# Patient Record
Sex: MALE | Race: WHITE | NOT HISPANIC OR LATINO | Employment: OTHER | URBAN - METROPOLITAN AREA
[De-identification: names, ages, dates, MRNs, and addresses within clinical notes are randomized per-mention and may not be internally consistent; named-entity substitution may affect disease eponyms.]

---

## 2017-01-05 ENCOUNTER — TRANSCRIBE ORDERS (OUTPATIENT)
Dept: LAB | Facility: CLINIC | Age: 77
End: 2017-01-05

## 2017-01-05 ENCOUNTER — APPOINTMENT (OUTPATIENT)
Dept: LAB | Facility: CLINIC | Age: 77
End: 2017-01-05
Payer: MEDICARE

## 2017-01-05 DIAGNOSIS — E11.9 TYPE 2 DIABETES MELLITUS WITHOUT COMPLICATION, UNSPECIFIED LONG TERM INSULIN USE STATUS: Primary | ICD-10-CM

## 2017-01-05 DIAGNOSIS — E11.9 TYPE 2 DIABETES MELLITUS WITHOUT COMPLICATION, UNSPECIFIED LONG TERM INSULIN USE STATUS: ICD-10-CM

## 2017-01-05 DIAGNOSIS — I42.9 SECONDARY CARDIOMYOPATHY, UNSPECIFIED: ICD-10-CM

## 2017-01-05 LAB
ALBUMIN SERPL BCP-MCNC: 3.3 G/DL (ref 3.5–5)
ALP SERPL-CCNC: 169 U/L (ref 46–116)
ALT SERPL W P-5'-P-CCNC: 34 U/L (ref 12–78)
ANION GAP SERPL CALCULATED.3IONS-SCNC: 9 MMOL/L (ref 4–13)
AST SERPL W P-5'-P-CCNC: 22 U/L (ref 5–45)
BASOPHILS # BLD AUTO: 0.04 THOUSANDS/ΜL (ref 0–0.1)
BASOPHILS NFR BLD AUTO: 1 % (ref 0–1)
BILIRUB SERPL-MCNC: 0.75 MG/DL (ref 0.2–1)
BUN SERPL-MCNC: 54 MG/DL (ref 5–25)
CALCIUM SERPL-MCNC: 9.5 MG/DL (ref 8.3–10.1)
CHLORIDE SERPL-SCNC: 99 MMOL/L (ref 100–108)
CHOLEST SERPL-MCNC: 124 MG/DL (ref 50–200)
CO2 SERPL-SCNC: 29 MMOL/L (ref 21–32)
CREAT SERPL-MCNC: 1.82 MG/DL (ref 0.6–1.3)
EOSINOPHIL # BLD AUTO: 0.18 THOUSAND/ΜL (ref 0–0.61)
EOSINOPHIL NFR BLD AUTO: 3 % (ref 0–6)
ERYTHROCYTE [DISTWIDTH] IN BLOOD BY AUTOMATED COUNT: 14.6 % (ref 11.6–15.1)
EST. AVERAGE GLUCOSE BLD GHB EST-MCNC: 151 MG/DL
GFR SERPL CREATININE-BSD FRML MDRD: 36.2 ML/MIN/1.73SQ M
GLUCOSE SERPL-MCNC: 75 MG/DL (ref 65–140)
HBA1C MFR BLD: 6.9 % (ref 4.2–6.3)
HCT VFR BLD AUTO: 38.1 % (ref 36.5–49.3)
HDLC SERPL-MCNC: 48 MG/DL (ref 40–60)
HGB BLD-MCNC: 12.8 G/DL (ref 12–17)
LDLC SERPL CALC-MCNC: 58 MG/DL (ref 0–100)
LYMPHOCYTES # BLD AUTO: 1.59 THOUSANDS/ΜL (ref 0.6–4.47)
LYMPHOCYTES NFR BLD AUTO: 23 % (ref 14–44)
MCH RBC QN AUTO: 34.9 PG (ref 26.8–34.3)
MCHC RBC AUTO-ENTMCNC: 33.6 G/DL (ref 31.4–37.4)
MCV RBC AUTO: 104 FL (ref 82–98)
MONOCYTES # BLD AUTO: 0.83 THOUSAND/ΜL (ref 0.17–1.22)
MONOCYTES NFR BLD AUTO: 12 % (ref 4–12)
NEUTROPHILS # BLD AUTO: 4.26 THOUSANDS/ΜL (ref 1.85–7.62)
NEUTS SEG NFR BLD AUTO: 61 % (ref 43–75)
NRBC BLD AUTO-RTO: 0 /100 WBCS
PLATELET # BLD AUTO: 309 THOUSANDS/UL (ref 149–390)
PMV BLD AUTO: 11.7 FL (ref 8.9–12.7)
POTASSIUM SERPL-SCNC: 4.4 MMOL/L (ref 3.5–5.3)
PROT SERPL-MCNC: 7.5 G/DL (ref 6.4–8.2)
RBC # BLD AUTO: 3.67 MILLION/UL (ref 3.88–5.62)
SODIUM SERPL-SCNC: 137 MMOL/L (ref 136–145)
TRIGL SERPL-MCNC: 88 MG/DL
WBC # BLD AUTO: 6.98 THOUSAND/UL (ref 4.31–10.16)

## 2017-01-05 PROCEDURE — 80061 LIPID PANEL: CPT

## 2017-01-05 PROCEDURE — 83036 HEMOGLOBIN GLYCOSYLATED A1C: CPT

## 2017-01-05 PROCEDURE — 36415 COLL VENOUS BLD VENIPUNCTURE: CPT

## 2017-01-05 PROCEDURE — 85025 COMPLETE CBC W/AUTO DIFF WBC: CPT

## 2017-01-05 PROCEDURE — 80053 COMPREHEN METABOLIC PANEL: CPT

## 2017-03-07 ENCOUNTER — APPOINTMENT (OUTPATIENT)
Dept: LAB | Facility: CLINIC | Age: 77
End: 2017-03-07
Payer: MEDICARE

## 2017-03-07 ENCOUNTER — TRANSCRIBE ORDERS (OUTPATIENT)
Dept: LAB | Facility: CLINIC | Age: 77
End: 2017-03-07

## 2017-03-07 DIAGNOSIS — I50.9 HEART FAILURE, UNSPECIFIED (HCC): ICD-10-CM

## 2017-03-07 DIAGNOSIS — Z79.899 ENCOUNTER FOR LONG-TERM (CURRENT) USE OF OTHER MEDICATIONS: ICD-10-CM

## 2017-03-07 DIAGNOSIS — I51.7 CARDIOMEGALY: Primary | ICD-10-CM

## 2017-03-07 LAB
ALBUMIN SERPL BCP-MCNC: 3.4 G/DL (ref 3.5–5)
ALP SERPL-CCNC: 151 U/L (ref 46–116)
ALT SERPL W P-5'-P-CCNC: 22 U/L (ref 12–78)
ANION GAP SERPL CALCULATED.3IONS-SCNC: 9 MMOL/L (ref 4–13)
AST SERPL W P-5'-P-CCNC: 13 U/L (ref 5–45)
BASOPHILS # BLD AUTO: 0.06 THOUSANDS/ΜL (ref 0–0.1)
BASOPHILS NFR BLD AUTO: 1 % (ref 0–1)
BILIRUB SERPL-MCNC: 0.52 MG/DL (ref 0.2–1)
BUN SERPL-MCNC: 42 MG/DL (ref 5–25)
CALCIUM SERPL-MCNC: 9.4 MG/DL (ref 8.3–10.1)
CHLORIDE SERPL-SCNC: 103 MMOL/L (ref 100–108)
CHOLEST SERPL-MCNC: 136 MG/DL (ref 50–200)
CO2 SERPL-SCNC: 25 MMOL/L (ref 21–32)
CREAT SERPL-MCNC: 1.69 MG/DL (ref 0.6–1.3)
EOSINOPHIL # BLD AUTO: 0.33 THOUSAND/ΜL (ref 0–0.61)
EOSINOPHIL NFR BLD AUTO: 5 % (ref 0–6)
ERYTHROCYTE [DISTWIDTH] IN BLOOD BY AUTOMATED COUNT: 13.5 % (ref 11.6–15.1)
GFR SERPL CREATININE-BSD FRML MDRD: 39.4 ML/MIN/1.73SQ M
GLUCOSE SERPL-MCNC: 274 MG/DL (ref 65–140)
HCT VFR BLD AUTO: 37.3 % (ref 36.5–49.3)
HDLC SERPL-MCNC: 46 MG/DL (ref 40–60)
HGB BLD-MCNC: 12.8 G/DL (ref 12–17)
LDLC SERPL CALC-MCNC: 63 MG/DL (ref 0–100)
LYMPHOCYTES # BLD AUTO: 0.85 THOUSANDS/ΜL (ref 0.6–4.47)
LYMPHOCYTES NFR BLD AUTO: 12 % (ref 14–44)
MCH RBC QN AUTO: 34.8 PG (ref 26.8–34.3)
MCHC RBC AUTO-ENTMCNC: 34.3 G/DL (ref 31.4–37.4)
MCV RBC AUTO: 101 FL (ref 82–98)
MONOCYTES # BLD AUTO: 0.91 THOUSAND/ΜL (ref 0.17–1.22)
MONOCYTES NFR BLD AUTO: 13 % (ref 4–12)
NEUTROPHILS # BLD AUTO: 4.84 THOUSANDS/ΜL (ref 1.85–7.62)
NEUTS SEG NFR BLD AUTO: 69 % (ref 43–75)
NRBC BLD AUTO-RTO: 0 /100 WBCS
PLATELET # BLD AUTO: 288 THOUSANDS/UL (ref 149–390)
PMV BLD AUTO: 10.9 FL (ref 8.9–12.7)
POTASSIUM SERPL-SCNC: 5.4 MMOL/L (ref 3.5–5.3)
PROT SERPL-MCNC: 7.5 G/DL (ref 6.4–8.2)
RBC # BLD AUTO: 3.68 MILLION/UL (ref 3.88–5.62)
SODIUM SERPL-SCNC: 137 MMOL/L (ref 136–145)
TRIGL SERPL-MCNC: 134 MG/DL
WBC # BLD AUTO: 7.06 THOUSAND/UL (ref 4.31–10.16)

## 2017-03-07 PROCEDURE — 80061 LIPID PANEL: CPT

## 2017-03-07 PROCEDURE — 80053 COMPREHEN METABOLIC PANEL: CPT

## 2017-03-07 PROCEDURE — 36415 COLL VENOUS BLD VENIPUNCTURE: CPT

## 2017-03-07 PROCEDURE — 85025 COMPLETE CBC W/AUTO DIFF WBC: CPT

## 2017-06-07 ENCOUNTER — APPOINTMENT (OUTPATIENT)
Dept: LAB | Facility: CLINIC | Age: 77
End: 2017-06-07
Payer: MEDICARE

## 2017-06-07 ENCOUNTER — TRANSCRIBE ORDERS (OUTPATIENT)
Dept: LAB | Facility: CLINIC | Age: 77
End: 2017-06-07

## 2017-06-07 DIAGNOSIS — I25.5 ISCHEMIC CARDIOMYOPATHY: Primary | ICD-10-CM

## 2017-06-07 DIAGNOSIS — E87.5 HYPERPOTASSEMIA: ICD-10-CM

## 2017-06-07 DIAGNOSIS — Z79.899 ENCOUNTER FOR LONG-TERM (CURRENT) USE OF OTHER MEDICATIONS: ICD-10-CM

## 2017-06-07 LAB
ANION GAP SERPL CALCULATED.3IONS-SCNC: 8 MMOL/L (ref 4–13)
BUN SERPL-MCNC: 39 MG/DL (ref 5–25)
CALCIUM SERPL-MCNC: 9.3 MG/DL (ref 8.3–10.1)
CHLORIDE SERPL-SCNC: 101 MMOL/L (ref 100–108)
CO2 SERPL-SCNC: 27 MMOL/L (ref 21–32)
CREAT SERPL-MCNC: 1.66 MG/DL (ref 0.6–1.3)
GFR SERPL CREATININE-BSD FRML MDRD: 40.3 ML/MIN/1.73SQ M
GLUCOSE SERPL-MCNC: 248 MG/DL (ref 65–140)
NT-PROBNP SERPL-MCNC: 1423 PG/ML
POTASSIUM SERPL-SCNC: 4.1 MMOL/L (ref 3.5–5.3)
SODIUM SERPL-SCNC: 136 MMOL/L (ref 136–145)

## 2017-06-07 PROCEDURE — 83880 ASSAY OF NATRIURETIC PEPTIDE: CPT

## 2017-06-07 PROCEDURE — 36415 COLL VENOUS BLD VENIPUNCTURE: CPT

## 2017-06-07 PROCEDURE — 80048 BASIC METABOLIC PNL TOTAL CA: CPT

## 2017-07-10 ENCOUNTER — APPOINTMENT (OUTPATIENT)
Dept: LAB | Facility: CLINIC | Age: 77
End: 2017-07-10
Payer: MEDICARE

## 2017-07-10 DIAGNOSIS — E13.9 MATURITY ONSET DIABETES MELLITUS IN YOUNG (HCC): Primary | ICD-10-CM

## 2017-07-10 LAB
EST. AVERAGE GLUCOSE BLD GHB EST-MCNC: 275 MG/DL
HBA1C MFR BLD: 11.2 % (ref 4.2–6.3)

## 2017-07-10 PROCEDURE — 83036 HEMOGLOBIN GLYCOSYLATED A1C: CPT

## 2017-07-10 PROCEDURE — 36415 COLL VENOUS BLD VENIPUNCTURE: CPT

## 2018-01-15 ENCOUNTER — APPOINTMENT (OUTPATIENT)
Dept: LAB | Facility: CLINIC | Age: 78
End: 2018-01-15
Payer: MEDICARE

## 2018-01-15 DIAGNOSIS — E11.65 UNCONTROLLED TYPE 2 DIABETES MELLITUS WITH COMPLICATION, UNSPECIFIED LONG TERM INSULIN USE STATUS: Primary | ICD-10-CM

## 2018-01-15 DIAGNOSIS — I25.5 ISCHEMIC CARDIOMYOPATHY: ICD-10-CM

## 2018-01-15 DIAGNOSIS — E11.8 UNCONTROLLED TYPE 2 DIABETES MELLITUS WITH COMPLICATION, UNSPECIFIED LONG TERM INSULIN USE STATUS: Primary | ICD-10-CM

## 2018-01-15 DIAGNOSIS — I10 ESSENTIAL HYPERTENSION, MALIGNANT: ICD-10-CM

## 2018-01-15 LAB
ALBUMIN SERPL BCP-MCNC: 3.6 G/DL (ref 3.5–5)
ALP SERPL-CCNC: 99 U/L (ref 46–116)
ALT SERPL W P-5'-P-CCNC: 28 U/L (ref 12–78)
ANION GAP SERPL CALCULATED.3IONS-SCNC: 6 MMOL/L (ref 4–13)
AST SERPL W P-5'-P-CCNC: 15 U/L (ref 5–45)
BACTERIA UR QL AUTO: ABNORMAL /HPF
BASOPHILS # BLD AUTO: 0.04 THOUSANDS/ΜL (ref 0–0.1)
BASOPHILS NFR BLD AUTO: 1 % (ref 0–1)
BILIRUB SERPL-MCNC: 0.78 MG/DL (ref 0.2–1)
BILIRUB UR QL STRIP: NEGATIVE
BUN SERPL-MCNC: 40 MG/DL (ref 5–25)
CALCIUM SERPL-MCNC: 9 MG/DL (ref 8.3–10.1)
CHLORIDE SERPL-SCNC: 102 MMOL/L (ref 100–108)
CLARITY UR: ABNORMAL
CO2 SERPL-SCNC: 28 MMOL/L (ref 21–32)
COLOR UR: YELLOW
CREAT SERPL-MCNC: 2.43 MG/DL (ref 0.6–1.3)
CREAT UR-MCNC: 85.1 MG/DL
EOSINOPHIL # BLD AUTO: 0.22 THOUSAND/ΜL (ref 0–0.61)
EOSINOPHIL NFR BLD AUTO: 3 % (ref 0–6)
ERYTHROCYTE [DISTWIDTH] IN BLOOD BY AUTOMATED COUNT: 12 % (ref 11.6–15.1)
EST. AVERAGE GLUCOSE BLD GHB EST-MCNC: 272 MG/DL
GFR SERPL CREATININE-BSD FRML MDRD: 24 ML/MIN/1.73SQ M
GLUCOSE P FAST SERPL-MCNC: 270 MG/DL (ref 65–99)
GLUCOSE UR STRIP-MCNC: ABNORMAL MG/DL
HBA1C MFR BLD: 11.1 % (ref 4.2–6.3)
HCT VFR BLD AUTO: 38 % (ref 36.5–49.3)
HGB BLD-MCNC: 13.2 G/DL (ref 12–17)
HGB UR QL STRIP.AUTO: ABNORMAL
KETONES UR STRIP-MCNC: NEGATIVE MG/DL
LEUKOCYTE ESTERASE UR QL STRIP: ABNORMAL
LYMPHOCYTES # BLD AUTO: 1.13 THOUSANDS/ΜL (ref 0.6–4.47)
LYMPHOCYTES NFR BLD AUTO: 15 % (ref 14–44)
MCH RBC QN AUTO: 35.7 PG (ref 26.8–34.3)
MCHC RBC AUTO-ENTMCNC: 34.7 G/DL (ref 31.4–37.4)
MCV RBC AUTO: 103 FL (ref 82–98)
MICROALBUMIN UR-MCNC: 274 MG/L (ref 0–20)
MICROALBUMIN/CREAT 24H UR: 322 MG/G CREATININE (ref 0–30)
MONOCYTES # BLD AUTO: 0.89 THOUSAND/ΜL (ref 0.17–1.22)
MONOCYTES NFR BLD AUTO: 12 % (ref 4–12)
NEUTROPHILS # BLD AUTO: 5.19 THOUSANDS/ΜL (ref 1.85–7.62)
NEUTS SEG NFR BLD AUTO: 69 % (ref 43–75)
NITRITE UR QL STRIP: NEGATIVE
NON-SQ EPI CELLS URNS QL MICRO: ABNORMAL /HPF
NRBC BLD AUTO-RTO: 0 /100 WBCS
PH UR STRIP.AUTO: 6.5 [PH] (ref 4.5–8)
PLATELET # BLD AUTO: 268 THOUSANDS/UL (ref 149–390)
PMV BLD AUTO: 10.3 FL (ref 8.9–12.7)
POTASSIUM SERPL-SCNC: 4.2 MMOL/L (ref 3.5–5.3)
PROT SERPL-MCNC: 7.4 G/DL (ref 6.4–8.2)
PROT UR STRIP-MCNC: ABNORMAL MG/DL
RBC # BLD AUTO: 3.7 MILLION/UL (ref 3.88–5.62)
RBC #/AREA URNS AUTO: ABNORMAL /HPF
SODIUM SERPL-SCNC: 136 MMOL/L (ref 136–145)
SP GR UR STRIP.AUTO: 1.02 (ref 1–1.03)
UROBILINOGEN UR QL STRIP.AUTO: 0.2 E.U./DL
WBC # BLD AUTO: 7.49 THOUSAND/UL (ref 4.31–10.16)
WBC #/AREA URNS AUTO: ABNORMAL /HPF

## 2018-01-15 PROCEDURE — 80053 COMPREHEN METABOLIC PANEL: CPT

## 2018-01-15 PROCEDURE — 82043 UR ALBUMIN QUANTITATIVE: CPT

## 2018-01-15 PROCEDURE — 82570 ASSAY OF URINE CREATININE: CPT

## 2018-01-15 PROCEDURE — 81001 URINALYSIS AUTO W/SCOPE: CPT

## 2018-01-15 PROCEDURE — 83036 HEMOGLOBIN GLYCOSYLATED A1C: CPT

## 2018-01-15 PROCEDURE — 85025 COMPLETE CBC W/AUTO DIFF WBC: CPT

## 2018-01-15 PROCEDURE — 36415 COLL VENOUS BLD VENIPUNCTURE: CPT

## 2018-02-08 ENCOUNTER — APPOINTMENT (OUTPATIENT)
Dept: LAB | Facility: CLINIC | Age: 78
End: 2018-02-08
Payer: MEDICARE

## 2018-02-08 DIAGNOSIS — N18.4 CHRONIC KIDNEY DISEASE, STAGE IV (SEVERE) (HCC): ICD-10-CM

## 2018-02-08 DIAGNOSIS — E11.65 UNCONTROLLED TYPE 2 DIABETES MELLITUS WITH COMPLICATION, UNSPECIFIED LONG TERM INSULIN USE STATUS: Primary | ICD-10-CM

## 2018-02-08 DIAGNOSIS — E11.8 UNCONTROLLED TYPE 2 DIABETES MELLITUS WITH COMPLICATION, UNSPECIFIED LONG TERM INSULIN USE STATUS: Primary | ICD-10-CM

## 2018-02-08 LAB
ANION GAP SERPL CALCULATED.3IONS-SCNC: 9 MMOL/L (ref 4–13)
BUN SERPL-MCNC: 23 MG/DL (ref 5–25)
CALCIUM SERPL-MCNC: 9.1 MG/DL (ref 8.3–10.1)
CHLORIDE SERPL-SCNC: 103 MMOL/L (ref 100–108)
CO2 SERPL-SCNC: 25 MMOL/L (ref 21–32)
CREAT SERPL-MCNC: 1.62 MG/DL (ref 0.6–1.3)
GFR SERPL CREATININE-BSD FRML MDRD: 40 ML/MIN/1.73SQ M
GLUCOSE SERPL-MCNC: 240 MG/DL (ref 65–140)
POTASSIUM SERPL-SCNC: 5 MMOL/L (ref 3.5–5.3)
SODIUM SERPL-SCNC: 137 MMOL/L (ref 136–145)

## 2018-02-08 PROCEDURE — 36415 COLL VENOUS BLD VENIPUNCTURE: CPT

## 2018-02-08 PROCEDURE — 80048 BASIC METABOLIC PNL TOTAL CA: CPT

## 2018-05-09 ENCOUNTER — APPOINTMENT (EMERGENCY)
Dept: RADIOLOGY | Facility: HOSPITAL | Age: 78
DRG: 682 | End: 2018-05-09
Payer: MEDICARE

## 2018-05-09 ENCOUNTER — HOSPITAL ENCOUNTER (INPATIENT)
Facility: HOSPITAL | Age: 78
LOS: 7 days | Discharge: HOME | DRG: 682 | End: 2018-05-16
Attending: EMERGENCY MEDICINE | Admitting: FAMILY MEDICINE
Payer: MEDICARE

## 2018-05-09 DIAGNOSIS — E11.00 HYPEROSMOLAR NON-KETOTIC STATE IN PATIENT WITH TYPE 2 DIABETES MELLITUS (HCC): Primary | ICD-10-CM

## 2018-05-09 DIAGNOSIS — I25.5 CARDIOMYOPATHY, ISCHEMIC: Chronic | ICD-10-CM

## 2018-05-09 DIAGNOSIS — N18.9 ACUTE ON CHRONIC RENAL FAILURE (HCC): ICD-10-CM

## 2018-05-09 DIAGNOSIS — R73.9 HYPERGLYCEMIA: ICD-10-CM

## 2018-05-09 DIAGNOSIS — E87.5 HYPERKALEMIA: ICD-10-CM

## 2018-05-09 DIAGNOSIS — R53.1 GENERALIZED WEAKNESS: ICD-10-CM

## 2018-05-09 DIAGNOSIS — N17.9 ACUTE ON CHRONIC RENAL FAILURE (HCC): ICD-10-CM

## 2018-05-09 DIAGNOSIS — E87.1 HYPONATREMIA: ICD-10-CM

## 2018-05-09 DIAGNOSIS — I49.5 TACHY-BRADY SYNDROME (HCC): ICD-10-CM

## 2018-05-09 PROBLEM — E11.9 DM2 (DIABETES MELLITUS, TYPE 2) (HCC): Status: ACTIVE | Noted: 2018-05-09

## 2018-05-09 PROBLEM — I10 HTN (HYPERTENSION): Status: ACTIVE | Noted: 2018-05-09

## 2018-05-09 PROBLEM — Z95.1 S/P CABG (CORONARY ARTERY BYPASS GRAFT): Status: ACTIVE | Noted: 2018-05-09

## 2018-05-09 PROBLEM — I25.10 CAD (CORONARY ARTERY DISEASE): Status: ACTIVE | Noted: 2018-05-09

## 2018-05-09 LAB
ALBUMIN SERPL BCP-MCNC: 2.8 G/DL (ref 3.5–5)
ALP SERPL-CCNC: 95 U/L (ref 46–116)
ALT SERPL W P-5'-P-CCNC: 23 U/L (ref 12–78)
ANION GAP SERPL CALCULATED.3IONS-SCNC: 10 MMOL/L (ref 4–13)
ANION GAP SERPL CALCULATED.3IONS-SCNC: 11 MMOL/L (ref 4–13)
APTT PPP: 30 SECONDS (ref 24–33)
ARTERIAL PATENCY WRIST A: YES
AST SERPL W P-5'-P-CCNC: 9 U/L (ref 5–45)
BACTERIA UR QL AUTO: ABNORMAL /HPF
BASE EXCESS BLDA CALC-SCNC: -10 MMOL/L
BASOPHILS # BLD AUTO: 0 THOUSANDS/ΜL (ref 0–0.1)
BASOPHILS NFR BLD AUTO: 0 % (ref 0–1)
BILIRUB SERPL-MCNC: 0.7 MG/DL (ref 0.2–1)
BILIRUB UR QL STRIP: NEGATIVE
BODY TEMPERATURE: 96.7 DEGREES FEHRENHEIT
BUN SERPL-MCNC: 90 MG/DL (ref 5–25)
BUN SERPL-MCNC: 92 MG/DL (ref 5–25)
CALCIUM SERPL-MCNC: 7.8 MG/DL (ref 8.3–10.1)
CALCIUM SERPL-MCNC: 8.2 MG/DL (ref 8.3–10.1)
CHLORIDE SERPL-SCNC: 83 MMOL/L (ref 100–108)
CHLORIDE SERPL-SCNC: 89 MMOL/L (ref 100–108)
CLARITY UR: ABNORMAL
CO2 SERPL-SCNC: 19 MMOL/L (ref 21–32)
CO2 SERPL-SCNC: 19 MMOL/L (ref 21–32)
COLOR UR: ABNORMAL
CREAT SERPL-MCNC: 2.36 MG/DL (ref 0.6–1.3)
CREAT SERPL-MCNC: 2.6 MG/DL (ref 0.6–1.3)
DIGOXIN SERPL-MCNC: 1.8 NG/ML (ref 0.8–2)
EOSINOPHIL # BLD AUTO: 0 THOUSAND/ΜL (ref 0–0.61)
EOSINOPHIL NFR BLD AUTO: 0 % (ref 0–6)
ERYTHROCYTE [DISTWIDTH] IN BLOOD BY AUTOMATED COUNT: 12.3 % (ref 11.6–15.1)
GFR SERPL CREATININE-BSD FRML MDRD: 22 ML/MIN/1.73SQ M
GFR SERPL CREATININE-BSD FRML MDRD: 25 ML/MIN/1.73SQ M
GLUCOSE SERPL-MCNC: 483 MG/DL (ref 65–140)
GLUCOSE SERPL-MCNC: 589 MG/DL (ref 65–140)
GLUCOSE SERPL-MCNC: 679 MG/DL (ref 65–140)
GLUCOSE UR STRIP-MCNC: ABNORMAL MG/DL
HCO3 BLDA-SCNC: 13.2 MMOL/L (ref 22–28)
HCT VFR BLD AUTO: 41.4 % (ref 42–52)
HGB BLD-MCNC: 13.7 G/DL (ref 14–18)
HGB UR QL STRIP.AUTO: ABNORMAL
INR PPP: 1.04 (ref 0.86–1.16)
KETONES UR STRIP-MCNC: NEGATIVE MG/DL
LEUKOCYTE ESTERASE UR QL STRIP: ABNORMAL
LG PLATELETS BLD QL SMEAR: PRESENT
LYMPHOCYTES # BLD AUTO: 0.6 THOUSANDS/ΜL (ref 0.6–4.47)
LYMPHOCYTES NFR BLD AUTO: 4 % (ref 14–44)
MAGNESIUM SERPL-MCNC: 2.7 MG/DL (ref 1.6–2.6)
MCH RBC QN AUTO: 34.2 PG (ref 27–31)
MCHC RBC AUTO-ENTMCNC: 33.1 G/DL (ref 31.4–37.4)
MCV RBC AUTO: 103 FL (ref 82–98)
MONOCYTES # BLD AUTO: 1.1 THOUSAND/ΜL (ref 0.17–1.22)
MONOCYTES NFR BLD AUTO: 8 % (ref 4–12)
NEUTROPHILS # BLD AUTO: 12.8 THOUSANDS/ΜL (ref 1.85–7.62)
NEUTS SEG NFR BLD AUTO: 88 % (ref 43–75)
NITRITE UR QL STRIP: POSITIVE
NON VENT ROOM AIR: 21 %
NON-SQ EPI CELLS URNS QL MICRO: ABNORMAL /HPF
NRBC BLD AUTO-RTO: 0 /100 WBCS
NT-PROBNP SERPL-MCNC: 1991 PG/ML
O2 CT BLDA-SCNC: 17.3 ML/DL (ref 16–23)
OXYHGB MFR BLDA: 95.3 % (ref 94–97)
PCO2 BLDA: 23.1 MM HG (ref 36–44)
PCO2 TEMP ADJ BLDA: 22 MM HG (ref 36–44)
PH BLD: 7.39 [PH] (ref 7.35–7.45)
PH BLDA: 7.38 [PH] (ref 7.35–7.45)
PH UR STRIP.AUTO: 6 [PH] (ref 5–9)
PLATELET # BLD AUTO: 225 THOUSANDS/UL (ref 130–400)
PLATELET BLD QL SMEAR: ADEQUATE
PMV BLD AUTO: 10.5 FL (ref 8.9–12.7)
PO2 BLD: 88.8 MM HG (ref 75–129)
PO2 BLDA: 95 MM HG (ref 75–129)
POTASSIUM SERPL-SCNC: 5.5 MMOL/L (ref 3.5–5.3)
POTASSIUM SERPL-SCNC: 6.5 MMOL/L (ref 3.5–5.3)
PROT SERPL-MCNC: 7.4 G/DL (ref 6.4–8.2)
PROT UR STRIP-MCNC: NEGATIVE MG/DL
PROTHROMBIN TIME: 10.9 SECONDS (ref 9.4–11.7)
RBC # BLD AUTO: 4.02 MILLION/UL (ref 4.7–6.1)
RBC #/AREA URNS AUTO: ABNORMAL /HPF
SODIUM SERPL-SCNC: 113 MMOL/L (ref 136–145)
SODIUM SERPL-SCNC: 118 MMOL/L (ref 136–145)
SP GR UR STRIP.AUTO: 1.01 (ref 1–1.03)
SPECIMEN SOURCE: ABNORMAL
TOXIC GRANULES BLD QL SMEAR: PRESENT
TROPONIN I SERPL-MCNC: <0.02 NG/ML
TSH SERPL DL<=0.05 MIU/L-ACNC: 1.74 UIU/ML (ref 0.36–3.74)
UROBILINOGEN UR QL STRIP.AUTO: 0.2 E.U./DL
WBC # BLD AUTO: 14.5 THOUSAND/UL (ref 4.8–10.8)
WBC #/AREA URNS AUTO: ABNORMAL /HPF

## 2018-05-09 PROCEDURE — 85730 THROMBOPLASTIN TIME PARTIAL: CPT | Performed by: EMERGENCY MEDICINE

## 2018-05-09 PROCEDURE — 83735 ASSAY OF MAGNESIUM: CPT | Performed by: PHYSICIAN ASSISTANT

## 2018-05-09 PROCEDURE — 87081 CULTURE SCREEN ONLY: CPT | Performed by: ANESTHESIOLOGY

## 2018-05-09 PROCEDURE — 87077 CULTURE AEROBIC IDENTIFY: CPT | Performed by: EMERGENCY MEDICINE

## 2018-05-09 PROCEDURE — 82948 REAGENT STRIP/BLOOD GLUCOSE: CPT

## 2018-05-09 PROCEDURE — 87086 URINE CULTURE/COLONY COUNT: CPT | Performed by: EMERGENCY MEDICINE

## 2018-05-09 PROCEDURE — 36600 WITHDRAWAL OF ARTERIAL BLOOD: CPT

## 2018-05-09 PROCEDURE — 80048 BASIC METABOLIC PNL TOTAL CA: CPT | Performed by: EMERGENCY MEDICINE

## 2018-05-09 PROCEDURE — 82805 BLOOD GASES W/O2 SATURATION: CPT | Performed by: EMERGENCY MEDICINE

## 2018-05-09 PROCEDURE — 85049 AUTOMATED PLATELET COUNT: CPT | Performed by: PHYSICIAN ASSISTANT

## 2018-05-09 PROCEDURE — 36415 COLL VENOUS BLD VENIPUNCTURE: CPT | Performed by: EMERGENCY MEDICINE

## 2018-05-09 PROCEDURE — 80053 COMPREHEN METABOLIC PANEL: CPT | Performed by: EMERGENCY MEDICINE

## 2018-05-09 PROCEDURE — 93005 ELECTROCARDIOGRAM TRACING: CPT

## 2018-05-09 PROCEDURE — 83930 ASSAY OF BLOOD OSMOLALITY: CPT | Performed by: PHYSICIAN ASSISTANT

## 2018-05-09 PROCEDURE — 83935 ASSAY OF URINE OSMOLALITY: CPT | Performed by: PHYSICIAN ASSISTANT

## 2018-05-09 PROCEDURE — 80162 ASSAY OF DIGOXIN TOTAL: CPT | Performed by: EMERGENCY MEDICINE

## 2018-05-09 PROCEDURE — 81001 URINALYSIS AUTO W/SCOPE: CPT | Performed by: EMERGENCY MEDICINE

## 2018-05-09 PROCEDURE — 99291 CRITICAL CARE FIRST HOUR: CPT | Performed by: PHYSICIAN ASSISTANT

## 2018-05-09 PROCEDURE — 83880 ASSAY OF NATRIURETIC PEPTIDE: CPT | Performed by: EMERGENCY MEDICINE

## 2018-05-09 PROCEDURE — 99285 EMERGENCY DEPT VISIT HI MDM: CPT

## 2018-05-09 PROCEDURE — 85025 COMPLETE CBC W/AUTO DIFF WBC: CPT | Performed by: EMERGENCY MEDICINE

## 2018-05-09 PROCEDURE — 80048 BASIC METABOLIC PNL TOTAL CA: CPT | Performed by: PHYSICIAN ASSISTANT

## 2018-05-09 PROCEDURE — 83735 ASSAY OF MAGNESIUM: CPT | Performed by: EMERGENCY MEDICINE

## 2018-05-09 PROCEDURE — 84300 ASSAY OF URINE SODIUM: CPT | Performed by: PHYSICIAN ASSISTANT

## 2018-05-09 PROCEDURE — 84443 ASSAY THYROID STIM HORMONE: CPT | Performed by: PHYSICIAN ASSISTANT

## 2018-05-09 PROCEDURE — 70450 CT HEAD/BRAIN W/O DYE: CPT

## 2018-05-09 PROCEDURE — 71045 X-RAY EXAM CHEST 1 VIEW: CPT

## 2018-05-09 PROCEDURE — 87186 SC STD MICRODIL/AGAR DIL: CPT | Performed by: EMERGENCY MEDICINE

## 2018-05-09 PROCEDURE — 85610 PROTHROMBIN TIME: CPT | Performed by: EMERGENCY MEDICINE

## 2018-05-09 PROCEDURE — 84484 ASSAY OF TROPONIN QUANT: CPT | Performed by: EMERGENCY MEDICINE

## 2018-05-09 RX ORDER — LISINOPRIL 2.5 MG/1
2.5 TABLET ORAL DAILY
COMMUNITY

## 2018-05-09 RX ORDER — HEPARIN SODIUM 5000 [USP'U]/ML
5000 INJECTION, SOLUTION INTRAVENOUS; SUBCUTANEOUS EVERY 8 HOURS SCHEDULED
Status: DISCONTINUED | OUTPATIENT
Start: 2018-05-09 | End: 2018-05-16 | Stop reason: HOSPADM

## 2018-05-09 RX ORDER — GLIMEPIRIDE 4 MG/1
4 TABLET ORAL 2 TIMES DAILY WITH MEALS
COMMUNITY
End: 2020-03-18 | Stop reason: HOSPADM

## 2018-05-09 RX ORDER — TORSEMIDE 10 MG/1
10 TABLET ORAL
Status: ON HOLD | COMMUNITY
End: 2018-05-16

## 2018-05-09 RX ORDER — METOPROLOL SUCCINATE 50 MG/1
50 TABLET, EXTENDED RELEASE ORAL DAILY
COMMUNITY
End: 2018-05-16 | Stop reason: HOSPADM

## 2018-05-09 RX ORDER — SPIRONOLACTONE 25 MG/1
25 TABLET ORAL DAILY
COMMUNITY
End: 2018-05-16 | Stop reason: HOSPADM

## 2018-05-09 RX ORDER — DIGOXIN 125 MCG
125 TABLET ORAL DAILY
COMMUNITY
End: 2018-05-16 | Stop reason: HOSPADM

## 2018-05-09 RX ORDER — DOCUSATE SODIUM 100 MG/1
100 CAPSULE, LIQUID FILLED ORAL 2 TIMES DAILY
COMMUNITY

## 2018-05-09 RX ORDER — ATORVASTATIN CALCIUM 10 MG/1
10 TABLET, FILM COATED ORAL DAILY
COMMUNITY

## 2018-05-09 RX ORDER — ASPIRIN 81 MG/1
81 TABLET, CHEWABLE ORAL DAILY
COMMUNITY
End: 2018-11-18 | Stop reason: HOSPADM

## 2018-05-09 RX ORDER — FERROUS SULFATE 325(65) MG
325 TABLET ORAL
COMMUNITY

## 2018-05-09 RX ORDER — TORSEMIDE 20 MG/1
20 TABLET ORAL EVERY MORNING
COMMUNITY
End: 2018-05-16 | Stop reason: HOSPADM

## 2018-05-09 RX ADMIN — CEFTRIAXONE 1000 MG: 1 INJECTION, SOLUTION INTRAVENOUS at 22:59

## 2018-05-09 RX ADMIN — SODIUM CHLORIDE 9 UNITS/HR: 9 INJECTION, SOLUTION INTRAVENOUS at 21:23

## 2018-05-09 RX ADMIN — HEPARIN SODIUM 5000 UNITS: 5000 INJECTION, SOLUTION INTRAVENOUS; SUBCUTANEOUS at 22:24

## 2018-05-09 RX ADMIN — SODIUM CHLORIDE 1000 ML: 0.9 INJECTION, SOLUTION INTRAVENOUS at 21:14

## 2018-05-10 PROBLEM — I10 BENIGN ESSENTIAL HYPERTENSION: Status: ACTIVE | Noted: 2017-07-12

## 2018-05-10 PROBLEM — E86.1 HYPOVOLEMIA DUE TO DEHYDRATION: Status: ACTIVE | Noted: 2018-05-10

## 2018-05-10 PROBLEM — I10 BENIGN ESSENTIAL HYPERTENSION: Chronic | Status: ACTIVE | Noted: 2017-07-12

## 2018-05-10 PROBLEM — Z95.1 HX OF CABG: Chronic | Status: ACTIVE | Noted: 2018-05-09

## 2018-05-10 PROBLEM — N39.0 URINARY TRACT INFECTION: Status: ACTIVE | Noted: 2018-05-10

## 2018-05-10 PROBLEM — N18.30 STAGE 3 CHRONIC KIDNEY DISEASE (HCC): Status: ACTIVE | Noted: 2018-05-09

## 2018-05-10 PROBLEM — E86.0 HYPOVOLEMIA DUE TO DEHYDRATION: Status: ACTIVE | Noted: 2018-05-10

## 2018-05-10 PROBLEM — M10.00 IDIOPATHIC GOUT: Status: ACTIVE | Noted: 2017-07-12

## 2018-05-10 PROBLEM — I10 HTN (HYPERTENSION): Chronic | Status: ACTIVE | Noted: 2018-05-09

## 2018-05-10 PROBLEM — I25.10 CAD (CORONARY ARTERY DISEASE): Chronic | Status: ACTIVE | Noted: 2018-05-09

## 2018-05-10 PROBLEM — E11.9 DM2 (DIABETES MELLITUS, TYPE 2) (HCC): Chronic | Status: ACTIVE | Noted: 2018-05-09

## 2018-05-10 PROBLEM — N18.30 STAGE 3 CHRONIC KIDNEY DISEASE (HCC): Chronic | Status: ACTIVE | Noted: 2018-05-09

## 2018-05-10 PROBLEM — I25.5 CARDIOMYOPATHY, ISCHEMIC: Status: ACTIVE | Noted: 2017-07-12

## 2018-05-10 PROBLEM — I25.5 CARDIOMYOPATHY, ISCHEMIC: Chronic | Status: ACTIVE | Noted: 2017-07-12

## 2018-05-10 PROBLEM — M10.00 IDIOPATHIC GOUT: Chronic | Status: ACTIVE | Noted: 2017-07-12

## 2018-05-10 LAB
ALBUMIN SERPL BCP-MCNC: 2.5 G/DL (ref 3.5–5)
ALP SERPL-CCNC: 88 U/L (ref 46–116)
ALT SERPL W P-5'-P-CCNC: 18 U/L (ref 12–78)
ANION GAP SERPL CALCULATED.3IONS-SCNC: 11 MMOL/L (ref 4–13)
ANION GAP SERPL CALCULATED.3IONS-SCNC: 9 MMOL/L (ref 4–13)
AST SERPL W P-5'-P-CCNC: 9 U/L (ref 5–45)
ATRIAL RATE: 141 BPM
ATRIAL RATE: 46 BPM
ATRIAL RATE: 50 BPM
BILIRUB SERPL-MCNC: 0.6 MG/DL (ref 0.2–1)
BUN SERPL-MCNC: 65 MG/DL (ref 5–25)
BUN SERPL-MCNC: 72 MG/DL (ref 5–25)
BUN SERPL-MCNC: 80 MG/DL (ref 5–25)
BUN SERPL-MCNC: 83 MG/DL (ref 5–25)
BUN SERPL-MCNC: 83 MG/DL (ref 5–25)
BUN SERPL-MCNC: 86 MG/DL (ref 5–25)
CA-I BLD-SCNC: 1.12 MMOL/L (ref 1.12–1.32)
CALCIUM SERPL-MCNC: 7.9 MG/DL (ref 8.3–10.1)
CALCIUM SERPL-MCNC: 7.9 MG/DL (ref 8.3–10.1)
CALCIUM SERPL-MCNC: 8 MG/DL (ref 8.3–10.1)
CALCIUM SERPL-MCNC: 8.3 MG/DL (ref 8.3–10.1)
CALCIUM SERPL-MCNC: 8.3 MG/DL (ref 8.3–10.1)
CALCIUM SERPL-MCNC: 8.4 MG/DL (ref 8.3–10.1)
CHLORIDE SERPL-SCNC: 94 MMOL/L (ref 100–108)
CHLORIDE SERPL-SCNC: 95 MMOL/L (ref 100–108)
CHLORIDE SERPL-SCNC: 95 MMOL/L (ref 100–108)
CHLORIDE SERPL-SCNC: 96 MMOL/L (ref 100–108)
CHLORIDE SERPL-SCNC: 97 MMOL/L (ref 100–108)
CHLORIDE SERPL-SCNC: 98 MMOL/L (ref 100–108)
CK SERPL-CCNC: 28 U/L (ref 39–308)
CO2 SERPL-SCNC: 18 MMOL/L (ref 21–32)
CO2 SERPL-SCNC: 18 MMOL/L (ref 21–32)
CO2 SERPL-SCNC: 20 MMOL/L (ref 21–32)
CO2 SERPL-SCNC: 20 MMOL/L (ref 21–32)
CO2 SERPL-SCNC: 21 MMOL/L (ref 21–32)
CO2 SERPL-SCNC: 21 MMOL/L (ref 21–32)
CREAT SERPL-MCNC: 1.73 MG/DL (ref 0.6–1.3)
CREAT SERPL-MCNC: 1.95 MG/DL (ref 0.6–1.3)
CREAT SERPL-MCNC: 2.07 MG/DL (ref 0.6–1.3)
CREAT SERPL-MCNC: 2.13 MG/DL (ref 0.6–1.3)
CREAT SERPL-MCNC: 2.16 MG/DL (ref 0.6–1.3)
CREAT SERPL-MCNC: 2.29 MG/DL (ref 0.6–1.3)
ERYTHROCYTE [DISTWIDTH] IN BLOOD BY AUTOMATED COUNT: 12.1 % (ref 11.6–15.1)
GFR SERPL CREATININE-BSD FRML MDRD: 26 ML/MIN/1.73SQ M
GFR SERPL CREATININE-BSD FRML MDRD: 28 ML/MIN/1.73SQ M
GFR SERPL CREATININE-BSD FRML MDRD: 29 ML/MIN/1.73SQ M
GFR SERPL CREATININE-BSD FRML MDRD: 30 ML/MIN/1.73SQ M
GFR SERPL CREATININE-BSD FRML MDRD: 32 ML/MIN/1.73SQ M
GFR SERPL CREATININE-BSD FRML MDRD: 37 ML/MIN/1.73SQ M
GLUCOSE SERPL-MCNC: 115 MG/DL (ref 65–140)
GLUCOSE SERPL-MCNC: 119 MG/DL (ref 65–140)
GLUCOSE SERPL-MCNC: 171 MG/DL (ref 65–140)
GLUCOSE SERPL-MCNC: 184 MG/DL (ref 65–140)
GLUCOSE SERPL-MCNC: 186 MG/DL (ref 65–140)
GLUCOSE SERPL-MCNC: 190 MG/DL (ref 65–140)
GLUCOSE SERPL-MCNC: 210 MG/DL (ref 65–140)
GLUCOSE SERPL-MCNC: 212 MG/DL (ref 65–140)
GLUCOSE SERPL-MCNC: 237 MG/DL (ref 65–140)
GLUCOSE SERPL-MCNC: 255 MG/DL (ref 65–140)
GLUCOSE SERPL-MCNC: 276 MG/DL (ref 65–140)
HCT VFR BLD AUTO: 40 % (ref 42–52)
HGB BLD-MCNC: 13.3 G/DL (ref 14–18)
LG PLATELETS BLD QL SMEAR: PRESENT
LYMPHOCYTES # BLD AUTO: 0.39 THOUSAND/UL (ref 0.6–4.47)
LYMPHOCYTES # BLD AUTO: 3 %
MAGNESIUM SERPL-MCNC: 2.5 MG/DL (ref 1.6–2.6)
MAGNESIUM SERPL-MCNC: 2.6 MG/DL (ref 1.6–2.6)
MCH RBC QN AUTO: 34.1 PG (ref 27–31)
MCHC RBC AUTO-ENTMCNC: 33.2 G/DL (ref 31.4–37.4)
MCV RBC AUTO: 103 FL (ref 82–98)
MONOCYTES # BLD AUTO: 0.52 THOUSAND/UL (ref 0–1.22)
MONOCYTES NFR BLD AUTO: 4 % (ref 4–12)
NEUTS BAND NFR BLD MANUAL: 10 % (ref 0–8)
NEUTS SEG # BLD: 12.18 THOUSAND/UL (ref 1.81–6.82)
NEUTS SEG NFR BLD AUTO: 83 %
NRBC BLD AUTO-RTO: 0 /100 WBCS
OSMOLALITY UR/SERPL-RTO: 314 MMOL/KG (ref 282–298)
OSMOLALITY UR: 456 MMOL/KG
P AXIS: 57 DEGREES
P AXIS: 84 DEGREES
PHOSPHATE SERPL-MCNC: 3 MG/DL (ref 2.3–4.1)
PLATELET # BLD AUTO: 222 THOUSANDS/UL (ref 130–400)
PLATELET # BLD AUTO: 240 THOUSANDS/UL (ref 130–400)
PLATELET BLD QL SMEAR: ADEQUATE
PMV BLD AUTO: 8.9 FL (ref 8.9–12.7)
PMV BLD AUTO: 9.2 FL (ref 8.9–12.7)
POTASSIUM SERPL-SCNC: 5 MMOL/L (ref 3.5–5.3)
POTASSIUM SERPL-SCNC: 5.3 MMOL/L (ref 3.5–5.3)
POTASSIUM SERPL-SCNC: 5.4 MMOL/L (ref 3.5–5.3)
POTASSIUM SERPL-SCNC: 5.4 MMOL/L (ref 3.5–5.3)
PR INTERVAL: 198 MS
PR INTERVAL: 220 MS
PROT SERPL-MCNC: 6.9 G/DL (ref 6.4–8.2)
QRS AXIS: 23 DEGREES
QRS AXIS: 32 DEGREES
QRS AXIS: 52 DEGREES
QRSD INTERVAL: 102 MS
QRSD INTERVAL: 110 MS
QRSD INTERVAL: 118 MS
QT INTERVAL: 294 MS
QT INTERVAL: 372 MS
QT INTERVAL: 432 MS
QTC INTERVAL: 325 MS
QTC INTERVAL: 393 MS
QTC INTERVAL: 464 MS
RBC # BLD AUTO: 3.9 MILLION/UL (ref 4.7–6.1)
SODIUM 24H UR-SCNC: 27 MOL/L
SODIUM SERPL-SCNC: 124 MMOL/L (ref 136–145)
SODIUM SERPL-SCNC: 124 MMOL/L (ref 136–145)
SODIUM SERPL-SCNC: 125 MMOL/L (ref 136–145)
SODIUM SERPL-SCNC: 126 MMOL/L (ref 136–145)
SODIUM SERPL-SCNC: 127 MMOL/L (ref 136–145)
SODIUM SERPL-SCNC: 127 MMOL/L (ref 136–145)
T WAVE AXIS: 187 DEGREES
T WAVE AXIS: 215 DEGREES
T WAVE AXIS: 219 DEGREES
TOTAL CELLS COUNTED SPEC: 100
TOXIC GRANULES BLD QL SMEAR: PRESENT
VENTRICULAR RATE: 150 BPM
VENTRICULAR RATE: 46 BPM
VENTRICULAR RATE: 50 BPM
WBC # BLD AUTO: 13.1 THOUSAND/UL (ref 4.8–10.8)

## 2018-05-10 PROCEDURE — 80048 BASIC METABOLIC PNL TOTAL CA: CPT | Performed by: PHYSICIAN ASSISTANT

## 2018-05-10 PROCEDURE — 82550 ASSAY OF CK (CPK): CPT | Performed by: INTERNAL MEDICINE

## 2018-05-10 PROCEDURE — 99222 1ST HOSP IP/OBS MODERATE 55: CPT | Performed by: INTERNAL MEDICINE

## 2018-05-10 PROCEDURE — 82948 REAGENT STRIP/BLOOD GLUCOSE: CPT

## 2018-05-10 PROCEDURE — 80053 COMPREHEN METABOLIC PANEL: CPT | Performed by: PHYSICIAN ASSISTANT

## 2018-05-10 PROCEDURE — 82330 ASSAY OF CALCIUM: CPT | Performed by: PHYSICIAN ASSISTANT

## 2018-05-10 PROCEDURE — 93005 ELECTROCARDIOGRAM TRACING: CPT

## 2018-05-10 PROCEDURE — 93010 ELECTROCARDIOGRAM REPORT: CPT | Performed by: INTERNAL MEDICINE

## 2018-05-10 PROCEDURE — 84100 ASSAY OF PHOSPHORUS: CPT | Performed by: PHYSICIAN ASSISTANT

## 2018-05-10 PROCEDURE — 85007 BL SMEAR W/DIFF WBC COUNT: CPT | Performed by: PHYSICIAN ASSISTANT

## 2018-05-10 PROCEDURE — 85027 COMPLETE CBC AUTOMATED: CPT | Performed by: PHYSICIAN ASSISTANT

## 2018-05-10 PROCEDURE — 83735 ASSAY OF MAGNESIUM: CPT | Performed by: PHYSICIAN ASSISTANT

## 2018-05-10 PROCEDURE — 99232 SBSQ HOSP IP/OBS MODERATE 35: CPT | Performed by: ANESTHESIOLOGY

## 2018-05-10 RX ORDER — ATORVASTATIN CALCIUM 10 MG/1
10 TABLET, FILM COATED ORAL
Status: DISCONTINUED | OUTPATIENT
Start: 2018-05-10 | End: 2018-05-16 | Stop reason: HOSPADM

## 2018-05-10 RX ORDER — ASPIRIN 81 MG/1
81 TABLET, CHEWABLE ORAL DAILY
Status: DISCONTINUED | OUTPATIENT
Start: 2018-05-10 | End: 2018-05-16 | Stop reason: HOSPADM

## 2018-05-10 RX ORDER — SODIUM CHLORIDE, SODIUM LACTATE, POTASSIUM CHLORIDE, CALCIUM CHLORIDE 600; 310; 30; 20 MG/100ML; MG/100ML; MG/100ML; MG/100ML
125 INJECTION, SOLUTION INTRAVENOUS CONTINUOUS
Status: DISCONTINUED | OUTPATIENT
Start: 2018-05-10 | End: 2018-05-11

## 2018-05-10 RX ORDER — INSULIN GLARGINE 100 [IU]/ML
20 INJECTION, SOLUTION SUBCUTANEOUS
Status: DISCONTINUED | OUTPATIENT
Start: 2018-05-10 | End: 2018-05-16 | Stop reason: HOSPADM

## 2018-05-10 RX ADMIN — SODIUM CHLORIDE, SODIUM LACTATE, POTASSIUM CHLORIDE, AND CALCIUM CHLORIDE 125 ML/HR: .6; .31; .03; .02 INJECTION, SOLUTION INTRAVENOUS at 19:31

## 2018-05-10 RX ADMIN — INSULIN LISPRO 2 UNITS: 100 INJECTION, SOLUTION INTRAVENOUS; SUBCUTANEOUS at 21:45

## 2018-05-10 RX ADMIN — INSULIN LISPRO 1 UNITS: 100 INJECTION, SOLUTION INTRAVENOUS; SUBCUTANEOUS at 07:33

## 2018-05-10 RX ADMIN — ATORVASTATIN CALCIUM 10 MG: 10 TABLET, FILM COATED ORAL at 16:11

## 2018-05-10 RX ADMIN — SODIUM CHLORIDE, SODIUM LACTATE, POTASSIUM CHLORIDE, AND CALCIUM CHLORIDE 500 ML: .6; .31; .03; .02 INJECTION, SOLUTION INTRAVENOUS at 13:57

## 2018-05-10 RX ADMIN — HEPARIN SODIUM 5000 UNITS: 5000 INJECTION, SOLUTION INTRAVENOUS; SUBCUTANEOUS at 13:45

## 2018-05-10 RX ADMIN — HEPARIN SODIUM 5000 UNITS: 5000 INJECTION, SOLUTION INTRAVENOUS; SUBCUTANEOUS at 05:54

## 2018-05-10 RX ADMIN — SODIUM CHLORIDE, SODIUM LACTATE, POTASSIUM CHLORIDE, AND CALCIUM CHLORIDE 500 ML: .6; .31; .03; .02 INJECTION, SOLUTION INTRAVENOUS at 06:33

## 2018-05-10 RX ADMIN — INSULIN GLARGINE 20 UNITS: 100 INJECTION, SOLUTION SUBCUTANEOUS at 21:45

## 2018-05-10 RX ADMIN — INSULIN LISPRO 2 UNITS: 100 INJECTION, SOLUTION INTRAVENOUS; SUBCUTANEOUS at 01:18

## 2018-05-10 RX ADMIN — HEPARIN SODIUM 5000 UNITS: 5000 INJECTION, SOLUTION INTRAVENOUS; SUBCUTANEOUS at 21:12

## 2018-05-10 RX ADMIN — INSULIN LISPRO 2 UNITS: 100 INJECTION, SOLUTION INTRAVENOUS; SUBCUTANEOUS at 11:57

## 2018-05-10 RX ADMIN — SODIUM CHLORIDE, SODIUM LACTATE, POTASSIUM CHLORIDE, AND CALCIUM CHLORIDE 100 ML/HR: .6; .31; .03; .02 INJECTION, SOLUTION INTRAVENOUS at 09:45

## 2018-05-10 RX ADMIN — ASPIRIN 81 MG 81 MG: 81 TABLET ORAL at 08:49

## 2018-05-10 RX ADMIN — CEFTRIAXONE 1000 MG: 1 INJECTION, SOLUTION INTRAVENOUS at 21:12

## 2018-05-10 NOTE — ASSESSMENT & PLAN NOTE
Likely secondary to CHRIS in the setting of hypovolemia and potassium supplements as well as diuretics and Ace inhibitors; trending down, following BMPs

## 2018-05-10 NOTE — ED PROVIDER NOTES
History  Chief Complaint   Patient presents with    Weakness - Generalized     thinks his CHF is acting up  Weak all over for several days  Denies SOB  76 yowm c/o worsening weakness x one week  Had fallen out of bed a week ago  Did not get checked  No head pain  No nausea/vomiting   + poor appetite  + feeling lightheaded  Says he has been taking his meds as he is supposed to  No chest pain  + sob with exertion  History provided by:  Patient   used: No        Prior to Admission Medications   Prescriptions Last Dose Informant Patient Reported?  Taking?   aspirin 81 mg chewable tablet   Yes Yes   Sig: Chew 81 mg daily   atorvastatin (LIPITOR) 10 mg tablet   Yes Yes   Sig: Take 10 mg by mouth daily   digoxin (DIGOX) 0 125 mg tablet   Yes Yes   Sig: Take 125 mcg by mouth daily   docusate sodium (COLACE) 100 mg capsule   Yes Yes   Sig: Take 100 mg by mouth 2 (two) times a day   ferrous sulfate 325 (65 Fe) mg tablet   Yes Yes   Sig: Take 325 mg by mouth 3 (three) times a day with meals   glimepiride (AMARYL) 4 mg tablet   Yes Yes   Sig: Take 4 mg by mouth 2 (two) times a day with meals   insulin detemir (LEVEMIR) 100 units/mL subcutaneous injection   Yes Yes   Sig: Inject 20 Units under the skin daily at bedtime   lisinopril (ZESTRIL) 2 5 mg tablet   Yes Yes   Sig: Take 2 5 mg by mouth daily   metoprolol succinate (TOPROL-XL) 50 mg 24 hr tablet   Yes Yes   Sig: Take 50 mg by mouth daily   spironolactone (ALDACTONE) 25 mg tablet   Yes Yes   Sig: Take 25 mg by mouth daily   torsemide (DEMADEX) 10 mg tablet   Yes Yes   Sig: Take 10 mg by mouth daily at bedtime   torsemide (DEMADEX) 20 mg tablet   Yes Yes   Sig: Take 20 mg by mouth every morning      Facility-Administered Medications: None       Past Medical History:   Diagnosis Date    CHF (congestive heart failure) (HCC)     Diabetes mellitus (HCC)     History of open heart surgery     Hyperlipidemia     Hypertension        Past Surgical History:   Procedure Laterality Date    CARDIAC SURGERY         History reviewed  No pertinent family history  I have reviewed and agree with the history as documented  Social History   Substance Use Topics    Smoking status: Never Smoker    Smokeless tobacco: Never Used    Alcohol use No        Review of Systems   Constitutional: Negative for chills and fever  HENT: Negative  Negative for congestion and sore throat  Eyes: Negative  Respiratory: Positive for shortness of breath  Negative for cough  Cardiovascular: Negative  Negative for chest pain and leg swelling  Gastrointestinal: Negative  Negative for abdominal pain, diarrhea, nausea and vomiting  Genitourinary: Negative  Negative for dysuria, flank pain and hematuria  Musculoskeletal: Negative  Negative for back pain and myalgias  Skin: Negative  Negative for rash and wound  Neurological: Positive for weakness and light-headedness  Negative for dizziness and headaches  Psychiatric/Behavioral: Negative  Negative for confusion and hallucinations  The patient is not nervous/anxious  All other systems reviewed and are negative  Physical Exam  ED Triage Vitals   Temperature Pulse Respirations Blood Pressure SpO2   05/09/18 1834 05/09/18 1834 05/09/18 1834 05/09/18 1840 05/09/18 1845   (!) 96 7 °F (35 9 °C) (!) 41 18 126/56 99 %      Temp Source Heart Rate Source Patient Position - Orthostatic VS BP Location FiO2 (%)   05/09/18 1834 05/09/18 1834 05/09/18 1834 05/09/18 1834 --   Tympanic Monitor Lying Left arm       Pain Score       05/09/18 1834       8           Orthostatic Vital Signs  Vitals:    05/09/18 1930 05/09/18 1945 05/09/18 2030 05/09/18 2045   BP: 110/57 118/56 110/53 (!) 189/70   Pulse: (!) 48 (!) 50 (!) 54 64   Patient Position - Orthostatic VS:           Physical Exam   Constitutional: He is oriented to person, place, and time  He appears well-developed and well-nourished  No distress     HENT: Head: Normocephalic and atraumatic  Mm dry   Eyes: Conjunctivae are normal  Pupils are equal, round, and reactive to light  No scleral icterus  Neck: Normal range of motion  Neck supple  Cardiovascular: Regular rhythm and normal heart sounds  Bradycardia present  No murmur heard  Pulmonary/Chest: Effort normal and breath sounds normal  No respiratory distress  He exhibits no tenderness  Abdominal: Soft  Bowel sounds are normal  He exhibits no distension  There is no tenderness  Musculoskeletal: Normal range of motion  He exhibits no edema, tenderness or deformity  Neurological: He is alert and oriented to person, place, and time  No cranial nerve deficit  He exhibits normal muscle tone  No drift, no droop, speech intact, finger to nose intact   Skin: Skin is warm and dry  No rash noted  He is not diaphoretic  No erythema  There is pallor  Psychiatric: He has a normal mood and affect  His behavior is normal    Nursing note and vitals reviewed  ED Medications  Medications   sodium chloride 0 9 % bolus 1,000 mL (1,000 mL Intravenous New Bag 5/9/18 2114)   insulin regular (HumuLIN R,NovoLIN R) 1 Units/mL in sodium chloride 0 9 % 100 mL infusion (9 Units/hr Intravenous New Bag 5/9/18 2123)       Diagnostic Studies  Results Reviewed     Procedure Component Value Units Date/Time    UA w Reflex to Microscopic [[id number]] Collected:  05/09/18 2116    Lab Status: In process Specimen:  Urine from Urine, Clean Catch Updated:  05/09/18 2124    Urine culture [[id number]] Collected:  05/09/18 2116    Lab Status: In process Specimen:  Urine from Urine, Clean Catch Updated:  05/09/18 2609    Basic metabolic panel [[id number]] Collected:  05/09/18 2116    Lab Status:   In process Specimen:  Blood from Arm, Right Updated:  05/09/18 2123    Blood gas, arterial [[id number]]     Lab Status:  No result Specimen:  Blood, Arterial     Comprehensive metabolic panel [[id number]]  (Abnormal) Collected:  05/09/18 1927    Lab Status:  Final result Specimen:  Blood from Arm, Right Updated:  05/09/18 2028     Sodium 113 (LL) mmol/L      Potassium 6 5 (HH) mmol/L      Chloride 83 (L) mmol/L      CO2 19 (L) mmol/L      Anion Gap 11 mmol/L      BUN 92 (H) mg/dL      Creatinine 2 60 (H) mg/dL      Glucose 679 (HH) mg/dL      Calcium 8 2 (L) mg/dL      AST 9 U/L      ALT 23 U/L      Alkaline Phosphatase 95 U/L      Total Protein 7 4 g/dL      Albumin 2 8 (L) g/dL      Total Bilirubin 0 70 mg/dL      eGFR 22 ml/min/1 73sq m     Narrative:         National Kidney Disease Education Program recommendations are as follows:  GFR calculation is accurate only with a steady state creatinine  Chronic Kidney disease less than 60 ml/min/1 73 sq  meters  Kidney failure less than 15 ml/min/1 73 sq  meters  B-type natriuretic peptide [[id number]]  (Abnormal) Collected:  05/09/18 1927    Lab Status:  Final result Specimen:  Blood from Arm, Right Updated:  05/09/18 2024     NT-proBNP 1,991 (H) pg/mL     Digoxin level [[id number]]  (Normal) Collected:  05/09/18 1927    Lab Status:  Final result Specimen:  Blood from Arm, Right Updated:  05/09/18 2024     Digoxin Lvl 1 8 ng/mL     Magnesium [[id number]]  (Abnormal) Collected:  05/09/18 1927    Lab Status:  Final result Specimen:  Blood from Arm, Right Updated:  05/09/18 2024     Magnesium 2 7 (H) mg/dL     Troponin I [[id number]]  (Normal) Collected:  05/09/18 1927    Lab Status:  Final result Specimen:  Blood from Arm, Right Updated:  05/09/18 2013     Troponin I <0 02 ng/mL     Narrative:         [employer] Chemistry analyzer 99% cutoff is > 0 04 ng/mL in network labs    o cTnI 99% cutoff is useful only when applied to patients in the clinical setting of myocardial ischemia  o cTnI 99% cutoff should be interpreted in the context of clinical history, ECG findings and possibly cardiac imaging to establish correct diagnosis    o cTnI 99% cutoff may be suggestive but clearly not indicative of a coronary event without the clinical setting of myocardial ischemia  CBC and differential [[id number]]  (Abnormal) Collected:  05/09/18 1927    Lab Status:  Final result Specimen:  Blood from Arm, Right Updated:  05/09/18 1951     WBC 14 50 (H) Thousand/uL      RBC 4 02 (L) Million/uL      Hemoglobin 13 7 (L) g/dL      Hematocrit 41 4 (L) %       (H) fL      MCH 34 2 (H) pg      MCHC 33 1 g/dL      RDW 12 3 %      MPV 10 5 fL      Platelets 340 Thousands/uL      nRBC 0 /100 WBCs      Neutrophils Relative 88 (H) %      Lymphocytes Relative 4 (L) %      Monocytes Relative 8 %      Eosinophils Relative 0 %      Basophils Relative 0 %      Neutrophils Absolute 12 80 (H) Thousands/µL      Lymphocytes Absolute 0 60 Thousands/µL      Monocytes Absolute 1 10 Thousand/µL      Eosinophils Absolute 0 00 Thousand/µL      Basophils Absolute 0 00 Thousands/µL     Protime-INR [[id number]]  (Normal) Collected:  05/09/18 1927    Lab Status:  Final result Specimen:  Blood from Arm, Right Updated:  05/09/18 1947     Protime 10 9 seconds      INR 1 04    APTT [[id number]]  (Normal) Collected:  05/09/18 1927    Lab Status:  Final result Specimen:  Blood from Arm, Right Updated:  05/09/18 1947     PTT 30 seconds                  CT head without contrast   Final Result by Giles Tatum MD (05/09 2119)      No acute intracranial abnormality  Workstation performed: [id number]         XR chest 1 view portable   Final Result by Nelida Hidalgo MD (05/09 2054)      No acute cardiopulmonary disease              Workstation performed: [id number]                    Procedures  ECG 12 Lead Documentation  Date/Time: 5/9/2018 9:16 PM  Performed by: Ludy Diana  Authorized by: Ludy Diana     Indications / Diagnosis:  Weakness  ECG reviewed by me, the ED Provider: yes    Patient location:  ED  Previous ECG:     Previous ECG:  Unavailable  Interpretation:     Interpretation: abnormal    Rate:     ECG rate:  50    ECG rate assessment: bradycardic    Rhythm: Rhythm: sinus bradycardia    Ectopy:     Ectopy: none    QRS:     QRS axis:  Normal  Conduction:     Conduction: normal    ST segments:     ST segments:  Normal  T waves:     T waves: inverted      Inverted:  I, II, aVL, aVF, V4, V5 and V6           Phone Contacts  ED Phone Contact    ED Course                               MDM  Number of Diagnoses or Management Options  Diagnosis management comments: Discussed with Dr Erin Rudd and Dr Cornelio Thakur  Will admit to ICU  The patient presented with a condition in which there was a high probability of imminent or life-threatening deterioration, and critical care services (excluding separately billable procedures) totalled 30-74 minutes  Disposition  Final diagnoses:   Hyperosmolar non-ketotic state in patient with type 2 diabetes mellitus ([facility] )   Acute on chronic renal failure (HCC)   Hyperkalemia   Hyponatremia   Generalized weakness   Hyperglycemia     Time reflects when diagnosis was documented in both MDM as applicable and the Disposition within this note     Time User Action Codes Description Comment    7/8/7045  4:96 PM Leitha Dynes A Add [R06 78] Hyperosmolar non-ketotic state in patient with type 2 diabetes mellitus ([facility] 75 )     4/4/1590  3:85 PM Tor Hikes Add [F80 7,  N18 9] Acute on chronic renal failure ([facility] 75 )     9/2/4773  7:38 PM Tor Hikes Add [N25 4] Hyperkalemia     0/5/7552  4:32 PM Leitha Dynes A Add [B42 8] Hyponatremia     5/5/0644  8:21 PM Leitha Dynes A Add [S98 0] Generalized weakness     2/9/2167  6:68 PM Leitha Dynes A Add [L23 4] Hyperglycemia       ED Disposition     ED Disposition Condition Comment    Admit  Case was discussed with **Dr Erin Rudd* and the patient's admission status was agreed to be Admission Status: inpatient status  Follow-up Information    None       Patient's Medications   Discharge Prescriptions    No medications on file     No discharge procedures on file      ED Provider  Electronically Signed by           Estefania Carrillo MD  64/94/30 1509       Estefania Carrillo MD  97/26/49 8377

## 2018-05-10 NOTE — CONSULTS
Consultation - Cardiology   Sai Brooks 76 y o  male MRN: [id number]  Unit/Bed#: ICU 05 Encounter: [id number]  05/10/18  8:23 AM          Physician Requesting Consult: Sho Bass MD  Reason for Consult / Principal Problem: Atrial fibrillation      Assessment:  1  Atrial fibrillation - patient has been alternating with rapid atrial fibrillation and sinus bradycardia with frequent PVCs  Likely related to electrolyte abnormalities  Will continue monitoring while treatment is ongoing  If remains after electrolytes normalize, may require permanent pacemaker  No indication for temporary pacemaker at this time  Check digoxin level  2  CAD with previous CABG - hold beta blocker  Continue ASA  3  Chronic CHF - please obtain most recent echocardiogram from his cardiologist     4  DM with hyperglycemia - treatment per icu team    5  Lethargy - began after fall - obtain CK  6  Dyslipidemia - hold atorvastatin for now  7  Hyponatremia  8  Hyperkalemia    Plan:  As above      History of Present Illness   HPI: Sai Brooks is a 76y o  year old male who presents with generalized weakness that has been present for the past 10 days  He complains of poor appetite and lethargy  Symptoms began after falling out of his bed 10 days ago  He was on the ground for over an hour and had multiple areas of injury  He denies any chest pain but has dyspnea with exertion  In ER, noted to be bradycardic and afterwards developed atrial fibrillation with RVR  Chest Xray was normal   Head CT did not show any intracranial abnormality  Sodium level was 113 (corrected 122) and blood sugar was 679  Patient has a history of CABG in 2017 and follows with Dr Sajan Bueno from [facility]  Review of Systems:    Review of Systems   Constitutional: Positive for fatigue  Negative for chills and fever  HENT: Negative for congestion, nosebleeds and postnasal drip      Respiratory: Positive for shortness of breath  Negative for cough and chest tightness  Cardiovascular: Negative for chest pain, palpitations and leg swelling  Gastrointestinal: Negative for abdominal distention, abdominal pain, diarrhea, nausea and vomiting  Endocrine: Negative for polydipsia, polyphagia and polyuria  Musculoskeletal: Positive for arthralgias and myalgias  Negative for gait problem  Skin: Negative for color change, pallor and rash  Allergic/Immunologic: Negative for environmental allergies, food allergies and immunocompromised state  Neurological: Negative for dizziness, seizures, syncope and light-headedness  Hematological: Negative for adenopathy  Does not bruise/bleed easily  Psychiatric/Behavioral: Negative for dysphoric mood  The patient is not nervous/anxious  Historical Information   Past Medical History:   Diagnosis Date    [facility]F (congestive heart failure) ([facility] )     Diabetes mellitus ([facility] )     History of open heart surgery     Hyperlipidemia     Hypertension      Past Surgical History:   Procedure Laterality Date    CARDIAC SURGERY       History   Alcohol Use No     History   Drug Use No     History   Smoking Status    Never Smoker   Smokeless Tobacco    Never Used       Family History: History reviewed  No pertinent family history  Meds/Allergies   PTA meds:   Prior to Admission Medications   Prescriptions Last Dose Informant Patient Reported?  Taking?   aspirin 81 mg chewable tablet   Yes Yes   Sig: Chew 81 mg daily   atorvastatin (LIPITOR) 10 mg tablet   Yes Yes   Sig: Take 10 mg by mouth daily   digoxin (DIGOX) 0 125 mg tablet   Yes Yes   Sig: Take 125 mcg by mouth daily   docusate sodium (COLACE) 100 mg capsule   Yes Yes   Sig: Take 100 mg by mouth 2 (two) times a day   ferrous sulfate 325 (65 Fe) mg tablet   Yes Yes   Sig: Take 325 mg by mouth 3 (three) times a day with meals   glimepiride (AMARYL) 4 mg tablet   Yes Yes   Sig: Take 4 mg by mouth 2 (two) times a day with meals   insulin detemir (LEVEMIR) 100 units/mL subcutaneous injection   Yes Yes   Sig: Inject 20 Units under the skin daily at bedtime   lisinopril (ZESTRIL) 2 5 mg tablet   Yes Yes   Sig: Take 2 5 mg by mouth daily   metoprolol succinate (TOPROL-XL) 50 mg 24 hr tablet   Yes Yes   Sig: Take 50 mg by mouth daily   spironolactone (ALDACTONE) 25 mg tablet   Yes Yes   Sig: Take 25 mg by mouth daily   torsemide (DEMADEX) 10 mg tablet   Yes Yes   Sig: Take 10 mg by mouth daily at bedtime   torsemide (DEMADEX) 20 mg tablet   Yes Yes   Sig: Take 20 mg by mouth every morning      Facility-Administered Medications: None     No Known Allergies    Objective   Vitals: Blood pressure (!) 83/44, pulse 75, temperature 98 6 °F (37 °C), temperature source Temporal, resp  rate (!) 24, height 5' 8 5" (1 74 m), weight 66 5 kg (146 lb 9 7 oz), SpO2 96 %  , Body mass index is 21 97 kg/m²  Physical Exam   Constitutional: He appears healthy  No distress  HENT:   Nose: Nose normal    Mouth/Throat: Oropharynx is clear  Eyes: Conjunctivae are normal  Pupils are equal, round, and reactive to light  Neck: Neck supple  No JVD present  Cardiovascular: Normal rate  An irregular rhythm present  Exam reveals no distant heart sounds and no friction rub  Murmur heard  Pulmonary/Chest: Effort normal and breath sounds normal  He has no wheezes  He has no rales  Abdominal: Soft  He exhibits no distension  There is no tenderness  Musculoskeletal: He exhibits no edema  Neurological: He is alert and oriented to person, place, and time  Skin: Skin is warm and dry  No rash noted         Lab Results:     Troponins:   Results from last 7 days  Lab Units 05/09/18 1927   TROPONIN I ng/mL <0 02       CBC with diff:   Results from last 7 days  Lab Units 05/10/18  0422 05/09/18  2356 05/09/18 1927   WBC Thousand/uL 13 10*  --  14 50*   HEMOGLOBIN g/dL 13 3*  --  13 7*   HEMATOCRIT % 40 0*  --  41 4*   MCV fL 103*  --  103*   PLATELETS Thousands/uL 240 222 225 MCH pg 34 1*  --  34 2*   MCHC g/dL 33 2  --  33 1   RDW % 12 1  --  12 3   MPV fL 8 9 9 2 10 5   NRBC AUTO /100 WBCs 0  --  0       CMP:   Results from last 7 days  Lab Units 05/10/18  0417 05/09/18  2356 05/09/18  2116 05/09/18  1927   SODIUM mmol/L 127* 126* 118* 113*   POTASSIUM mmol/L 5 3 5 0 5 5* 6 5*   CHLORIDE mmol/L 97* 94* 89* 83*   CO2 mmol/L 21 21 19* 19*   ANION GAP mmol/L 9 11 10 11   BUN mg/dL 83* 86* 90* 92*   CREATININE mg/dL 2 07* 2 16* 2 36* 2 60*   GLUCOSE RANDOM mg/dL 119 186* 589* 679*   CALCIUM mg/dL 8 4 8 3 7 8* 8 2*   AST U/L 9  --   --  9   ALT U/L 18  --   --  23   ALK PHOS U/L 88  --   --  95   TOTAL PROTEIN g/dL 6 9  --   --  7 4   BILIRUBIN TOTAL mg/dL 0 60  --   --  0 70   EGFR ml/min/1 73sq m 30 28 25 22       Magnesium:   Results from last 7 days  Lab Units 05/10/18  0417 05/09/18  2356 05/09/18  1927   MAGNESIUM mg/dL 2 5 2 6 2 7*       Coags:   Results from last 7 days  Lab Units 05/09/18 1927   PTT seconds 30   INR  1 04     Cardiac testing:     Serial EKG: Personally reviewed:   Atrial fibrillation with RVR  Marked Sinus bradycardia with frequent PVCs  Sinus bradycardia    Imaging: I have personally reviewed pertinent films in PACS

## 2018-05-10 NOTE — ASSESSMENT & PLAN NOTE
Suspect back to baseline 1 4, suspect secondary to hypovolemia and medication use; no additional intervention

## 2018-05-10 NOTE — PROGRESS NOTES
Daily Progress Note - Critical Care/ Stepdown   Remington Jc 76 y o  male MRN: [id number]  Unit/Bed#: ICU 05 Encounter: [id number]    ______________________________________________________________________  Assessment:   Principal Problem:    Hyponatremia  Active Problems:    Hypovolemia due to dehydration    Hyperkalemia    CHRIS (acute kidney injury) ([place] [address] )    Urinary tract infection    CKD (chronic kidney disease) stage 3, GFR 30-59 ml/min    Cardiomyopathy, ischemic    DM2 (diabetes mellitus, type 2) ([place] [address] )    CAD (coronary artery disease)    Hx of CABG    Benign essential hypertension  Resolved Problems:    * No resolved hospital problems  *      Hypovolemia due to dehydration   Assessment & Plan    Cont IVF, trend labs        * Hyponatremia   Assessment & Plan    Corrected sodium for hyperglycemia was 122, upto 127 this am, will cont to trend Q4H, correct no higher than 130 today; Patient admits to not eating or drinking very well for the last week and a half  Patient is also on several medications that may be playing a role in this such as torsemide, lisinopril, potassium supplements, Aldactone; Hold all these meds for now  Given history, suspect this is hypovolemic hyponatremia; urine studies are currently pending; May need additional fluids base on lab data        Hyperkalemia   Assessment & Plan    Likely secondary to CHRIS in the setting of hypovolemia and potassium supplements as well as diuretics and Ace inhibitors; trending down, following BMPs        CHRIS (acute kidney injury) ([place] [address] )   Assessment & Plan    Suspect secondary to hypovolemia and medication use, trended down this am, still making urine   Follow urine output        Urinary tract infection   Assessment & Plan    Cont on Rocephin, awaiting urine culture        HTN (hypertension)   Assessment & Plan    · Blood pressure is currently stable  · Will hold antihypertensive meds and diuretics given acute kidney injury and electrolyte abnormalities CKD (chronic kidney disease) stage 3, GFR 30-59 ml/min   Assessment & Plan    Baseline creatinine appears to be around 1 6, 2 07 this am, will follow        Cardiomyopathy, ischemic   Assessment & Plan    Supportive care, no active intervention        Benign essential hypertension   Assessment & Plan    Hold antihypertensives as BP's in the low 100's/ 90's        CAD (coronary artery disease)   Assessment & Plan    History of coronary artery disease with a CABG in 2017, continue aspirin        DM2 (diabetes mellitus, type 2) ([facility])   Assessment & Plan    Restart Levemir, cont to hold Amaryl; sliding scale            Plan:    Neuro:   · Delirium: CAM ICU positive no   · Regulate sleep/wake cycle    CV:   · Rhythm: Has had multiple different rhythms, A-fib, Junctional and NSR; currently NSR  · Follow rhythm on telemetry    Pulm:   · Tolerating room air    GI:   · Nutrition/diet plan: Diabetic  · Stress ulcer prophylaxis: No prophylaxis needed  · Bowel regimen: none  · Last BM: prior to admission    FEN:   · Potassium trending down; Sodium slowly trending up; follow Q4H  · Fluid/Diuretic plan: Needs volume LR infusion  · Electrolytes repleted: yes  · Goal: K >4 0, Mag >2 0, and Phos >3 0    :   · Indwelling Gordon present: yes   · Urinary catheter still needed for Strict I and O in a critically ill patient      ID:   · Possible UTI, cont antibx until culture back  · Abx ordered: Ceftriaxone  · Day # 1 of 7 day course  · Trend temps and WBC count    Heme:   · Hgb stable  · Trend hgb and plts    Endo:   · BS stable, restarted Levemir, cont sliding scale  · Glycemic control plan: Blood glucose controlled on current regimen    Msk/Skin:  · Mobility goal: OOB as tolerated  · PT consult: no  · OT consult: no  · Frequent turning and off-loading    Family:  · Family updated within 24 hours: yes   · Family meeting planned today: no     Lines:  · PIV    VTE Prophylaxis:  · Pharmacologic Prophylaxis: Heparin  · Mechanical Prophylaxis: sequential compression device    Disposition: Transfer to Stepdown    Code Status: Level 1 - Full Code    Counseling / Coordination of Care  Total time spent today 40 minutes  Greater than 50% of total time was spent with the patient and / or family counseling and / or coordination of care  A description of the counseling / coordination of care: evaluating patient, writing note, placing orders, reviewing chart  ______________________________________________________________________    HPI/24hr events: no issues overnight; he states he feels much better this morning; denies any SOB or CP;     ______________________________________________________________________    Physical Exam:   Physical Exam   Constitutional: He is oriented to person, place, and time  He appears well-developed and well-nourished  No distress  HENT:   Head: Normocephalic  Dry mucous membranes   Eyes: Pupils are equal, round, and reactive to light  Neck: Normal range of motion  Cardiovascular: Normal rate and regular rhythm  Pulmonary/Chest: Effort normal and breath sounds normal  No respiratory distress  Abdominal: Soft  Bowel sounds are normal  He exhibits no distension  There is no tenderness  Musculoskeletal: Normal range of motion  He exhibits no edema  Neurological: He is alert and oriented to person, place, and time  Skin: Skin is warm and dry  Capillary refill takes less than 2 seconds  He is not diaphoretic  Psychiatric: He has a normal mood and affect  His behavior is normal    Nursing note and vitals reviewed        ______________________________________________________________________  Vitals:    05/10/18 1346 05/10/18 1500 05/10/18 1502 05/10/18 1608   BP:    100/52   Pulse: 85 86 87 90   Resp:    Temp:    97 7 °F (36 5 °C)   TempSrc:    Temporal   SpO2: 97% 97% 97% 98%   Weight:       Height:                  Temperature:   Temp (24hrs), Av 6 °F (36 4 °C), Min:96 7 °F (35 9 °C), Max:98 6 °F (37 °C)    Current Temperature: 97 7 °F (36 5 °C)    Weights:   IBW: 69 55 kg    Body mass index is 21 97 kg/m²  Weight (last 2 days)     Date/Time   Weight    05/10/18 0600  66 5 (146 61)    05/09/18 2209  66 5 (146 61)    05/09/18 1834  68 (150)              Hemodynamic Monitoring:  N/A       Non-Invasive/Invasive Ventilation Settings:  Respiratory    Lab Data (Last 4 hours)    None         O2/Vent Data (Last 4 hours)    None              Lab Results   Component Value Date    PHART 7 376 05/09/2018    SDN2OBS 23 1 (LL) 05/09/2018    PO2ART 95 0 05/09/2018    ZDR2UJS 13 2 (L) 05/09/2018    BEART -10 0 05/09/2018    SOURCE Radial, Left 05/09/2018     SpO2: SpO2: 98 %, SpO2 Device: O2 Device: None (Room air)    Intake and Outputs:  I/O       05/08 0701 - 05/09 0700 05/09 0701 - 05/10 0700 05/10 0701 - 05/11 0700    P  O    240    I V  (mL/kg)  28 4 (0 4) 30 (0 5)    IV Piggyback  1050 1000    Total Intake(mL/kg)  1078 4 (16 2) 1270 (19 1)    Urine (mL/kg/hr)  1450 370 (0 6)    Total Output   1450 370    Net   -371 6 +900                 Nutrition:        Diet Orders            Start     Ordered    05/10/18 0756  Diet Omid/CHO Controlled; Consistent Carbohydrate Diet Level 2 (5 carb servings/75 grams CHO/meal)  Diet effective now     Question Answer Comment   Diet Type Omid/CHO Controlled    Omid/CHO Controlled Consistent Carbohydrate Diet Level 2 (5 carb servings/75 grams CHO/meal)    RD to adjust diet per protocol?  No        05/10/18 0756    05/10/18 0754  Room Service  Once     Question:  Type of Service  Answer:  Room Service-Appropriate    05/10/18 0753        Labs:     Results from last 7 days  Lab Units 05/10/18  0422 05/09/18  2356 05/09/18  1927   WBC Thousand/uL 13 10*  --  14 50*   HEMOGLOBIN g/dL 13 3*  --  13 7*   HEMATOCRIT % 40 0*  --  41 4*   PLATELETS Thousands/uL 240 222 225   NEUTROS PCT %  --   --  88*   MONOS PCT %  --   --  8   MONO PCT MAN % 4  --   --        Results from last 7 days  Lab Units 05/10/18  1607 05/10/18  1205 05/10/18  0750 05/10/18  0417  05/09/18  1927   SODIUM mmol/L 124* 124* 127* 127*  < > 113*   POTASSIUM mmol/L 5 4* 5 4* 5 0 5 3  < > 6 5*   CHLORIDE mmol/L 95* 95* 98* 97*  < > 83*   CO2 mmol/L 20* 18* 18* 21  < > 19*   BUN mg/dL 72* 80* 83* 83*  < > 92*   CREATININE mg/dL 1 95* 2 13* 2 29* 2 07*  < > 2 60*   CALCIUM mg/dL 7 9* 7 9* 8 3 8 4  < > 8 2*   TOTAL PROTEIN g/dL  --   --   --  6 9  --  7 4   BILIRUBIN TOTAL mg/dL  --   --   --  0 60  --  0 70   ALK PHOS U/L  --   --   --  88  --  95   ALT U/L  --   --   --  18  --  23   AST U/L  --   --   --  9  --  9   GLUCOSE RANDOM mg/dL 276* 184* 115 119  < > 679*   < > = values in this interval not displayed  Results from last 7 days  Lab Units 05/10/18  0417 05/09/18  2356 05/09/18  1927   MAGNESIUM mg/dL 2 5 2 6 2 7*     Lab Results   Component Value Date    PHOS 3 0 05/10/2018        Results from last 7 days  Lab Units 05/09/18 1927   INR  1 04   PTT seconds 30       0  Lab Value Date/Time   TROPONINI <0 02 05/09/2018 1927         ABG:  Lab Results   Component Value Date    PHART 7 376 05/09/2018    [id number] 23 1 (LL) 05/09/2018    PO2ART 95 0 05/09/2018    [id number] 13 2 (L) 05/09/2018    BEART -10 0 05/09/2018    SOURCE Radial, Left 05/09/2018       Imaging: CXR: none today I have personally reviewed pertinent reports        EKG: NSR presently, but has flipped into A-fib w/ RVR and Junctional    Micro:  No results found for: Brandy Laity, WOUNDCULT, SPUTUMCULTUR    Allergies: No Known Allergies  Medications:   Scheduled Meds:  Current Facility-Administered Medications:  aspirin 81 mg Oral Daily MIGUEL Glover    atorvastatin 10 mg Oral Daily With MIGUEL Graves    cefTRIAXone 1,000 mg Intravenous Q24H Rhea John PA-C Last Rate: Stopped (05/09/18 1269)   heparin (porcine) 5,000 Units Subcutaneous Q8H [address] Rhea John PA-C    insulin glargine 20 Units Subcutaneous HS Rhea John PA-C    insulin lispro 1-6 Units Subcutaneous TID AC MIGUEL Prasad    insulin lispro 1-6 Units Subcutaneous HS MIGUEL Prasad    lactated ringers 125 mL/hr Intravenous Continuous MIGUEL Prasad Last Rate: 125 mL/hr (05/10/18 1350)   pneumococcal 13-valent conjugate vaccine 0 5 mL Intramuscular Prior to discharge Noemi Wallace MD      Continuous Infusions:  lactated ringers 125 mL/hr Last Rate: 125 mL/hr (05/10/18 1350)     PRN Meds:    pneumococcal 13-valent conjugate vaccine 0 5 mL Prior to discharge       Invasive lines and devices:   Invasive Devices     Peripheral Intravenous Line            Peripheral IV 05/09/18 Right Antecubital less than 1 day    Peripheral IV 05/09/18 Right Forearm less than 1 day          Drain            Urethral Catheter 16 Fr  less than 1 day                   SIGNATURE: MIGUEL Prasad  DATE: May 10, 2018

## 2018-05-10 NOTE — PLAN OF CARE
Plan of care cont      CARDIOVASCULAR - ADULT     Maintains optimal cardiac output and hemodynamic stability Progressing     Absence of cardiac dysrhythmias or at baseline rhythm Progressing        GENITOURINARY - ADULT     Maintains or returns to baseline urinary function Progressing     Absence of urinary retention Progressing     Urinary catheter remains patent Progressing        METABOLIC, FLUID AND ELECTROLYTES - ADULT     Electrolytes maintained within normal limits Progressing     Fluid balance maintained Progressing     Glucose maintained within target range Progressing        Potential for Falls     Patient will remain free of falls Progressing        Prexisting or High Potential for Compromised Skin Integrity     Skin integrity is maintained or improved Progressing

## 2018-05-10 NOTE — MEDICAL STUDENT
Daily Progress Note - Critical Care/ Stepdown   Salud Pearl 76 y o  male MRN: [id number]  Unit/Bed#: ICU 05 Encounter: [id number]    ______________________________________________________________________  Assessment:   Principal Problem:    Hyponatremia  Active Problems:    Hyperkalemia    Hyperglycemia    DM2 (diabetes mellitus, type 2) ([facility])    CAD (coronary artery disease)    Hx of CABG    CKD (chronic kidney disease)    CHRIS (acute kidney injury) ([facility] )    HTN (hypertension)  Resolved Problems:    * No resolved hospital problems  *      HTN (hypertension)   Assessment & Plan    · Blood pressure is currently stable  · Will hold antihypertensive meds and diuretics given acute kidney injury and electrolyte abnormalities        CHRIS (acute kidney injury) ([facility] )   Assessment & Plan    · Suspect secondary to hypovolemia and medication use  · Monitor urine output        CKD (chronic kidney disease)   Assessment & Plan    · Baseline creatinine appears to be around 1 6        CAD (coronary artery disease)   Assessment & Plan    · History of coronary artery disease with a CABG in 2017  · Continue aspirin  · Normally follows with yeast in Cardiology  No echo available to review however medication list suggests a possible poor ejection fraction        DM2 (diabetes mellitus, type 2) ([facility])   Assessment & Plan    · Normally on Levemir as well as Amaryl, hold while currently on insulin infusion        Hyperglycemia   Assessment & Plan    · Insulin infusion         Hyperkalemia   Assessment & Plan    · Likely secondary to CHRIS in the setting of hypovolemia and potassium supplements as well as diuretics and Ace inhibitors  · Will repeat labs after fluid resuscitation and insulin infusion        * Hyponatremia   Assessment & Plan    · Corrected sodium for hyperglycemia is 122  · Patient admits to not eating or drinking very well for the last week and a half    Family member states he ate a can of soup today and stated it was his 1st time eating for several days    · Patient is also on several medications that may be playing a role in this such as torsemide, lisinopril, potassium supplements, Aldactone  · Given history, suspect this is hypo volemic hyponatremia  · Urine studies are currently pending  · Receiving 1 L normal saline in the emergency department, will repeat BMP after fluids and re-evaluate            Plan:    Neuro:   · Delirium: CAM ICU positive no   · Regulate sleep/wake cycle    CV:   · Conduction issues: consult cardiology- Junctional, afib, christen  · CAD: restart ASA 81mg daily  · HTN:  Cont to hold home meds  · Rhythm: NSR, junctional, afib  · Follow rhythm on telemetry    Pulm:   ·     GI:   · Nutrition/diet plan: start consistent carb diet   · Stress ulcer prophylaxis: No prophylaxis needed  · Bowel regimen: none at this time  · Last BM: unknown    FEN:   · Hyponatremia: 127 this am, cont to trend, cont fluids  · Electrolytes repleted: no  · Goal: K >4 0, Mag >2 0, and Phos >3 0    :   · CHRIS on CKD stage 3: bumped this morning to 2 29, add LR at 100ml/hr  · UTI: urine culture pending,  UA dirty- cont ceftriaxone   · Indwelling Luis present: yes, DC luis    ID:   · Abx ordered: Ceftriaxone  · Day # 2 of course  · Trend temps and WBC count    Heme:   · Leukocytosis: slowly trending down  · Bandemia: 10% today  · Trend hgb and plts    Endo:   · DM2: insulin drip DC'd overnight, 119 this am, lantus tonight  · Glycemic control plan: Blood glucose controlled on current regimen    Msk/Skin:  · Mobility goal: activity as tolerated  · PT consult: no  · OT consult: no  · Frequent turning and off-loading    Family:  · Family updated within 24 hours: yes   · Family meeting planned today: yes     Lines:  · 2 peripheral IVs    VTE Prophylaxis:  · Pharmacologic Prophylaxis: Heparin  · Mechanical Prophylaxis: sequential compression device    Disposition: Transfer to Stepdown    Code Status: Level 1 - Full Code    Counseling / Coordination of Care  Total Critical Care time spent 41 minutes excluding procedures, teaching and family updates  ______________________________________________________________________    HPI/24hr events: Pts labs normalizing  Overnight he went into Afib RVR, junctional and bradycardic rhythms, cardiology consult today      ______________________________________________________________________    Physical Exam:   Physical Exam   Constitutional: He is oriented to person, place, and time  He appears well-developed  No distress  HENT:   Head: Normocephalic and atraumatic  Nose: Nose normal    Mouth/Throat: Mucous membranes are dry  Eyes: Conjunctivae and EOM are normal  Pupils are equal, round, and reactive to light  Neck: Normal range of motion  Neck supple  No JVD present  Cardiovascular: Normal rate, regular rhythm, normal heart sounds and intact distal pulses  Exam reveals no gallop and no friction rub  No murmur heard  Pulmonary/Chest: Effort normal and breath sounds normal    Abdominal: Soft  He exhibits no distension and no mass  There is no tenderness  There is no guarding  Musculoskeletal: Normal range of motion  He exhibits no edema  Neurological: He is alert and oriented to person, place, and time  No cranial nerve deficit  Skin: Skin is warm and dry  Capillary refill takes less than 2 seconds  Psychiatric: He has a normal mood and affect   His behavior is normal  Judgment and thought content normal        ______________________________________________________________________  Vitals:    05/10/18 0100 05/10/18 0200 05/10/18 0300 05/10/18 0659   BP: 92/55 94/53 114/57    Pulse: 83 95 90    Resp: 22 21 (!) 24    Temp:    98 6 °F (37 °C)   TempSrc:    Temporal   SpO2: 97% 97% 97%    Weight:       Height:                  Temperature:   Temp (24hrs), Av 6 °F (36 4 °C), Min:96 7 °F (35 9 °C), Max:98 6 °F (37 °C)    Current Temperature: 98 6 °F (37 °C)    Weights:   IBW: 69 55 kg Body mass index is 21 97 kg/m²  Weight (last 2 days)     Date/Time   Weight    05/09/18 2209  66 5 (146 61)    05/09/18 1834  68 (150)              Hemodynamic Monitoring:  N/A       Non-Invasive/Invasive Ventilation Settings:  Respiratory    Lab Data (Last 4 hours)    None         O2/Vent Data (Last 4 hours)    None              Lab Results   Component Value Date    PHART 7 376 05/09/2018    [id number] 23 1 (LL) 05/09/2018    PO2ART 95 0 05/09/2018    [id number] 13 2 (L) 05/09/2018    BEART -10 0 05/09/2018    SOURCE Radial, Left 05/09/2018     SpO2: SpO2: 96 %    Intake and Outputs:  I/O       05/08 0701 - 05/09 0700 05/09 0701 - 05/10 0700 05/10 0701 - 05/11 0700    IV Piggyback  1000     Total Intake(mL/kg)  1000 (15)     Urine (mL/kg/hr)  1125     Total Output   1125      Net   -125                    Nutrition:        Diet Orders            Start     Ordered    05/09/18 2209  Diet NPO; Sips of clear liquids  Diet effective now     Question Answer Comment   Diet Type NPO    NPO Except: Sips of clear liquids    RD to adjust diet per protocol?  No        05/09/18 2208          Labs:     Results from last 7 days  Lab Units 05/10/18  0422 05/09/18 2356 05/09/18  1927   WBC Thousand/uL 13 10*  --  14 50*   HEMOGLOBIN g/dL 13 3*  --  13 7*   HEMATOCRIT % 40 0*  --  41 4*   PLATELETS Thousands/uL 240 222 225   NEUTROS PCT %  --   --  88*   MONOS PCT %  --   --  8   MONO PCT MAN % 4  --   --        Results from last 7 days  Lab Units 05/10/18  0417 05/09/18  2356 05/09/18  2116 05/09/18  1927   SODIUM mmol/L 127* 126* 118* 113*   POTASSIUM mmol/L 5 3 5 0 5 5* 6 5*   CHLORIDE mmol/L 97* 94* 89* 83*   CO2 mmol/L 21 21 19* 19*   BUN mg/dL 83* 86* 90* 92*   CREATININE mg/dL 2 07* 2 16* 2 36* 2 60*   CALCIUM mg/dL 8 4 8 3 7 8* 8 2*   TOTAL PROTEIN g/dL 6 9  --   --  7 4   BILIRUBIN TOTAL mg/dL 0 60  --   --  0 70   ALK PHOS U/L 88  --   --  95   ALT U/L 18  --   --  23   AST U/L 9  --   --  9   GLUCOSE RANDOM mg/dL 119 186* 589* 679*       Results from last 7 days  Lab Units 05/10/18  0417 05/09/18  2356 05/09/18  1927   MAGNESIUM mg/dL 2 5 2 6 2 7*     Lab Results   Component Value Date    PHOS 3 0 05/10/2018        Results from last 7 days  Lab Units 05/09/18 1927   INR  1 04   PTT seconds 30       0  Lab Value Date/Time   TROPONINI <0 02 05/09/2018 1927         ABG:  Lab Results   Component Value Date    PHART 7 376 05/09/2018    [id number] 23 1 (LL) 05/09/2018    PO2ART 95 0 05/09/2018    [id number] 13 2 (L) 05/09/2018    BEART -10 0 05/09/2018    SOURCE Radial, Left 05/09/2018       Imaging: CXR: no disease I have personally reviewed pertinent reports  CT: no abnormalities I have personally reviewed pertinent reports  EKG: NSR with PACs    Micro:  No results found for: Arman Deems, WOUNDCULT, SPUTUMCULTUR    Allergies: No Known Allergies  Medications:   Scheduled Meds:  Current Facility-Administered Medications:  cefTRIAXone 1,000 mg Intravenous Q24H Rhea John PA-C Last Rate: 1,000 mg (05/09/18 2259)   heparin (porcine) 5,000 Units Subcutaneous Q8H [address] FRITZ An-MARGOTH    insulin glargine 20 Units Subcutaneous HS FRITZ An-MARGOTH    insulin lispro 1-6 Units Subcutaneous TID AC Rhea John PA-C    lactated ringers 500 mL Intravenous Once MIGUEL Gomez Last Rate: 500 mL (05/10/18 2250)   pneumococcal 13-valent conjugate vaccine 0 5 mL Intramuscular Prior to discharge Farrah Pitt MD      Continuous Infusions:   PRN Meds:    pneumococcal 13-valent conjugate vaccine 0 5 mL Prior to discharge       Invasive lines and devices:   Invasive Devices     Peripheral Intravenous Line            Peripheral IV 05/09/18 Right Antecubital less than 1 day    Peripheral IV 05/09/18 Right Forearm less than 1 day          Drain            Urethral Catheter 16 Fr  less than 1 day                   SIGNATURE: Concepción Staton  DATE: May 10, 2018

## 2018-05-10 NOTE — H&P
History and Physical - Critical Care  Steph Mcneil 76 y o  male MRN: [id number]  Unit/Bed#: ICU 05 Encounter: [id number]     Reason for Admission / Chief Complaint: generalized weakness     History of Present Illness:  Steph Mcneil is a 76 y o  male with past medical history of coronary artery disease status post CABG in 2017, hypertension , hyperlipidemia, diabetes type 2, chronic kidney disease who presents with generalized weakness  Some of history is offered by his daughter  States that about a week and half ago the patient fell out of bed and since then he has been generally very weak  Patient is normally very active, 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  For the last week he has been somewhat lethargic, taking naps which is uncharacteristic for him  Patient and daughter also admit that he has had very poor oral intake recently  He made a comment to her today after eating a bowl of soup that that was the 1st food he had eaten in several days  He does complain of some dysuria that has been ongoing for several days  He denies knowing he has ever had a urinary tract infection in the past   He denies symptoms such as urgency or hesitancy  In Emergency Department, labs were significant for hyponatremia, hyperkalemia, leukocytosis, and acute kidney injury  Given degree of metabolic abnormalities, patient was admitted to ICU for further monitoring  History obtained from chart review and the patient  Past Medical History:  Past Medical History:   Diagnosis Date    [facility]     [facility]     History of open heart surgery     Hyperlipidemia     Hypertension         Past Surgical History:  Past Surgical History:   Procedure Laterality Date    CARDIAC SURGERY          Past Family History:  History reviewed  No pertinent family history       Social History:  History   Smoking Status    Never Smoker   Smokeless Tobacco    Never Used     History   Alcohol Use No     History   Drug Use No     Marital Status: Single       Medications:  Current Facility-Administered Medications   Medication Dose Route Frequency    cefTRIAXone (ROCEPHIN) IVPB (premix) 1,000 mg  1,000 mg Intravenous Q24H    heparin (porcine) subcutaneous injection 5,000 Units  5,000 Units Subcutaneous Q8H [facility]    insulin regular (HumuLIN R,NovoLIN R) 1 Units/mL in sodium chloride 0 9 % 100 mL infusion  9 Units/hr Intravenous Continuous     Home medications:  Prior to Admission medications    Medication Sig Start Date End Date Taking?  Authorizing Provider   aspirin 81 mg chewable tablet Chew 81 mg daily   Yes Historical Provider, MD   atorvastatin (LIPITOR) 10 mg tablet Take 10 mg by mouth daily   Yes Historical Provider, MD   digoxin (DIGOX) 0 125 mg tablet Take 125 mcg by mouth daily   Yes Historical Provider, MD   docusate sodium (COLACE) 100 mg capsule Take 100 mg by mouth 2 (two) times a day   Yes Historical Provider, MD   ferrous sulfate 325 (65 Fe) mg tablet Take 325 mg by mouth 3 (three) times a day with meals   Yes Historical Provider, MD   glimepiride (AMARYL) 4 mg tablet Take 4 mg by mouth 2 (two) times a day with meals   Yes Historical Provider, MD   insulin detemir (LEVEMIR) 100 units/mL subcutaneous injection Inject 20 Units under the skin daily at bedtime   Yes Historical Provider, MD   lisinopril (ZESTRIL) 2 5 mg tablet Take 2 5 mg by mouth daily   Yes Historical Provider, MD   metoprolol succinate (TOPROL-XL) 50 mg 24 hr tablet Take 50 mg by mouth daily   Yes Historical Provider, MD   spironolactone (ALDACTONE) 25 mg tablet Take 25 mg by mouth daily   Yes Historical Provider, MD   torsemide (DEMADEX) 10 mg tablet Take 10 mg by mouth daily at bedtime   Yes Historical Provider, MD   torsemide (DEMADEX) 20 mg tablet Take 20 mg by mouth every morning   Yes Historical Provider, MD     Allergies:  No Known Allergies     ROS:   Review of Systems Constitutional: Positive for appetite change  Gastrointestinal: Negative for abdominal pain  Neurological: Positive for weakness and light-headedness  Negative for dizziness, seizures, speech difficulty and numbness  All other systems reviewed and are negative  Vitals:  Vitals:    18 1930 18 19418   BP: 110/57 118/56 110/53 (!) 189/70   Pulse: (!) 48 (!) 50 (!) 54 64   Resp: (!) 23 18 20 (!) 34   Temp:       TempSrc:       SpO2: 98% 98%     Weight:       Height:         Temperature:   Temp (24hrs), Av 7 °F (35 9 °C), Min:96 7 °F (35 9 °C), Max:96 7 °F (35 9 °C)    Current: Temperature: (!) 96 7 °F (35 9 °C)     Weights:   IBW: 69 55 kg  Body mass index is 22 48 kg/m²  Hemodynamic Monitoring:  N/A     Non-Invasive/Invasive Ventilation Settings:  Respiratory    Lab Data (Last 4 hours)       2132            pH, Arterial       7 376           pCO2, Arterial       (!)23 1           pO2, Arterial       95 0           HCO3, Arterial       (!)13 2           Base Excess, Arterial       -10 0                O2/Vent Data     None              Lab Results   Component Value Date    PHART 7 376 2018    JEL1YMN 23 1 (LL) 2018    PO2ART 95 0 2018    GBN2TXZ 13 2 (L) 2018    BEART -10 0 2018    SOURCE Radial, Left 2018     SpO2: SpO2: 98 %, SpO2 Activity: SpO2 Activity: At Rest, SpO2 Device:  , Capnography:       Physical Exam:  Physical Exam   Constitutional: He is oriented to person, place, and time  He appears well-developed and well-nourished  No distress  HENT:   Head: Normocephalic and atraumatic  Eyes: Pupils are equal, round, and reactive to light  Neck: No JVD present  Cardiovascular: Normal rate and regular rhythm  No murmur heard  Pulmonary/Chest: Effort normal and breath sounds normal  No respiratory distress  Abdominal: Soft  Bowel sounds are normal  He exhibits no distension     Musculoskeletal: He exhibits no edema  Neurological: He is alert and oriented to person, place, and time  No cranial nerve deficit  Skin: Skin is warm and dry  Psychiatric: He has a normal mood and affect  Nursing note and vitals reviewed  Labs:    Results from last 7 days  Lab Units 05/09/18 1927   WBC Thousand/uL 14 50*   HEMOGLOBIN g/dL 13 7*   HEMATOCRIT % 41 4*   PLATELETS Thousands/uL 225   NEUTROS PCT % 88*   MONOS PCT % 8      Results from last 7 days  Lab Units 05/09/18 2116 05/09/18 1927   SODIUM mmol/L 118* 113*   POTASSIUM mmol/L 5 5* 6 5*   CHLORIDE mmol/L 89* 83*   CO2 mmol/L 19* 19*   BUN mg/dL 90* 92*   CREATININE mg/dL 2 36* 2 60*   CALCIUM mg/dL 7 8* 8 2*   TOTAL PROTEIN g/dL  --  7 4   BILIRUBIN TOTAL mg/dL  --  0 70   ALK PHOS U/L  --  95   ALT U/L  --  23   AST U/L  --  9   GLUCOSE RANDOM mg/dL 589* 679*       Results from last 7 days  Lab Units 05/09/18 1927   MAGNESIUM mg/dL 2 7*            Results from last 7 days  Lab Units 05/09/18 1927   INR  1 04   PTT seconds 30           0  Lab Value Date/Time   TROPONINI <0 02 05/09/2018 1927        Imaging: cxr, CTH I have personally reviewed pertinent reports  and I have personally reviewed pertinent films in PACS  Micro:  No results found for: CHAY WrightCULTLITA    Assessment/Plan:      * Hyponatremia   Assessment & Plan    · Corrected sodium for hyperglycemia is 122  · Patient admits to not eating or drinking very well for the last week and a half  Family member states he ate a can of soup today and stated it was his 1st time eating for several days    · Patient is also on several medications that may be playing a role in this such as torsemide, lisinopril, potassium supplements, Aldactone  · Given history, suspect this is hypo volemic hyponatremia  · Urine studies are currently pending  · Receiving 1 L normal saline in the emergency department, will repeat BMP after fluids and re-evaluate        Hyperkalemia   Assessment & Plan    · Likely secondary to CHRIS in the setting of hypovolemia and potassium supplements as well as diuretics and Ace inhibitors  · Will repeat labs after fluid resuscitation and insulin infusion        [facility]IS ([facility]) ([facility] )   Assessment & Plan    · Suspect secondary to hypovolemia and medication use  · Monitor urine output        Hyperglycemia   Assessment & Plan    · Insulin infusion         DM2 (diabetes mellitus, type 2) ([facility])   Assessment & Plan    · Normally on Levemir as well as Amaryl, hold while currently on insulin infusion        CAD (coronary artery disease)   Assessment & Plan    · History of coronary artery disease with a CABG in 2017  · Continue aspirin  · Normally follows with yeast in Cardiology  No echo available to review however medication list suggests a possible poor ejection fraction        HTN (hypertension)   Assessment & Plan    · Blood pressure is currently stable  · Will hold antihypertensive meds and diuretics given acute kidney injury and electrolyte abnormalities        CKD (chronic kidney disease)   Assessment & Plan    · Baseline creatinine appears to be around 1 6                           Disposition: Admit to ICU     VTE Pharmacologic Prophylaxis: Heparin  VTE Mechanical Prophylaxis: sequential compression device     Invasive lines and devices: Invasive Devices     Peripheral Intravenous Line            Peripheral IV 05/09/18 Right Antecubital less than 1 day                 Code Status: Level 1 - Full Code  POA:    POLST:       Given critical illness, patient length of stay will require greater than two midnights  Counseling / Coordination of Care  Total Critical Care time spent 43 minutes excluding procedures, teaching and family updates  Portions of the record may have been created with voice recognition software  Occasional wrong word or "sound a like" substitutions may have occurred due to the inherent limitations of voice recognition software    Read the chart carefully and recognize, using context, where substitutions have occurred          Myrna Nova PA-C

## 2018-05-10 NOTE — PLAN OF CARE
Problem: DISCHARGE PLANNING - CARE MANAGEMENT  Goal: Discharge to post-acute care or home with appropriate resources  INTERVENTIONS:  - Conduct assessment to determine patient/family and health care team treatment goals, and need for post-acute services based on payer coverage, community resources, and patient preferences, and barriers to discharge  - Address psychosocial, clinical, and financial barriers to discharge as identified in assessment in conjunction with the patient/family and health care team  - Arrange appropriate level of post-acute services according to patient's   needs and preference and payer coverage in collaboration with the physician and health care team  - Communicate with and update the patient/family, physician, and health care team regarding progress on the discharge plan  - Arrange appropriate transportation to post-acute venues    RETURN HOME WHEN STABLE  Outcome: Progressing   Pt's plan is to return home when discharged  No discharge needs expressed or anticipated by pt at this time

## 2018-05-10 NOTE — CASE MANAGEMENT
Initial Clinical Review    Admission: Date/Time/Statement: 5/9/18 @ 2120     Orders Placed This Encounter   Procedures    Inpatient Admission (expected length of stay for this patient is greater than two midnights)     Standing Status:   Standing     Number of Occurrences:   1     Order Specific Question:   Admitting Physician     Answer:   Edi Diggs [[id number]]     Order Specific Question:   Level of Care     Answer:   Critical Care [15]     Order Specific Question:   Estimated length of stay     Answer:   More than 2 Midnights     Order Specific Question:   Certification     Answer:   I certify that inpatient services are medically necessary for this patient for a duration of greater than two midnights  See H&P and MD Progress Notes for additional information about the patient's course of treatment  ED: Date/Time/Mode of Arrival:   ED Arrival Information     Expected Arrival Acuity Means of Arrival Escorted By Service Admission Type    - 5/9/2018 18:16 Urgent Wheelchair Family Member Critical Care/ICU Urgent    Arrival Complaint    extremity weakness x 4 days      Chief Complaint:   Chief Complaint   Patient presents with    Weakness - Generalized     thinks his CHF is acting up  Weak all over for several days  Denies SOB  History of Illness:   76 yowm c/o worsening weakness x one week  Had fallen out of bed a week ago  Did not get checked  No head pain  No nausea/vomiting   + poor appetite  + feeling lightheaded  Says he has been taking his meds as he is supposed to  No chest pain  + sob with exertion    ED Vital Signs:   ED Triage Vitals   Temperature Pulse Respirations Blood Pressure SpO2   05/09/18 1834 05/09/18 1834 05/09/18 1834 05/09/18 1840 05/09/18 1845   (!) 96 7 °F (35 9 °C) (!) 41 18 126/56 99 %      Temp Source Heart Rate Source Patient Position - Orthostatic VS BP Location FiO2 (%)   05/09/18 1834 05/09/18 1834 05/09/18 1834 05/09/18 1834 --   Tympanic Monitor Lying Left arm Pain Score       05/09/18 1834       8        Wt Readings from Last 1 Encounters:   05/10/18 66 5 kg (146 lb 9 7 oz)   Vital Signs (abnormal):   HR 50  BP 94/53, 83/44  Abnormal Labs/Diagnostic Test Results: GFR 22 WBC 14 50 HGB 13 7 BNP 1991 MG 2 7   Sodium 136 - 145 mmol/L 113   LL    Comments: Verified by repeat analysis   Potassium 3 5 - 5 3 mmol/L 6 5   HH    Comments: No Hemolysis present  Specimen was rerun for result verification    Chloride 100 - 108 mmol/L 83   L    CO2 21 - 32 mmol/L 19   L    Anion Gap 4 - 13 mmol/L 11     BUN 5 - 25 mg/dL 92   H    Creatinine 0 60 - 1 30 mg/dL 2 60   H    Comments: Standardized to IDMS reference method   Glucose 65 - 140 mg/dL 679   HH      Color, UA  Light Yellow     Clarity, UA  Cloudy     Specific Gravity, UA 1 000 - 1 030 1 010     pH, UA 5 0 - 9 0 6 0     Leukocytes, UA Negative Moderate   A    Nitrite, UA Negative Positive   A    Protein, UA Negative mg/dl Negative     Glucose, UA Negative mg/dl >=1000 (1%)   A    Ketones, UA Negative mg/dl Negative     Urobilinogen, UA 0 2, 1 0 E U /dl E U /dl 0 2     Bilirubin, UA Negative Negative     Blood, UA Negative Trace-Intact   A      RBC, UA None Seen, 0-5 /hpf 1-2   A    WBC, UA None Seen, 0-5, 5-55, 5-65 /hpf 20-30   A    Epithelial Cells None Seen, Occasional /hpf Occasional     Bacteria, UA None Seen, Occasional /hpf Innumerable   A     pH, Arterial 7 350 - 7 450 7 376     PH ART TC 7 350 - 7 450 7 392     pCO2, Arterial 36 0 - 44 0 mm Hg 23 1   LL    PCO2 (TC) Arterial 36 0 - 44 0 mm Hg 22 0   LL    Comments: Results verified by repeat analysis   pO2, Arterial 75 0 - 129 0 mm Hg 95 0     PO2 (TC) Arterial 75 0 - 129 0 mm Hg 88 8     HCO3, Arterial 22 0 - 28 0 mmol/L 13 2   L    Base Excess, Arterial mmol/L -10 0     O2 Content, Arterial 16 0 - 23 0 mL/dL 17 3     O2 HGB,Arterial  94 0 - 97 0 % 95 3     SOURCE  Radial, Left     VÍCTOR TEST  Yes     Temperature Degrees Fehrenheit 96 7     ROOM AIR FIO2 % 21 CXR=No acute cardiopulmonary disease  CT HEAD=No acute intracranial abnormality  EKG=  Ventricular Rate BPM 50    Atrial Rate BPM 50    PA Interval ms 198    QRSD Interval ms 118    QT Interval ms 432    QTC Interval ms 393    P Axis degrees 84    QRS Axis degrees 23    T Wave Axis degrees 187      Sinus bradycardia  Low voltage QRS  Left bundle branch block  Abnormal ECG      ED Treatment:   Medication Administration from 05/09/2018 1815 to 05/09/2018 2156       Date/Time Order Dose Route Action Action by Comments     05/09/2018 2114 sodium chloride 0 9 % bolus 1,000 mL 1,000 mL Intravenous Darcy Olson 40, [address] [facility]      05/09/2018 2123 insulin regular (HumuLIN R,NovoLIN R) 1 Units/mL in sodium chloride 0 9 % 100 mL infusion 9 Units/hr Intravenous New Sangeetha Ferraro RN       Past Medical/Surgical History: Active Ambulatory Problems     Diagnosis Date Noted    HTN (hypertension) 05/09/2018    Idiopathic gout 07/12/2017     Resolved Ambulatory Problems     Diagnosis Date Noted    No Resolved Ambulatory Problems     Past Medical History:   Diagnosis Date    [facility]     [facility]     History of open heart surgery     Hyperlipidemia     Hypertension    Admitting Diagnosis: Hyperkalemia [E87 5]  Hyponatremia [E87 1]  Head injury [S09 90XA]  Hyperglycemia [R73 9]  Acute on chronic renal failure (HCC) [N17 9, N18 9]  Generalized weakness [R53 1]  Hyperosmolar non-ketotic state in patient with type 2 diabetes mellitus ([facility] ) [E11 01]  Age/Sex: 76 y o  male  Assessment/Plan:   Hyponatremia   Assessment & Plan     · Corrected sodium for hyperglycemia is 122  · Patient admits to not eating or drinking very well for the last week and a half  Family member states he ate a can of soup today and stated it was his 1st time eating for several days    · Patient is also on several medications that may be playing a role in this such as torsemide, lisinopril, potassium supplements, Aldactone  · Given history, suspect this is hypo volemic hyponatremia  · Urine studies are currently pending  · Receiving 1 L normal saline in the emergency department, will repeat BMP after fluids and re-evaluate       Hyperkalemia   Assessment & Plan     · Likely secondary to CHRIS in the setting of hypovolemia and potassium supplements as well as diuretics and Ace inhibitors  · Will repeat labs after fluid resuscitation and insulin infusion       CHRIS (acute kidney injury) ([facility] )   Assessment & Plan     · Suspect secondary to hypovolemia and medication use  · Monitor urine output       Hyperglycemia   Assessment & Plan     · Insulin infusion        DM2 (diabetes mellitus, type 2) ([facility])   Assessment & Plan     · Normally on Levemir as well as Amaryl, hold while currently on insulin infusion       CAD (coronary artery disease)   Assessment & Plan     · History of coronary artery disease with a CABG in 2017  · Continue aspirin  · Normally follows with yeast in Cardiology    No echo available to review however medication list suggests a possible poor ejection fraction       HTN (hypertension)   Assessment & Plan     · Blood pressure is currently stable  · Will hold antihypertensive meds and diuretics given acute kidney injury and electrolyte abnormalities       CKD (chronic kidney disease)   Assessment & Plan     · Baseline creatinine appears to be around 1 6   Admission Orders:  ICU  MIGUEL CATH  NEURO CHECKS Q2H  VENODYNES  NPO  ACCUCHECKS WITH COVERAGE SCALE  CONSULT CARDIO  Scheduled Meds:   Current Facility-Administered Medications:  aspirin 81 mg Oral Daily MIGUEL Rod    atorvastatin 10 mg Oral Daily With MIGUEL Brito    cefTRIAXone 1,000 mg Intravenous Q24H Rhea John PA-C Last Rate: Stopped (05/09/18 1587)   heparin (porcine) 5,000 Units Subcutaneous Q8H [address] Rhea John PA-C    insulin glargine 20 Units Subcutaneous HS Rhea John PA-C    insulin lispro 1-6 Units Subcutaneous TID AC Elizabeth Tammie, CRNP    insulin lispro 1-6 Units Subcutaneous HS Elizabeth Furminerva, CRNP    lactated ringers 100 mL/hr Intravenous Continuous Elizabeth MIGUEL Cho Last Rate: 100 mL/hr (05/10/18 0945)   pneumococcal 13-valent conjugate vaccine 0 5 mL Intramuscular Prior to discharge Millie Ruiz MD      Continuous Infusions:   lactated ringers 100 mL/hr Last Rate: 100 mL/hr (05/10/18 0945)     PRN Meds: pneumococcal 13-valent conjugate vaccine

## 2018-05-10 NOTE — ASSESSMENT & PLAN NOTE
· Blood pressure is currently stable  · Will hold antihypertensive meds and diuretics given acute kidney injury and electrolyte abnormalities

## 2018-05-10 NOTE — ASSESSMENT & PLAN NOTE
Corrected sodium for hyperglycemia was 122, fluctuated throughout the day yesterday but has hit a plateau has been between 124-126, will cont to trend; Patient was also on several medications that may be playing a role in this such as torsemide, lisinopril, potassium supplements, Aldactone; Cont to hold all these meds for now  Given history, suspect this is hypovolemic hyponatremia; urine studies complete;  Cont IVF, will transition to NSS today

## 2018-05-11 ENCOUNTER — APPOINTMENT (INPATIENT)
Dept: NON INVASIVE DIAGNOSTICS | Facility: HOSPITAL | Age: 78
DRG: 682 | End: 2018-05-11
Payer: MEDICARE

## 2018-05-11 PROBLEM — E87.5 HYPERKALEMIA: Status: RESOLVED | Noted: 2018-05-09 | Resolved: 2018-05-11

## 2018-05-11 PROBLEM — E86.1 HYPOVOLEMIA DUE TO DEHYDRATION: Status: RESOLVED | Noted: 2018-05-10 | Resolved: 2018-05-11

## 2018-05-11 PROBLEM — E86.0 HYPOVOLEMIA DUE TO DEHYDRATION: Status: RESOLVED | Noted: 2018-05-10 | Resolved: 2018-05-11

## 2018-05-11 LAB
ANION GAP SERPL CALCULATED.3IONS-SCNC: 6 MMOL/L (ref 4–13)
ANION GAP SERPL CALCULATED.3IONS-SCNC: 7 MMOL/L (ref 4–13)
ANION GAP SERPL CALCULATED.3IONS-SCNC: 7 MMOL/L (ref 4–13)
BASOPHILS # BLD AUTO: 0.16 THOUSAND/UL (ref 0–0.1)
BASOPHILS NFR MAR MANUAL: 1 % (ref 0–1)
BUN SERPL-MCNC: 35 MG/DL (ref 5–25)
BUN SERPL-MCNC: 39 MG/DL (ref 5–25)
BUN SERPL-MCNC: 51 MG/DL (ref 5–25)
CALCIUM SERPL-MCNC: 7.7 MG/DL (ref 8.3–10.1)
CALCIUM SERPL-MCNC: 7.8 MG/DL (ref 8.3–10.1)
CALCIUM SERPL-MCNC: 7.9 MG/DL (ref 8.3–10.1)
CHLORIDE SERPL-SCNC: 100 MMOL/L (ref 100–108)
CHLORIDE SERPL-SCNC: 98 MMOL/L (ref 100–108)
CHLORIDE SERPL-SCNC: 99 MMOL/L (ref 100–108)
CO2 SERPL-SCNC: 21 MMOL/L (ref 21–32)
CO2 SERPL-SCNC: 23 MMOL/L (ref 21–32)
CO2 SERPL-SCNC: 24 MMOL/L (ref 21–32)
CREAT SERPL-MCNC: 1.3 MG/DL (ref 0.6–1.3)
CREAT SERPL-MCNC: 1.41 MG/DL (ref 0.6–1.3)
CREAT SERPL-MCNC: 1.42 MG/DL (ref 0.6–1.3)
DOHLE BOD BLD QL SMEAR: PRESENT
ERYTHROCYTE [DISTWIDTH] IN BLOOD BY AUTOMATED COUNT: 12.2 % (ref 11.6–15.1)
GFR SERPL CREATININE-BSD FRML MDRD: 47 ML/MIN/1.73SQ M
GFR SERPL CREATININE-BSD FRML MDRD: 47 ML/MIN/1.73SQ M
GFR SERPL CREATININE-BSD FRML MDRD: 52 ML/MIN/1.73SQ M
GLUCOSE SERPL-MCNC: 160 MG/DL (ref 65–140)
GLUCOSE SERPL-MCNC: 169 MG/DL (ref 65–140)
GLUCOSE SERPL-MCNC: 202 MG/DL (ref 65–140)
GLUCOSE SERPL-MCNC: 223 MG/DL (ref 65–140)
GLUCOSE SERPL-MCNC: 234 MG/DL (ref 65–140)
GLUCOSE SERPL-MCNC: 265 MG/DL (ref 65–140)
GLUCOSE SERPL-MCNC: 282 MG/DL (ref 65–140)
GLUCOSE SERPL-MCNC: 288 MG/DL (ref 65–140)
GLUCOSE SERPL-MCNC: 337 MG/DL (ref 65–140)
HCT VFR BLD AUTO: 34.4 % (ref 42–52)
HGB BLD-MCNC: 11.4 G/DL (ref 14–18)
LG PLATELETS BLD QL SMEAR: PRESENT
LYMPHOCYTES # BLD AUTO: 0.63 THOUSAND/UL (ref 0.6–4.47)
LYMPHOCYTES # BLD AUTO: 4 %
MAGNESIUM SERPL-MCNC: 2.2 MG/DL (ref 1.6–2.6)
MCH RBC QN AUTO: 34 PG (ref 27–31)
MCHC RBC AUTO-ENTMCNC: 33.1 G/DL (ref 31.4–37.4)
MCV RBC AUTO: 103 FL (ref 82–98)
MONOCYTES # BLD AUTO: 0.32 THOUSAND/UL (ref 0–1.22)
MONOCYTES NFR BLD AUTO: 2 % (ref 4–12)
MRSA NOSE QL CULT: NORMAL
NEUTS BAND NFR BLD MANUAL: 25 % (ref 0–8)
NEUTS SEG # BLD: 14.69 THOUSAND/UL (ref 1.81–6.82)
NEUTS SEG NFR BLD AUTO: 68 %
NRBC BLD AUTO-RTO: 0 /100 WBCS
PHOSPHATE SERPL-MCNC: 2.3 MG/DL (ref 2.3–4.1)
PLATELET # BLD AUTO: 243 THOUSANDS/UL (ref 130–400)
PLATELET BLD QL SMEAR: ADEQUATE
PMV BLD AUTO: 8.9 FL (ref 8.9–12.7)
POTASSIUM SERPL-SCNC: 4.5 MMOL/L (ref 3.5–5.3)
POTASSIUM SERPL-SCNC: 4.8 MMOL/L (ref 3.5–5.3)
POTASSIUM SERPL-SCNC: 4.9 MMOL/L (ref 3.5–5.3)
RBC # BLD AUTO: 3.35 MILLION/UL (ref 4.7–6.1)
SODIUM SERPL-SCNC: 126 MMOL/L (ref 136–145)
SODIUM SERPL-SCNC: 128 MMOL/L (ref 136–145)
SODIUM SERPL-SCNC: 131 MMOL/L (ref 136–145)
TOTAL CELLS COUNTED SPEC: 100
TOXIC GRANULES BLD QL SMEAR: PRESENT
WBC # BLD AUTO: 15.8 THOUSAND/UL (ref 4.8–10.8)

## 2018-05-11 PROCEDURE — 93306 TTE W/DOPPLER COMPLETE: CPT | Performed by: INTERNAL MEDICINE

## 2018-05-11 PROCEDURE — 82948 REAGENT STRIP/BLOOD GLUCOSE: CPT

## 2018-05-11 PROCEDURE — 80048 BASIC METABOLIC PNL TOTAL CA: CPT | Performed by: PHYSICIAN ASSISTANT

## 2018-05-11 PROCEDURE — 83735 ASSAY OF MAGNESIUM: CPT | Performed by: NURSE PRACTITIONER

## 2018-05-11 PROCEDURE — 84100 ASSAY OF PHOSPHORUS: CPT | Performed by: NURSE PRACTITIONER

## 2018-05-11 PROCEDURE — 85007 BL SMEAR W/DIFF WBC COUNT: CPT | Performed by: NURSE PRACTITIONER

## 2018-05-11 PROCEDURE — 85027 COMPLETE CBC AUTOMATED: CPT | Performed by: NURSE PRACTITIONER

## 2018-05-11 PROCEDURE — 80048 BASIC METABOLIC PNL TOTAL CA: CPT | Performed by: NURSE PRACTITIONER

## 2018-05-11 PROCEDURE — 93306 TTE W/DOPPLER COMPLETE: CPT

## 2018-05-11 PROCEDURE — 99232 SBSQ HOSP IP/OBS MODERATE 35: CPT | Performed by: ANESTHESIOLOGY

## 2018-05-11 RX ORDER — SODIUM CHLORIDE 9 MG/ML
75 INJECTION, SOLUTION INTRAVENOUS CONTINUOUS
Status: DISCONTINUED | OUTPATIENT
Start: 2018-05-11 | End: 2018-05-13

## 2018-05-11 RX ADMIN — HEPARIN SODIUM 5000 UNITS: 5000 INJECTION, SOLUTION INTRAVENOUS; SUBCUTANEOUS at 21:18

## 2018-05-11 RX ADMIN — SODIUM CHLORIDE 125 ML/HR: 0.9 INJECTION, SOLUTION INTRAVENOUS at 17:30

## 2018-05-11 RX ADMIN — INSULIN GLARGINE 20 UNITS: 100 INJECTION, SOLUTION SUBCUTANEOUS at 21:24

## 2018-05-11 RX ADMIN — HEPARIN SODIUM 5000 UNITS: 5000 INJECTION, SOLUTION INTRAVENOUS; SUBCUTANEOUS at 05:40

## 2018-05-11 RX ADMIN — CEFTRIAXONE 1000 MG: 1 INJECTION, SOLUTION INTRAVENOUS at 21:18

## 2018-05-11 RX ADMIN — SODIUM CHLORIDE 125 ML/HR: 0.9 INJECTION, SOLUTION INTRAVENOUS at 09:30

## 2018-05-11 RX ADMIN — HEPARIN SODIUM 5000 UNITS: 5000 INJECTION, SOLUTION INTRAVENOUS; SUBCUTANEOUS at 14:10

## 2018-05-11 RX ADMIN — ATORVASTATIN CALCIUM 10 MG: 10 TABLET, FILM COATED ORAL at 17:22

## 2018-05-11 RX ADMIN — ASPIRIN 81 MG 81 MG: 81 TABLET ORAL at 08:41

## 2018-05-11 RX ADMIN — INSULIN LISPRO 3 UNITS: 100 INJECTION, SOLUTION INTRAVENOUS; SUBCUTANEOUS at 21:25

## 2018-05-11 RX ADMIN — SODIUM PHOSPHATE, MONOBASIC, MONOHYDRATE AND SODIUM PHOSPHATE, DIBASIC, ANHYDROUS 21 MMOL: 276; 142 INJECTION, SOLUTION INTRAVENOUS at 11:29

## 2018-05-11 RX ADMIN — SODIUM CHLORIDE, SODIUM LACTATE, POTASSIUM CHLORIDE, AND CALCIUM CHLORIDE 125 ML/HR: .6; .31; .03; .02 INJECTION, SOLUTION INTRAVENOUS at 04:02

## 2018-05-11 NOTE — MALNUTRITION/BMI
This medical record reflects one or more clinical indicators suggestive of malnutrition and/or morbid obesity  Malnutrition Findings:   Malnutrition type: Acute illness  Degree of Malnutrition: Other severe protein calorie malnutrition (related to inadequate intake)  Malnutrition Characteristics: Muscle loss, Fat loss, Inadequate energy, Weight loss (as evidenced by temporal indentation, clavicle visible, orbital hollow, inadequate intake >5 days per pt reported po history, 14% wt loss x 3 mos (based on wt of 171 lb 2/1/18 encounter)  Treated with oral supplementation )      See Nutrition note dated 5/11/18 for additional details  Completed nutrition assessment is viewable in the nutrition documentation

## 2018-05-11 NOTE — MEDICAL STUDENT
Daily Progress Note - Critical Care/ Stepdown   Sai Brooks 76 y o  male MRN: [id number]  Unit/Bed#: ICU 05 Encounter: [id number]    ______________________________________________________________________  Assessment:   Principal Problem:    Hyponatremia  Active Problems:    Hyperkalemia    DM2 (diabetes mellitus, type 2) ([facility])    CAD (coronary artery disease)    Hx of CABG    CKD (chronic kidney disease) stage 3, GFR 30-59 ml/min    CHRIS (acute kidney injury) ([facility] 75 )    Cardiomyopathy, ischemic    Benign essential hypertension    Hypovolemia due to dehydration    Urinary tract infection  Resolved Problems:    * No resolved hospital problems  *      Urinary tract infection   Assessment & Plan    Cont on Rocephin, awaiting urine culture        Hypovolemia due to dehydration   Assessment & Plan    Cont IVF, trend labs        Benign essential hypertension   Assessment & Plan    Hold antihypertensives as BP's in the low 100's/ 90's        Cardiomyopathy, ischemic   Assessment & Plan    Supportive care, no active intervention        HTN (hypertension)   Assessment & Plan    · Blood pressure is currently stable  · Will hold antihypertensive meds and diuretics given acute kidney injury and electrolyte abnormalities        CHRIS (acute kidney injury) ([facility] 75 )   Assessment & Plan    Suspect secondary to hypovolemia and medication use, trended down this am, still making urine   Follow urine output        CKD (chronic kidney disease) stage 3, GFR 30-59 ml/min   Assessment & Plan    Baseline creatinine appears to be around 1 6, 2 07 this am, will follow        CAD (coronary artery disease)   Assessment & Plan    History of coronary artery disease with a CABG in 2017, continue aspirin        DM2 (diabetes mellitus, type 2) ([facility])   Assessment & Plan    Restart Levemir, cont to hold Amaryl; sliding scale        Hyperkalemia   Assessment & Plan    Likely secondary to CHRIS in the setting of hypovolemia and potassium supplements as well as diuretics and Ace inhibitors; trending down, following BMPs        * Hyponatremia   Assessment & Plan    Corrected sodium for hyperglycemia was 122, upto 127 this am, will cont to trend Q4H, correct no higher than 130 today; Patient admits to not eating or drinking very well for the last week and a half  Patient is also on several medications that may be playing a role in this such as torsemide, lisinopril, potassium supplements, Aldactone; Hold all these meds for now  Given history, suspect this is hypovolemic hyponatremia; urine studies are currently pending; May need additional fluids base on lab data            Plan:    Neuro:   · Delirium: CAM ICU positive no   · Pain controlled with: no pain  · Pain score: none  · Regulate sleep/wake cycle    CV:   · CAD, hx CABG: continue ASA 81 daily, obtain most recent echo from cardiologist, if not within the year, order echo  · Ischemic cardiomyopathy: supportive care   · HTN: continue holding antihypertensives, bp continues to be in low range  · Rhythm: NSR  · Follow rhythm on telemetry    Pulm:   · No active concerns     GI:   · Nutrition/diet plan: consistent carb  · Stress ulcer prophylaxis: No prophylaxis needed  · Bowel regimen: none at this time  · Last BM: 5/10    FEN:   · Hyponatremia: 126 this am, corrected 127  Cont fluids at 125ml/hr, change to NSS  Check labs q8h  · Hypovolemia: likely d/t dehydration, cont fluids  · Fluid/Diuretic plan: cont fluids  · Goal 24 hour fluid balance: +  · Electrolytes repleted: yes  · Goal: K >4 0, Mag >2 0, and Phos >3 0- replete phos, sodium phos    :   · CHRIS on CKD stage 3: creat back to baseline 1 4 today, cont to monitor  · Indwelling Gordon present: yes   · Urinary catheter no longer needed  Will place order to D/C      ID:   · UTI: cont ceftriaxone, wbc slightly increased today, bands 25%  · Abx ordered: Ceftriaxone  · Day # 3 of course  · Trend temps and WBC count    Heme:   · Leukocytosis: slightly increased, awaiting urine culture  · Trend hgb and plts    Endo:   · DM2: continue sliding scale and lantus at bedtime  Trend today  · Glycemic control plan: cont    Msk/Skin:  · Mobility goal: activity as tolerated  · PT consult: no  · OT consult: no  · Frequent turning and off-loading    Family:  · Family updated within 24 hours: yes   · Family meeting planned today: yes     Lines:  · 2 peripheral IVs and a luis    VTE Prophylaxis:  · Pharmacologic Prophylaxis: Heparin  · Mechanical Prophylaxis: sequential compression device    Disposition: Continue Stepdown, transfer to floor later today if remains stable  Code Status: Level 1 - Full Code    Counseling / Coordination of Care  Total Critical Care time spent 41 minutes excluding procedures, teaching and family updates  ______________________________________________________________________    HPI/24hr events: Pt remained stable throughout the night, Na 126 this am and Cr 1 42     ______________________________________________________________________    Physical Exam:   Physical Exam   Constitutional: He is oriented to person, place, and time  He appears well-developed  No distress  HENT:   Head: Normocephalic and atraumatic  Nose: Nose normal    Eyes: Conjunctivae and EOM are normal  Pupils are equal, round, and reactive to light  Neck: Normal range of motion  Neck supple  No JVD present  Cardiovascular: Normal rate, regular rhythm, normal heart sounds and intact distal pulses  Exam reveals no gallop and no friction rub  No murmur heard  Pulmonary/Chest: Effort normal and breath sounds normal    Abdominal: Soft  Bowel sounds are normal    Musculoskeletal: Normal range of motion  He exhibits no edema  Neurological: He is alert and oriented to person, place, and time  No cranial nerve deficit  Skin: Skin is warm and dry  Capillary refill takes less than 2 seconds  He is not diaphoretic  There is pallor  Psychiatric: He has a normal mood and affect   His behavior is normal  Judgment and thought content normal        ______________________________________________________________________  Vitals:    18 0200 18 0400 18 0412 18 0600   BP: 97/54 106/53  104/56   Pulse: 101 101  98   Resp: 21 22  22   Temp:  99 1 °F (37 3 °C)     TempSrc:       SpO2: 96% 96%  98%   Weight:   66 7 kg (147 lb 0 8 oz)    Height:                  Temperature:   Temp (24hrs), Av 2 °F (36 8 °C), Min:97 5 °F (36 4 °C), Max:99 1 °F (37 3 °C)    Current Temperature: 99 1 °F (37 3 °C)    Weights:   IBW: 69 55 kg    Body mass index is 22 03 kg/m²  Weight (last 2 days)     Date/Time   Weight    18 0412  66 7 (147 05)    05/10/18 0600  66 5 (146 61)    18 2209  66 5 (146 61)    18 1834  68 (150)              Hemodynamic Monitoring: Total Critical Care time spent 41 minutes excluding procedures, teaching and family updates  Non-Invasive/Invasive Ventilation Settings:  Respiratory    Lab Data (Last 4 hours)    None         O2/Vent Data (Last 4 hours)    None              No results found for: PHART, BKS3GMV, PO2ART, ABN2JGT, K8ONAVXR, BEART, SOURCE  SpO2: SpO2: 98 %    Intake and Outputs:  I/O       701 - 05/10 0700 05/10 07 -  0700    P  O   1080    I V  (mL/kg) 28 4 (0 4) 961 3 (14 4)    IV Piggyback 1050 1046  7    Total Intake(mL/kg) 1078 4 (16 2) 3087 9 (46 3)    Urine (mL/kg/hr) 1450 2200 (1 4)    Total Output 1450 2200    Net -371 6 +887 9              UOP: 90cc/hour     Nutrition:        Diet Orders            Start     Ordered    05/10/18 0756  Diet Omid/CHO Controlled; Consistent Carbohydrate Diet Level 2 (5 carb servings/75 grams CHO/meal)  Diet effective now     Question Answer Comment   Diet Type Omid/CHO Controlled    Omid/CHO Controlled Consistent Carbohydrate Diet Level 2 (5 carb servings/75 grams CHO/meal)    RD to adjust diet per protocol?  No        05/10/18 0756    05/10/18 0754  Room Service  Once     Question:  Type of Service Answer:  [facility]    05/10/18 0753          Labs:     Results from last 7 days  Lab Units 05/11/18  0421 05/10/18  0422 05/09/18 2356 05/09/18 1927   WBC Thousand/uL 15 80* 13 10*  --  14 50*   HEMOGLOBIN g/dL 11 4* 13 3*  --  13 7*   HEMATOCRIT % 34 4* 40 0*  --  41 4*   PLATELETS Thousands/uL 243 240 222 225   NEUTROS PCT %  --   --   --  88*   MONOS PCT %  --   --   --  8   MONO PCT MAN % 2* 4  --   --        Results from last 7 days  Lab Units 05/11/18  0405 05/10/18  2044 05/10/18  1607  05/10/18  0417  05/09/18  1927   SODIUM mmol/L 126* 125* 124*  < > 127*  < > 113*   POTASSIUM mmol/L 4 8 5 0 5 4*  < > 5 3  < > 6 5*   CHLORIDE mmol/L 98* 96* 95*  < > 97*  < > 83*   CO2 mmol/L 21 20* 20*  < > 21  < > 19*   BUN mg/dL 51* 65* 72*  < > 83*  < > 92*   CREATININE mg/dL 1 42* 1 73* 1 95*  < > 2 07*  < > 2 60*   CALCIUM mg/dL 7 9* 8 0* 7 9*  < > 8 4  < > 8 2*   TOTAL PROTEIN g/dL  --   --   --   --  6 9  --  7 4   BILIRUBIN TOTAL mg/dL  --   --   --   --  0 60  --  0 70   ALK PHOS U/L  --   --   --   --  88  --  95   ALT U/L  --   --   --   --  18  --  23   AST U/L  --   --   --   --  9  --  9   GLUCOSE RANDOM mg/dL 169* 237* 276*  < > 119  < > 679*   < > = values in this interval not displayed  Results from last 7 days  Lab Units 05/11/18  0405 05/10/18  0417 05/09/18  2356   MAGNESIUM mg/dL 2 2 2 5 2 6     Lab Results   Component Value Date    PHOS 2 3 05/11/2018    PHOS 3 0 05/10/2018        Results from last 7 days  Lab Units 05/09/18 1927   INR  1 04   PTT seconds 30       0  Lab Value Date/Time   TROPONINI <0 02 05/09/2018 1927         ABG:  Lab Results   Component Value Date    PHART 7 376 05/09/2018    LMJ2IRF 23 1 (LL) 05/09/2018    PO2ART 95 0 05/09/2018    WWA4AXO 13 2 (L) 05/09/2018    BEART -10 0 05/09/2018    SOURCE Radial, Left 05/09/2018       Imaging: CXR: 5/9 no acute disease I have personally reviewed pertinent reports      CT: 5/9 no acute intracranial abnormalities I have personally reviewed pertinent reports  EKG: NSR    Micro:  No results found for: Suzi Heror, WOUNDCULT, SPUTUMCULTUR    Allergies: No Known Allergies  Medications:   Scheduled Meds:  Current Facility-Administered Medications:  aspirin 81 mg Oral Daily MIGUEL Acuña    atorvastatin 10 mg Oral Daily With MIGUEL Ross    cefTRIAXone 1,000 mg Intravenous Q24H FRITZ An-MARGOTH Last Rate: Stopped (05/10/18 2140)   heparin (porcine) 5,000 Units Subcutaneous Q8H [address] Rhea John PA-C    insulin glargine 20 Units Subcutaneous HS Rhea John PA-C    insulin lispro 1-6 Units Subcutaneous TID AC MIGUEL Acuña    insulin lispro 1-6 Units Subcutaneous HS Alessio AdiausGAYATRINP    lactated ringers 125 mL/hr Intravenous Continuous Alessio MIGUEL Altamirano Last Rate: 125 mL/hr (05/11/18 0402)   pneumococcal 13-valent conjugate vaccine 0 5 mL Intramuscular Prior to discharge Patti Alejandre MD      Continuous Infusions:  lactated ringers 125 mL/hr Last Rate: 125 mL/hr (05/11/18 0402)     PRN Meds:    pneumococcal 13-valent conjugate vaccine 0 5 mL Prior to discharge       Invasive lines and devices:   Invasive Devices     Peripheral Intravenous Line            Peripheral IV 05/09/18 Right Antecubital 1 day    Peripheral IV 05/09/18 Right Forearm 1 day          Drain            Urethral Catheter 16 Fr  1 day                   SIGNATURE: Concepción Staton  DATE: May 11, 2018

## 2018-05-11 NOTE — PROGRESS NOTES
Daily Progress Note - Critical Care/ Stepdown   Gloria Dimas 76 y o  male MRN: [id number]  Unit/Bed#: ICU 05 Encounter: [id number]    ______________________________________________________________________  Assessment:   Principal Problem:    Hyponatremia  Active Problems:    CHRIS (acute kidney injury) ([facility] 75 )    Urinary tract infection    CKD (chronic kidney disease) stage 3, GFR 30-59 ml/min    Cardiomyopathy, ischemic    DM2 (diabetes mellitus, type 2) ([place] [address] )    CAD (coronary artery disease)    Hx of CABG    Benign essential hypertension  Resolved Problems:    Hypovolemia due to dehydration    Hyperkalemia      * Hyponatremia   Assessment & Plan    Corrected sodium for hyperglycemia was 122, fluctuated throughout the day yesterday but has hit a plateau has been between 124-126, will cont to trend; Patient was also on several medications that may be playing a role in this such as torsemide, lisinopril, potassium supplements, Aldactone; Cont to hold all these meds for now  Given history, suspect this is hypovolemic hyponatremia; urine studies complete;  Cont IVF, will transition to NSS today        Hypovolemia due to dehydrationresolved as of 5/11/2018   Assessment & Plan    Cont IVF, trend labs        Hyperkalemiaresolved as of 5/11/2018   Assessment & Plan    Likely secondary to CHRIS in the setting of hypovolemia and potassium supplements as well as diuretics and Ace inhibitors; trending down, following BMPs        CHRIS (acute kidney injury) ([place] [address] )   Assessment & Plan    Suspect back to baseline 1 4, suspect secondary to hypovolemia and medication use; no additional intervention        Urinary tract infection   Assessment & Plan    Cont on Rocephin, still awaiting urine culture, should be back today        HTN (hypertension)   Assessment & Plan    · Blood pressure is currently stable  · Will hold antihypertensive meds and diuretics given acute kidney injury and electrolyte abnormalities        CKD (chronic kidney disease) stage 3, GFR 30-59 ml/min   Assessment & Plan    Baseline creatinine appears to be around 1 6  Cr 1 4 this am        Cardiomyopathy, ischemic   Assessment & Plan    Supportive care, no active intervention        Benign essential hypertension   Assessment & Plan    Cont to hold antihypertensives as BP's in the low 100's        CAD (coronary artery disease)   Assessment & Plan    History of coronary artery disease with a CABG in 2017, continue aspirin        DM2 (diabetes mellitus, type 2) ([facility])   Assessment & Plan    Cont Levemir, cont to hold Amaryl; sliding scale; BS better this am since restarting Levemir            Plan:    Neuro:   · Delirium: CAM ICU positive no   · Regulate sleep/wake cycle    CV:   · Rhythm: NSR  · Follow rhythm on telemetry    Pulm:   · Tolerating room air     GI:   · Nutrition/diet plan: Diabetic  · Stress ulcer prophylaxis: No prophylaxis needed  · Bowel regimen: none  · Last BM: prior to admission    FEN:   · Improving  · Fluid/Diuretic plan: Needs volume normal saline infusion  · Electrolytes repleted: yes  · Goal: K >4 0, Mag >2 0, and Phos >3 0    :   · Indwelling Gordon present: yes   · Urinary catheter no longer needed  Will place order to D/C      ID:   · Possible UTI, culture pending, cont antibx  · Abx ordered: Ceftriaxone  · Day # 2 of 7 day course  · Trend temps and WBC count    Heme:   · Hgb stable  · Trend hgb and plts as needed    Endo:   · Cont sliding scale  · Glycemic control plan: Blood glucose controlled on current regimen    Msk/Skin:  · Mobility goal: OOB to chair/ ambulate as tolerated  · PT consult: no  · OT consult: no  · Frequent turning and off-loading    Family:  · Family updated within 24 hours: yes at bedside yesterday evening- son and daughter in law  · Family meeting planned today: no     Lines:  · PIV    VTE Prophylaxis:  · Pharmacologic Prophylaxis: Heparin  · Mechanical Prophylaxis: sequential compression device    Disposition: Transfer to telemetry    Code Status: Level 1 - Full Code    Counseling / Coordination of Care  Total time spent today 40 minutes  Greater than 50% of total time was spent with the patient and / or family counseling and / or coordination of care  A description of the counseling / coordination of care: reviewing chart, evaluating patient, writing noted, placing orders  ______________________________________________________________________    HPI/24hr events: No issues overnight; states he is feeling better this morning but still not feeling back to normal  Tolerating diet, denies any c/o pain    ______________________________________________________________________    Physical Exam:   Physical Exam   Constitutional: He is oriented to person, place, and time  He appears well-developed and well-nourished  No distress  HENT:   Head: Normocephalic and atraumatic  Mucous membranes not as dry, improved from yesterday   Eyes: Pupils are equal, round, and reactive to light  Neck: Normal range of motion  Cardiovascular: Normal rate and regular rhythm  Pulmonary/Chest: Effort normal and breath sounds normal  No respiratory distress  Abdominal: Soft  Bowel sounds are normal  He exhibits no distension  There is no tenderness  Genitourinary:   Genitourinary Comments: Gordon for yellow urine   Musculoskeletal: Normal range of motion  He exhibits no edema  Neurological: He is alert and oriented to person, place, and time  Skin: Skin is warm and dry  Capillary refill takes less than 2 seconds  He is not diaphoretic  There is pallor  Nursing note and vitals reviewed        ______________________________________________________________________  Vitals:    05/11/18 0400 05/11/18 0412 05/11/18 0600 05/11/18 0709   BP: 106/53  104/56    Pulse: 101  98    Resp: 22  22    Temp: 99 1 °F (37 3 °C)   98 7 °F (37 1 °C)   TempSrc:    Temporal   SpO2: 96%  98%    Weight:  66 7 kg (147 lb 0 8 oz)     Height:                  Temperature: Temp (24hrs), Av 2 °F (36 8 °C), Min:97 5 °F (36 4 °C), Max:99 1 °F (37 3 °C)    Current Temperature: 98 7 °F (37 1 °C)    Weights:   IBW: 69 55 kg    Body mass index is 22 03 kg/m²  Weight (last 2 days)     Date/Time   Weight    18 0412  66 7 (147 05)    05/10/18 0600  66 5 (146 61)    18 2209  66 5 (146 61)    18 1834  68 (150)              Hemodynamic Monitoring:  N/A       Non-Invasive/Invasive Ventilation Settings:  Respiratory    Lab Data (Last 4 hours)    None         O2/Vent Data (Last 4 hours)    None              No results found for: PHART, SQN5XDM, PO2ART, LQO0XBN, L0PUGXHY, BEART, SOURCE  SpO2: SpO2: 98 %, SpO2 Device: O2 Device: None (Room air)    Intake and Outputs:  I/O       701 - 05/10 0700 05/10 07 -  0700  07 -  0700    P  O   1280     I V  (mL/kg) 28 4 (0 4) 2461 3 (36 9)     IV Piggyback 1050 1046  7     Total Intake(mL/kg) 1078 4 (16 2) 4787 9 (71 8)     Urine (mL/kg/hr) 1450 2200 (1 4)     Total Output 1450 2200      Net -371 6 +2587 9                   Nutrition:        Diet Orders            Start     Ordered    05/10/18 0756  Diet Omid/CHO Controlled; Consistent Carbohydrate Diet Level 2 (5 carb servings/75 grams CHO/meal)  Diet effective now     Question Answer Comment   Diet Type Omid/CHO Controlled    Omid/CHO Controlled Consistent Carbohydrate Diet Level 2 (5 carb servings/75 grams CHO/meal)    RD to adjust diet per protocol?  No        05/10/18 0756    05/10/18 0754  Room Service  Once     Question:  Type of Service  Answer:  Room Service-Appropriate    05/10/18 0753          Labs:     Results from last 7 days  Lab Units 18  0421 05/10/18  0422 18  2356 18  1927   WBC Thousand/uL 15 80* 13 10*  --  14 50*   HEMOGLOBIN g/dL 11 4* 13 3*  --  13 7*   HEMATOCRIT % 34 4* 40 0*  --  41 4*   PLATELETS Thousands/uL 243 240 222 225   NEUTROS PCT %  --   --   --  88*   MONOS PCT %  --   --   --  8   MONO PCT MAN % 2* 4  --   -- Results from last 7 days  Lab Units 05/11/18  0405 05/10/18  2044 05/10/18  1607  05/10/18  0417  05/09/18 1927   SODIUM mmol/L 126* 125* 124*  < > 127*  < > 113*   POTASSIUM mmol/L 4 8 5 0 5 4*  < > 5 3  < > 6 5*   CHLORIDE mmol/L 98* 96* 95*  < > 97*  < > 83*   CO2 mmol/L 21 20* 20*  < > 21  < > 19*   BUN mg/dL 51* 65* 72*  < > 83*  < > 92*   CREATININE mg/dL 1 42* 1 73* 1 95*  < > 2 07*  < > 2 60*   CALCIUM mg/dL 7 9* 8 0* 7 9*  < > 8 4  < > 8 2*   TOTAL PROTEIN g/dL  --   --   --   --  6 9  --  7 4   BILIRUBIN TOTAL mg/dL  --   --   --   --  0 60  --  0 70   ALK PHOS U/L  --   --   --   --  88  --  95   ALT U/L  --   --   --   --  18  --  23   AST U/L  --   --   --   --  9  --  9   GLUCOSE RANDOM mg/dL 169* 237* 276*  < > 119  < > 679*   < > = values in this interval not displayed  Results from last 7 days  Lab Units 05/11/18  0405 05/10/18  0417 05/09/18  2356   MAGNESIUM mg/dL 2 2 2 5 2 6     Lab Results   Component Value Date    PHOS 2 3 05/11/2018    PHOS 3 0 05/10/2018        Results from last 7 days  Lab Units 05/09/18 1927   INR  1 04   PTT seconds 30       0  Lab Value Date/Time   TROPONINI <0 02 05/09/2018 1927         ABG:  Lab Results   Component Value Date    PHART 7 376 05/09/2018    OBR2JVS 23 1 (LL) 05/09/2018    PO2ART 95 0 05/09/2018    BSB9RVV 13 2 (L) 05/09/2018    BEART -10 0 05/09/2018    SOURCE Radial, Left 05/09/2018       Imaging: CXR: None today I have personally reviewed pertinent reports        EKG: NSR    Micro:  No results found for: Rob Loera, WOUNDCULT, SPUTUMCULTUR    Allergies: No Known Allergies  Medications:   Scheduled Meds:  Current Facility-Administered Medications:  aspirin 81 mg Oral Daily MIGUEL Sue    atorvastatin 10 mg Oral Daily With MIGUEL Park    cefTRIAXone 1,000 mg Intravenous Q24H Rhea John PA-C Last Rate: Stopped (05/10/18 2140)   heparin (porcine) 5,000 Units Subcutaneous Q8H [facility] Rhea John PA-C    insulin glargine 20 Units Subcutaneous HS Rhea John PA-C    insulin lispro 1-6 Units Subcutaneous TID AC MIGUEL Subramanian    insulin lispro 1-6 Units Subcutaneous HS MIGUEL Subramanian    lactated ringers 125 mL/hr Intravenous Continuous MIGUEL Subramanian Last Rate: 125 mL/hr (05/11/18 0402)   pneumococcal 13-valent conjugate vaccine 0 5 mL Intramuscular Prior to discharge Beaulah Schirmer, MD      Continuous Infusions:  lactated ringers 125 mL/hr Last Rate: 125 mL/hr (05/11/18 0402)     PRN Meds:    pneumococcal 13-valent conjugate vaccine 0 5 mL Prior to discharge       Invasive lines and devices:   Invasive Devices     Peripheral Intravenous Line            Peripheral IV 05/09/18 Right Antecubital 1 day    Peripheral IV 05/09/18 Right Forearm 1 day          Drain            Urethral Catheter 16 Fr  1 day                   SIGNATURE: MIGUEL Subramanian  DATE: May 11, 2018

## 2018-05-11 NOTE — PROGRESS NOTES
Transfer Note - ICU/Stepdown Transfer to [facility]/MS tele   Sina Choudhury 76 y o  male MRN: [id number]  Jayce 43   Unit/Bed#: ICU 05 Encounter: [id number]    Code Status: Level 1 - Full Code    Reason for ICU/Stepdown admission: Hyperkalemia/ Hyponatremia    Active problems: Principal Problem:    Hyponatremia  Active Problems:    CHRIS (acute kidney injury) ([place] [address] )    Urinary tract infection    CKD (chronic kidney disease) stage 3, GFR 30-59 ml/min    Cardiomyopathy, ischemic    DM2 (diabetes mellitus, type 2) ([place] [address] )    CAD (coronary artery disease)    Hx of CABG    Benign essential hypertension  Resolved Problems:    Hypovolemia due to dehydration    Hyperkalemia      * Hyponatremia   Assessment & Plan    Corrected sodium for hyperglycemia was 122, fluctuated throughout the day yesterday but has hit a plateau has been between 124-126, will cont to trend; Patient was also on several medications that may be playing a role in this such as torsemide, lisinopril, potassium supplements, Aldactone; Cont to hold all these meds for now  Given history, suspect this is hypovolemic hyponatremia; urine studies complete;  Cont IVF, will transition to NSS today        Hypovolemia due to dehydrationresolved as of 5/11/2018   Assessment & Plan    Cont IVF, trend labs        Hyperkalemiaresolved as of 5/11/2018   Assessment & Plan    Likely secondary to CHRIS in the setting of hypovolemia and potassium supplements as well as diuretics and Ace inhibitors; trending down, following BMPs        CHRIS (acute kidney injury) ([place] [address] )   Assessment & Plan    Suspect back to baseline 1 4, suspect secondary to hypovolemia and medication use; no additional intervention        Urinary tract infection   Assessment & Plan    Cont on Rocephin, still awaiting urine culture, should be back today        HTN (hypertension)   Assessment & Plan    · Blood pressure is currently stable  · Will hold antihypertensive meds and diuretics given acute kidney injury and electrolyte abnormalities        CKD (chronic kidney disease) stage 3, GFR 30-59 ml/min   Assessment & Plan    Baseline creatinine appears to be around 1 6  Cr 1 4 this am        Cardiomyopathy, ischemic   Assessment & Plan    Supportive care, no active intervention        Benign essential hypertension   Assessment & Plan    Cont to hold antihypertensives as BP's in the low 100's        CAD (coronary artery disease)   Assessment & Plan    History of coronary artery disease with a CABG in 2017, continue aspirin        DM2 (diabetes mellitus, type 2) ([facility])   Assessment & Plan    Cont Levemir, cont to hold Amaryl; sliding scale; BS better this am since restarting Levemir            Consultants:   · Cardiology    History of Present Illness/Summary of clinical course: from H&P 76 y o  male with past medical history of coronary artery disease status post CABG in 2017, hypertension , hyperlipidemia, diabetes type 2, chronic kidney disease who presents with generalized weakness  Some of history is offered by his daughter  States that about a week and half ago the patient fell out of bed and since then he has been generally very weak  Patient is normally very active, 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  For the last week he has been somewhat lethargic, taking naps which is uncharacteristic for him  Patient and daughter also admit that he has had very poor oral intake recently  He made a comment to her today after eating a bowl of soup that that was the 1st food he had eaten in several days  He does complain of some dysuria that has been ongoing for several days  He denies knowing he has ever had a urinary tract infection in the past  In Emergency Department, labs were significant for hyponatremia, hyperkalemia, leukocytosis, and acute kidney injury  Given degree of metabolic abnormalities, patient was admitted to ICU for further monitoring      While in ICU he has had no c/o pain or SOB, states he is feeling better but not back to normal  Sodium is slowly correcting, transitioned to NSS today  Following BMP Q8H     Please refer to today's progress note for further clinical details  Recent or scheduled procedures: none    Outstanding/pending diagnostics: Urine culture, BMP Q8H       Mobilization Plan: OOB as tolerated    Nutrition Plan: Regular    Discharge Plan: Home     Specific Diagnosis Plan:    Sepsis: Source: Urine, Antibiotics: Rocephin, Length:  7 days; narrow once culture back, Gram negative rods    Need for Infectious Disease consult: no    Hyperglycemia:  Converting from IV to subcutaneous insulin: Back on Levemir and sliding scale    [  ] Family aware of transfer out of critical care: no     Spoke with Dr Jeaneth Arredondo regarding transfer @ [phone]  Patient accepted to their service      MIGUEL Lira

## 2018-05-11 NOTE — PLAN OF CARE
Problem: Nutrition/Hydration-ADULT  Goal: Nutrient/Hydration intake appropriate for improving, restoring or maintaining nutritional needs  Monitor and assess patient's nutrition/hydration status for malnutrition (ex- brittle hair, bruises, dry skin, pale skin and conjunctiva, muscle wasting, smooth red tongue, and disorientation)  Collaborate with interdisciplinary team and initiate plan and interventions as ordered  Monitor patient's weight and dietary intake as ordered or per policy  Utilize nutrition screening tool and intervene per policy  Determine patient's food preferences and provide high-protein, high-caloric foods as appropriate  INTERVENTIONS:  - Monitor oral intake, urinary output, labs, and treatment plans  - Assess nutrition and hydration status and recommend course of action  - Evaluate amount of meals eaten  - Assist patient with eating if necessary   - Allow adequate time for meals  - Recommend/ encourage appropriate diets, oral nutritional supplements, and vitamin/mineral supplements  - Order, calculate, and assess calorie counts as needed  - Recommend, monitor, and adjust tube feedings and TPN/PPN based on assessed needs  - Assess need for intravenous fluids  - Provide specific nutrition/hydration education as appropriate  - Include patient/family/caregiver in decisions related to nutrition  Outcome: Progressing    Pt with acute severe malnutrition  RD currently does not have protocol to order supplements  Please provide Glucerna QD at dinner meals  Pt also interested in trialing Ensure Clear (apple) at breakfast meals  Monitor glucose levels  Will monitor tolerance/acceptance and make adjustments as indicated

## 2018-05-12 PROBLEM — I48.91 ATRIAL FIBRILLATION (HCC): Status: ACTIVE | Noted: 2018-05-12

## 2018-05-12 LAB
ANION GAP SERPL CALCULATED.3IONS-SCNC: 6 MMOL/L (ref 4–13)
ANION GAP SERPL CALCULATED.3IONS-SCNC: 8 MMOL/L (ref 4–13)
ANION GAP SERPL CALCULATED.3IONS-SCNC: 8 MMOL/L (ref 4–13)
BASOPHILS # BLD AUTO: 0 THOUSANDS/ΜL (ref 0–0.1)
BASOPHILS NFR BLD AUTO: 0 % (ref 0–1)
BUN SERPL-MCNC: 26 MG/DL (ref 5–25)
BUN SERPL-MCNC: 27 MG/DL (ref 5–25)
BUN SERPL-MCNC: 29 MG/DL (ref 5–25)
CALCIUM SERPL-MCNC: 7.5 MG/DL (ref 8.3–10.1)
CALCIUM SERPL-MCNC: 7.6 MG/DL (ref 8.3–10.1)
CALCIUM SERPL-MCNC: 7.7 MG/DL (ref 8.3–10.1)
CHLORIDE SERPL-SCNC: 100 MMOL/L (ref 100–108)
CHLORIDE SERPL-SCNC: 101 MMOL/L (ref 100–108)
CHLORIDE SERPL-SCNC: 104 MMOL/L (ref 100–108)
CO2 SERPL-SCNC: 21 MMOL/L (ref 21–32)
CO2 SERPL-SCNC: 22 MMOL/L (ref 21–32)
CO2 SERPL-SCNC: 23 MMOL/L (ref 21–32)
CREAT SERPL-MCNC: 1.07 MG/DL (ref 0.6–1.3)
CREAT SERPL-MCNC: 1.21 MG/DL (ref 0.6–1.3)
CREAT SERPL-MCNC: 1.22 MG/DL (ref 0.6–1.3)
EOSINOPHIL # BLD AUTO: 0.2 THOUSAND/ΜL (ref 0–0.61)
EOSINOPHIL NFR BLD AUTO: 2 % (ref 0–6)
ERYTHROCYTE [DISTWIDTH] IN BLOOD BY AUTOMATED COUNT: 12 % (ref 11.6–15.1)
GFR SERPL CREATININE-BSD FRML MDRD: 56 ML/MIN/1.73SQ M
GFR SERPL CREATININE-BSD FRML MDRD: 57 ML/MIN/1.73SQ M
GFR SERPL CREATININE-BSD FRML MDRD: 66 ML/MIN/1.73SQ M
GLUCOSE SERPL-MCNC: 227 MG/DL (ref 65–140)
GLUCOSE SERPL-MCNC: 239 MG/DL (ref 65–140)
GLUCOSE SERPL-MCNC: 241 MG/DL (ref 65–140)
GLUCOSE SERPL-MCNC: 280 MG/DL (ref 65–140)
GLUCOSE SERPL-MCNC: 304 MG/DL (ref 65–140)
GLUCOSE SERPL-MCNC: 85 MG/DL (ref 65–140)
GLUCOSE SERPL-MCNC: 97 MG/DL (ref 65–140)
HCT VFR BLD AUTO: 30.1 % (ref 42–52)
HGB BLD-MCNC: 9.9 G/DL (ref 14–18)
LG PLATELETS BLD QL SMEAR: PRESENT
LYMPHOCYTES # BLD AUTO: 0.9 THOUSANDS/ΜL (ref 0.6–4.47)
LYMPHOCYTES NFR BLD AUTO: 8 % (ref 14–44)
MAGNESIUM SERPL-MCNC: 2.1 MG/DL (ref 1.6–2.6)
MCH RBC QN AUTO: 34.1 PG (ref 27–31)
MCHC RBC AUTO-ENTMCNC: 32.8 G/DL (ref 31.4–37.4)
MCV RBC AUTO: 104 FL (ref 82–98)
MONOCYTES # BLD AUTO: 0.9 THOUSAND/ΜL (ref 0.17–1.22)
MONOCYTES NFR BLD AUTO: 9 % (ref 4–12)
NEUTROPHILS # BLD AUTO: 8.5 THOUSANDS/ΜL (ref 1.85–7.62)
NEUTS SEG NFR BLD AUTO: 81 % (ref 43–75)
NRBC BLD AUTO-RTO: 0 /100 WBCS
PHOSPHATE SERPL-MCNC: 2.6 MG/DL (ref 2.3–4.1)
PLATELET # BLD AUTO: 232 THOUSANDS/UL (ref 130–400)
PLATELET BLD QL SMEAR: ADEQUATE
PMV BLD AUTO: 8.5 FL (ref 8.9–12.7)
POTASSIUM SERPL-SCNC: 4.2 MMOL/L (ref 3.5–5.3)
POTASSIUM SERPL-SCNC: 4.7 MMOL/L (ref 3.5–5.3)
POTASSIUM SERPL-SCNC: 4.8 MMOL/L (ref 3.5–5.3)
RBC # BLD AUTO: 2.89 MILLION/UL (ref 4.7–6.1)
SODIUM SERPL-SCNC: 130 MMOL/L (ref 136–145)
SODIUM SERPL-SCNC: 130 MMOL/L (ref 136–145)
SODIUM SERPL-SCNC: 133 MMOL/L (ref 136–145)
TOXIC GRANULES BLD QL SMEAR: PRESENT
WBC # BLD AUTO: 10.5 THOUSAND/UL (ref 4.8–10.8)

## 2018-05-12 PROCEDURE — 83735 ASSAY OF MAGNESIUM: CPT | Performed by: NURSE PRACTITIONER

## 2018-05-12 PROCEDURE — 99232 SBSQ HOSP IP/OBS MODERATE 35: CPT | Performed by: INTERNAL MEDICINE

## 2018-05-12 PROCEDURE — 84100 ASSAY OF PHOSPHORUS: CPT | Performed by: NURSE PRACTITIONER

## 2018-05-12 PROCEDURE — 82948 REAGENT STRIP/BLOOD GLUCOSE: CPT

## 2018-05-12 PROCEDURE — 99232 SBSQ HOSP IP/OBS MODERATE 35: CPT | Performed by: FAMILY MEDICINE

## 2018-05-12 PROCEDURE — 80048 BASIC METABOLIC PNL TOTAL CA: CPT | Performed by: NURSE PRACTITIONER

## 2018-05-12 PROCEDURE — 85025 COMPLETE CBC W/AUTO DIFF WBC: CPT | Performed by: NURSE PRACTITIONER

## 2018-05-12 RX ADMIN — HEPARIN SODIUM 5000 UNITS: 5000 INJECTION, SOLUTION INTRAVENOUS; SUBCUTANEOUS at 13:53

## 2018-05-12 RX ADMIN — SODIUM CHLORIDE 125 ML/HR: 0.9 INJECTION, SOLUTION INTRAVENOUS at 01:17

## 2018-05-12 RX ADMIN — INSULIN GLARGINE 20 UNITS: 100 INJECTION, SOLUTION SUBCUTANEOUS at 21:41

## 2018-05-12 RX ADMIN — CEFTRIAXONE 1000 MG: 1 INJECTION, SOLUTION INTRAVENOUS at 21:39

## 2018-05-12 RX ADMIN — ASPIRIN 81 MG 81 MG: 81 TABLET ORAL at 09:01

## 2018-05-12 RX ADMIN — SODIUM CHLORIDE 75 ML/HR: 0.9 INJECTION, SOLUTION INTRAVENOUS at 16:03

## 2018-05-12 RX ADMIN — HEPARIN SODIUM 5000 UNITS: 5000 INJECTION, SOLUTION INTRAVENOUS; SUBCUTANEOUS at 05:41

## 2018-05-12 RX ADMIN — HEPARIN SODIUM 5000 UNITS: 5000 INJECTION, SOLUTION INTRAVENOUS; SUBCUTANEOUS at 21:42

## 2018-05-12 RX ADMIN — SODIUM CHLORIDE 125 ML/HR: 0.9 INJECTION, SOLUTION INTRAVENOUS at 08:52

## 2018-05-12 RX ADMIN — ATORVASTATIN CALCIUM 10 MG: 10 TABLET, FILM COATED ORAL at 15:57

## 2018-05-12 RX ADMIN — INSULIN LISPRO 4 UNITS: 100 INJECTION, SOLUTION INTRAVENOUS; SUBCUTANEOUS at 21:41

## 2018-05-12 NOTE — ASSESSMENT & PLAN NOTE
As per cardiology note, patient had been alternating with rapid atrial fibrillation and sinus bradycardia with frequent PVCs which is likely related to electrolyte abnormalities      Patient in sinus rhythm but still having lot frequent PVCs  Started on Toprol-XL 25 mg daily

## 2018-05-12 NOTE — PROGRESS NOTES
[facility] Internal Medicine Progress Note  Patient: Morena Uche 76 y o  male   MRN: [id number]  PCP: Conor Tillman DO  Unit/Bed#: [address] [place] [address] Encounter: [id number]  Date Of Visit: 05/12/18    Problem List:    Principal Problem:    Hyponatremia  Active Problems:    CHRIS (acute kidney injury) ([facility] [address] )    Urinary tract infection    Atrial fibrillation (HCC)    DM2 (diabetes mellitus, type 2) ([facility])    CKD (chronic kidney disease) stage 3, GFR 30-59 ml/min    Cardiomyopathy, ischemic    Benign essential hypertension    CAD (coronary artery disease)      Assessment & Plan:    * Hyponatremia   Assessment & Plan    Corrected sodium for hyperglycemia on recent lab work is about 133  Patient was also on several medications that may be playing a role in this such as torsemide, lisinopril, Aldactone  Cont to hold all these meds for now  Likely hypovolemic hyponatremia sodium level is improving with IV fluid   Decrease rate of IV fluids and continue to monitor        Urinary tract infection   Assessment & Plan    Cont on Rocephin  urine culture growing gram-negative rods        CHRIS (acute kidney injury) ([facility] )   Assessment & Plan    Likely pre renal in nature from hypovolemia and also due to medication use  Creatinine has trended down with IV fluids        Atrial fibrillation ([facility])   Assessment & Plan    As per cardiology note, patient has been alternating with rapid atrial fibrillation and sinus bradycardia with frequent PVCs which is likely related to electrolyte abnormalities  Continue monitoring on telemetry  He may need permanent pacemaker if remains after electrolytes normalize  No indication for temporary pacemaker at this time          Benign essential hypertension   Assessment & Plan    Home medications on hold due to electrolyte abnormalities and acute kidney injury        Cardiomyopathy, ischemic   Assessment & Plan    No evidence of volume overload  Echo showed ejection fraction of 40 % with moderate diffuse hypokinesis and grade 1 diastolic dysfunction        CKD (chronic kidney disease) stage 3, GFR 30-59 ml/min   Assessment & Plan    Baseline creatinine appears to be around 1 6  Creatinine now appears at baseline        DM2 (diabetes mellitus, type 2) ([facility])   Assessment & Plan    Cont Levemir, sliding scale  Continue holding Amaryl        CAD (coronary artery disease)   Assessment & Plan    History of coronary artery disease with a CABG in 2017, continue aspirin        Hyperkalemiaresolved as of 2018   Assessment & Plan    resolved        Hypovolemia due to dehydrationresolved as of 2018   Assessment & Plan                    VTE Pharmacologic Prophylaxis:   Pharmacologic: Heparin  Mechanical VTE Prophylaxis in Place: Yes    Patient Centered Rounds: I have performed bedside rounds with nursing staff today  Discussions with Specialists or Other Care Team Provider: Yes    Education and Discussions with Family / Patient:Yes    Time Spent for Care: 30 minutes  More than 50% of total time spent on counseling and coordination of care as described above  Current Length of Stay: 3 day(s)    Current Patient Status: Inpatient     Discharge Plan: home vs rehab    Code Status: Level 1 - Full Code    Certification Statement: The patient will continue to require additional inpatient hospital stay due to hyponatremia, CHRIS      Subjective:   States he feels better denies any shortness of breath, chest pain    Objective:     Vitals:   Temp (24hrs), Av 9 °F (36 6 °C), Min:97 5 °F (36 4 °C), Max:98 1 °F (36 7 °C)    HR:  [79-88] 83  Resp:  [16-18] 16  BP: (104-124)/(51-65) 105/51  SpO2:  [97 %-100 %] 100 %  Body mass index is 22 03 kg/m²  Input and Output Summary (last 24 hours):        Intake/Output Summary (Last 24 hours) at 18 1802  Last data filed at 18 1401   Gross per 24 hour   Intake          1983 33 ml   Output             1710 ml   Net           273 33 ml       Physical Exam: Physical Exam   Constitutional: He is oriented to person, place, and time  No distress  HENT:   Head: Normocephalic and atraumatic  Eyes: EOM are normal  Right eye exhibits no discharge  Left eye exhibits no discharge  No scleral icterus  Neck: Neck supple  Cardiovascular: Normal rate and regular rhythm  Murmur heard  Pulmonary/Chest: Effort normal and breath sounds normal  No respiratory distress  He has no wheezes  He has no rales  Abdominal: Soft  Bowel sounds are normal  He exhibits no distension  There is no tenderness  Musculoskeletal: He exhibits no edema  Neurological: He is alert and oriented to person, place, and time  No cranial nerve deficit  Skin: He is not diaphoretic  Psychiatric: He has a normal mood and affect  Additional Data:     Labs:      Results from last 7 days  Lab Units 05/12/18  0434   WBC Thousand/uL 10 50   HEMOGLOBIN g/dL 9 9*   HEMATOCRIT % 30 1*   PLATELETS Thousands/uL 232   NEUTROS PCT % 81*   LYMPHS PCT % 8*   MONOS PCT % 9   EOS PCT % 2       Results from last 7 days  Lab Units 05/12/18  1408  05/10/18  0417   SODIUM mmol/L 130*  < > 127*   POTASSIUM mmol/L 4 7  < > 5 3   CHLORIDE mmol/L 100  < > 97*   CO2 mmol/L 22  < > 21   BUN mg/dL 27*  < > 83*   CREATININE mg/dL 1 21  < > 2 07*   CALCIUM mg/dL 7 7*  < > 8 4   TOTAL PROTEIN g/dL  --   --  6 9   BILIRUBIN TOTAL mg/dL  --   --  0 60   ALK PHOS U/L  --   --  88   ALT U/L  --   --  18   AST U/L  --   --  9   GLUCOSE RANDOM mg/dL 241*  < > 119   < > = values in this interval not displayed  Results from last 7 days  Lab Units 05/09/18  1927   INR  1 04       * I Have Reviewed All Lab Data Listed Above  * Additional Pertinent Lab Tests Reviewed: All Labs For Current Hospital Admission Reviewed    Imaging:  Xr Chest 1 View Portable    Result Date: 5/9/2018  Narrative: CHEST INDICATION:   weakness   COMPARISON:  None EXAM PERFORMED/VIEWS:  XR CHEST PORTABLE FINDINGS:  There are median sternotomy wires indicating prior cardiac surgery  Cardiomediastinal silhouette appears unremarkable  The lungs are clear  No pneumothorax or pleural effusion  Osseous structures appear within normal limits for patient age  Impression: No acute cardiopulmonary disease  Workstation performed: [id number]     Ct Head Without Contrast    Result Date: 5/9/2018  Narrative: CT BRAIN - WITHOUT CONTRAST INDICATION:   Head trauma, neuro decline, f/u imaging fall, weakness, hit head  COMPARISON:  None  TECHNIQUE:  CT examination of the brain was performed  In addition to axial images, coronal 2D reformatted images were created and submitted for interpretation  Radiation dose length product (DLP) for this visit:  1169 35 mGy-cm   This examination, like all CT scans performed in the [facility], was performed utilizing techniques to minimize radiation dose exposure, including the use of iterative reconstruction and automated exposure control  IMAGE QUALITY:  Diagnostic  FINDINGS: PARENCHYMA:  No intracranial mass, mass effect or midline shift  No CT signs of acute infarction  No acute intracranial hemorrhage  Mild bilateral periventricular white matter hypodensity likely microvascular in origin  VENTRICLES AND EXTRA-AXIAL SPACES:  Normal for the patient's age  VISUALIZED ORBITS AND PARANASAL SINUSES:  Unremarkable  CALVARIUM AND EXTRACRANIAL SOFT TISSUES:  Normal      Impression: No acute intracranial abnormality   Workstation performed: [id number]     Imaging Reports Reviewed by myself    Cultures:   Blood Culture: No results found for: BLOODCX  Urine Culture:   Lab Results   Component Value Date    URINECX >100,000 cfu/ml Gram Negative Ole (A) 05/09/2018     Sputum Culture: No components found for: SPUTUMCX  Wound Culture: No results found for: WOUNDCULT    Last 24 Hours Medication List:     Current Facility-Administered Medications:  aspirin 81 mg Oral Daily MIGUEL Hood    atorvastatin 10 mg Oral Daily With MIGUEL Alanis    cefTRIAXone 1,000 mg Intravenous Q24H Yanique Calderón PA-C Last Rate: Stopped (05/11/18 2150)   heparin (porcine) 5,000 Units Subcutaneous Q8H [facility] Rhea John PA-C    insulin glargine 20 Units Subcutaneous HS Rhea John PA-C    insulin lispro 1-6 Units Subcutaneous TID AC MIGUEL Arora    insulin lispro 1-6 Units Subcutaneous HS MIGUEL Arora    pneumococcal 13-valent conjugate vaccine 0 5 mL Intramuscular Prior to discharge Ita De La Cruz MD    sodium chloride 75 mL/hr Intravenous Continuous Rubia Ndiaye DO Last Rate: 75 mL/hr (05/12/18 1603)        Today, Patient Was Seen By: Susan Cox DO    ** Please Note: Dragon 360 Dictation voice to text software may have been used in the creation of this document   **

## 2018-05-12 NOTE — ASSESSMENT & PLAN NOTE
Sodium level has improved stable overall  Patient was also on several medications that may be playing a role in this such as torsemide, lisinopril, Aldactone      Likely hypovolemic hyponatremia  IVF was discontinued yesterday  Repeat labs in the a m    As per Cardiology, start patient on low-dose of torsemide 10 mg daily at discharge, Aldactone at a lower dose, lisinopril 2 5 mg before discharge

## 2018-05-12 NOTE — ASSESSMENT & PLAN NOTE
No evidence of volume overload  Echo showed ejection fraction of 40 % with moderate diffuse hypokinesis and grade 1 diastolic dysfunction  Patient underwent Lexiscan stress test today  Patient started on metoprolol 25 milligram p o  daily and digoxin 0 125 milligram p o  Daily  Patient to be restarted back on Demadex 10 milligram p o  daily and Aldactone 12 5 milligrams tomorrow  Also start low-dose lisinopril upon discharge

## 2018-05-12 NOTE — PROGRESS NOTES
Cardiology Progress Note  ASSESSMENT:    1  Suppressed atrial fibrillation and PVCs with suppression of sinus bradycardia  2   Stable CAD with prior CABG  3  Ischemic cardiomyopathy with 40% ejection fraction, grade 1 diastolic dysfunction, and LAE  4   Chronic systolic and diastolic heart failure, currently compensated  5  Improving uncontrolled diabetes mellitus with hyperosmolar nonketotic state  6  Previously treated dyslipidemia  7  Mild hyponatremia, but improved since admission  8  Resolved hyperkalemia      PLAN:    1  Concur with present management  2   Can resume atorvastatin at discharge  3   Recommend follow-up with his own cardiologist within several weeks after discharge  HPI:    Mr Dain Mina Denies new cardiopulmonary or  medical symptoms  He is quite alert and oriented at present  He normally has cardiology follow-up with Dr Berry Barros MD of [facility] and thinks he has an upcoming appointment in September, 2018  The patient currently denies weakness, poor appetite, and does not appear lethargic  He was ambulated with assistance today  Objective       Physical Exam:   /51 (BP Location: Left arm)   Pulse 83   Temp 97 8 °F (36 6 °C) (Oral)   Resp 16   Ht 5' 8 5" (1 74 m)   Wt 66 7 kg (147 lb 0 8 oz)   SpO2 100%   BMI 22 03 kg/m²   Body mass index is 22 03 kg/m²      General Appearance:  Alert, cooperative, no distress, appears stated age   Head:  Normocephalic, without obvious abnormality, atraumatic   Eyes:  PERRL, conjunctiva/corneas clear, EOM's intact,   both eyes   Ears:  Normal TM's and external ear canals, both ears   Nose: Nares normal, septum midline, mucosa normal, no drainage or sinus tenderness   Throat: Lips, mucosa, and tongue normal; teeth and gums normal   Neck: Supple, symmetrical, trachea midline, no adenopathy, thyroid: not enlarged, symmetric, no tenderness/mass/nodules, no carotid bruit or JVD   Back:   Symmetric, no curvature, ROM normal, no CVA tenderness   Lungs:   Clear to auscultation bilaterally, respirations unlabored   Chest Wall:  No tenderness or deformity   Heart:  Regular rate and rhythm, S1, S2 normal, no murmur, rub or gallop   Abdomen:   Soft, non-tender, bowel sounds active all four quadrants,  no masses, no organomegaly   Extremities: Extremities normal, atraumatic, no cyanosis or edema   Pulses: 2+ and symmetric   Skin: Skin showed normal color, texture, turgor and no rashes or lesions   Lymph nodes: Cervical, supraclavicular, and axillary nodes normal   Neurologic: Normal         Cardiographics  Telemetry 05/12/2018:              Normal sinus rhythm with no further bradycardia or tachycardia    Echocardiogram 05/11/2018:               Left ventricular enlargement and mild LVH with grade 1 diastolic dysfunction and 03% LVEF  LAE  Mild aortic, mitral, and tricuspid regurgitation    Imaging  Chest X-Ray 05/09/2018:              No active disease    Lab Review   Recent Results (from the past 24 hour(s))   Fingerstick Glucose (POCT)    Collection Time: 05/11/18  9:21 PM   Result Value Ref Range    POC Glucose 234 (H) 65 - 140 mg/dl   Basic metabolic panel    Collection Time: 05/11/18 10:16 PM   Result Value Ref Range    Sodium 131 (L) 136 - 145 mmol/L    Potassium 4 5 3 5 - 5 3 mmol/L    Chloride 100 100 - 108 mmol/L    CO2 24 21 - 32 mmol/L    Anion Gap 7 4 - 13 mmol/L    BUN 35 (H) 5 - 25 mg/dL    Creatinine 1 30 0 60 - 1 30 mg/dL    Glucose 202 (H) 65 - 140 mg/dL    Calcium 7 8 (L) 8 3 - 10 1 mg/dL    eGFR 52 ml/min/1 73sq m   Basic metabolic panel    Collection Time: 05/12/18  4:34 AM   Result Value Ref Range    Sodium 133 (L) 136 - 145 mmol/L    Potassium 4 2 3 5 - 5 3 mmol/L    Chloride 104 100 - 108 mmol/L    CO2 21 21 - 32 mmol/L    Anion Gap 8 4 - 13 mmol/L    BUN 29 (H) 5 - 25 mg/dL    Creatinine 1 07 0 60 - 1 30 mg/dL    Glucose 97 65 - 140 mg/dL    Calcium 7 5 (L) 8 3 - 10 1 mg/dL    eGFR 66 ml/min/1 73sq m   CBC and differential    Collection Time: 05/12/18  4:34 AM   Result Value Ref Range    WBC 10 50 4 80 - 10 80 Thousand/uL    RBC 2 89 (L) 4 70 - 6 10 Million/uL    Hemoglobin 9 9 (L) 14 0 - 18 0 g/dL    Hematocrit 30 1 (L) 42 0 - 52 0 %     (H) 82 - 98 fL    MCH 34 1 (H) 27 0 - 31 0 pg    MCHC 32 8 31 4 - 37 4 g/dL    RDW 12 0 11 6 - 15 1 %    MPV 8 5 (L) 8 9 - 12 7 fL    Platelets 446 212 - 182 Thousands/uL    nRBC 0 /100 WBCs    Neutrophils Relative 81 (H) 43 - 75 %    Lymphocytes Relative 8 (L) 14 - 44 %    Monocytes Relative 9 4 - 12 %    Eosinophils Relative 2 0 - 6 %    Basophils Relative 0 0 - 1 %    Neutrophils Absolute 8 50 (H) 1 85 - 7 62 Thousands/µL    Lymphocytes Absolute 0 90 0 60 - 4 47 Thousands/µL    Monocytes Absolute 0 90 0 17 - 1 22 Thousand/µL    Eosinophils Absolute 0 20 0 00 - 0 61 Thousand/µL    Basophils Absolute 0 00 0 00 - 0 10 Thousands/µL   Magnesium    Collection Time: 05/12/18  4:34 AM   Result Value Ref Range    Magnesium 2 1 1 6 - 2 6 mg/dL   Phosphorus    Collection Time: 05/12/18  4:34 AM   Result Value Ref Range    Phosphorus 2 6 2 3 - 4 1 mg/dL   Smear Review(Phlebs Do Not Order)    Collection Time: 05/12/18  4:34 AM   Result Value Ref Range    Toxic Granulation Present     Platelet Estimate Adequate Adequate    Large Platelet Present    Fingerstick Glucose (POCT)    Collection Time: 05/12/18  8:16 AM   Result Value Ref Range    POC Glucose 85 65 - 140 mg/dl   Fingerstick Glucose (POCT)    Collection Time: 05/12/18 11:37 AM   Result Value Ref Range    POC Glucose 227 (H) 65 - 140 mg/dl   Basic metabolic panel    Collection Time: 05/12/18  2:08 PM   Result Value Ref Range    Sodium 130 (L) 136 - 145 mmol/L    Potassium 4 7 3 5 - 5 3 mmol/L    Chloride 100 100 - 108 mmol/L    CO2 22 21 - 32 mmol/L    Anion Gap 8 4 - 13 mmol/L    BUN 27 (H) 5 - 25 mg/dL    Creatinine 1 21 0 60 - 1 30 mg/dL    Glucose 241 (H) 65 - 140 mg/dL    Calcium 7 7 (L) 8 3 - 10 1 mg/dL    eGFR 57 ml/min/1 73sq rebekah   Fingerstick Glucose (POCT)    Collection Time: 05/12/18  3:59 PM   Result Value Ref Range    POC Glucose 239 (H) 65 - 140 mg/dl     Current Facility-Administered Medications   Medication Dose Route Frequency Provider Last Rate Last Dose    aspirin chewable tablet 81 mg  81 mg Oral Daily MIGUEL Andrew   81 mg at 05/12/18 0901    atorvastatin (LIPITOR) tablet 10 mg  10 mg Oral Daily With MIGUEL Nation   10 mg at 05/12/18 1557    cefTRIAXone (ROCEPHIN) IVPB (premix) 1,000 mg  1,000 mg Intravenous Q24H Rhea John PA-C   Stopped at 05/11/18 2150    heparin (porcine) subcutaneous injection 5,000 Units  5,000 Units Subcutaneous Q8H [address] Rhea John PA-C   5,000 Units at 05/12/18 1353    insulin glargine (LANTUS) subcutaneous injection 20 Units 0 2 mL  20 Units Subcutaneous HS Rhea John PA-C   20 Units at 05/11/18 2124    insulin lispro (HumaLOG) 100 units/mL subcutaneous injection 1-6 Units  1-6 Units Subcutaneous TID AC MIGUEL Andrew   3 Units at 05/12/18 1601    insulin lispro (HumaLOG) 100 units/mL subcutaneous injection 1-6 Units  1-6 Units Subcutaneous HS MIGUEL Andrew   3 Units at 05/11/18 2125    pneumococcal 13-valent conjugate vaccine (PREVNAR-13) IM injection 0 5 mL  0 5 mL Intramuscular Prior to discharge Sandra Ovalle MD        sodium chloride 0 9 % infusion  75 mL/hr Intravenous Continuous Rubia Ndiaye DO 75 mL/hr at 05/12/18 1603 75 mL/hr at 05/12/18 1603

## 2018-05-12 NOTE — ASSESSMENT & PLAN NOTE
Likely pre renal in nature from hypovolemia and also due to medication use  Creatinine has trended down with IV fluids and appears at baseline

## 2018-05-13 LAB
ANION GAP SERPL CALCULATED.3IONS-SCNC: 7 MMOL/L (ref 4–13)
ANION GAP SERPL CALCULATED.3IONS-SCNC: 8 MMOL/L (ref 4–13)
BACTERIA UR CULT: ABNORMAL
BUN SERPL-MCNC: 19 MG/DL (ref 5–25)
BUN SERPL-MCNC: 20 MG/DL (ref 5–25)
CALCIUM SERPL-MCNC: 7.4 MG/DL (ref 8.3–10.1)
CALCIUM SERPL-MCNC: 7.6 MG/DL (ref 8.3–10.1)
CHLORIDE SERPL-SCNC: 101 MMOL/L (ref 100–108)
CHLORIDE SERPL-SCNC: 105 MMOL/L (ref 100–108)
CO2 SERPL-SCNC: 21 MMOL/L (ref 21–32)
CO2 SERPL-SCNC: 22 MMOL/L (ref 21–32)
CREAT SERPL-MCNC: 0.98 MG/DL (ref 0.6–1.3)
CREAT SERPL-MCNC: 1.04 MG/DL (ref 0.6–1.3)
ERYTHROCYTE [DISTWIDTH] IN BLOOD BY AUTOMATED COUNT: 12.4 % (ref 11.6–15.1)
GFR SERPL CREATININE-BSD FRML MDRD: 68 ML/MIN/1.73SQ M
GFR SERPL CREATININE-BSD FRML MDRD: 73 ML/MIN/1.73SQ M
GLUCOSE SERPL-MCNC: 192 MG/DL (ref 65–140)
GLUCOSE SERPL-MCNC: 227 MG/DL (ref 65–140)
GLUCOSE SERPL-MCNC: 301 MG/DL (ref 65–140)
GLUCOSE SERPL-MCNC: 379 MG/DL (ref 65–140)
GLUCOSE SERPL-MCNC: 84 MG/DL (ref 65–140)
GLUCOSE SERPL-MCNC: 86 MG/DL (ref 65–140)
HCT VFR BLD AUTO: 28.4 % (ref 42–52)
HGB BLD-MCNC: 9.4 G/DL (ref 14–18)
MCH RBC QN AUTO: 34.4 PG (ref 27–31)
MCHC RBC AUTO-ENTMCNC: 33.3 G/DL (ref 31.4–37.4)
MCV RBC AUTO: 103 FL (ref 82–98)
PLATELET # BLD AUTO: 251 THOUSANDS/UL (ref 130–400)
PMV BLD AUTO: 8.6 FL (ref 8.9–12.7)
POTASSIUM SERPL-SCNC: 4.1 MMOL/L (ref 3.5–5.3)
POTASSIUM SERPL-SCNC: 4.3 MMOL/L (ref 3.5–5.3)
RBC # BLD AUTO: 2.74 MILLION/UL (ref 4.7–6.1)
SODIUM SERPL-SCNC: 131 MMOL/L (ref 136–145)
SODIUM SERPL-SCNC: 133 MMOL/L (ref 136–145)
WBC # BLD AUTO: 10.5 THOUSAND/UL (ref 4.8–10.8)

## 2018-05-13 PROCEDURE — 97166 OT EVAL MOD COMPLEX 45 MIN: CPT

## 2018-05-13 PROCEDURE — 82948 REAGENT STRIP/BLOOD GLUCOSE: CPT

## 2018-05-13 PROCEDURE — G8987 SELF CARE CURRENT STATUS: HCPCS

## 2018-05-13 PROCEDURE — 80048 BASIC METABOLIC PNL TOTAL CA: CPT | Performed by: NURSE PRACTITIONER

## 2018-05-13 PROCEDURE — G8978 MOBILITY CURRENT STATUS: HCPCS

## 2018-05-13 PROCEDURE — 99232 SBSQ HOSP IP/OBS MODERATE 35: CPT | Performed by: FAMILY MEDICINE

## 2018-05-13 PROCEDURE — 99232 SBSQ HOSP IP/OBS MODERATE 35: CPT | Performed by: INTERNAL MEDICINE

## 2018-05-13 PROCEDURE — 85027 COMPLETE CBC AUTOMATED: CPT | Performed by: FAMILY MEDICINE

## 2018-05-13 PROCEDURE — 97162 PT EVAL MOD COMPLEX 30 MIN: CPT

## 2018-05-13 PROCEDURE — 80048 BASIC METABOLIC PNL TOTAL CA: CPT | Performed by: FAMILY MEDICINE

## 2018-05-13 PROCEDURE — G8988 SELF CARE GOAL STATUS: HCPCS

## 2018-05-13 PROCEDURE — G8979 MOBILITY GOAL STATUS: HCPCS

## 2018-05-13 RX ADMIN — HEPARIN SODIUM 5000 UNITS: 5000 INJECTION, SOLUTION INTRAVENOUS; SUBCUTANEOUS at 14:39

## 2018-05-13 RX ADMIN — INSULIN LISPRO 6 UNITS: 100 INJECTION, SOLUTION INTRAVENOUS; SUBCUTANEOUS at 21:08

## 2018-05-13 RX ADMIN — CEFTRIAXONE 1000 MG: 1 INJECTION, SOLUTION INTRAVENOUS at 21:09

## 2018-05-13 RX ADMIN — ATORVASTATIN CALCIUM 10 MG: 10 TABLET, FILM COATED ORAL at 16:39

## 2018-05-13 RX ADMIN — HEPARIN SODIUM 5000 UNITS: 5000 INJECTION, SOLUTION INTRAVENOUS; SUBCUTANEOUS at 21:08

## 2018-05-13 RX ADMIN — INSULIN GLARGINE 20 UNITS: 100 INJECTION, SOLUTION SUBCUTANEOUS at 21:08

## 2018-05-13 RX ADMIN — HEPARIN SODIUM 5000 UNITS: 5000 INJECTION, SOLUTION INTRAVENOUS; SUBCUTANEOUS at 06:40

## 2018-05-13 RX ADMIN — ASPIRIN 81 MG 81 MG: 81 TABLET ORAL at 08:02

## 2018-05-13 NOTE — OCCUPATIONAL THERAPY NOTE
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  Patient s/p recent fall due to weakness   Prior Function   Level of [place] Independent with ADLs and functional mobility   Lives With Alone   ADL Assistance Independent   IADLs Independent   Falls in the last 6 months 1 to 4   ADL   Eating Assistance 7  [address]  4  Minimal Assistance   Bed Mobility   Supine to Sit 5  Supervision   Transfers   Sit to Stand 4  Minimal assistance   Stand to Sit 4  Minimal assistance   Stand pivot 4  Minimal assistance   Functional Mobility   Functional Mobility 4  Minimal assistance   Additional Comments 50 feet; unsteady    Balance   Static Sitting Fair +   Dynamic Sitting Fair   Static Standing Fair -   Dynamic Standing Fair -   Activity Tolerance   Activity Tolerance Patient limited by fatigue   RUE Assessment   RUE Assessment WFL  (4/5)   LUE Assessment   LUE Assessment WFL  (4/5)   Cognition   Overall Cognitive Status WFL   Arousal/Participation Cooperative   Attention Within functional limits   Orientation Level Oriented X4   Following Commands Follows all commands and directions without difficulty   Assessment   Limitation Decreased ADL status; Decreased UE strength;Decreased Safe judgement during ADL;Decreased sensation;Decreased self-care trans;Decreased high-level ADLs  (decreased balance and mobility )   Prognosis Good   Assessment Patient evaluated by Occupational Therapy  Patient admitted with Hyponatremia    The patients occupational profile, medical and therapy history includes a expanded review of medical and/or therapy records and additional review of physical, cognitive, or psychosocial history related to current functional performance  Comorbidities affecting functional mobility and ADLS include:cardiac surgery, CHF, diabetes and hypertension  Prior to admission, patient was independent with functional mobility without assistive device, independent with ADLS and independent with IADLS  The evaluation identifies the following performance deficits: weakness, impaired balance, decreased endurance, increased fall risk, new onset of impairment of functional mobility, decreased ADLS, decreased IADLS, decreased activity tolerance, decreased safety awareness, impaired judgement and decreased strength, that result in activity limitations and/or participation restrictions  This evaluation requires clinical decision making of moderate complexity, because the patient may present with comorbidities that affect occupational performance and required minimal or moderate modification of tasks or assistance with the consideration of several treatment options  The Barthel Index was used as a functional outcome tool presenting with a score of 60, indicating moderate limitations of functional mobility and ADLS  Patient will benefit from skilled Occupational Therapy services to address above deficits and facilitate a safe return to prior level of function  Goals   Patient Goals go home   STG Time Frame (1-7 days)   Short Term Goal  Patient will increase standing tolerance to 5 minutes during functional activity;  Patient will increase bed mobility to independent; Patient will increase functional mobility to and from bathroom with no assistive device with supervision to increase performance with ADLS; Patient will tolerate 8 minutes of UE ROM/strengthening to increase general activity tolerance and performance in ADLS/IADLS; Patient will improve functional activity tolerance to 10 minutes of sustained functional tasks to increase participation in basic self-care and decrease assistance level  LTG Time Frame (8-14 days)   Long Term Goal Patient will increase standing tolerance to 10 minutes during functional activity; Patient will increase functional mobility to and from bathroom with no assistive device independently to increase performance with ADLS; Patient will tolerate 12 minutes of UE ROM/strengthening to increase general activity tolerance and performance in ADLS/IADLS; Patient will improve functional activity tolerance to 20 minutes of sustained functional tasks to increase participation in basic self-care and decrease assistance level  Functional Transfer Goals   Pt Will Perform All Functional Transfers (STG supervision LTG independent )   ADL Goals   Pt Will Perform Bathing (STG supervision LTG independent )   Pt Will Perform LE Dressing (STG supervision LTG independent )   Pt Will Perform Toileting (STG supervision LTG independent )   Plan   Treatment Interventions ADL retraining;Functional transfer training; Endurance training;UE strengthening/ROM; Patient/family training;Equipment evaluation/education; Activityengagement   OT Frequency 3-5x/wk   Recommendation   OT Discharge Recommendation Home with family support   Barthel Index   Feeding 10   Bathing 0   Grooming Score 5   Dressing Score 5   Bladder Score 10   Bowels Score 10   Toilet Use Score 5   Transfers (Bed/Chair) Score 10   Mobility (Level Surface) Score 0   Stairs Score 5   Barthel Index Score 61   Licensure   [place] License Number  Benny Quiroga Willy 85 OTR/L [id number]

## 2018-05-13 NOTE — PHYSICAL THERAPY NOTE
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 ))   Gait Assistance 4  Minimal assist   Assistive Device None   Distance 50 feet with change in direction   Balance   Static Sitting Fair +   Dynamic Sitting Fair   Static Standing Fair -   Dynamic Standing Fair -   Ambulatory Fair -   Activity Tolerance   Activity Tolerance Patient limited by fatigue   Assessment   Prognosis Good   Problem List Decreased strength;Decreased endurance; Impaired balance;Decreased mobility   Assessment Patient seen for Physical Therapy evaluation  Patient admitted with Hyponatremia  Comorbidities affecting patient's physical performance include: CABG, DM2, HTN, CKD, CHF  Personal factors affecting patient at time of initial evaluation include: lives in single story house, inability to navigate level surfaces without external assistance, limited home support, positive fall history, inability to perform current job functions, inability to perform physical activity and inability to perform ADLS   Prior to admission, patient was independent with functional mobility without assistive device, independent with ADLS, independent with IADLS, living with alone in a single  level home with one steps to enter, ambulating household distance, ambulating community distances and works full time  Please find objective findings from Physical Therapy assessment regarding body systems outlined above with impairments and limitations including weakness, impaired balance, decreased endurance, gait deviations, decreased activity tolerance, decreased safety awareness and fall risk  The Barthel Index was used as a functional outcome tool presenting with a score of 60 today indicating moderate limitations of functional mobility and ADLS  Patient's clinical presentation is currently unstable/unpredictable as seen in patient's presentation of increased fall risk, new onset of impairment of functional mobility, decreased endurance and new onset of weakness  Pt would benefit from continued Physical Therapy treatment to address deficits as defined above and maximize level of functional mobility  As demonstrated by objective findings, the assigned level of complexity for this evaluation is high  Goals   Patient Goals to go home   STG Expiration Date (1- 7 days)   Short Term Goal #1 Indep  bedmobs and transfers from all surfaces and flat bed   Short Term Goal #2 Indend  amb  without AD for 100 without loss of balance   LTG Expiration Date (8 - 14 days)   Long Term Goal #1 Independ  amb  without AD for 200 feet without LOB and normal gait pattern   Plan   Treatment/Interventions Functional transfer training;LE strengthening/ROM; Therapeutic exercise; Endurance training;Patient/family training;Equipment eval/education; Bed mobility;Gait training;OT   PT Frequency 5x/wk   Recommendation   Recommendation Home independently   Barthel Index   Feeding 10   Bathing 0   Grooming Score 5   Dressing Score 5   Bladder Score 10   Bowels Score 10   Toilet Use Score 5   Transfers (Bed/Chair) Score 10   Mobility (Level Surface) Score 0   Stairs Score 5   Barthel Index Score 60   Licensure   [place] License Number  [facility]   Ramesh [facility] PT  [id number]

## 2018-05-13 NOTE — PROGRESS NOTES
[facility] Progress Note  Patient: Michael Osei 76 y o  male   MRN: [id number]  PCP: Juvenal Corrales DO  Unit/Bed#: [address] [place] [address] Encounter: [id number]  Date Of Visit: 05/13/18    Problem List:    Principal Problem:    Hyponatremia  Active Problems:    CHRIS (acute kidney injury) ([facility] [address] )    Urinary tract infection    Atrial fibrillation (HCC)    DM2 (diabetes mellitus, type 2) ([facility])    CKD (chronic kidney disease) stage 3, GFR 30-59 ml/min    Cardiomyopathy, ischemic    Benign essential hypertension    CAD (coronary artery disease)      Assessment & Plan:    * Hyponatremia   Assessment & Plan    Sodium level improved  Patient was also on several medications that may be playing a role in this such as torsemide, lisinopril, Aldactone  Cont to hold all these meds for now  Likely hypovolemic hyponatremia  Repeat level later today and again tomm        Urinary tract infection   Assessment & Plan    Cont on Rocephin   urine culture growing klebsiella oxytoca        CHRIS (acute kidney injury) ([facility] [address] )   Assessment & Plan    Likely pre renal in nature from hypovolemia and also due to medication use  Creatinine has trended down with IV fluids and appears at baseline        Atrial fibrillation [facility])   Assessment & Plan    As per cardiology note, patient has been alternating with rapid atrial fibrillation and sinus bradycardia with frequent PVCs which is likely related to electrolyte abnormalities  Continue monitoring on telemetry  He may need permanent pacemaker if remains after electrolytes normalize  No indication for temporary pacemaker at this time  Currently not noted to be in atrial fibrillation        Benign essential hypertension   Assessment & Plan    Home medications on hold due to electrolyte abnormalities and acute kidney injury  BP is acceptable        Cardiomyopathy, ischemic   Assessment & Plan    No evidence of volume overload    Echo showed ejection fraction of 40 % with moderate diffuse hypokinesis and grade 1 diastolic dysfunction  Holding home medications due to Marco and electrolyte abnormalities        CKD (chronic kidney disease) stage 3, GFR 30-59 ml/min   Assessment & Plan    Creatinine now appears at baseline        DM2 (diabetes mellitus, type 2) ([facility])   Assessment & Plan    Cont Levemir, sliding scale  Continue holding Amaryl  Check A1c in the a m         CAD (coronary artery disease)   Assessment & Plan    History of coronary artery disease with a CABG in 2017, continue aspirin  Hyperkalemiaresolved as of 2018   Assessment & Plan    Resolved  VTE Pharmacologic Prophylaxis:   Pharmacologic: Heparin  Mechanical VTE Prophylaxis in Place: Yes    Patient Centered Rounds: I have performed bedside rounds with nursing staff today  Discussions with Specialists or Other Care Team Provider: Yes    Education and Discussions with Family / Patient:Yes    Time Spent for Care: 35 min  More than 50% of total time spent on counseling and coordination of care as described above  Current Length of Stay: 4 day(s)    Current Patient Status: Inpatient     Discharge Plan: home     Code Status: Level 1 - Full Code    Certification Statement: The patient will continue to require additional inpatient hospital stay due to hyponatremia, MARCO      Subjective:     Feels better  Appetite is improving  Denies any shortness of breath    Objective:     Vitals:   Temp (24hrs), Av 1 °F (36 7 °C), Min:97 5 °F (36 4 °C), Max:98 7 °F (37 1 °C)    HR:  [65-87] 72  Resp:  [18] 18  BP: (107-132)/(55-63) 132/63  SpO2:  [95 %-99 %] 99 %  Body mass index is 22 03 kg/m²  Input and Output Summary (last 24 hours):        Intake/Output Summary (Last 24 hours) at 18 1949  Last data filed at 18 1846   Gross per 24 hour   Intake              960 ml   Output             2200 ml   Net            -1240 ml       Physical Exam:     Physical Exam   Constitutional: He is oriented to person, place, and time  No distress  HENT:   Head: Normocephalic and atraumatic  Eyes: Conjunctivae are normal  Right eye exhibits no discharge  Left eye exhibits no discharge  Neck: Neck supple  Cardiovascular: Normal rate and regular rhythm  Murmur heard  Pulmonary/Chest: Effort normal and breath sounds normal  No respiratory distress  He has no wheezes  He has no rales  Abdominal: Soft  Bowel sounds are normal  He exhibits no distension  There is no tenderness  Musculoskeletal: He exhibits no edema  Neurological: He is alert and oriented to person, place, and time  No cranial nerve deficit  Skin: He is not diaphoretic  Psychiatric: He has a normal mood and affect  Additional Data:     Labs:      Results from last 7 days  Lab Units 05/13/18  0646 05/12/18  0434   WBC Thousand/uL 10 50 10 50   HEMOGLOBIN g/dL 9 4* 9 9*   HEMATOCRIT % 28 4* 30 1*   PLATELETS Thousands/uL 251 232   NEUTROS PCT %  --  81*   LYMPHS PCT %  --  8*   MONOS PCT %  --  9   EOS PCT %  --  2       Results from last 7 days  Lab Units 05/13/18  0646  05/10/18  0417   SODIUM mmol/L 133*  < > 127*   POTASSIUM mmol/L 4 1  < > 5 3   CHLORIDE mmol/L 105  < > 97*   CO2 mmol/L 21  < > 21   BUN mg/dL 20  < > 83*   CREATININE mg/dL 0 98  < > 2 07*   CALCIUM mg/dL 7 6*  < > 8 4   TOTAL PROTEIN g/dL  --   --  6 9   BILIRUBIN TOTAL mg/dL  --   --  0 60   ALK PHOS U/L  --   --  88   ALT U/L  --   --  18   AST U/L  --   --  9   GLUCOSE RANDOM mg/dL 84  < > 119   < > = values in this interval not displayed  Results from last 7 days  Lab Units 05/09/18  1927   INR  1 04       * I Have Reviewed All Lab Data Listed Above  * Additional Pertinent Lab Tests Reviewed: All Labs For Current Hospital Admission Reviewed    Imaging:  Xr Chest 1 View Portable    Result Date: 5/9/2018  Narrative: CHEST INDICATION:   weakness   COMPARISON:  None EXAM PERFORMED/VIEWS:  XR CHEST PORTABLE FINDINGS:  There are median sternotomy wires indicating prior cardiac surgery  Cardiomediastinal silhouette appears unremarkable  The lungs are clear  No pneumothorax or pleural effusion  Osseous structures appear within normal limits for patient age  Impression: No acute cardiopulmonary disease  Workstation performed: [id number]     Ct Head Without Contrast    Result Date: 5/9/2018  Narrative: CT BRAIN - WITHOUT CONTRAST INDICATION:   Head trauma, neuro decline, f/u imaging fall, weakness, hit head  COMPARISON:  None  TECHNIQUE:  CT examination of the brain was performed  In addition to axial images, coronal 2D reformatted images were created and submitted for interpretation  Radiation dose length product (DLP) for this visit:  1169 35 mGy-cm   This examination, like all CT scans performed in the [facility], was performed utilizing techniques to minimize radiation dose exposure, including the use of iterative reconstruction and automated exposure control  IMAGE QUALITY:  Diagnostic  FINDINGS: PARENCHYMA:  No intracranial mass, mass effect or midline shift  No CT signs of acute infarction  No acute intracranial hemorrhage  Mild bilateral periventricular white matter hypodensity likely microvascular in origin  VENTRICLES AND EXTRA-AXIAL SPACES:  Normal for the patient's age  VISUALIZED ORBITS AND PARANASAL SINUSES:  Unremarkable  CALVARIUM AND EXTRACRANIAL SOFT TISSUES:  Normal      Impression: No acute intracranial abnormality   Workstation performed: [id number]     Imaging Reports Reviewed by myself    Cultures:   Blood Culture: No results found for: BLOODCX  Urine Culture:   Lab Results   Component Value Date    URINECX >100,000 cfu/ml Klebsiella oxytoca (A) 05/09/2018     Sputum Culture: No components found for: SPUTUMCX  Wound Culture: No results found for: WOUNDCULT    Last 24 Hours Medication List:     Current Facility-Administered Medications:  aspirin 81 mg Oral Daily MIGUEL Haas    atorvastatin 10 mg Oral Daily With Leroy Hernandez MIGUEL Granger    cefTRIAXone 1,000 mg Intravenous Q24H Marie John PA-C Last Rate: 1,000 mg (05/12/18 2139)   heparin (porcine) 5,000 Units Subcutaneous Q8H [facility] Rhea John PA-C    insulin glargine 20 Units Subcutaneous HS Rhea John PA-C    insulin lispro 1-6 Units Subcutaneous TID AC MIGUEL Arora    insulin lispro 1-6 Units Subcutaneous HS MIGUEL Arora    pneumococcal 13-valent conjugate vaccine 0 5 mL Intramuscular Prior to discharge Ita De La Cruz MD    sodium chloride 75 mL/hr Intravenous Continuous Rubia Ndiaye DO Last Rate: 75 mL/hr (05/12/18 1603)        Today, Patient Was Seen By: Susan Cox DO    ** Please Note: Dragon 360 Dictation voice to text software may have been used in the creation of this document   **

## 2018-05-13 NOTE — PROGRESS NOTES
Cardiology Progress Note  ASSESSMENT:    1  Suppressed atrial fibrillation and PVCs with suppression of sinus bradycardia  2   Stable CAD with prior CABG  3  Ischemic cardiomyopathy with 40% ejection fraction, grade 1 diastolic dysfunction, and LAE  4   Chronic systolic and diastolic heart failure, currently compensated  5  Improving uncontrolled diabetes mellitus with hyperosmolar nonketotic state  6  Previously treated dyslipidemia  7  Mild hyponatremia, but improved since admission, with further improvement to 133 today  8   Resolved hyperkalemia  9  Moderate anemia, slightly worsened with H/H 9 4/28 4  10  Improved renal function with creatinine of 0 98, down from 1 22      PLAN:    1  Continue present management  2   Patient to follow up with his own cardiologist within several weeks after discharge  HPI:    Mr  Valentina Alonso Denies new cardiopulmonary or  medical symptoms  He feels well and did well with physical therapy and ambulation today  No confusion in good appetite noted  Objective       Physical Exam:   /63 (BP Location: Right arm)   Pulse 72   Temp 98 °F (36 7 °C) (Oral)   Resp 18   Ht 5' 8 5" (1 74 m)   Wt 66 7 kg (147 lb 0 8 oz)   SpO2 99%   BMI 22 03 kg/m²   Body mass index is 22 03 kg/m²      General Appearance:  Alert, cooperative, no distress, appears stated age   Head:  Normocephalic, without obvious abnormality, atraumatic   Eyes:  PERRL, conjunctiva/corneas clear, EOM's intact,   both eyes   Ears:  Normal TM's and external ear canals, both ears   Nose: Nares normal, septum midline, mucosa normal, no drainage or sinus tenderness   Throat: Lips, mucosa, and tongue normal; teeth and gums normal   Neck: Supple, symmetrical, trachea midline, no adenopathy, thyroid: not enlarged, symmetric, no tenderness/mass/nodules, no carotid bruit or JVD   Back:   Symmetric, no curvature, ROM normal, no CVA tenderness   Lungs:   Clear to auscultation bilaterally, respirations unlabored Chest Wall:  No tenderness or deformity   Heart:  Regular rate and rhythm, S1, S2 normal, no murmur, rub or gallop   Abdomen:   Soft, non-tender, bowel sounds active all four quadrants,  no masses, no organomegaly   Extremities: Extremities normal, atraumatic, no cyanosis or edema   Pulses: 2+ and symmetric   Skin: Skin showed normal color, texture, turgor and no rashes or lesions   Lymph nodes: Cervical, supraclavicular, and axillary nodes normal   Neurologic: Normal         Cardiographics  ECG             Not applicable    Echocardiogram             Not applicable    Imaging  Chest X-Ray             Not applicable    Lab Review   Recent Results (from the past 24 hour(s))   Fingerstick Glucose (POCT)    Collection Time: 05/12/18  9:40 PM   Result Value Ref Range    POC Glucose 304 (H) 65 - 140 mg/dl   Basic metabolic panel    Collection Time: 05/12/18 10:07 PM   Result Value Ref Range    Sodium 130 (L) 136 - 145 mmol/L    Potassium 4 8 3 5 - 5 3 mmol/L    Chloride 101 100 - 108 mmol/L    CO2 23 21 - 32 mmol/L    Anion Gap 6 4 - 13 mmol/L    BUN 26 (H) 5 - 25 mg/dL    Creatinine 1 22 0 60 - 1 30 mg/dL    Glucose 280 (H) 65 - 140 mg/dL    Calcium 7 6 (L) 8 3 - 10 1 mg/dL    eGFR 56 ml/min/1 73sq m   Basic metabolic panel    Collection Time: 05/13/18  6:46 AM   Result Value Ref Range    Sodium 133 (L) 136 - 145 mmol/L    Potassium 4 1 3 5 - 5 3 mmol/L    Chloride 105 100 - 108 mmol/L    CO2 21 21 - 32 mmol/L    Anion Gap 7 4 - 13 mmol/L    BUN 20 5 - 25 mg/dL    Creatinine 0 98 0 60 - 1 30 mg/dL    Glucose 84 65 - 140 mg/dL    Calcium 7 6 (L) 8 3 - 10 1 mg/dL    eGFR 73 ml/min/1 73sq m   CBC    Collection Time: 05/13/18  6:46 AM   Result Value Ref Range    WBC 10 50 4 80 - 10 80 Thousand/uL    RBC 2 74 (L) 4 70 - 6 10 Million/uL    Hemoglobin 9 4 (L) 14 0 - 18 0 g/dL    Hematocrit 28 4 (L) 42 0 - 52 0 %     (H) 82 - 98 fL    MCH 34 4 (H) 27 0 - 31 0 pg    MCHC 33 3 31 4 - 37 4 g/dL    RDW 12 4 11 6 - 15 1 % Platelets 161 582 - 012 Thousands/uL    MPV 8 6 (L) 8 9 - 12 7 fL   Fingerstick Glucose (POCT)    Collection Time: 05/13/18  7:21 AM   Result Value Ref Range    POC Glucose 86 65 - 140 mg/dl   Fingerstick Glucose (POCT)    Collection Time: 05/13/18 10:52 AM   Result Value Ref Range    POC Glucose 227 (H) 65 - 140 mg/dl   Fingerstick Glucose (POCT)    Collection Time: 05/13/18  4:04 PM   Result Value Ref Range    POC Glucose 192 (H) 65 - 140 mg/dl     Current Facility-Administered Medications   Medication Dose Route Frequency Provider Last Rate Last Dose    aspirin chewable tablet 81 mg  81 mg Oral Daily MIGUEL Andrew   81 mg at 05/13/18 0802    atorvastatin (LIPITOR) tablet 10 mg  10 mg Oral Daily With MIGUEL Nation   10 mg at 05/13/18 1639    cefTRIAXone (ROCEPHIN) IVPB (premix) 1,000 mg  1,000 mg Intravenous Q24H Rhea John PA-C 100 mL/hr at 05/12/18 2139 1,000 mg at 05/12/18 2139    heparin (porcine) subcutaneous injection 5,000 Units  5,000 Units Subcutaneous [facility] Rhea John PA-C   5,000 Units at 05/13/18 1439    insulin glargine (LANTUS) subcutaneous injection 20 Units 0 2 mL  20 Units Subcutaneous HS Rhea John PA-C   20 Units at 05/12/18 2141    insulin lispro (HumaLOG) 100 units/mL subcutaneous injection 1-6 Units  1-6 Units Subcutaneous TID AC MIGUEL Andrew   2 Units at 05/13/18 1639    insulin lispro (HumaLOG) 100 units/mL subcutaneous injection 1-6 Units  1-6 Units Subcutaneous HS MIGUEL Andrew   4 Units at 05/12/18 2141    pneumococcal 13-valent conjugate vaccine (PREVNAR-13) IM injection 0 5 mL  0 5 mL Intramuscular Prior to discharge Sandra Ovalle MD        sodium chloride 0 9 % infusion  75 mL/hr Intravenous Continuous Rubia Ndiaye DO 75 mL/hr at 05/12/18 1603 75 mL/hr at 05/12/18 1603

## 2018-05-14 PROBLEM — D64.9 ANEMIA: Status: ACTIVE | Noted: 2018-05-14

## 2018-05-14 PROBLEM — E43 SEVERE PROTEIN-CALORIE MALNUTRITION (HCC): Status: ACTIVE | Noted: 2018-05-14

## 2018-05-14 LAB
ANION GAP SERPL CALCULATED.3IONS-SCNC: 7 MMOL/L (ref 4–13)
BUN SERPL-MCNC: 15 MG/DL (ref 5–25)
CALCIUM SERPL-MCNC: 7.6 MG/DL (ref 8.3–10.1)
CHLORIDE SERPL-SCNC: 104 MMOL/L (ref 100–108)
CO2 SERPL-SCNC: 23 MMOL/L (ref 21–32)
CREAT SERPL-MCNC: 0.93 MG/DL (ref 0.6–1.3)
FERRITIN SERPL-MCNC: 460 NG/ML (ref 8–388)
GFR SERPL CREATININE-BSD FRML MDRD: 78 ML/MIN/1.73SQ M
GLUCOSE SERPL-MCNC: 100 MG/DL (ref 65–140)
GLUCOSE SERPL-MCNC: 208 MG/DL (ref 65–140)
GLUCOSE SERPL-MCNC: 256 MG/DL (ref 65–140)
GLUCOSE SERPL-MCNC: 318 MG/DL (ref 65–140)
GLUCOSE SERPL-MCNC: 96 MG/DL (ref 65–140)
IRON SATN MFR SERPL: 61 %
IRON SERPL-MCNC: 84 UG/DL (ref 65–175)
POTASSIUM SERPL-SCNC: 4.2 MMOL/L (ref 3.5–5.3)
SODIUM SERPL-SCNC: 134 MMOL/L (ref 136–145)
TIBC SERPL-MCNC: 138 UG/DL (ref 250–450)

## 2018-05-14 PROCEDURE — 83540 ASSAY OF IRON: CPT | Performed by: FAMILY MEDICINE

## 2018-05-14 PROCEDURE — 99233 SBSQ HOSP IP/OBS HIGH 50: CPT | Performed by: INTERNAL MEDICINE

## 2018-05-14 PROCEDURE — 82728 ASSAY OF FERRITIN: CPT | Performed by: FAMILY MEDICINE

## 2018-05-14 PROCEDURE — 83550 IRON BINDING TEST: CPT | Performed by: FAMILY MEDICINE

## 2018-05-14 PROCEDURE — 97110 THERAPEUTIC EXERCISES: CPT

## 2018-05-14 PROCEDURE — 80048 BASIC METABOLIC PNL TOTAL CA: CPT | Performed by: FAMILY MEDICINE

## 2018-05-14 PROCEDURE — 99232 SBSQ HOSP IP/OBS MODERATE 35: CPT | Performed by: FAMILY MEDICINE

## 2018-05-14 PROCEDURE — 82948 REAGENT STRIP/BLOOD GLUCOSE: CPT

## 2018-05-14 RX ORDER — METOPROLOL SUCCINATE 25 MG/1
25 TABLET, EXTENDED RELEASE ORAL DAILY
Status: DISCONTINUED | OUTPATIENT
Start: 2018-05-14 | End: 2018-05-16 | Stop reason: HOSPADM

## 2018-05-14 RX ADMIN — ATORVASTATIN CALCIUM 10 MG: 10 TABLET, FILM COATED ORAL at 16:27

## 2018-05-14 RX ADMIN — ASPIRIN 81 MG 81 MG: 81 TABLET ORAL at 08:15

## 2018-05-14 RX ADMIN — HEPARIN SODIUM 5000 UNITS: 5000 INJECTION, SOLUTION INTRAVENOUS; SUBCUTANEOUS at 21:56

## 2018-05-14 RX ADMIN — INSULIN LISPRO 5 UNITS: 100 INJECTION, SOLUTION INTRAVENOUS; SUBCUTANEOUS at 21:55

## 2018-05-14 RX ADMIN — HEPARIN SODIUM 5000 UNITS: 5000 INJECTION, SOLUTION INTRAVENOUS; SUBCUTANEOUS at 05:15

## 2018-05-14 RX ADMIN — HEPARIN SODIUM 5000 UNITS: 5000 INJECTION, SOLUTION INTRAVENOUS; SUBCUTANEOUS at 13:46

## 2018-05-14 RX ADMIN — INSULIN GLARGINE 20 UNITS: 100 INJECTION, SOLUTION SUBCUTANEOUS at 21:55

## 2018-05-14 RX ADMIN — CEFTRIAXONE 1000 MG: 1 INJECTION, SOLUTION INTRAVENOUS at 21:54

## 2018-05-14 NOTE — CASE MANAGEMENT
Continued Stay Review    Date: 5/13/18    Vital Signs: BP 97/54 (BP Location: Right arm)   Pulse 68   Temp 97 5 °F (36 4 °C) (Oral)   Resp 18   Ht 5' 8 5" (1 74 m)   Wt 66 7 kg (147 lb 0 8 oz)   SpO2 95%   BMI 22 03 kg/m²     Medications:   Scheduled Meds:   Current Facility-Administered Medications:  aspirin 81 mg Oral Daily MIGUEL Tyler    atorvastatin 10 mg Oral Daily With MIGUEL Wu    cefTRIAXone 1,000 mg Intravenous Q24H Rhea John PA-C Last Rate: 1,000 mg (05/13/18 2109)   heparin (porcine) 5,000 Units Subcutaneous Q8H [facility] Rhea John PA-C    insulin glargine 20 Units Subcutaneous HS Rhea John PA-C    insulin lispro 1-6 Units Subcutaneous TID AC MIGUEL Tyler    insulin lispro 1-6 Units Subcutaneous HS MIGUEL Tyler    pneumococcal 13-valent conjugate vaccine 0 5 mL Intramuscular Prior to discharge Deborah Crawford MD      Continuous Infusions:    PRN Meds: pneumococcal 13-valent conjugate vaccine    Abnormal Labs/Diagnostic Results:     Age/Sex: 76 y o  male     CARDIO  ASSESSMENT:     1   Suppressed atrial fibrillation and PVCs with suppression of sinus bradycardia  2   Stable CAD with prior CABG  3   Ischemic cardiomyopathy with 40% ejection fraction, grade 1 diastolic dysfunction, and LAE  4   Chronic systolic and diastolic heart failure, currently compensated  5   Improving uncontrolled diabetes mellitus with hyperosmolar nonketotic state  6   Previously treated dyslipidemia  7   Mild hyponatremia, but improved since admission, with further improvement to 133 today  8   Resolved hyperkalemia  9  Moderate anemia, slightly worsened with H/H 9 4/28 4  10  Improved renal function with creatinine of 0 98, down from 1 22        PLAN:     1  Continue present management  2   Patient to follow up with his own cardiologist within several weeks after discharge      ATTENDING  Hyponatremia   Assessment & Plan     Sodium level improved  Patient was also on several medications that may be playing a role in this such as torsemide, lisinopril, Aldactone  Cont to hold all these meds for now  Likely hypovolemic hyponatremia  Repeat level later today and again tomm          Urinary tract infection   Assessment & Plan     Cont on Rocephin   urine culture growing klebsiella oxytoca          MARCO (acute kidney injury) ([facility] [address] )   Assessment & Plan     Likely pre renal in nature from hypovolemia and also due to medication use  Creatinine has trended down with IV fluids and appears at baseline          Atrial fibrillation [facility])   Assessment & Plan     As per cardiology note, patient has been alternating with rapid atrial fibrillation and sinus bradycardia with frequent PVCs which is likely related to electrolyte abnormalities     Continue monitoring on telemetry  He may need permanent pacemaker if remains after electrolytes normalize  No indication for temporary pacemaker at this time  Currently not noted to be in atrial fibrillation          Benign essential hypertension   Assessment & Plan     Home medications on hold due to electrolyte abnormalities and acute kidney injury  BP is acceptable          Cardiomyopathy, ischemic   Assessment & Plan     No evidence of volume overload  Echo showed ejection fraction of 40 % with moderate diffuse hypokinesis and grade 1 diastolic dysfunction  Holding home medications due to Marco and electrolyte abnormalities          CKD (chronic kidney disease) stage 3, GFR 30-59 ml/min   Assessment & Plan     Creatinine now appears at baseline          DM2 (diabetes mellitus, type 2) ([facility])   Assessment & Plan     Cont Levemir, sliding scale  Continue holding Amaryl    Check A1c in the a m           CAD (coronary artery disease)   Assessment & Plan     History of coronary artery disease with a CABG in 2017, continue aspirin           Hyperkalemiaresolved as of 5/11/2018   Assessment & Plan     Resolved

## 2018-05-14 NOTE — PHYSICAL THERAPY NOTE
PT TREATMENT     05/14/18 1506   Pain Assessment   Pain Assessment No/denies pain   Restrictions/Precautions   Other Precautions Fall Risk   General   Chart Reviewed Yes   Family/Caregiver Present No   Cognition   Arousal/Participation Cooperative   Subjective   Subjective patient reports possible DC home tomorrow   Transfers   Sit to Stand 5  Supervision   Stand to Sit 5  Supervision   Ambulation/Elevation   Gait Assistance 5  Supervision   Additional items Verbal cues   Assistive Device Straight cane   Distance 80 feet with change in direction, education completed in safety issues with gait and potential to fall therefore patient given cane for improved balance and gait endurance  patient states having cane at home and "walking stick" to use at times from home to barn as needed   Exercises   Hip Flexion Sitting;10 reps;Bilateral   Knee AROM Long Arc Quad Sitting;10 reps;Bilateral   Ankle Pumps Sitting;10 reps;Bilateral   Balance training  forward and backward walking as well as sidestepping all with cane and supervision to min assist of 1   Assessment   Assessment Patient cooperative and demonstrating improved balance with cane therefore encouraged to use cane for safety at home  pateint will benefit from continued PT with progression as tolerated   Plan   Treatment/Interventions ADL retraining;Functional transfer training;LE strengthening/ROM; Elevations; Therapeutic exercise; Endurance training;Patient/family training;Equipment eval/education;Gait training   PT Frequency 5x/wk   Recommendation   Recommendation (home)   Licensure   [place] License Number  Edgar Wolff PT [id number]

## 2018-05-14 NOTE — ASSESSMENT & PLAN NOTE
Malnutrition Findings:   Malnutrition type: Acute illness  Degree of Malnutrition: Other severe protein calorie malnutrition (related to inadequate intake)    BMI Findings: Body mass index is 22 03 kg/m²

## 2018-05-14 NOTE — PLAN OF CARE
CARDIOVASCULAR - ADULT     Maintains optimal cardiac output and hemodynamic stability Progressing     Absence of cardiac dysrhythmias or at baseline rhythm Progressing        DISCHARGE PLANNING - CARE MANAGEMENT     Discharge to post-acute care or home with appropriate resources Progressing        GENITOURINARY - ADULT     Maintains or returns to baseline urinary function Progressing     Absence of urinary retention Progressing     Urinary catheter remains patent Progressing        METABOLIC, FLUID AND ELECTROLYTES - ADULT     Electrolytes maintained within normal limits Progressing     Fluid balance maintained Progressing     Glucose maintained within target range Progressing        Nutrition/Hydration-ADULT     Nutrient/Hydration intake appropriate for improving, restoring or maintaining nutritional needs Progressing        Potential for Falls     Patient will remain free of falls Progressing        Prexisting or High Potential for Compromised Skin Integrity     Skin integrity is maintained or improved Progressing

## 2018-05-14 NOTE — PROGRESS NOTES
Progress Note - Cardiology   Christine Valdez 76 y o  male MRN: [id number]  : 1940  Unit/Bed#: [address] Encounter: [id number]    Assessment and Plan:   1  Recent severe hyponatremia, dizziness lightheadedness,  etiology may be to secondary to dehydration  2   Frequent PVCs, including couplet and triplet with episode of atrial fibrillation during admission time currently in sinus rhythm  Heart rate now in 90s will restart back on low-dose metoprolol, as patient is continues to have recurrent episodes of couplets and triplets  3   History of ischemic cardiomyopathy with EF around 40%  Will check Lexiscan stress test to rule out any underlying ischemia  4  Coronary artery disease status post CABG  5  Recent acute kidney injury  Creatinine now close to baseline  6  Dyslipidemia on statin  7  Type 2 diabetes mellitus  8  History of chronic systolic and diastolic heart failure patient seems to be compensated  9   Anemia  Plan  Will start the patient back on low-dose of metoprolol XL as he is having lot of frequent PVCs  Lexiscan stress test to rule out any ischemia  Monitor input output  At discharge will start the patient on a low-dose of torsemide 10 mg daily  Will also add Aldactone at a lower dose    Will add lisinopril 2 5 mg before discharge  Continue statin  So far no more episode of recurrent atrial fibrillation noted  Subjective / Objective:   Patient seen and evaluated  Chart reviewed  He is feeling much better  Denies any dizziness or lightheadedness  Vitals: Blood pressure 108/57, pulse 94, temperature 98 1 °F (36 7 °C), temperature source Oral, resp  rate 18, height 5' 8 5" (1 74 m), weight 66 7 kg (147 lb 0 8 oz), SpO2 99 %  Vitals:    18 0600 18 0427   Weight: 66 7 kg (147 lb 0 8 oz) 66 7 kg (147 lb 0 8 oz)     Body mass index is 22 03 kg/m²    BP Readings from Last 3 Encounters:   18 108/57     Orthostatic Blood Pressures      Most Recent Value   Blood Pressure  108/57 filed at 05/14/2018 1137   Patient Position - Orthostatic VS  Sitting filed at 05/14/2018 1137          Invasive Devices     Peripheral Intravenous Line            Peripheral IV 05/13/18 Left Wrist 1 day                  Intake/Output Summary (Last 24 hours) at 05/14/18 1302  Last data filed at 05/14/18 0830   Gross per 24 hour   Intake              580 ml   Output             1850 ml   Net            -1270 ml         Physical Exam:   Physical Exam    Neurologic:  Alert & oriented x 3,  no focal deficits noted   Constitutional:  Well developed, well nourished,  With no acute distress  Eyes:  PERRL, conjunctiva normal   HENT:  Atraumatic, external ears normal, nose normal,   NECK: Normal range of motion, no tenderness, neck is supple , No JVP  Respiratory:  Bilateral air entry mostly clear to auscultation  Cardiovascular: S1-S2 irregular with a 2/6 ejection systolic murmur  GI:  Soft, nondistended, normal bowel sounds, nontender, no hepatosplenomegaly appreciated  Musculoskeletal:  No tenderness, no deformities      Extremities:  No edema   Psychiatric:  Speech and behavior appropriate             Medications/ Allergies:     Current Facility-Administered Medications:  aspirin 81 mg Oral Daily MIGUEL Haas    atorvastatin 10 mg Oral Daily With MIGUEL Bustos    cefTRIAXone 1,000 mg Intravenous Q24H Rhea John PA-C Last Rate: 1,000 mg (05/13/18 2109)   heparin (porcine) 5,000 Units Subcutaneous Q8H [address] Rhea John PA-C    insulin glargine 20 Units Subcutaneous HS Rhea John PA-C    insulin lispro 1-6 Units Subcutaneous TID AC MIGUEL Haas    insulin lispro 1-6 Units Subcutaneous HS MIGUEL Haas    pneumococcal 13-valent conjugate vaccine 0 5 mL Intramuscular Prior to discharge Estela Kruger MD        pneumococcal 13-valent conjugate vaccine 0 5 mL Prior to discharge     No Known Allergies    VTE Pharmacologic Prophylaxis:    Heparin    Labs: Troponins:  Results from last 7 days  Lab Units 05/10/18  1205 05/09/18  1927   CK TOTAL U/L 28*  --    TROPONIN I ng/mL  --  <0 02       CBC with diff:  Results from last 7 days  Lab Units 05/13/18  0646 05/12/18  0434 05/11/18  0421 05/10/18  0422 05/09/18  2356 05/09/18  1927   WBC Thousand/uL 10 50 10 50 15 80* 13 10*  --  14 50*   HEMOGLOBIN g/dL 9 4* 9 9* 11 4* 13 3*  --  13 7*   HEMATOCRIT % 28 4* 30 1* 34 4* 40 0*  --  41 4*   MCV fL 103* 104* 103* 103*  --  103*   PLATELETS Thousands/uL 251 232 243 240 222 225   MCH pg 34 4* 34 1* 34 0* 34 1*  --  34 2*   MCHC g/dL 33 3 32 8 33 1 33 2  --  33 1   RDW % 12 4 12 0 12 2 12 1  --  12 3   MPV fL 8 6* 8 5* 8 9 8 9 9 2 10 5   NRBC AUTO /100 WBCs  --  0 0 0  --  0       CMP:  Results from last 7 days  Lab Units 05/14/18  0514 05/13/18  1948 05/13/18  0646 05/12/18  2207 05/12/18  1408 05/12/18  0434 05/11/18  2216  05/10/18  0417  05/09/18  1927   SODIUM mmol/L 134* 131* 133* 130* 130* 133* 131*  < > 127*  < > 113*   POTASSIUM mmol/L 4 2 4 3 4 1 4 8 4 7 4 2 4 5  < > 5 3  < > 6 5*   CHLORIDE mmol/L 104 101 105 101 100 104 100  < > 97*  < > 83*   CO2 mmol/L 23 22 21 23 22 21 24  < > 21  < > 19*   ANION GAP mmol/L 7 8 7 6 8 8 7  < > 9  < > 11   BUN mg/dL 15 19 20 26* 27* 29* 35*  < > 83*  < > 92*   CREATININE mg/dL 0 93 1 04 0 98 1 22 1 21 1 07 1 30  < > 2 07*  < > 2 60*   GLUCOSE RANDOM mg/dL 100 301* 84 280* 241* 97 202*  < > 119  < > 679*   CALCIUM mg/dL 7 6* 7 4* 7 6* 7 6* 7 7* 7 5* 7 8*  < > 8 4  < > 8 2*   AST U/L  --   --   --   --   --   --   --   --  9  --  9   ALT U/L  --   --   --   --   --   --   --   --  18  --  23   ALK PHOS U/L  --   --   --   --   --   --   --   --  88  --  95   TOTAL PROTEIN g/dL  --   --   --   --   --   --   --   --  6 9  --  7 4   BILIRUBIN TOTAL mg/dL  --   --   --   --   --   --   --   --  0 60  --  0 70   EGFR ml/min/1 73sq m 78 68 73 56 57 66 52  < > 30  < > 22   < > = values in this interval not displayed  Magnesium:  Results from last 7 days  Lab Units 05/12/18  0434 05/11/18  0405 05/10/18  0417 05/09/18  2356 05/09/18  1927   MAGNESIUM mg/dL 2 1 2 2 2 5 2 6 2 7*     Coags:  Results from last 7 days  Lab Units 05/09/18 1927   PTT seconds 30   INR  1 04     TSH:  Results from last 7 days  Lab Units 05/09/18 1927   TSH 3RD GENERATON uIU/mL 1 738         Imaging & Testing   I have personally reviewed pertinent reports  Xr Chest 1 View Portable    Result Date: 5/9/2018  Narrative: CHEST INDICATION:   weakness  COMPARISON:  None EXAM PERFORMED/VIEWS:  XR CHEST PORTABLE FINDINGS:  There are median sternotomy wires indicating prior cardiac surgery  Cardiomediastinal silhouette appears unremarkable  The lungs are clear  No pneumothorax or pleural effusion  Osseous structures appear within normal limits for patient age  Impression: No acute cardiopulmonary disease  Workstation performed: [id number]     Ct Head Without Contrast    Result Date: 5/9/2018  Narrative: CT BRAIN - WITHOUT CONTRAST INDICATION:   Head trauma, neuro decline, f/u imaging fall, weakness, hit head  COMPARISON:  None  TECHNIQUE:  CT examination of the brain was performed  In addition to axial images, coronal 2D reformatted images were created and submitted for interpretation  Radiation dose length product (DLP) for this visit:  1169 35 mGy-cm   This examination, like all CT scans performed in the [facility], was performed utilizing techniques to minimize radiation dose exposure, including the use of iterative reconstruction and automated exposure control  IMAGE QUALITY:  Diagnostic  FINDINGS: PARENCHYMA:  No intracranial mass, mass effect or midline shift  No CT signs of acute infarction  No acute intracranial hemorrhage  Mild bilateral periventricular white matter hypodensity likely microvascular in origin  VENTRICLES AND EXTRA-AXIAL SPACES:  Normal for the patient's age   VISUALIZED ORBITS AND PARANASAL SINUSES:  Unremarkable  CALVARIUM AND EXTRACRANIAL SOFT TISSUES:  Normal      Impression: No acute intracranial abnormality  Workstation performed: [id number]        EKG / Monitor: Personally reviewed  Monitor shows multiple episodes of PVCs, couplet and triplet is also noted  Patient otherwise in sinus rhythm  Cardiac testing:   Results for orders placed during the hospital encounter of 18   Echo complete with contrast if indicated    Narrative [address]  [address] [facility]   [phone]    Transthoracic Echocardiogram  2D, M-mode, Doppler, and Color Doppler    Study date:  11-May-2018    Patient: Leo Kathleen  MR number: [id number]  Account number: [de-identified]  : 1940  Age: 76 years  Gender: Male  Status: Routine  Location: Echo lab  Height: 68 in  Weight: 146 7 lb  BP: 97/ 54 mmHg    Indications: A Fib  Diagnoses: I48 0 - Atrial fibrillation    Sonographer:  ADRIEL Norris  Primary Physician:  Lynn Gloria DO  Group:  [facility]  Interpreting Physician:  Paul Jiang DO    IMPRESSIONS:  These features are consistent with dilated cardiomyopathy  SUMMARY    LEFT VENTRICLE:  The ventricle was mildly dilated  Systolic function was moderately reduced by visual assessment  Ejection fraction was estimated to be 40 %  There was moderate diffuse hypokinesis  Wall thickness was mildly increased  Doppler parameters were consistent with abnormal left ventricular relaxation (grade 1 diastolic dysfunction)  VENTRICULAR SEPTUM:  There was paradoxical motion  These changes are consistent with a post-thoracotomy state  LEFT ATRIUM:  The atrium was moderately to markedly dilated  MITRAL VALVE:  There was mild regurgitation  AORTIC VALVE:  There was no evidence for stenosis  There was mild regurgitation  TRICUSPID VALVE:  There was mild regurgitation    The tricuspid jet envelope definition was inadequate for estimation of RV systolic pressure  PULMONIC VALVE:  There was mild regurgitation  AORTA:  The root exhibited normal size and mild fibrocalcific change  HISTORY: PRIOR HISTORY: Diabetes, CABG,CAD,Cardiomyopathy, HTN, Acute kidney injury    PROCEDURE: The procedure was performed in the echo lab  This was a routine study  The transthoracic approach was used  The study included complete 2D imaging, M-mode, complete spectral Doppler, and color Doppler  The heart rate was 82 bpm,  at the start of the study  Image quality was adequate  LEFT VENTRICLE: The ventricle was mildly dilated  Systolic function was moderately reduced by visual assessment  Ejection fraction was estimated to be 40 %  There was moderate diffuse hypokinesis  Wall thickness was mildly increased  DOPPLER: Doppler parameters were consistent with abnormal left ventricular relaxation (grade 1 diastolic dysfunction)  VENTRICULAR SEPTUM: There was paradoxical motion  These changes are consistent with a post-thoracotomy state  RIGHT VENTRICLE: The size was normal  Systolic function was normal  DOPPLER: Systolic pressure was within the normal range  LEFT ATRIUM: The atrium was moderately to markedly dilated  RIGHT ATRIUM: The atrium was mildly dilated  MITRAL VALVE: There was annular calcification  Valve structure was normal  There was mild thickening  There was normal leaflet separation  No echocardiographic evidence for prolapse  DOPPLER: The transmitral velocity was within the normal  range  There was no evidence for stenosis  There was mild regurgitation  AORTIC VALVE: The valve was trileaflet  Leaflets exhibited normal thickness, calcification, and normal cuspal separation  DOPPLER: Transaortic velocity was within the normal range  There was no evidence for stenosis  There was mild  regurgitation  TRICUSPID VALVE: The valve structure was normal  There was normal leaflet separation   DOPPLER: The transtricuspid velocity was within the normal range  There was no evidence for stenosis  There was mild regurgitation  The tricuspid jet  envelope definition was inadequate for estimation of RV systolic pressure  PULMONIC VALVE: Leaflets exhibited normal thickness, no calcification, and normal cuspal separation  DOPPLER: The transpulmonic velocity was within the normal range  There was mild regurgitation  PERICARDIUM: There was no thickening  There was no pericardial effusion  AORTA: The root exhibited normal size and mild fibrocalcific change  PULMONARY ARTERY: The size was normal  The morphology appeared normal     SYSTEM MEASUREMENT TABLES    2D mode  AoR Diam 2D: 3 7 cm  LA Diam (2D): 4 cm  LA/Ao (2D): 1 08  FS (2D Teich): 19 3 %  IVSd (2D): 1 12 cm  LVDEV: 114 cm³  LVEDV MOD BP: 177 cm³  LVESV: 69 2 cm³  LVIDd(2D): 4 93 cm  LVISd (2D): 3 98 cm  LVOT Area 2D: 3 14 cm squared  LVPWd (2D): 1 17 cm  SV (Teich): 44 8 cm³    Apical four chamber  LVEF A4C: 40 %    Apical two chamber  LA Area: 21 9 cm squared  LA Volume: 65 cm³  LVEF A2C: 41 %    Unspecified Scan Mode  CARL Cont Eq (Peak Travon): 2 39 cm squared  LVOT Diam : 2 cm  LVOT Vmax: 1050 mm/s  LVOT Vmax; Mean: 1050 mm/s  Peak Grad ; Mean: 4 mm[Hg]  MV Peak A Travon: 1010 mm/s  MV Peak E Travon  Mean: 647 mm/s  MVA (PHT): 5 24 cm squared  PHT: 42 ms  RA Area: 19 8 cm squared  RA Volume: 51 cm³  TAPSE: 1 2 cm    [employer] Accredited Echocardiography Laboratory    Prepared and electronically signed by    Elizabeth Cordon DO  Signed 11-May-2018 17:30:45         Dr Severo Jay MD [facility]      "This note has been constructed using a voice recognition system  Therefore there may be syntax, spelling, and/or grammatical errors   Please call if you have any questions  "

## 2018-05-14 NOTE — PROGRESS NOTES
[facility] Internal Medicine Progress Note  Patient: Reynaldo Patient 76 y o  male   MRN: [id number]  PCP: Espinoza Benitez DO  Unit/Bed#: [address] [address] Encounter: [id number]  Date Of Visit: 05/14/18    Problem List:    Principal Problem:    Hyponatremia  Active Problems:    CHRIS (acute kidney injury) ([facility] )    Urinary tract infection    Atrial fibrillation (HCC)    Severe protein-calorie malnutrition (HCC)    Anemia    DM2 (diabetes mellitus, type 2) ([facility])    CKD (chronic kidney disease) stage 3, GFR 30-59 ml/min    Cardiomyopathy, ischemic    Benign essential hypertension    CAD (coronary artery disease)      Assessment & Plan:    * Hyponatremia   Assessment & Plan    Sodium level has improved stable overall  Patient was also on several medications that may be playing a role in this such as torsemide, lisinopril, Aldactone  Cont to hold all these meds for now  Likely hypovolemic hyponatremia  IVF was discontinued yesterday  Repeat labs in the a m  As per Cardiology, start patient on low-dose of torsemide 10 mg daily at discharge, Aldactone at a lower dose, lisinopril 2 5 mg before discharge        Urinary tract infection   Assessment & Plan    Cont on Rocephin, 5 out of 7 days completed so far  urine culture growing klebsiella oxytoca        CHRIS (acute kidney injury) ([facility] )   Assessment & Plan    Likely pre renal in nature from hypovolemia and also due to medication use  Creatinine has trended down with IV fluids and appears at baseline  Anemia   Assessment & Plan    Microcytic    Hemoglobin has trended down although this could be dilutional as patient was on IV fluids  Iron panel reviewed and Iron level is adequate  FOBT was ordered  Check vitamin B12, folate level        Severe protein-calorie malnutrition (HCC)   Assessment & Plan    Malnutrition Findings:   Malnutrition type: Acute illness  Degree of Malnutrition: Other severe protein calorie malnutrition (related to inadequate intake)    BMI Findings: Body mass index is 22 03 kg/m²  Atrial fibrillation ([address] )   Assessment & Plan    As per cardiology note, patient had been alternating with rapid atrial fibrillation and sinus bradycardia with frequent PVCs which is likely related to electrolyte abnormalities  Patient in sinus rhythm but still having lot frequent PVCs  Continue monitoring on telemetry  Started on Toprol-XL 25 mg daily        Benign essential hypertension   Assessment & Plan    Started on Toprol-XL        Cardiomyopathy, ischemic   Assessment & Plan    No evidence of volume overload  Echo showed ejection fraction of 40 % with moderate diffuse hypokinesis and grade 1 diastolic dysfunction  Holding home medications due to Marco and electrolyte abnormalities  Lexiscan stress test in the a m  to rule out any ischemia        CKD (chronic kidney disease) stage 3, GFR 30-59 ml/min   Assessment & Plan    Creatinine now appears at baseline  DM2 (diabetes mellitus, type 2) ([facility])   Assessment & Plan    Cont Lantus, sliding scale  Continue holding Amaryl  Check A1c in the a m        CAD (coronary artery disease)   Assessment & Plan    History of coronary artery disease with a CABG in 2017, continue aspirin, Toprol-XL, statin        Hyperkalemiaresolved as of 5/11/2018   Assessment & Plan    Resolved              VTE Pharmacologic Prophylaxis:   Pharmacologic: Heparin  Mechanical VTE Prophylaxis in Place: Yes    Patient Centered Rounds: I have performed bedside rounds with nursing staff today  Discussions with Specialists or Other Care Team Provider: Yes    Education and Discussions with Family / Patient:Yes    Time Spent for Care: 35 min  More than 50% of total time spent on counseling and coordination of care as described above      Current Length of Stay: 5 day(s)    Current Patient Status: Inpatient     Discharge Plan: home     Code Status: Level 1 - Full Code    Certification Statement: The patient will continue to require additional inpatient hospital stay due to hyponatremia, Bonny      Subjective:     Denies any chest pain, shortness of breath    Objective:     Vitals:   Temp (24hrs), Av °F (36 7 °C), Min:97 5 °F (36 4 °C), Max:98 2 °F (36 8 °C)    HR:  [68-94] 75  Resp:  [18] 18  BP: ()/(54-63) 99/55  SpO2:  [95 %-99 %] 99 %  Body mass index is 22 03 kg/m²  Input and Output Summary (last 24 hours): Intake/Output Summary (Last 24 hours) at 18 1414  Last data filed at 18 0830   Gross per 24 hour   Intake              580 ml   Output             1850 ml   Net            -1270 ml       Physical Exam:     Physical Exam   Constitutional: He is oriented to person, place, and time  No distress  HENT:   Head: Normocephalic and atraumatic  Eyes: Conjunctivae are normal  Right eye exhibits no discharge  Left eye exhibits no discharge  Neck: Neck supple  Cardiovascular: Normal rate and regular rhythm  Murmur heard  Pulmonary/Chest: Effort normal and breath sounds normal  No respiratory distress  He has no wheezes  He has no rales  Abdominal: Soft  Bowel sounds are normal  He exhibits no distension  There is no tenderness  Musculoskeletal: He exhibits no edema  Neurological: He is alert and oriented to person, place, and time  No cranial nerve deficit  Skin: He is not diaphoretic  Psychiatric: He has a normal mood and affect         Additional Data:     Labs:      Results from last 7 days  Lab Units 18  0646 18  0434   WBC Thousand/uL 10 50 10 50   HEMOGLOBIN g/dL 9 4* 9 9*   HEMATOCRIT % 28 4* 30 1*   PLATELETS Thousands/uL 251 232   NEUTROS PCT %  --  81*   LYMPHS PCT %  --  8*   MONOS PCT %  --  9   EOS PCT %  --  2       Results from last 7 days  Lab Units 18  0514  05/10/18  0417   SODIUM mmol/L 134*  < > 127*   POTASSIUM mmol/L 4 2  < > 5 3   CHLORIDE mmol/L 104  < > 97*   CO2 mmol/L 23  < > 21   BUN mg/dL 15  < > 83*   CREATININE mg/dL 0 93  < > 2 07*   CALCIUM mg/dL 7 6*  < > 8 4   TOTAL PROTEIN g/dL  --   --  6 9   BILIRUBIN TOTAL mg/dL  --   --  0 60   ALK PHOS U/L  --   --  88   ALT U/L  --   --  18   AST U/L  --   --  9   GLUCOSE RANDOM mg/dL 100  < > 119   < > = values in this interval not displayed  Results from last 7 days  Lab Units 05/09/18  1927   INR  1 04       * I Have Reviewed All Lab Data Listed Above  * Additional Pertinent Lab Tests Reviewed: All Labs For Current Hospital Admission Reviewed    Imaging:  Xr Chest 1 View Portable    Result Date: 5/9/2018  Narrative: CHEST INDICATION:   weakness  COMPARISON:  None EXAM PERFORMED/VIEWS:  XR CHEST PORTABLE FINDINGS:  There are median sternotomy wires indicating prior cardiac surgery  Cardiomediastinal silhouette appears unremarkable  The lungs are clear  No pneumothorax or pleural effusion  Osseous structures appear within normal limits for patient age  Impression: No acute cardiopulmonary disease  Workstation performed: [id number]     Ct Head Without Contrast    Result Date: 5/9/2018  Narrative: CT BRAIN - WITHOUT CONTRAST INDICATION:   Head trauma, neuro decline, f/u imaging fall, weakness, hit head  COMPARISON:  None  TECHNIQUE:  CT examination of the brain was performed  In addition to axial images, coronal 2D reformatted images were created and submitted for interpretation  Radiation dose length product (DLP) for this visit:  1169 35 mGy-cm   This examination, like all CT scans performed in the [facility], was performed utilizing techniques to minimize radiation dose exposure, including the use of iterative reconstruction and automated exposure control  IMAGE QUALITY:  Diagnostic  FINDINGS: PARENCHYMA:  No intracranial mass, mass effect or midline shift  No CT signs of acute infarction  No acute intracranial hemorrhage  Mild bilateral periventricular white matter hypodensity likely microvascular in origin  VENTRICLES AND EXTRA-AXIAL SPACES:  Normal for the patient's age   VISUALIZED ORBITS AND PARANASAL SINUSES:  Unremarkable  CALVARIUM AND EXTRACRANIAL SOFT TISSUES:  Normal      Impression: No acute intracranial abnormality  Workstation performed: [id number]     Imaging Reports Reviewed by myself    Cultures:   Blood Culture: No results found for: BLOODCX  Urine Culture:   Lab Results   Component Value Date    URINECX >100,000 cfu/ml Klebsiella oxytoca (A) 05/09/2018     Sputum Culture: No components found for: SPUTUMCX  Wound Culture: No results found for: WOUNDCULT    Last 24 Hours Medication List:     Current Facility-Administered Medications:  aspirin 81 mg Oral Daily GAYATRI AcuñaNP    atorvastatin 10 mg Oral Daily With Dinner Alessio GAYATRI AltamiranoNP    cefTRIAXone 1,000 mg Intravenous Q24H Rhea John PA-C Last Rate: 1,000 mg (05/13/18 2109)   heparin (porcine) 5,000 Units Subcutaneous Q8H [address] Rhea John PA-C    insulin glargine 20 Units Subcutaneous HS Rhea John PA-C    insulin lispro 1-6 Units Subcutaneous TID AC GAYATRI AcuñaNP    insulin lispro 1-6 Units Subcutaneous HS GAYATRI AcuñaNP    metoprolol succinate 25 mg Oral Daily Lanre Carter MD    pneumococcal 13-valent conjugate vaccine 0 5 mL Intramuscular Prior to discharge Patti Alejandre MD         Today, Patient Was Seen By: Bonnie Moore DO    ** Please Note: Dragon 360 Dictation voice to text software may have been used in the creation of this document   **

## 2018-05-14 NOTE — ASSESSMENT & PLAN NOTE
Microcytic  Hemoglobin has trended down although this could be dilutional as patient was on IV fluids    Hemoglobin has been stable  Iron panel reviewed and Iron level is adequate  FOBT was ordered  N60 and folic acid level acceptable

## 2018-05-15 ENCOUNTER — APPOINTMENT (INPATIENT)
Dept: NON INVASIVE DIAGNOSTICS | Facility: HOSPITAL | Age: 78
DRG: 682 | End: 2018-05-15
Payer: MEDICARE

## 2018-05-15 ENCOUNTER — APPOINTMENT (INPATIENT)
Dept: RADIOLOGY | Facility: HOSPITAL | Age: 78
DRG: 682 | End: 2018-05-15
Payer: MEDICARE

## 2018-05-15 LAB
ANION GAP SERPL CALCULATED.3IONS-SCNC: 7 MMOL/L (ref 4–13)
BUN SERPL-MCNC: 14 MG/DL (ref 5–25)
CALCIUM SERPL-MCNC: 8 MG/DL (ref 8.3–10.1)
CHEST PAIN STATEMENT: NORMAL
CHLORIDE SERPL-SCNC: 105 MMOL/L (ref 100–108)
CO2 SERPL-SCNC: 24 MMOL/L (ref 21–32)
CREAT SERPL-MCNC: 0.93 MG/DL (ref 0.6–1.3)
ERYTHROCYTE [DISTWIDTH] IN BLOOD BY AUTOMATED COUNT: 12.1 % (ref 11.6–15.1)
EST. AVERAGE GLUCOSE BLD GHB EST-MCNC: 292 MG/DL
FOLATE SERPL-MCNC: 16.8 NG/ML (ref 3.1–17.5)
GFR SERPL CREATININE-BSD FRML MDRD: 78 ML/MIN/1.73SQ M
GLUCOSE SERPL-MCNC: 165 MG/DL (ref 65–140)
GLUCOSE SERPL-MCNC: 210 MG/DL (ref 65–140)
GLUCOSE SERPL-MCNC: 72 MG/DL (ref 65–140)
GLUCOSE SERPL-MCNC: 76 MG/DL (ref 65–140)
GLUCOSE SERPL-MCNC: 89 MG/DL (ref 65–140)
HBA1C MFR BLD: 11.8 % (ref 4.2–6.3)
HCT VFR BLD AUTO: 29.7 % (ref 42–52)
HGB BLD-MCNC: 9.8 G/DL (ref 14–18)
MAX DIASTOLIC BP: 80 MMHG
MAX HEART RATE: 102 BPM
MAX PREDICTED HEART RATE: 141 BPM
MAX. SYSTOLIC BP: 118 MMHG
MCH RBC QN AUTO: 33.9 PG (ref 27–31)
MCHC RBC AUTO-ENTMCNC: 32.9 G/DL (ref 31.4–37.4)
MCV RBC AUTO: 103 FL (ref 82–98)
PLATELET # BLD AUTO: 330 THOUSANDS/UL (ref 130–400)
PMV BLD AUTO: 7.6 FL (ref 8.9–12.7)
POTASSIUM SERPL-SCNC: 4.1 MMOL/L (ref 3.5–5.3)
PROTOCOL NAME: NORMAL
RBC # BLD AUTO: 2.88 MILLION/UL (ref 4.7–6.1)
REASON FOR TERMINATION: NORMAL
SODIUM SERPL-SCNC: 136 MMOL/L (ref 136–145)
TARGET HR FORMULA: NORMAL
TEST INDICATION: NORMAL
TIME IN EXERCISE PHASE: NORMAL
VIT B12 SERPL-MCNC: 727 PG/ML (ref 100–900)
WBC # BLD AUTO: 12.7 THOUSAND/UL (ref 4.8–10.8)

## 2018-05-15 PROCEDURE — A9502 TC99M TETROFOSMIN: HCPCS

## 2018-05-15 PROCEDURE — 93017 CV STRESS TEST TRACING ONLY: CPT

## 2018-05-15 PROCEDURE — 83036 HEMOGLOBIN GLYCOSYLATED A1C: CPT | Performed by: FAMILY MEDICINE

## 2018-05-15 PROCEDURE — 99233 SBSQ HOSP IP/OBS HIGH 50: CPT | Performed by: INTERNAL MEDICINE

## 2018-05-15 PROCEDURE — 82746 ASSAY OF FOLIC ACID SERUM: CPT | Performed by: FAMILY MEDICINE

## 2018-05-15 PROCEDURE — 99232 SBSQ HOSP IP/OBS MODERATE 35: CPT | Performed by: INTERNAL MEDICINE

## 2018-05-15 PROCEDURE — 80048 BASIC METABOLIC PNL TOTAL CA: CPT | Performed by: FAMILY MEDICINE

## 2018-05-15 PROCEDURE — 97110 THERAPEUTIC EXERCISES: CPT

## 2018-05-15 PROCEDURE — 78452 HT MUSCLE IMAGE SPECT MULT: CPT

## 2018-05-15 PROCEDURE — 85027 COMPLETE CBC AUTOMATED: CPT | Performed by: FAMILY MEDICINE

## 2018-05-15 PROCEDURE — 82948 REAGENT STRIP/BLOOD GLUCOSE: CPT

## 2018-05-15 PROCEDURE — 82607 VITAMIN B-12: CPT | Performed by: FAMILY MEDICINE

## 2018-05-15 RX ORDER — TORSEMIDE 10 MG/1
10 TABLET ORAL DAILY
Status: DISCONTINUED | OUTPATIENT
Start: 2018-05-16 | End: 2018-05-16 | Stop reason: HOSPADM

## 2018-05-15 RX ORDER — SPIRONOLACTONE 25 MG/1
12.5 TABLET ORAL DAILY
Status: DISCONTINUED | OUTPATIENT
Start: 2018-05-15 | End: 2018-05-16 | Stop reason: HOSPADM

## 2018-05-15 RX ADMIN — CEFTRIAXONE 1000 MG: 1 INJECTION, SOLUTION INTRAVENOUS at 23:18

## 2018-05-15 RX ADMIN — INSULIN GLARGINE 20 UNITS: 100 INJECTION, SOLUTION SUBCUTANEOUS at 23:18

## 2018-05-15 RX ADMIN — INSULIN LISPRO 2 UNITS: 100 INJECTION, SOLUTION INTRAVENOUS; SUBCUTANEOUS at 23:19

## 2018-05-15 RX ADMIN — ASPIRIN 81 MG 81 MG: 81 TABLET ORAL at 08:12

## 2018-05-15 RX ADMIN — ATORVASTATIN CALCIUM 10 MG: 10 TABLET, FILM COATED ORAL at 18:41

## 2018-05-15 RX ADMIN — REGADENOSON 0.4 MG: 0.08 INJECTION, SOLUTION INTRAVENOUS at 09:56

## 2018-05-15 RX ADMIN — HEPARIN SODIUM 5000 UNITS: 5000 INJECTION, SOLUTION INTRAVENOUS; SUBCUTANEOUS at 13:56

## 2018-05-15 RX ADMIN — SPIRONOLACTONE 12.5 MG: 25 TABLET, FILM COATED ORAL at 18:40

## 2018-05-15 RX ADMIN — HEPARIN SODIUM 5000 UNITS: 5000 INJECTION, SOLUTION INTRAVENOUS; SUBCUTANEOUS at 06:03

## 2018-05-15 RX ADMIN — HEPARIN SODIUM 5000 UNITS: 5000 INJECTION, SOLUTION INTRAVENOUS; SUBCUTANEOUS at 23:18

## 2018-05-15 RX ADMIN — METOPROLOL SUCCINATE 25 MG: 25 TABLET, FILM COATED, EXTENDED RELEASE ORAL at 08:12

## 2018-05-15 NOTE — PROGRESS NOTES
Progress Note - Cardiology   Margarita Ksaper 76 y o  male MRN: [id number]  : 1940  Unit/Bed#: [address] Encounter: [id number]    Assessment and Plan:   1  Recent severe hyponatremia, dizziness lightheadedness,  etiology may be to secondary to dehydration  Patient is now euvolemic  2  Frequent PVCs, including couplet and triplet with episode of atrial fibrillation during admission time currently in sinus rhythm  Patient tolerating metoprolol very well  Continue metoprolol XL 25 mg daily  3  History of ischemic cardiomyopathy with EF around 40%  Will review Lexiscan when done  4  Coronary artery disease status post CABG  5  Recent acute kidney injury  Creatinine now close to baseline  Will resume baseline diuretics  6  Dyslipidemia on statin  7  Type 2 diabetes mellitus  8  History of chronic systolic and diastolic heart failure patient seems to be compensated  9   Anemia  Plan  Will start the patient back on low-dose of metoprolol XL as he is having lot of frequent PVCs  Review Lexiscan  Monitor input output  Start back torsemide 10 mg daily from tomorrow  Aldactone 12 5 mg daily on discharge  Will add lisinopril 2 5 mg before discharge  Continue statin  So far no more episode of recurrent atrial fibrillation noted  Subjective / Objective:   Patient seen and evaluated  Chart reviewed  He is feeling much better  Denies any dizziness or lightheadedness  Patient has less PVCs now  Started on metoprolol yesterday  Vitals: Blood pressure 119/61, pulse 84, temperature (!) 97 °F (36 1 °C), temperature source Tympanic, resp  rate 18, height 5' 8 5" (1 74 m), weight 66 8 kg (147 lb 4 3 oz), SpO2 98 %  Vitals:    18 0427 05/15/18 0600   Weight: 66 7 kg (147 lb 0 8 oz) 66 8 kg (147 lb 4 3 oz)     Body mass index is 22 07 kg/m²    BP Readings from Last 3 Encounters:   05/15/18 119/61     Orthostatic Blood Pressures      Most Recent Value   Blood Pressure  119/61 filed at 05/15/2018 6393   Patient Position - Orthostatic VS  Lying filed at 05/15/2018 0750          Invasive Devices     Peripheral Intravenous Line            Peripheral IV 05/13/18 Left Wrist 2 days                  Intake/Output Summary (Last 24 hours) at 05/15/18 1138  Last data filed at 05/15/18 0801   Gross per 24 hour   Intake               60 ml   Output              750 ml   Net             -690 ml         Physical Exam:   Physical Exam   Constitutional: He is oriented to person, place, and time  He appears well-developed and well-nourished  No distress  HENT:   Head: Normocephalic and atraumatic  Eyes: Pupils are equal, round, and reactive to light  Neck: Neck supple  No JVD present  No tracheal deviation present  No thyromegaly present  Cardiovascular: Normal rate, regular rhythm, S1 normal, S2 normal and intact distal pulses  Exam reveals no gallop, no S3, no S4, no distant heart sounds and no friction rub  Murmur heard  Systolic (ejection) murmur is present with a grade of 2/6   Pulmonary/Chest: Effort normal and breath sounds normal  No respiratory distress  He has no wheezes  He has no rales  He exhibits no tenderness  Abdominal: Soft  Bowel sounds are normal  He exhibits no distension  There is no tenderness  Musculoskeletal: He exhibits no edema or deformity  Neurological: He is alert and oriented to person, place, and time  Skin: Skin is warm and dry  No rash noted  He is not diaphoretic  No pallor  Psychiatric: He has a normal mood and affect   His behavior is normal  Judgment normal                  Medications/ Allergies:       Current Facility-Administered Medications:  aspirin 81 mg Oral Daily MIGUEL Garnica    atorvastatin 10 mg Oral Daily With MIGUEL Sullivan    cefTRIAXone 1,000 mg Intravenous Q24H Rhea John PA-C Last Rate: 1,000 mg (05/14/18 2154)   heparin (porcine) 5,000 Units Subcutaneous Q8H [facility] Rhea John PA-C    insulin glargine 20 Units Subcutaneous HS Rhea John PA-C    insulin lispro 1-6 Units Subcutaneous TID AC MIGUEL Green    insulin lispro 1-6 Units Subcutaneous HS MIGUEL Green    metoprolol succinate 25 mg Oral Daily Dontae Carvajal MD    pneumococcal 13-valent conjugate vaccine 0 5 mL Intramuscular Prior to discharge Nelson Enamorado MD        pneumococcal 13-valent conjugate vaccine 0 5 mL Prior to discharge     No Known Allergies    VTE Pharmacologic Prophylaxis:    Heparin    Labs:   Troponins:    Results from last 7 days  Lab Units 05/10/18  1205 05/09/18  1927   CK TOTAL U/L 28*  --    TROPONIN I ng/mL  --  <0 02       CBC with diff:    Results from last 7 days  Lab Units 05/15/18  0612 05/13/18  0646 05/12/18  0434 05/11/18  0421 05/10/18  0422 05/09/18  2356 05/09/18  1927   WBC Thousand/uL 12 70* 10 50 10 50 15 80* 13 10*  --  14 50*   HEMOGLOBIN g/dL 9 8* 9 4* 9 9* 11 4* 13 3*  --  13 7*   HEMATOCRIT % 29 7* 28 4* 30 1* 34 4* 40 0*  --  41 4*   MCV fL 103* 103* 104* 103* 103*  --  103*   PLATELETS Thousands/uL 330 251 232 243 240 222 225   MCH pg 33 9* 34 4* 34 1* 34 0* 34 1*  --  34 2*   MCHC g/dL 32 9 33 3 32 8 33 1 33 2  --  33 1   RDW % 12 1 12 4 12 0 12 2 12 1  --  12 3   MPV fL 7 6* 8 6* 8 5* 8 9 8 9 9 2 10 5   NRBC AUTO /100 WBCs  --   --  0 0 0  --  0       CMP:  Results from last 7 days  Lab Units 05/15/18  0612 05/14/18  0514 05/13/18  1948 05/13/18  0646 05/12/18  2207 05/12/18  1408 05/12/18  0434  05/10/18  0417  05/09/18  1927   SODIUM mmol/L 136 134* 131* 133* 130* 130* 133*  < > 127*  < > 113*   POTASSIUM mmol/L 4 1 4 2 4 3 4 1 4 8 4 7 4 2  < > 5 3  < > 6 5*   CHLORIDE mmol/L 105 104 101 105 101 100 104  < > 97*  < > 83*   CO2 mmol/L 24 23 22 21 23 22 21  < > 21  < > 19*   ANION GAP mmol/L 7 7 8 7 6 8 8  < > 9  < > 11   BUN mg/dL 14 15 19 20 26* 27* 29*  < > 83*  < > 92*   CREATININE mg/dL 0 93 0 93 1 04 0 98 1 22 1 21 1 07  < > 2 07*  < > 2 60*   GLUCOSE RANDOM mg/dL 72 100 301* 84 280* 241* 97  < > 119  < > 679*   CALCIUM mg/dL 8 0* 7 6* 7 4* 7 6* 7 6* 7 7* 7 5*  < > 8 4  < > 8 2*   AST U/L  --   --   --   --   --   --   --   --  9  --  9   ALT U/L  --   --   --   --   --   --   --   --  18  --  23   ALK PHOS U/L  --   --   --   --   --   --   --   --  88  --  95   TOTAL PROTEIN g/dL  --   --   --   --   --   --   --   --  6 9  --  7 4   BILIRUBIN TOTAL mg/dL  --   --   --   --   --   --   --   --  0 60  --  0 70   EGFR ml/min/1 73sq m 78 78 68 73 56 57 66  < > 30  < > 22   < > = values in this interval not displayed  Magnesium:    Results from last 7 days  Lab Units 05/12/18  0434 05/11/18  0405 05/10/18  0417 05/09/18  2356 05/09/18  1927   MAGNESIUM mg/dL 2 1 2 2 2 5 2 6 2 7*     Coags:    Results from last 7 days  Lab Units 05/09/18  1927   PTT seconds 30   INR  1 04     TSH:    Results from last 7 days  Lab Units 05/09/18  1927   TSH 3RD GENERATON uIU/mL 1 738         Imaging & Testing   I have personally reviewed pertinent reports  Xr Chest 1 View Portable    Result Date: 5/9/2018  Narrative: CHEST INDICATION:   weakness  COMPARISON:  None EXAM PERFORMED/VIEWS:  XR CHEST PORTABLE FINDINGS:  There are median sternotomy wires indicating prior cardiac surgery  Cardiomediastinal silhouette appears unremarkable  The lungs are clear  No pneumothorax or pleural effusion  Osseous structures appear within normal limits for patient age  Impression: No acute cardiopulmonary disease  Workstation performed: [id number]     Ct Head Without Contrast    Result Date: 5/9/2018  Narrative: CT BRAIN - WITHOUT CONTRAST INDICATION:   Head trauma, neuro decline, f/u imaging fall, weakness, hit head  COMPARISON:  None  TECHNIQUE:  CT examination of the brain was performed  In addition to axial images, coronal 2D reformatted images were created and submitted for interpretation  Radiation dose length product (DLP) for this visit:  1169 35 mGy-cm     This examination, like all CT scans performed in the [facility], was performed utilizing techniques to minimize radiation dose exposure, including the use of iterative reconstruction and automated exposure control  IMAGE QUALITY:  Diagnostic  FINDINGS: PARENCHYMA:  No intracranial mass, mass effect or midline shift  No CT signs of acute infarction  No acute intracranial hemorrhage  Mild bilateral periventricular white matter hypodensity likely microvascular in origin  VENTRICLES AND EXTRA-AXIAL SPACES:  Normal for the patient's age  VISUALIZED ORBITS AND PARANASAL SINUSES:  Unremarkable  CALVARIUM AND EXTRACRANIAL SOFT TISSUES:  Normal      Impression: No acute intracranial abnormality  Workstation performed: [id number]        EKG / Monitor: Personally reviewed  Monitor shows multiple episodes of PVCs  As compared to few days ago number of PVCs has significantly decreased  Cardiac testing:   Results for orders placed during the hospital encounter of 18   Echo complete with contrast if indicated    [address]  [address] [facility] [phone]    Transthoracic Echocardiogram  2D, M-mode, Doppler, and Color Doppler    Study date:  11-May-2018    Patient: Mauri Bolton  MR number: [id number]  Account number: [de-identified]  : 1940  Age: 76 years  Gender: Male  Status: Routine  Location: Echo lab  Height: 68 in  Weight: 146 7 lb  BP: 97/ 54 mmHg    Indications: A Fib  Diagnoses: I48 0 - Atrial fibrillation    Sonographer:  ADRIEL Joiner  Primary Physician:  Kenyetta Molina DO  Group:  [facility]  Interpreting Physician:  Karen Blackburn DO    IMPRESSIONS:  These features are consistent with dilated cardiomyopathy  SUMMARY    LEFT VENTRICLE:  The ventricle was mildly dilated  Systolic function was moderately reduced by visual assessment  Ejection fraction was estimated to be 40 %  There was moderate diffuse hypokinesis    Wall thickness was mildly increased  Doppler parameters were consistent with abnormal left ventricular relaxation (grade 1 diastolic dysfunction)  VENTRICULAR SEPTUM:  There was paradoxical motion  These changes are consistent with a post-thoracotomy state  LEFT ATRIUM:  The atrium was moderately to markedly dilated  MITRAL VALVE:  There was mild regurgitation  AORTIC VALVE:  There was no evidence for stenosis  There was mild regurgitation  TRICUSPID VALVE:  There was mild regurgitation  The tricuspid jet envelope definition was inadequate for estimation of RV systolic pressure  PULMONIC VALVE:  There was mild regurgitation  AORTA:  The root exhibited normal size and mild fibrocalcific change  HISTORY: PRIOR HISTORY: Diabetes, CABG,CAD,Cardiomyopathy, HTN, Acute kidney injury    PROCEDURE: The procedure was performed in the echo lab  This was a routine study  The transthoracic approach was used  The study included complete 2D imaging, M-mode, complete spectral Doppler, and color Doppler  The heart rate was 82 bpm,  at the start of the study  Image quality was adequate  LEFT VENTRICLE: The ventricle was mildly dilated  Systolic function was moderately reduced by visual assessment  Ejection fraction was estimated to be 40 %  There was moderate diffuse hypokinesis  Wall thickness was mildly increased  DOPPLER: Doppler parameters were consistent with abnormal left ventricular relaxation (grade 1 diastolic dysfunction)  VENTRICULAR SEPTUM: There was paradoxical motion  These changes are consistent with a post-thoracotomy state  RIGHT VENTRICLE: The size was normal  Systolic function was normal  DOPPLER: Systolic pressure was within the normal range  LEFT ATRIUM: The atrium was moderately to markedly dilated  RIGHT ATRIUM: The atrium was mildly dilated  MITRAL VALVE: There was annular calcification  Valve structure was normal  There was mild thickening  There was normal leaflet separation   No echocardiographic evidence for prolapse  DOPPLER: The transmitral velocity was within the normal  range  There was no evidence for stenosis  There was mild regurgitation  AORTIC VALVE: The valve was trileaflet  Leaflets exhibited normal thickness, calcification, and normal cuspal separation  DOPPLER: Transaortic velocity was within the normal range  There was no evidence for stenosis  There was mild  regurgitation  TRICUSPID VALVE: The valve structure was normal  There was normal leaflet separation  DOPPLER: The transtricuspid velocity was within the normal range  There was no evidence for stenosis  There was mild regurgitation  The tricuspid jet  envelope definition was inadequate for estimation of RV systolic pressure  PULMONIC VALVE: Leaflets exhibited normal thickness, no calcification, and normal cuspal separation  DOPPLER: The transpulmonic velocity was within the normal range  There was mild regurgitation  PERICARDIUM: There was no thickening  There was no pericardial effusion  AORTA: The root exhibited normal size and mild fibrocalcific change  PULMONARY ARTERY: The size was normal  The morphology appeared normal     SYSTEM MEASUREMENT TABLES    2D mode  AoR Diam 2D: 3 7 cm  LA Diam (2D): 4 cm  LA/Ao (2D): 1 08  FS (2D Teich): 19 3 %  IVSd (2D): 1 12 cm  LVDEV: 114 cm³  LVEDV MOD BP: 177 cm³  LVESV: 69 2 cm³  LVIDd(2D): 4 93 cm  LVISd (2D): 3 98 cm  LVOT Area 2D: 3 14 cm squared  LVPWd (2D): 1 17 cm  SV (Teich): 44 8 cm³    Apical four chamber  LVEF A4C: 40 %    Apical two chamber  LA Area: 21 9 cm squared  LA Volume: 65 cm³  LVEF A2C: 41 %    Unspecified Scan Mode  CARL Cont Eq (Peak Travon): 2 39 cm squared  LVOT Diam : 2 cm  LVOT Vmax: 1050 mm/s  LVOT Vmax; Mean: 1050 mm/s  Peak Grad ; Mean: 4 mm[Hg]  MV Peak A Travon: 1010 mm/s  MV Peak E Travon   Mean: 647 mm/s  MVA (PHT): 5 24 cm squared  PHT: 42 ms  RA Area: 19 8 cm squared  RA Volume: 51 cm³  TAPSE: 1 2 cm    [employer] Accredited Echocardiography Laboratory    Prepared and electronically signed by    Chantal Jackson DO  Signed 11-May-2018 17:30:45         Dr Jeffrey Cr MD [facility]      "This note has been constructed using a voice recognition system  Therefore there may be syntax, spelling, and/or grammatical errors   Please call if you have any questions  "

## 2018-05-15 NOTE — PHYSICAL THERAPY NOTE
PT TREATMENT     05/15/18 7898   Pain Assessment   Pain Assessment No/denies pain   Restrictions/Precautions   Other Precautions Fall Risk   General   Chart Reviewed Yes   Family/Caregiver Present No   Cognition   Arousal/Participation Cooperative   Subjective   Subjective patient reports having busy day due to stress testing   Bed Mobility   Supine to Sit 7  Independent   Sit to Supine 7  Independent   Transfers   Sit to Stand 5  Supervision   Additional items Assist x 1   Stand to Sit 5  Supervision   Additional items Assist x 1   Ambulation/Elevation   Gait Assistance 5  Supervision   Additional items Assist x 1   Assistive Device Straight cane   Distance 80 feet with change in direction with slow gait speed and one loss of balance laterally but patient able to recover with use of cane  Patient further educated in importance of use of cane for safety and balance even with return home  Exercises   Hip Flexion Sitting;10 reps;Bilateral   Hip Abduction Sitting;10 reps;Bilateral   Knee AROM Long Arc Quad Sitting;10 reps;Bilateral   Ankle Pumps Sitting;10 reps;Bilateral   Assessment   Assessment patient cooperative and motivated and will benefit from increasing mobility and continued PT   Plan   Treatment/Interventions ADL retraining;Functional transfer training;LE strengthening/ROM; Therapeutic exercise; Endurance training;Patient/family training;Equipment eval/education;Gait training   PT Frequency 5x/wk   Recommendation   Recommendation (home)   Licensure   [place] License Number  [employer] [id number]

## 2018-05-15 NOTE — PROGRESS NOTES
Progress Note - Sina Choudhury 1940, 76 y o  male MRN: [id number]    Unit/Bed#: [address] Encounter: [id number]    Primary Care Provider: Julio Nicolas DO   Date and time admitted to hospital: 5/9/2018  6:26 PM        * Hyponatremia   Assessment & Plan    Sodium level has improved stable overall  Patient was also on several medications that may be playing a role in this such as torsemide, lisinopril, Aldactone      Likely hypovolemic hyponatremia  IVF was discontinued yesterday  Repeat labs in the a m  As per Cardiology, start patient on low-dose of torsemide 10 mg daily at discharge, Aldactone at a lower dose, lisinopril 2 5 mg before discharge        CHRIS (acute kidney injury) ([facility] )   Assessment & Plan    Likely pre renal in nature from hypovolemia and also due to medication use  Creatinine has trended down with IV fluids and appears at baseline  Urinary tract infection   Assessment & Plan    Cont on Rocephin, 6 out of 7 days completed so far  urine culture growing klebsiella oxytoca        Cardiomyopathy, ischemic   Assessment & Plan    No evidence of volume overload  Echo showed ejection fraction of 40 % with moderate diffuse hypokinesis and grade 1 diastolic dysfunction  Patient underwent Lexiscan stress test today  Patient started on metoprolol 25 milligram p o  daily and digoxin 0 125 milligram p o  Daily  Patient to be restarted back on Demadex 10 milligram p o  daily and Aldactone 12 5 milligrams tomorrow  Also start low-dose lisinopril upon discharge  Anemia   Assessment & Plan    Microcytic  Hemoglobin has trended down although this could be dilutional as patient was on IV fluids    Hemoglobin has been stable  Iron panel reviewed and Iron level is adequate  FOBT was ordered  Y31 and folic acid level acceptable        Atrial fibrillation ([facility] )   Assessment & Plan    As per cardiology note, patient had been alternating with rapid atrial fibrillation and sinus bradycardia with frequent PVCs which is likely related to electrolyte abnormalities  Patient in sinus rhythm but still having lot frequent PVCs  Started on Toprol-XL 25 mg daily        Benign essential hypertension   Assessment & Plan    Patient is on Toprol-XL        DM2 (diabetes mellitus, type 2) ([facility])   Assessment & Plan    Cont Lantus, sliding scale  Hemoglobin A1c is 11 8  Blood sugars are labile        CAD (coronary artery disease)   Assessment & Plan    History of coronary artery disease with a CABG in 2017, continue aspirin, Toprol-XL        CKD (chronic kidney disease) stage 3, GFR 30-59 ml/min   Assessment & Plan    Creatinine now appears at baseline  Severe protein-calorie malnutrition ([facility] )   Assessment & Plan    Malnutrition Findings:   Malnutrition type: Acute illness  Degree of Malnutrition: Other severe protein calorie malnutrition (related to inadequate intake)    BMI Findings: Body mass index is 22 03 kg/m²  Hyperkalemiaresolved as of 2018   Assessment & Plan    Resolved            VTE Pharmacologic Prophylaxis:   Pharmacologic: Heparin  Mechanical VTE Prophylaxis in Place: Yes    Patient Centered Rounds: I have performed bedside rounds with nursing staff today  Discussions with Specialists or Other Care Team Provider: Dr Nimesh Arzola    Education and Discussions with Family / Patient: yes    Time Spent for Care: 30 minutes  More than 50% of total time spent on counseling and coordination of care as described above  Current Length of Stay: 6 day(s)    Current Patient Status: Inpatient   Certification Statement: The patient will continue to require additional inpatient hospital stay due to UTI, ischemic cardiomyopathy    Discharge Plan: Home    Code Status: Level 1 - Full Code      Subjective:   Patient is feeling well  Denies any chest pain, shortness of breath, abdominal pain, nausea or vomiting      Objective:     Vitals:   Temp (24hrs), Av °F (36 7 °C), Min:97 °F (36 1 °C), Max:98 5 °F (36 9 °C)    HR:  [] 106  Resp:  [18] 18  BP: (117-119)/(56-61) 117/56  SpO2:  [98 %] 98 %  Body mass index is 22 07 kg/m²  Input and Output Summary (last 24 hours): Intake/Output Summary (Last 24 hours) at 05/15/18 1931  Last data filed at 05/15/18 0801   Gross per 24 hour   Intake               60 ml   Output              750 ml   Net             -690 ml       Physical Exam:     Physical Exam   Constitutional: No distress  HENT:   Head: Normocephalic and atraumatic  Nose: Nose normal    Mouth/Throat: Oropharynx is clear and moist    Eyes: Conjunctivae and EOM are normal  Pupils are equal, round, and reactive to light  Neck: Normal range of motion  Neck supple  No JVD present  Cardiovascular: Normal rate and regular rhythm  Exam reveals no gallop and no friction rub  Murmur heard  Pulmonary/Chest: Effort normal and breath sounds normal  No respiratory distress  He has no wheezes  He has no rales  He exhibits no tenderness  Abdominal: Soft  Bowel sounds are normal  He exhibits no distension  There is no tenderness  There is no rebound and no guarding  Musculoskeletal: He exhibits no edema  Neurological: He is alert  No cranial nerve deficit  Skin: Skin is warm and dry  No rash noted  Psychiatric: He has a normal mood and affect  Additional Data:     Labs:      Results from last 7 days  Lab Units 05/15/18  0612  05/12/18  0434   WBC Thousand/uL 12 70*  < > 10 50   HEMOGLOBIN g/dL 9 8*  < > 9 9*   HEMATOCRIT % 29 7*  < > 30 1*   PLATELETS Thousands/uL 330  < > 232   NEUTROS PCT %  --   --  81*   LYMPHS PCT %  --   --  8*   MONOS PCT %  --   --  9   EOS PCT %  --   --  2   < > = values in this interval not displayed      Results from last 7 days  Lab Units 05/15/18  0612  05/10/18  0417   SODIUM mmol/L 136  < > 127*   POTASSIUM mmol/L 4 1  < > 5 3   CHLORIDE mmol/L 105  < > 97*   CO2 mmol/L 24  < > 21   BUN mg/dL 14  < > 83*   CREATININE mg/dL 0 93  < > 2 07* CALCIUM mg/dL 8 0*  < > 8 4   TOTAL PROTEIN g/dL  --   --  6 9   BILIRUBIN TOTAL mg/dL  --   --  0 60   ALK PHOS U/L  --   --  88   ALT U/L  --   --  18   AST U/L  --   --  9   GLUCOSE RANDOM mg/dL 72  < > 119   < > = values in this interval not displayed  Results from last 7 days  Lab Units 05/09/18  1927   INR  1 04       * I Have Reviewed All Lab Data Listed Above  * Additional Pertinent Lab Tests Reviewed: Meh[address] Admission Reviewed        Recent Cultures (last 7 days):       Results from last 7 days  Lab Units 05/09/18  2116   URINE CULTURE  >100,000 cfu/ml Klebsiella oxytoca*       Last 24 Hours Medication List:     Current Facility-Administered Medications:  aspirin 81 mg Oral Daily Cuco Flair, CRNP    atorvastatin 10 mg Oral Daily With MIGUEL Vergara    cefTRIAXone 1,000 mg Intravenous Q24H Rhea John PA-C Last Rate: 1,000 mg (05/14/18 2154)   heparin (porcine) 5,000 Units Subcutaneous Q8H [address] Rhea John PA-C    insulin glargine 20 Units Subcutaneous HS Rhea John PA-C    insulin lispro 1-6 Units Subcutaneous TID AC Cuco Flair, CRNP    insulin lispro 1-6 Units Subcutaneous HS Cuco Flair, CRNP    metoprolol succinate 25 mg Oral Daily Jon Castano MD    pneumococcal 13-valent conjugate vaccine 0 5 mL Intramuscular Prior to discharge Fredia Hatchet, MD    spironolactone 12 5 mg Oral Daily MD Maria Esther Gonzalez ON 5/16/2018] torsemide 10 mg Oral Daily Jon Castano MD         Today, Patient Was Seen By: Janeen Lehman MD    ** Please Note: Dictation voice to text software may have been used in the creation of this document   **

## 2018-05-16 VITALS
BODY MASS INDEX: 21.84 KG/M2 | RESPIRATION RATE: 18 BRPM | SYSTOLIC BLOOD PRESSURE: 107 MMHG | DIASTOLIC BLOOD PRESSURE: 58 MMHG | HEIGHT: 69 IN | HEART RATE: 71 BPM | TEMPERATURE: 97.7 F | WEIGHT: 147.49 LBS | OXYGEN SATURATION: 98 %

## 2018-05-16 LAB
ANION GAP SERPL CALCULATED.3IONS-SCNC: 7 MMOL/L (ref 4–13)
BASOPHILS # BLD MANUAL: 0 THOUSAND/UL (ref 0–0.1)
BASOPHILS NFR MAR MANUAL: 0 % (ref 0–1)
BUN SERPL-MCNC: 13 MG/DL (ref 5–25)
CALCIUM SERPL-MCNC: 7.9 MG/DL (ref 8.3–10.1)
CHLORIDE SERPL-SCNC: 105 MMOL/L (ref 100–108)
CO2 SERPL-SCNC: 25 MMOL/L (ref 21–32)
CREAT SERPL-MCNC: 0.97 MG/DL (ref 0.6–1.3)
EOSINOPHIL # BLD MANUAL: 0.08 THOUSAND/UL (ref 0–0.4)
EOSINOPHIL NFR BLD MANUAL: 1 % (ref 0–6)
ERYTHROCYTE [DISTWIDTH] IN BLOOD BY AUTOMATED COUNT: 11.8 % (ref 11.6–15.1)
GFR SERPL CREATININE-BSD FRML MDRD: 74 ML/MIN/1.73SQ M
GLUCOSE SERPL-MCNC: 243 MG/DL (ref 65–140)
GLUCOSE SERPL-MCNC: 72 MG/DL (ref 65–140)
GLUCOSE SERPL-MCNC: 72 MG/DL (ref 65–140)
HCT VFR BLD AUTO: 30 % (ref 36.5–49.3)
HGB BLD-MCNC: 9.9 G/DL (ref 12–17)
LYMPHOCYTES # BLD AUTO: 0.65 THOUSAND/UL (ref 0.6–4.47)
LYMPHOCYTES # BLD AUTO: 8 % (ref 14–44)
MCH RBC QN AUTO: 34.9 PG (ref 26.8–34.3)
MCHC RBC AUTO-ENTMCNC: 33 G/DL (ref 31.4–37.4)
MCV RBC AUTO: 106 FL (ref 82–98)
METAMYELOCYTES NFR BLD MANUAL: 2 % (ref 0–1)
MONOCYTES # BLD AUTO: 0.33 THOUSAND/UL (ref 0–1.22)
MONOCYTES NFR BLD: 4 % (ref 4–12)
NEUTROPHILS # BLD MANUAL: 6.93 THOUSAND/UL (ref 1.85–7.62)
NEUTS BAND NFR BLD MANUAL: 4 % (ref 0–8)
NEUTS SEG NFR BLD AUTO: 81 % (ref 43–75)
NRBC BLD AUTO-RTO: 0 /100 WBCS
PLATELET # BLD AUTO: 288 THOUSANDS/UL (ref 149–390)
PLATELET BLD QL SMEAR: ADEQUATE
PMV BLD AUTO: 10.6 FL (ref 8.9–12.7)
POTASSIUM SERPL-SCNC: 4.1 MMOL/L (ref 3.5–5.3)
RBC # BLD AUTO: 2.84 MILLION/UL (ref 3.88–5.62)
SODIUM SERPL-SCNC: 137 MMOL/L (ref 136–145)
TOTAL CELLS COUNTED SPEC: 100
TOXIC GRANULES BLD QL SMEAR: PRESENT
WBC # BLD AUTO: 8.15 THOUSAND/UL (ref 4.31–10.16)

## 2018-05-16 PROCEDURE — 90670 PCV13 VACCINE IM: CPT | Performed by: ANESTHESIOLOGY

## 2018-05-16 PROCEDURE — 85007 BL SMEAR W/DIFF WBC COUNT: CPT | Performed by: INTERNAL MEDICINE

## 2018-05-16 PROCEDURE — 80048 BASIC METABOLIC PNL TOTAL CA: CPT | Performed by: INTERNAL MEDICINE

## 2018-05-16 PROCEDURE — 82948 REAGENT STRIP/BLOOD GLUCOSE: CPT

## 2018-05-16 PROCEDURE — G0009 ADMIN PNEUMOCOCCAL VACCINE: HCPCS | Performed by: ANESTHESIOLOGY

## 2018-05-16 PROCEDURE — 99239 HOSP IP/OBS DSCHRG MGMT >30: CPT | Performed by: INTERNAL MEDICINE

## 2018-05-16 PROCEDURE — 85027 COMPLETE CBC AUTOMATED: CPT | Performed by: INTERNAL MEDICINE

## 2018-05-16 RX ORDER — SPIRONOLACTONE 25 MG/1
12.5 TABLET ORAL DAILY
Qty: 30 TABLET | Refills: 0 | Status: SHIPPED | OUTPATIENT
Start: 2018-05-17

## 2018-05-16 RX ORDER — TORSEMIDE 10 MG/1
10 TABLET ORAL DAILY
Refills: 0
Start: 2018-05-16

## 2018-05-16 RX ORDER — METOPROLOL SUCCINATE 25 MG/1
25 TABLET, EXTENDED RELEASE ORAL DAILY
Qty: 30 TABLET | Refills: 0 | Status: ON HOLD | OUTPATIENT
Start: 2018-05-17 | End: 2018-11-18

## 2018-05-16 RX ADMIN — ASPIRIN 81 MG 81 MG: 81 TABLET ORAL at 08:45

## 2018-05-16 RX ADMIN — TORSEMIDE 10 MG: 10 TABLET ORAL at 08:45

## 2018-05-16 RX ADMIN — HEPARIN SODIUM 5000 UNITS: 5000 INJECTION, SOLUTION INTRAVENOUS; SUBCUTANEOUS at 06:00

## 2018-05-16 RX ADMIN — SPIRONOLACTONE 12.5 MG: 25 TABLET, FILM COATED ORAL at 08:46

## 2018-05-16 RX ADMIN — METOPROLOL SUCCINATE 25 MG: 25 TABLET, FILM COATED, EXTENDED RELEASE ORAL at 08:45

## 2018-05-16 RX ADMIN — PNEUMOCOCCAL 13-VALENT CONJUGATE VACCINE 0.5 ML: 2.2; 2.2; 2.2; 2.2; 2.2; 4.4; 2.2; 2.2; 2.2; 2.2; 2.2; 2.2; 2.2 INJECTION, SUSPENSION INTRAMUSCULAR at 13:38

## 2018-05-16 NOTE — ASSESSMENT & PLAN NOTE
Microcytic  Hemoglobin has trended down although this could be dilutional as patient was on IV fluids    Hemoglobin has been stable around 9  Iron panel reviewed and Iron level is adequate  P39 and folic acid level acceptable

## 2018-05-16 NOTE — ASSESSMENT & PLAN NOTE
Malnutrition Findings:   Malnutrition type: Acute illness  Degree of Malnutrition: Other severe protein calorie malnutrition (related to inadequate intake)    BMI Findings: Body mass index is 22 1 kg/m²     Continue diet and nutrition supplementation as needed

## 2018-05-16 NOTE — ASSESSMENT & PLAN NOTE
No evidence of volume overload  Echo showed ejection fraction of 40 % with moderate diffuse hypokinesis and grade 1 diastolic dysfunction  Patient underwent Lexiscan stress test t which showed moderate-sized infarct of the inferior and apical wall and small ischemia, LVEF of 35%  Patient might benefit from biventricular pacemaker as per Cardiology and patient is going to follow up with Dr Naila Jay who is his primary cardiologist   Patient started on metoprolol 25 milligram p o  daily and digoxin 0 125 milligram p o   Daily  Patient restarted back on Demadex 10 milligram p o  daily and Aldactone 12 5 milligrams and lisinopril 2 5 milligram p o  daily  Follow BMP in 1 week

## 2018-05-16 NOTE — ASSESSMENT & PLAN NOTE
Sodium level has improved stable overall  Patient was also on several medications that may be playing a role in this such as torsemide, lisinopril, Aldactone      Likely hypovolemic hyponatremia  IVF was discontinued yesterday  Patient restarted back on torsemide, Aldactone at small doses    Follow-up BMP in 1 week

## 2018-05-16 NOTE — ASSESSMENT & PLAN NOTE
Likely pre renal in nature from hypovolemia and also due to medication use  Creatinine has trended down with IV fluids and appears at baseline    Follow BMP in with restarting diuretics

## 2018-05-16 NOTE — DISCHARGE SUMMARY
Discharge- Isabell Israel 1940, 76 y o  male MRN: [id number]    Unit/Bed#: [address] Encounter: [id number]    Primary Care Provider: Bettejane Ormond, DO   Date and time admitted to hospital: 5/9/2018  6:26 PM        * Hyponatremia   Assessment & Plan    Sodium level has improved stable overall  Patient was also on several medications that may be playing a role in this such as torsemide, lisinopril, Aldactone      Likely hypovolemic hyponatremia  IVF was discontinued yesterday  Patient restarted back on torsemide, Aldactone at small doses  Follow-up BMP in 1 week         CHRIS (acute kidney injury) [facility])   Assessment & Plan    Likely pre renal in nature from hypovolemia and also due to medication use  Creatinine has trended down with IV fluids and appears at baseline  Follow BMP in with restarting diuretics        Urinary tract infection   Assessment & Plan    Patient finished 7 day course of Rocephin  urine culture growing klebsiella oxytoca        Cardiomyopathy, ischemic   Assessment & Plan    No evidence of volume overload  Echo showed ejection fraction of 40 % with moderate diffuse hypokinesis and grade 1 diastolic dysfunction  Patient underwent Lexiscan stress test t which showed moderate-sized infarct of the inferior and apical wall and small ischemia, LVEF of 35%  Patient might benefit from biventricular pacemaker as per Cardiology and patient is going to follow up with Dr Alisson Escobar who is his primary cardiologist   Patient started on metoprolol 25 milligram p o  daily and digoxin 0 125 milligram p o  Daily  Patient restarted back on Demadex 10 milligram p o  daily and Aldactone 12 5 milligrams and lisinopril 2 5 milligram p o  daily  Follow BMP in 1 week        Anemia   Assessment & Plan    Microcytic  Hemoglobin has trended down although this could be dilutional as patient was on IV fluids    Hemoglobin has been stable around 9  Iron panel reviewed and Iron level is adequate  U78 and folic acid level acceptable        Atrial fibrillation (HCC)   Assessment & Plan    As per cardiology note, patient had been alternating with rapid atrial fibrillation and sinus bradycardia with frequent PVCs which is likely related to electrolyte abnormalities  Patient in sinus rhythm but still having lot frequent PVCs  Started on Toprol-XL 25 mg daily        Benign essential hypertension   Assessment & Plan    Patient is on Toprol-XL  Restarted back on torsemide, spironolactone and lisinopril at small doses        DM2 (diabetes mellitus, type 2) ([facility])   Assessment & Plan    Cont Lantus, sliding scale  Hemoglobin A1c is 11 8  Blood sugars are labile        CAD (coronary artery disease)   Assessment & Plan    History of coronary artery disease with a CABG in 2017, continue aspirin, Toprol-XL and Lipitor        CKD (chronic kidney disease) stage 3, GFR 30-59 ml/min   Assessment & Plan    Creatinine now appears at baseline  Severe protein-calorie malnutrition ([address] )   Assessment & Plan    Malnutrition Findings:   Malnutrition type: Acute illness  Degree of Malnutrition: Other severe protein calorie malnutrition (related to inadequate intake)    BMI Findings: Body mass index is 22 1 kg/m²     Continue diet and nutrition supplementation as needed              Discharging Physician / Practitioner: Lake Sawyer MD  PCP: Eugene Nj DO  Admission Date:   Admission Orders     Ordered        05/09/18 2119  Inpatient Admission (expected length of stay for this patient is greater than two midnights)  Once             Discharge Date: 05/16/18    Resolved Problems  Date Reviewed: 5/16/2018          Resolved    Hyperkalemia 5/11/2018     Resolved by  Pat Pereira DO          Consultations During Hospital Stay:  · Dr Tena Hoang    Procedures Performed:     · Nuclear stress test showed abnormal study with moderate-sized infarct of the entire inferior and apical wall with small amount of ischemia, reduced EF of 35% with regional wall motion abnormalities  · Echo showed EF of 51%, grade 1 diastolic dysfunction       Outpatient Tests Requested:  · BMP in 1 week  Follow up with Dr Dana Dee in 1 week    Complications:  None    Reason for Admission:     Hospital Course:     Clarence Oconnell is a 76 y o  male patient with past medical history of CAD status post CABG, hypertension, hyperlipidemia, diabetes, chronic kidney disease who originally presented to the hospital on 5/9/2018 due to generalized weakness with very poor oral intake  In the ED patient was noted to have acute kidney injury and significant hyponatremia with sodium level of 122  Patient was initially admitted to the ICU and was started on IV hydration  Patient also noted to have hyperkalemia  Patient's creatinine continued to improve with IV hydration  Patient's diuretics and lisinopril were held during the hospital stay  During the ICU patient was noted to have intermittent atrial fibrillation and some ectopy  Cardiology was consulted  Patient also noted to have UTI and patient was started on IV Rocephin  Due to frequent PVCs and ectopy patient was started back on low-dose metoprolol  Patient's creatinine improved to baseline and sodium level normalized  Patient finished 7 day course of IV Rocephin during the hospital stay  Later patient underwent nuclear stress test which showed moderate-sized defect with small amount of ischemia  Patient remained stable and patient was slowly restarted back on Demadex and Aldactone  Cardiology recommended biventricular pacemaker placement as outpatient and patient will follow up with his primary cardiologist   Patient remained in a stable condition be discharged home  Please see above list of diagnoses and related plan for additional information       Condition at Discharge: stable     Discharge Day Visit / Exam:     Subjective:  Patient denies any chest pain, shortness of breath, abdominal pain, nausea or vomiting  Vitals: Blood Pressure: 107/58 (05/16/18 0800)  Pulse: 71 (05/16/18 0800)  Temperature: 97 7 °F (36 5 °C) (05/16/18 0800)  Temp Source: Oral (05/16/18 0800)  Respirations: 18 (05/16/18 0800)  Height: 5' 8 5" (174 cm) (05/11/18 1537)  Weight - Scale: 66 9 kg (147 lb 7 8 oz) (05/16/18 0600)  SpO2: 98 % (05/16/18 0800)  Exam:   Physical Exam   Constitutional: No distress  HENT:   Head: Normocephalic and atraumatic  Nose: Nose normal    Mouth/Throat: Oropharynx is clear and moist    Eyes: Conjunctivae and EOM are normal  Pupils are equal, round, and reactive to light  Neck: Normal range of motion  Neck supple  No JVD present  Cardiovascular: Normal rate and regular rhythm  Exam reveals no gallop and no friction rub  Murmur heard  Pulmonary/Chest: Effort normal and breath sounds normal  No respiratory distress  He has no wheezes  He has no rales  He exhibits no tenderness  Abdominal: Soft  Bowel sounds are normal  He exhibits no distension  There is no tenderness  There is no rebound and no guarding  Musculoskeletal: He exhibits no edema  Neurological: He is alert  No cranial nerve deficit  Skin: Skin is warm and dry  No rash noted  Psychiatric: He has a normal mood and affect  Discussion with Family:  Daughter    Discharge instructions/Information to patient and family:   See after visit summary for information provided to patient and family  Provisions for Follow-Up Care:  See after visit summary for information related to follow-up care and any pertinent home health orders  Disposition:     Home    For Discharges to [facility]:   · Not Applicable to this Patient - Not Applicable to this Patient    Planned Readmission: No     Discharge Statement:  I spent 40 minutes discharging the patient  This time was spent on the day of discharge  I had direct contact with the patient on the day of discharge   Greater than 50% of the total time was spent examining patient, answering all patient questions, arranging and discussing plan of care with patient as well as directly providing post-discharge instructions  Additional time then spent on discharge activities  Discharge Medications:  See after visit summary for reconciled discharge medications provided to patient and family        ** Please Note: This note has been constructed using a voice recognition system **

## 2018-05-29 ENCOUNTER — APPOINTMENT (OUTPATIENT)
Dept: LAB | Facility: CLINIC | Age: 78
End: 2018-05-29
Payer: MEDICARE

## 2018-05-29 DIAGNOSIS — E11.8 UNCONTROLLED TYPE 2 DIABETES MELLITUS WITH COMPLICATION, UNSPECIFIED WHETHER LONG TERM INSULIN USE: ICD-10-CM

## 2018-05-29 DIAGNOSIS — E11.65 UNCONTROLLED TYPE 2 DIABETES MELLITUS WITH COMPLICATION, UNSPECIFIED WHETHER LONG TERM INSULIN USE: ICD-10-CM

## 2018-05-29 DIAGNOSIS — I25.5 ISCHEMIC CARDIOMYOPATHY: Primary | ICD-10-CM

## 2018-05-29 LAB
ANION GAP SERPL CALCULATED.3IONS-SCNC: 10 MMOL/L (ref 4–13)
BASOPHILS # BLD AUTO: 0.03 THOUSANDS/ΜL (ref 0–0.1)
BASOPHILS NFR BLD AUTO: 1 % (ref 0–1)
BUN SERPL-MCNC: 29 MG/DL (ref 5–25)
CALCIUM SERPL-MCNC: 8.9 MG/DL (ref 8.3–10.1)
CHLORIDE SERPL-SCNC: 104 MMOL/L (ref 100–108)
CO2 SERPL-SCNC: 25 MMOL/L (ref 21–32)
CREAT SERPL-MCNC: 1.39 MG/DL (ref 0.6–1.3)
EOSINOPHIL # BLD AUTO: 0.24 THOUSAND/ΜL (ref 0–0.61)
EOSINOPHIL NFR BLD AUTO: 5 % (ref 0–6)
ERYTHROCYTE [DISTWIDTH] IN BLOOD BY AUTOMATED COUNT: 13.2 % (ref 11.6–15.1)
GFR SERPL CREATININE-BSD FRML MDRD: 48 ML/MIN/1.73SQ M
GLUCOSE P FAST SERPL-MCNC: 76 MG/DL (ref 65–99)
HCT VFR BLD AUTO: 36.6 % (ref 36.5–49.3)
HGB BLD-MCNC: 11.8 G/DL (ref 12–17)
LYMPHOCYTES # BLD AUTO: 1.58 THOUSANDS/ΜL (ref 0.6–4.47)
LYMPHOCYTES NFR BLD AUTO: 30 % (ref 14–44)
MCH RBC QN AUTO: 34.2 PG (ref 26.8–34.3)
MCHC RBC AUTO-ENTMCNC: 32.2 G/DL (ref 31.4–37.4)
MCV RBC AUTO: 106 FL (ref 82–98)
MONOCYTES # BLD AUTO: 0.72 THOUSAND/ΜL (ref 0.17–1.22)
MONOCYTES NFR BLD AUTO: 14 % (ref 4–12)
NEUTROPHILS # BLD AUTO: 2.75 THOUSANDS/ΜL (ref 1.85–7.62)
NEUTS SEG NFR BLD AUTO: 51 % (ref 43–75)
NRBC BLD AUTO-RTO: 0 /100 WBCS
PLATELET # BLD AUTO: 369 THOUSANDS/UL (ref 149–390)
PMV BLD AUTO: 10 FL (ref 8.9–12.7)
POTASSIUM SERPL-SCNC: 3.8 MMOL/L (ref 3.5–5.3)
RBC # BLD AUTO: 3.45 MILLION/UL (ref 3.88–5.62)
SODIUM SERPL-SCNC: 139 MMOL/L (ref 136–145)
WBC # BLD AUTO: 5.34 THOUSAND/UL (ref 4.31–10.16)

## 2018-05-29 PROCEDURE — 36415 COLL VENOUS BLD VENIPUNCTURE: CPT

## 2018-05-29 PROCEDURE — 85025 COMPLETE CBC W/AUTO DIFF WBC: CPT

## 2018-05-29 PROCEDURE — 80048 BASIC METABOLIC PNL TOTAL CA: CPT

## 2018-10-25 ENCOUNTER — APPOINTMENT (EMERGENCY)
Dept: RADIOLOGY | Facility: HOSPITAL | Age: 78
DRG: 853 | End: 2018-10-25
Payer: MEDICARE

## 2018-10-25 ENCOUNTER — HOSPITAL ENCOUNTER (INPATIENT)
Facility: HOSPITAL | Age: 78
LOS: 24 days | Discharge: NURSING, REHAB OR LONG-TERM CARE | DRG: 853 | End: 2018-11-18
Attending: EMERGENCY MEDICINE | Admitting: ANESTHESIOLOGY
Payer: MEDICARE

## 2018-10-25 DIAGNOSIS — B95.61 BACTEREMIA DUE TO STAPHYLOCOCCUS AUREUS: ICD-10-CM

## 2018-10-25 DIAGNOSIS — I25.5 CARDIOMYOPATHY, ISCHEMIC: Chronic | ICD-10-CM

## 2018-10-25 DIAGNOSIS — I82.619: ICD-10-CM

## 2018-10-25 DIAGNOSIS — R78.81 BACTEREMIA DUE TO STAPHYLOCOCCUS AUREUS: ICD-10-CM

## 2018-10-25 DIAGNOSIS — Z95.828 STATUS POST PERIPHERALLY INSERTED CENTRAL CATHETER (PICC) CENTRAL LINE PLACEMENT: ICD-10-CM

## 2018-10-25 DIAGNOSIS — L03.112 CELLULITIS OF LEFT AXILLA: ICD-10-CM

## 2018-10-25 DIAGNOSIS — R33.9 URINARY RETENTION: ICD-10-CM

## 2018-10-25 DIAGNOSIS — R65.21 SEPTIC SHOCK (HCC): ICD-10-CM

## 2018-10-25 DIAGNOSIS — K92.1 MELENA: ICD-10-CM

## 2018-10-25 DIAGNOSIS — K21.9 GERD (GASTROESOPHAGEAL REFLUX DISEASE): ICD-10-CM

## 2018-10-25 DIAGNOSIS — L03.90 CELLULITIS: ICD-10-CM

## 2018-10-25 DIAGNOSIS — B02.9 SHINGLES: ICD-10-CM

## 2018-10-25 DIAGNOSIS — Z79.4 TYPE 2 DIABETES MELLITUS WITH COMPLICATION, WITH LONG-TERM CURRENT USE OF INSULIN (HCC): Chronic | ICD-10-CM

## 2018-10-25 DIAGNOSIS — L02.213 ABSCESS OF CHEST WALL: ICD-10-CM

## 2018-10-25 DIAGNOSIS — E13.10 DIABETIC KETOACIDOSIS WITHOUT COMA ASSOCIATED WITH OTHER SPECIFIED DIABETES MELLITUS (HCC): ICD-10-CM

## 2018-10-25 DIAGNOSIS — K59.00 CONSTIPATION: ICD-10-CM

## 2018-10-25 DIAGNOSIS — E11.10 DKA (DIABETIC KETOACIDOSES): Primary | ICD-10-CM

## 2018-10-25 DIAGNOSIS — I50.43 ACUTE ON CHRONIC COMBINED SYSTOLIC AND DIASTOLIC HEART FAILURE (HCC): ICD-10-CM

## 2018-10-25 DIAGNOSIS — E11.8 TYPE 2 DIABETES MELLITUS WITH COMPLICATION, WITH LONG-TERM CURRENT USE OF INSULIN (HCC): Chronic | ICD-10-CM

## 2018-10-25 DIAGNOSIS — A41.9 SEPSIS (HCC): ICD-10-CM

## 2018-10-25 DIAGNOSIS — G47.00 INSOMNIA: ICD-10-CM

## 2018-10-25 DIAGNOSIS — I80.8 THROMBOPHLEBITIS ARM: ICD-10-CM

## 2018-10-25 DIAGNOSIS — I50.42 CHRONIC COMBINED SYSTOLIC AND DIASTOLIC HEART FAILURE (HCC): Chronic | ICD-10-CM

## 2018-10-25 DIAGNOSIS — A41.9 SEPTIC SHOCK (HCC): ICD-10-CM

## 2018-10-25 DIAGNOSIS — I49.3 FREQUENT PVCS: ICD-10-CM

## 2018-10-25 DIAGNOSIS — K26.4 GASTROINTESTINAL HEMORRHAGE ASSOCIATED WITH DUODENAL ULCER: ICD-10-CM

## 2018-10-25 PROBLEM — E87.2 LACTIC ACID ACIDOSIS: Status: ACTIVE | Noted: 2018-10-25

## 2018-10-25 LAB
ACETONE SERPL-MCNC: ABNORMAL MG/DL
ALBUMIN SERPL BCP-MCNC: 1.9 G/DL (ref 3.5–5)
ALBUMIN SERPL BCP-MCNC: 2.3 G/DL (ref 3.5–5)
ALP SERPL-CCNC: 124 U/L (ref 46–116)
ALT SERPL W P-5'-P-CCNC: 17 U/L (ref 12–78)
ANION GAP SERPL CALCULATED.3IONS-SCNC: 16 MMOL/L (ref 4–13)
ANION GAP SERPL CALCULATED.3IONS-SCNC: 22 MMOL/L (ref 4–13)
AST SERPL W P-5'-P-CCNC: 11 U/L (ref 5–45)
BACTERIA UR QL AUTO: ABNORMAL /HPF
BASE EX.OXY STD BLDV CALC-SCNC: 70.1 % (ref 60–80)
BASE EX.OXY STD BLDV CALC-SCNC: 72.8 % (ref 60–80)
BASE EXCESS BLDV CALC-SCNC: -10.9 MMOL/L
BASE EXCESS BLDV CALC-SCNC: -12.5 MMOL/L
BASOPHILS # BLD MANUAL: 0 THOUSAND/UL (ref 0–0.1)
BASOPHILS NFR MAR MANUAL: 0 % (ref 0–1)
BILIRUB DIRECT SERPL-MCNC: 0.4 MG/DL (ref 0–0.2)
BILIRUB SERPL-MCNC: 0.7 MG/DL (ref 0.2–1)
BILIRUB UR QL STRIP: NEGATIVE
BODY TEMPERATURE: 94 DEGREES FEHRENHEIT
BUN SERPL-MCNC: 111 MG/DL (ref 5–25)
BUN SERPL-MCNC: 118 MG/DL (ref 5–25)
CALCIUM SERPL-MCNC: 8.3 MG/DL (ref 8.3–10.1)
CALCIUM SERPL-MCNC: 8.8 MG/DL (ref 8.3–10.1)
CHLORIDE SERPL-SCNC: 75 MMOL/L (ref 100–108)
CHLORIDE SERPL-SCNC: 87 MMOL/L (ref 100–108)
CK SERPL-CCNC: 131 U/L (ref 39–308)
CLARITY UR: ABNORMAL
CO2 SERPL-SCNC: 14 MMOL/L (ref 21–32)
CO2 SERPL-SCNC: 17 MMOL/L (ref 21–32)
COLOR UR: YELLOW
CREAT SERPL-MCNC: 2.87 MG/DL (ref 0.6–1.3)
CREAT SERPL-MCNC: 3.1 MG/DL (ref 0.6–1.3)
CREAT UR-MCNC: 29.6 MG/DL
EOSINOPHIL # BLD MANUAL: 0 THOUSAND/UL (ref 0–0.4)
EOSINOPHIL NFR BLD MANUAL: 0 % (ref 0–6)
ERYTHROCYTE [DISTWIDTH] IN BLOOD BY AUTOMATED COUNT: 13.4 % (ref 11.6–15.1)
GFR SERPL CREATININE-BSD FRML MDRD: 18 ML/MIN/1.73SQ M
GFR SERPL CREATININE-BSD FRML MDRD: 20 ML/MIN/1.73SQ M
GLUCOSE SERPL-MCNC: 1184 MG/DL (ref 65–140)
GLUCOSE SERPL-MCNC: 612 MG/DL (ref 65–140)
GLUCOSE SERPL-MCNC: 897 MG/DL (ref 65–140)
GLUCOSE SERPL-MCNC: >500 MG/DL (ref 65–140)
GLUCOSE UR STRIP-MCNC: ABNORMAL MG/DL
HCO3 BLDV-SCNC: 13.7 MMOL/L (ref 24–30)
HCO3 BLDV-SCNC: 15.1 MMOL/L (ref 24–30)
HCT VFR BLD AUTO: 37.3 % (ref 36.5–49.3)
HGB BLD-MCNC: 11.8 G/DL (ref 12–17)
HGB UR QL STRIP.AUTO: ABNORMAL
KETONES UR STRIP-MCNC: ABNORMAL MG/DL
LACTATE SERPL-SCNC: 2 MMOL/L (ref 0.5–2)
LACTATE SERPL-SCNC: 2.4 MMOL/L (ref 0.5–2)
LACTATE SERPL-SCNC: 3.5 MMOL/L (ref 0.5–2)
LACTATE SERPL-SCNC: 5.1 MMOL/L (ref 0.5–2)
LEUKOCYTE ESTERASE UR QL STRIP: ABNORMAL
LG PLATELETS BLD QL SMEAR: PRESENT
LIPASE SERPL-CCNC: 489 U/L (ref 73–393)
LYMPHOCYTES # BLD AUTO: 0.88 THOUSAND/UL (ref 0.6–4.47)
LYMPHOCYTES # BLD AUTO: 4 % (ref 14–44)
MACROCYTES BLD QL AUTO: PRESENT
MAGNESIUM SERPL-MCNC: 2.7 MG/DL (ref 1.6–2.6)
MAGNESIUM SERPL-MCNC: 3 MG/DL (ref 1.6–2.6)
MCH RBC QN AUTO: 35.3 PG (ref 26.8–34.3)
MCHC RBC AUTO-ENTMCNC: 31.6 G/DL (ref 31.4–37.4)
MCV RBC AUTO: 112 FL (ref 82–98)
METAMYELOCYTES NFR BLD MANUAL: 1 % (ref 0–1)
MONOCYTES # BLD AUTO: 1.77 THOUSAND/UL (ref 0–1.22)
MONOCYTES NFR BLD: 8 % (ref 4–12)
NASAL CANNULA: 3
NEUTROPHILS # BLD MANUAL: 19.22 THOUSAND/UL (ref 1.85–7.62)
NEUTS BAND NFR BLD MANUAL: 21 % (ref 0–8)
NEUTS SEG NFR BLD AUTO: 66 % (ref 43–75)
NITRITE UR QL STRIP: NEGATIVE
NON-SQ EPI CELLS URNS QL MICRO: ABNORMAL /HPF
NRBC BLD AUTO-RTO: 0 /100 WBCS
O2 CT BLDV-SCNC: 11.8 ML/DL
O2 CT BLDV-SCNC: 13.1 ML/DL
PCO2 BLDV: 32.6 MM HG (ref 42–50)
PCO2 BLDV: 34.2 MM HG (ref 42–50)
PH BLDV: 7.24 [PH] (ref 7.3–7.4)
PH BLDV: 7.26 [PH] (ref 7.3–7.4)
PH UR STRIP.AUTO: 6 [PH] (ref 5–9)
PHOSPHATE SERPL-MCNC: 4.3 MG/DL (ref 2.3–4.1)
PLATELET # BLD AUTO: 404 THOUSANDS/UL (ref 149–390)
PLATELET BLD QL SMEAR: ABNORMAL
PMV BLD AUTO: 11.1 FL (ref 8.9–12.7)
PO2 BLDV: 41.6 MM HG (ref 35–45)
PO2 BLDV: 43 MM HG (ref 35–45)
POTASSIUM SERPL-SCNC: 3.8 MMOL/L (ref 3.5–5.3)
POTASSIUM SERPL-SCNC: 5.4 MMOL/L (ref 3.5–5.3)
PROT SERPL-MCNC: 7 G/DL (ref 6.4–8.2)
PROT UR STRIP-MCNC: NEGATIVE MG/DL
RBC # BLD AUTO: 3.34 MILLION/UL (ref 3.88–5.62)
RBC #/AREA URNS AUTO: ABNORMAL /HPF
RBC MORPH BLD: NORMAL
SODIUM 24H UR-SCNC: 8 MOL/L
SODIUM SERPL-SCNC: 111 MMOL/L (ref 136–145)
SODIUM SERPL-SCNC: 120 MMOL/L (ref 136–145)
SP GR UR STRIP.AUTO: 1.01 (ref 1–1.03)
TOTAL CELLS COUNTED SPEC: 100
TOXIC GRANULES BLD QL SMEAR: PRESENT
TROPONIN I SERPL-MCNC: <0.02 NG/ML
UROBILINOGEN UR QL STRIP.AUTO: 0.2 E.U./DL
WBC # BLD AUTO: 22.09 THOUSAND/UL (ref 4.31–10.16)
WBC #/AREA URNS AUTO: ABNORMAL /HPF

## 2018-10-25 PROCEDURE — 83605 ASSAY OF LACTIC ACID: CPT | Performed by: EMERGENCY MEDICINE

## 2018-10-25 PROCEDURE — 85027 COMPLETE CBC AUTOMATED: CPT | Performed by: EMERGENCY MEDICINE

## 2018-10-25 PROCEDURE — 83605 ASSAY OF LACTIC ACID: CPT | Performed by: PHYSICIAN ASSISTANT

## 2018-10-25 PROCEDURE — 93005 ELECTROCARDIOGRAM TRACING: CPT

## 2018-10-25 PROCEDURE — 81001 URINALYSIS AUTO W/SCOPE: CPT | Performed by: PHYSICIAN ASSISTANT

## 2018-10-25 PROCEDURE — 96361 HYDRATE IV INFUSION ADD-ON: CPT

## 2018-10-25 PROCEDURE — 87205 SMEAR GRAM STAIN: CPT | Performed by: EMERGENCY MEDICINE

## 2018-10-25 PROCEDURE — 1123F ACP DISCUSS/DSCN MKR DOCD: CPT | Performed by: INTERNAL MEDICINE

## 2018-10-25 PROCEDURE — 82550 ASSAY OF CK (CPK): CPT | Performed by: EMERGENCY MEDICINE

## 2018-10-25 PROCEDURE — 80076 HEPATIC FUNCTION PANEL: CPT | Performed by: EMERGENCY MEDICINE

## 2018-10-25 PROCEDURE — 87070 CULTURE OTHR SPECIMN AEROBIC: CPT | Performed by: EMERGENCY MEDICINE

## 2018-10-25 PROCEDURE — 87186 SC STD MICRODIL/AGAR DIL: CPT | Performed by: EMERGENCY MEDICINE

## 2018-10-25 PROCEDURE — 83690 ASSAY OF LIPASE: CPT | Performed by: EMERGENCY MEDICINE

## 2018-10-25 PROCEDURE — 82570 ASSAY OF URINE CREATININE: CPT | Performed by: PHYSICIAN ASSISTANT

## 2018-10-25 PROCEDURE — 85007 BL SMEAR W/DIFF WBC COUNT: CPT | Performed by: EMERGENCY MEDICINE

## 2018-10-25 PROCEDURE — 73030 X-RAY EXAM OF SHOULDER: CPT

## 2018-10-25 PROCEDURE — 99285 EMERGENCY DEPT VISIT HI MDM: CPT

## 2018-10-25 PROCEDURE — 84484 ASSAY OF TROPONIN QUANT: CPT | Performed by: EMERGENCY MEDICINE

## 2018-10-25 PROCEDURE — 36415 COLL VENOUS BLD VENIPUNCTURE: CPT | Performed by: EMERGENCY MEDICINE

## 2018-10-25 PROCEDURE — 83930 ASSAY OF BLOOD OSMOLALITY: CPT | Performed by: PHYSICIAN ASSISTANT

## 2018-10-25 PROCEDURE — 83735 ASSAY OF MAGNESIUM: CPT | Performed by: EMERGENCY MEDICINE

## 2018-10-25 PROCEDURE — 87040 BLOOD CULTURE FOR BACTERIA: CPT | Performed by: EMERGENCY MEDICINE

## 2018-10-25 PROCEDURE — 72100 X-RAY EXAM L-S SPINE 2/3 VWS: CPT

## 2018-10-25 PROCEDURE — 82948 REAGENT STRIP/BLOOD GLUCOSE: CPT

## 2018-10-25 PROCEDURE — 83935 ASSAY OF URINE OSMOLALITY: CPT | Performed by: PHYSICIAN ASSISTANT

## 2018-10-25 PROCEDURE — 83735 ASSAY OF MAGNESIUM: CPT | Performed by: PHYSICIAN ASSISTANT

## 2018-10-25 PROCEDURE — 82805 BLOOD GASES W/O2 SATURATION: CPT | Performed by: PHYSICIAN ASSISTANT

## 2018-10-25 PROCEDURE — 72072 X-RAY EXAM THORAC SPINE 3VWS: CPT

## 2018-10-25 PROCEDURE — 84300 ASSAY OF URINE SODIUM: CPT | Performed by: PHYSICIAN ASSISTANT

## 2018-10-25 PROCEDURE — 87147 CULTURE TYPE IMMUNOLOGIC: CPT | Performed by: EMERGENCY MEDICINE

## 2018-10-25 PROCEDURE — 72125 CT NECK SPINE W/O DYE: CPT

## 2018-10-25 PROCEDURE — 96365 THER/PROPH/DIAG IV INF INIT: CPT

## 2018-10-25 PROCEDURE — 87081 CULTURE SCREEN ONLY: CPT | Performed by: ANESTHESIOLOGY

## 2018-10-25 PROCEDURE — 99291 CRITICAL CARE FIRST HOUR: CPT | Performed by: ANESTHESIOLOGY

## 2018-10-25 PROCEDURE — 87086 URINE CULTURE/COLONY COUNT: CPT | Performed by: PHYSICIAN ASSISTANT

## 2018-10-25 PROCEDURE — 82009 KETONE BODYS QUAL: CPT | Performed by: EMERGENCY MEDICINE

## 2018-10-25 PROCEDURE — 71045 X-RAY EXAM CHEST 1 VIEW: CPT

## 2018-10-25 PROCEDURE — 80048 BASIC METABOLIC PNL TOTAL CA: CPT | Performed by: EMERGENCY MEDICINE

## 2018-10-25 PROCEDURE — 96375 TX/PRO/DX INJ NEW DRUG ADDON: CPT

## 2018-10-25 PROCEDURE — 82805 BLOOD GASES W/O2 SATURATION: CPT | Performed by: EMERGENCY MEDICINE

## 2018-10-25 PROCEDURE — 80069 RENAL FUNCTION PANEL: CPT | Performed by: PHYSICIAN ASSISTANT

## 2018-10-25 PROCEDURE — 82947 ASSAY GLUCOSE BLOOD QUANT: CPT | Performed by: PHYSICIAN ASSISTANT

## 2018-10-25 PROCEDURE — 70450 CT HEAD/BRAIN W/O DYE: CPT

## 2018-10-25 RX ORDER — HEPARIN SODIUM 5000 [USP'U]/ML
5000 INJECTION, SOLUTION INTRAVENOUS; SUBCUTANEOUS EVERY 8 HOURS SCHEDULED
Status: DISCONTINUED | OUTPATIENT
Start: 2018-10-25 | End: 2018-10-29

## 2018-10-25 RX ORDER — ALBUMIN, HUMAN INJ 5% 5 %
12.5 SOLUTION INTRAVENOUS ONCE
Status: COMPLETED | OUTPATIENT
Start: 2018-10-25 | End: 2018-10-26

## 2018-10-25 RX ORDER — FENTANYL CITRATE 50 UG/ML
100 INJECTION, SOLUTION INTRAMUSCULAR; INTRAVENOUS ONCE
Status: COMPLETED | OUTPATIENT
Start: 2018-10-25 | End: 2018-10-25

## 2018-10-25 RX ORDER — VANCOMYCIN HYDROCHLORIDE 1 G/200ML
15 INJECTION, SOLUTION INTRAVENOUS ONCE
Status: COMPLETED | OUTPATIENT
Start: 2018-10-25 | End: 2018-10-25

## 2018-10-25 RX ORDER — HYDROMORPHONE HCL/PF 1 MG/ML
0.5 SYRINGE (ML) INJECTION ONCE
Status: DISCONTINUED | OUTPATIENT
Start: 2018-10-25 | End: 2018-10-25

## 2018-10-25 RX ORDER — POTASSIUM CHLORIDE 14.9 MG/ML
20 INJECTION INTRAVENOUS ONCE
Status: COMPLETED | OUTPATIENT
Start: 2018-10-25 | End: 2018-10-25

## 2018-10-25 RX ORDER — VANCOMYCIN HYDROCHLORIDE 1 G/200ML
15 INJECTION, SOLUTION INTRAVENOUS EVERY 24 HOURS
Status: DISCONTINUED | OUTPATIENT
Start: 2018-10-26 | End: 2018-10-28

## 2018-10-25 RX ORDER — SODIUM CHLORIDE, SODIUM LACTATE, POTASSIUM CHLORIDE, CALCIUM CHLORIDE 600; 310; 30; 20 MG/100ML; MG/100ML; MG/100ML; MG/100ML
250 INJECTION, SOLUTION INTRAVENOUS CONTINUOUS
Status: DISCONTINUED | OUTPATIENT
Start: 2018-10-25 | End: 2018-10-26

## 2018-10-25 RX ORDER — ASPIRIN 81 MG/1
81 TABLET, CHEWABLE ORAL DAILY
Status: DISCONTINUED | OUTPATIENT
Start: 2018-10-26 | End: 2018-10-30

## 2018-10-25 RX ORDER — ATORVASTATIN CALCIUM 10 MG/1
10 TABLET, FILM COATED ORAL DAILY
Status: DISCONTINUED | OUTPATIENT
Start: 2018-10-26 | End: 2018-11-18 | Stop reason: HOSPADM

## 2018-10-25 RX ORDER — FENTANYL CITRATE 50 UG/ML
100 INJECTION, SOLUTION INTRAMUSCULAR; INTRAVENOUS ONCE
Status: DISCONTINUED | OUTPATIENT
Start: 2018-10-25 | End: 2018-10-25

## 2018-10-25 RX ORDER — LIDOCAINE HYDROCHLORIDE AND EPINEPHRINE BITARTRATE 20; .01 MG/ML; MG/ML
10 INJECTION, SOLUTION SUBCUTANEOUS ONCE
Status: COMPLETED | OUTPATIENT
Start: 2018-10-25 | End: 2018-10-25

## 2018-10-25 RX ADMIN — Medication 0.04 UNITS/MIN: at 21:27

## 2018-10-25 RX ADMIN — FENTANYL CITRATE 100 MCG: 50 INJECTION, SOLUTION INTRAMUSCULAR; INTRAVENOUS at 15:49

## 2018-10-25 RX ADMIN — SODIUM CHLORIDE, SODIUM LACTATE, POTASSIUM CHLORIDE, AND CALCIUM CHLORIDE 250 ML/HR: .6; .31; .03; .02 INJECTION, SOLUTION INTRAVENOUS at 22:50

## 2018-10-25 RX ADMIN — HEPARIN SODIUM 5000 UNITS: 5000 INJECTION, SOLUTION INTRAVENOUS; SUBCUTANEOUS at 22:26

## 2018-10-25 RX ADMIN — SODIUM CHLORIDE 1000 ML: 0.9 INJECTION, SOLUTION INTRAVENOUS at 16:12

## 2018-10-25 RX ADMIN — SODIUM CHLORIDE, SODIUM LACTATE, POTASSIUM CHLORIDE, AND CALCIUM CHLORIDE 250 ML/HR: .6; .31; .03; .02 INJECTION, SOLUTION INTRAVENOUS at 21:21

## 2018-10-25 RX ADMIN — SODIUM CHLORIDE, SODIUM LACTATE, POTASSIUM CHLORIDE, AND CALCIUM CHLORIDE 500 ML: .6; .31; .03; .02 INJECTION, SOLUTION INTRAVENOUS at 20:23

## 2018-10-25 RX ADMIN — SODIUM CHLORIDE 1000 ML: 0.9 INJECTION, SOLUTION INTRAVENOUS at 16:43

## 2018-10-25 RX ADMIN — CEFEPIME HYDROCHLORIDE 2000 MG: 2 INJECTION, SOLUTION INTRAVENOUS at 15:54

## 2018-10-25 RX ADMIN — NOREPINEPHRINE BITARTRATE 15 MCG/MIN: 1 INJECTION INTRAVENOUS at 22:47

## 2018-10-25 RX ADMIN — SODIUM CHLORIDE 28 UNITS/HR: 9 INJECTION, SOLUTION INTRAVENOUS at 22:47

## 2018-10-25 RX ADMIN — SODIUM CHLORIDE, SODIUM LACTATE, POTASSIUM CHLORIDE, AND CALCIUM CHLORIDE 1000 ML: .6; .31; .03; .02 INJECTION, SOLUTION INTRAVENOUS at 21:23

## 2018-10-25 RX ADMIN — POTASSIUM CHLORIDE 20 MEQ: 200 INJECTION, SOLUTION INTRAVENOUS at 21:23

## 2018-10-25 RX ADMIN — SODIUM CHLORIDE 1000 ML: 0.9 INJECTION, SOLUTION INTRAVENOUS at 15:13

## 2018-10-25 RX ADMIN — NOREPINEPHRINE BITARTRATE 5 MCG/MIN: 1 INJECTION INTRAVENOUS at 18:39

## 2018-10-25 RX ADMIN — LIDOCAINE HYDROCHLORIDE,EPINEPHRINE BITARTRATE 10 ML: 20; .01 INJECTION, SOLUTION INFILTRATION; PERINEURAL at 18:45

## 2018-10-25 RX ADMIN — ACYCLOVIR SODIUM 700 MG: 50 INJECTION, SOLUTION INTRAVENOUS at 18:08

## 2018-10-25 RX ADMIN — SODIUM CHLORIDE 7 UNITS/HR: 9 INJECTION, SOLUTION INTRAVENOUS at 17:05

## 2018-10-25 RX ADMIN — FENTANYL CITRATE 100 MCG: 50 INJECTION, SOLUTION INTRAMUSCULAR; INTRAVENOUS at 17:22

## 2018-10-25 RX ADMIN — VANCOMYCIN HYDROCHLORIDE 1000 MG: 1 INJECTION, SOLUTION INTRAVENOUS at 17:01

## 2018-10-25 NOTE — SEPSIS NOTE
Sepsis Note   Jerald Jordan [de-identified] y o  male MRN: [id number]  Unit/Bed#: ED 08 Encounter: [id number]            Initial Sepsis Screening     Row Name 10/25/18 1836 10/25/18 1639             Is the patient's history suggestive of a new or worsening infection?  (!)  Yes (Proceed)  -SA       Suspected source of infection   wound infection  -SA       Are two or more of the following signs & symptoms of infection both present and new to the patient?  No  -SA       Indicate SIRS criteria   WBC > 10% bands;Leukocytosis (WBC > 68065 IJL); Tachycardia > 90 bpm  -SA       If the answer is yes to both questions, suspicion of sepsis is present  [de-identified]         If severe sepsis is present AND tissue hypoperfusion perists in the hour after fluid resuscitation or lactate > 4, the patient meets criteria for SEPTIC SHOCK           Are any of the following organ dysfunction criteria present within 6 hours of suspected infection and SIRS criteria that are NOT considered to be chronic conditions?  (!)  Yes  -SA       Organ dysfunction   Lactate > 2 0 mmol/L  -SA       Date of presentation of severe sepsis 10/25/18  -CR         Time of presentation of severe sepsis 1530  -CR         Tissue hypoperfusion persists in the hour after crystalloid fluid administration, evidenced, by either: SBP < 90 mm/Hg ( ___ mm/Hg in comment field)  -CR         Was hypotension present within one hour of the conclusion of crystalloid fluid administration?  Yes  -CR Yes  -SA       Date of presentation of septic shock 10/25/18  -CR         Time of presentation of septic shock 1730  -CR           User Key  (r) = Recorded By, (t) = Taken By, (c) = Cosigned By    [address] Name Provider Type    SA Patsy Combs DO Physician    Damien Knott PA-C Physician Assistant               [facility] (last 720 hours)      Sepsis Reassess     Row Name 10/25/18 1800                   Repeat Volume Status and Tissue Perfusion Assessment Performed Repeat Volume Status and Tissue Perfusion Assessment Performed Yes  -CR           Volume Status and Tissue Perfusion Post Fluid Resuscitation- Must Document 5 of the Following 7:    Vital Signs Reviewed (HR, RR, BP, T) Yes  -CR        Arterial Oxygen Saturation Reviewed (POx, SaO2 or SpO2) Yes (comment %)   99%  -CR        Cardio Normal S1/S2; Regular rate and rhythm; No murmor  -CR        Pulmonary Normal effort;Clear to auscultation  -CR        Capillary Refill Brisk  -CR        Peripheral Pulses Radial  -CR        Peripheral Pulse +3  -CR        Skin Warm  -CR        Urine output assessed Decreased  -CR           *OR*   Intensive Monitoring- Must Document One of the Following Four *:    Vital Signs Reviewed          * Central Venous Pressure (CVP or RAP)          * Central Venous Oxygen (SVO2, ScvO2 or Oxygen saturation via central catheter)          * Bedside Cardiovascular US in IVC diameter and % collapse          * Passive Leg Raise OR Crystalloid Challenge            User Key  (r) = Recorded By, (t) = Taken By, (c) = Cosigned By    Initials Name Provider Type    CR Dickson Hart PA-C Physician Assistant

## 2018-10-25 NOTE — ED PROVIDER NOTES
History  Chief Complaint   Patient presents with    Back Pain     Pt sts he has shingles and has back pain for 4 weeks  Nabor Summers last night because he lost his balance  Area left upper back scabbed, red swollen with greenis drainage  Scabbed area over scapula and under left arm  [de-identified] y/o male presents with severe left shoulder chest and upper back pain  Patient says he developed a rash for a few weeks, being managed with pain medicine, denies fevers,chills, shortness of breath, nausea,vomiting,abdominal pain or other symptoms  Says he fell last night and could not get up no LOC, neck pain, headache, numbness,weakness,parasthesias or other symptoms  History provided by:  Patient   used: No        Prior to Admission Medications   Prescriptions Last Dose Informant Patient Reported?  Taking?   aspirin 81 mg chewable tablet   Yes No   Sig: Chew 81 mg daily   atorvastatin (LIPITOR) 10 mg tablet   Yes No   Sig: Take 10 mg by mouth daily   docusate sodium (COLACE) 100 mg capsule   Yes No   Sig: Take 100 mg by mouth 2 (two) times a day   ferrous sulfate 325 (65 Fe) mg tablet   Yes No   Sig: Take 325 mg by mouth 3 (three) times a day with meals   glimepiride (AMARYL) 4 mg tablet   Yes No   Sig: Take 4 mg by mouth 2 (two) times a day with meals   insulin detemir (LEVEMIR) 100 units/mL subcutaneous injection   Yes No   Sig: Inject 20 Units under the skin daily at bedtime   lisinopril (ZESTRIL) 2 5 mg tablet   Yes No   Sig: Take 2 5 mg by mouth daily   metoprolol succinate (TOPROL-XL) 25 mg 24 hr tablet   No No   Sig: Take 1 tablet (25 mg total) by mouth daily   spironolactone (ALDACTONE) 25 mg tablet   No No   Sig: Take 0 5 tablets (12 5 mg total) by mouth daily   torsemide (DEMADEX) 10 mg tablet   No No   Sig: Take 1 tablet (10 mg total) by mouth daily      Facility-Administered Medications: None       Past Medical History:   Diagnosis Date    [facility]     [facility]     History of open heart surgery     Hyperlipidemia     Hypertension        Past Surgical History:   Procedure Laterality Date    CARDIAC SURGERY         History reviewed  No pertinent family history  I have reviewed and agree with the history as documented  Social History   Substance Use Topics    Smoking status: Former Smoker     Types: Pipe    Smokeless tobacco: Never Used    Alcohol use No        Review of Systems   All other systems reviewed and are negative  Physical Exam  Physical Exam   Constitutional: He is oriented to person, place, and time  He appears well-developed and well-nourished  HENT:   Head: Normocephalic and atraumatic  Eyes: Pupils are equal, round, and reactive to light  EOM are normal    Neck: Normal range of motion  Neck supple  Cardiovascular: Normal rate and regular rhythm  Pulmonary/Chest: Effort normal and breath sounds normal    Abdominal: Soft  Bowel sounds are normal    Musculoskeletal: Normal range of motion  Neurological: He is alert and oriented to person, place, and time  Skin: Skin is warm and dry  Vesicular rash noted left upper chest wall radiating across his upper back with crusted over lesions and overlying cellulitis, very tender to touch  Psychiatric: He has a normal mood and affect  Nursing note and vitals reviewed        Vital Signs  ED Triage Vitals   Temperature Pulse Respirations Blood Pressure SpO2   10/25/18 1507 10/25/18 1507 10/25/18 1507 10/25/18 1509 10/25/18 1507   (!) 96 4 °F (35 8 °C) (!) 106 20 (!) 89/51 98 %      Temp src Heart Rate Source Patient Position - Orthostatic VS BP Location FiO2 (%)   -- -- -- -- --             Pain Score       10/25/18 1507       6           Vitals:    10/25/18 2200 10/25/18 2230 10/25/18 2300 10/25/18 2330   BP: 100/60 116/63 (!) 89/56 91/52   Pulse: (!) 116 (!) 117 (!) 114 (!) 117       Visual Acuity  Visual Acuity      Most Recent Value   L Pupil Size (mm)  3   R Pupil Size (mm)  3   L Pupil Shape  Round   R Pupil Shape  Round          ED Medications  Medications   insulin regular (HumuLIN R,NovoLIN R) 1 Units/mL in sodium chloride 0 9 % 100 mL infusion (28 Units/hr Intravenous New Bag 10/25/18 2247)   aspirin chewable tablet 81 mg (not administered)   atorvastatin (LIPITOR) tablet 10 mg (not administered)   heparin (porcine) subcutaneous injection 5,000 Units (5,000 Units Subcutaneous Given 10/25/18 2226)   cefepime (MAXIPIME) IVPB (premix) 1,000 mg (not administered)   vancomycin (VANCOCIN) IVPB (premix) 1,000 mg (not administered)   acyclovir (ZOVIRAX) 700 mg in sodium chloride 0 9 % 100 mL IVPB (700 mg Intravenous Not Given 10/25/18 2330)   lactated ringers infusion (250 mL/hr Intravenous New Bag 10/25/18 2250)   norepinephrine (LEVOPHED) 4 mg (STANDARD CONCENTRATION) IV in sodium chloride 0 9% 250 mL (12 mcg/min Intravenous Rate/Dose Change 10/25/18 2337)   vasopressin (PITRESSIN) 20 Units in sodium chloride 0 9 % 100 mL infusion (0 04 Units/min Intravenous New Bag 10/25/18 2127)   influenza vaccine, high-dose (FLUZONE HIGH-DOSE) IM injection WIN 0 5 mL (not administered)   albumin human (FLEXBUMIN) 5 % injection 12 5 g (not administered)   albumin human (FLEXBUMIN) 5 % injection 12 5 g (not administered)   sodium chloride 0 9 % bolus 1,000 mL (0 mL Intravenous Stopped 10/25/18 1611)   cefepime (MAXIPIME) 2 g/50 mL dextrose IVPB (0 mg Intravenous Stopped 10/25/18 1651)   acyclovir (ZOVIRAX) 700 mg in sodium chloride 0 9 % 100 mL IVPB (700 mg Intravenous New Bag 10/25/18 1808)   fentanyl citrate (PF) 100 MCG/2ML 100 mcg (100 mcg Intravenous Given 10/25/18 1549)   sodium chloride 0 9 % bolus 1,000 mL (0 mL Intravenous Stopped 10/25/18 1712)   sodium chloride 0 9 % bolus 1,000 mL (0 mL Intravenous Stopped 10/25/18 1800)   vancomycin (VANCOCIN) IVPB (premix) 1,000 mg (0 mg/kg × 72 6 kg Intravenous Stopped 10/25/18 1810)   fentanyl citrate (PF) 100 MCG/2ML 100 mcg (100 mcg Intravenous Given 10/25/18 1722)   lidocaine-epinephrine (XYLOCAINE/EPINEPHRINE) 2 %-1:100,000 injection 10 mL (10 mL Infiltration Given 10/25/18 1845)   lactated ringers bolus 500 mL (0 mL Intravenous Stopped 10/25/18 2121)   lactated ringers bolus 1,000 mL (0 mL Intravenous Stopped 10/25/18 2250)   potassium chloride 20 mEq IVPB (premix) (20 mEq Intravenous New Bag 10/25/18 2123)       Diagnostic Studies  Results Reviewed     Procedure Component Value Units Date/Time    Fingerstick Glucose (POCT) [[id number]]  (Abnormal) Collected:  10/25/18 1937    Lab Status:  Final result Updated:  10/25/18 2003     POC Glucose >500 (HH) mg/dl     Fingerstick Glucose (POCT) [[id number]]  (Abnormal) Collected:  10/25/18 1850    Lab Status:  Final result Updated:  10/25/18 1851     POC Glucose >500 (HH) mg/dl     Fingerstick Glucose (POCT) [[id number]]  (Abnormal) Collected:  10/25/18 1711    Lab Status:  Final result Updated:  10/25/18 1851     POC Glucose >500 (HH) mg/dl     Lactic acid, plasma [[id number]]  (Normal) Collected:  10/25/18 1730    Lab Status:  Final result Specimen:  Blood from Arm, Left Updated:  10/25/18 1809     LACTIC ACID 2 0 mmol/L     Narrative:         Result may be elevated if tourniquet was used during collection  Urine Microscopic [[id number]]  (Abnormal) Collected:  10/25/18 1730    Lab Status:  Final result Specimen:  Urine from Urine, Other Updated:  10/25/18 1744     RBC, UA None Seen /hpf      WBC, UA 10-20 (A) /hpf      Epithelial Cells Occasional /hpf      Bacteria, UA Innumerable (A) /hpf     Urine culture [[id number]] Collected:  10/25/18 1730    Lab Status:   In process Specimen:  Urine from Urine, Other Updated:  10/25/18 1744    UA w Reflex to Microscopic w Reflex to Culture [[id number]]  (Abnormal) Collected:  10/25/18 1730    Lab Status:  Final result Specimen:  Urine from Urine, Other Updated:  10/25/18 1737     Color, UA Yellow     Clarity, UA Slightly Cloudy     Specific Brodnax, UA 1 010     pH, UA 6 0 Leukocytes, UA Small (A)     Nitrite, UA Negative     Protein, UA Negative mg/dl      Glucose, UA >=1000 (1%) (A) mg/dl      Ketones, UA Trace (A) mg/dl      Urobilinogen, UA 0 2 E U /dl      Bilirubin, UA Negative     Blood, UA Trace-lysed (A)    Wound culture and Gram stain [[id number]] Collected:  10/25/18 1730    Lab Status: In process Specimen:  Wound from Arm, Left Updated:  10/25/18 5395    Basic metabolic panel [[id number]]  (Abnormal) Collected:  10/25/18 1534    Lab Status:  Final result Specimen:  Blood from Arm, Left Updated:  10/25/18 1650     Sodium 111 (LL) mmol/L      Potassium 5 4 (H) mmol/L      Chloride 75 (L) mmol/L      CO2 14 (L) mmol/L      ANION GAP 22 (H) mmol/L       (H) mg/dL      Creatinine 3 10 (H) mg/dL      Glucose 1,184 (HH) mg/dL      Calcium 8 8 mg/dL      eGFR 18 ml/min/1 73sq m     Narrative:         National Kidney Disease Education Program recommendations are as follows:  GFR calculation is accurate only with a steady state creatinine  Chronic Kidney disease less than 60 ml/min/1 73 sq  meters  Kidney failure less than 15 ml/min/1 73 sq  meters      Magnesium [[id number]]  (Abnormal) Collected:  10/25/18 1534    Lab Status:  Final result Specimen:  Blood from Arm, Left Updated:  10/25/18 1648     Magnesium 3 0 (H) mg/dL     Hepatic function panel [[id number]]  (Abnormal) Collected:  10/25/18 1534    Lab Status:  Final result Specimen:  Blood from Arm, Left Updated:  10/25/18 1648     Total Bilirubin 0 70 mg/dL      Bilirubin, Direct 0 40 (H) mg/dL      Alkaline Phosphatase 124 (H) U/L      AST 11 U/L      ALT 17 U/L      Total Protein 7 0 g/dL      Albumin 2 3 (L) g/dL     Lipase [[id number]]  (Abnormal) Collected:  10/25/18 1534    Lab Status:  Final result Specimen:  Blood from Arm, Left Updated:  10/25/18 1648     Lipase 489 (H) u/L     CK (with reflex to MB) [[id number]]  (Normal) Collected:  10/25/18 1534    Lab Status:  Final result Specimen:  Blood from Arm, Left Updated: 10/25/18 1632     Total  U/L     CBC and differential [[id number]]  (Abnormal) Collected:  10/25/18 1534    Lab Status:  Final result Specimen:  Blood from Arm, Left Updated:  10/25/18 1623     WBC 22 09 (H) Thousand/uL      RBC 3 34 (L) Million/uL      Hemoglobin 11 8 (L) g/dL      Hematocrit 37 3 %       (H) fL      MCH 35 3 (H) pg      MCHC 31 6 g/dL      RDW 13 4 %      MPV 11 1 fL      Platelets 209 (H) Thousands/uL      nRBC 0 /100 WBCs     Narrative: This is an appended report  These results have been appended to a previously verified report  Troponin I [[id number]]  (Normal) Collected:  10/25/18 1534    Lab Status:  Final result Specimen:  Blood from Arm, Left Updated:  10/25/18 1622     Troponin I <0 02 ng/mL     Lactic acid, plasma [[id number]]  (Abnormal) Collected:  10/25/18 1534    Lab Status:  Final result Specimen:  Blood from Arm, Left Updated:  10/25/18 1622     LACTIC ACID 2 4 (HH) mmol/L     Narrative:         Result may be elevated if tourniquet was used during collection  Acetone [[id number]]  (Abnormal) Collected:  10/25/18 1548    Lab Status:  Final result Specimen:  Blood from Arm, Left Updated:  10/25/18 1609     Acetone, Bld 2+ (A)    Blood culture #1 [[id number]] Collected:  10/25/18 1534    Lab Status: In process Specimen:  Blood from Arm, Left Updated:  10/25/18 1602    Blood culture #2 [[id number]] Collected:  10/25/18 1530    Lab Status:   In process Specimen:  Blood from Arm, Right Updated:  10/25/18 1602    Blood gas, venous [[id number]]  (Abnormal) Collected:  10/25/18 1548    Lab Status:  Final result Specimen:  Blood from Arm, Left Updated:  10/25/18 1557     pH, Ruel 7 241 (L)     pCO2, Ruel 32 6 (L) mm Hg      pO2, Ruel 43 0 mm Hg      HCO3, Ruel 13 7 (L) mmol/L      Base Excess, Ruel -12 5 mmol/L      O2 Content, Ruel 13 1 ml/dL      O2 HGB, VENOUS 72 8 %     Fingerstick Glucose (POCT) [[id number]]  (Abnormal) Collected:  10/25/18 1507    Lab Status:  Final result Updated: 10/25/18 1504     POC Glucose >500 (HH) mg/dl                  XR spine lumbar 2 or 3 views injury   Final Result by Sil Valadez MD (10/25 2122)      Levoscoliosis and moderate multilevel degenerative change  Workstation performed: [id number]         XR chest 1 view   Final Result by Tee Rice MD (10/25 1653)      Focal consolidation in the right lower lobe  This may represent pneumonia  If there is any trauma to the right chest, pulmonary contusion is a consideration  Recommend follow up to resolution  The study was marked in EPIC for significant notification  Workstation performed: [id number]         XR spine thoracic 3 views   Final Result by Tee Rice MD (10/25 1651)      No acute osseous abnormality  Workstation performed: [id number]         XR shoulder 2+ views LEFT   Final Result by Tee Rice MD (10/25 1649)      No acute osseous abnormality  Workstation performed: [id number]         CT spine cervical without contrast   Final Result by Sil Valadez MD (10/25 1645)      No cervical spine fracture or traumatic malalignment  Workstation performed: [id number]         CT head without contrast   Final Result by Sil Valadez MD (10/25 1643)      No acute intracranial abnormality                    Workstation performed: [id number]                    Procedures  ECG 12 Lead Documentation  Date/Time: 10/25/2018 4:30 PM  Performed by: Deepti Olivas by: Jay Officer     ECG reviewed by me, the ED Provider: yes    Patient location:  ED  Previous ECG:     Previous ECG:  Unavailable    Comparison to cardiac monitor: Yes    Interpretation:     Interpretation: abnormal    Rate:     ECG rate assessment: normal    Rhythm:     Rhythm: sinus rhythm    Ectopy:     Ectopy: none    QRS:     QRS axis:  Normal  Conduction:     Conduction: normal    ST segments:     ST segments:  Non-specific  T waves:     T waves: non-specific      Central Line  Performed by: Nieves Swift by: Carolina Hernandez     Patient location:  ED  Consent:     Consent obtained:  Verbal    Consent given by:  Patient    Alternatives discussed:  No treatment  Universal protocol:     Patient identity confirmed:  Verbally with patient and arm band  Indications:     Central line indications: medications requiring central line and hemodynamic monitoring    Anesthesia (see MAR for exact dosages): Anesthesia method:  Local infiltration    Local anesthetic:  Lidocaine 1% WITH epi  Procedure details:     Location:  Right femoral    Vessel type: vein      Laterality:  Right    Approach: open technique used      Patient position:  Trendelenburg    Catheter type:  Triple lumen 16cm    Catheter size:  4 5 Fr    Landmarks identified: yes      Ultrasound guidance: yes      Sterile ultrasound techniques: Sterile gel and sterile probe covers were used      Number of attempts:  2  Post-procedure details:     Post-procedure:  Dressing applied    Assessment:  Blood return through all ports    Patient tolerance of procedure: Tolerated well, no immediate complications    Observer: Yes             Phone Contacts  ED Phone Contact    ED Course                         Initial Sepsis Screening     Row Name 10/25/18 1836 10/25/18 1095             Is the patient's history suggestive of a new or worsening infection?  (!)  Yes (Proceed)  -SA       Suspected source of infection   wound infection  -SA       Are two or more of the following signs & symptoms of infection both present and new to the patient?  No  -SA       Indicate SIRS criteria   WBC > 10% bands;Leukocytosis (WBC > 45299 IJL); Tachycardia > 90 bpm  -SA       If the answer is yes to both questions, suspicion of sepsis is present  [de-identified]         If severe sepsis is present AND tissue hypoperfusion perists in the hour after fluid resuscitation or lactate > 4, the patient meets criteria for SEPTIC SHOCK           Are any of the following organ dysfunction criteria present within 6 hours of suspected infection and SIRS criteria that are NOT considered to be chronic conditions?  (!)  Yes  -SA       Organ dysfunction   Lactate > 2 0 mmol/L  -SA       Date of presentation of severe sepsis 10/25/18  -CR         Time of presentation of severe sepsis 1530  -CR         Tissue hypoperfusion persists in the hour after crystalloid fluid administration, evidenced, by either: SBP < 90 mm/Hg ( ___ mm/Hg in comment field)  -CR         Was hypotension present within one hour of the conclusion of crystalloid fluid administration? Yes  -CR Yes  -SA       Date of presentation of septic shock 10/25/18  -CR         Time of presentation of septic shock 1730  -CR           User Key  (r) = Recorded By, (t) = Taken By, (c) = Cosigned By    Initials Name Provider Type    SA Claritza Hampton DO Physician    GAYATRI Ortiz PA-C Physician Assistant           Default Flowsheet Data (last 720 hours)      Sepsis Reassess     Row Name 10/25/18 1800                   Repeat Volume Status and Tissue Perfusion Assessment Performed    Repeat Volume Status and Tissue Perfusion Assessment Performed Yes  -CR           Volume Status and Tissue Perfusion Post Fluid Resuscitation- Must Document 5 of the Following 7:    Vital Signs Reviewed (HR, RR, BP, T) Yes  -CR        Arterial Oxygen Saturation Reviewed (POx, SaO2 or SpO2) Yes (comment %)   99%  -CR        Cardio Normal S1/S2; Regular rate and rhythm; No murmor  -CR        Pulmonary Normal effort;Clear to auscultation  -CR        Capillary Refill Brisk  -CR        Peripheral Pulses Radial  -CR        Peripheral Pulse +3  -CR        Skin Warm  -CR        Urine output assessed Decreased  -CR           *OR*   Intensive Monitoring- Must Document One of the Following Four *:    Vital Signs Reviewed          * Central Venous Pressure (CVP or RAP)          * Central Venous Oxygen (SVO2, ScvO2 or Oxygen saturation via central catheter)          * Bedside Cardiovascular US in IVC diameter and % collapse          * Passive Leg Raise OR Crystalloid Challenge            User Key  (r) = Recorded By, (t) = Taken By, (c) = Cosigned By    Initials Name Provider Type    GAYATRI Lomax PA-C Physician Assistant                MDM  Number of Diagnoses or Management Options  Cellulitis:   DKA (diabetic ketoacidoses) ([facility] ):   Sepsis ([facility] ):   Shingles:   Diagnosis management comments: Patient evaluated with labs, imaging, UA  Reviewed results discussed with patient  Patient given IV fluids, pain medications,  IV acyclovir and IV antibiotics  Central line placed and started patient on levophed  Given insulin drip  Patient admitted to ICU service for further evaluation and management  Patient verbalized understanding of results and agreed with the plan  Family updated with ED course  Amount and/or Complexity of Data Reviewed  Clinical lab tests: reviewed and ordered  Tests in the radiology section of CPT®: reviewed and ordered  Tests in the medicine section of CPT®: ordered and reviewed    Patient Progress  Patient progress: stable    The patient presented with a condition in which there was a high probability of imminent or life-threatening deterioration, and critical care services (excluding separately billable procedures) totalled > 105 minutes          Disposition  Final diagnoses:   DKA (diabetic ketoacidoses) ([facility] )   Sepsis ([facility] )   Cellulitis   Shingles     Time reflects when diagnosis was documented in both MDM as applicable and the Disposition within this note     Time User Action Codes Description Comment    10/25/2018  4:44 PM Anepu, Lisette Add [E13 10] DKA (diabetic ketoacidoses) ([facility] )     10/25/2018  4:44 PM Anepu, Lisette Add [A41 9] Sepsis ([facility] )     10/25/2018  4:44 PM Anepu, Lisette Add [L03 90] Cellulitis     10/25/2018  4:44 PM Anepu, Judith Base Add [B02 9] Shingles       ED Disposition     ED Disposition Condition Comment    Admit Follow-up Information    None         Current Discharge Medication List      CONTINUE these medications which have NOT CHANGED    Details   aspirin 81 mg chewable tablet Chew 81 mg daily      atorvastatin (LIPITOR) 10 mg tablet Take 10 mg by mouth daily      docusate sodium (COLACE) 100 mg capsule Take 100 mg by mouth 2 (two) times a day      ferrous sulfate 325 (65 Fe) mg tablet Take 325 mg by mouth 3 (three) times a day with meals      glimepiride (AMARYL) 4 mg tablet Take 4 mg by mouth 2 (two) times a day with meals      insulin detemir (LEVEMIR) 100 units/mL subcutaneous injection Inject 20 Units under the skin daily at bedtime      lisinopril (ZESTRIL) 2 5 mg tablet Take 2 5 mg by mouth daily      metoprolol succinate (TOPROL-XL) 25 mg 24 hr tablet Take 1 tablet (25 mg total) by mouth daily  Qty: 30 tablet, Refills: 0    Associated Diagnoses: Cardiomyopathy, ischemic      spironolactone (ALDACTONE) 25 mg tablet Take 0 5 tablets (12 5 mg total) by mouth daily  Qty: 30 tablet, Refills: 0    Associated Diagnoses: Cardiomyopathy, ischemic      torsemide (DEMADEX) 10 mg tablet Take 1 tablet (10 mg total) by mouth daily  Refills: 0    Associated Diagnoses: Cardiomyopathy, ischemic           No discharge procedures on file      ED Provider  Electronically Signed by           Jorge Purvis DO  10/25/18 9069

## 2018-10-25 NOTE — H&P
History and Physical - Critical Care  Angélica Aguero [de-identified] y o  male MRN: [id number]  Unit/Bed#: ED 08 Encounter: [id number]     Reason for Admission / Chief Complaint:  Fall     History of Present Illness:  Angélica Aguero is a [de-identified] y o  male with a past medical history of coronary artery disease status post CABG with recent nuclear stress test with showed moderate-sized defect with small amount of skin me a which is being followed as an outpatient by Cardiology, hypertension, hyperlipidemia, diabetes, chronic kidney disease who presents after being found down after a fall  Per conversation with Dr Brian Landa, patient states he has been having a rash for the last 2 weeks has been feeling generally unwell  Eb Calix yesterday and was unable to get up and was found today  In the emergency department he was found to have a leukocytosis, acute kidney injury, lactic acidosis, and hyperglycemia consistent with diabetic ketoacidosis  Patient is actively being fluid resuscitated and treated for septic shock secondary to a cellulitis  He will be admitted to the intensive care unit for further monitoring  History obtained from chart review and the patient  Past Medical History:  Past Medical History:   Diagnosis Date    CHF (congestive heart failure) ([address] )     [facility]     History of open heart surgery     Hyperlipidemia     Hypertension         Past Surgical History:  Past Surgical History:   Procedure Laterality Date    CARDIAC SURGERY          Past Family History:  History reviewed  No pertinent family history       Social History:  History   Smoking Status    Former Smoker    Types: Pipe   Smokeless Tobacco    Never Used     History   Alcohol Use No     History   Drug Use No     Marital Status: Single       Medications:  Current Facility-Administered Medications   Medication Dose Route Frequency    acyclovir (ZOVIRAX) 700 mg in sodium chloride 0 9 % 100 mL IVPB  10 mg/kg (Ideal) Intravenous Once  insulin regular (HumuLIN R,NovoLIN R) 1 Units/mL in sodium chloride 0 9 % 100 mL infusion  7 Units/hr Intravenous Continuous    lidocaine-epinephrine (XYLOCAINE/EPINEPHRINE) 2 %-1:100,000 injection 10 mL  10 mL Infiltration Once    norepinephrine (LEVOPHED) 4 mg (STANDARD CONCENTRATION) IV in sodium chloride 0 9% 250 mL  1-30 mcg/min Intravenous Titrated    sodium chloride 0 9 % bolus 1,000 mL  1,000 mL Intravenous Once     Home medications:  Prior to Admission medications    Medication Sig Start Date End Date Taking? Authorizing Provider   aspirin 81 mg chewable tablet Chew 81 mg daily    Historical Provider, MD   atorvastatin (LIPITOR) 10 mg tablet Take 10 mg by mouth daily    Historical Provider, MD   docusate sodium (COLACE) 100 mg capsule Take 100 mg by mouth 2 (two) times a day    Historical Provider, MD   ferrous sulfate 325 (65 Fe) mg tablet Take 325 mg by mouth 3 (three) times a day with meals    Historical Provider, MD   glimepiride (AMARYL) 4 mg tablet Take 4 mg by mouth 2 (two) times a day with meals    Historical Provider, MD   insulin detemir (LEVEMIR) 100 units/mL subcutaneous injection Inject 20 Units under the skin daily at bedtime    Historical Provider, MD   lisinopril (ZESTRIL) 2 5 mg tablet Take 2 5 mg by mouth daily    Historical Provider, MD   metoprolol succinate (TOPROL-XL) 25 mg 24 hr tablet Take 1 tablet (25 mg total) by mouth daily 5/17/18   Luiza Jaramillo MD   spironolactone (ALDACTONE) 25 mg tablet Take 0 5 tablets (12 5 mg total) by mouth daily 5/17/18   Luiza Jaramillo MD   torsemide (DEMADEX) 10 mg tablet Take 1 tablet (10 mg total) by mouth daily 5/16/18   Luiza Jaramillo MD     Allergies:  No Known Allergies     ROS:   Review of Systems   Reason unable to perform ROS: I did not physically examine patient as he is a high risk for shingles and for my personal safety          Vitals:  Vitals:    10/25/18 1612 10/25/18 1700 10/25/18 1730 10/25/18 1745   BP: (!) 87/53 99/56 (!) 87/49 (!) 79/45   Pulse: (!) 106 104 (!) 106 (!) 108   Resp: 20 20 21 19   Temp:       SpO2: 98% 98%  95%   Weight:       Height:         Temperature:   Temp (24hrs), Av 4 °F (35 8 °C), Min:96 4 °F (35 8 °C), Max:96 4 °F (35 8 °C)    Current: Temperature: (!) 96 4 °F (35 8 °C)     Weights:   IBW: 69 55 kg  Body mass index is 23 97 kg/m²  Hemodynamic Monitoring:  N/A     Non-Invasive/Invasive Ventilation Settings:  Respiratory    Lab Data (Last 4 hours)    None         O2/Vent Data (Last 4 hours)    None              No results found for: PHART, FGF9NRV, PO2ART, UBE7DJN, E6BBLTTP, BEART, SOURCE  SpO2: SpO2: 95 %, SpO2 Activity:       Physical Exam:  Physical Exam   Nursing note and vitals reviewed  I did not physically examine patient myself as he is a high risk for shingles and for my personal safety     Labs:    Results from last 7 days  Lab Units 10/25/18  1534   WBC Thousand/uL 22 09*   HEMOGLOBIN g/dL 11 8*   HEMATOCRIT % 37 3   PLATELETS Thousands/uL 404*   MONO PCT MAN % 8      Results from last 7 days  Lab Units 10/25/18  1534   SODIUM mmol/L 111*   POTASSIUM mmol/L 5 4*   CHLORIDE mmol/L 75*   CO2 mmol/L 14*   BUN mg/dL 118*   CREATININE mg/dL 3 10*   CALCIUM mg/dL 8 8   ALK PHOS U/L 124*   ALT U/L 17   AST U/L 11       Results from last 7 days  Lab Units 10/25/18  1534   MAGNESIUM mg/dL 3 0*                Results from last 7 days  Lab Units 10/25/18  1730   LACTIC ACID mmol/L 2 0       0  Lab Value Date/Time   TROPONINI <0 02 10/25/2018 1534   TROPONINI <0 02 2018 1927        Imaging: CXR, CT spine I have personally reviewed pertinent reports  EKG: This was personally reviewed by myself     Micro:  Lab Results   Component Value Date    URINECX >100,000 cfu/ml Klebsiella oxytoca (A) 2018       Assessment/Plan:     * Septic shock ([address] )   Assessment & Plan    · Source likely presumed cellulitis  · Patient has received greater than 30 mL/kg, if remains hypotensive, will require central line and vasopressors  · Given concomitant DKA, will likely require greater than 30 mL/kg fluid resuscitation  · Patient received cefepime, vancomycin, and acyclovir  Will continue  · Blood cultures in process  · Wound culture in process  · Trend lactic acid     Shingles   Assessment & Plan    · Continue acyclovir     Lactic acid acidosis   Assessment & Plan    · Secondary to septic shock and DKA, trend     CHRIS (acute kidney injury) ([facility] )   Assessment & Plan    · Likely secondary to septic shock and DKA  · Continue fluid resuscitation and monitor urine output  · May require Gordon catheter     DKA (diabetic ketoacidoses) [facility])   Assessment & Plan    Lab Results   Component Value Date    HGBA1C 11 8 (H) 05/15/2018       Recent Labs      10/25/18   1503   POCGLU  >500*       Blood Sugar Average: Last 72 hrs:  (P) 0     · Glucose found to be greater than 1000  · Acetone present and blood, acidosis present on VBG  · Continue fluid resuscitation and insulin infusion  · Monitor BMP for gap closure and electrolytes     Urinary tract infection   Assessment & Plan    · Urinalysis concerning for possible urinary tract infection  Urine culture in process  Continue antibiotics     Hyponatremia   Assessment & Plan    · Corrected sodium 125  · Continue follow closely with fluid resuscitation  · Likely hypovolemic hyponatremia in the setting of dehydration, sepsis, and DKA     [facility]   Assessment & Plan    · History of AFib in the setting of electrolyte abnormalities in the past   Monitor on telemetry     Cardiomyopathy, ischemic   Assessment & Plan    · EF 35 to 40%  Recent stress test in May of 2018 showed reversible ischemic changes  Cardiology following as an outpatient with medical management       DM2 (diabetes mellitus, type 2) [facility])   Assessment & Plan    Lab Results   Component Value Date    HGBA1C 11 8 (H) 05/15/2018       Recent Labs      10/25/18   1503   POCGLU  >500* · Managing DKA as above    Blood Sugar Average: Last 72 hrs:  (P) 0                        Disposition: Admit to ICU     VTE Pharmacologic Prophylaxis: Heparin  VTE Mechanical Prophylaxis: sequential compression device     Invasive lines and devices: Invasive Devices     Peripheral Intravenous Line            Peripheral IV 10/25/18 Left Antecubital less than 1 day    Peripheral IV 10/25/18 Right Hand less than 1 day                 Code Status: Prior  POA:    POLST:       Given critical illness, patient length of stay will require greater than two midnights  Counseling / Coordination of Care  Total Critical Care time spent 52 minutes excluding procedures, teaching and family updates  Portions of the record may have been created with voice recognition software  Occasional wrong word or "sound a like" substitutions may have occurred due to the inherent limitations of voice recognition software  Read the chart carefully and recognize, using context, where substitutions have occurred          Marcellus Richter PA-C

## 2018-10-26 ENCOUNTER — APPOINTMENT (INPATIENT)
Dept: RADIOLOGY | Facility: HOSPITAL | Age: 78
DRG: 853 | End: 2018-10-26
Payer: MEDICARE

## 2018-10-26 ENCOUNTER — APPOINTMENT (INPATIENT)
Dept: NON INVASIVE DIAGNOSTICS | Facility: HOSPITAL | Age: 78
DRG: 853 | End: 2018-10-26
Payer: MEDICARE

## 2018-10-26 ENCOUNTER — ANESTHESIA EVENT (INPATIENT)
Dept: PERIOP | Facility: HOSPITAL | Age: 78
DRG: 853 | End: 2018-10-26
Payer: MEDICARE

## 2018-10-26 ENCOUNTER — ANESTHESIA (INPATIENT)
Dept: PERIOP | Facility: HOSPITAL | Age: 78
DRG: 853 | End: 2018-10-26
Payer: MEDICARE

## 2018-10-26 PROBLEM — N30.01 ACUTE CYSTITIS WITH HEMATURIA: Status: ACTIVE | Noted: 2018-10-26

## 2018-10-26 PROBLEM — E87.2 LACTIC ACID ACIDOSIS: Status: RESOLVED | Noted: 2018-10-25 | Resolved: 2018-10-26

## 2018-10-26 PROBLEM — R53.83 LETHARGY: Status: ACTIVE | Noted: 2018-10-26

## 2018-10-26 PROBLEM — L03.90 NECROTIZING CELLULITIS: Status: ACTIVE | Noted: 2018-10-25

## 2018-10-26 PROBLEM — L02.213 ABSCESS OF CHEST WALL: Status: ACTIVE | Noted: 2018-10-25

## 2018-10-26 PROBLEM — A41.9 SEPSIS (HCC): Status: RESOLVED | Noted: 2018-10-25 | Resolved: 2018-10-26

## 2018-10-26 PROBLEM — J96.00 ACUTE RESPIRATORY FAILURE (HCC): Status: ACTIVE | Noted: 2018-10-26

## 2018-10-26 PROBLEM — J18.9 PNEUMONIA DUE TO INFECTIOUS ORGANISM: Status: ACTIVE | Noted: 2018-10-26

## 2018-10-26 PROBLEM — E11.10 DKA (DIABETIC KETOACIDOSES): Status: RESOLVED | Noted: 2018-10-25 | Resolved: 2018-10-26

## 2018-10-26 PROBLEM — A41.9 SEPSIS (HCC): Status: ACTIVE | Noted: 2018-10-25

## 2018-10-26 LAB
ABO GROUP BLD: NORMAL
ALBUMIN SERPL BCP-MCNC: 1.7 G/DL (ref 3.5–5)
ALBUMIN SERPL BCP-MCNC: 1.8 G/DL (ref 3.5–5)
ALBUMIN SERPL BCP-MCNC: 1.9 G/DL (ref 3.5–5)
ALBUMIN SERPL BCP-MCNC: 2.1 G/DL (ref 3.5–5)
ALBUMIN SERPL BCP-MCNC: 2.1 G/DL (ref 3.5–5)
ALBUMIN SERPL BCP-MCNC: 2.2 G/DL (ref 3.5–5)
ALP SERPL-CCNC: 67 U/L (ref 46–116)
ALP SERPL-CCNC: 68 U/L (ref 46–116)
ALT SERPL W P-5'-P-CCNC: 12 U/L (ref 12–78)
ALT SERPL W P-5'-P-CCNC: 12 U/L (ref 12–78)
AMMONIA PLAS-SCNC: 19 UMOL/L (ref 11–35)
ANION GAP SERPL CALCULATED.3IONS-SCNC: 10 MMOL/L (ref 4–13)
ANION GAP SERPL CALCULATED.3IONS-SCNC: 11 MMOL/L (ref 4–13)
ANION GAP SERPL CALCULATED.3IONS-SCNC: 11 MMOL/L (ref 4–13)
ANION GAP SERPL CALCULATED.3IONS-SCNC: 15 MMOL/L (ref 4–13)
ANION GAP SERPL CALCULATED.3IONS-SCNC: 8 MMOL/L (ref 4–13)
ANION GAP SERPL CALCULATED.3IONS-SCNC: 9 MMOL/L (ref 4–13)
APTT PPP: 27 SECONDS (ref 24–36)
ARTERIAL PATENCY WRIST A: YES
AST SERPL W P-5'-P-CCNC: 14 U/L (ref 5–45)
AST SERPL W P-5'-P-CCNC: 15 U/L (ref 5–45)
ATRIAL RATE: 109 BPM
BACTERIA UR CULT: NORMAL
BASE EX.OXY STD BLDV CALC-SCNC: 76 % (ref 60–80)
BASE EX.OXY STD BLDV CALC-SCNC: 97.3 % (ref 60–80)
BASE EXCESS BLDA CALC-SCNC: -2 MMOL/L
BASE EXCESS BLDA CALC-SCNC: -2 MMOL/L
BASE EXCESS BLDA CALC-SCNC: -2.2 MMOL/L
BASE EXCESS BLDA CALC-SCNC: -4.4 MMOL/L
BASE EXCESS BLDA CALC-SCNC: 0 MMOL/L (ref -2–3)
BASE EXCESS BLDA CALC-SCNC: 0 MMOL/L (ref -2–3)
BASE EXCESS BLDV CALC-SCNC: -3.3 MMOL/L
BASE EXCESS BLDV CALC-SCNC: -5.7 MMOL/L
BASOPHILS # BLD AUTO: 0.05 THOUSANDS/ΜL (ref 0–0.1)
BASOPHILS # BLD MANUAL: 0 THOUSAND/UL (ref 0–0.1)
BASOPHILS NFR BLD AUTO: 0 % (ref 0–1)
BASOPHILS NFR MAR MANUAL: 0 % (ref 0–1)
BILIRUB SERPL-MCNC: 0.4 MG/DL (ref 0.2–1)
BILIRUB SERPL-MCNC: 0.5 MG/DL (ref 0.2–1)
BLD GP AB SCN SERPL QL: NEGATIVE
BODY TEMPERATURE: 96.8 DEGREES FEHRENHEIT
BODY TEMPERATURE: 97.1 DEGREES FEHRENHEIT
BODY TEMPERATURE: 97.1 DEGREES FEHRENHEIT
BODY TEMPERATURE: 97.7 DEGREES FEHRENHEIT
BODY TEMPERATURE: 97.8 DEGREES FEHRENHEIT
BUN SERPL-MCNC: 55 MG/DL (ref 5–25)
BUN SERPL-MCNC: 60 MG/DL (ref 5–25)
BUN SERPL-MCNC: 66 MG/DL (ref 5–25)
BUN SERPL-MCNC: 83 MG/DL (ref 5–25)
BUN SERPL-MCNC: 84 MG/DL (ref 5–25)
BUN SERPL-MCNC: 99 MG/DL (ref 5–25)
CA-I BLD-SCNC: 1.2 MMOL/L (ref 1.12–1.32)
CA-I BLD-SCNC: 1.23 MMOL/L (ref 1.12–1.32)
CALCIUM SERPL-MCNC: 7.8 MG/DL (ref 8.3–10.1)
CALCIUM SERPL-MCNC: 7.8 MG/DL (ref 8.3–10.1)
CALCIUM SERPL-MCNC: 8.2 MG/DL (ref 8.3–10.1)
CALCIUM SERPL-MCNC: 8.3 MG/DL (ref 8.3–10.1)
CALCIUM SERPL-MCNC: 8.3 MG/DL (ref 8.3–10.1)
CALCIUM SERPL-MCNC: 8.4 MG/DL (ref 8.3–10.1)
CHLORIDE SERPL-SCNC: 101 MMOL/L (ref 100–108)
CHLORIDE SERPL-SCNC: 94 MMOL/L (ref 100–108)
CHLORIDE SERPL-SCNC: 98 MMOL/L (ref 100–108)
CHLORIDE SERPL-SCNC: 98 MMOL/L (ref 100–108)
CK SERPL-CCNC: 112 U/L (ref 39–308)
CO2 SERPL-SCNC: 17 MMOL/L (ref 21–32)
CO2 SERPL-SCNC: 21 MMOL/L (ref 21–32)
CO2 SERPL-SCNC: 22 MMOL/L (ref 21–32)
CO2 SERPL-SCNC: 23 MMOL/L (ref 21–32)
CREAT SERPL-MCNC: 1.3 MG/DL (ref 0.6–1.3)
CREAT SERPL-MCNC: 1.42 MG/DL (ref 0.6–1.3)
CREAT SERPL-MCNC: 1.49 MG/DL (ref 0.6–1.3)
CREAT SERPL-MCNC: 1.87 MG/DL (ref 0.6–1.3)
CREAT SERPL-MCNC: 1.88 MG/DL (ref 0.6–1.3)
CREAT SERPL-MCNC: 2.4 MG/DL (ref 0.6–1.3)
EOSINOPHIL # BLD AUTO: 0.01 THOUSAND/ΜL (ref 0–0.61)
EOSINOPHIL # BLD MANUAL: 0 THOUSAND/UL (ref 0–0.4)
EOSINOPHIL NFR BLD AUTO: 0 % (ref 0–6)
EOSINOPHIL NFR BLD MANUAL: 0 % (ref 0–6)
ERYTHROCYTE [DISTWIDTH] IN BLOOD BY AUTOMATED COUNT: 12.4 % (ref 11.6–15.1)
ERYTHROCYTE [DISTWIDTH] IN BLOOD BY AUTOMATED COUNT: 12.6 % (ref 11.6–15.1)
EST. AVERAGE GLUCOSE BLD GHB EST-MCNC: 298 MG/DL
GFR SERPL CREATININE-BSD FRML MDRD: 25 ML/MIN/1.73SQ M
GFR SERPL CREATININE-BSD FRML MDRD: 33 ML/MIN/1.73SQ M
GFR SERPL CREATININE-BSD FRML MDRD: 33 ML/MIN/1.73SQ M
GFR SERPL CREATININE-BSD FRML MDRD: 44 ML/MIN/1.73SQ M
GFR SERPL CREATININE-BSD FRML MDRD: 46 ML/MIN/1.73SQ M
GFR SERPL CREATININE-BSD FRML MDRD: 52 ML/MIN/1.73SQ M
GLUCOSE SERPL-MCNC: 104 MG/DL (ref 65–140)
GLUCOSE SERPL-MCNC: 107 MG/DL (ref 65–140)
GLUCOSE SERPL-MCNC: 115 MG/DL (ref 65–140)
GLUCOSE SERPL-MCNC: 120 MG/DL (ref 65–140)
GLUCOSE SERPL-MCNC: 131 MG/DL (ref 65–140)
GLUCOSE SERPL-MCNC: 136 MG/DL (ref 65–140)
GLUCOSE SERPL-MCNC: 136 MG/DL (ref 65–140)
GLUCOSE SERPL-MCNC: 158 MG/DL (ref 65–140)
GLUCOSE SERPL-MCNC: 177 MG/DL (ref 65–140)
GLUCOSE SERPL-MCNC: 182 MG/DL (ref 65–140)
GLUCOSE SERPL-MCNC: 192 MG/DL (ref 65–140)
GLUCOSE SERPL-MCNC: 230 MG/DL (ref 65–140)
GLUCOSE SERPL-MCNC: 242 MG/DL (ref 65–140)
GLUCOSE SERPL-MCNC: 260 MG/DL (ref 65–140)
GLUCOSE SERPL-MCNC: 260 MG/DL (ref 65–140)
GLUCOSE SERPL-MCNC: 289 MG/DL (ref 65–140)
GLUCOSE SERPL-MCNC: 400 MG/DL (ref 65–140)
GLUCOSE SERPL-MCNC: 422 MG/DL (ref 65–140)
GLUCOSE SERPL-MCNC: 92 MG/DL (ref 65–140)
HBA1C MFR BLD: 12 % (ref 4.2–6.3)
HCO3 BLDA-SCNC: 19.7 MMOL/L (ref 22–28)
HCO3 BLDA-SCNC: 20.1 MMOL/L (ref 22–28)
HCO3 BLDA-SCNC: 21.7 MMOL/L (ref 22–28)
HCO3 BLDA-SCNC: 22.3 MMOL/L (ref 22–28)
HCO3 BLDA-SCNC: 24.8 MMOL/L (ref 22–28)
HCO3 BLDA-SCNC: 25 MMOL/L (ref 22–28)
HCO3 BLDV-SCNC: 19.2 MMOL/L (ref 24–30)
HCO3 BLDV-SCNC: 20.2 MMOL/L (ref 24–30)
HCT VFR BLD AUTO: 25.8 % (ref 36.5–49.3)
HCT VFR BLD AUTO: 26.4 % (ref 36.5–49.3)
HCT VFR BLD CALC: 23 % (ref 36.5–49.3)
HCT VFR BLD CALC: 26 % (ref 36.5–49.3)
HGB BLD-MCNC: 9.3 G/DL (ref 12–17)
HGB BLD-MCNC: 9.4 G/DL (ref 12–17)
HGB BLDA-MCNC: 7.8 G/DL (ref 12–17)
HGB BLDA-MCNC: 8.8 G/DL (ref 12–17)
HOROWITZ INDEX BLDA+IHG-RTO: 100 MM[HG]
HOROWITZ INDEX BLDA+IHG-RTO: 40 MM[HG]
IMM GRANULOCYTES # BLD AUTO: 0.3 THOUSAND/UL (ref 0–0.2)
IMM GRANULOCYTES NFR BLD AUTO: 2 % (ref 0–2)
INR PPP: 1.09 (ref 0.86–1.16)
LACTATE SERPL-SCNC: 1.8 MMOL/L (ref 0.5–2)
LACTATE SERPL-SCNC: 2 MMOL/L (ref 0.5–2)
LACTATE SERPL-SCNC: 3.3 MMOL/L (ref 0.5–2)
LACTATE SERPL-SCNC: 5.2 MMOL/L (ref 0.5–2)
LYMPHOCYTES # BLD AUTO: 0.35 THOUSAND/UL (ref 0.6–4.47)
LYMPHOCYTES # BLD AUTO: 0.84 THOUSANDS/ΜL (ref 0.6–4.47)
LYMPHOCYTES # BLD AUTO: 2 % (ref 14–44)
LYMPHOCYTES NFR BLD AUTO: 5 % (ref 14–44)
MACROCYTES BLD QL AUTO: PRESENT
MAGNESIUM SERPL-MCNC: 2.1 MG/DL (ref 1.6–2.6)
MAGNESIUM SERPL-MCNC: 2.3 MG/DL (ref 1.6–2.6)
MAGNESIUM SERPL-MCNC: 2.3 MG/DL (ref 1.6–2.6)
MCH RBC QN AUTO: 35.6 PG (ref 26.8–34.3)
MCH RBC QN AUTO: 35.7 PG (ref 26.8–34.3)
MCHC RBC AUTO-ENTMCNC: 35.6 G/DL (ref 31.4–37.4)
MCHC RBC AUTO-ENTMCNC: 36 G/DL (ref 31.4–37.4)
MCV RBC AUTO: 100 FL (ref 82–98)
MCV RBC AUTO: 99 FL (ref 82–98)
MONOCYTES # BLD AUTO: 0.77 THOUSAND/ΜL (ref 0.17–1.22)
MONOCYTES # BLD AUTO: 0.89 THOUSAND/UL (ref 0–1.22)
MONOCYTES NFR BLD AUTO: 4 % (ref 4–12)
MONOCYTES NFR BLD: 5 % (ref 4–12)
NASAL CANNULA: 3
NASAL CANNULA: 3
NEUTROPHILS # BLD AUTO: 15.74 THOUSANDS/ΜL (ref 1.85–7.62)
NEUTROPHILS # BLD MANUAL: 16.47 THOUSAND/UL (ref 1.85–7.62)
NEUTS BAND NFR BLD MANUAL: 28 % (ref 0–8)
NEUTS SEG NFR BLD AUTO: 65 % (ref 43–75)
NEUTS SEG NFR BLD AUTO: 89 % (ref 43–75)
NRBC BLD AUTO-RTO: 0 /100 WBCS
NRBC BLD AUTO-RTO: 0 /100 WBCS
O2 CT BLDA-SCNC: 11.7 ML/DL (ref 16–23)
O2 CT BLDA-SCNC: 13.2 ML/DL (ref 16–23)
O2 CT BLDA-SCNC: 14.7 ML/DL (ref 16–23)
O2 CT BLDA-SCNC: 14.9 ML/DL (ref 16–23)
O2 CT BLDV-SCNC: 11.3 ML/DL
O2 CT BLDV-SCNC: 13.9 ML/DL
OSMOLALITY UR/SERPL-RTO: 341 MMOL/KG (ref 282–298)
OSMOLALITY UR: 476 MMOL/KG
OXYHGB MFR BLDA: 95.1 % (ref 94–97)
OXYHGB MFR BLDA: 95.4 % (ref 94–97)
OXYHGB MFR BLDA: 98.3 % (ref 94–97)
OXYHGB MFR BLDA: 98.4 % (ref 94–97)
P AXIS: 68 DEGREES
PCO2 BLD: 26 MMOL/L (ref 21–32)
PCO2 BLD: 26 MMOL/L (ref 21–32)
PCO2 BLD: 42.4 MM HG (ref 36–44)
PCO2 BLD: 43 MM HG (ref 36–44)
PCO2 BLDA: 25.6 MM HG (ref 36–44)
PCO2 BLDA: 32.6 MM HG (ref 36–44)
PCO2 BLDA: 32.6 MM HG (ref 36–44)
PCO2 BLDA: 37.5 MM HG (ref 36–44)
PCO2 BLDV: 30.9 MM HG (ref 42–50)
PCO2 BLDV: 35.4 MM HG (ref 42–50)
PCO2 TEMP ADJ BLDA: 25 MM HG (ref 36–44)
PCO2 TEMP ADJ BLDA: 31.5 MM HG (ref 36–44)
PCO2 TEMP ADJ BLDA: 32 MM HG (ref 36–44)
PCO2 TEMP ADJ BLDA: 35.9 MM HG (ref 36–44)
PEEP RESPIRATORY: 5 CM[H2O]
PEEP RESPIRATORY: 5 CM[H2O]
PH BLD: 7.37 [PH] (ref 7.35–7.45)
PH BLD: 7.38 [PH] (ref 7.35–7.45)
PH BLD: 7.41 [PH] (ref 7.35–7.45)
PH BLD: 7.41 [PH] (ref 7.35–7.45)
PH BLD: 7.45 [PH] (ref 7.35–7.45)
PH BLD: 7.52 [PH] (ref 7.35–7.45)
PH BLDA: 7.39 [PH] (ref 7.35–7.45)
PH BLDA: 7.4 [PH] (ref 7.35–7.45)
PH BLDA: 7.44 [PH] (ref 7.35–7.45)
PH BLDA: 7.51 [PH] (ref 7.35–7.45)
PH BLDV: 7.35 [PH] (ref 7.3–7.4)
PH BLDV: 7.43 [PH] (ref 7.3–7.4)
PHOSPHATE SERPL-MCNC: 1.8 MG/DL (ref 2.3–4.1)
PHOSPHATE SERPL-MCNC: 1.9 MG/DL (ref 2.3–4.1)
PHOSPHATE SERPL-MCNC: 2.2 MG/DL (ref 2.3–4.1)
PHOSPHATE SERPL-MCNC: 2.3 MG/DL (ref 2.3–4.1)
PHOSPHATE SERPL-MCNC: 2.4 MG/DL (ref 2.3–4.1)
PHOSPHATE SERPL-MCNC: 2.9 MG/DL (ref 2.3–4.1)
PLATELET # BLD AUTO: 294 THOUSANDS/UL (ref 149–390)
PLATELET # BLD AUTO: 324 THOUSANDS/UL (ref 149–390)
PLATELET BLD QL SMEAR: ADEQUATE
PMV BLD AUTO: 10.4 FL (ref 8.9–12.7)
PMV BLD AUTO: 10.9 FL (ref 8.9–12.7)
PO2 BLD: 321.7 MM HG (ref 75–129)
PO2 BLD: 334 MM HG (ref 75–129)
PO2 BLD: 460.7 MM HG (ref 75–129)
PO2 BLD: 75.5 MM HG (ref 75–129)
PO2 BLD: 86.1 MM HG (ref 75–129)
PO2 BLD: >400 MM HG (ref 75–129)
PO2 BLDA: 324.2 MM HG (ref 75–129)
PO2 BLDA: 467 MM HG (ref 75–129)
PO2 BLDA: 79.6 MM HG (ref 75–129)
PO2 BLDA: 88.3 MM HG (ref 75–129)
PO2 BLDV: 108.9 MM HG (ref 35–45)
PO2 BLDV: 41.6 MM HG (ref 35–45)
POTASSIUM BLD-SCNC: 3.5 MMOL/L (ref 3.5–5.3)
POTASSIUM BLD-SCNC: 3.5 MMOL/L (ref 3.5–5.3)
POTASSIUM SERPL-SCNC: 3.4 MMOL/L (ref 3.5–5.3)
POTASSIUM SERPL-SCNC: 3.9 MMOL/L (ref 3.5–5.3)
POTASSIUM SERPL-SCNC: 4.1 MMOL/L (ref 3.5–5.3)
POTASSIUM SERPL-SCNC: 4.2 MMOL/L (ref 3.5–5.3)
PR INTERVAL: 196 MS
PROCALCITONIN SERPL-MCNC: 2.34 NG/ML
PROT SERPL-MCNC: 5.5 G/DL (ref 6.4–8.2)
PROT SERPL-MCNC: 5.6 G/DL (ref 6.4–8.2)
PROTHROMBIN TIME: 11.4 SECONDS (ref 9.4–11.7)
QRS AXIS: 57 DEGREES
QRSD INTERVAL: 118 MS
QT INTERVAL: 328 MS
QTC INTERVAL: 441 MS
RBC # BLD AUTO: 2.61 MILLION/UL (ref 3.88–5.62)
RBC # BLD AUTO: 2.63 MILLION/UL (ref 3.88–5.62)
RBC MORPH BLD: PRESENT
RH BLD: POSITIVE
SAO2 % BLD FROM PO2: 100 % (ref 95–98)
SODIUM BLD-SCNC: 134 MMOL/L (ref 136–145)
SODIUM BLD-SCNC: 134 MMOL/L (ref 136–145)
SODIUM SERPL-SCNC: 126 MMOL/L (ref 136–145)
SODIUM SERPL-SCNC: 130 MMOL/L (ref 136–145)
SODIUM SERPL-SCNC: 130 MMOL/L (ref 136–145)
SODIUM SERPL-SCNC: 132 MMOL/L (ref 136–145)
SODIUM SERPL-SCNC: 132 MMOL/L (ref 136–145)
SODIUM SERPL-SCNC: 134 MMOL/L (ref 136–145)
SPECIMEN EXPIRATION DATE: NORMAL
SPECIMEN SOURCE: ABNORMAL
T WAVE AXIS: 217 DEGREES
TOTAL CELLS COUNTED SPEC: 100
TOXIC GRANULES BLD QL SMEAR: PRESENT
VENT AC: 16
VENT AC: 16
VENT- AC: AC
VENT- AC: AC
VENTRICULAR RATE: 109 BPM
VT SETTING VENT: 450 ML
VT SETTING VENT: 450 ML
WBC # BLD AUTO: 17.71 THOUSAND/UL (ref 4.31–10.16)
WBC # BLD AUTO: 17.71 THOUSAND/UL (ref 4.31–10.16)
WBC TOXIC VACUOLES BLD QL SMEAR: PRESENT

## 2018-10-26 PROCEDURE — 99223 1ST HOSP IP/OBS HIGH 75: CPT | Performed by: SURGERY

## 2018-10-26 PROCEDURE — 94150 VITAL CAPACITY TEST: CPT

## 2018-10-26 PROCEDURE — 83735 ASSAY OF MAGNESIUM: CPT | Performed by: STUDENT IN AN ORGANIZED HEALTH CARE EDUCATION/TRAINING PROGRAM

## 2018-10-26 PROCEDURE — 87070 CULTURE OTHR SPECIMN AEROBIC: CPT | Performed by: SURGERY

## 2018-10-26 PROCEDURE — 82948 REAGENT STRIP/BLOOD GLUCOSE: CPT

## 2018-10-26 PROCEDURE — 85025 COMPLETE CBC W/AUTO DIFF WBC: CPT | Performed by: NURSE PRACTITIONER

## 2018-10-26 PROCEDURE — 82805 BLOOD GASES W/O2 SATURATION: CPT | Performed by: PHYSICIAN ASSISTANT

## 2018-10-26 PROCEDURE — 87205 SMEAR GRAM STAIN: CPT | Performed by: SURGERY

## 2018-10-26 PROCEDURE — 87186 SC STD MICRODIL/AGAR DIL: CPT | Performed by: SURGERY

## 2018-10-26 PROCEDURE — 83605 ASSAY OF LACTIC ACID: CPT | Performed by: PHYSICIAN ASSISTANT

## 2018-10-26 PROCEDURE — 85610 PROTHROMBIN TIME: CPT | Performed by: PHYSICIAN ASSISTANT

## 2018-10-26 PROCEDURE — 85027 COMPLETE CBC AUTOMATED: CPT | Performed by: PHYSICIAN ASSISTANT

## 2018-10-26 PROCEDURE — 36620 INSERTION CATHETER ARTERY: CPT | Performed by: NURSE PRACTITIONER

## 2018-10-26 PROCEDURE — 83735 ASSAY OF MAGNESIUM: CPT | Performed by: PHYSICIAN ASSISTANT

## 2018-10-26 PROCEDURE — 83605 ASSAY OF LACTIC ACID: CPT | Performed by: NURSE PRACTITIONER

## 2018-10-26 PROCEDURE — 85007 BL SMEAR W/DIFF WBC COUNT: CPT | Performed by: PHYSICIAN ASSISTANT

## 2018-10-26 PROCEDURE — 82805 BLOOD GASES W/O2 SATURATION: CPT | Performed by: STUDENT IN AN ORGANIZED HEALTH CARE EDUCATION/TRAINING PROGRAM

## 2018-10-26 PROCEDURE — 84100 ASSAY OF PHOSPHORUS: CPT | Performed by: PHYSICIAN ASSISTANT

## 2018-10-26 PROCEDURE — 87176 TISSUE HOMOGENIZATION CULTR: CPT | Performed by: SURGERY

## 2018-10-26 PROCEDURE — 80069 RENAL FUNCTION PANEL: CPT | Performed by: PHYSICIAN ASSISTANT

## 2018-10-26 PROCEDURE — 86900 BLOOD TYPING SEROLOGIC ABO: CPT | Performed by: PHYSICIAN ASSISTANT

## 2018-10-26 PROCEDURE — 80053 COMPREHEN METABOLIC PANEL: CPT | Performed by: NURSE PRACTITIONER

## 2018-10-26 PROCEDURE — 94003 VENT MGMT INPAT SUBQ DAY: CPT

## 2018-10-26 PROCEDURE — 93306 TTE W/DOPPLER COMPLETE: CPT | Performed by: INTERNAL MEDICINE

## 2018-10-26 PROCEDURE — 83036 HEMOGLOBIN GLYCOSYLATED A1C: CPT | Performed by: PHYSICIAN ASSISTANT

## 2018-10-26 PROCEDURE — 86920 COMPATIBILITY TEST SPIN: CPT

## 2018-10-26 PROCEDURE — 82330 ASSAY OF CALCIUM: CPT

## 2018-10-26 PROCEDURE — 82140 ASSAY OF AMMONIA: CPT | Performed by: PHYSICIAN ASSISTANT

## 2018-10-26 PROCEDURE — 84145 PROCALCITONIN (PCT): CPT | Performed by: PHYSICIAN ASSISTANT

## 2018-10-26 PROCEDURE — 84295 ASSAY OF SERUM SODIUM: CPT

## 2018-10-26 PROCEDURE — 80053 COMPREHEN METABOLIC PANEL: CPT | Performed by: PHYSICIAN ASSISTANT

## 2018-10-26 PROCEDURE — 93010 ELECTROCARDIOGRAM REPORT: CPT | Performed by: INTERNAL MEDICINE

## 2018-10-26 PROCEDURE — 87147 CULTURE TYPE IMMUNOLOGIC: CPT | Performed by: SURGERY

## 2018-10-26 PROCEDURE — 74176 CT ABD & PELVIS W/O CONTRAST: CPT

## 2018-10-26 PROCEDURE — 82550 ASSAY OF CK (CPK): CPT | Performed by: PHYSICIAN ASSISTANT

## 2018-10-26 PROCEDURE — 87075 CULTR BACTERIA EXCEPT BLOOD: CPT | Performed by: SURGERY

## 2018-10-26 PROCEDURE — 0JB60ZZ EXCISION OF CHEST SUBCUTANEOUS TISSUE AND FASCIA, OPEN APPROACH: ICD-10-PCS | Performed by: SURGERY

## 2018-10-26 PROCEDURE — 85014 HEMATOCRIT: CPT

## 2018-10-26 PROCEDURE — 0JBF0ZZ EXCISION OF LEFT UPPER ARM SUBCUTANEOUS TISSUE AND FASCIA, OPEN APPROACH: ICD-10-PCS | Performed by: SURGERY

## 2018-10-26 PROCEDURE — 86901 BLOOD TYPING SEROLOGIC RH(D): CPT | Performed by: PHYSICIAN ASSISTANT

## 2018-10-26 PROCEDURE — 11046 DBRDMT MUSC&/FSCA EA ADDL: CPT | Performed by: SURGERY

## 2018-10-26 PROCEDURE — 11043 DBRDMT MUSC&/FSCA 1ST 20/<: CPT | Performed by: SURGERY

## 2018-10-26 PROCEDURE — 36600 WITHDRAWAL OF ARTERIAL BLOOD: CPT

## 2018-10-26 PROCEDURE — 82947 ASSAY GLUCOSE BLOOD QUANT: CPT

## 2018-10-26 PROCEDURE — 86850 RBC ANTIBODY SCREEN: CPT | Performed by: PHYSICIAN ASSISTANT

## 2018-10-26 PROCEDURE — 82803 BLOOD GASES ANY COMBINATION: CPT

## 2018-10-26 PROCEDURE — 99291 CRITICAL CARE FIRST HOUR: CPT | Performed by: PHYSICIAN ASSISTANT

## 2018-10-26 PROCEDURE — 3E0G76Z INTRODUCTION OF NUTRITIONAL SUBSTANCE INTO UPPER GI, VIA NATURAL OR ARTIFICIAL OPENING: ICD-10-PCS | Performed by: STUDENT IN AN ORGANIZED HEALTH CARE EDUCATION/TRAINING PROGRAM

## 2018-10-26 PROCEDURE — 94002 VENT MGMT INPAT INIT DAY: CPT

## 2018-10-26 PROCEDURE — 70450 CT HEAD/BRAIN W/O DYE: CPT

## 2018-10-26 PROCEDURE — 71045 X-RAY EXAM CHEST 1 VIEW: CPT

## 2018-10-26 PROCEDURE — C8929 TTE W OR WO FOL WCON,DOPPLER: HCPCS

## 2018-10-26 PROCEDURE — 84132 ASSAY OF SERUM POTASSIUM: CPT

## 2018-10-26 PROCEDURE — 97606 NEG PRS WND THER DME>50 SQCM: CPT | Performed by: SURGERY

## 2018-10-26 PROCEDURE — 94760 N-INVAS EAR/PLS OXIMETRY 1: CPT

## 2018-10-26 PROCEDURE — 85730 THROMBOPLASTIN TIME PARTIAL: CPT | Performed by: PHYSICIAN ASSISTANT

## 2018-10-26 PROCEDURE — 03HY32Z INSERTION OF MONITORING DEVICE INTO UPPER ARTERY, PERCUTANEOUS APPROACH: ICD-10-PCS | Performed by: STUDENT IN AN ORGANIZED HEALTH CARE EDUCATION/TRAINING PROGRAM

## 2018-10-26 PROCEDURE — 82805 BLOOD GASES W/O2 SATURATION: CPT | Performed by: NURSE PRACTITIONER

## 2018-10-26 PROCEDURE — 71250 CT THORAX DX C-: CPT

## 2018-10-26 PROCEDURE — 84100 ASSAY OF PHOSPHORUS: CPT | Performed by: NURSE PRACTITIONER

## 2018-10-26 RX ORDER — POTASSIUM CHLORIDE 14.9 MG/ML
20 INJECTION INTRAVENOUS ONCE
Status: COMPLETED | OUTPATIENT
Start: 2018-10-26 | End: 2018-10-26

## 2018-10-26 RX ORDER — FENTANYL CITRATE 50 UG/ML
INJECTION, SOLUTION INTRAMUSCULAR; INTRAVENOUS AS NEEDED
Status: DISCONTINUED | OUTPATIENT
Start: 2018-10-26 | End: 2018-10-26 | Stop reason: SURG

## 2018-10-26 RX ORDER — CLINDAMYCIN PHOSPHATE 600 MG/50ML
600 INJECTION INTRAVENOUS EVERY 8 HOURS
Status: DISCONTINUED | OUTPATIENT
Start: 2018-10-26 | End: 2018-10-29

## 2018-10-26 RX ORDER — CHLORHEXIDINE GLUCONATE 0.12 MG/ML
15 RINSE ORAL EVERY 12 HOURS SCHEDULED
Status: DISCONTINUED | OUTPATIENT
Start: 2018-10-26 | End: 2018-10-27

## 2018-10-26 RX ORDER — SUCCINYLCHOLINE/SOD CL,ISO/PF 100 MG/5ML
100 SYRINGE (ML) INTRAVENOUS ONCE
Status: COMPLETED | OUTPATIENT
Start: 2018-10-26 | End: 2018-10-26

## 2018-10-26 RX ORDER — ONDANSETRON 2 MG/ML
INJECTION INTRAMUSCULAR; INTRAVENOUS AS NEEDED
Status: DISCONTINUED | OUTPATIENT
Start: 2018-10-26 | End: 2018-10-26 | Stop reason: SURG

## 2018-10-26 RX ORDER — POTASSIUM CHLORIDE 29.8 MG/ML
40 INJECTION INTRAVENOUS ONCE
Status: DISCONTINUED | OUTPATIENT
Start: 2018-10-26 | End: 2018-10-26 | Stop reason: SDUPTHER

## 2018-10-26 RX ORDER — SODIUM CHLORIDE 9 MG/ML
INJECTION, SOLUTION INTRAVENOUS CONTINUOUS PRN
Status: DISCONTINUED | OUTPATIENT
Start: 2018-10-26 | End: 2018-10-26 | Stop reason: SURG

## 2018-10-26 RX ORDER — SODIUM CHLORIDE, SODIUM GLUCONATE, SODIUM ACETATE, POTASSIUM CHLORIDE, MAGNESIUM CHLORIDE, SODIUM PHOSPHATE, DIBASIC, AND POTASSIUM PHOSPHATE .53; .5; .37; .037; .03; .012; .00082 G/100ML; G/100ML; G/100ML; G/100ML; G/100ML; G/100ML; G/100ML
1000 INJECTION, SOLUTION INTRAVENOUS ONCE
Status: COMPLETED | OUTPATIENT
Start: 2018-10-26 | End: 2018-10-26

## 2018-10-26 RX ORDER — ROCURONIUM BROMIDE 10 MG/ML
INJECTION, SOLUTION INTRAVENOUS AS NEEDED
Status: DISCONTINUED | OUTPATIENT
Start: 2018-10-26 | End: 2018-10-26 | Stop reason: SURG

## 2018-10-26 RX ORDER — ETOMIDATE 2 MG/ML
6 INJECTION INTRAVENOUS ONCE
Status: COMPLETED | OUTPATIENT
Start: 2018-10-26 | End: 2018-10-26

## 2018-10-26 RX ORDER — ALBUMIN, HUMAN INJ 5% 5 %
SOLUTION INTRAVENOUS CONTINUOUS PRN
Status: DISCONTINUED | OUTPATIENT
Start: 2018-10-26 | End: 2018-10-26 | Stop reason: SURG

## 2018-10-26 RX ORDER — MAGNESIUM HYDROXIDE 1200 MG/15ML
LIQUID ORAL AS NEEDED
Status: DISCONTINUED | OUTPATIENT
Start: 2018-10-26 | End: 2018-10-26 | Stop reason: HOSPADM

## 2018-10-26 RX ORDER — DEXTROSE, SODIUM CHLORIDE, SODIUM LACTATE, POTASSIUM CHLORIDE, AND CALCIUM CHLORIDE 5; .6; .31; .03; .02 G/100ML; G/100ML; G/100ML; G/100ML; G/100ML
125 INJECTION, SOLUTION INTRAVENOUS CONTINUOUS
Status: DISCONTINUED | OUTPATIENT
Start: 2018-10-26 | End: 2018-10-26

## 2018-10-26 RX ORDER — PROPOFOL 10 MG/ML
5-50 INJECTION, EMULSION INTRAVENOUS
Status: DISCONTINUED | OUTPATIENT
Start: 2018-10-26 | End: 2018-10-27

## 2018-10-26 RX ADMIN — ACYCLOVIR SODIUM 700 MG: 50 INJECTION, SOLUTION INTRAVENOUS at 18:51

## 2018-10-26 RX ADMIN — CEFEPIME HYDROCHLORIDE 1000 MG: 1 INJECTION, SOLUTION INTRAVENOUS at 16:44

## 2018-10-26 RX ADMIN — ETOMIDATE 6 MG: 2 INJECTION INTRAVENOUS at 06:52

## 2018-10-26 RX ADMIN — DEXTROSE, SODIUM CHLORIDE, SODIUM LACTATE, POTASSIUM CHLORIDE, AND CALCIUM CHLORIDE 125 ML/HR: 5; .6; .31; .03; .02 INJECTION, SOLUTION INTRAVENOUS at 11:12

## 2018-10-26 RX ADMIN — NOREPINEPHRINE BITARTRATE 12 MCG/MIN: 1 INJECTION, SOLUTION, CONCENTRATE INTRAVENOUS at 07:12

## 2018-10-26 RX ADMIN — FENTANYL CITRATE 25 MCG/HR: 50 INJECTION, SOLUTION INTRAMUSCULAR; INTRAVENOUS at 11:04

## 2018-10-26 RX ADMIN — DIBASIC SODIUM PHOSPHATE, MONOBASIC POTASSIUM PHOSPHATE AND MONOBASIC SODIUM PHOSPHATE 2 TABLET: 852; 155; 130 TABLET ORAL at 18:00

## 2018-10-26 RX ADMIN — CHLORHEXIDINE GLUCONATE 0.12% ORAL RINSE 15 ML: 1.2 LIQUID ORAL at 20:50

## 2018-10-26 RX ADMIN — HYDROCORTISONE SODIUM SUCCINATE 50 MG: 100 INJECTION, POWDER, FOR SOLUTION INTRAMUSCULAR; INTRAVENOUS at 01:44

## 2018-10-26 RX ADMIN — NOREPINEPHRINE BITARTRATE 12 MCG/MIN: 1 INJECTION INTRAVENOUS at 05:39

## 2018-10-26 RX ADMIN — POTASSIUM PHOSPHATE, MONOBASIC AND POTASSIUM PHOSPHATE, DIBASIC 21 MMOL: 224; 236 INJECTION, SOLUTION, CONCENTRATE INTRAVENOUS at 23:23

## 2018-10-26 RX ADMIN — Medication 0.04 UNITS/MIN: at 07:16

## 2018-10-26 RX ADMIN — ALBUMIN HUMAN: 0.05 INJECTION, SOLUTION INTRAVENOUS at 08:07

## 2018-10-26 RX ADMIN — Medication: at 08:28

## 2018-10-26 RX ADMIN — POTASSIUM CHLORIDE 20 MEQ: 200 INJECTION, SOLUTION INTRAVENOUS at 14:10

## 2018-10-26 RX ADMIN — SODIUM CHLORIDE, SODIUM GLUCONATE, SODIUM ACETATE, POTASSIUM CHLORIDE, MAGNESIUM CHLORIDE, SODIUM PHOSPHATE, DIBASIC, AND POTASSIUM PHOSPHATE 1000 ML: .53; .5; .37; .037; .03; .012; .00082 INJECTION, SOLUTION INTRAVENOUS at 01:43

## 2018-10-26 RX ADMIN — ALBUMIN HUMAN 12.5 G: 0.05 INJECTION, SOLUTION INTRAVENOUS at 00:18

## 2018-10-26 RX ADMIN — VANCOMYCIN HYDROCHLORIDE 1000 MG: 1 INJECTION, SOLUTION INTRAVENOUS at 17:44

## 2018-10-26 RX ADMIN — HEPARIN SODIUM 5000 UNITS: 5000 INJECTION, SOLUTION INTRAVENOUS; SUBCUTANEOUS at 14:18

## 2018-10-26 RX ADMIN — NOREPINEPHRINE BITARTRATE 5 MCG/MIN: 1 INJECTION INTRAVENOUS at 18:05

## 2018-10-26 RX ADMIN — ACYCLOVIR SODIUM 700 MG: 50 INJECTION, SOLUTION INTRAVENOUS at 02:11

## 2018-10-26 RX ADMIN — Medication 0.04 UNITS/MIN: at 18:07

## 2018-10-26 RX ADMIN — ASPIRIN 81 MG 81 MG: 81 TABLET ORAL at 10:21

## 2018-10-26 RX ADMIN — CLINDAMYCIN IN 5 PERCENT DEXTROSE 600 MG: 12 INJECTION, SOLUTION INTRAVENOUS at 13:04

## 2018-10-26 RX ADMIN — CLINDAMYCIN IN 5 PERCENT DEXTROSE 600 MG: 12 INJECTION, SOLUTION INTRAVENOUS at 05:36

## 2018-10-26 RX ADMIN — CHLORHEXIDINE GLUCONATE 0.12% ORAL RINSE 15 ML: 1.2 LIQUID ORAL at 10:21

## 2018-10-26 RX ADMIN — PERFLUTREN 1 ML/MIN: 6.52 INJECTION, SUSPENSION INTRAVENOUS at 14:58

## 2018-10-26 RX ADMIN — Medication 100 MG: at 06:53

## 2018-10-26 RX ADMIN — FENTANYL CITRATE 25 MCG: 50 INJECTION, SOLUTION INTRAMUSCULAR; INTRAVENOUS at 08:10

## 2018-10-26 RX ADMIN — ACYCLOVIR SODIUM 700 MG: 50 INJECTION, SOLUTION INTRAVENOUS at 10:49

## 2018-10-26 RX ADMIN — Medication 20 MG: at 10:22

## 2018-10-26 RX ADMIN — HYDROCORTISONE SODIUM SUCCINATE 50 MG: 100 INJECTION, POWDER, FOR SOLUTION INTRAMUSCULAR; INTRAVENOUS at 13:04

## 2018-10-26 RX ADMIN — EPINEPHRINE 2 MCG/MIN: 1 INJECTION, SOLUTION, CONCENTRATE INTRAVENOUS at 18:29

## 2018-10-26 RX ADMIN — FENTANYL CITRATE 50 MCG: 50 INJECTION, SOLUTION INTRAMUSCULAR; INTRAVENOUS at 07:17

## 2018-10-26 RX ADMIN — POTASSIUM CHLORIDE 20 MEQ: 200 INJECTION, SOLUTION INTRAVENOUS at 11:48

## 2018-10-26 RX ADMIN — ATORVASTATIN CALCIUM 10 MG: 10 TABLET, FILM COATED ORAL at 18:16

## 2018-10-26 RX ADMIN — SODIUM CHLORIDE 4 UNITS/HR: 9 INJECTION, SOLUTION INTRAVENOUS at 14:16

## 2018-10-26 RX ADMIN — CLINDAMYCIN IN 5 PERCENT DEXTROSE 600 MG: 12 INJECTION, SOLUTION INTRAVENOUS at 05:35

## 2018-10-26 RX ADMIN — SODIUM CHLORIDE: 0.9 INJECTION, SOLUTION INTRAVENOUS at 07:17

## 2018-10-26 RX ADMIN — DEXTROSE, SODIUM CHLORIDE, SODIUM LACTATE, POTASSIUM CHLORIDE, AND CALCIUM CHLORIDE 125 ML/HR: 5; .6; .31; .03; .02 INJECTION, SOLUTION INTRAVENOUS at 03:02

## 2018-10-26 RX ADMIN — PROPOFOL 5 MCG/KG/MIN: 10 INJECTION, EMULSION INTRAVENOUS at 09:57

## 2018-10-26 RX ADMIN — ONDANSETRON 4 MG: 2 INJECTION INTRAMUSCULAR; INTRAVENOUS at 07:43

## 2018-10-26 RX ADMIN — HYDROCORTISONE SODIUM SUCCINATE 50 MG: 100 INJECTION, POWDER, FOR SOLUTION INTRAMUSCULAR; INTRAVENOUS at 18:52

## 2018-10-26 RX ADMIN — SODIUM CHLORIDE: 0.9 INJECTION, SOLUTION INTRAVENOUS at 08:37

## 2018-10-26 RX ADMIN — FENTANYL CITRATE 25 MCG: 50 INJECTION, SOLUTION INTRAMUSCULAR; INTRAVENOUS at 07:55

## 2018-10-26 RX ADMIN — ROCURONIUM BROMIDE 50 MG: 10 INJECTION INTRAVENOUS at 07:17

## 2018-10-26 RX ADMIN — NOREPINEPHRINE BITARTRATE 5 MCG/MIN: 1 INJECTION INTRAVENOUS at 12:08

## 2018-10-26 RX ADMIN — Medication 0.04 UNITS/MIN: at 01:42

## 2018-10-26 RX ADMIN — SODIUM CHLORIDE, SODIUM GLUCONATE, SODIUM ACETATE, POTASSIUM CHLORIDE, MAGNESIUM CHLORIDE, SODIUM PHOSPHATE, DIBASIC, AND POTASSIUM PHOSPHATE 1000 ML: .53; .5; .37; .037; .03; .012; .00082 INJECTION, SOLUTION INTRAVENOUS at 17:10

## 2018-10-26 RX ADMIN — Medication 100 MG: at 05:36

## 2018-10-26 RX ADMIN — CLINDAMYCIN IN 5 PERCENT DEXTROSE 600 MG: 12 INJECTION, SOLUTION INTRAVENOUS at 20:49

## 2018-10-26 RX ADMIN — Medication 3.5 UNITS/HR: at 07:16

## 2018-10-26 RX ADMIN — HEPARIN SODIUM 5000 UNITS: 5000 INJECTION, SOLUTION INTRAVENOUS; SUBCUTANEOUS at 21:35

## 2018-10-26 NOTE — PROGRESS NOTES
Pt became acutely hypoxic in preparation for the OR  Pt intubated at bedside by anesthesia  Family made aware and in agreement

## 2018-10-26 NOTE — PROGRESS NOTES
Spoke with Pierre Melvin PA-C  Reviewed CT scan  Several potential sites of infection, but it may be worth taking the pectoral region off the table with possible I&D and washout  Will present to the bedside to examine and then speak with family to discuss  I understand that the patient is DNR

## 2018-10-26 NOTE — ANESTHESIA PREPROCEDURE EVALUATION
Review of Systems/Medical History  Patient summary reviewed  Chart reviewed  No history of anesthetic complications     Cardiovascular  Hyperlipidemia, Hypertension , CAD , History of CABG, Dysrhythmias , atrial fibrillation, CHF ,   Comment: Stress results: There was no chest pain during stress  -  ECG conclusions: The stress ECG was equivocal for ischemia  -  Perfusion imaging: There was a moderate-sized, moderately severe, partially reversible myocardial perfusion defect of the entire inferior and apical wall  -  Gated SPECT: The calculated left ventricular ejection fraction was 35 %  Left ventricular ejection fraction was moderately decreased by visual estimate  There was moderate global left ventricular hypokinesis with paradoxical septal  motion  There was moderately reduced myocardial thickening and motion of the inferior wall of the left ventricle      IMPRESSIONS: Abnormal study after pharmacologic vasodilation  There was a moderate-sized infarct and a small amount of ischemia  Left ventricular systolic function was reduced, with regional wall motion abnormalities    ,  Pulmonary  Smoker ex-smoker  , Pneumonia,        GI/Hepatic       Kidney disease ARF, Chronic kidney disease stage 3,   Comment: urosepsis     Endo/Other  Diabetes poorly controlled type 2 ,   Comment: IN DKA    GYN       Hematology  Anemia ,     Musculoskeletal       Neurology   Psychology           Physical Exam    Airway  Comment: Patient intubated n ICU due to an episode of desaturation tdy morning  Mallampati score: II  TM Distance: >3 FB  Neck ROM: full     Dental       Cardiovascular  Cardiovascular exam normal    Pulmonary  Pulmonary exam normal Decreased breath sounds,     Other Findings        Anesthesia Plan  ASA Score- 4 Emergent    Anesthesia Type-   Additional Monitors: arterial line  Airway Plan:     Comment: SEptic shock with levophed and vasopressin running   Was intubated in the ICU tdy morning due to episode of desaturation  Admitted with blood Sugars in 1000s  on insulin drip  Tdy morning sugar in 130s  Intraoperative risks explained to the family in detail  Also discussed with them to Lift Off DNR orders incase patient codes during the prrocedure  Patient will remain intubated postprocedure and transferred back to MICU  George Goodman Plan Factors-    Induction- intravenous  Postoperative Plan-     Informed Consent- Anesthetic plan and risks discussed with daughter and son  I personally reviewed this patient with the CRNA  Discussed and agreed on the Anesthesia Plan with the CRNA  George Goodman

## 2018-10-26 NOTE — PROGRESS NOTES
Attempted to call son Lynnette [phone] concerning his father, left message to call hospital back

## 2018-10-26 NOTE — PLAN OF CARE
CARDIOVASCULAR - ADULT     Maintains optimal cardiac output and hemodynamic stability Progressing     Absence of cardiac dysrhythmias or at baseline rhythm Progressing        DISCHARGE PLANNING     Discharge to home or other facility with appropriate resources Progressing        GENITOURINARY - ADULT     Maintains or returns to baseline urinary function Progressing     Urinary catheter remains patent Progressing        INFECTION - ADULT     Absence or prevention of progression during hospitalization Progressing        Knowledge Deficit     Patient/family/caregiver demonstrates understanding of disease process, treatment plan, medications, and discharge instructions Progressing        METABOLIC, FLUID AND ELECTROLYTES - ADULT     Electrolytes maintained within normal limits Progressing     Fluid balance maintained Progressing     Glucose maintained within target range Progressing        PAIN - ADULT     Verbalizes/displays adequate comfort level or baseline comfort level Progressing        Potential for Falls     Patient will remain free of falls Progressing        Prexisting or High Potential for Compromised Skin Integrity     Skin integrity is maintained or improved Progressing        RESPIRATORY - ADULT     Achieves optimal ventilation and oxygenation Progressing        SAFETY ADULT     Maintain or return to baseline ADL function Progressing     Maintain or return mobility status to optimal level Progressing        SKIN/TISSUE INTEGRITY - ADULT     Incision(s), wounds(s) or drain site(s) healing without S/S of infection Progressing

## 2018-10-26 NOTE — CONSULTS
Recommend Jevity 1 2 at 40 mL/hour for a total volume of 960 mL  This would provide 1152 kcals, 53 grams protein and 775 mL free water  Recommend a flush of 120 mL q 4 hours for a total fluid volume of 1495 mL which doesn't quite meet needs but pt is on IVF currently  Recommend 2 packets of prosource daily for a total intake with current propofol and 5% Dextrose infusion of 1837 kcals and 83 grams protein

## 2018-10-26 NOTE — UTILIZATION REVIEW
Initial Clinical Review    Admission: Date/Time/Statement: 10/25/18 AT 1645 ADMIT INPATIENT TO ICU    Orders Placed This Encounter   Procedures    Inpatient Admission (expected length of stay for this patient is greater than two midnights)     Standing Status:   Standing     Number of Occurrences:   1     Order Specific Question:   Admitting Physician     Answer:   Luann Marques [[id number]]     Order Specific Question:   Level of Care     Answer:   Critical Care [15]     Order Specific Question:   Estimated length of stay     Answer:   More than 2 Midnights     Order Specific Question:   Certification     Answer:   I certify that inpatient services are medically necessary for this patient for a duration of greater than two midnights  See H&P and MD Progress Notes for additional information about the patient's course of treatment  ED: Date/Time/Mode of Arrival:   ED Arrival Information     Expected Arrival Acuity Means of Arrival Escorted By Service Admission Type    - 10/25/2018 14:55 Emergent Ambulance [address] [facility] [address] PAIN          Chief Complaint:   Chief Complaint   Patient presents with    Back Pain     Pt sts he has shingles and has back pain for 4 weeks  Vicky Fallon last night because he lost his balance  Area left upper back scabbed, red swollen with greenis drainage  Scabbed area over scapula and under left arm  Reason for Admission / Chief Complaint:  Fall; Hypotension     History of Present Illness:  Gloria Butts is a [de-identified] y o  male with a past medical history of coronary artery disease status post CABG with recent nuclear stress test with showed moderate-sized defect with small amount of skin me a which is being followed as an outpatient by Cardiology, hypertension, hyperlipidemia, diabetes, chronic kidney disease who presents after being found down after a fall    Per conversation with Dr Alex Omer, patient states he has been having a rash for the last 2 weeks has been feeling generally unwell  Krista Lockett yesterday and was unable to get up and was found today  In the emergency department he was found to have a leukocytosis, acute kidney injury, lactic acidosis, and hyperglycemia consistent with diabetic ketoacidosis  Patient is actively being fluid resuscitated and treated for septic shock secondary to a cellulitis  He will be admitted to the intensive care unit for further monitoring        Review of Systems:  Mental status change precludes getting this information        ED Vital Signs:   ED Triage Vitals   Temperature Pulse Respirations Blood Pressure SpO2   10/25/18 1507 10/25/18 1507 10/25/18 1507 10/25/18 1509 10/25/18 1507   (!) 96 4 °F (35 8 °C) (!) 106 20 (!) 89/51 98 %      Temp Source Heart Rate Source Patient Position - Orthostatic VS BP Location FiO2 (%)   10/26/18 0000 10/26/18 0000 -- 10/26/18 0600 10/26/18 1000   Temporal Monitor  Right arm 100      Pain Score       10/25/18 1507       6        Wt Readings from Last 1 Encounters:   10/25/18 71 1 kg (156 lb 12 oz)      10/25 0701  10/26 0700 10/26 0701  10/26 1443      Temperature (°F) 96 497 8 96 8      Pulse 103121 104113      Respirations 32 23      Blood Pressure 00/86609/50        Arterial Line /64 101/34134/68      SpO2 (%) w/ VENT 94100 95100        10/24 0701  10/25 0700 10/25 0701  10/26 0700 10/26 0701  10/27 0700    I V  (mL/kg)  1071 4 (15 1) 500 (7)   IV Piggyback  5600 250   Feedings   20   Total Intake(mL/kg)  6671 4 (93 8) 770 (10 8)   Urine (mL/kg/hr)  2200 815 (1 5)   Blood   100   Total Output  2200 915   Net  +4471 4 -145     Respiratory Data:  Blood Gases   (Last 48 hours)     10/26 0340 10/26 0708 10/26 0948                 pH, Arterial  7 400     7 512     7 393              pCO2, Arterial  32 6     25 6     37 5              pO2, Arterial  79 6     324 2     467 0              HCO3, Arterial  19 7     20 1     22 3                 Respiratory Data       10/26 0000 10/26 0600 10/26 0925  Most Recent                Vent Mode   AC/VC  AC/VC         Resp Rate (BPM) (BPM)   16  16         Vt (mL) (mL)   450  450         FIO2 (%) (%)   100  40         PEEP (cmH2O) (cmH2O)   5  5         O2 Device Nasal c  Nasal c  Other (              O2 Flow Rate (L/min) (L/min) 3 3   3                LABS/Diagnostic Test Results:   CBC  Results from last 7 days  Lab Units 10/25/18  1534   WBC Thousand/uL 22 09*   HEMOGLOBIN g/dL 11 8*   HEMATOCRIT % 37 3   PLATELETS Thousands/uL 404*   MONO PCT MAN % 8      Results from last 7 days  Lab Units 10/25/18  1534   SODIUM mmol/L 111*   POTASSIUM mmol/L 5 4*   CHLORIDE mmol/L 75*   CO2 mmol/L 14*   BUN mg/dL 118*   CREATININE mg/dL 3 10*   CALCIUM mg/dL 8 8   ALK PHOS U/L 124*   ALT U/L 17   AST U/L 11       Results from last 7 days  Lab Units 10/25/18  1534   MAGNESIUM mg/dL 3 0*      Results from last 7 days  Lab Units 10/25/18  1730   LACTIC ACID mmol/L 2 0      Lab Value Date/Time   TROPONINI <0 02 10/25/2018 1534     Microbiology:  Procedure Component Value - Date/Time   Culture, tissue and Gram stain [[id number]] Collected: 10/26/18 0749   Lab Status: Preliminary result Specimen: Tissue from Soft Tissue, Other Updated: 10/26/18 1350    Gram Stain Result No Polys     2+ Gram positive cocci in clusters   Anaerobic culture and Gram stain [[id number]] Collected: 10/26/18 0742   Lab Status: In process Specimen: Wound from Wound Updated: 10/26/18 0808   Wound culture and Gram stain [[id number]] Collected: 10/26/18 0742   Lab Status: Preliminary result Specimen: Wound from Wound Updated: 10/26/18 1348    Gram Stain Result 2+ Polys     3+ Gram positive cocci in clusters   MRSA culture [[id number]] Collected: 10/25/18 2007   Lab Status:  In process Specimen: Nares from Nose Updated: 10/25/18 2010   Wound culture and Gram stain [[id number]] (Abnormal) Collected: 10/25/18 1730   Lab Status: Preliminary result Specimen: Wound from Arm, Left Updated: 10/26/18 1238    Wound Culture 4+ Growth of Staphylococcus aureus (A)    Gram Stain Result 1+ Polys     2+ Gram positive cocci in pairs     2+ Gram positive cocci in clusters   Urine culture [[id number]] Collected: 10/25/18 1730   Lab Status: In process Specimen: Urine from Urine, Other Updated: 10/25/18 1744   Blood culture #1 [[id number]] Collected: 10/25/18 1534   Lab Status: In process Specimen: Blood from Arm, Left Updated: 10/25/18 1602   Blood culture #2 [[id number]] Collected: 10/25/18 1530   Lab Status: In process Specimen: Blood from Arm, Right Updated: 10/25/18 1602       CT CHEST ABDOMEN PELVIS -  Patchy airspace opacities are seen in the right lower lobe superior segment   There is a large dense consolidation in the right middle lobe, lateral segment, nonspecific but suspicious for infection/pneumonia in the appropriate clinical setting  Cardiomegaly, bilateral pleural effusions, and body wall edema suggests a superimposed component of fluid overload/CHF  Multiple areas of skin thickening with associated subjacent complex fluid are seen in the superficial soft tissues of the left pectoralis region (for example axial image 30), as well as in the posterior and superior left thorax (axial image 12), as   described   Consider cellulitis with developing abscesses   In addition, a few bubbles of air are seen in the anterior left chest subcutaneous tissues (axial image 8) which could be related to recent intervention but also raises suspicion for possible   gas-forming infection  Partially distended bladder with catheter seen within the bladder lumen   Mild circumferential bladder wall thickening noted, probably exaggerated by underdistention   A cystitis could be considered in the appropriate clinical setting  Renal cysts, degenerative changes of the shoulder, spine, and hips, and other findings as above          ED Treatment:   Medication Administration from 10/25/2018 9265 to 10/25/2018 1945       Date/Time Order Dose Route Action Action by Comments     10/25/2018 1611 sodium chloride 0 9 % bolus 1,000 mL 0 mL Intravenous Stopped Mari Mention, RN      10/25/2018 1513 sodium chloride 0 9 % bolus 1,000 mL 1,000 mL Intravenous [address] [place] Mention, RN      10/25/2018 1651 cefepime (MAXIPIME) 2 g/50 mL dextrose IVPB 0 mg Intravenous Stopped Mari Mention, RN      10/25/2018 1554 cefepime (MAXIPIME) 2 g/50 mL dextrose IVPB 2,000 mg Intravenous [address] Mari Mention, RN      10/25/2018 1808 acyclovir (ZOVIRAX) 700 mg in sodium chloride 0 9 % 100 mL IVPB 700 mg Intravenous [address] Mari Mention, RN      10/25/2018 1549 fentanyl citrate (PF) 100 MCG/2ML 100 mcg 100 mcg Intravenous Given Mari Mention, RN      10/25/2018 1712 sodium chloride 0 9 % bolus 1,000 mL 0 mL Intravenous Stopped [place] Mention, RN      10/25/2018 1612 sodium chloride 0 9 % bolus 1,000 mL 1,000 mL Intravenous [address] Mari Mention, RN      10/25/2018 1800 sodium chloride 0 9 % bolus 1,000 mL 0 mL Intravenous Stopped [place] Mention, RN      10/25/2018 1643 sodium chloride 0 9 % bolus 1,000 mL 1,000 mL Intravenous [address] [place] Mention, RN      10/25/2018 1810 vancomycin (VANCOCIN) IVPB (premix) 1,000 mg 0 mg/kg Intravenous Stopped [place] Mention, RN      10/25/2018 1701 vancomycin (VANCOCIN) IVPB (premix) 1,000 mg 1,000 mg Intravenous [address] [place] Mention, RN      10/25/2018 1853 insulin regular (HumuLIN R,NovoLIN R) 1 Units/mL in sodium chloride 0 9 % 100 mL infusion 14 Units/hr Intravenous Rate/Dose Change Mari Mention, RN      10/25/2018 1705 insulin regular (HumuLIN R,NovoLIN R) 1 Units/mL in sodium chloride 0 9 % 100 mL infusion 7 Units/hr Intravenous [address] [place] Mention, RN      10/25/2018 1722 fentanyl citrate (PF) 100 MCG/2ML 100 mcg 100 mcg Intravenous Given Mari Turner RN      10/25/2018 1922 norepinephrine (LEVOPHED) 4 mg (STANDARD CONCENTRATION) IV in sodium chloride 0 9% 250 mL 10 mcg/min Intravenous Rate/Dose 2008 Nine Carlton, RN Per Dr Marina Dyson     10/25/2018 1917 norepinephrine (LEVOPHED) 4 mg (STANDARD CONCENTRATION) IV in sodium chloride 0 9% 250 mL 6 mcg/min Intravenous Rate/Dose Change Maurilio Milligan RN      10/25/2018 1839 norepinephrine (LEVOPHED) 4 mg (STANDARD CONCENTRATION) IV in sodium chloride 0 9% 250 mL 5 mcg/min Intravenous [address] Maurilio Milligan RN      10/25/2018 1845 lidocaine-epinephrine (XYLOCAINE/EPINEPHRINE) 2 %-1:100,000 injection 10 mL 10 mL Infiltration Given Maurilio Milligan RN           Past Medical/Surgical History:    Active Ambulatory Problems     Diagnosis Date Noted    Hyponatremia 05/09/2018    DM2 (diabetes mellitus, type 2) ([place] [address] ) 05/09/2018    CAD (coronary artery disease) 05/09/2018    Hx of CABG 05/09/2018    CKD (chronic kidney disease) stage 3, GFR 30-59 ml/min ([facility]) 05/09/2018    CHRIS (acute kidney injury) ([place] [address] ) 05/09/2018    HTN (hypertension) 05/09/2018    Cardiomyopathy, ischemic 07/12/2017    Benign essential hypertension 07/12/2017    Idiopathic gout 07/12/2017    Urinary tract infection 05/10/2018    Atrial fibrillation ([place] [address] ) 05/12/2018    Severe protein-calorie malnutrition ([place] [address] ) 05/14/2018    Anemia 05/14/2018     Resolved Ambulatory Problems     Diagnosis Date Noted    Hyperkalemia 05/09/2018     Past Medical History:   Diagnosis Date    CHF (congestive heart failure) ([facility])     Diabetes mellitus ([place] [address] )     History of open heart surgery     Hyperlipidemia     Hypertension        Admitting Diagnosis: Shingles [B02 9]  Cellulitis [L03 90]  DKA (diabetic ketoacidoses) ([place] [address] ) [E13 10]  Flank pain [R10 9]  Sepsis ([place] [address] ) [A41 9]    Age/Sex: [de-identified] y o  male      Assessment/Plan:   * Septic shock ([place] [address] )   Assessment & Plan     · Source likely presumed cellulitis  · Patient has received greater than 30 mL/kg, if remains hypotensive, will require central line and vasopressors  · Given concomitant DKA, will likely require greater than 30 mL/kg fluid resuscitation  · Patient received cefepime, vancomycin, and acyclovir  Will continue  · Blood cultures in process  · Wound culture in process  · Trend lactic acid      Shingles   Assessment & Plan     · Continue acyclovir      Lactic acid acidosis   Assessment & Plan     · Secondary to septic shock and DKA, trend      CHRIS (acute kidney injury) ([facility] )   Assessment & Plan     · Likely secondary to septic shock and DKA  · Continue fluid resuscitation and monitor urine output  · May require Gordon catheter      DKA (diabetic ketoacidoses) [facility])   Assessment & Plan             Lab Results   Component Value Date     HGBA1C 11 8 (H) 05/15/2018             Recent Labs      10/25/18   1503   POCGLU  >500*         Blood Sugar Average: Last 72 hrs:  (P) 0      · Glucose found to be greater than 1000  · Acetone present and blood, acidosis present on VBG  · Continue fluid resuscitation and insulin infusion  · Monitor BMP for gap closure and electrolytes      Urinary tract infection   Assessment & Plan     · Urinalysis concerning for possible urinary tract infection  Urine culture in process  Continue antibiotics      Hyponatremia   Assessment & Plan     · Corrected sodium 125  · Continue follow closely with fluid resuscitation  · Likely hypovolemic hyponatremia in the setting of dehydration, sepsis, and DKA      Atrial fibrillation ([facility] )   Assessment & Plan     · History of AFib in the setting of electrolyte abnormalities in the past   Monitor on telemetry      Cardiomyopathy, ischemic   Assessment & Plan     · EF 35 to 40%  Recent stress test in May of 2018 showed reversible ischemic changes    Cardiology following as an outpatient with medical management       DM2 (diabetes mellitus, type 2) ([facility] )   Assessment & Plan             Lab Results   Component Value Date     HGBA1C 11 8 (H) 05/15/2018             Recent Labs      10/25/18   1503   POCGLU  >500*      · Managing DKA as above     Blood Sugar Average: Last 72 hrs:  (P) 0           VTE Pharmacologic Prophylaxis: Heparin  VTE Mechanical Prophylaxis: sequential compression device     Invasive lines and devices: Invasive Devices            Peripheral Intravenous Line                     Peripheral IV 10/25/18 Left Antecubital less than 1 day      Peripheral IV 10/25/18 Right Hand less than 1 day                  Given critical illness, patient length of stay will require greater than two midnights          Admission Orders:  10/25/18 AT 1645 ADMIT INPATIENT TO ICU  CARDIO PULM MONITORING  ARTERIAL LINE MONITORING  VS PER ICU Q1HRS    ETT - VENT AT AC  16  X  VT  450   X  100%  X  PEEP +5  Continuous Pulse Oximetry    Turn q2hrs  SCD      NPO    Continuous IV Infusions:   fentaNYL 25 mcg/hr Last Rate: 25 mcg/hr (10/26/18 1104)   insulin regular (HumuLIN R,NovoLIN R) infusion 0 3-21 Units/hr Last Rate: 4 Units/hr (10/26/18 1416)   norepinephrine 1-30 mcg/min Last Rate: 6 mcg/min (10/26/18 1213)   propofol 5-50 mcg/kg/min Last Rate: 5 mcg/kg/min (10/26/18 0957)   vasopressin (PITRESSIN) in 0 9 % sodium chloride 100 mL 0 04 Units/min Last Rate: 0 04 Units/min (10/26/18 0716)       Scheduled Meds:   Current Facility-Administered Medications:  acyclovir 10 mg/kg (Ideal) Intravenous Q8H Rhea John PA-C Last Rate: Stopped (10/26/18 1149)   aspirin 81 mg Oral Daily Rhea John PA-C    atorvastatin 10 mg Oral Daily Rhea John PA-C    cefepime 1,000 mg Intravenous Q24H Rhea John PA-C    chlorhexidine 15 mL Swish [employer] Spit Q12H [address] MIGUEL Kyle    clindamycin 600 mg Intravenous Rhina Cordero PA-C Last Rate: 600 mg (10/26/18 1304)   fentaNYL 25 mcg/hr Intravenous Continuous MIGUEL Kyle Last Rate: 25 mcg/hr (10/26/18 1104)   heparin (porcine) 5,000 Units Subcutaneous Q8H [address] Rhea John PA-C    hydrocortisone sodium succinate 50 mg Intravenous Q6H [address] Ritchie Geller PA-C    influenza vaccine 0 5 mL Intramuscular Prior to discharge Yvan Echavarria MD    insulin regular (HumuLIN R,NovoLIN R) infusion 0 3-21 Units/hr Intravenous Titrated Patricio Duckworth MD Last Rate: 4 Units/hr (10/26/18 1416)   norepinephrine 1-30 mcg/min Intravenous Titrated Rhea John PA-C Last Rate: 6 mcg/min (10/26/18 1213)   omeprazole (PRILOSEC) suspension 2 mg/mL 20 mg Oral Daily MIGUEL Kyle    potassium chloride 20 mEq Intravenous Once Joanne Mourning CRNP Last Rate: 20 mEq (10/26/18 1410)   propofol 5-50 mcg/kg/min Intravenous Titrated Joanne Mourning CRNP Last Rate: 5 mcg/kg/min (10/26/18 0957)   vancomycin 15 mg/kg Intravenous Q24H Rhea John PA-C    vasopressin (PITRESSIN) in 0 9 % sodium chloride 100 mL 0 04 Units/min Intravenous Continuous Cristanando Caceres PA-C Last Rate: 0 04 Units/min (10/26/18 0716)       PRN Meds:     influenza vaccine    CONSULT GEN SURGERY       Surgery-General  Consults Date of Service: 10/26/2018  5:43 AM     Assessment and Plan:  23-year-old male in septic shock with at least 3 foci of infection in the face of an improving DKA and ongoing resuscitation with dual vasopressors (NE and )  Have spoken with family and despite his being in DNR status, they believe that should Yaneth Johnson have been of sound mind he would have chosen to go through with the surgery to at least take one of the factors off the table for at least bring it under control  His family members acknowledge that the procedure will most likely require intubation if not at least LMA and the patient may be on prolonged ventilator support and with no guarantee of coming off  In addition chances of mortality currently still remain high  I did tell the family that I would think that he would be able to survive the surgery, however in the postoperative period, with 2 other competing factors that could potentially kill the patient, there still exists a high chance of mortality and prolonged ICU course    They responded that Yaneth Johnson is "a fighter" and would want all attempts made to improve his outcome           Surgical plan: Status is emergentCurrently am considering going at the most fluctuant areas to drain the abscesses but also and considering raising a flap up to the subclavian region as there is gas in the subclavian area on the CT scan  Conversely, I might make an incision at the most fluctuant areas and then make a counter incision near the sub subclavicular regions to explore that area as well         Surgery-General  OP Note Date of Service: 10/26/2018  7:33 AM     OPERATIVE REPORT  PATIENT NAME: Josette Sharma    :  1940  MRN: [id number]  Pt Location: [facility] OR ROOM 03     SURGERY DATE: 10/26/2018     Preop Diagnosis:  Sepsis ([address] ) [A41 9]  Shingles [B02 9]  Abscess of chest wall [L02 213]  Septic shock ([address] ) [A41 9, R65 21]     Post-Op Diagnosis Codes:     * Sepsis ([address] ) [A41 9]     * Shingles [B02 9]     * Abscess of chest wall [L02 213]     * Septic shock ([address] ) [A41 9, R65 21]     Procedure(s) (LRB):  Chest wall wound debridement (extensive) with pulse lavage and wound vac placement (Left)     Anesthesia Type: Choice     Operative Indications:  Sepsis ([address] ) [A41 9]  Shingles [B02 9]  Abscess of chest wall [L02 213]  Septic shock ([address] ) [A41 9, R65 21]     Operative Findings:  Extensive and multiple abscesses underlying zoster scabs going from left chest wall into left axilla    Infected sebaceous cyst of axilla      Complications: None

## 2018-10-26 NOTE — PROGRESS NOTES
Daily Progress Note - Critical Care/ Stepdown   Ez Lund [de-identified] y o  male MRN: [id number]  Unit/Bed#: ICU 08 Encounter: [id number]    ______________________________________________________________________  Assessment:   Principal Problem:    Septic shock ([place] [address] )  Active Problems:    Acute respiratory failure secondary to septic shock    Necrotizing cellulitis of chest wall    Urinary tract infection    Pneumonia due to infectious organism    Hyponatremia    DM2 (diabetes mellitus, type 2) ([facility])    CAD (coronary artery disease)    CKD (chronic kidney disease) stage 3, GFR 30-59 ml/min ([facility])    CHRIS (acute kidney injury) ([place] [address] )    Cardiomyopathy, ischemic    Atrial fibrillation ([facility])    Shingles    Lethargy    Acute cystitis with hematuria  Resolved Problems:    Lactic acid acidosis    DKA (diabetic ketoacidoses) ([facility])    Sepsis ([place] [address] )    Cellulitis of left axilla      * Septic shock ([facility])   Assessment & Plan    · Source likely presumed necrotizing cellulitis of chest wall with pneumonia and UTI  · Fluid resuscitation with 30ml/kg  · Given concomitant DKA, will likely require greater than 30 mL/kg fluid resuscitation  · Started on levophed in ER, now on levophed and vasopressin, started on Stress Dose Steroids  · Lactic acid normalized  · Patient received cefepime, vancomycin, clindamycin and acyclovir    Will continue  · Performed aggressive debridement in OR 10/26 of chest wall cellulitis, wound vac in place intubated prior to procedure  · Blood cultures in process  · Wound culture in process  · Urine cultures pending       Acute respiratory failure secondary to septic shock   Assessment & Plan    · Likely compensatory secondary to septic shock  · Intubated  · Repeat ABG at 1600     Necrotizing cellulitis of chest wall   Assessment & Plan    · Aggressive  debridement in OR on 10/26 by Dr Bijan Abarca, wound VAC in place  · Continue with antibiotics:  Vancomycin, cefepime, clindamycin, and acyclovir for HSV infection  · Plan to return to OR on Monday       Urinary tract infection   Assessment & Plan    · Urinalysis concerning for possible urinary tract infection  Urine culture in process  Continue antibiotics     Pneumonia due to infectious organism   Assessment & Plan    · Continue to treat with vancomycin, cefepime, clindamycin     Acute cystitis with hematuria   Assessment & Plan    Continue antibiotic regimen with vancomycin, cefepime, clindamycin, urine culture pending     Lethargy   Assessment & Plan    - VBG/ABG without indication of hypercarbia  - CT head negative for acute findings  - Ammonia normal  - Likely 2/2 septic shock, DKA, and resultant metabolic derangements with Pneumonia, UTI,  and multiple infected chest wall abcesses        Shingles   Assessment & Plan    · Continue acyclovir     Atrial fibrillation (HCC)   Assessment & Plan    · History of AFib in the setting of electrolyte abnormalities in the past   Monitor on telemetry  · Current Sinus Tachy  · Holding BB 2/2 hypotension     Cardiomyopathy, ischemic   Assessment & Plan    · EF 35 to 40%  Recent stress test in May of 2018 showed reversible ischemic changes  Cardiology following as an outpatient with medical management  · ECHO today     CHRIS (acute kidney injury) ([facility] )   Assessment & Plan    · Likely secondary to septic shock and DKA    · Improving with fluids  · FENa consistent with Pre-renal cause  · Continue fluid resuscitation and monitor urine output  · Gordon catheter in place     CKD (chronic kidney disease) stage 3, GFR 30-59 ml/min ([facility])   Assessment & Plan    Baseline 0 9-1 3  UOP Brisk overnight  Follow up AM Renal indices     CAD (coronary artery disease)   Assessment & Plan    Troponin negative  Will obtain ECHO today     DM2 (diabetes mellitus, type 2) ([facility] )   Assessment & Plan    Lab Results   Component Value Date    HGBA1C 11 8 (H) 05/15/2018       Recent Labs      10/25/18   1503   POCGLU  >500*     DKA resolved, will continue on Alg 1 insulin drip with D5LR at 125cc/hr, blood sugar checks every 2 hours  K and Mag repleted  BMP at 4pm           Hyponatremia   Assessment & Plan    · Improvings slowly  · Continue Q4 and avoid abrupt correction  · Continue follow closely with fluid resuscitation  · Likely hypovolemic hyponatremia in the setting of dehydration, sepsis, and DKA     DKA (diabetic ketoacidoses) (HCC)resolved as of 10/26/2018   Assessment & Plan    Lab Results   Component Value Date    HGBA1C 11 8 (H) 05/15/2018       Recent Labs      10/25/18   1503   POCGLU  >500*       Blood Sugar Average: Last 72 hrs:  (P) 0     · Glucose found to be greater than 1000  · Acetone present and blood, acidosis present on VBG  · Continue fluid resuscitation and insulin infusion  · Monitor BMP for gap closure and electrolytes     Lactic acid acidosisresolved as of 10/26/2018   Assessment & Plan    · Secondary to septic shock and DKA, trend  · Still tending upward, etiology unclear  · CT CAP showing:  Left pectoral cellulitis with gas-forming pockets and right lower lobe pneumonia  · Continue to trend  · Dose with Thiamine  · Surgery consulted, went to OR for I%D  · Continue with vancomycin, clindamycin, cefepime, acyclovir         Plan:    Neuro:   · Sedation plan: Fentanyl and Propofol  · RASS goal: -2 Light Sedation  · Sedation break plan: daily  · Delirium: CAM ICU positive no   · Pain controlled with: fentanyl  · Pain score: none  · Regulate sleep/wake cycle    CV:   · Cardiac infusions: Levophed, 6 mcg/min, Vasopressin, 0 04 Units/min  · Rhythm: NSR  · Follow rhythm on telemetry    Pulm:   · Intubated, plan to continue intubation today, possible extubation tomorrow  · SBT plan: daily  · Chest PT ordered: no   · Chlorhexidine ordered: yes   · HOB >30 degrees: yes     GI:   · Nutrition/diet plan:  Enteral trickle feeds initiated today  · Stress ulcer prophylaxis: No prophylaxis needed  · Bowel regimen: None currently      FEN: · Fluid/Diuretic plan: No intervention, monitor intake and output  · Electrolytes repleted: yes  · Goal: K >4 0, Mag >2 0, and Phos >3 0    :   · Indwelling Gordon present: yes   · Urinary catheter still needed for Strict I and O in a critically ill patient  ID:   · Septic shock secondary to necrotizing cellulitis of chest wall, pneumonia, UTI  · Abx ordered: Cefepime, Vanco and clindamycin   · Day # 1   · Vancomycin trough due: Sunday  · Trend temps and WBC count    Heme:   · Trend hgb and plts    Endo:   · DKA, now resolved, continue on algorithm 1 insulin drip  · Glycemic control plan: Blood glucose controlled on current regimen    Msk/Skin:  · Mobility goal:   · PT consult: no  · OT consult: no  · Frequent turning and off-loading    Family:  · Family updated within 24 hours: yes   · Family meeting planned today: no     Lines:  · Central venous access: triple lumen catheter - 20 cm  · Keep central line today for meds requiring central line, hemodynamic monitoring  · Arterial line: Assessed  Continued for the following reasons Hemodynamic monitoring  VTE Prophylaxis:  · Pharmacologic Prophylaxis: Heparin  · Mechanical Prophylaxis: sequential compression device    Disposition: Continue ICU care    Code Status: Level 3 - DNAR and DNI    Counseling / Coordination of Care  Total Critical Care time spent 25 minutes excluding procedures, teaching and family updates  ______________________________________________________________________    HPI/24hr events:  Patient developed worsening mental status along with increasing lactic acid  CT of chest revealed concerning gas pockets with cellulitis of anterior chest, along with pneumonia and UTI    Patient was placed on vancomycin, cefepime, clindamycin, intubated,  and brought to operating room for aggressive debridement of anterior chest wall     ______________________________________________________________________    Physical Exam:   Physical Exam Constitutional: He appears well-developed and well-nourished  No distress  Intubated with mild sedation   HENT:   Head: Normocephalic and atraumatic  Cardiovascular: Normal rate, regular rhythm, normal heart sounds and intact distal pulses  Exam reveals no friction rub  No murmur heard  Pulmonary/Chest: Effort normal and breath sounds normal  No respiratory distress  He has no wheezes  He has no rales  He exhibits no tenderness  Musculoskeletal: He exhibits no edema  Neurological:   Sedated, moving all extremities spontaneously, opening eyes, reacting to stimulus   Skin: Skin is warm and dry  Capillary refill takes less than 2 seconds  He is not diaphoretic  Left axilla and anterior chest wall wound VAC in place, red erythematous scabbed over lesions on posterior scapula left side  Left-sided femoral central line in place, right-sided A-line in place       ______________________________________________________________________  Vitals:    10/26/18 1100 10/26/18 1200 10/26/18 1208 10/26/18 1300   BP:       BP Location:       Pulse: 104 (!) 109  104   Resp: 19 18  20   Temp:  (!) 96 8 °F (36 °C)     TempSrc:  Temporal     SpO2: 100% 99% 98% 95%   Weight:       Height:         Arterial Line BP: 120/44  Arterial Line MAP (mmHg): 67 mmHg     Temperature:   Temp (24hrs), Av °F (36 1 °C), Min:96 4 °F (35 8 °C), Max:97 8 °F (36 6 °C)    Current Temperature: (!) 96 8 °F (36 °C)    Weights:   IBW: 69 55 kg    Body mass index is 23 49 kg/m²    Weight (last 2 days)     Date/Time   Weight    10/25/18 1952  71 1 (156 75)    10/25/18 1507  72 6 (160)              Hemodynamic Monitoring:  Arterial line in place, frequent vitals       Non-Invasive/Invasive Ventilation Settings:  Respiratory    Lab Data (Last 4 hours)    None         O2/Vent Data (Last 4 hours)      10/26 1208           Vent Mode AC/VC       Resp Rate (BPM) (BPM) 16       Vt (mL) (mL) 450       FIO2 (%) (%) 40       PEEP (cmH2O) (cmH2O) 5 MV 7 98                 Lab Results   Component Value Date    PHART 7 393 10/26/2018    SZT2PSG 37 5 10/26/2018    PO2ART 467 0 (H) 10/26/2018    PAX9PCV 22 3 10/26/2018    BEART -2 2 10/26/2018    SOURCE Line, Arterial 10/26/2018     SpO2: SpO2: 95 %    Intake and Outputs:  I/O       10/24 0701 - 10/25 0700 10/25 0701 - 10/26 0700 10/26 0701 - 10/27 0700    I V  (mL/kg)  1071 4 (15 1) 500 (7)    IV Piggyback  5600 250    Total Intake(mL/kg)  6671 4 (93 8) 750 (10 5)    Urine (mL/kg/hr)  2200 140 (1 1)    Blood   100    Total Output   2200 240    Net   +4471 4 +510                   Nutrition:        Diet Orders            Start     Ordered    10/26/18 1152  Diet Enteral/Parenteral; Tube Feeding No Oral Diet; Jevity 1 2 Omid; Continuous; 20  Diet effective now     Question Answer Comment   Diet Type Enteral/Parenteral    Enteral/Parenteral Tube Feeding No Oral Diet    Tube Feeding Formula: Jevity 1 2 Omid    Bolus/Cyclic/Continuous Continuous    Tube Feeding Goal Rate (mL/hr): 20    RD to adjust diet per protocol?  No        10/26/18 1151    10/25/18 1947  Room Service  Once     Question:  Type of Service  Answer:  Room Service - Appropriate with Assistance    10/25/18 1946            Labs:     Results from last 7 days  Lab Units 10/26/18  1003 10/26/18  0817 10/26/18  0732 10/26/18  0523 10/25/18  1534   WBC Thousand/uL 17 71*  --   --  17 71* 22 09*   HEMOGLOBIN g/dL 9 3*  --   --  9 4* 11 8*   I STAT HEMOGLOBIN g/dl  --  7 8* 8 8*  --   --    HEMATOCRIT % 25 8* 23* 26* 26 4* 37 3   PLATELETS Thousands/uL 294  --   --  324 404*   NEUTROS PCT % 89*  --   --   --   --    MONOS PCT % 4  --   --   --   --    MONO PCT MAN %  --   --   --  5 8       Results from last 7 days  Lab Units 10/26/18  1004 10/26/18  0817 10/26/18  0732 10/26/18  0525 10/26/18  0522  10/25/18  1534   SODIUM mmol/L 134*  --   --  130* 130*  < > 111*   POTASSIUM mmol/L 3 4*  --   --  3 9 3 9  < > 5 4*   CHLORIDE mmol/L 101  --   --  98* 98*  < > 75*   CO2 mmol/L 22  --   --  22 21  < > 14*   BUN mg/dL 66*  --   --  83* 84*  < > 118*   CREATININE mg/dL 1 49*  --   --  1 87* 1 88*  < > 3 10*   CALCIUM mg/dL 8 2*  --   --  8 3 8 4  < > 8 8   ALK PHOS U/L 67  --   --  68  --   --  124*   ALT U/L 12  --   --  12  --   --  17   AST U/L 14  --   --  15  --   --  11   GLUCOSE, ISTAT mg/dl  --  115 120  --   --   --   --    < > = values in this interval not displayed  Results from last 7 days  Lab Units 10/26/18  1004 10/26/18  0525 10/25/18  2015   MAGNESIUM mg/dL 2 3 2 3 2 7*     Lab Results   Component Value Date    PHOS 2 4 10/26/2018    PHOS 2 2 (L) 10/26/2018    PHOS 2 3 10/26/2018        Results from last 7 days  Lab Units 10/26/18  0526   INR  1 09   PTT seconds 27       0  Lab Value Date/Time   TROPONINI <0 02 10/25/2018 1534   TROPONINI <0 02 05/09/2018 1927       Results from last 7 days  Lab Units 10/26/18  1004 10/26/18  0528 10/26/18  0206   LACTIC ACID mmol/L 1 8 3 3* 5 2*     ABG:  Lab Results   Component Value Date    PHART 7 393 10/26/2018    XFS2FZD 37 5 10/26/2018    PO2ART 467 0 (H) 10/26/2018    KPY9YYQ 22 3 10/26/2018    BEART -2 2 10/26/2018    SOURCE Line, Arterial 10/26/2018       Imaging:   Chest x-ray:  ET tube 3 3 centimeters above yohana, consolidation at right lung base  CT chest abdomen pelvis:  Consolidation of right lung base, cellulitis is with developing abscesses and few bubbles of air in anterior left chest subcu tissue    CT head:  No acute intracranial abnormality      Micro:  Lab Results   Component Value Date    URINECX >100,000 cfu/ml Klebsiella oxytoca (A) 05/09/2018    WOUNDCULT 4+ Growth of Staphylococcus aureus (A) 10/25/2018       Allergies: No Known Allergies  Medications:   Scheduled Meds:    Current Facility-Administered Medications:  acyclovir 10 mg/kg (Ideal) Intravenous Q8H Rhea John PA-C Last Rate: Stopped (10/26/18 1149)   aspirin 81 mg Oral Daily Rhea John PA-C    atorvastatin 10 mg Oral Daily Adriel Mae GORDON John PA-C    cefepime 1,000 mg Intravenous Q24H Rhea John PA-C    chlorhexidine 15 mL Swish & Spit Q12H [address] MIGUEL Kyle    clindamycin 600 mg Intravenous [place], [place] Last Rate: 600 mg (10/26/18 1304)   fentaNYL 25 mcg/hr Intravenous Continuous Terrea Jubilee, CRNP Last Rate: 25 mcg/hr (10/26/18 1104)   heparin (porcine) 5,000 Units Subcutaneous Q8H [address] Rhea John PA-C    hydrocortisone sodium succinate 50 mg Intravenous Q6H [address] Dimple EVY Vasquez    influenza vaccine 0 5 mL Intramuscular Prior to discharge Kristel Walls MD    insulin regular (HumuLIN R,NovoLIN R) infusion 0 3-21 Units/hr Intravenous Titrated Ethel Miranda MD Last Rate: 4 Units/hr (10/26/18 1416)   norepinephrine 1-30 mcg/min Intravenous Titrated Rhea John PA-C Last Rate: 6 mcg/min (10/26/18 1213)   omeprazole (PRILOSEC) suspension 2 mg/mL 20 mg Oral Daily MIGUEL Kyle    potassium chloride 20 mEq Intravenous Once Terrea Jubilee, CRNP Last Rate: 20 mEq (10/26/18 1410)   propofol 5-50 mcg/kg/min Intravenous Titrated Terrannamaria Henningbilee CRNP Last Rate: 5 mcg/kg/min (10/26/18 0957)   vancomycin 15 mg/kg Intravenous Q24H Rhea John PA-C    vasopressin (PITRESSIN) in 0 9 % sodium chloride 100 mL 0 04 Units/min Intravenous Continuous Dimple EVY Vasquez Last Rate: 0 04 Units/min (10/26/18 0716)     Continuous Infusions:    fentaNYL 25 mcg/hr Last Rate: 25 mcg/hr (10/26/18 1104)   insulin regular (HumuLIN R,NovoLIN R) infusion 0 3-21 Units/hr Last Rate: 4 Units/hr (10/26/18 1416)   norepinephrine 1-30 mcg/min Last Rate: 6 mcg/min (10/26/18 1213)   propofol 5-50 mcg/kg/min Last Rate: 5 mcg/kg/min (10/26/18 0957)   vasopressin (PITRESSIN) in 0 9 % sodium chloride 100 mL 0 04 Units/min Last Rate: 0 04 Units/min (10/26/18 0716)     PRN Meds:    influenza vaccine 0 5 mL Prior to discharge       Invasive lines and devices:   Invasive Devices     Central Venous Catheter Line CVC Central Lines 10/25/18 Triple Right Femoral less than 1 day          Peripheral Intravenous Line            Peripheral IV 10/25/18 Left Antecubital less than 1 day    Peripheral IV 10/25/18 Left Forearm less than 1 day    Peripheral IV 10/25/18 Right Hand less than 1 day          Arterial Line            Arterial Line 10/26/18 Radial less than 1 day          Drain            NG/OG/Enteral Tube Orogastric less than 1 day    Negative Pressure Wound Therapy (V A C ) Chest Anterior; Left less than 1 day    Urethral Catheter Latex 16 Fr  less than 1 day          Airway            ETT  8 mm less than 1 day                   SIGNATURE: Chica Dejesus MD  DATE: October 26, 2018

## 2018-10-26 NOTE — OP NOTE
OPERATIVE REPORT  PATIENT NAME: Andrez Kuhn    :  1940  MRN: [id number]  Pt Location: [facility] OR ROOM 03    SURGERY DATE: 10/26/2018    Surgeon(s) and Role:     * Darlene Buck MD - Primary     * Etta Dang PA-C - Assisting    Preop Diagnosis:  Sepsis [facility]) [A41 9]  Shingles [B02 9]  Abscess of chest wall [L02 213]  Septic shock ([address] ) [A41 9, R65 21]    Post-Op Diagnosis Codes:     * Sepsis ([address] ) [A41 9]     * Shingles [B02 9]     * Abscess of chest wall [L02 213]     * Septic shock ([address] ) [A41 9, R65 21]    Procedure(s) (LRB):  Chest wall wound debridement (extensive) with pulse lavage and wound vac placement (Left)    Specimen(s):  ID Type Source Tests Collected by Time Destination   A : multiple chest wall abscess, LEFT Wound Wound ANAEROBIC CULTURE AND GRAM STAIN, WOUND CULTURE Darlene Buck MD 10/26/2018 2822    B : left chest wall abscess (multiple) Tissue Soft Tissue, Other CULTURE, TISSUE AND GRAM STAIN Darlene Buck MD 10/26/2018 0427        Estimated Blood Loss:   100 mL    Drains:  Negative Pressure Wound Therapy (V A C ) Chest Anterior; Left (Active)   Unit Type WOUND VAC 10/26/2018  8:35 AM   Black foam- # applied 3 10/26/2018  8:35 AM   Nonadherent (Adaptic)- # applied 2 10/26/2018  8:35 AM   Target Pressure (mmHg) 125 10/26/2018  8:35 AM   Canister Changed Yes 10/26/2018  8:35 AM   Number of days: 0       Urethral Catheter Latex 16 Fr   (Active)   Amt returned on insertion(mL) 100 mL 10/25/2018  9:00 PM   Site Assessment Clean;Skin intact 10/26/2018  4:00 AM   Collection Container Standard drainage bag 10/26/2018  4:00 AM   Securement Method Securing device (Describe) 10/26/2018  4:00 AM   Output (mL) 200 mL 10/26/2018  6:54 AM   Number of days: 1       Anesthesia Type:   Choice    Operative Indications:  Sepsis ([address] ) [A41 9]  Shingles [B02 9]  Abscess of chest wall [L02 213]  Se[facility] shock ([facility] ) [A41 9, R65 21]    Operative Findings:  Extensive and multiple abscesses underlying zoster scabs going from left chest wall into left axilla  Infected sebaceous cyst of axilla  Complications:   None    Procedure and Technique:  After the patient was brought into the OR, the patient was identified as Tigre Caballero, date of birth 1940  We reviewed that we had planned on an extensive chest wall debridement and all key steps and modifications were reviewed  We noted that the patient was neither on beta-blockers nor therapeutic anticoagulation, but was on DVT prophylactic doses of heparin  Otherwise SCDs were on and in place  Patient was on around the clock antibiotics including vancomycin, cefepime, and clindamycin  ASA was noted to be 4E  Patient was not noted to be allergic to any medications  Fire safety level was 3  Patient was placed in a supine position, anesthetized, prepped, and draped with arms out  An incision was made over the most fluctuant areas and multiple undermining abscesses were noted throughout the chest extending all the way into the axilla and therefore in a radical fashion down to the level of the fascia of the pectoralis muscle, a big excisional debridement was conducted with skin and subcutaneous tissue removed using skin knife as well as electrocautery  Total debridement area of approximately 35 cm x 20 cm with a depth of 10 cm  The area was then checked for hemostasis with electrocautery and subsequently irrigated out with pulse lavage for 3 L  Seeing that the area was pretty clean, I partially closed the middle of the region making a bilobed shaped and subsequently used a wound VAC to temporarily close the area with plans to return this upcoming Monday  Patient was still noted to be in shock at the end of the case with 12 micrograms/minute of norepinephrine and 0 04 units per hour of vasopressin  Patient was taken back to the ICU in very guarded state        I was present for the entire procedure and a physician assistant was required during the procedure for retraction tissue handling,dissection and suturing      Patient Disposition:  PACU     SIGNATURE: Andria Rico MD  DATE: 2018  TIME: 10:01 AM

## 2018-10-26 NOTE — PROGRESS NOTES
Patient with rapid desaturation to 66%  Previous desaturation noted and therefore we decided that it would be safest to intubate here at the bedside prior to going up to the OR  Dr Shana Tripp also at bedside with me  Intubation successful with colorimetric change noted  Proceeding to OR now

## 2018-10-26 NOTE — PROGRESS NOTES
Plan for return to OR on Monday for second look   Will more thoroughly address wound on posterior aspect and on the posterior aspect of axilla  (2nd look with VAC change )

## 2018-10-26 NOTE — ANESTHESIA POSTPROCEDURE EVALUATION
Post-Op Assessment Note      CV Status:  Stable    Hydration Status:  Stable    PONV Controlled:  Controlled  Airway: intubated    Post Op Vitals Reviewed: Yes          Staff: Anesthesiologist, CRNA       Comments: bring pt directly from OR to ICU with portable monitors and ambubag with 15L oxygen, pt vss stable during procedure           BP   164/68   Temp   37 3   Pulse 91   Resp   14   SpO2   100 with ETT 15L oxygen

## 2018-10-26 NOTE — SOCIAL WORK
SW following to monitor needs and assist as needed  Pt is currently intubated in ICU  SW familiar with pt from previous admission  [place]  Pt's son lived next door  SW will continue to follow to monitor progress and assist with planning once appropriate level of care is determined

## 2018-10-26 NOTE — CONSULTS
[facility] Consultation Note:    Requesting Physician: Nano Montano MD    Consulting Physician: Casper Couch MD    Chief Complaint/Reason for Consultation:  Chest wall abscess with gas on CT scan in subclavicular region    HPI:  13-year-old male with 3 competing potential sites of infection causing septic shock, complicated with the fact that he is in DKA despite his overall improving picture with closing anion gap  First is urosepsis  Second is potential pneumonia  And with the third being a secondarily infected zoster shingles with associated abscesses  Patient currently on 12 micrograms/minute of norepinephrine and 0 04 units of vasopressin per hour  Altered mental status is noted  In speaking to his family, they believe the patient would want surgery if at least 1 of these foci can be controlled  Patient currently noted to be DNR, but family understands that this includes the potential for prolonged intubation post surgery  PMH:  Past Medical History:   Diagnosis Date    CHF (congestive heart failure) ([facility] [place] [address] )     Diabetes mellitus ([facility] [place] [address] )     History of open heart surgery     Hyperlipidemia     Hypertension      PSH:  Past Surgical History:   Procedure Laterality Date    CARDIAC SURGERY       Home Meds:  No current facility-administered medications on file prior to encounter        Current Outpatient Prescriptions on File Prior to Encounter   Medication Sig Dispense Refill    aspirin 81 mg chewable tablet Chew 81 mg daily      atorvastatin (LIPITOR) 10 mg tablet Take 10 mg by mouth daily      docusate sodium (COLACE) 100 mg capsule Take 100 mg by mouth 2 (two) times a day      ferrous sulfate 325 (65 Fe) mg tablet Take 325 mg by mouth 3 (three) times a day with meals      glimepiride (AMARYL) 4 mg tablet Take 4 mg by mouth 2 (two) times a day with meals      insulin detemir (LEVEMIR) 100 units/mL subcutaneous injection Inject 20 Units under the skin daily at bedtime      lisinopril (ZESTRIL) 2 5 mg tablet Take 2 5 mg by mouth daily      metoprolol succinate (TOPROL-XL) 25 mg 24 hr tablet Take 1 tablet (25 mg total) by mouth daily 30 tablet 0    spironolactone (ALDACTONE) 25 mg tablet Take 0 5 tablets (12 5 mg total) by mouth daily 30 tablet 0    torsemide (DEMADEX) 10 mg tablet Take 1 tablet (10 mg total) by mouth daily  0     Allergies:  No Known Allergies    Social Hx:  Social History     Social History    Marital status: Single     Spouse name: N/A    Number of children: N/A    Years of education: N/A     Occupational History    Not on file  Social History Main Topics    Smoking status: Former Smoker     Types: Pipe    Smokeless tobacco: Never Used    Alcohol use No    Drug use: No    Sexual activity: Not on file     Other Topics Concern    Not on file     Social History Narrative    No narrative on file     Family Hx:    History reviewed  No pertinent family history  A  Bleeding diatheses: Mental status change precludes getting this information  B  Thrombophilias: Mental status change precludes getting this information  Review of Systems:  Mental status change precludes getting this information  Physical Examination:  1  Constitutional:   Vitals:    10/26/18 0430   BP: 105/61   Pulse: 105   Resp: 17   Temp:    SpO2: 95%   2  GEN: NAD  3  Eyes: PERRL, but only opens eyes to pain  4  ENT: Mucous membranes are dry  5  Neck: Unable to examine  6  CV: Tachycardia is noted  7  Pulm: Coarse bilaterally  8  Chest:  No obvious crepitance is felt, but certainly there is a scabbed over area with fluctuance and upon applying pressure to the area, there is definite purulence emanating from the crusted over areas with openings in the skin  The area corresponding to the gas on the CT scan in the subclavicular regions do not demonstrate any kind of crepitance or any overlying skin changes  Midline scar noted    9  Abdomen/GI:  Soft, nondistended  10  MSK: FROM, right femoral central line in place  11  Skin: Warm and dry  12  Neurologic: CN II-XI intact  13  Psychiatric: Unable to examine  Pertinent labs reviewed  Strangely in the face of an improving base deficit, the lactic acid has risen to 5 2  New lactic acid is currently pending  Last base deficit was 3 3  LFTs are within normal limits  INR is 1 09  Pertinent images and available reads personally reviewed    Pertinent notes reviewed    Assessment and Plan:  47-year-old male in septic shock with at least 3 foci of infection in the face of an improving DKA and ongoing resuscitation with dual vasopressors (NE and )  Have spoken with family and despite his being in DNR status, they believe that should Dennis Trevino have been of sound mind he would have chosen to go through with the surgery to at least take one of the factors off the table for at least bring it under control  His family members acknowledge that the procedure will most likely require intubation if not at least LMA and the patient may be on prolonged ventilator support and with no guarantee of coming off  In addition chances of mortality currently still remain high  I did tell the family that I would think that he would be able to survive the surgery, however in the postoperative period, with 2 other competing factors that could potentially kill the patient, there still exists a high chance of mortality and prolonged ICU course  They responded that Dennis Trevino is "a fighter" and would want all attempts made to improve his outcome  I have spoken with Dr Adam Carlin (Anesthesia) as well as Dr Sarita Rubin ([facility]) - we are assembling team for emergent trip to OR for debridement of chest wall  Spoke with Tasha Banks RN of OR to convey plan         Surgical plan: Status is emergentCurrently am considering going at the most fluctuant areas to drain the abscesses but also and considering raising a flap up to the subclavian region as there is gas in the subclavian area on the CT scan  Conversely, I might make an incision at the most fluctuant areas and then make a counter incision near the sub subclavicular regions to explore that area as well  Counseling / Coordination of Care  Total floor/unit time spent today 60 minutes  Greater than 50% of total time was spent with the patient and/or family counseling and/or coordination of care  A description of the counseling / coordination of care:  I performed an interim history, pertinent images and labs, performed a physical examination to arrive at the plan delineated above with associated thought processes  Plan conveyed to nurse Neva Zambrano, in person  Plan discussed with Terrence Lopez PA-C of primary team     Family members updated at the bedside - Elvira Villalba (son) and Elle Torres (daughter-in-law)  *The areas in bold, if present within the assessment and plan, are summary statements/bullet points for nursing and consultant/primary teams  * Points delineated in red, if present, are not points of contention and should not be taken by any practitioner, nurse, therapist, , or anyone else with access to the patient chart  Hand off errors are prevalent and therefore, any changes or updates shown in red are to communicate to the next practitioner, points not discussed in handout but can affect patient care and management (yet not important enough to wake, or disturb, or alert the next practitioner in an emergent fashion)  Portions of the record may have been created with voice recognition software  Occasional wrong word or "sound a like" substitutions may have occurred due to the inherent limitations of voice recognition software  Read the chart carefully and recognize, using context, where substitutions have occurred  RENETTA Corral    [address] [place] [address] [facility]  General, Trauma, and Acute Care Surgery  Surgical Critical Care  Office [phone]  Fax [phone]

## 2018-10-26 NOTE — PLAN OF CARE
Problem: DISCHARGE PLANNING - CARE MANAGEMENT  Goal: Discharge to post-acute care or home with appropriate resources  INTERVENTIONS:  - Conduct assessment to determine patient/family and health care team treatment goals, and need for post-acute services based on payer coverage, community resources, and patient preferences, and barriers to discharge  - Address psychosocial, clinical, and financial barriers to discharge as identified in assessment in conjunction with the patient/family and health care team  - Arrange appropriate level of post-acute services according to patients   needs and preference and payer coverage in collaboration with the physician and health care team  - Communicate with and update the patient/family, physician, and health care team regarding progress on the discharge plan  - Arrange appropriate transportation to post-acute venues  Outcome: Progressing  Pt is currently intubated in ICU  SW will continue to follow to monitor progress and assist with planning once appropriate level of care is determined

## 2018-10-26 NOTE — PROCEDURES
Arterial Line Insertion  Date/Time: 10/26/2018 7:13 AM  Performed by: Edu Darling  Authorized by: Tracey Villa     Patient location:  Bedside  Consent:     Consent obtained:  Written    Consent given by: Family     Risks discussed:  Bleeding, infection and pain  Universal protocol:     Procedure explained and questions answered to patient or proxy's satisfaction: yes      Relevant documents present and verified: yes      Test results available and properly labeled: yes      Radiology Images displayed and confirmed  If images not available, report reviewed: yes      Required blood products, implants, devices, and special equipment available: yes      Site/side marked: yes      Immediately prior to procedure a time out was called: yes      Patient identity confirmed:  Arm band  Indications:     Indications: hemodynamic monitoring, multiple ABGs and continuous blood pressure monitoring    Pre-procedure details:     Skin preparation:  Chlorhexidine    Preparation: Patient was prepped and draped in sterile fashion    Sedation:     Sedation type: none  Anesthesia (see MAR for exact dosages): Anesthesia method:  None  Procedure details:     Location / Tip of Catheter:  Radial    Laterality:  Right    Main's test performed: no      Needle gauge:  20 G    Placement technique:  Percutaneous    Number of attempts:  1    Successful placement: yes      Transducer: waveform confirmed    Post-procedure details:     Post-procedure:  Secured with tape, biopatch applied, sutured and sterile dressing applied    Patient tolerance of procedure:   Tolerated well, no immediate complications

## 2018-10-26 NOTE — RESPIRATORY THERAPY NOTE
RT Ventilator Management Note  Jovan Sep [de-identified] y o  male MRN: [id number]  Unit/Bed#: ICU 08 Encounter: [id number]  Patient intubated by anesthesia this am and transfer to OR safely  Patient received from OR and place on mechanical ventilation as documented on vent flow sheet vent order and x ray pending  Will continue to monitor      Daily Screen       10/26/2018 0925             Patient safety screen outcome[de-identified] Passed    Not Ready for Weaning due to[de-identified] FiO2 >60%            Physical Exam:

## 2018-10-26 NOTE — PROGRESS NOTES
Patient with failure to clear lactic acid despite adequate fluid resuscitation  Pressor requirements began to increase so stress dose hydrocortisone 50 mg IV Q6 hours ordered  At around 300 am, when lactic acid returned still higher, went to bedside to assess patient  Unfortunately, patient was only responsive to deeply painful stimuli  A STAT CT Head, CT CAP, and ABG ordered  Labs reviewed  I accompanied patient to CT Scans  On return from CT scan, patient with a slight interval improvement in mental status to the point of being able to say he was at the hospital and his name  CT head was negative  CT CAP showed several concerning findings including a large, dense consolidation in the right middle lung field, cystitis, but most concerningly, patient with multiple areas of skin thickening with associated subjacent complex fluid indicative of cellulitis and possible developing abscess and a few bubbles of air in the anterior left chest in the area of the subcutaneous tissues which raises suspicion for gas forming infection  Attending, Dr Brian Landa, and surgical on call, Dr Kristyn Rhoades notified  Dr Kristyn Rhoades arriving at bedside shortly  Unfortunately, in mean time, patient's mental status again has deteriorated and he is now only responsive to deep painful stimuli once again  I attempted to call patient's daughter-in-law, Bernie Molina and left a voicemail  I tried patient's son, Nilay Curtis, on his cell phone and once left a voice mail  I tried patient's son's home telephone number and did get through  I explained abrupt decompensation and CT findings  I requested him and his sister arrive at bedside for a discussion  I did also explain that patient's status continues to deteriorate, most notably his mental status  I confirmed level 3 code status and I explained that with his current mental status he is not protecting his airway and is at risk for aspiration and death   He expressed understanding and will arrive at bedside shortly      CC time: 45 minutes    Suha Davis

## 2018-10-27 PROBLEM — I50.40 COMBINED SYSTOLIC AND DIASTOLIC HEART FAILURE (HCC): Chronic | Status: ACTIVE | Noted: 2018-10-27

## 2018-10-27 PROBLEM — R53.83 LETHARGY: Status: RESOLVED | Noted: 2018-10-26 | Resolved: 2018-10-27

## 2018-10-27 PROBLEM — E87.1 HYPONATREMIA: Status: RESOLVED | Noted: 2018-05-09 | Resolved: 2018-10-27

## 2018-10-27 PROBLEM — N17.9 AKI (ACUTE KIDNEY INJURY) (HCC): Status: RESOLVED | Noted: 2018-05-09 | Resolved: 2018-10-27

## 2018-10-27 LAB
ALBUMIN SERPL BCP-MCNC: 1.6 G/DL (ref 3.5–5)
ALP SERPL-CCNC: 65 U/L (ref 46–116)
ALT SERPL W P-5'-P-CCNC: 12 U/L (ref 12–78)
ANION GAP SERPL CALCULATED.3IONS-SCNC: 7 MMOL/L (ref 4–13)
ARTERIAL PATENCY WRIST A: YES
AST SERPL W P-5'-P-CCNC: 17 U/L (ref 5–45)
BACTERIA WND AEROBE CULT: ABNORMAL
BASE EXCESS BLDA CALC-SCNC: 0.6 MMOL/L
BASOPHILS # BLD MANUAL: 0 THOUSAND/UL (ref 0–0.1)
BASOPHILS NFR MAR MANUAL: 0 % (ref 0–1)
BILIRUB SERPL-MCNC: 0.6 MG/DL (ref 0.2–1)
BODY TEMPERATURE: 97.9 DEGREES FEHRENHEIT
BUN SERPL-MCNC: 42 MG/DL (ref 5–25)
CALCIUM SERPL-MCNC: 7.7 MG/DL (ref 8.3–10.1)
CHLORIDE SERPL-SCNC: 103 MMOL/L (ref 100–108)
CK SERPL-CCNC: 49 U/L (ref 39–308)
CO2 SERPL-SCNC: 25 MMOL/L (ref 21–32)
CREAT SERPL-MCNC: 1.11 MG/DL (ref 0.6–1.3)
EOSINOPHIL # BLD MANUAL: 0 THOUSAND/UL (ref 0–0.4)
EOSINOPHIL NFR BLD MANUAL: 0 % (ref 0–6)
ERYTHROCYTE [DISTWIDTH] IN BLOOD BY AUTOMATED COUNT: 13.4 % (ref 11.6–15.1)
GFR SERPL CREATININE-BSD FRML MDRD: 62 ML/MIN/1.73SQ M
GLUCOSE SERPL-MCNC: 117 MG/DL (ref 65–140)
GLUCOSE SERPL-MCNC: 130 MG/DL (ref 65–140)
GLUCOSE SERPL-MCNC: 136 MG/DL (ref 65–140)
GLUCOSE SERPL-MCNC: 141 MG/DL (ref 65–140)
GLUCOSE SERPL-MCNC: 142 MG/DL (ref 65–140)
GLUCOSE SERPL-MCNC: 173 MG/DL (ref 65–140)
GLUCOSE SERPL-MCNC: 174 MG/DL (ref 65–140)
GLUCOSE SERPL-MCNC: 179 MG/DL (ref 65–140)
GLUCOSE SERPL-MCNC: 179 MG/DL (ref 65–140)
GLUCOSE SERPL-MCNC: 195 MG/DL (ref 65–140)
GLUCOSE SERPL-MCNC: 254 MG/DL (ref 65–140)
GLUCOSE SERPL-MCNC: 320 MG/DL (ref 65–140)
GRAM STN SPEC: ABNORMAL
HCO3 BLDA-SCNC: 24.3 MMOL/L (ref 22–28)
HCT VFR BLD AUTO: 23.4 % (ref 36.5–49.3)
HGB BLD-MCNC: 8 G/DL (ref 12–17)
HOROWITZ INDEX BLDA+IHG-RTO: 40 MM[HG]
LYMPHOCYTES # BLD AUTO: 0.17 THOUSAND/UL (ref 0.6–4.47)
LYMPHOCYTES # BLD AUTO: 1 % (ref 14–44)
MACROCYTES BLD QL AUTO: PRESENT
MAGNESIUM SERPL-MCNC: 2.1 MG/DL (ref 1.6–2.6)
MCH RBC QN AUTO: 35.2 PG (ref 26.8–34.3)
MCHC RBC AUTO-ENTMCNC: 34.2 G/DL (ref 31.4–37.4)
MCV RBC AUTO: 103 FL (ref 82–98)
MONOCYTES # BLD AUTO: 0.87 THOUSAND/UL (ref 0–1.22)
MONOCYTES NFR BLD: 5 % (ref 4–12)
MRSA NOSE QL CULT: NORMAL
NEUTROPHILS # BLD MANUAL: 16.31 THOUSAND/UL (ref 1.85–7.62)
NEUTS BAND NFR BLD MANUAL: 23 % (ref 0–8)
NEUTS SEG NFR BLD AUTO: 71 % (ref 43–75)
NRBC BLD AUTO-RTO: 0 /100 WBCS
O2 CT BLDA-SCNC: 11.9 ML/DL (ref 16–23)
OXYHGB MFR BLDA: 97.3 % (ref 94–97)
PCO2 BLDA: 34.9 MM HG (ref 36–44)
PCO2 TEMP ADJ BLDA: 34.3 MM HG (ref 36–44)
PEEP RESPIRATORY: 5 CM[H2O]
PH BLD: 7.47 [PH] (ref 7.35–7.45)
PH BLDA: 7.46 [PH] (ref 7.35–7.45)
PHOSPHATE SERPL-MCNC: 2.8 MG/DL (ref 2.3–4.1)
PLATELET # BLD AUTO: 256 THOUSANDS/UL (ref 149–390)
PLATELET BLD QL SMEAR: ADEQUATE
PMV BLD AUTO: 10.6 FL (ref 8.9–12.7)
PO2 BLD: 131.8 MM HG (ref 75–129)
PO2 BLDA: 134.2 MM HG (ref 75–129)
POTASSIUM SERPL-SCNC: 4.3 MMOL/L (ref 3.5–5.3)
PROT SERPL-MCNC: 5 G/DL (ref 6.4–8.2)
RBC # BLD AUTO: 2.27 MILLION/UL (ref 3.88–5.62)
RBC MORPH BLD: PRESENT
SODIUM SERPL-SCNC: 135 MMOL/L (ref 136–145)
SPECIMEN SOURCE: ABNORMAL
TOTAL CELLS COUNTED SPEC: 100
TOXIC GRANULES BLD QL SMEAR: PRESENT
VENT AC: 16
VENT- AC: AC
VT SETTING VENT: 450 ML
WBC # BLD AUTO: 17.35 THOUSAND/UL (ref 4.31–10.16)

## 2018-10-27 PROCEDURE — 84100 ASSAY OF PHOSPHORUS: CPT | Performed by: NURSE PRACTITIONER

## 2018-10-27 PROCEDURE — 82805 BLOOD GASES W/O2 SATURATION: CPT | Performed by: NURSE PRACTITIONER

## 2018-10-27 PROCEDURE — 99291 CRITICAL CARE FIRST HOUR: CPT | Performed by: ANESTHESIOLOGY

## 2018-10-27 PROCEDURE — 94760 N-INVAS EAR/PLS OXIMETRY 1: CPT

## 2018-10-27 PROCEDURE — 85007 BL SMEAR W/DIFF WBC COUNT: CPT | Performed by: NURSE PRACTITIONER

## 2018-10-27 PROCEDURE — 82948 REAGENT STRIP/BLOOD GLUCOSE: CPT

## 2018-10-27 PROCEDURE — 85027 COMPLETE CBC AUTOMATED: CPT | Performed by: NURSE PRACTITIONER

## 2018-10-27 PROCEDURE — 94003 VENT MGMT INPAT SUBQ DAY: CPT

## 2018-10-27 PROCEDURE — 93005 ELECTROCARDIOGRAM TRACING: CPT

## 2018-10-27 PROCEDURE — 80053 COMPREHEN METABOLIC PANEL: CPT | Performed by: NURSE PRACTITIONER

## 2018-10-27 PROCEDURE — 82550 ASSAY OF CK (CPK): CPT | Performed by: NURSE PRACTITIONER

## 2018-10-27 PROCEDURE — 83735 ASSAY OF MAGNESIUM: CPT | Performed by: NURSE PRACTITIONER

## 2018-10-27 PROCEDURE — 99232 SBSQ HOSP IP/OBS MODERATE 35: CPT | Performed by: SURGERY

## 2018-10-27 RX ORDER — FENTANYL CITRATE 50 UG/ML
50 INJECTION, SOLUTION INTRAMUSCULAR; INTRAVENOUS EVERY 2 HOUR PRN
Status: DISCONTINUED | OUTPATIENT
Start: 2018-10-27 | End: 2018-10-28

## 2018-10-27 RX ORDER — INSULIN GLARGINE 100 [IU]/ML
20 INJECTION, SOLUTION SUBCUTANEOUS
Status: DISCONTINUED | OUTPATIENT
Start: 2018-10-27 | End: 2018-10-29

## 2018-10-27 RX ORDER — FENTANYL CITRATE 50 UG/ML
50 INJECTION, SOLUTION INTRAMUSCULAR; INTRAVENOUS EVERY 2 HOUR PRN
Status: DISCONTINUED | OUTPATIENT
Start: 2018-10-27 | End: 2018-10-27

## 2018-10-27 RX ORDER — OXYCODONE HYDROCHLORIDE 5 MG/1
5 TABLET ORAL EVERY 4 HOURS PRN
Status: DISCONTINUED | OUTPATIENT
Start: 2018-10-27 | End: 2018-10-31

## 2018-10-27 RX ADMIN — THIAMINE HYDROCHLORIDE 200 MG: 100 INJECTION, SOLUTION INTRAMUSCULAR; INTRAVENOUS at 21:16

## 2018-10-27 RX ADMIN — ACYCLOVIR SODIUM 700 MG: 50 INJECTION, SOLUTION INTRAVENOUS at 01:18

## 2018-10-27 RX ADMIN — VANCOMYCIN HYDROCHLORIDE 1000 MG: 1 INJECTION, SOLUTION INTRAVENOUS at 17:41

## 2018-10-27 RX ADMIN — HYDROCORTISONE SODIUM SUCCINATE 50 MG: 100 INJECTION, POWDER, FOR SOLUTION INTRAMUSCULAR; INTRAVENOUS at 00:05

## 2018-10-27 RX ADMIN — FENTANYL CITRATE 25 MCG/HR: 50 INJECTION, SOLUTION INTRAMUSCULAR; INTRAVENOUS at 11:59

## 2018-10-27 RX ADMIN — Medication 0.04 UNITS/MIN: at 21:15

## 2018-10-27 RX ADMIN — ACYCLOVIR SODIUM 700 MG: 50 INJECTION, SOLUTION INTRAVENOUS at 18:07

## 2018-10-27 RX ADMIN — CEFEPIME HYDROCHLORIDE 1000 MG: 1 INJECTION, SOLUTION INTRAVENOUS at 18:00

## 2018-10-27 RX ADMIN — PROPOFOL 5 MCG/KG/MIN: 10 INJECTION, EMULSION INTRAVENOUS at 05:33

## 2018-10-27 RX ADMIN — COLLAGENASE SANTYL 1 APPLICATION: 250 OINTMENT TOPICAL at 08:54

## 2018-10-27 RX ADMIN — EPINEPHRINE 5 MCG/MIN: 1 INJECTION, SOLUTION INTRAMUSCULAR; SUBCUTANEOUS at 08:22

## 2018-10-27 RX ADMIN — NOREPINEPHRINE BITARTRATE 13 MCG/MIN: 1 INJECTION INTRAVENOUS at 04:26

## 2018-10-27 RX ADMIN — HYDROCORTISONE SODIUM SUCCINATE 50 MG: 100 INJECTION, POWDER, FOR SOLUTION INTRAMUSCULAR; INTRAVENOUS at 12:00

## 2018-10-27 RX ADMIN — ASCORBIC ACID: 500 INJECTION, SOLUTION INTRAMUSCULAR; INTRAVENOUS; SUBCUTANEOUS at 20:00

## 2018-10-27 RX ADMIN — Medication 20 MG: at 08:53

## 2018-10-27 RX ADMIN — INSULIN GLARGINE 20 UNITS: 100 INJECTION, SOLUTION SUBCUTANEOUS at 21:42

## 2018-10-27 RX ADMIN — CLINDAMYCIN IN 5 PERCENT DEXTROSE 600 MG: 12 INJECTION, SOLUTION INTRAVENOUS at 19:57

## 2018-10-27 RX ADMIN — HYDROCORTISONE SODIUM SUCCINATE 50 MG: 100 INJECTION, POWDER, FOR SOLUTION INTRAMUSCULAR; INTRAVENOUS at 07:47

## 2018-10-27 RX ADMIN — CLINDAMYCIN IN 5 PERCENT DEXTROSE 600 MG: 12 INJECTION, SOLUTION INTRAVENOUS at 13:03

## 2018-10-27 RX ADMIN — Medication 0.04 UNITS/MIN: at 13:00

## 2018-10-27 RX ADMIN — CLINDAMYCIN IN 5 PERCENT DEXTROSE 600 MG: 12 INJECTION, SOLUTION INTRAVENOUS at 04:26

## 2018-10-27 RX ADMIN — ASPIRIN 81 MG 81 MG: 81 TABLET ORAL at 08:53

## 2018-10-27 RX ADMIN — HYDROCORTISONE SODIUM SUCCINATE 50 MG: 100 INJECTION, POWDER, FOR SOLUTION INTRAMUSCULAR; INTRAVENOUS at 18:07

## 2018-10-27 RX ADMIN — ACYCLOVIR SODIUM 700 MG: 50 INJECTION, SOLUTION INTRAVENOUS at 10:21

## 2018-10-27 RX ADMIN — NOREPINEPHRINE BITARTRATE 4 MCG/MIN: 1 INJECTION INTRAVENOUS at 21:16

## 2018-10-27 RX ADMIN — HEPARIN SODIUM 5000 UNITS: 5000 INJECTION, SOLUTION INTRAVENOUS; SUBCUTANEOUS at 13:54

## 2018-10-27 RX ADMIN — HEPARIN SODIUM 5000 UNITS: 5000 INJECTION, SOLUTION INTRAVENOUS; SUBCUTANEOUS at 21:42

## 2018-10-27 RX ADMIN — ASCORBIC ACID: 500 INJECTION, SOLUTION INTRAMUSCULAR; INTRAVENOUS; SUBCUTANEOUS at 08:53

## 2018-10-27 RX ADMIN — HEPARIN SODIUM 5000 UNITS: 5000 INJECTION, SOLUTION INTRAVENOUS; SUBCUTANEOUS at 05:33

## 2018-10-27 RX ADMIN — Medication 0.04 UNITS/MIN: at 00:19

## 2018-10-27 RX ADMIN — THIAMINE HYDROCHLORIDE 200 MG: 100 INJECTION, SOLUTION INTRAMUSCULAR; INTRAVENOUS at 09:30

## 2018-10-27 RX ADMIN — CHLORHEXIDINE GLUCONATE 0.12% ORAL RINSE 15 ML: 1.2 LIQUID ORAL at 08:53

## 2018-10-27 RX ADMIN — ASCORBIC ACID: 500 INJECTION, SOLUTION INTRAMUSCULAR; INTRAVENOUS; SUBCUTANEOUS at 15:20

## 2018-10-27 NOTE — PLAN OF CARE
CARDIOVASCULAR - ADULT     Maintains optimal cardiac output and hemodynamic stability Progressing     Absence of cardiac dysrhythmias or at baseline rhythm Progressing        DISCHARGE PLANNING     Discharge to home or other facility with appropriate resources Progressing        DISCHARGE PLANNING - CARE MANAGEMENT     Discharge to post-acute care or home with appropriate resources Progressing        GENITOURINARY - ADULT     Maintains or returns to baseline urinary function Progressing     Urinary catheter remains patent Progressing        INFECTION - ADULT     Absence or prevention of progression during hospitalization Progressing        Knowledge Deficit     Patient/family/caregiver demonstrates understanding of disease process, treatment plan, medications, and discharge instructions Progressing        METABOLIC, FLUID AND ELECTROLYTES - ADULT     Electrolytes maintained within normal limits Progressing     Fluid balance maintained Progressing     Glucose maintained within target range Progressing        Nutrition/Hydration-ADULT     Nutrient/Hydration intake appropriate for improving, restoring or maintaining nutritional needs Progressing        PAIN - ADULT     Verbalizes/displays adequate comfort level or baseline comfort level Progressing        Potential for Falls     Patient will remain free of falls Progressing        Prexisting or High Potential for Compromised Skin Integrity     Skin integrity is maintained or improved Progressing        RESPIRATORY - ADULT     Achieves optimal ventilation and oxygenation Progressing        SAFETY ADULT     Maintain or return to baseline ADL function Progressing     Maintain or return mobility status to optimal level Progressing        SAFETY,RESTRAINT: NV/NON-SELF DESTRUCTIVE BEHAVIOR     Remains free of harm/injury (restraint for non violent/non self-detsructive behavior) Progressing     Returns to optimal restraint-free functioning Progressing SKIN/TISSUE INTEGRITY - ADULT     Incision(s), wounds(s) or drain site(s) healing without S/S of infection Progressing

## 2018-10-27 NOTE — PROGRESS NOTES
General Surgery Progress Note    Chief Complaint:   1  Septic shock  2  Urosepsis  3  Pneumonia  4  Secondarily infected Shingles with underlying abscesses of chest  5  Left upper back wound with no underlying abscess or purulence, but with slough    Subjective/24 hour/interim events:  Nav Mao is doing slightly better today with the overall pressor requirement down  Ongoing resuscitation  Objective:  /67 (BP Location: Right arm)   Pulse 83   Temp 98 4 °F (36 9 °C) (Temporal)   Resp 16   Ht 5' 8 5" (1 74 m)   Wt 78 2 kg (172 lb 6 4 oz)   SpO2 99%   BMI 25 83 kg/m²   General: Sedated  CV: RRR  Pulm: Coarse, intubated, on AC/VC  Chest/Back:  The chest wound has the wound VAC in place with no signs of leak  The wound VAC in and of itself has a serosanguineous output  No purulence is noted  Especially in the posterior aspect of the axillary region, there is no evolving fluctuance noted  In the left upper back, the demarcated (with pen) area, has not been violated and does not show any evolving cellulitis/worsening cellulitis or any kind of fluctuance  Certainly there was no purulence has noted on the other side  There is some crusting of the previous zoster wounds  Mild fibrinous slough is noted in the area  Pertinent labs reviewed  BE of 0 6 mEq  WBC of 17 35 from 17 71  Pertinent images and available reads personally reviewed  CT scan of chest area re-reviewed  No collections noted on the back (left upper area)  Pertinent notes reviewed    Assessment and Plan:  1  Wound care orders placed for Santyl to back (and to cover with Large Allevyn/Mepilex) - sloughing is in combination with the scabbing from the Zoster - no collections on CT (for back) and no fluctuance on examination  No purulence noted  Will clean up in OR on Monday (10/29/2018)  2  For the chest, will plan on second look and VAC change on Monday (10/29/2018)     3  Continue Abx and ongoing resuscitation per ICU team  Counseling / Coordination of Care  Total floor/unit time spent today 20 minutes  Greater than 50% of total time was spent with the patient and/or family counseling and/or coordination of care  A description of the counseling / coordination of care:  I performed an interim history, pertinent images and labs, performed a physical examination to arrive at the plan delineated above with associated thought processes  Plan conveyed to nurse Alfonso Gorman, in person  Plan discussed with Kristen Castillo MD (Critical Care) of primary team     Family member (son), Colonel Gduino, contacted at [phone] for update  *The areas in bold, if present within the assessment and plan, are summary statements/bullet points for nursing and consultant/primary teams  * Points delineated in red, if present, are not points of contention and should not be taken by any practitioner, nurse, therapist, , or anyone else with access to the patient chart  Hand off errors are prevalent and therefore, any changes or updates shown in red are to communicate to the next practitioner, points not discussed in handout but can affect patient care and management (yet not important enough to wake, or disturb, or alert the next practitioner in an emergent fashion)  RENETTA Sloan    [address] [place] [address] [facility]  Office [phone]  Fax [phone]

## 2018-10-27 NOTE — PROGRESS NOTES
Daily Progress Note - Critical Care/ Stepdown   Rasheeda Roman [de-identified] y o  male MRN: [id number]  Unit/Bed#: ICU 08 Encounter: [id number]    ______________________________________________________________________  Assessment:   Principal Problem:    Septic shock ([facility] )  Active Problems:    Shingles    CHRIS (acute kidney injury) ([facility] )    Acute respiratory failure secondary to septic shock    Urinary tract infection    Pneumonia due to infectious organism    Necrotizing cellulitis of chest wall    Hyponatremia    Acute cystitis with hematuria    Lethargy    Atrial fibrillation ([facility])    CKD (chronic kidney disease) stage 3, GFR 30-59 ml/min ([facility])    Cardiomyopathy, ischemic    DM2 (diabetes mellitus, type 2) ([facility])    CAD (coronary artery disease)  Resolved Problems:    Lactic acid acidosis    DKA (diabetic ketoacidoses) ([facility])    Sepsis ([facility])    Cellulitis of left axilla      * Septic shock ([facility])   Assessment & Plan    · Source likely presumed necrotizing cellulitis of chest wall with pneumonia and UTI  · Fluid resuscitation with 30ml/kg  · Given concomitant DKA, will likely require greater than 30 mL/kg fluid resuscitation  · Started on levophed in ER, now on levophed and vasopressin, started on Stress Dose Steroids  · Lactic acid normalized  · Patient received cefepime, vancomycin, clindamycin and acyclovir  Will continue  · Performed aggressive debridement in OR 10/26 of chest wall cellulitis, wound vac in place intubated prior to procedure  · Blood cultures in process  · Wound culture in process  · Urine cultures pending       Shingles   Assessment & Plan    · Continue acyclovir     Acute respiratory failure secondary to septic shock   Assessment & Plan    · Likely compensatory secondary to septic shock  · Intubated  · Repeat ABG at 1600     CHRIS (acute kidney injury) ([facility] )   Assessment & Plan    · Likely secondary to septic shock and DKA    · Improving with fluids  · FENa consistent with Pre-renal cause  · Continue fluid resuscitation and monitor urine output  · Gordon catheter in place     Lactic acid acidosisresolved as of 10/26/2018   Assessment & Plan    · Secondary to septic shock and DKA, trend  · Still tending upward, etiology unclear  · CT CAP showing:  Left pectoral cellulitis with gas-forming pockets and right lower lobe pneumonia  · Continue to trend  · Dose with Thiamine  · Surgery consulted, went to OR for I%D  · Continue with vancomycin, clindamycin, cefepime, acyclovir     Urinary tract infection   Assessment & Plan    · Urinalysis concerning for possible urinary tract infection  Urine culture in process    Continue antibiotics     DKA (diabetic ketoacidoses) (HCC)resolved as of 10/26/2018   Assessment & Plan    Lab Results   Component Value Date    HGBA1C 11 8 (H) 05/15/2018       Recent Labs      10/25/18   1503   POCGLU  >500*       Blood Sugar Average: Last 72 hrs:  (P) 0     · Glucose found to be greater than 1000  · Acetone present and blood, acidosis present on VBG  · Continue fluid resuscitation and insulin infusion  · Monitor BMP for gap closure and electrolytes     Pneumonia due to infectious organism   Assessment & Plan    · Continue to treat with vancomycin, cefepime, clindamycin     Necrotizing cellulitis of chest wall   Assessment & Plan    · Aggressive  debridement in OR on 10/26 by Dr Verito Figueroa, wound VAC in place  · Continue with antibiotics:  Vancomycin, cefepime, clindamycin, and acyclovir for HSV infection  · Plan to return to OR on Monday       Hyponatremia   Assessment & Plan    · Improvings slowly  · Continue Q4 and avoid abrupt correction  · Continue follow closely with fluid resuscitation  · Likely hypovolemic hyponatremia in the setting of dehydration, sepsis, and DKA     Acute cystitis with hematuria   Assessment & Plan    Continue antibiotic regimen with vancomycin, cefepime, clindamycin, urine culture pending     Lethargy   Assessment & Plan    - VBG/ABG without indication of hypercarbia  - CT head negative for acute findings  - Ammonia normal  - Likely 2/2 septic shock, DKA, and resultant metabolic derangements with Pneumonia, UTI,  and multiple infected chest wall abcesses        Atrial fibrillation (HCC)   Assessment & Plan    · History of AFib in the setting of electrolyte abnormalities in the past   Monitor on telemetry  · Current Sinus Tachy  · Holding BB 2/2 hypotension     CKD (chronic kidney disease) stage 3, GFR 30-59 ml/min ([facility])   Assessment & Plan    Baseline 0 9-1 3  UOP Brisk overnight  Follow up AM Renal indices     Cardiomyopathy, ischemic   Assessment & Plan    · EF 35 to 40%  Recent stress test in May of 2018 showed reversible ischemic changes  Cardiology following as an outpatient with medical management    · ECHO today     CAD (coronary artery disease)   Assessment & Plan    Troponin negative  Will obtain ECHO today     DM2 (diabetes mellitus, type 2) ([facility] )   Assessment & Plan    Lab Results   Component Value Date    HGBA1C 11 8 (H) 05/15/2018       Recent Labs      10/25/18   1503   POCGLU  >500*     DKA resolved, will continue on Alg 1 insulin drip with D5LR at 125cc/hr, blood sugar checks every 2 hours  K and Mag repleted  BMP at 4pm               Plan:    Neuro:   · Sedation plan: Fentanyl and Propofol  · RASS goal: 0 Alert and Calm  · Sedation break plan: daily  · Delirium: CAM ICU positive no   · If yes, intervention: environmental controls  · Pain controlled with: prn fentanyl  · Pain score: mild  · Regulate sleep/wake cycle    CV:   · Cardiac infusions: Levophed, 12 mcg/min, Vasopressin, 0 04 Units/min  · Rhythm: NSR  · Follow rhythm on telemetry    Pulm:   · SBT plan: daily  · Chest PT ordered: no   · Chlorhexidine ordered: yes   · HOB >30 degrees: yes     GI:   · Nutrition/diet plan: tubefeeding  · Stress ulcer prophylaxis: Omeprazole PO  · Bowel regimen: None currently  · Last BM:     FEN:   · Fluid/Diuretic plan: No intervention  · Goal 24 hour fluid balance: even  · Electrolytes repleted: yes  · Goal: K >4 0, Mag >2 0, and Phos >3 0    :   · Indwelling Gordon present: yes   · Urinary catheter no longer needed  Will place order to D/C  ID:   · Abx ordered: Cefepime, Vanco and clindamycin, acyclovir  · Trend temps and WBC count    Heme:   · Trend hgb and plts    Endo:   · Glycemic control plan: Blood glucose controlled on current regimen    Msk/Skin:  · Mobility goal:   · PT consult: no  · OT consult: no  · Frequent turning and off-loading    Family:  · Family updated within 24 hours: yes   · Family meeting planned today: no     Lines:  · Central venous access: triple lumen catheter - 16 cm  · Keep central line today for meds requiring central line  · Arterial line: Assessed  Continued for the following reasons BP monitoring  VTE Prophylaxis:  · Pharmacologic Prophylaxis: Heparin  · Mechanical Prophylaxis: sequential compression device    Disposition: Continue ICU care    Code Status: Level 2 - DNAR: but accepts endotracheal intubation    Counseling / Coordination of Care  Total Critical Care time spent 42 minutes excluding procedures, teaching and family updates      ______________________________________________________________________    HPI/24hr events: no acute events overnight    ______________________________________________________________________    Physical Exam:   Physical Exam   physical exam deferred for personal safety given patient being treated for shingles    ______________________________________________________________________  Vitals:    10/27/18 0600 10/27/18 0630 10/27/18 0702 10/27/18 0741   BP:       BP Location:       Pulse: 86 83     Resp: 16 16     Temp:   98 4 °F (36 9 °C)    TempSrc:   Temporal    SpO2: 99% 99%  99%   Weight:       Height:         Arterial Line BP: 124/49  Arterial Line MAP (mmHg): 70 mmHg     Temperature:   Temp (24hrs), Av 8 °F (36 6 °C), Min:96 8 °F (36 °C), Max:98 4 °F (36 9 °C)    Current Temperature: 98 4 °F (36 9 °C)    Weights:   IBW: 69 55 kg    Body mass index is 25 83 kg/m²    Weight (last 2 days)     Date/Time   Weight    10/27/18 0556  78 2 (172 4)    10/25/18 1952  71 1 (156 75)    10/25/18 1507  72 6 (160)              Hemodynamic Monitoring:  N/A  CVP:  [6 mmHg-7 mmHg] 7 mmHg  CO:  [5 2 L/min-6 L/min] 5 2 L/min  CI:  [2 8 L/min/m2-3 2 L/min/m2] 2 8 L/min/m2    Non-Invasive/Invasive Ventilation Settings:  Respiratory    Lab Data (Last 4 hours)      10/27 0429            pH, Arterial       (!)7 461           pCO2, Arterial       (!)34 9           pO2, Arterial       (!)134 2           HCO3, Arterial       24 3           Base Excess, Arterial       0 6                O2/Vent Data       10/27 0404   Most Recent         Vent Mode AC/VC  AC/VC      Resp Rate (BPM) (BPM) 16  16      Vt (mL) (mL) 450  450      FIO2 (%) (%) 40  40      PEEP (cmH2O) (cmH2O) 5  5      Patient safety screen outcome:   Passed      MV 7 6  8 54                Lab Results   Component Value Date    PHART 7 461 (H) 10/27/2018    KWV4MII 34 9 (L) 10/27/2018    PO2ART 134 2 (H) 10/27/2018    FPF9NEM 24 3 10/27/2018    BEART 0 6 10/27/2018    SOURCE Line, Arterial 10/27/2018     SpO2: SpO2: 99 %, SpO2 Activity: SpO2 Activity: At Rest    Intake and Outputs:  I/O       10/25 0701 - 10/26 0700 10/26 0701 - 10/27 0700 10/27 0701 - 10/28 0700    I V  (mL/kg) 1071 4 (15 1) 4885 4 (62 5)     IV Piggyback 5600 1300     Feedings  280     Total Intake(mL/kg) 6671 4 (93 8) 6465 4 (82 7)     Urine (mL/kg/hr) 2200 2540 (1 4)     Emesis/NG output  300     Blood  100     Total Output 2200 2940      Net +4471 4 +3525 4                     Nutrition:        Diet Orders            Start     Ordered    10/26/18 1152  Diet Enteral/Parenteral; Tube Feeding No Oral Diet; Jevity 1 2 Omid; Continuous; 20  Diet effective now     Question Answer Comment   Diet Type Enteral/Parenteral    Enteral/Parenteral Tube Feeding No Oral Diet    Tube Feeding Formula: Jevity 1 2 Omid    Bolus/Cyclic/Continuous Continuous    Tube Feeding Goal Rate (mL/hr): 20    RD to adjust diet per protocol? No        10/26/18 1151    10/25/18 1947  Room Service  Once     Question:  Type of Service  Answer:  Room Service - Appropriate with Assistance    10/25/18 1946            Labs:     Results from last 7 days  Lab Units 10/27/18  0428 10/26/18  1003 10/26/18  0817  10/26/18  0523   WBC Thousand/uL 17 35* 17 71*  --   --  17 71*   HEMOGLOBIN g/dL 8 0* 9 3*  --   --  9 4*   I STAT HEMOGLOBIN g/dl  --   --  7 8*  < >  --    HEMATOCRIT % 23 4* 25 8* 23*  < > 26 4*   PLATELETS Thousands/uL 256 294  --   --  324   NEUTROS PCT %  --  89*  --   --   --    MONOS PCT %  --  4  --   --   --    MONO PCT MAN % 5  --   --   --  5   < > = values in this interval not displayed      Results from last 7 days  Lab Units 10/27/18  0428 10/26/18  2053 10/26/18  1614 10/26/18  1004 10/26/18  0817 10/26/18  0732 10/26/18  0525   SODIUM mmol/L 135* 132* 132* 134*  --   --  130*   POTASSIUM mmol/L 4 3 3 9 4 2 3 4*  --   --  3 9   CHLORIDE mmol/L 103 101 101 101  --   --  98*   CO2 mmol/L 25 22 23 22  --   --  22   BUN mg/dL 42* 55* 60* 66*  --   --  83*   CREATININE mg/dL 1 11 1 30 1 42* 1 49*  --   --  1 87*   CALCIUM mg/dL 7 7* 7 8* 7 8* 8 2*  --   --  8 3   ALK PHOS U/L 65  --   --  67  --   --  68   ALT U/L 12  --   --  12  --   --  12   AST U/L 17  --   --  14  --   --  15   GLUCOSE, ISTAT mg/dl  --   --   --   --  115 120  --        Results from last 7 days  Lab Units 10/27/18  0428 10/26/18  1614 10/26/18  1004   MAGNESIUM mg/dL 2 1 2 1 2 3     Lab Results   Component Value Date    PHOS 2 8 10/27/2018    PHOS 1 9 (L) 10/26/2018    PHOS 1 8 (L) 10/26/2018        Results from last 7 days  Lab Units 10/26/18  0526   INR  1 09   PTT seconds 27       0  Lab Value Date/Time   TROPONINI <0 02 10/25/2018 1534   TROPONINI <0 02 05/09/2018 1927       Results from last 7 days  Lab Units 10/26/18  1706 10/26/18  1004 10/26/18  0528   LACTIC ACID mmol/L 2 0 1 8 3 3*     ABG:  Lab Results   Component Value Date    PHART 7 461 (H) 10/27/2018    [id number] 34 9 (L) 10/27/2018    PO2ART 134 2 (H) 10/27/2018    [id number] 24 3 10/27/2018    BEART 0 6 10/27/2018    SOURCE Line, Arterial 10/27/2018       Imaging:   EKG: nsr    Micro:  Lab Results   Component Value Date    BLOODCX Staphylococcus aureus (A) 10/25/2018    BLOODCX No Growth at 24 hrs   10/25/2018    URINECX >100,000 cfu/ml  10/25/2018    URINECX >100,000 cfu/ml Klebsiella oxytoca (A) 05/09/2018    WOUNDCULT 4+ Growth of Staphylococcus aureus (A) 10/25/2018       Allergies: No Known Allergies  Medications:   Scheduled Meds:  Current Facility-Administered Medications:  acyclovir 10 mg/kg (Ideal) Intravenous Q8H Rhea John PA-C Last Rate: Stopped (10/27/18 0218)   aspirin 81 mg Oral Daily Rhea John PA-C    atorvastatin 10 mg Oral Daily Rhea John PA-C    cefepime 1,000 mg Intravenous Q24H Rhea John PA-C Last Rate: Stopped (10/26/18 1714)   chlorhexidine 15 mL Swish & Spit Q12H [address] MIGUEL Kyle    clindamycin 600 mg Intravenous Messedamm Chad, PA-C Last Rate: Stopped (10/27/18 0456)   collagenase 1 application Topical Daily Yulia Sorensen MD    epinephrine 1-20 mcg/min Intravenous Titrated Rhea John PA-C    fentaNYL 25 mcg/hr Intravenous Continuous Nakita BarryMIGUEL proctor Last Rate: 25 mcg/hr (10/26/18 1104)   heparin (porcine) 5,000 Units Subcutaneous Q8H [address] Rhea John PA-C    hydrocortisone sodium succinate 50 mg Intravenous Q6H Yu Nice PA-C    influenza vaccine 0 5 mL Intramuscular Prior to discharge Saul Benavidez MD    insulin regular (HumuLIN R,NovoLIN R) infusion 0 3-21 Units/hr Intravenous Titrated Emiliano Montoya MD Last Rate: 0 5 Units/hr (10/27/18 0604)   norepinephrine 1-30 mcg/min Intravenous Titrated Dorrene MIGUEL Reddy Last Rate: 12 mcg/min (10/27/18 8329)   omeprazole (PRILOSEC) suspension 2 mg/mL 20 mg Oral Daily MIGUEL Kyle    propofol 5-50 mcg/kg/min Intravenous Titrated Felicie Sarahi, MIGUEL Last Rate: 5 mcg/kg/min (10/27/18 0533)   vancomycin 15 mg/kg Intravenous Q24H Rhea John PA-C Last Rate: Stopped (10/26/18 1844)   vasopressin (PITRESSIN) in 0 9 % sodium chloride 100 mL 0 04 Units/min Intravenous Continuous Christian Odonnell PA-C Last Rate: 0 04 Units/min (10/27/18 0019)     Continuous Infusions:  epinephrine 1-20 mcg/min    fentaNYL 25 mcg/hr Last Rate: 25 mcg/hr (10/26/18 1104)   insulin regular (HumuLIN R,NovoLIN R) infusion 0 3-21 Units/hr Last Rate: 0 5 Units/hr (10/27/18 0604)   norepinephrine 1-30 mcg/min Last Rate: 12 mcg/min (10/27/18 0533)   propofol 5-50 mcg/kg/min Last Rate: 5 mcg/kg/min (10/27/18 0533)   vasopressin (PITRESSIN) in 0 9 % sodium chloride 100 mL 0 04 Units/min Last Rate: 0 04 Units/min (10/27/18 0019)     PRN Meds:    influenza vaccine 0 5 mL Prior to discharge       Invasive lines and devices: Invasive Devices     Central Venous Catheter Line            CVC Central Lines 10/25/18 Triple Right Femoral 1 day          Peripheral Intravenous Line            Peripheral IV 10/25/18 Left Antecubital 1 day    Peripheral IV 10/25/18 Left Forearm 1 day    Peripheral IV 10/25/18 Right Hand 1 day          Arterial Line            Arterial Line 10/26/18 Radial 1 day          Drain            NG/OG/Enteral Tube Orogastric 1 day    Urethral Catheter Latex 16 Fr  1 day    Negative Pressure Wound Therapy (V A C ) Chest Anterior; Left less than 1 day          Airway            ETT  8 mm 1 day                   SIGNATURE: Sky Her PA-C  DATE: October 27, 2018

## 2018-10-28 ENCOUNTER — ANESTHESIA EVENT (INPATIENT)
Dept: PERIOP | Facility: HOSPITAL | Age: 78
DRG: 853 | End: 2018-10-28
Payer: MEDICARE

## 2018-10-28 PROBLEM — J96.00 ACUTE RESPIRATORY FAILURE (HCC): Status: RESOLVED | Noted: 2018-10-26 | Resolved: 2018-10-28

## 2018-10-28 PROBLEM — D50.0 IRON DEFICIENCY ANEMIA DUE TO CHRONIC BLOOD LOSS: Status: ACTIVE | Noted: 2018-10-28

## 2018-10-28 LAB
ABO GROUP BLD: NORMAL
ANION GAP SERPL CALCULATED.3IONS-SCNC: 8 MMOL/L (ref 4–13)
BACTERIA SPEC ANAEROBE CULT: NORMAL
BACTERIA TISS AEROBE CULT: ABNORMAL
BACTERIA WND AEROBE CULT: ABNORMAL
BASOPHILS # BLD MANUAL: 0 THOUSAND/UL (ref 0–0.1)
BASOPHILS NFR MAR MANUAL: 0 % (ref 0–1)
BUN SERPL-MCNC: 37 MG/DL (ref 5–25)
CALCIUM SERPL-MCNC: 7.8 MG/DL (ref 8.3–10.1)
CHLORIDE SERPL-SCNC: 107 MMOL/L (ref 100–108)
CO2 SERPL-SCNC: 24 MMOL/L (ref 21–32)
CREAT SERPL-MCNC: 1.08 MG/DL (ref 0.6–1.3)
EOSINOPHIL # BLD MANUAL: 0 THOUSAND/UL (ref 0–0.4)
EOSINOPHIL NFR BLD MANUAL: 0 % (ref 0–6)
ERYTHROCYTE [DISTWIDTH] IN BLOOD BY AUTOMATED COUNT: 14.1 % (ref 11.6–15.1)
GFR SERPL CREATININE-BSD FRML MDRD: 64 ML/MIN/1.73SQ M
GLUCOSE SERPL-MCNC: 169 MG/DL (ref 65–140)
GLUCOSE SERPL-MCNC: 188 MG/DL (ref 65–140)
GLUCOSE SERPL-MCNC: 197 MG/DL (ref 65–140)
GLUCOSE SERPL-MCNC: 206 MG/DL (ref 65–140)
GLUCOSE SERPL-MCNC: 211 MG/DL (ref 65–140)
GLUCOSE SERPL-MCNC: 222 MG/DL (ref 65–140)
GLUCOSE SERPL-MCNC: 260 MG/DL (ref 65–140)
GRAM STN SPEC: ABNORMAL
HCT VFR BLD AUTO: 21.1 % (ref 36.5–49.3)
HGB BLD-MCNC: 7.1 G/DL (ref 12–17)
LG PLATELETS BLD QL SMEAR: PRESENT
LYMPHOCYTES # BLD AUTO: 0.68 THOUSAND/UL (ref 0.6–4.47)
LYMPHOCYTES # BLD AUTO: 4 % (ref 14–44)
MACROCYTES BLD QL AUTO: PRESENT
MAGNESIUM SERPL-MCNC: 2.2 MG/DL (ref 1.6–2.6)
MCH RBC QN AUTO: 35.5 PG (ref 26.8–34.3)
MCHC RBC AUTO-ENTMCNC: 33.6 G/DL (ref 31.4–37.4)
MCV RBC AUTO: 106 FL (ref 82–98)
MONOCYTES # BLD AUTO: 0.51 THOUSAND/UL (ref 0–1.22)
MONOCYTES NFR BLD: 3 % (ref 4–12)
NEUTROPHILS # BLD MANUAL: 15.7 THOUSAND/UL (ref 1.85–7.62)
NEUTS BAND NFR BLD MANUAL: 12 % (ref 0–8)
NEUTS SEG NFR BLD AUTO: 81 % (ref 43–75)
NRBC BLD AUTO-RTO: 0 /100 WBCS
PHOSPHATE SERPL-MCNC: 2.6 MG/DL (ref 2.3–4.1)
PLATELET # BLD AUTO: 205 THOUSANDS/UL (ref 149–390)
PLATELET BLD QL SMEAR: ADEQUATE
PMV BLD AUTO: 10.9 FL (ref 8.9–12.7)
POTASSIUM SERPL-SCNC: 3.7 MMOL/L (ref 3.5–5.3)
PROCALCITONIN SERPL-MCNC: 1.18 NG/ML
RBC # BLD AUTO: 2 MILLION/UL (ref 3.88–5.62)
RBC MORPH BLD: PRESENT
RH BLD: POSITIVE
SODIUM SERPL-SCNC: 139 MMOL/L (ref 136–145)
TOTAL CELLS COUNTED SPEC: 100
TOXIC GRANULES BLD QL SMEAR: PRESENT
WBC # BLD AUTO: 16.88 THOUSAND/UL (ref 4.31–10.16)

## 2018-10-28 PROCEDURE — 83735 ASSAY OF MAGNESIUM: CPT | Performed by: NURSE PRACTITIONER

## 2018-10-28 PROCEDURE — 85027 COMPLETE CBC AUTOMATED: CPT | Performed by: NURSE PRACTITIONER

## 2018-10-28 PROCEDURE — 92526 ORAL FUNCTION THERAPY: CPT

## 2018-10-28 PROCEDURE — 86901 BLOOD TYPING SEROLOGIC RH(D): CPT | Performed by: PHYSICIAN ASSISTANT

## 2018-10-28 PROCEDURE — 84100 ASSAY OF PHOSPHORUS: CPT | Performed by: NURSE PRACTITIONER

## 2018-10-28 PROCEDURE — 92610 EVALUATE SWALLOWING FUNCTION: CPT

## 2018-10-28 PROCEDURE — 82948 REAGENT STRIP/BLOOD GLUCOSE: CPT

## 2018-10-28 PROCEDURE — P9016 RBC LEUKOCYTES REDUCED: HCPCS

## 2018-10-28 PROCEDURE — 84145 PROCALCITONIN (PCT): CPT | Performed by: NURSE PRACTITIONER

## 2018-10-28 PROCEDURE — 86900 BLOOD TYPING SEROLOGIC ABO: CPT | Performed by: PHYSICIAN ASSISTANT

## 2018-10-28 PROCEDURE — 80048 BASIC METABOLIC PNL TOTAL CA: CPT | Performed by: NURSE PRACTITIONER

## 2018-10-28 PROCEDURE — 99291 CRITICAL CARE FIRST HOUR: CPT | Performed by: ANESTHESIOLOGY

## 2018-10-28 PROCEDURE — 85007 BL SMEAR W/DIFF WBC COUNT: CPT | Performed by: NURSE PRACTITIONER

## 2018-10-28 RX ORDER — ACETAMINOPHEN 325 MG/1
650 TABLET ORAL EVERY 6 HOURS SCHEDULED
Status: DISCONTINUED | OUTPATIENT
Start: 2018-10-28 | End: 2018-10-30

## 2018-10-28 RX ORDER — SENNOSIDES 8.6 MG
1 TABLET ORAL
Status: DISCONTINUED | OUTPATIENT
Start: 2018-10-28 | End: 2018-11-18 | Stop reason: HOSPADM

## 2018-10-28 RX ORDER — SODIUM CHLORIDE 9 MG/ML
75 INJECTION, SOLUTION INTRAVENOUS CONTINUOUS
Status: DISCONTINUED | OUTPATIENT
Start: 2018-10-28 | End: 2018-10-28

## 2018-10-28 RX ORDER — VALACYCLOVIR HYDROCHLORIDE 500 MG/1
1000 TABLET, FILM COATED ORAL EVERY 8 HOURS
Status: DISCONTINUED | OUTPATIENT
Start: 2018-10-28 | End: 2018-10-29

## 2018-10-28 RX ORDER — SODIUM CHLORIDE, SODIUM LACTATE, POTASSIUM CHLORIDE, CALCIUM CHLORIDE 600; 310; 30; 20 MG/100ML; MG/100ML; MG/100ML; MG/100ML
75 INJECTION, SOLUTION INTRAVENOUS CONTINUOUS
Status: DISCONTINUED | OUTPATIENT
Start: 2018-10-28 | End: 2018-10-28

## 2018-10-28 RX ORDER — LANOLIN ALCOHOL/MO/W.PET/CERES
6 CREAM (GRAM) TOPICAL
Status: DISCONTINUED | OUTPATIENT
Start: 2018-10-28 | End: 2018-10-31

## 2018-10-28 RX ADMIN — INSULIN GLARGINE 20 UNITS: 100 INJECTION, SOLUTION SUBCUTANEOUS at 21:33

## 2018-10-28 RX ADMIN — CLINDAMYCIN IN 5 PERCENT DEXTROSE 600 MG: 12 INJECTION, SOLUTION INTRAVENOUS at 12:12

## 2018-10-28 RX ADMIN — ACYCLOVIR SODIUM 700 MG: 50 INJECTION, SOLUTION INTRAVENOUS at 01:43

## 2018-10-28 RX ADMIN — HYDROCORTISONE SODIUM SUCCINATE 50 MG: 100 INJECTION, POWDER, FOR SOLUTION INTRAMUSCULAR; INTRAVENOUS at 05:02

## 2018-10-28 RX ADMIN — CLINDAMYCIN IN 5 PERCENT DEXTROSE 600 MG: 12 INJECTION, SOLUTION INTRAVENOUS at 03:55

## 2018-10-28 RX ADMIN — INSULIN LISPRO 6 UNITS: 100 INJECTION, SOLUTION INTRAVENOUS; SUBCUTANEOUS at 21:36

## 2018-10-28 RX ADMIN — HYDROCORTISONE SODIUM SUCCINATE 50 MG: 100 INJECTION, POWDER, FOR SOLUTION INTRAMUSCULAR; INTRAVENOUS at 11:17

## 2018-10-28 RX ADMIN — ACETAMINOPHEN 650 MG: 325 TABLET, FILM COATED ORAL at 17:31

## 2018-10-28 RX ADMIN — ASCORBIC ACID: 500 INJECTION, SOLUTION INTRAMUSCULAR; INTRAVENOUS; SUBCUTANEOUS at 14:26

## 2018-10-28 RX ADMIN — INSULIN LISPRO 2 UNITS: 100 INJECTION, SOLUTION INTRAVENOUS; SUBCUTANEOUS at 05:55

## 2018-10-28 RX ADMIN — CLINDAMYCIN IN 5 PERCENT DEXTROSE 600 MG: 12 INJECTION, SOLUTION INTRAVENOUS at 19:53

## 2018-10-28 RX ADMIN — THIAMINE HYDROCHLORIDE 200 MG: 100 INJECTION, SOLUTION INTRAMUSCULAR; INTRAVENOUS at 21:31

## 2018-10-28 RX ADMIN — ASCORBIC ACID: 500 INJECTION, SOLUTION INTRAMUSCULAR; INTRAVENOUS; SUBCUTANEOUS at 03:02

## 2018-10-28 RX ADMIN — COLLAGENASE SANTYL 1 APPLICATION: 250 OINTMENT TOPICAL at 12:14

## 2018-10-28 RX ADMIN — MELATONIN TAB 3 MG 6 MG: 3 TAB at 21:32

## 2018-10-28 RX ADMIN — Medication 0.04 UNITS/MIN: at 05:55

## 2018-10-28 RX ADMIN — ATORVASTATIN CALCIUM 10 MG: 10 TABLET, FILM COATED ORAL at 17:28

## 2018-10-28 RX ADMIN — HEPARIN SODIUM 5000 UNITS: 5000 INJECTION, SOLUTION INTRAVENOUS; SUBCUTANEOUS at 05:03

## 2018-10-28 RX ADMIN — VALACYCLOVIR HYDROCHLORIDE 1000 MG: 500 TABLET, FILM COATED ORAL at 16:58

## 2018-10-28 RX ADMIN — INSULIN LISPRO 2 UNITS: 100 INJECTION, SOLUTION INTRAVENOUS; SUBCUTANEOUS at 12:11

## 2018-10-28 RX ADMIN — HEPARIN SODIUM 5000 UNITS: 5000 INJECTION, SOLUTION INTRAVENOUS; SUBCUTANEOUS at 21:32

## 2018-10-28 RX ADMIN — THIAMINE HYDROCHLORIDE 200 MG: 100 INJECTION, SOLUTION INTRAMUSCULAR; INTRAVENOUS at 08:58

## 2018-10-28 RX ADMIN — SENNOSIDES 8.6 MG: 8.6 TABLET, FILM COATED ORAL at 21:32

## 2018-10-28 RX ADMIN — HYDROCORTISONE SODIUM SUCCINATE 50 MG: 100 INJECTION, POWDER, FOR SOLUTION INTRAMUSCULAR; INTRAVENOUS at 00:01

## 2018-10-28 RX ADMIN — ASCORBIC ACID: 500 INJECTION, SOLUTION INTRAMUSCULAR; INTRAVENOUS; SUBCUTANEOUS at 21:32

## 2018-10-28 RX ADMIN — HEPARIN SODIUM 5000 UNITS: 5000 INJECTION, SOLUTION INTRAVENOUS; SUBCUTANEOUS at 13:50

## 2018-10-28 RX ADMIN — ASCORBIC ACID: 500 INJECTION, SOLUTION INTRAMUSCULAR; INTRAVENOUS; SUBCUTANEOUS at 09:01

## 2018-10-28 RX ADMIN — INSULIN LISPRO 4 UNITS: 100 INJECTION, SOLUTION INTRAVENOUS; SUBCUTANEOUS at 16:52

## 2018-10-28 RX ADMIN — CEFEPIME HYDROCHLORIDE 1000 MG: 1 INJECTION, SOLUTION INTRAVENOUS at 16:50

## 2018-10-28 RX ADMIN — INSULIN LISPRO 2 UNITS: 100 INJECTION, SOLUTION INTRAVENOUS; SUBCUTANEOUS at 00:02

## 2018-10-28 RX ADMIN — HYDROCORTISONE SODIUM SUCCINATE 50 MG: 100 INJECTION, POWDER, FOR SOLUTION INTRAMUSCULAR; INTRAVENOUS at 17:29

## 2018-10-28 NOTE — ANESTHESIA PREPROCEDURE EVALUATION
Urinary tract infection   Atrial fibrillation (HCC)   Severe protein-calorie malnutrition (HCC)   Anemia   Septic shock (HCC)   Shingles   Necrotizing cellulitis of chest wall   Acute respiratory failure secondary to septic shock   Pneumonia due to infectious organism   Acute cystitis with hematuria   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HCC)       Review of Systems/Medical History  Patient summary reviewed  Chart reviewed      Cardiovascular  Hyperlipidemia, Hypertension , CAD , History of CABG, Dysrhythmias , atrial fibrillation, CHF ,   Comment: Echo complete with contrast if indicated   Status: Final result  PACS Images     Show images for Echo complete with contrast if indicated  Order Report      Order Details   Order Questions     Question Answer Comment  Reason for exam? Heart Failure Shock  Who is the reading Cardiologist? [facility]        Reason For Exam     Heart Failure - Shock  Study Result     [address]  [address] [facility]  [phone]     Transthoracic Echocardiogram  2D, M-mode, Doppler, and Color Doppler     Study date:  26-Oct-2018     Patient: Twan Smith  MR number: [id number]  Account number: [de-identified]  : 1940  Age: [de-identified] years  Gender: Male  Status: Inpatient  Location: Bedside  Height: 68 in  Weight: 155 8 lb  BP: 83/ 53 mmHg     Indications: Heart Failure     Diagnoses: I50 9 - Heart failure, unspecified     Sonographer:  ADRIEL Truong  Primary Physician:  Eleonora Brown DO  Referring Physician:  Lewis Caicedo PA-C  Group:  [facility]  Interpreting Physician:  Doyle Bagley DO     IMPRESSIONS:  These features are consistent with dilated cardiomyopathy      SUMMARY     LEFT VENTRICLE:  The ventricle was moderately dilated  Systolic function was severely reduced by visual assessment  Ejection fraction was estimated to be 20 %  There were no regional wall motion abnormalities    There was mild concentric hypertrophy  Doppler parameters were consistent with abnormal left ventricular relaxation (grade 1 diastolic dysfunction)      LEFT ATRIUM:  The atrium was moderately dilated      MITRAL VALVE:  There was moderate regurgitation      AORTIC VALVE:  There was no evidence for stenosis  There was no regurgitation      TRICUSPID VALVE:  There was mild regurgitation  Pulmonary artery systolic pressure was mildly increased      HISTORY: PRIOR HISTORY: HTN, Hyperlipidemia, DM, CHF, CABG     PROCEDURE: The procedure was performed at the bedside  This was a routine study  The transthoracic approach was used  The study included complete 2D imaging, M-mode, complete spectral Doppler, and color Doppler  The heart rate was 115 bpm,  at the start of the study  Images were not obtained from the subcostal or suprasternal notch acoustic windows  Intravenous contrast (Definity solution (1 3 ml Definity/8 7ml normal saline solution), 1 ml) was administered to opacify the  left ventricle  Echocardiographic views were limited due to restricted patient mobility, surgical dressings, poor acoustic window availability, lung interference, and patient on mechanical ventilator  This was a technically difficult study      LEFT VENTRICLE: The ventricle was moderately dilated  Systolic function was severely reduced by visual assessment  Ejection fraction was estimated to be 20 %  There were no regional wall motion abnormalities  There was mild concentric  hypertrophy  DOPPLER: Doppler parameters were consistent with abnormal left ventricular relaxation (grade 1 diastolic dysfunction)      RIGHT VENTRICLE: The size was normal  Systolic function was normal  DOPPLER: Systolic pressure was within the normal range      LEFT ATRIUM: The atrium was moderately dilated      RIGHT ATRIUM: Size was normal      MITRAL VALVE: There was annular calcification  Valve structure was normal  There was normal leaflet separation   No echocardiographic evidence for prolapse  DOPPLER: The transmitral velocity was within the normal range  There was no  evidence for stenosis  There was moderate regurgitation      AORTIC VALVE: The valve was trileaflet  Leaflets exhibited normal thickness, mild calcification, normal cuspal separation, and sclerosis  DOPPLER: Transaortic velocity was within the normal range  There was no evidence for stenosis  There  was no regurgitation      TRICUSPID VALVE: The valve structure was normal  There was normal leaflet separation  DOPPLER: The transtricuspid velocity was within the normal range  There was no evidence for stenosis  There was mild regurgitation  Pulmonary artery  systolic pressure was mildly increased  Estimated peak PA pressure was 43 mmHg      PULMONIC VALVE: Leaflets exhibited normal thickness, no calcification, and normal cuspal separation  DOPPLER: The transpulmonic velocity was within the normal range  There was no regurgitation      PERICARDIUM: There was no thickening  There was no pericardial effusion      AORTA: The root exhibited normal size      PULMONARY ARTERY: The size was normal  The morphology appeared normal      SYSTEM MEASUREMENT TABLES     2D mode  AoR Diam 2D: 3 7 cm  LA Diam (2D): 4 6 cm  LA/Ao (2D): 1 24  FS (2D Teich): 13 7 %  IVSd (2D): 1 17 cm  LVDEV: 189 cm³  LVEDV MOD BP: 180 cm³  LVESV: 135 cm³  LVIDd(2D): 6 13 cm  LVISd (2D): 5 29 cm  LVPWd (2D): 1 09 cm  SV (Teich): 54 cm³     Apical four chamber  LVEF A4C: 24 %     Apical two chamber  LVEF A2C: 13 %     Unspecified Scan Mode  MV Peak E Travon   Mean: 1120 mm/s  MVA (PHT): 7 33 cm squared  PHT: 30 ms  Max P mm(Hg)  V Max: 2970 mm/s  Vmax: 2770 mm/s  RA Area: 19 cm squared  RA Volume: 48 4 cm³  TAPSE: 1 2 cm     [employer] Accredited Echocardiography Laboratory     Prepared and electronically signed by  Jaylin Bonds DO  Signed 26-Oct-2018 16:55:41     Linked Documents     View Image  Imaging     Echo complete with contrast if indicated (Order #[id number]) on 10/26/2018 - Imaging Information   Result History     Echo complete with contrast if indicated (Order #[id number]) on 10/26/2018 - Order Result History Report   Show result history  Result Comparison   Echo complete with contrast if indicated (Order [id number])   Newer Version Older Version  Final result   10/26/2018  4:56 PM   Interface, Radiology Results In       This is the newest version No older versions exist  Narrative     [address]   [address] [facility] [place]    [phone]     Transthoracic Echocardiogram   2D, M-mode, Doppler, and Color Doppler     Study date:       Patient: Cory Thompson   MR number: [id number]   Account number: [de-identified]   : 1940   Age: [de-identified] years   Gender: Male   Status: Inpatient   Location: Bedside   Height: 68 in   Weight: 155 8 lb   BP: 83/ 53 mmHg     Indications: Heart Failure     Diagnoses: I50 9 - Heart failure, unspecified     Sonographer: ADRIEL Sow   Primary Physician: Jamaal Esteves DO   Referring Physician: John Bravo PA-C   Group: [facility]   Interpreting Physician: Brea Torrez DO     IMPRESSIONS:   These features are consistent with dilated cardiomyopathy  SUMMARY     LEFT VENTRICLE:   The ventricle was moderately dilated  Systolic function was severely reduced by visual assessment  Ejection fraction was estimated to be 20 %  There were no regional wall motion abnormalities  There was mild concentric hypertrophy  Doppler parameters were consistent with abnormal left ventricular relaxation (grade 1 diastolic dysfunction)  LEFT ATRIUM:   The atrium was moderately dilated  MITRAL VALVE:   There was moderate regurgitation  AORTIC VALVE:   There was no evidence for stenosis  There was no regurgitation  TRICUSPID VALVE:   There was mild regurgitation  Pulmonary artery systolic pressure was mildly increased       HISTORY: PRIOR HISTORY: HTN, Hyperlipidemia, DM, CHF, CABG     PROCEDURE: The procedure was performed at the bedside  This was a routine study  The transthoracic approach was used  The study included complete 2D imaging, M-mode, complete spectral Doppler, and color Doppler  The heart rate was 115 bpm,   at the start of the study  Images were not obtained from the subcostal or suprasternal notch acoustic windows  Intravenous contrast (Definity solution (1 3 ml Definity/8 7ml normal saline solution), 1 ml) was administered to opacify the   left ventricle  Echocardiographic views were limited due to restricted patient mobility, surgical dressings, poor acoustic window availability, lung interference, and patient on mechanical ventilator  This was a technically difficult study  LEFT VENTRICLE: The ventricle was moderately dilated  Systolic function was severely reduced by visual assessment  Ejection fraction was estimated to be 20 %  There were no regional wall motion abnormalities  There was mild concentric   hypertrophy  DOPPLER: Doppler parameters were consistent with abnormal left ventricular relaxation (grade 1 diastolic dysfunction)  RIGHT VENTRICLE: The size was normal  Systolic function was normal  DOPPLER: Systolic pressure was within the normal range  LEFT ATRIUM: The atrium was moderately dilated  RIGHT ATRIUM: Size was normal      MITRAL VALVE: There was annular calcification  Valve structure was normal  There was normal leaflet separation  No echocardiographic evidence for prolapse  DOPPLER: The transmitral velocity was within the normal range  There was no   evidence for stenosis  There was moderate regurgitation  AORTIC VALVE: The valve was trileaflet  Leaflets exhibited normal thickness, mild calcification, normal cuspal separation, and sclerosis  DOPPLER: Transaortic velocity was within the normal range  There was no evidence for stenosis  There   was no regurgitation       TRICUSPID VALVE: The valve structure was normal  There was normal leaflet separation  DOPPLER: The transtricuspid velocity was within the normal range  There was no evidence for stenosis  There was mild regurgitation  Pulmonary artery   systolic pressure was mildly increased  Estimated peak PA pressure was 43 mmHg  PULMONIC VALVE: Leaflets exhibited normal thickness, no calcification, and normal cuspal separation  DOPPLER: The transpulmonic velocity was within the normal range  There was no regurgitation  PERICARDIUM: There was no thickening  There was no pericardial effusion  AORTA: The root exhibited normal size  PULMONARY ARTERY: The size was normal  The morphology appeared normal      SYSTEM MEASUREMENT TABLES     2D mode   AoR Diam 2D: 3 7 cm   LA Diam (2D): 4 6 cm   LA/Ao (2D): 1 24   FS (2D Teich): 13 7 %   IVSd (2D): 1 17 cm   LVDEV: 189 cm³   LVEDV MOD BP: 180 cm³   LVESV: 135 cm³   LVIDd(2D): 6 13 cm   LVISd (2D): 5 29 cm   LVPWd (2D): 1 09 cm   SV (Teich): 54 cm³     Apical four chamber   LVEF A4C: 24 %     Apical two chamber   LVEF A2C: 13 %     Unspecified Scan Mode   MV Peak E Travon  Mean: 1120 mm/s   MVA (PHT): 7 33 cm squared   PHT: 30 ms   Max P mm(Hg)   V Max: 2970 mm/s   Vmax: 2770 mm/s   RA Area: 19 cm squared   RA Volume: 48 4 cm³   TAPSE: 1 2 cm     [facility] Accredited Echocardiography Laboratory     Prepared and electronically signed by     Angeles Aquino DO   Signed 26-Oct-2018 16:55:41     Signed by     Signed Date/Time  Phone Pager  Capri Espinosa 10/26/2018 16:56 [phone]   Exam Information     NM myocardial perfusion spect (rx stress and/or rest)   Status: Final result  PACS Images     Show images for NM myocardial perfusion spect (rx stress and/or rest)  Order Report      Order Details   Order Questions     Question Answer Comment  Have at least 2 sets of Troponins been ordered? Yes   Does the patient have Nitro Patches or Paste on?  No        Reason For Exam     Frequent PVCs, and NSVT and recent syncope  Study Result     [facility]  [address] [facility] 6 [phone]     Rest/Stress Gated SPECT Myocardial Perfusion Imaging After Regadenoson     Patient: Bunny Garcia  MR number: [id number]  Account number: [de-identified]  : 1940  Age: 76 years  Gender: Male  Status: Inpatient  Location: Stress lab  Height: 68 in  Weight: 147 lb  BP: 122/ 60 mmHg     Allergies: NO KNOWN ALLERGIES     Diagnosis: I25 5 - Ischemic cardiomyopathy     Primary Physician:  Savanna Mejia DO  RN:  NATALY Escalante  Group:  [facility]  Report Prepared By[de-identified]  NATALY Escalante  Interpreting Physician:  Jon Gloria MD     INDICATIONS: Evaluation of known coronary artery disease      HISTORY: The patient is a 76year old  male  Chest pain status: no chest pain  Other symptoms: decreased effort tolerance  Coronary artery disease risk factors: dyslipidemia, hypertension, and diabetes mellitus  Cardiovascular  history:Ischemic cardiomyopathy, coronary artery disease and congestive heart failure  Prior cardiovascular procedures: coronary artery bypass grafting  Co-morbidity: age Medications: a beta blocker, aspirin, a lipid lowering agent, and  diabetic medications      PHYSICAL EXAM: Baseline physical exam screening:Weakness of extremities noted, no wheezes audible      REST ECG: Normal sinus rhythm  The ECG showed ventricular couplets      PROCEDURE: The study was performed in the stress lab  A regadenoson infusion pharmacologic stress test was performed  Gated SPECT myocardial perfusion imaging was performed after stress and at rest  Systolic blood pressure was 122 mmHg, at  the start of the study  Diastolic blood pressure was 60 mmHg, at the start of the study  The heart rate was 74 bpm, at the start of the study  IV double checked    Regadenoson protocol:  Time HR bpm SBP mmHg DBP mmHg Symptoms Rhythm/conduct  Baseline 09:54 74 122 60 none NSR, ventricular couplets  Immediate 09:56 102 97 54 none occasional PVC's  1 min 09:57 98 101 56 none --  2 min 09:58 98 102 58 none --  3 min 09:59 99 106 55 none --  4 min 10:00 97 105 56 none --  No medications or fluids given      STRESS SUMMARY: Duration of pharmacologic stress was 3 min  Maximal heart rate during stress was 102 bpm  The heart rate response to stress was normal  There was normal resting blood pressure with an appropriate response to stress  The  rate-pressure product for the peak heart rate and blood pressure was 88681  There was no chest pain during stress  The stress test was terminated due to protocol completion  Pre oxygen saturation: 99 %  Peak oxygen saturation: 100 %  The  stress ECG was equivocal for ischemia  Arrhythmia during stress: isolated premature ventricular beats      ISOTOPE ADMINISTRATION:  Resting isotope administration Stress isotope administration  Agent Tetrofosmin Tetrofosmin  Dose 10 6 mCi 32 2 mCi  Date 05/15/2018 05/15/2018     The radiopharmaceutical was injected at the peak effect of pharmacologic stress      MYOCARDIAL PERFUSION IMAGING:  The left ventricle was dilated  The TID ratio was 1  12      PERFUSION DEFECTS:  -  There was a moderate-sized, moderately severe, partially reversible myocardial perfusion defect of the entire inferior and apical wall      GATED SPECT:  The calculated left ventricular ejection fraction was 35 %  Left ventricular ejection fraction was moderately decreased by visual estimate  There was moderate global left ventricular hypokinesis with paradoxical septal motion  There was  moderately reduced myocardial thickening and motion of the inferior wall of the left ventricle      SUMMARY:  -  Stress results: There was no chest pain during stress  -  ECG conclusions: The stress ECG was equivocal for ischemia  -  Perfusion imaging:  There was a moderate-sized, moderately severe, partially reversible myocardial perfusion defect of the entire inferior and apical wall  -  Gated SPECT: The calculated left ventricular ejection fraction was 35 %  Left ventricular ejection fraction was moderately decreased by visual estimate  There was moderate global left ventricular hypokinesis with paradoxical septal  motion  There was moderately reduced myocardial thickening and motion of the inferior wall of the left ventricle      IMPRESSIONS: Abnormal study after pharmacologic vasodilation  There was a moderate-sized infarct and a small amount of ischemia   Left ventricular systolic function was reduced, with regional wall motion abnormalities      Prepared and signed by     Uriah Fowler MD  Signed 2018 07:35:18     Linked Documents     View Image  Imaging     NM myocardial perfusion spect (rx stress and/or rest) (Order #[id number]) on 2018 - Imaging Information   Result History     NM myocardial perfusion spect (rx stress and/or rest) (Order #[id number]) on 2018 - Order Result History Report   Show result history  Result Comparison   NM myocardial perfusion spect (rx stress and/or rest) (Order [id number])   Newer Version Older Version  Final result   2018  7:35 AM   Interface, Radiology Results In       This is the newest version No older versions exist  Narrative     [address]   [address] [facility] [address]   [phone]     Rest/Stress Gated SPECT Myocardial Perfusion Imaging After Regadenoson     Patient: Erika Phoenix   MR number: [id number]   Account number: [de-identified]   : 1940   Age: 76 years   Gender: Male   Status: Inpatient   Location: Stress lab   Height: 68 in   Weight: 147 lb   BP: 122/ 60 mmHg     Allergies: NO KNOWN ALLERGIES     Diagnosis: I25 5 - Ischemic cardiomyopathy     Primary Physician: Jonn Padilla DO   RN: NATALY Adler   Group: [facility]   Report Prepared By[de-identified] NATALY Adler   Interpreting Physician: Uriah Fowler MD     INDICATIONS: Evaluation of known coronary artery disease  HISTORY: The patient is a 76year old  male  Chest pain status: no chest pain  Other symptoms: decreased effort tolerance  Coronary artery disease risk factors: dyslipidemia, hypertension, and diabetes mellitus  Cardiovascular   history:Ischemic cardiomyopathy, coronary artery disease and congestive heart failure  Prior cardiovascular procedures: coronary artery bypass grafting  Co-morbidity: age Medications: a beta blocker, aspirin, a lipid lowering agent, and   diabetic medications  PHYSICAL EXAM: Baseline physical exam screening:Weakness of extremities noted, no wheezes audible  REST ECG: Normal sinus rhythm  The ECG showed ventricular couplets  PROCEDURE: The study was performed in the stress lab  A regadenoson infusion pharmacologic stress test was performed  Gated SPECT myocardial perfusion imaging was performed after stress and at rest  Systolic blood pressure was 122 mmHg, at   the start of the study  Diastolic blood pressure was 60 mmHg, at the start of the study  The heart rate was 74 bpm, at the start of the study  IV double checked  Regadenoson protocol:   Time HR bpm SBP mmHg DBP mmHg Symptoms Rhythm/conduct   Baseline 09:54 74 122 60 none NSR, ventricular couplets   Immediate 09:56 102 97 54 none occasional PVC's   1 min 09:57 98 101 56 none --   2 min 09:58 98 102 58 none --   3 min 09:59 99 106 55 none --   4 min 10:00 97 105 56 none --   No medications or fluids given  STRESS SUMMARY: Duration of pharmacologic stress was 3 min  Maximal heart rate during stress was 102 bpm  The heart rate response to stress was normal  There was normal resting blood pressure with an appropriate response to stress  The   rate-pressure product for the peak heart rate and blood pressure was 47483  There was no chest pain during stress  The stress test was terminated due to protocol completion  Pre oxygen saturation: 99 %  Peak oxygen saturation: 100 %   The   stress ECG was equivocal for ischemia  Arrhythmia during stress: isolated premature ventricular beats  ISOTOPE ADMINISTRATION:   Resting isotope administration Stress isotope administration   Agent Tetrofosmin Tetrofosmin   Dose 10 6 mCi 32 2 mCi   Date 05/15/2018 05/15/2018     The radiopharmaceutical was injected at the peak effect of pharmacologic stress  MYOCARDIAL PERFUSION IMAGING:   The left ventricle was dilated  The TID ratio was 1 12  PERFUSION DEFECTS:   - Mohan Hussein was a moderate-sized, moderately severe, partially reversible myocardial perfusion defect of the entire inferior and apical wall  GATED SPECT:   The calculated left ventricular ejection fraction was 35 %  Left ventricular ejection fraction was moderately decreased by visual estimate  There was moderate global left ventricular hypokinesis with paradoxical septal motion  There was   moderately reduced myocardial thickening and motion of the inferior wall of the left ventricle  SUMMARY:   -  Stress results: There was no chest pain during stress  -  ECG conclusions: The stress ECG was equivocal for ischemia  -  Perfusion imaging: There was a moderate-sized, moderately severe, partially reversible myocardial perfusion defect of the entire inferior and apical wall  -  Gated SPECT: The calculated left ventricular ejection fraction was 35 %  Left ventricular ejection fraction was moderately decreased by visual estimate  There was moderate global left ventricular hypokinesis with paradoxical septal   motion  There was moderately reduced myocardial thickening and motion of the inferior wall of the left ventricle  IMPRESSIONS: Abnormal study after pharmacologic vasodilation  There was a moderate-sized infarct and a small amount of ischemia  Left ventricular systolic function was reduced, with regional wall motion abnormalities       Prepared and signed by     Corina Mejia MD   Signed 05/16/2018 07:35:18     Signed by Signed Date/Time  Phone Pager  Kush Pichardo 5/16/2018 07:35 [phone]     Cardiomyopathy, ischemic,  Pulmonary  Pneumonia,        GI/Hepatic       Chronic kidney disease stage 3,        Endo/Other  Diabetes type 2 ,      GYN       Hematology  Anemia ,     Musculoskeletal    Comment: Idiopathic gout      Neurology   Psychology           Physical Exam    Airway    Mallampati score: II  TM Distance: >3 FB  Neck ROM: full     Dental       Cardiovascular  Rhythm: regular, Rate: normal,     Pulmonary      Other Findings        Anesthesia Plan  ASA Score- 4     Anesthesia Type- general with ASA Monitors  Additional Monitors:   Airway Plan: LMA  Plan Factors-    Induction- intravenous  Postoperative Plan-     Informed Consent- Anesthetic plan and risks discussed with patient  I personally reviewed this patient with the CRNA  Discussed and agreed on the Anesthesia Plan with the CRNA  Juan Diego Botello

## 2018-10-28 NOTE — PROGRESS NOTES
Daily Progress Note - Critical Care/ Stepdown   Mili Ortez [de-identified] y o  male MRN: [id number]  Unit/Bed#: ICU 08 Encounter: [id number]    ______________________________________________________________________  Assessment:   Principal Problem:    Septic shock ([facility] [address] )  Active Problems:    Shingles    Iron deficiency anemia due to chronic blood loss    Urinary tract infection    Pneumonia due to infectious organism    Necrotizing cellulitis of chest wall    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[facility])    Acute cystitis with hematuria    Atrial fibrillation ([facility])    CKD (chronic kidney disease) stage 3, GFR 30-59 ml/min ([facility])    Cardiomyopathy, ischemic    DM2 (diabetes mellitus, type 2) ([facility])    CAD (coronary artery disease)  Resolved Problems:    CHRIS (acute kidney injury) ([facility])    Lactic acid acidosis    Acute respiratory failure secondary to septic shock    DKA (diabetic ketoacidoses) ([facility])    Hyponatremia    Lethargy    Sepsis ([facility] [address] )    Cellulitis of left axilla      * Septic shock ([facility])   Assessment & Plan    · Improving  Patient with decreased vasopressor requirements  ·   Source likely presumed cellulitis/absess of chest wall with pneumonia and UTI  · Fluid resuscitation with 30ml/kg and started on vasopressors  · Continues on Levophed to mics  · Poor EF on echo  Cardiac index and output on Flow track remains acceptable  · [place] protocol - Stress Dose Steroids, Vit C, thiamine  · Lactic acid normalized  · ABX: cefepime, vancomycin, clindamycin and acyclovir  Will continue  · ID consult  · Performed aggressive debridement in OR 10/26 of chest wall cellulitis, wound vac in place   Scant serosanguineous output  · Return to OR likely Monday to address posterior aspect of cellulitis  · Blood cultures in process  · Wound culture in process  · Urine cultures pending       Shingles   Assessment & Plan    · Patient tolerating p o  Intake    Transition to valacyclovir     Iron deficiency anemia due to chronic blood loss   Assessment & Plan    · Secondary to sepsis and acuity of condition  · Hemoglobin this morning 7 1  Transfuse 1 unit packed red blood cells in light of patient's pressor needs and cardiac dysfunction  · Repeat H&H     Acute respiratory failure secondary to septic shockresolved as of 10/28/2018   Assessment & Plan    · Resolved  ·  Patient extubated on 10/26  · Continues on nasal cannula  Wean as tolerated  Lactic acid acidosisresolved as of 10/26/2018   Assessment & Plan    · Resolved  · Secondary to septic shock and DKA, trend  · Cleared  · CT CAP showing:  Left pectoral cellulitis with gas-forming pockets and right lower lobe pneumonia  · Continue to trend  · Dose with Thiamine  · Surgery consulted, went to OR for I%D  · Continue with vancomycin, clindamycin, cefepime, acyclovir     CHRIS (acute kidney injury) (HCC)resolved as of 10/27/2018   Assessment & Plan    · Resolved  · Likely Pre renal   Likely secondary to septic shock and DKA  · Improving with fluids  · FENa consistent with Pre-renal cause  · Continue fluid resuscitation and monitor urine output  · Gordon catheter in place     Urinary tract infection   Assessment & Plan    · Urinalysis concerning for possible urinary tract infection  Urine culture with > 100,000 CFU of mixed contaminants  · Continue cefepime     DKA (diabetic ketoacidoses) (HCC)resolved as of 10/26/2018   Assessment & Plan    Lab Results   Component Value Date    HGBA1C 12 0 (H) 10/26/2018       Recent Labs      10/28/18   0000  10/28/18   0806  10/28/18   1010  10/28/18   1209   POCGLU  206*  188*  197*  211*       Blood Sugar Average: Last 72 hrs:  (P) 062 48640     · DKA resolved  · Blood sugar remains mildly elevated  Continue b i d  Lantus  Increase sliding scale algorithm  · Goal blood sugar under 180     Pneumonia due to infectious organism   Assessment & Plan    · Continue to treat with vancomycin, cefepime, clindamycin  · Respiratory status improved  Extubated on the 26th  Now 2 L nasal cannula  Wean as tolerated     Necrotizing cellulitis of chest wall   Assessment & Plan    · Aggressive  debridement in OR on 10/26 by Dr Kristyn Rhoades, wound VAC in place  · Continue with antibiotics:  Vancomycin, cefepime, clindamycin, and acyclovir for HSV infection  · Plan to return to OR on Monday for further debridement  · Wound VAC in place with scant serosanguineous output  · Area does not appear cellulitic in surrounding tissue       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HCC)   Assessment & Plan    · Known EF of 20%  · Judicious fluid resuscitation  · Consider diuresis for evidence of respiratory distress     Hyponatremiaresolved as of 10/27/2018   Assessment & Plan    · Resolved  · Likely hypovolemic hyponatremia in the setting of dehydration, sepsis, and DKA  · Possibly a component of pseudo hyponatremia     Acute cystitis with hematuria   Assessment & Plan    · Continue antibiotic regimen with vancomycin, cefepime, clindamycin, urine culture with greater than 100,000 colony-forming units of mixed roberto     Lethargyresolved as of 10/27/2018   Assessment & Plan    - improving  Patient awake and alert  Continue environmental controls        Atrial fibrillation ([facility] [address] )   Assessment & Plan    · History of AFib in the setting of electrolyte abnormalities in the past   Monitor on telemetry  · Current Sinus Tachy  · Holding BB shock state and need for beta agonist therapy  · No indication for anticoagulation at this time     CKD (chronic kidney disease) stage 3, GFR 30-59 ml/min ([facility])   Assessment & Plan    Baseline 0 9-1 3  Current creatinine 1 08  Adequate urine output at this time     Cardiomyopathy, ischemic   Assessment & Plan    · EF 35 to 40%  Recent stress test in May of 2018 showed reversible ischemic changes  Cardiology following as an outpatient with medical management    · ECHO repeat with EF 20%  · Cardiac output and indexes while patient was intubated were acceptable  · Patient vasopressor requirements have reduced  CAD (coronary artery disease)   Assessment & Plan    Troponin negative  Will obtain ECHO today  EF 20%     DM2 (diabetes mellitus, type 2) [facility])   Assessment & Plan    Lab Results   Component Value Date    HGBA1C 12 0 (H) 10/26/2018       Recent Labs      10/28/18   0806  10/28/18   1010  10/28/18   1209  10/28/18   1632   POCGLU  188*  197*  211*  222*     · DKA resolved  · Blood sugar remains mildly elevated  Continue b i d  Lantus  Increase sliding scale algorithm  · Goal blood sugar under 180               Plan:    Neuro:     · Delirium: CAM ICU positive yes   · If yes, intervention:  Strict environmental controls  Frequent reorientation  Melatonin for sleep tonight  · Pain controlled with:  Scheduled Tylenol and p r n  Oxycodone  Patient currently refusing oxycodone  · Pain score: none  · Regulate sleep/wake cycle    CV:   · Severe sepsis septic shock:  Patient continues on 2 of levo  Vasopressin and epi has been weaned off  Patient making adequate urine  Likely source is necrotizing skin infection, patient also has UTI  Patient day 3 of antibiotics since source control  A m  Procalcitonin  Wean Levophed as tolerated  · Combined systolic and diastolic heart function  Patient not currently in acute CHF exacerbation  Patient received 1 unit of blood today consider diuresis after blood  Pulm:   · Respiratory insufficiency:  Patient continues on nasal cannula  Wean as tolerated  · Patient extubated on the 26th  · CT scan with evidence of pneumonia:  Continue antibiotic therapy  GI:   · Nutrition/diet plan:  Mechanical soft with thin liquid diet    · Stress ulcer prophylaxis: No prophylaxis needed  · Bowel regimen: Sennakot  · Last BM:  PTA    FEN:   · No IV fluids  · Fluid/Diuretic plan: No intervention  · Goal 24 hour fluid balance:  Net even  · Electrolytes repleted: yes  · Goal: K >4 0, Mag >2 0, and Phos >3 0    : · Indwelling Gordon present: yes   · Urinary catheter still needed for Strict I and O in a critically ill patient  ID:   · Severe sepsis with septic shock secondary to left axilla necrotizing cellulitis  Patient scheduled for OR for further debridement tomorrow  Continue antibiotic therapy  Shock state is resolving patient remains on 2 mics of Levophed  · Abx ordered: Cefepime and Clindamycin, Valtrex  · Day # 3 of 7 day course since source control  · Trend temps and WBC count    Heme:   · Anemia:  Likely secondary to severe sepsis  Transfuse 1 unit packed red blood cells  Monitor closely for need for diuresis  No evidence of acute bleeding  · Trend hgb and plts    Endo:   · Type 2 diabetes with poor control and profound hyperglycemia:  Continue b i d  Lantus  Increase sliding scale mealtime regimen  Continue to monitor  Msk/Skin:  · Mobility goal:  Out of bed with assistance  · PT consult: yes  · OT consult: yes  · Frequent turning and off-loading    Family:  · Family updated within 24 hours: yes   · Family meeting planned today: no     Lines:  · Central venous access: triple lumen catheter - 16 cm  · Keep central line today for meds requiring central line, hemodynamic monitoring  · Arterial line: Assessed  Continued for the following reasons Hemodynamics monitoring  VTE Prophylaxis:  · Pharmacologic Prophylaxis: Heparin  · Mechanical Prophylaxis: sequential compression device    Disposition: Continue ICU care    Code Status: Level 2 - DNAR: but accepts endotracheal intubation    Counseling / Coordination of Care  Total Critical Care time spent 43 minutes excluding procedures, teaching and family updates  ______________________________________________________________________    HPI/24hr events:  Vasopressin remained off  Patient extubated the yesterday    Speech and swallow evaluation cleared for diet     ______________________________________________________________________    Physical Exam:   Physical Exam   Constitutional: He appears well-developed  No distress  HENT:   Head: Normocephalic and atraumatic  Eyes: Pupils are equal, round, and reactive to light  EOM are normal  Right eye exhibits no discharge  Left eye exhibits no discharge  No scleral icterus  Neck: Normal range of motion  Neck supple  No JVD present  Cardiovascular: Regular rhythm  Skin: He is not diaphoretic        ______________________________________________________________________  Vitals:    10/28/18 1400 10/28/18 1500 10/28/18 1600 10/28/18 1645   BP: 96/63 95/56  93/60   BP Location:       Pulse: 101 101  98   Resp: 19 22  (!) 24   Temp:   (!) 97 4 °F (36 3 °C) 97 6 °F (36 4 °C)   TempSrc:   Temporal Tympanic   SpO2: 100% 100%     Weight:       Height:         Arterial Line BP: 143/132  Arterial Line MAP (mmHg): 140 mmHg     Temperature:   Temp (24hrs), Av 6 °F (36 4 °C), Min:97 4 °F (36 3 °C), Max:97 8 °F (36 6 °C)    Current Temperature: 97 6 °F (36 4 °C)    Weights:   IBW: 69 55 kg    Body mass index is 22 36 kg/m²    Weight (last 2 days)     Date/Time   Weight    10/28/18 0500  67 7 (149 25)    10/27/18 0556  78 2 (172 4)              Hemodynamic Monitoring:  N/A  CO:  [5 7 L/min] 5 7 L/min  CI:  [2 5 L/min/m2] 2 5 L/min/m2    Non-Invasive/Invasive Ventilation Settings:  Respiratory    Lab Data (Last 4 hours)    None         O2/Vent Data (Last 4 hours)    None              No results found for: PHART, OZF4LQW, PO2ART, UED4ZUD, F8SCDRIR, BEART, SOURCE  SpO2: SpO2: 100 %, SpO2 Activity: SpO2 Activity: At Rest, SpO2 Device: O2 Device: Nasal cannula    Intake and Outputs:  I/O       10/26 0701 - 10/27 0700 10/27 0701 - 10/28 0700 10/28 0701 - 10/29 07    I V  (mL/kg) 4885 4 (62 5) 1009 (14 9)     IV Piggyback 1300 450     Feedings 280 40     Total Intake(mL/kg) 6465 4 (82 7) 1499 (22 1)     Urine (mL/kg/hr) 2540 (1 4) 1295 (0 8)     Emesis/NG output 300      Blood 100      Total Output 2940 1295      Net +3525 4 +204                     Nutrition:        Diet Orders            Start     Ordered    10/29/18 0001  Diet NPO  Diet effective midnight     Question:  Diet Type  Answer:  NPO    10/28/18 1351    10/29/18 0001  Diet NPO  Diet effective midnight     Question:  Diet Type  Answer:  NPO    10/28/18 1351    10/28/18 1525  Diet Dysphagia/Modified Consistency; Dysphagia 2-Mechanical Soft; Thin Liquid  Diet effective now     Question Answer Comment   Diet Type Dysphagia/Modified Consistency    Dysphagia/Modified Consistency Dysphagia 2-Mechanical Soft    Liquid Modifier Thin Liquid    RD to adjust diet per protocol? No        10/28/18 1524    10/25/18 1947  Room Service  Once     Question:  Type of Service  Answer:  Room Service - Appropriate with Assistance    10/25/18 1946             Labs:     Results from last 7 days  Lab Units 10/28/18  0456 10/27/18  0428 10/26/18  1003   WBC Thousand/uL 16 88* 17 35* 17 71*   HEMOGLOBIN g/dL 7 1* 8 0* 9 3*   HEMATOCRIT % 21 1* 23 4* 25 8*   PLATELETS Thousands/uL 205 256 294   NEUTROS PCT %  --   --  89*   MONOS PCT %  --   --  4   MONO PCT MAN % 3* 5  --        Results from last 7 days  Lab Units 10/28/18  0456 10/27/18  0428 10/26/18  2053  10/26/18  1004 10/26/18  0817 10/26/18  0732 10/26/18  0525   SODIUM mmol/L 139 135* 132*  < > 134*  --   --  130*   POTASSIUM mmol/L 3 7 4 3 3 9  < > 3 4*  --   --  3 9   CHLORIDE mmol/L 107 103 101  < > 101  --   --  98*   CO2 mmol/L 24 25 22  < > 22  --   --  22   BUN mg/dL 37* 42* 55*  < > 66*  --   --  83*   CREATININE mg/dL 1 08 1 11 1 30  < > 1 49*  --   --  1 87*   CALCIUM mg/dL 7 8* 7 7* 7 8*  < > 8 2*  --   --  8 3   ALK PHOS U/L  --  65  --   --  67  --   --  68   ALT U/L  --  12  --   --  12  --   --  12   AST U/L  --  17  --   --  14  --   --  15   GLUCOSE, ISTAT mg/dl  --   --   --   --   --  115 120  --    < > = values in this interval not displayed      Results from last 7 days  Lab Units 10/28/18  0456 10/27/18  5069 10/26/18  1614   MAGNESIUM mg/dL 2 2 2 1 2 1     Lab Results   Component Value Date    PHOS 2 6 10/28/2018    PHOS 2 8 10/27/2018    PHOS 1 9 (L) 10/26/2018        Results from last 7 days  Lab Units 10/26/18  0526   INR  1 09   PTT seconds 27       0  Lab Value Date/Time   TROPONINI <0 02 10/25/2018 1534   TROPONINI <0 02 05/09/2018 1927       Results from last 7 days  Lab Units 10/26/18  1706 10/26/18  1004 10/26/18  0528   LACTIC ACID mmol/L 2 0 1 8 3 3*     ABG:  Lab Results   Component Value Date    PHART 7 461 (H) 10/27/2018    KPL4SEJ 34 9 (L) 10/27/2018    PO2ART 134 2 (H) 10/27/2018    RBF6PND 24 3 10/27/2018    BEART 0 6 10/27/2018    SOURCE Line, Arterial 10/27/2018       Imaging:     XR chest portable ICU   Final Result      Endotracheal tube ending 3 3 cm above the yohana  Consolidation at the right lung base  Workstation performed: [id number]         CT chest abdomen pelvis wo contrast   Final Result      Patchy airspace opacities are seen in the right lower lobe superior segment  There is a large dense consolidation in the right middle lobe, lateral segment, nonspecific but suspicious for infection/pneumonia in the appropriate clinical setting  Cardiomegaly, bilateral pleural effusions, and body wall edema suggests a superimposed component of fluid overload/CHF  Multiple areas of skin thickening with associated subjacent complex fluid are seen in the superficial soft tissues of the left pectoralis region (for example axial image 30), as well as in the posterior and superior left thorax (axial image 12), as    described  Consider cellulitis with developing abscesses  In addition, a few bubbles of air are seen in the anterior left chest subcutaneous tissues (axial image 8) which could be related to recent intervention but also raises suspicion for possible    gas-forming infection  Partially distended bladder with catheter seen within the bladder lumen    Mild circumferential bladder wall thickening noted, probably exaggerated by underdistention  A cystitis could be considered in the appropriate clinical setting  Renal cysts, degenerative changes of the shoulder, spine, and hips, and other findings as above  Findings discussed with FRITZ Alexis by Dr Neri Parra at 4:18 AM on 10/26/2018  Workstation performed: [id number]         CT head wo contrast   Final Result      No acute intracranial abnormality  Other nonacute findings as above  Workstation performed: [id number]         XR spine lumbar 2 or 3 views injury   Final Result      Levoscoliosis and moderate multilevel degenerative change  Workstation performed: [id number]         XR chest 1 view   Final Result      Focal consolidation in the right lower lobe  This may represent pneumonia  If there is any trauma to the right chest, pulmonary contusion is a consideration  Recommend follow up to resolution  The study was marked in EPIC for significant notification  Workstation performed: [id number]         XR spine thoracic 3 views   Final Result      No acute osseous abnormality  Workstation performed: [id number]         XR shoulder 2+ views LEFT   Final Result      No acute osseous abnormality  Workstation performed: [id number]         CT spine cervical without contrast   Final Result      No cervical spine fracture or traumatic malalignment  Workstation performed: [id number]         CT head without contrast   Final Result      No acute intracranial abnormality  Workstation performed: [id number]         XR chest portable    (Results Pending)       Micro:   Lab Results   Component Value Date    BLOODCX Staphylococcus aureus (A) 10/25/2018    BLOODCX No Growth at 48 hrs   10/25/2018    URINECX >100,000 cfu/ml  10/25/2018    URINECX >100,000 cfu/ml Klebsiella oxytoca (A) 05/09/2018    WOUNDCULT 4+ Growth of Staphylococcus aureus (A) 10/26/2018    WOUNDCULT 4+ Growth of Staphylococcus aureus (A) 10/25/2018       Allergies: No Known Allergies  Medications:   Scheduled Meds:    Current Facility-Administered Medications:  acetaminophen 650 mg Oral Q6H [address] Sissy Check, PA-MARGOTH    IV infusion builder  Intravenous Q6H Gearsidra Hinojosa PA-C Last Rate: Stopped (10/28/18 1526)   aspirin 81 mg Oral Daily FRITZ An-C    atorvastatin 10 mg Oral Daily Rhea John PA-C    cefepime 1,000 mg Intravenous Q24H hRea John, PA-C Last Rate: 1,000 mg (10/28/18 1650)   clindamycin 600 mg Intravenous Q8H Nila Pineda PA-C Last Rate: Stopped (10/28/18 1243)   collagenase 1 application Topical Daily Johan Duckworth MD    heparin (porcine) 5,000 Units Subcutaneous Q8H [address] Rhea John PA-C    hydrocortisone sodium succinate 50 mg Intravenous Q6H [address] Nila Pineda PA-C    influenza vaccine 0 5 mL Intramuscular Prior to discharge Meera Reyes MD    insulin glargine 20 Units Subcutaneous HS Rhea John PA-C    insulin lispro 2-12 Units Subcutaneous TID AC FRITZ Panchal-MARGOTH    insulin lispro 2-12 Units Subcutaneous HS FRITZ Panchal-MARGOTH    melatonin 6 mg Oral HS Belen Tee PA-MARGOTH    norepinephrine 1-30 mcg/min Intravenous Titrated Nicho Hinojosa PA-C Last Rate: 2 mcg/min (10/28/18 0701)   oxyCODONE 5 mg Oral Q4H PRN MIGUEL Willis    senna 1 tablet Oral HS FRITZ Panchal-MARGOTH    thiamine 200 mg Intravenous Q12H Rhea John PA-C Last Rate: 200 mg (10/28/18 0858)   valACYclovir 1,000 mg Oral Q8H Belen Tee PA-MARGOTH      Continuous Infusions:    norepinephrine 1-30 mcg/min Last Rate: 2 mcg/min (10/28/18 0701)     PRN Meds:    influenza vaccine 0 5 mL Prior to discharge   oxyCODONE 5 mg Q4H PRN       Invasive lines and devices:   Invasive Devices     Central Venous Catheter Line            CVC Central Lines 10/25/18 Triple Right Femoral 2 days          Peripheral Intravenous Line Peripheral IV 10/25/18 Left Forearm 3 days          Drain            Negative Pressure Wound Therapy (V A C ) Chest Anterior; Left 2 days    Urethral Catheter Latex 16 Fr  2 days                   SIGNATURE: Lisbeth Corrigan PA-C  DATE: October 28, 2018

## 2018-10-28 NOTE — SPEECH THERAPY NOTE
Speech-Language Pathology Bedside Swallow Evaluation      Patient Name: Thais Ochoa    TDRKX'A Date: 10/28/2018     Problem List  Patient Active Problem List   Diagnosis    DM2 (diabetes mellitus, type 2) ([facility] )    CAD (coronary artery disease)    Hx of CABG    CKD (chronic kidney disease) stage 3, GFR 30-59 ml/min ([facility])    HTN (hypertension)    Cardiomyopathy, ischemic    Benign essential hypertension    Idiopathic gout    Urinary tract infection    Atrial fibrillation ([facility])    Severe protein-calorie malnutrition ([facility])    Anemia    Septic shock ([facility])    Shingles    Necrotizing cellulitis of chest wall    Acute respiratory failure secondary to septic shock    Pneumonia due to infectious organism    Acute cystitis with hematuria    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[facility] )       Past Medical History  Past Medical History:   Diagnosis Date    CHF (congestive heart failure) ([place] [address] )     Diabetes mellitus ([place] [address] )     History of open heart surgery     Hyperlipidemia     Hypertension        Past Surgical History  Past Surgical History:   Procedure Laterality Date    CARDIAC SURGERY         Summary   Pt tolerated pureed and very soft "dissolveable" food (merlene crackers) as well as thin liquid c no overt s/s pharyngeal dysphagia today (~24 minute evaluation process)  However, in pt interview, he presented with report of avoidance of multiple foods including breads, some meats, all sandwiches  ?related to preference vs need/benefit of modified diet  He also stated need to cut larger pills  Education re: swallow A & P completed (interview & education process ~15 minutes)  Conservative diet suggested (Dysphagia 2/mech soft) and diagnostic tx to continue to assure pt on safest yet least restrictive diet      Recommendations: mechanically altered/level 2 diet and thin liquids     Recommended Form of Meds: OK whole if small (cut if large) and OK to administer with thin liquid     Aspiration precautions and compensatory swallowing strategies: upright posture and slow rate of feeding        Current Medical Status  Pt is a [de-identified] y o  male who presented to [facility] after being "found down"  DX (from H & P): 1  DKA - Multiple potential infectious precipitants identified, including UTI, pneumonia and cellulitis in left pectoral region overlying left zoster outbreak  Aggressively fluid resuscitated  Continue to monitor blood gas and BMP q 4 hours  Anion gap most recently 15, trend through closure  Continue insulin infusion as per protocol    2  Left pectoral cellulitis with developing abscesses and possible gas-forming necrotizing soft tissue infection - Add clindamycin to current antibiotic regiment  Emergent surgical consult placed    3  Varicella zoster outbreak - Continue acyclovir    4  Septic shock - Sources include cellultis, UTI and right-sided pneumonia  Continue support with norepinephrine and vasopressin  Trend lactate  Continue vancomycin and cefepime in addition to clindamycin  Follow blood and urine cultures  Follow wound culture  Add stress dose steroids    5  CHRIS on CKD     10/26 early am, pt status deteriorated and pt intubated at bedside before going to OR  He is s/p chest well debridement (extensive) with pulse lavage and wound vac placement  He was extubated this am (8:15)  He is NPO pending Swallowing Evaluation by SLP  Past medical history:  Please see H&P for details but includes CAD status post CABG, moderate-sized partially reversible area of inferior and apical ischemia on 5/15/18 nuclear stress test, CKD, DM-II     Special Studies:  Most recent CXR 10:26: ETT ending 3 3 cm above yohana, consolidation at R lung base  Social/Education/Vocational Hx:  Pt lives alone with family/son next door  Pt was independent, farmed and drove        Swallow Information   Current Risks for Dysphagia & Aspiration: recent surgery and recent intubation     Current Diet: NPO      Baseline Diet: "Soft food" and thin liquids  "I need my food soft cooked  I make my rice real soft then add lots of catsup " "II won't eat chicken or turkey  I like steak but I have to cook it just right "  "I eat eggs in the morning " "No  I don't eat bread/toast/english muffin/bagels"  "I don't eat sandwiches"        Baseline Assessment   Behavior/Cognition: alert    Speech/Language Status: able to follow commands and participate in basic conversation (vocal volume significantly reduced to begin but pt able to increase volume on request)  No dysphonia noted  Patient Positioning: upright in bed    Pain Status/Interventions/Response to Interventions: No report of pain (even with cough after deep breath)  Swallow Mechanism Exam     Facial: reduced effort in ROM tasks   Labial: WFL for straw use  Lingual: good ROM  Velum: symmetrical  Mandible: adequate ROM when effort requested in tasks (reduced ROM in conversational speech to begin)  Dentition: missing upper dentition  Vocal quality:no dysphonia but at least moderately reduced volume at most times today   Volitional Cough: fair strength     Consistencies Assessed and Performance   Consistencies Administered: ice chips, thin liquids, nectar thick, puree and "dissolveable" soft/solid  Specific materials administered included nectar thick then thin juice, ice, applesauce, pack of merlene crackers    Oral Stage: mild  Mastication was weak appearing but adequate with the limited materials administered today (merlene crackers)  Bolus formation and transfer were functional with no significant oral residue noted  No overt s/s reduced oral control  Pharyngeal Stage: WFL and suspected    Swallow Mechanics:  Swallowing initiation appeared prompt  Laryngeal rise was palpated and judged to be within functional limits  No coughing, throat clearing, change in vocal quality or respiratory status noted today       Esophageal Concerns: ?avoidance of dense foods      Summary and Recommendations (see above)      Results Reviewed with: patient, RN and PA     Treatment Recommended: yes    Frequency of treatment: 2-3x week     Patient Stated Goal: "I'd like another drink"     Dysphagia Goals per SLP:   1  Interview pt/SON and r/o h/o significant dysphagia  2 assure safe & effectivept will tolerate 2 servings of  Dysphagia 2//mech soft foods  with thin liquid without s/s of oral or pharyngeal dysphagia     3  Assess with upgraded food texture (Dysphagia 3 or Regular) if additional interval suggests pt with no food texture avoidance/modification    Pt Education: initiated

## 2018-10-29 ENCOUNTER — APPOINTMENT (INPATIENT)
Dept: RADIOLOGY | Facility: HOSPITAL | Age: 78
DRG: 853 | End: 2018-10-29
Payer: MEDICARE

## 2018-10-29 ENCOUNTER — ANESTHESIA (INPATIENT)
Dept: PERIOP | Facility: HOSPITAL | Age: 78
DRG: 853 | End: 2018-10-29
Payer: MEDICARE

## 2018-10-29 PROBLEM — B95.61 BACTEREMIA DUE TO STAPHYLOCOCCUS AUREUS: Status: ACTIVE | Noted: 2018-10-29

## 2018-10-29 PROBLEM — N39.0 URINARY TRACT INFECTION: Status: RESOLVED | Noted: 2018-05-10 | Resolved: 2018-10-29

## 2018-10-29 PROBLEM — N30.01 ACUTE CYSTITIS WITH HEMATURIA: Status: RESOLVED | Noted: 2018-10-26 | Resolved: 2018-10-29

## 2018-10-29 PROBLEM — R78.81 BACTEREMIA DUE TO STAPHYLOCOCCUS AUREUS: Status: ACTIVE | Noted: 2018-10-29

## 2018-10-29 PROBLEM — J98.8 RESPIRATORY INFECTION: Status: ACTIVE | Noted: 2018-10-26

## 2018-10-29 LAB
ABO GROUP BLD BPU: NORMAL
ALBUMIN SERPL BCP-MCNC: 1.6 G/DL (ref 3.5–5)
ALP SERPL-CCNC: 71 U/L (ref 46–116)
ALT SERPL W P-5'-P-CCNC: 20 U/L (ref 12–78)
ANION GAP SERPL CALCULATED.3IONS-SCNC: 9 MMOL/L (ref 4–13)
AST SERPL W P-5'-P-CCNC: 22 U/L (ref 5–45)
ATRIAL RATE: 103 BPM
ATRIAL RATE: 124 BPM
BACTERIA BLD CULT: ABNORMAL
BASOPHILS # BLD MANUAL: 0 THOUSAND/UL (ref 0–0.1)
BASOPHILS NFR MAR MANUAL: 0 % (ref 0–1)
BILIRUB SERPL-MCNC: 0.6 MG/DL (ref 0.2–1)
BPU ID: NORMAL
BUN SERPL-MCNC: 40 MG/DL (ref 5–25)
CA-I BLD-SCNC: 1.1 MMOL/L (ref 1.12–1.32)
CALCIUM SERPL-MCNC: 7.7 MG/DL (ref 8.3–10.1)
CHLORIDE SERPL-SCNC: 105 MMOL/L (ref 100–108)
CO2 SERPL-SCNC: 25 MMOL/L (ref 21–32)
CREAT SERPL-MCNC: 1.32 MG/DL (ref 0.6–1.3)
CROSSMATCH: NORMAL
EOSINOPHIL # BLD MANUAL: 0 THOUSAND/UL (ref 0–0.4)
EOSINOPHIL NFR BLD MANUAL: 0 % (ref 0–6)
ERYTHROCYTE [DISTWIDTH] IN BLOOD BY AUTOMATED COUNT: 16 % (ref 11.6–15.1)
GFR SERPL CREATININE-BSD FRML MDRD: 51 ML/MIN/1.73SQ M
GLUCOSE SERPL-MCNC: 191 MG/DL (ref 65–140)
GLUCOSE SERPL-MCNC: 194 MG/DL (ref 65–140)
GLUCOSE SERPL-MCNC: 212 MG/DL (ref 65–140)
GLUCOSE SERPL-MCNC: 239 MG/DL (ref 65–140)
GLUCOSE SERPL-MCNC: 242 MG/DL (ref 65–140)
GRAM STN SPEC: ABNORMAL
HCT VFR BLD AUTO: 26 % (ref 36.5–49.3)
HGB BLD-MCNC: 8.5 G/DL (ref 12–17)
INR PPP: 1.04 (ref 0.86–1.16)
LG PLATELETS BLD QL SMEAR: PRESENT
LYMPHOCYTES # BLD AUTO: 0.58 THOUSAND/UL (ref 0.6–4.47)
LYMPHOCYTES # BLD AUTO: 4 % (ref 14–44)
MACROCYTES BLD QL AUTO: PRESENT
MAGNESIUM SERPL-MCNC: 2.1 MG/DL (ref 1.6–2.6)
MAGNESIUM SERPL-MCNC: 2.2 MG/DL (ref 1.6–2.6)
MCH RBC QN AUTO: 33.7 PG (ref 26.8–34.3)
MCHC RBC AUTO-ENTMCNC: 32.7 G/DL (ref 31.4–37.4)
MCV RBC AUTO: 103 FL (ref 82–98)
METAMYELOCYTES NFR BLD MANUAL: 1 % (ref 0–1)
MONOCYTES # BLD AUTO: 0.29 THOUSAND/UL (ref 0–1.22)
MONOCYTES NFR BLD: 2 % (ref 4–12)
MYELOCYTES NFR BLD MANUAL: 2 % (ref 0–1)
NEUTROPHILS # BLD MANUAL: 13.27 THOUSAND/UL (ref 1.85–7.62)
NEUTS BAND NFR BLD MANUAL: 10 % (ref 0–8)
NEUTS SEG NFR BLD AUTO: 81 % (ref 43–75)
NRBC BLD AUTO-RTO: 0 /100 WBCS
P AXIS: 73 DEGREES
P AXIS: 89 DEGREES
PLATELET # BLD AUTO: 229 THOUSANDS/UL (ref 149–390)
PLATELET BLD QL SMEAR: ADEQUATE
PMV BLD AUTO: 11.3 FL (ref 8.9–12.7)
POTASSIUM SERPL-SCNC: 3.4 MMOL/L (ref 3.5–5.3)
POTASSIUM SERPL-SCNC: 3.9 MMOL/L (ref 3.5–5.3)
PR INTERVAL: 172 MS
PR INTERVAL: 192 MS
PROCALCITONIN SERPL-MCNC: 0.79 NG/ML
PROT SERPL-MCNC: 5.2 G/DL (ref 6.4–8.2)
PROTHROMBIN TIME: 10.9 SECONDS (ref 9.4–11.7)
QRS AXIS: 12 DEGREES
QRS AXIS: 33 DEGREES
QRSD INTERVAL: 86 MS
QRSD INTERVAL: 92 MS
QT INTERVAL: 342 MS
QT INTERVAL: 350 MS
QTC INTERVAL: 458 MS
QTC INTERVAL: 491 MS
RBC # BLD AUTO: 2.52 MILLION/UL (ref 3.88–5.62)
RBC MORPH BLD: PRESENT
SODIUM SERPL-SCNC: 139 MMOL/L (ref 136–145)
T WAVE AXIS: 193 DEGREES
T WAVE AXIS: 193 DEGREES
TOTAL CELLS COUNTED SPEC: 100
TOXIC GRANULES BLD QL SMEAR: PRESENT
UNIT DISPENSE STATUS: NORMAL
UNIT PRODUCT CODE: NORMAL
UNIT RH: NORMAL
VENTRICULAR RATE: 103 BPM
VENTRICULAR RATE: 124 BPM
WBC # BLD AUTO: 14.58 THOUSAND/UL (ref 4.31–10.16)

## 2018-10-29 PROCEDURE — 85027 COMPLETE CBC AUTOMATED: CPT | Performed by: PHYSICIAN ASSISTANT

## 2018-10-29 PROCEDURE — 87205 SMEAR GRAM STAIN: CPT | Performed by: SURGERY

## 2018-10-29 PROCEDURE — 84132 ASSAY OF SERUM POTASSIUM: CPT | Performed by: STUDENT IN AN ORGANIZED HEALTH CARE EDUCATION/TRAINING PROGRAM

## 2018-10-29 PROCEDURE — 80053 COMPREHEN METABOLIC PANEL: CPT | Performed by: PHYSICIAN ASSISTANT

## 2018-10-29 PROCEDURE — 71045 X-RAY EXAM CHEST 1 VIEW: CPT

## 2018-10-29 PROCEDURE — 85610 PROTHROMBIN TIME: CPT | Performed by: PHYSICIAN ASSISTANT

## 2018-10-29 PROCEDURE — 94760 N-INVAS EAR/PLS OXIMETRY 1: CPT

## 2018-10-29 PROCEDURE — 82330 ASSAY OF CALCIUM: CPT | Performed by: PHYSICIAN ASSISTANT

## 2018-10-29 PROCEDURE — 87186 SC STD MICRODIL/AGAR DIL: CPT | Performed by: SURGERY

## 2018-10-29 PROCEDURE — 97605 NEG PRS WND THER DME<=50SQCM: CPT | Performed by: SURGERY

## 2018-10-29 PROCEDURE — 11042 DBRDMT SUBQ TIS 1ST 20SQCM/<: CPT | Performed by: SURGERY

## 2018-10-29 PROCEDURE — 82948 REAGENT STRIP/BLOOD GLUCOSE: CPT

## 2018-10-29 PROCEDURE — 85007 BL SMEAR W/DIFF WBC COUNT: CPT | Performed by: PHYSICIAN ASSISTANT

## 2018-10-29 PROCEDURE — 99223 1ST HOSP IP/OBS HIGH 75: CPT | Performed by: INTERNAL MEDICINE

## 2018-10-29 PROCEDURE — 87040 BLOOD CULTURE FOR BACTERIA: CPT | Performed by: PHYSICIAN ASSISTANT

## 2018-10-29 PROCEDURE — 87176 TISSUE HOMOGENIZATION CULTR: CPT | Performed by: SURGERY

## 2018-10-29 PROCEDURE — 87075 CULTR BACTERIA EXCEPT BLOOD: CPT | Performed by: SURGERY

## 2018-10-29 PROCEDURE — 11045 DBRDMT SUBQ TISS EACH ADDL: CPT | Performed by: SURGERY

## 2018-10-29 PROCEDURE — 87070 CULTURE OTHR SPECIMN AEROBIC: CPT | Performed by: SURGERY

## 2018-10-29 PROCEDURE — 87147 CULTURE TYPE IMMUNOLOGIC: CPT | Performed by: SURGERY

## 2018-10-29 PROCEDURE — 99233 SBSQ HOSP IP/OBS HIGH 50: CPT | Performed by: INTERNAL MEDICINE

## 2018-10-29 PROCEDURE — 0JDF0ZZ EXTRACTION OF LEFT UPPER ARM SUBCUTANEOUS TISSUE AND FASCIA, OPEN APPROACH: ICD-10-PCS | Performed by: SURGERY

## 2018-10-29 PROCEDURE — 0JD70ZZ EXTRACTION OF BACK SUBCUTANEOUS TISSUE AND FASCIA, OPEN APPROACH: ICD-10-PCS | Performed by: SURGERY

## 2018-10-29 PROCEDURE — 0JD60ZZ EXTRACTION OF CHEST SUBCUTANEOUS TISSUE AND FASCIA, OPEN APPROACH: ICD-10-PCS | Performed by: SURGERY

## 2018-10-29 PROCEDURE — 83735 ASSAY OF MAGNESIUM: CPT | Performed by: PHYSICIAN ASSISTANT

## 2018-10-29 PROCEDURE — 84145 PROCALCITONIN (PCT): CPT | Performed by: PHYSICIAN ASSISTANT

## 2018-10-29 PROCEDURE — 93010 ELECTROCARDIOGRAM REPORT: CPT | Performed by: INTERNAL MEDICINE

## 2018-10-29 PROCEDURE — 83735 ASSAY OF MAGNESIUM: CPT | Performed by: STUDENT IN AN ORGANIZED HEALTH CARE EDUCATION/TRAINING PROGRAM

## 2018-10-29 PROCEDURE — 94664 DEMO&/EVAL PT USE INHALER: CPT

## 2018-10-29 RX ORDER — POTASSIUM CHLORIDE 14.9 MG/ML
20 INJECTION INTRAVENOUS ONCE
Status: COMPLETED | OUTPATIENT
Start: 2018-10-29 | End: 2018-10-29

## 2018-10-29 RX ORDER — PROPOFOL 10 MG/ML
INJECTION, EMULSION INTRAVENOUS AS NEEDED
Status: DISCONTINUED | OUTPATIENT
Start: 2018-10-29 | End: 2018-10-29 | Stop reason: SURG

## 2018-10-29 RX ORDER — ONDANSETRON 2 MG/ML
INJECTION INTRAMUSCULAR; INTRAVENOUS AS NEEDED
Status: DISCONTINUED | OUTPATIENT
Start: 2018-10-29 | End: 2018-10-29 | Stop reason: SURG

## 2018-10-29 RX ORDER — VALACYCLOVIR HYDROCHLORIDE 500 MG/1
1000 TABLET, FILM COATED ORAL EVERY 12 HOURS SCHEDULED
Status: COMPLETED | OUTPATIENT
Start: 2018-10-29 | End: 2018-10-31

## 2018-10-29 RX ORDER — POTASSIUM CHLORIDE 14.9 MG/ML
20 INJECTION INTRAVENOUS ONCE
Status: DISCONTINUED | OUTPATIENT
Start: 2018-10-29 | End: 2018-10-29

## 2018-10-29 RX ORDER — ALBUMIN, HUMAN INJ 5% 5 %
12.5 SOLUTION INTRAVENOUS ONCE
Status: COMPLETED | OUTPATIENT
Start: 2018-10-29 | End: 2018-10-29

## 2018-10-29 RX ORDER — POTASSIUM CHLORIDE 29.8 MG/ML
40 INJECTION INTRAVENOUS ONCE
Status: DISCONTINUED | OUTPATIENT
Start: 2018-10-29 | End: 2018-10-29

## 2018-10-29 RX ORDER — HEPARIN SODIUM 5000 [USP'U]/ML
5000 INJECTION, SOLUTION INTRAVENOUS; SUBCUTANEOUS EVERY 8 HOURS SCHEDULED
Status: DISCONTINUED | OUTPATIENT
Start: 2018-10-29 | End: 2018-10-30

## 2018-10-29 RX ORDER — MAGNESIUM SULFATE HEPTAHYDRATE 40 MG/ML
2 INJECTION, SOLUTION INTRAVENOUS ONCE
Status: COMPLETED | OUTPATIENT
Start: 2018-10-29 | End: 2018-10-29

## 2018-10-29 RX ORDER — MAGNESIUM HYDROXIDE 1200 MG/15ML
LIQUID ORAL AS NEEDED
Status: DISCONTINUED | OUTPATIENT
Start: 2018-10-29 | End: 2018-10-29 | Stop reason: HOSPADM

## 2018-10-29 RX ORDER — ONDANSETRON 2 MG/ML
4 INJECTION INTRAMUSCULAR; INTRAVENOUS ONCE AS NEEDED
Status: DISCONTINUED | OUTPATIENT
Start: 2018-10-29 | End: 2018-10-29 | Stop reason: HOSPADM

## 2018-10-29 RX ORDER — FENTANYL CITRATE 50 UG/ML
INJECTION, SOLUTION INTRAMUSCULAR; INTRAVENOUS AS NEEDED
Status: DISCONTINUED | OUTPATIENT
Start: 2018-10-29 | End: 2018-10-29 | Stop reason: SURG

## 2018-10-29 RX ORDER — FENTANYL CITRATE/PF 50 MCG/ML
12.5 SYRINGE (ML) INJECTION
Status: DISCONTINUED | OUTPATIENT
Start: 2018-10-29 | End: 2018-10-29 | Stop reason: HOSPADM

## 2018-10-29 RX ORDER — INSULIN GLARGINE 100 [IU]/ML
24 INJECTION, SOLUTION SUBCUTANEOUS
Status: DISCONTINUED | OUTPATIENT
Start: 2018-10-29 | End: 2018-10-30

## 2018-10-29 RX ORDER — POTASSIUM CHLORIDE 14.9 MG/ML
20 INJECTION INTRAVENOUS ONCE
Status: COMPLETED | OUTPATIENT
Start: 2018-10-29 | End: 2018-10-30

## 2018-10-29 RX ADMIN — ACETAMINOPHEN 650 MG: 325 TABLET, FILM COATED ORAL at 17:32

## 2018-10-29 RX ADMIN — PROPOFOL 50 MG: 10 INJECTION, EMULSION INTRAVENOUS at 10:05

## 2018-10-29 RX ADMIN — CEFAZOLIN SODIUM 2000 MG: 2 SOLUTION INTRAVENOUS at 23:00

## 2018-10-29 RX ADMIN — MAGNESIUM SULFATE HEPTAHYDRATE 2 G: 40 INJECTION, SOLUTION INTRAVENOUS at 19:52

## 2018-10-29 RX ADMIN — VALACYCLOVIR HYDROCHLORIDE 1000 MG: 500 TABLET, FILM COATED ORAL at 08:28

## 2018-10-29 RX ADMIN — MELATONIN TAB 3 MG 6 MG: 3 TAB at 21:57

## 2018-10-29 RX ADMIN — FENTANYL CITRATE 25 MCG: 50 INJECTION, SOLUTION INTRAMUSCULAR; INTRAVENOUS at 11:29

## 2018-10-29 RX ADMIN — COLLAGENASE SANTYL 1 APPLICATION: 250 OINTMENT TOPICAL at 08:27

## 2018-10-29 RX ADMIN — FENTANYL CITRATE 25 MCG: 50 INJECTION, SOLUTION INTRAMUSCULAR; INTRAVENOUS at 12:08

## 2018-10-29 RX ADMIN — ASCORBIC ACID: 500 INJECTION, SOLUTION INTRAMUSCULAR; INTRAVENOUS; SUBCUTANEOUS at 09:20

## 2018-10-29 RX ADMIN — ACETAMINOPHEN 650 MG: 325 TABLET, FILM COATED ORAL at 00:23

## 2018-10-29 RX ADMIN — VALACYCLOVIR HYDROCHLORIDE 1000 MG: 500 TABLET, FILM COATED ORAL at 00:23

## 2018-10-29 RX ADMIN — ASCORBIC ACID: 500 INJECTION, SOLUTION INTRAMUSCULAR; INTRAVENOUS; SUBCUTANEOUS at 21:55

## 2018-10-29 RX ADMIN — ONDANSETRON 4 MG: 2 INJECTION INTRAMUSCULAR; INTRAVENOUS at 10:45

## 2018-10-29 RX ADMIN — ACETAMINOPHEN 650 MG: 325 TABLET, FILM COATED ORAL at 23:51

## 2018-10-29 RX ADMIN — HYDROCORTISONE SODIUM SUCCINATE 50 MG: 100 INJECTION, POWDER, FOR SOLUTION INTRAMUSCULAR; INTRAVENOUS at 17:32

## 2018-10-29 RX ADMIN — ASPIRIN 81 MG 81 MG: 81 TABLET ORAL at 08:27

## 2018-10-29 RX ADMIN — FENTANYL CITRATE 50 MCG: 50 INJECTION, SOLUTION INTRAMUSCULAR; INTRAVENOUS at 10:05

## 2018-10-29 RX ADMIN — POTASSIUM CHLORIDE 20 MEQ: 200 INJECTION, SOLUTION INTRAVENOUS at 09:21

## 2018-10-29 RX ADMIN — POTASSIUM CHLORIDE 20 MEQ: 200 INJECTION, SOLUTION INTRAVENOUS at 22:13

## 2018-10-29 RX ADMIN — INSULIN LISPRO 4 UNITS: 100 INJECTION, SOLUTION INTRAVENOUS; SUBCUTANEOUS at 21:56

## 2018-10-29 RX ADMIN — THIAMINE HYDROCHLORIDE 200 MG: 100 INJECTION, SOLUTION INTRAMUSCULAR; INTRAVENOUS at 08:26

## 2018-10-29 RX ADMIN — CEFAZOLIN SODIUM 2000 MG: 2 SOLUTION INTRAVENOUS at 15:26

## 2018-10-29 RX ADMIN — NOREPINEPHRINE BITARTRATE 3 MCG/MIN: 1 INJECTION INTRAVENOUS at 15:09

## 2018-10-29 RX ADMIN — HYDROCORTISONE SODIUM SUCCINATE 50 MG: 100 INJECTION, POWDER, FOR SOLUTION INTRAMUSCULAR; INTRAVENOUS at 00:23

## 2018-10-29 RX ADMIN — INSULIN GLARGINE 24 UNITS: 100 INJECTION, SOLUTION SUBCUTANEOUS at 21:56

## 2018-10-29 RX ADMIN — HEPARIN SODIUM 5000 UNITS: 5000 INJECTION, SOLUTION INTRAVENOUS; SUBCUTANEOUS at 05:47

## 2018-10-29 RX ADMIN — ALBUMIN HUMAN 12.5 G: 0.05 INJECTION, SOLUTION INTRAVENOUS at 17:39

## 2018-10-29 RX ADMIN — SENNOSIDES 8.6 MG: 8.6 TABLET, FILM COATED ORAL at 21:57

## 2018-10-29 RX ADMIN — POTASSIUM CHLORIDE 20 MEQ: 200 INJECTION, SOLUTION INTRAVENOUS at 19:53

## 2018-10-29 RX ADMIN — FENTANYL CITRATE 25 MCG: 50 INJECTION, SOLUTION INTRAMUSCULAR; INTRAVENOUS at 10:41

## 2018-10-29 RX ADMIN — FENTANYL CITRATE 25 MCG: 50 INJECTION, SOLUTION INTRAMUSCULAR; INTRAVENOUS at 11:05

## 2018-10-29 RX ADMIN — HYDROCORTISONE SODIUM SUCCINATE 50 MG: 100 INJECTION, POWDER, FOR SOLUTION INTRAMUSCULAR; INTRAVENOUS at 05:46

## 2018-10-29 RX ADMIN — FENTANYL CITRATE 25 MCG: 50 INJECTION, SOLUTION INTRAMUSCULAR; INTRAVENOUS at 10:42

## 2018-10-29 RX ADMIN — ATORVASTATIN CALCIUM 10 MG: 10 TABLET, FILM COATED ORAL at 17:32

## 2018-10-29 RX ADMIN — NOREPINEPHRINE BITARTRATE 3 MCG/MIN: 1 INJECTION INTRAVENOUS at 09:53

## 2018-10-29 RX ADMIN — POTASSIUM CHLORIDE 20 MEQ: 200 INJECTION, SOLUTION INTRAVENOUS at 07:39

## 2018-10-29 RX ADMIN — THIAMINE HYDROCHLORIDE 200 MG: 100 INJECTION, SOLUTION INTRAMUSCULAR; INTRAVENOUS at 21:56

## 2018-10-29 RX ADMIN — FENTANYL CITRATE 25 MCG: 50 INJECTION, SOLUTION INTRAMUSCULAR; INTRAVENOUS at 12:55

## 2018-10-29 RX ADMIN — ACETAMINOPHEN 650 MG: 325 TABLET, FILM COATED ORAL at 05:46

## 2018-10-29 RX ADMIN — ASCORBIC ACID: 500 INJECTION, SOLUTION INTRAMUSCULAR; INTRAVENOUS; SUBCUTANEOUS at 02:39

## 2018-10-29 RX ADMIN — INSULIN LISPRO 4 UNITS: 100 INJECTION, SOLUTION INTRAVENOUS; SUBCUTANEOUS at 07:43

## 2018-10-29 RX ADMIN — HEPARIN SODIUM 5000 UNITS: 5000 INJECTION, SOLUTION INTRAVENOUS; SUBCUTANEOUS at 21:56

## 2018-10-29 RX ADMIN — VALACYCLOVIR HYDROCHLORIDE 1000 MG: 500 TABLET, FILM COATED ORAL at 21:57

## 2018-10-29 RX ADMIN — HYDROCORTISONE SODIUM SUCCINATE 50 MG: 100 INJECTION, POWDER, FOR SOLUTION INTRAMUSCULAR; INTRAVENOUS at 23:51

## 2018-10-29 RX ADMIN — CLINDAMYCIN IN 5 PERCENT DEXTROSE 600 MG: 12 INJECTION, SOLUTION INTRAVENOUS at 05:45

## 2018-10-29 NOTE — UTILIZATION REVIEW
Continued Stay Review    Date: 10/29/18 Monday ICU LEVEL OF CARE    Vital Signs: BP 95/58   Pulse 105   Temp (!) 96 7 °F (35 9 °C) (Temporal)   Resp (!) 26   Ht 5' 8 5" (1 74 m)   Wt 67 7 kg (149 lb 4 oz)   SpO2 95%   BMI 22 36 kg/m²           10/29/18 0700 10/29/18 0800 10/29/18 0848 10/29/18 0900   BP: 111/68 102/68   97/68   BP Location: Left arm         Pulse: 94 94   98   Resp: (!) 24 20   20   Temp: (!) 97 1 °F (36 2 °C)         TempSrc: Temporal         SpO2: 99% 100% 100% 99%   Weight:           Height:                 Arterial Line BP: 143/132  Arterial Line MAP (mmHg): 140 mmHg     Temperature:   Temp (24hrs), Av 8 °F (36 6 °C), Min:97 1 °F (36 2 °C), Max:98 8 °F (37 1 °C)     Current Temperature: (!) 97 1 °F (36 2 °C)     Weights:   IBW: 69 55 kg    Body mass index is 22 36 kg/m²  Weight (last 2 days)      Date/Time   Weight     10/28/18 0500   67 7 (149 25)     10/27/18 0556   78 2 (172 4)           Intake and Outputs:         I/O        10/27 0701 - 10/28 0700 10/28 07 - 10/29 0700 10/29 07 - 10/30 0700     P  O    540       I V  (mL/kg) 1109 (16 4) 632 (9 3)       Blood   350       IV Piggyback 450 250       Feedings 40         Total Intake(mL/kg) 1599 (23 6) 1772 (26 2)       Urine (mL/kg/hr) 1295 (0 8) 1070 (0 7) 60 (1 5)     Drains 50 50       Total Output 1345 1120 60     Net +254 +652 -60       NPO    Continuous IV Infusions:   Norepinephrine   TITRATE MAP > 65 1-30 mcg/min Last Rate: 5 mcg/min (10/29/18 1622)       Medications:   Scheduled Meds:   Current Facility-Administered Medications:  acetaminophen 650 mg Oral Q6H Wendy Alexandre MD    IV infusion builder  Intravenous Q6H Jeff Giraldo MD Last Rate: Stopped (10/29/18 5754)   aspirin 81 mg Oral Daily Jeff Giraldo MD    atorvastatin 10 mg Oral Daily Jeff Giraldo MD    cefazolin 2,000 mg Intravenous Q8H Jason Wheeler PA-C Last Rate: Stopped (10/29/18 8129)   collagenase 1 application Topical Daily Jeff Giraldo, MD    heparin (porcine) 5,000 Units Subcutaneous [facility] Ryan Mcneil MD    hydrocortisone sodium succinate 50 mg Intravenous Q6H [facility] Ryan Mcneil MD    influenza vaccine 0 5 mL Intramuscular Prior to discharge Ryan Mcneil MD    insulin glargine 24 Units Subcutaneous  Ryan Mcneil MD    insulin lispro 2-12 Units Subcutaneous HS Ryan Mcneil MD    insulin lispro 2-12 Units Subcutaneous TID AC Ryan Mcneil MD    melatonin 6 mg Oral HS Ryan Mcneil MD    norepinephrine 1-30 mcg/min Intravenous Titrated Ryan Mcneil MD Last Rate: 5 mcg/min (10/29/18 1622)   oxyCODONE 5 mg Oral Q4H PRN Ryan Mcneil MD    senna 1 tablet Oral HS Ryan Mcneil MD    thiamine 200 mg Intravenous Q12H Ryan Mcneil MD Last Rate: Stopped (10/29/18 0910)   valACYclovir 1,000 mg Oral Q12H [facility] Mehrdad Zuniga MD        PRN Meds:       influenza vaccine    oxyCODONE      LABS/Diagnostic Results:   CBC  Results from last 7 days  Lab Units 10/29/18  0548 10/28/18  0456 10/27/18  0428 10/26/18  1003   WBC Thousand/uL 14 58* 16 88* 17 35* 17 71*   HEMOGLOBIN g/dL 8 5* 7 1* 8 0* 9 3*   HEMATOCRIT % 26 0* 21 1* 23 4* 25 8*   PLATELETS Thousands/uL 229 205 256 294   NEUTROS PCT %  --   --   --  89*   MONOS PCT %  --   --   --  4   MONO PCT MAN % 2* 3* 5  --         Results from last 7 days  Lab Units 10/29/18  0548 10/28/18  0456 10/27/18  0428   10/26/18  1004 10/26/18  0817 10/26/18  0732   SODIUM mmol/L 139 139 135*  < > 134*  --   --    POTASSIUM mmol/L 3 4* 3 7 4 3  < > 3 4*  --   --    CHLORIDE mmol/L 105 107 103  < > 101  --   --    CO2 mmol/L 25 24 25  < > 22  --   --    BUN mg/dL 40* 37* 42*  < > 66*  --   --    CREATININE mg/dL 1 32* 1 08 1 11  < > 1 49*  --   --    CALCIUM mg/dL 7 7* 7 8* 7 7*  < > 8 2*  --   --    ALK PHOS U/L 71  --  65  --  67  --   --    ALT U/L 20  --  12  --  12  --   --    AST U/L 22  --  17  --  14  --   --    GLUCOSE, ISTAT mg/dl  --   --   --   --   --  115 120      Results from last 7 days  Lab Units 10/29/18  0548 10/28/18  0456 10/27/18  0428   MAGNESIUM mg/dL 2 2 2 2 2 1            Lab Results   Component Value Date     PHOS 2 6 10/28/2018     PHOS 2 8 10/27/2018     PHOS 1 9 (L) 10/26/2018       Results from last 7 days  Lab Units 10/29/18  0548 10/26/18  0526   INR   1 04 1 09   PTT seconds  --  27      Lab Value Date/Time   TROPONINI <0 02 10/25/2018 1534   TROPONINI <0 02 05/09/2018 1927       Results from last 7 days  Lab Units 10/26/18  1706 10/26/18  1004 10/26/18  0528   LACTIC ACID mmol/L 2 0 1 8 3 3*      ABG:        Lab Results   Component Value Date     PHART 7 461 (H) 10/27/2018     [id number] 34 9 (L) 10/27/2018     PO2ART 134 2 (H) 10/27/2018     [id number] 24 3 10/27/2018     BEART 0 6 10/27/2018     SOURCE Line, Arterial 10/27/2018         Micro:        Lab Results   Component Value Date     BLOODCX Staphylococcus aureus (A) 10/25/2018     BLOODCX No Growth at 72 hrs   10/25/2018     URINECX >100,000 cfu/ml  10/25/2018     WOUNDCULT 4+ Growth of Staphylococcus aureus (A) 10/26/2018      WOUNDCULT 4+ Growth of Staphylococcus aureus (A) 10/25/2018        Age/Sex: [de-identified] y o  male       Assessment/Plan:   Assessment:   Principal Problem:    Septic shock ([facility])  Active Problems:    Necrotizing cellulitis of chest wall    Urinary tract infection    Respiratory infection    DM2 (diabetes mellitus, type 2) ([facility])    CAD (coronary artery disease)    CKD (chronic kidney disease) stage 3, GFR 30-59 ml/min ([facility])    Cardiomyopathy, ischemic    Atrial fibrillation ([facility])    Shingles    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[facility])    Iron deficiency anemia due to chronic blood loss  Resolved Problems:    Acute respiratory failure secondary to septic shock    Hyponatremia    CHRIS (acute kidney injury) ([facility] )    Lactic acid acidosis    DKA (diabetic ketoacidoses) ([facility])    Lethargy    Sepsis ([facility] )    Cellulitis of left axilla    Acute cystitis with hematuria            * Septic shock ([facility] )   Assessment & Plan   · Improving  Vasopressor d/zaida  · Source likely presumed cellulitis/absess of chest wall with pneumonia and UTI  · Fluid resuscitation with 30ml/kg and started on vasopressors  · Continues on Levophed to mics  · Poor EF on echo  Cardiac index and output on Flow track remains acceptable  · [place] protocol - Stress Dose Steroids, Vit C, thiamine  · Lactic acid normalized  · ABX: cefepime, clindamycin and valacyclovir  Will continue  · ID consult  · Performed aggressive debridement in OR 10/26 of chest wall cellulitis, wound vac in place   Scant serosanguineous output  · Return to OR likely Monday to address posterior aspect of cellulitis  · Blood culture staph aureus x 1  · Wound culture: 3+ staph aureus   · Urine cultures > 100,000 mixed containments          Acute respiratory failure secondary to septic shockresolved as of 10/28/2018   Assessment & Plan     · Resolved  ·  Patient extubated on 10/26  · Continues on nasal cannula  Wean as tolerated       Necrotizing cellulitis of chest wall   Assessment & Plan     · Aggressive  debridement in OR on 10/26 by Dr Africa Huston, wound VAC in place  · Continue with antibiotics:  cefepime, clindamycin, and vacyclovir for HSV infection  · Plan to return to OR today for further debridement  · Wound VAC in place with scant serosanguineous output  · Area does not appear cellulitic in surrounding tissue         Urinary tract infection   Assessment & Plan     · Urinalysis concerning for possible urinary tract infection  Urine culture with > 100,000 CFU of mixed contaminants  · Continue cefepime      Respiratory infection   Assessment & Plan     · Present on admission as evidence by CT scan suggestive of infectious process  · No sputum culture to confirm pneumonia  · Respiratory status improved  Extubated on the 26th  Now 2 L nasal cannula     · Continue to treat with empiric antibiotics      Iron deficiency anemia due to chronic blood loss   Assessment & Plan     · Secondary to sepsis and acuity of condition  · Hemoglobin this morning 7 1  Transfuse 1 unit packed red blood cells in light of patient's pressor needs and cardiac dysfunction  · Repeat H&H 8 5      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HCC)   Assessment & Plan     · Known EF of 20%  · Judicious fluid resuscitation  · Consider diuresis for evidence of respiratory distress      Shingles   Assessment & Plan     · Patient tolerating p o  Intake  Transition to valacyclovir      Atrial fibrillation (HCC)   Assessment & Plan     · History of AFib in the setting of electrolyte abnormalities in the past   Monitor on telemetry  · Current Sinus Tachy  · Holding BB shock state and need for beta agonist therapy  · No indication for anticoagulation at this time      Cardiomyopathy, ischemic   Assessment & Plan     · EF 35 to 40%  Recent stress test in May of 2018 showed reversible ischemic changes  Cardiology following as an outpatient with medical management  · ECHO repeat with EF 20%  · Cardiac output and indexes while patient was intubated were acceptable  · Vasopressor d/zaida      CKD (chronic kidney disease) stage 3, GFR 30-59 ml/min ([facility])   Assessment & Plan     Baseline 0 9-1 3  Current creatinine 1 08  Adequate urine output at this time      CAD (coronary artery disease)   Assessment & Plan     Troponin negative  Will obtain ECHO today  EF 20%      DM2 (diabetes mellitus, type 2) [facility])   Assessment & Plan             Lab Results   Component Value Date     HGBA1C 12 0 (H) 10/26/2018                Recent Labs      10/28/18   0806  10/28/18   1010  10/28/18   1209  10/28/18   1632   POCGLU  188*  197*  211*  222*      · DKA resolved  · Blood sugar remains mildly elevated  Continue b i d  Lantus  Increase sliding scale algorithm    · Goal blood sugar under 180               Acute cystitis with hematuriaresolved as of 10/29/2018   Assessment & Plan     · Continue antibiotic regimen with cefepime, clindamycin, urine culture with greater than 100,000 colony-forming units of mixed roberto      Lethargyresolved as of 10/27/2018   Assessment & Plan     - improving  Patient awake and alert  Continue environmental controls         DKA (diabetic ketoacidoses) (HCC)resolved as of 10/26/2018   Assessment & Plan             Lab Results   Component Value Date     HGBA1C 12 0 (H) 10/26/2018                Recent Labs      10/28/18   0000  10/28/18   0806  10/28/18   1010  10/28/18   1209   POCGLU  206*  188*  197*  211*         Blood Sugar Average: Last 72 hrs:  (P) 174 14874      · DKA resolved  · Blood sugar remains mildly elevated  Continue b i d  Lantus  Increase sliding scale algorithm  · Goal blood sugar under 180      Lactic acid acidosisresolved as of 10/26/2018   Assessment & Plan     · Resolved  · Secondary to septic shock and DKA, trend  · Cleared  · CT CAP showing:  Left pectoral cellulitis with gas-forming pockets and right lower lobe pneumonia  · Continue to trend  · Dose with Thiamine  · Surgery consulted, went to OR for I%D  · Continue with vancomycin, clindamycin, cefepime, acyclovir      CHRIS (acute kidney injury) (HCC)resolved as of 10/27/2018   Assessment & Plan     · Resolved  · Likely Pre renal   Likely secondary to septic shock and DKA  · Improving with fluids  · FENa consistent with Pre-renal cause  · Continue fluid resuscitation and monitor urine output  · Gordon catheter in place      Hyponatremiaresolved as of 10/27/2018   Assessment & Plan     · Resolved  · Likely hypovolemic hyponatremia in the setting of dehydration, sepsis, and DKA  · Possibly a component of pseudo hyponatremia            Plan:   Neuro:   · Delirium: CAM ICU positive yes   ? If yes, intervention: Strict environmental controls, frequent reorientation, melatonin for sleep tonight  · Pain controlled with: scheduled tylenol and prn Oxycodone  ?  Pain score: none  · Regulate sleep/wake cycle     CV:   · Severe sepsis, septic shock  Continue on 2 of levo  Vasopressin weaned off, c/w norepi, Likely from necrotizing skin infection, pneumonia, and UTI  Day #4 of antibiotics - Cefepime and Cllindamycin, Procalcitonin pending  · Combined Systolic and diastolic HF, no in acute exacerbation  · Received 1 unit PRBCs yesterday, consider diuresis if necessary     Pulm:   · Respiratory Insufficiency, continue with nasal canula as needed  · Extubated on 26th  · CT scan on showed PNA, c/w antibiotics     GI:   · Nutrition/diet plan: Mechanical Soft with thin liquid diet  · Stress ulcer prophylaxis: No prophylaxis needed  · Bowel regimen: [place]  ? Last BM: PTA     FEN:   · No IV fluids  · Fluid/Diuretic plan: No intervention  ? Goal 24 hour fluid balance: net even  · Electrolytes repleted: yes  ? Goal: K >4 0, Mag >2 0, and Phos >3 0     :   · Indwelling Gordon present: yes   ? Urinary catheter still needed for Strict I and O in a critically ill patient      ID:   · Severe Sepsis with septic shock secondary to left axilla necrotizing cellulitis  Scheduled for OR today for further debridement  Shock resolving, Levophed wean as tolerated  · Abx ordered: Cefepime and Clindamycin, valacyclovir   ? Day # 4 of 7 day course  · Trend temps and WBC count     Heme:   · Anemia: likely secondary to severe sepsis, transfused 1 units PRBCs yesterday, hgb stable at 8 5  · Trend hgb and plts     Endo:   · Type 2DM, poorly controlled, c/w lantus bid with sliding scale      Msk/Skin:  · Mobility goal: out of bed with assisstance  ? PT consult: yes  ? OT consult: yes  · Frequent turning and off-loading     Family:  · Family updated within 24 hours: yes   · Family meeting planned today: no      Lines:  · Central venous access: Triple right femoral   ? Keep central line today for meds requiring central line    · Peripheral IV  · Wound vac in place     VTE Prophylaxis:  · Pharmacologic Prophylaxis: Heparin  · Mechanical Prophylaxis: sequential compression device     Disposition: Continue ICU care      HPI/24hr events: Extubated, vasopressin discontinued over weekend  Continuing to wean levo  Transfused 1Unit PRBCs yesterday  No acute events overnight  Plan for OR debridement today           Discharge Plan:   TO BE DETERMINED / YE IN ICU    CASE MANAGEMENT FOLLOWING CLOSE[facility] FOR ALL DISCHARGE NEEDS

## 2018-10-29 NOTE — PROGRESS NOTES
Critical Care Interval Progress Note   Ragena Duty [de-identified] y o  male MRN: [id number]    Unit/Bed#: ICU 08 Encounter: [id number]    Impression:  Principal Problem:    Septic shock ([address] )  Active Problems:    Necrotizing cellulitis of chest wall    Urinary tract infection    Respiratory infection    DM2 (diabetes mellitus, type 2) ([facility])    CAD (coronary artery disease)    CKD (chronic kidney disease) stage 3, GFR 30-59 ml/min ([facility])    Cardiomyopathy, ischemic    Atrial fibrillation ([facility])    Shingles    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[facility])    Iron deficiency anemia due to chronic blood loss  Resolved Problems:    Acute respiratory failure secondary to septic shock    Hyponatremia    CHRIS (acute kidney injury) ([facility])    Lactic acid acidosis    DKA (diabetic ketoacidoses) ([facility])    Lethargy    Sepsis ([facility])    Cellulitis of left axilla    Acute cystitis with hematuria      Plan:  Patient returned from OR for debridement with wound vac change  Vital signs stable  Chest xray ordered for possible aspiration due to episode of vomiting after LMA placed  No signs of aspiration pneumonia on chest xray  Antibiotics down-graded to Ancef, as recommended by Infectious disease consult  Frequent PVCs on telemetry, potassium repletion resumed on arrival  Patient awake and alert with [place] wound vac in place  Counseling / Coordination of Care: Total Critical Care time spent 25 minutes excluding procedures, teaching and family updates      ______________________________________________________________________    Chief Complaint: MSSA Bacteremia due to necrotizing cellulitis        Vitals:   Vitals:    10/29/18 1305 10/29/18 1320 10/29/18 1335 10/29/18 1350   BP: 102/59 116/65 95/64 94/62   BP Location:       Pulse: 86 98 100 98   Resp:       Temp:       TempSrc:       SpO2: 100% 100% 100% 99%   Weight:       Height:         Arterial Line BP: 143/132  Arterial Line MAP (mmHg): 140 mmHg    Temperature: Temp (24hrs), Av 7 °F (36 5 °C), Min:97 1 °F (36 2 °C), Max:98 8 °F (37 1 °C)  Current: Temperature: (!) 97 2 °F (36 2 °C)    Hemodynamic Monitoring:  Frequent vital signs    Respiratory:  SpO2: SpO2: 99 %  O2 Flow Rate (L/min): 2 L/min    Physical Exam:  Physical Exam   Constitutional: He is oriented to person, place, and time  He appears well-developed and well-nourished  No distress  HENT:   Head: Normocephalic and atraumatic  Cardiovascular: Normal rate, regular rhythm, normal heart sounds and intact distal pulses  Exam reveals no gallop and no friction rub  No murmur heard  Pulmonary/Chest: Effort normal and breath sounds normal  No respiratory distress  He has no wheezes  He has no rales  Abdominal: Soft  Bowel sounds are normal  He exhibits no distension  There is no tenderness  There is no rebound  Musculoskeletal: He exhibits no edema  Neurological: He is alert and oriented to person, place, and time  Skin: Skin is warm and dry  He is not diaphoretic  Left anterior chest and axillary wound vac in place, dressing clean and dry   Psychiatric: He has a normal mood and affect   His behavior is normal          Allergies: No Known Allergies    Medications:   Scheduled Meds:    Current Facility-Administered Medications:  acetaminophen 650 mg Oral Q6H [address] Nupur Serrano MD    IV infusion builder  Intravenous Q6H Nupur Serrano MD Last Rate: Stopped (10/29/18 1508)   aspirin 81 mg Oral Daily Nupur Serrano MD    atorvastatin 10 mg Oral Daily Nupur Serrano MD    cefazolin 2,000 mg Intravenous Q8H Glenis Dawson PA-C    collagenase 1 application Topical Daily Nupur Serrano MD    heparin (porcine) 5,000 Units Subcutaneous [facility] Nupur Serrano MD    hydrocortisone sodium succinate 50 mg Intravenous Q6H [address] Nupur Serrano MD    influenza vaccine 0 5 mL Intramuscular Prior to discharge Nupur Serrano MD    insulin glargine 24 Units Subcutaneous HS Nupur Serrano MD    insulin lispro 2-12 Units Subcutaneous HS Reed Mauricio MD    insulin lispro 2-12 Units Subcutaneous TID AC Reed Mauricio MD    melatonin 6 mg Oral HS Reed Mauricio MD    norepinephrine 1-30 mcg/min Intravenous Titrated Reed Mauricio MD Last Rate: 3 mcg/min (10/29/18 1509)   oxyCODONE 5 mg Oral Q4H PRN Reed Mauricio MD    senna 1 tablet Oral HS Reed Mauricio MD    thiamine 200 mg Intravenous Q12H Reed Mauricio MD Last Rate: Stopped (10/29/18 0910)   valACYclovir 1,000 mg Oral Q12H [facility] Esau Franklin MD      Continuous Infusions:    norepinephrine 1-30 mcg/min Last Rate: 3 mcg/min (10/29/18 1509)     PRN Meds:    influenza vaccine 0 5 mL Prior to discharge   oxyCODONE 5 mg Q4H PRN       Labs:     Results from last 7 days  Lab Units 10/29/18  0548 10/28/18  0456 10/27/18  0428 10/26/18  1003   WBC Thousand/uL 14 58* 16 88* 17 35* 17 71*   HEMOGLOBIN g/dL 8 5* 7 1* 8 0* 9 3*   HEMATOCRIT % 26 0* 21 1* 23 4* 25 8*   PLATELETS Thousands/uL 229 205 256 294   NEUTROS PCT %  --   --   --  89*   MONOS PCT %  --   --   --  4   MONO PCT MAN % 2* 3* 5  --        Results from last 7 days  Lab Units 10/29/18  0548 10/28/18  0456 10/27/18  0428  10/26/18  1004 10/26/18  0817 10/26/18  0732   SODIUM mmol/L 139 139 135*  < > 134*  --   --    POTASSIUM mmol/L 3 4* 3 7 4 3  < > 3 4*  --   --    CHLORIDE mmol/L 105 107 103  < > 101  --   --    CO2 mmol/L 25 24 25  < > 22  --   --    BUN mg/dL 40* 37* 42*  < > 66*  --   --    CREATININE mg/dL 1 32* 1 08 1 11  < > 1 49*  --   --    CALCIUM mg/dL 7 7* 7 8* 7 7*  < > 8 2*  --   --    ALK PHOS U/L 71  --  65  --  67  --   --    ALT U/L 20  --  12  --  12  --   --    AST U/L 22  --  17  --  14  --   --    GLUCOSE, ISTAT mg/dl  --   --   --   --   --  115 120   < > = values in this interval not displayed      Results from last 7 days  Lab Units 10/29/18  0548 10/28/18  0456 10/27/18  0428   MAGNESIUM mg/dL 2 2 2 2 2 1       Results from last 7 days  Lab Units 10/28/18  0456 10/27/18  0428 10/26/18  2053   PHOSPHORUS mg/dL 2 6 2 8 1 9*        Results from last 7 days  Lab Units 10/29/18  0548 10/26/18  0526   INR  1 04 1 09   PTT seconds  --  27       Results from last 7 days  Lab Units 10/26/18  1706 10/26/18  1004 10/26/18  0528 10/26/18  0206 10/25/18  2259   LACTIC ACID mmol/L 2 0 1 8 3 3* 5 2* 5 1*       0  Lab Value Date/Time   TROPONINI <0 02 10/25/2018 1534   TROPONINI <0 02 05/09/2018 1927       Results from last 7 days  Lab Units 10/27/18  0429 10/26/18  1614 10/26/18  0948 10/26/18  0708 10/26/18  0340   PH ART  7 461* 7 441 7 393 7 512* 7 400   PCO2 ART mm Hg 34 9* 32 6* 37 5 25 6* 32 6*   PO2 ART mm Hg 134 2* 88 3 467 0* 324 2* 79 6   HCO3 ART mmol/L 24 3 21 7* 22 3 20 1* 19 7*   BASE EXC ART mmol/L 0 6 -2 0 -2 2 -2 0 -4 4   ABG SOURCE  Line, Arterial Line, Arterial Line, Arterial Line, Arterial Radial, Right       Diagnostic Imaging / Data: I have personally reviewed pertinent reports  Code Status: Level 2 - DNAR: but accepts endotracheal intubation    Portions of the record may have been created with voice recognition software  Occasional wrong word or "sound a like" substitutions may have occurred due to the inherent limitations of voice recognition software  Read the chart carefully and recognize, using context, where substitutions have occurred      SIGNATURE: Chica Dejesus MD  DATE: October 29, 2018

## 2018-10-29 NOTE — RESPIRATORY THERAPY NOTE
RT Protocol Note  Pavan Hallman [de-identified] y o  male MRN: [id number]  Unit/Bed#: ICU 08 Encounter: [id number]    Assessment    Principal Problem:    Septic shock ([place] [address] )  Active Problems:    DM2 (diabetes mellitus, type 2) ([place] [address] )    CAD (coronary artery disease)    CKD (chronic kidney disease) stage 3, GFR 30-59 ml/min ([facility])    Cardiomyopathy, ischemic    Atrial fibrillation ([facility])    Shingles    Necrotizing cellulitis of chest wall    Respiratory infection    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[facility])    Iron deficiency anemia due to chronic blood loss    Bacteremia due to Staphylococcus aureus      Home Pulmonary Medications:  none       Past Medical History:   Diagnosis Date    CHF (congestive heart failure) ([place] [address] )     Diabetes mellitus ([place] [address] )     History of open heart surgery     Hyperlipidemia     Hypertension      Social History     Social History    Marital status: Single     Spouse name: N/A    Number of children: N/A    Years of education: N/A     Social History Main Topics    Smoking status: Former Smoker     Types: Pipe    Smokeless tobacco: Never Used    Alcohol use No    Drug use: No    Sexual activity: Not Asked     Other Topics Concern    None     Social History Narrative    None       Subjective    Subjective Data: pt states breathing is good    Objective    Physical Exam:   Assessment Type: Assess only  General Appearance: Alert, Awake  Respiratory Pattern: Normal  Chest Assessment: Chest expansion symmetrical  Bilateral Breath Sounds: Clear, Diminished  R Breath Sounds: Clear, Diminished  L Breath Sounds: Clear, Diminished  Location Specific: No  Cough: Non-productive, Moist, Strong  O2 Device: room aie    Vitals:  Blood pressure 108/55, pulse (!) 124, temperature (!) 96 7 °F (35 9 °C), temperature source Temporal, resp  rate 21, height 5' 8 5" (1 74 m), weight 67 7 kg (149 lb 4 oz), SpO2 97 %        Results from last 7 days  Lab Units 10/27/18  0429   PH ART  7 461*   PCO2 ART mm Hg 34 9*   PO2 ART mm Hg 134 2*   HCO3 ART mmol/L 24 3   BASE EXC ART mmol/L 0 6   O2 CONTENT ART mL/dL 11 9*   O2 HGB, ARTERIAL % 97 3*   ABG SOURCE  Line, Arterial   VÍCTOR TEST  Yes       Imaging and other studies: I have personally reviewed pertinent reports        O2 Device: room aie     Plan    Respiratory Plan: Discontinue Protocol  Airway Clearance Plan: Discontinue Protocol     Resp Comments: no distress noted IS taught and given

## 2018-10-29 NOTE — CONSULTS
Consultation - Infectious Disease   Javi Dumont [address] y o  male MRN: [id number]  Unit/Bed#: OR POOL Encounter: [id number]      IMPRESSION & RECOMMENDATIONS:   Impression/Recommendations:  1  Sepsis/septic shock - secondary to #2/3  Slowly improving requiring decreased pressor support  Now extubated  Rec:   · Continue supportive care per ICU team        2   MSSA bacteremia  1 out of 2 sets positive on admission  No repeat blood cultures done to date  2D echo on admission without vegetation  Secondary to #3  Rec:   · Repeat blood cultures today  · Continue antibiotics as below  3   Left chest wall cellulitis with underlying abscesses requiring formal I and D in the OR secondary to zoster  Patient currently POD #3  Cultures growing MSSA  Patient returning to the OR today for a 2nd look  Rec:   · Narrow antibiotics down to cefazolin  · Await further OR evaluation today  · Local care as per surgery  4   Herpes zoster  Unsure how long zoster rash was present prior to hospitalization  Rec:   · Continue valtrex for now  5   CHRIS, improving  Rec:   · Watch cr     6   Consolidation R lung likely atelectasis  7   Bacturia on UA with only mixed contaminant growth on Ucx  Rec:   · No treatment for now  Antibiotics:  Valacyclovir day number 4 of antiviral treatment  Cefepime day 4  Clindamycin day 4  Thank you for this consultation  We will follow along with you  HISTORY OF PRESENT ILLNESS:  Reason for Consult: sepsis    HPI: Javi Dumont is a [address] y o  male admitted on 10/25/2018 after falling  Records indicate patient was down for an unknown period of time  Prior to falling, he had developed a rash consistent with shingles  There was also concern for overlying cellulitis  Workup in the emergency department showed multiple electrolyte abnormalities as well as DKA  He was hypotensive despite fluid resuscitation    He required right femoral CVC placement and infusion of norepinephrine  He was additionally started on IV acyclovir as well as cefepime and vancomycin with additional clindamycin added  At the time of his admission his lactic acid of 2 4 and leukocytosis of 22 09  Creatinine kinase was 131  Patient eventually required intubation due to hypoxia  CT scan of the chest abdomen and pelvis on 10/26/2018 showed patchy airspace opacity in the right lower lobe, large dense consolidation in the right middle lobe nonspecific but suspicious for infection/pneumonia, cardiomegaly, multiple areas of skin thickening with associated subjacent complex fluid in the superficial soft tissues of the left pectoralis region as well as in the posterior and superior left lower in addition of few air bubbles were seen  Patient was taken to the OR on 10/26/2018 for he underwent I and D extensive and multiple abscesses underlying zoster scabs going for the left chest wall into the left axilla as well as infected sebaceous cyst of the axilla  The area of debridement measures 35 cm x 20 cm with a depth of 10 cm  The wound VAC was placed at the end of the procedure  Patient wound culture, tissue culture, and blood culture are all growing MSSA  He has not had repeat blood cultures done  He did undergo 2D echo on 10/26/2018 without vegetation  He remains in the ICU this slowly improving, he is currently extubated and only on 3 mcg of Levophed  He is afebrile with slowly declining leukocytosis currently 14 58  Procalcitonin remains elevated at 0 79  He is returning to the OR today for further evaluation      REVIEW OF SYSTEMS:  Constitutional:  No fever/chills  HEENT:  No current headache  Cardiovascular:  No chest pain  Pulmonary:  No shortness of breath  GI:  No diarrhea  :  Gordon catheter in place  Back:  No back pain  Skin:  Positive rash consistent with zoster  Extremities:  No edema present  Musculoskeletal:  No arthralgias or myalgias  A complete system-based review of systems is otherwise negative  PAST MEDICAL HISTORY:  Past Medical History:   Diagnosis Date    [facility]     Diabetes mellitus ([facility] )     History of open heart surgery     Hyperlipidemia     Hypertension      Past Surgical History:   Procedure Laterality Date    CARDIAC SURGERY         FAMILY HISTORY:  Non-contributory    SOCIAL HISTORY:  History   Alcohol Use No     History   Drug Use No     History   Smoking Status    Former Smoker    Types: Pipe   Smokeless Tobacco    Never Used       ALLERGIES:  No Known Allergies    MEDICATIONS:  All current active medications have been reviewed  PHYSICAL EXAM:  Vitals:  Temp:  [97 1 °F (36 2 °C)-98 8 °F (37 1 °C)] 97 1 °F (36 2 °C)  HR:  [] 98  Resp:  [16-26] 20  BP: ()/(50-72) 97/68  SpO2:  [99 %-100 %] 99 %  Temp (24hrs), Av 7 °F (36 5 °C), Min:97 1 °F (36 2 °C), Max:98 8 °F (37 1 °C)  Current: Temperature: (!) 97 1 °F (36 2 °C)     Physical Exam:  General:  Well-nourished, well-developed, in no acute distress  Eyes:  Conjunctive clear with no hemorrhages or effusions  Oropharynx:  No ulcers, no lesions  Neck:  Supple, no lymphadenopathy  Lungs:  Clear to auscultation bilaterally, no accessory muscle use  Cardiac:  Regular rate and rhythm, no murmurs  Abdomen:  Soft, non-tender, non-distended  :  Gordon in place  Back:  Nontender to palpation  Extremities:  No peripheral cyanosis, clubbing, or edema  Skin:  Open area of ulceration left posterior thorax, wound VAC in place left pectoral area  Neurological:  Moves all four extremities spontaneously, sensation grossly intact      LABS, IMAGING, & OTHER STUDIES:  Lab Results:  I have personally reviewed pertinent labs      Results from last 7 days  Lab Units 10/29/18  0548 10/28/18  0456 10/27/18  0428  10/26/18  1004 10/26/18  0817 10/26/18  0732   SODIUM mmol/L 139 139 135*  < > 134*  --   --    POTASSIUM mmol/L 3 4* 3 7 4 3  < > 3 4*  --   -- CHLORIDE mmol/L 105 107 103  < > 101  --   --    CO2 mmol/L 25 24 25  < > 22  --   --    BUN mg/dL 40* 37* 42*  < > 66*  --   --    CREATININE mg/dL 1 32* 1 08 1 11  < > 1 49*  --   --    EGFR ml/min/1 73sq m 51 64 62  < > 44  --   --    GLUCOSE, ISTAT mg/dl  --   --   --   --   --  115 120   CALCIUM mg/dL 7 7* 7 8* 7 7*  < > 8 2*  --   --    AST U/L 22  --  17  --  14  --   --    ALT U/L 20  --  12  --  12  --   --    ALK PHOS U/L 71  --  65  --  67  --   --    < > = values in this interval not displayed  Results from last 7 days  Lab Units 10/29/18  0548 10/28/18  0456 10/27/18  0428   WBC Thousand/uL 14 58* 16 88* 17 35*   HEMOGLOBIN g/dL 8 5* 7 1* 8 0*   PLATELETS Thousands/uL 229 205 256       Results from last 7 days  Lab Units 10/26/18  0749 10/26/18  0742 10/25/18  2007 10/25/18  1730 10/25/18  1534 10/25/18  1530   BLOOD CULTURE   --   --   --   --  Staphylococcus aureus* No Growth at 72 hrs  GRAM STAIN RESULT  No Polys  2+ Gram positive cocci in clusters 2+ Polys  3+ Gram positive cocci in clusters  --  1+ Polys  2+ Gram positive cocci in pairs  2+ Gram positive cocci in clusters Gram positive cocci in clusters  --    URINE CULTURE   --   --   --  >100,000 cfu/ml   --   --    WOUND CULTURE   --  4+ Growth of Staphylococcus aureus*  --  4+ Growth of Staphylococcus aureus*  --   --    MRSA CULTURE ONLY   --   --  No Methicillin Resistant Staphlyococcus aureus (MRSA) isolated  --   --   --          Imaging Studies:   I have personally reviewed pertinent imaging study reports and images in PACS  EKG, Pathology, and Other Studies:   I have personally reviewed pertinent reports

## 2018-10-29 NOTE — PROGRESS NOTES
1  Will plan for second look chest/axilla  2  Right lateral decubitus positioning with examination of back - left arm straight up

## 2018-10-30 ENCOUNTER — ANESTHESIA (INPATIENT)
Dept: GASTROENTEROLOGY | Facility: AMBULARY SURGERY CENTER | Age: 78
DRG: 853 | End: 2018-10-30
Payer: MEDICARE

## 2018-10-30 PROBLEM — K92.1 MELENA: Status: ACTIVE | Noted: 2018-10-25

## 2018-10-30 LAB
ABO GROUP BLD: NORMAL
ANION GAP SERPL CALCULATED.3IONS-SCNC: 8 MMOL/L (ref 4–13)
ARTERIAL PATENCY WRIST A: YES
BACTERIA BLD CULT: NORMAL
BASE EXCESS BLDA CALC-SCNC: -0.9 MMOL/L
BLD GP AB SCN SERPL QL: NEGATIVE
BODY TEMPERATURE: 97.8 DEGREES FEHRENHEIT
BUN SERPL-MCNC: 49 MG/DL (ref 5–25)
CALCIUM SERPL-MCNC: 7.5 MG/DL (ref 8.3–10.1)
CHLORIDE SERPL-SCNC: 106 MMOL/L (ref 100–108)
CO2 SERPL-SCNC: 24 MMOL/L (ref 21–32)
CREAT SERPL-MCNC: 1.36 MG/DL (ref 0.6–1.3)
ERYTHROCYTE [DISTWIDTH] IN BLOOD BY AUTOMATED COUNT: 15.7 % (ref 11.6–15.1)
GFR SERPL CREATININE-BSD FRML MDRD: 49 ML/MIN/1.73SQ M
GLUCOSE SERPL-MCNC: 126 MG/DL (ref 65–140)
GLUCOSE SERPL-MCNC: 172 MG/DL (ref 65–140)
GLUCOSE SERPL-MCNC: 186 MG/DL (ref 65–140)
GLUCOSE SERPL-MCNC: 220 MG/DL (ref 65–140)
GLUCOSE SERPL-MCNC: 240 MG/DL (ref 65–140)
HCO3 BLDA-SCNC: 21.3 MMOL/L (ref 22–28)
HCT VFR BLD AUTO: 22.1 % (ref 36.5–49.3)
HCT VFR BLD AUTO: 23.5 % (ref 36.5–49.3)
HGB BLD-MCNC: 7.3 G/DL (ref 12–17)
HGB BLD-MCNC: 7.9 G/DL (ref 12–17)
MAGNESIUM SERPL-MCNC: 2.5 MG/DL (ref 1.6–2.6)
MCH RBC QN AUTO: 34.8 PG (ref 26.8–34.3)
MCHC RBC AUTO-ENTMCNC: 33 G/DL (ref 31.4–37.4)
MCV RBC AUTO: 105 FL (ref 82–98)
NON VENT ROOM AIR: ABNORMAL %
O2 CT BLDA-SCNC: 9.7 ML/DL (ref 16–23)
OXYHGB MFR BLDA: 94.7 % (ref 94–97)
PCO2 BLDA: 25.6 MM HG (ref 36–44)
PCO2 TEMP ADJ BLDA: 25.2 MM HG (ref 36–44)
PH BLD: 7.54 [PH] (ref 7.35–7.45)
PH BLDA: 7.54 [PH] (ref 7.35–7.45)
PHOSPHATE SERPL-MCNC: 2.1 MG/DL (ref 2.3–4.1)
PLATELET # BLD AUTO: 242 THOUSANDS/UL (ref 149–390)
PMV BLD AUTO: 11.2 FL (ref 8.9–12.7)
PO2 BLD: 68.8 MM HG (ref 75–129)
PO2 BLDA: 70.7 MM HG (ref 75–129)
POTASSIUM SERPL-SCNC: 4.5 MMOL/L (ref 3.5–5.3)
PROCALCITONIN SERPL-MCNC: 0.44 NG/ML
RBC # BLD AUTO: 2.1 MILLION/UL (ref 3.88–5.62)
RH BLD: POSITIVE
SODIUM SERPL-SCNC: 138 MMOL/L (ref 136–145)
SPECIMEN EXPIRATION DATE: NORMAL
SPECIMEN SOURCE: ABNORMAL
WBC # BLD AUTO: 12.67 THOUSAND/UL (ref 4.31–10.16)

## 2018-10-30 PROCEDURE — C9113 INJ PANTOPRAZOLE SODIUM, VIA: HCPCS | Performed by: PHYSICIAN ASSISTANT

## 2018-10-30 PROCEDURE — 86901 BLOOD TYPING SEROLOGIC RH(D): CPT | Performed by: STUDENT IN AN ORGANIZED HEALTH CARE EDUCATION/TRAINING PROGRAM

## 2018-10-30 PROCEDURE — 36600 WITHDRAWAL OF ARTERIAL BLOOD: CPT

## 2018-10-30 PROCEDURE — 99233 SBSQ HOSP IP/OBS HIGH 50: CPT | Performed by: INTERNAL MEDICINE

## 2018-10-30 PROCEDURE — 82948 REAGENT STRIP/BLOOD GLUCOSE: CPT

## 2018-10-30 PROCEDURE — 83735 ASSAY OF MAGNESIUM: CPT | Performed by: STUDENT IN AN ORGANIZED HEALTH CARE EDUCATION/TRAINING PROGRAM

## 2018-10-30 PROCEDURE — 85018 HEMOGLOBIN: CPT | Performed by: INTERNAL MEDICINE

## 2018-10-30 PROCEDURE — 86920 COMPATIBILITY TEST SPIN: CPT

## 2018-10-30 PROCEDURE — P9021 RED BLOOD CELLS UNIT: HCPCS

## 2018-10-30 PROCEDURE — 88305 TISSUE EXAM BY PATHOLOGIST: CPT | Performed by: PATHOLOGY

## 2018-10-30 PROCEDURE — 86900 BLOOD TYPING SEROLOGIC ABO: CPT | Performed by: STUDENT IN AN ORGANIZED HEALTH CARE EDUCATION/TRAINING PROGRAM

## 2018-10-30 PROCEDURE — 93005 ELECTROCARDIOGRAM TRACING: CPT

## 2018-10-30 PROCEDURE — 88312 SPECIAL STAINS GROUP 1: CPT | Performed by: PATHOLOGY

## 2018-10-30 PROCEDURE — 85014 HEMATOCRIT: CPT | Performed by: INTERNAL MEDICINE

## 2018-10-30 PROCEDURE — 80048 BASIC METABOLIC PNL TOTAL CA: CPT | Performed by: STUDENT IN AN ORGANIZED HEALTH CARE EDUCATION/TRAINING PROGRAM

## 2018-10-30 PROCEDURE — 88341 IMHCHEM/IMCYTCHM EA ADD ANTB: CPT | Performed by: PATHOLOGY

## 2018-10-30 PROCEDURE — C9113 INJ PANTOPRAZOLE SODIUM, VIA: HCPCS | Performed by: NURSE PRACTITIONER

## 2018-10-30 PROCEDURE — 99222 1ST HOSP IP/OBS MODERATE 55: CPT | Performed by: INTERNAL MEDICINE

## 2018-10-30 PROCEDURE — 88342 IMHCHEM/IMCYTCHM 1ST ANTB: CPT | Performed by: PATHOLOGY

## 2018-10-30 PROCEDURE — 86850 RBC ANTIBODY SCREEN: CPT | Performed by: STUDENT IN AN ORGANIZED HEALTH CARE EDUCATION/TRAINING PROGRAM

## 2018-10-30 PROCEDURE — 85027 COMPLETE CBC AUTOMATED: CPT | Performed by: STUDENT IN AN ORGANIZED HEALTH CARE EDUCATION/TRAINING PROGRAM

## 2018-10-30 PROCEDURE — 0W3P8ZZ CONTROL BLEEDING IN GASTROINTESTINAL TRACT, VIA NATURAL OR ARTIFICIAL OPENING ENDOSCOPIC: ICD-10-PCS | Performed by: INTERNAL MEDICINE

## 2018-10-30 PROCEDURE — 30233N1 TRANSFUSION OF NONAUTOLOGOUS RED BLOOD CELLS INTO PERIPHERAL VEIN, PERCUTANEOUS APPROACH: ICD-10-PCS | Performed by: STUDENT IN AN ORGANIZED HEALTH CARE EDUCATION/TRAINING PROGRAM

## 2018-10-30 PROCEDURE — 43239 EGD BIOPSY SINGLE/MULTIPLE: CPT | Performed by: INTERNAL MEDICINE

## 2018-10-30 PROCEDURE — 84145 PROCALCITONIN (PCT): CPT | Performed by: STUDENT IN AN ORGANIZED HEALTH CARE EDUCATION/TRAINING PROGRAM

## 2018-10-30 PROCEDURE — 99233 SBSQ HOSP IP/OBS HIGH 50: CPT | Performed by: SURGERY

## 2018-10-30 PROCEDURE — 0DB38ZX EXCISION OF LOWER ESOPHAGUS, VIA NATURAL OR ARTIFICIAL OPENING ENDOSCOPIC, DIAGNOSTIC: ICD-10-PCS | Performed by: INTERNAL MEDICINE

## 2018-10-30 PROCEDURE — 82272 OCCULT BLD FECES 1-3 TESTS: CPT | Performed by: NURSE PRACTITIONER

## 2018-10-30 PROCEDURE — 82805 BLOOD GASES W/O2 SATURATION: CPT | Performed by: STUDENT IN AN ORGANIZED HEALTH CARE EDUCATION/TRAINING PROGRAM

## 2018-10-30 PROCEDURE — 84100 ASSAY OF PHOSPHORUS: CPT | Performed by: STUDENT IN AN ORGANIZED HEALTH CARE EDUCATION/TRAINING PROGRAM

## 2018-10-30 RX ORDER — FLUCONAZOLE 2 MG/ML
200 INJECTION, SOLUTION INTRAVENOUS EVERY 24 HOURS
Status: DISCONTINUED | OUTPATIENT
Start: 2018-10-31 | End: 2018-10-31

## 2018-10-30 RX ORDER — METOCLOPRAMIDE HYDROCHLORIDE 5 MG/ML
10 INJECTION INTRAMUSCULAR; INTRAVENOUS ONCE
Status: COMPLETED | OUTPATIENT
Start: 2018-10-30 | End: 2018-10-30

## 2018-10-30 RX ORDER — PANTOPRAZOLE SODIUM 40 MG/1
40 INJECTION, POWDER, FOR SOLUTION INTRAVENOUS EVERY 12 HOURS SCHEDULED
Status: DISCONTINUED | OUTPATIENT
Start: 2018-10-30 | End: 2018-10-30

## 2018-10-30 RX ORDER — INSULIN GLARGINE 100 [IU]/ML
12 INJECTION, SOLUTION SUBCUTANEOUS
Status: DISCONTINUED | OUTPATIENT
Start: 2018-10-30 | End: 2018-11-01

## 2018-10-30 RX ORDER — PROPOFOL 10 MG/ML
INJECTION, EMULSION INTRAVENOUS CONTINUOUS PRN
Status: DISCONTINUED | OUTPATIENT
Start: 2018-10-30 | End: 2018-10-30 | Stop reason: SURG

## 2018-10-30 RX ORDER — FLUCONAZOLE 2 MG/ML
400 INJECTION, SOLUTION INTRAVENOUS ONCE
Status: COMPLETED | OUTPATIENT
Start: 2018-10-30 | End: 2018-10-30

## 2018-10-30 RX ORDER — FLUCONAZOLE 2 MG/ML
400 INJECTION, SOLUTION INTRAVENOUS EVERY 24 HOURS
Status: DISCONTINUED | OUTPATIENT
Start: 2018-10-30 | End: 2018-10-30

## 2018-10-30 RX ADMIN — CEFAZOLIN SODIUM 2000 MG: 2 SOLUTION INTRAVENOUS at 15:09

## 2018-10-30 RX ADMIN — HEPARIN SODIUM 5000 UNITS: 5000 INJECTION, SOLUTION INTRAVENOUS; SUBCUTANEOUS at 05:50

## 2018-10-30 RX ADMIN — ASCORBIC ACID: 500 INJECTION, SOLUTION INTRAMUSCULAR; INTRAVENOUS; SUBCUTANEOUS at 21:35

## 2018-10-30 RX ADMIN — PANTOPRAZOLE SODIUM 40 MG: 40 INJECTION, POWDER, FOR SOLUTION INTRAVENOUS at 12:56

## 2018-10-30 RX ADMIN — ASCORBIC ACID: 500 INJECTION, SOLUTION INTRAMUSCULAR; INTRAVENOUS; SUBCUTANEOUS at 03:10

## 2018-10-30 RX ADMIN — VALACYCLOVIR HYDROCHLORIDE 1000 MG: 500 TABLET, FILM COATED ORAL at 08:44

## 2018-10-30 RX ADMIN — MELATONIN TAB 3 MG 6 MG: 3 TAB at 21:36

## 2018-10-30 RX ADMIN — ASCORBIC ACID: 500 INJECTION, SOLUTION INTRAMUSCULAR; INTRAVENOUS; SUBCUTANEOUS at 09:18

## 2018-10-30 RX ADMIN — METOCLOPRAMIDE 10 MG: 5 INJECTION, SOLUTION INTRAMUSCULAR; INTRAVENOUS at 16:32

## 2018-10-30 RX ADMIN — VALACYCLOVIR HYDROCHLORIDE 1000 MG: 500 TABLET, FILM COATED ORAL at 21:37

## 2018-10-30 RX ADMIN — COLLAGENASE SANTYL: 250 OINTMENT TOPICAL at 11:15

## 2018-10-30 RX ADMIN — SODIUM CHLORIDE 80 MG: 9 INJECTION, SOLUTION INTRAVENOUS at 14:22

## 2018-10-30 RX ADMIN — ASPIRIN 81 MG 81 MG: 81 TABLET ORAL at 08:43

## 2018-10-30 RX ADMIN — HYDROCORTISONE SODIUM SUCCINATE 50 MG: 100 INJECTION, POWDER, FOR SOLUTION INTRAMUSCULAR; INTRAVENOUS at 05:51

## 2018-10-30 RX ADMIN — THIAMINE HYDROCHLORIDE 200 MG: 100 INJECTION, SOLUTION INTRAMUSCULAR; INTRAVENOUS at 22:40

## 2018-10-30 RX ADMIN — TOPICAL ANESTHETIC 1 SPRAY: 200 SPRAY DENTAL; PERIODONTAL at 17:10

## 2018-10-30 RX ADMIN — CEFAZOLIN SODIUM 2000 MG: 2 SOLUTION INTRAVENOUS at 23:33

## 2018-10-30 RX ADMIN — INSULIN GLARGINE 12 UNITS: 100 INJECTION, SOLUTION SUBCUTANEOUS at 21:31

## 2018-10-30 RX ADMIN — FLUCONAZOLE 400 MG: 2 INJECTION, SOLUTION INTRAVENOUS at 19:29

## 2018-10-30 RX ADMIN — CEFAZOLIN SODIUM 2000 MG: 2 SOLUTION INTRAVENOUS at 06:46

## 2018-10-30 RX ADMIN — NOREPINEPHRINE BITARTRATE 3 MCG/MIN: 1 INJECTION INTRAVENOUS at 11:54

## 2018-10-30 RX ADMIN — THIAMINE HYDROCHLORIDE 200 MG: 100 INJECTION, SOLUTION INTRAMUSCULAR; INTRAVENOUS at 08:41

## 2018-10-30 RX ADMIN — SODIUM CHLORIDE 8 MG/HR: 9 INJECTION, SOLUTION INTRAVENOUS at 14:39

## 2018-10-30 RX ADMIN — HYDROCORTISONE SODIUM SUCCINATE 50 MG: 100 INJECTION, POWDER, FOR SOLUTION INTRAMUSCULAR; INTRAVENOUS at 21:36

## 2018-10-30 RX ADMIN — ASCORBIC ACID: 500 INJECTION, SOLUTION INTRAMUSCULAR; INTRAVENOUS; SUBCUTANEOUS at 15:44

## 2018-10-30 RX ADMIN — ACETAMINOPHEN 650 MG: 325 TABLET, FILM COATED ORAL at 05:50

## 2018-10-30 RX ADMIN — PROPOFOL 130 MCG/KG/MIN: 10 INJECTION, EMULSION INTRAVENOUS at 17:12

## 2018-10-30 NOTE — OP NOTE
OPERATIVE REPORT  PATIENT NAME: Rashaad Nuñez    :  1940  MRN: [id number]  Pt Location: [facility] OR ROOM 03    SURGERY DATE: 10/29/2018    Surgeon(s) and Role:     * Hiren Coley MD - Primary    Preop Diagnosis:  Abscess of chest wall [L02 213]    Post-Op Diagnosis Codes:     * Abscess of chest wall [L02 213]    Procedure(s) (LRB):  DEBRIDEMENT WOUND AND DRESSING CHANGE ([address] OUT) (Left)    Specimen(s):  ID Type Source Tests Collected by Time Destination   A : Left upper back wound tissue for culture and gram stain  Wound Wound ANAEROBIC CULTURE AND GRAM STAIN, CULTURE, TISSUE AND GRAM STAIN, WOUND CULTURE Hiren Coley MD 10/29/2018 11:08 AM        Estimated Blood Loss:   20 mL    Drains:  Urethral Catheter Latex 16 Fr  (Active)   Amt returned on insertion(mL) 100 mL 10/25/2018  9:00 PM   Site Assessment Clean;Skin intact 10/30/2018  8:00 AM   Collection Container Standard drainage bag 10/30/2018  8:00 AM   Securement Method Securing device (Describe) 10/30/2018  8:00 AM   Output (mL) 75 mL 10/30/2018 10:01 AM   Number of days: 5       [REMOVED] Negative Pressure Wound Therapy (V A C ) Chest Anterior; Left (Removed)   Unit Type Prevena  10/30/2018  8:00 AM   Black foam- # applied 3 10/29/2018  2:48 PM   Nonadherent (Adaptic)- # applied 2 10/29/2018  2:48 PM   Cycle Continuous 10/30/2018  8:00 AM   Target Pressure (mmHg) 125 10/30/2018  8:00 AM   Canister Changed Yes 10/30/2018  8:00 AM   Dressing Status Clean;Dry; Intact; Reinforced 10/30/2018  8:00 AM   Output (mL) 50 mL 10/29/2018  6:01 AM   Number of days: 3       [REMOVED] NG/OG/Enteral Tube Orogastric (Removed)   Placement Reverification Auscultation 10/27/2018 12:00 PM   Site Assessment Clean;Dry; Intact 10/27/2018 12:00 PM   Status Clamped 10/27/2018 12:00 PM   Drainage Appearance Green 10/26/2018 12:00 PM   Output (mL) 300 mL 10/26/2018  4:00 PM   Number of days: 1     Anesthesia Type:   General    Operative Indications:  Abscess of chest wall [B13 895]    Operative Findings:  Clean chest and axilla  No abscesses in the left upper back    Complications:   None    Procedure and Technique:  After the patient was brought into the OR, the patient was identified as Tiffany Buckley, date of birth 1940  We reviewed that we had planned on an extensive chest wall debridement and all key steps and modifications were reviewed  We noted that the patient was neither on beta-blockers nor therapeutic anticoagulation, but was on DVT prophylactic doses of heparin  Otherwise SCDs were on and in place  Patient was on around the clock antibiotics including vancomycin, cefepime, and clindamycin  ASA was noted to be 4  Patient was not noted to be allergic to any medications  Fire safety level was 3  Patient was placed in a supine position, anesthetized, prepped, and draped in right lateral decubitus position  It should be noted that the patient started off with an LMA, however had signs of emesis into the tube and therefore he was suctioned out without any signs of desaturation during that time and was intubated prior to surgery  I started off with debriding the left upper back down to the level of the subcutaneous tissue taking way only the necrotic tissue with a combination of scalpel, 2  Curette, and electrocautery knife  Total area debrided is very difficult to calculate as there were the striated scab/scarring from the varicella zoster shingles that he had prior  If I had to give a total area, estimated debridement to be approximately 5 centimeters squared in total   The necrotic tissue was sent off for tissue culture  Next I turned my attention to the chest and axilla and noted that the wound was clean and therefore I started off with a pulse lavage of the area and then achieved good hemostasis and actually started to close the deeper layers with 3 0 Vicryl suture in an interrupted fashion    Having done this, I realized that the whole wound could be closed loosely and therefore I placed several vertical mattress 2 0 nylon sutures to loosely close the skin and then subsequently placed 10 iodoform selwyn into the wound and subsequently dressed the whole process using a modified Eckhauser-VAC  This is to say that the whole area was dressed and then subsequently a Provena VAC was placed over to serve as an incisional VAC  The patient tolerated the procedure well and was taken back to the post anesthesia care unit in in extubated and stable condition      I was present for the entire procedure and a physician assistant was required during the procedure for retraction tissue handling,dissection and suturing      Patient Disposition:  PACU     SIGNATURE: Yvette Mae MD  DATE: October 30, 2018  TIME: 11:35 AM

## 2018-10-30 NOTE — CONSULTS
Consultation -  [facility]  Imagene Class [de-identified] y o  male MRN: [id number]  Unit/Bed#: ICU 08 Encounter: [id number]        Inpatient consult to gastroenterology  Consult performed by: Florencia Jasso ordered by: Raheem Toledo        Reason for Consult / Principal Problem:  Melena    HPI: Imagene Class is a [de-identified]y o  year old male with history of CABG, diabetes, CKD who presented to the ER 10/25 (5 days ago) after being found down after a fall  He was found to be in septic shock, with numerous electrolyte derangements, glucose over 1000, with apparent finding of chest wall cellulitis and associated abscesses, MSSA bacteremia, RML pneumonia, acute kidney injury, and possible shingles of the chest wall  He has been receiving IV antibiotics, underwent extensive I + D of chest wall abscess, was intubated (and has since been extubated), and remains on pressors (norepinephrine)  Earlier today (per nursing, about 3-4 hours ago) he was noted with dark colored heme positive stool when being cleaned up  Over the last 15-20 minutes he was noted with output of black liquid stool with several episodes  He has no known history of GI bleeding; he does not think he has had EGD or colonoscopy before, although his memory appears poor  His hemoglobin was noted to be 7 3 this morning, compared against 8 5 yesterday  It was 9 3 on 10/26, and 7 1 on 10/28 (he was transfused 1 unit PRBCs later that day)  The patient is a fairly poor informant, exhibits poor short term memory of events of his hospitalization  He denies any abdominal pain at this time  He has not had any vomiting episodes  Denies NSAID use  Nursing reports he ate some eggs for breakfast around 9:00 AM       REVIEW OF SYSTEMS:    CONSTITUTIONAL: Denies any fever, chills, or rigors  Good appetite, and no recent weight loss  HEENT: No earache or tinnitus  Denies hearing loss or visual disturbances    CARDIOVASCULAR: No chest pain or palpitations  RESPIRATORY: Denies any cough, hemoptysis, shortness of breath or dyspnea on exertion  GASTROINTESTINAL: As noted in the History of Present Illness  GENITOURINARY: No problems with urination  Denies any hematuria or dysuria  NEUROLOGIC: No dizziness or vertigo, denies headaches  MUSCULOSKELETAL: Denies any muscle or joint pain  SKIN: Denies skin rashes or itching  ENDOCRINE: Denies excessive thirst  Denies intolerance to heat or cold  PSYCHOSOCIAL: Denies depression or anxiety  Denies any recent memory loss  Historical Information   Past Medical History:   Diagnosis Date    CHF (congestive heart failure) ([facility] )     Diabetes mellitus ([facility] )     History of open heart surgery     Hyperlipidemia     Hypertension      Past Surgical History:   Procedure Laterality Date    CARDIAC SURGERY      WOUND DEBRIDEMENT Left 10/26/2018    Procedure: Chest wall wound debridement (extensive) with pulse lavage and wound vac placement;  Surgeon: Torrie Jamison MD;  Location: [address] [place] [address];  Service: General    WOUND DEBRIDEMENT Left 10/29/2018    Procedure: DEBRIDEMENT WOUND AND DRESSING CHANGE ([address] OUT); Surgeon: Torrie Jamison MD;  Location: [address] [place] [address];  Service: General     Social History   History   Alcohol Use No     History   Drug Use No     History   Smoking Status    Former Smoker    Types: Pipe   Smokeless Tobacco    Never Used     History reviewed  No pertinent family history      Meds/Allergies     Prescriptions Prior to Admission   Medication    aspirin 81 mg chewable tablet    atorvastatin (LIPITOR) 10 mg tablet    docusate sodium (COLACE) 100 mg capsule    ferrous sulfate 325 (65 Fe) mg tablet    glimepiride (AMARYL) 4 mg tablet    insulin detemir (LEVEMIR) 100 units/mL subcutaneous injection    lisinopril (ZESTRIL) 2 5 mg tablet    metoprolol succinate (TOPROL-XL) 25 mg 24 hr tablet    spironolactone (ALDACTONE) 25 mg tablet    torsemide (DEMADEX) 10 mg tablet     Current Facility-Administered Medications   Medication Dose Route Frequency    ascorbic acid 1,500 mg in sodium chloride 0 9 % 100 mL infusion   Intravenous Q6H    aspirin chewable tablet 81 mg  81 mg Oral Daily    atorvastatin (LIPITOR) tablet 10 mg  10 mg Oral Daily    ceFAZolin (ANCEF) IVPB (premix) 2,000 mg  2,000 mg Intravenous Q8H    collagenase (SANTYL) ointment   Topical Daily    heparin (porcine) subcutaneous injection 5,000 Units  5,000 Units Subcutaneous Q8H [address]    hydrocortisone sodium succinate (PF) (Solu-CORTEF) injection 50 mg  50 mg Intravenous Q12H [address]    influenza vaccine, high-dose (FLUZONE HIGH-DOSE) IM injection WIN 0 5 mL  0 5 mL Intramuscular Prior to discharge    insulin glargine (LANTUS) subcutaneous injection 24 Units 0 24 mL  24 Units Subcutaneous HS    insulin lispro (HumaLOG) 100 units/mL subcutaneous injection 2-12 Units  2-12 Units Subcutaneous HS    insulin lispro (HumaLOG) 100 units/mL subcutaneous injection 2-12 Units  2-12 Units Subcutaneous TID AC    melatonin tablet 6 mg  6 mg Oral HS    norepinephrine (LEVOPHED) 8 mg (DOUBLE CONCENTRATION) IV in sodium chloride 0 9% 250 mL  1-30 mcg/min Intravenous Titrated    oxyCODONE (ROXICODONE) IR tablet 5 mg  5 mg Oral Q4H PRN    pantoprazole (PROTONIX) injection 40 mg  40 mg Intravenous Q12H [address]    senna (SENOKOT) tablet 8 6 mg  1 tablet Oral HS    thiamine (VITAMIN B1) 200 mg in sodium chloride 0 9 % 50 mL IVPB  200 mg Intravenous Q12H    valACYclovir (VALTREX) tablet 1,000 mg  1,000 mg Oral Q12H [address]       No Known Allergies        Objective     Blood pressure 100/56, pulse (!) 116, temperature 97 7 °F (36 5 °C), temperature source Temporal, resp  rate 19, height 5' 8 5" (1 74 m), weight 70 3 kg (154 lb 15 7 oz), SpO2 98 %        Intake/Output Summary (Last 24 hours) at 10/30/18 1233  Last data filed at 10/30/18 1001   Gross per 24 hour   Intake          1810 72 ml   Output             1130 ml   Net 680 72 ml         PHYSICAL EXAM     General Appearance:   Alert, cooperative, no distress, appears stated age    HEENT:   Normocephalic, atraumatic, anicteric      Neck:  Supple, symmetrical, trachea midline, no adenopathy;    thyroid: no enlargement/tenderness/nodules; no carotid  bruit or JVD    Lungs:   Clear to auscultation bilaterally; no rales, rhonchi or wheezing; respirations unlabored    Heart[de-identified]   S1 and S2 normal; regular rate and rhythm; no murmur, rub, or gallop  Abdomen:   Soft, non-tender, non-distended; normal bowel sounds; no masses, no organomegaly    Genitalia:   Deferred    Rectal:   Deferred    Extremities:  No cyanosis, clubbing or edema    Pulses:  2+ and symmetric all extremities    Skin:  Skin color, texture, turgor normal, no rashes or lesions    Lymph nodes:  No palpable cervical, axillary or inguinal lymphadenopathy        Lab Results:   Admission on 10/25/2018   No results displayed because visit has over 200 results  Imaging Studies: I have personally reviewed pertinent reports  CT CHEST, ABDOMEN AND PELVIS WITHOUT IV CONTRAST     INDICATION:   Sepsis      COMPARISON:  CT cervical spine dated 10/25/2018, radiographs of the left shoulder dated 10/25/2018     TECHNIQUE: CT examination of the chest, abdomen and pelvis was performed without intravenous contrast   Axial, sagittal, and coronal 2D reformatted images were created from the source data and submitted for interpretation       Radiation dose length product (DLP) for this visit:  652 78 mGy-cm   This examination, like all CT scans performed in the [facility], was performed utilizing techniques to minimize radiation dose exposure, including the use of iterative   reconstruction and automated exposure control       Enteric contrast was not administered       FINDINGS:     CHEST     LUNGS:  Some patchy airspace opacities are seen in the right lower lobe superior segment    There is a large dense consolidation in the right middle lobe, lateral segment  Some mucous plugging is suspected in a left upper lobe bronchus  There is mild   dependent and bibasilar atelectasis  The lungs otherwise appear grossly clear      PLEURA:  Small bilateral pleural effusions are noted dependently      HEART/GREAT VESSELS:  There is left ventricular enlargement  At least moderate coronary atherosclerosis is noted  Status post CABG with a LIMA graft  Mild aortic atherosclerosis  No aortic aneurysm      MEDIASTINUM AND ALIZE:  Unremarkable      CHEST WALL AND LOWER NECK: Median sternotomy wires are noted  A few bubbles of air are seen in the anterior left chest subcutaneous tissues (axial image 8)  Multiple areas of skin thickening with associated subjacent complex fluid are seen in the   superficial soft tissues of the left pectoralis region (for example axial image 30); this area measures approximately 5 5 x 1 1 x 4 0 cm in transverse, AP, and CC dimensions  There is body wall edema noted with some shotty left axillary lymph nodes  In   addition there is posterior skin thickening with some irregularity of the soft tissues of the posterior and superior left thorax (axial image 12)  Subjacent to this there is an apparent fluid collection measuring approximately 4 7 x 1 8 x 4 6 cm in   size      ABDOMEN     LIVER/BILIARY TREE:  Unremarkable      GALLBLADDER:  No calcified gallstones  No pericholecystic inflammatory change      SPLEEN:  Unremarkable      PANCREAS:  Mild atrophy  Otherwise grossly unremarkable     ADRENAL GLANDS:  Grossly unremarkable     KIDNEYS/URETERS:  Partially exophytic cyst in the posterior midportion of the right kidney measures approximately 1 5 cm in size  Exophytic cyst in the lower pole of the left kidney measures approximately 3 7 cm in size    Bilateral kidneys otherwise   appear grossly unremarkable; no hydronephrosis      STOMACH AND BOWEL:  Grossly unremarkable     APPENDIX:  A normal appendix was visualized      ABDOMINOPELVIC CAVITY:  No ascites or free intraperitoneal air  No lymphadenopathy      VESSELS:  Mild atherosclerosis; no aortic aneurysm     PELVIS     REPRODUCTIVE ORGANS:  Unremarkable for patient's age      URINARY BLADDER:  Bladder is partially distended  A catheter is seen within the bladder lumen  Some air is seen within the nondependent portion of the bladder  Mild circumferential bladder wall thickening is noted, probably exaggerated by   underdistention      ABDOMINAL WALL/INGUINAL REGIONS:  A right-sided femoral venous catheter is noted  Some subcutaneous emphysema is seen in this region as well, probably related to recent intervention/placement  Diffuse body wall edema is noted      OSSEOUS STRUCTURES:  Mild curvature of the lumbar spine, apex to the left  Moderate degenerative changes of the bilateral hips, worse on the right  No acute fracture or destructive osseous lesion is identified      Prominent degenerative changes of the right shoulder  Multilevel degenerative changes of the spine with vacuum disc phenomenon at L4/L5  There is intervertebral disc space narrowing and facet joint arthropathy at L2/L3, L3/L4, and L4/L5      IMPRESSION:     Patchy airspace opacities are seen in the right lower lobe superior segment  There is a large dense consolidation in the right middle lobe, lateral segment, nonspecific but suspicious for infection/pneumonia in the appropriate clinical setting      Cardiomegaly, bilateral pleural effusions, and body wall edema suggests a superimposed component of fluid overload/CHF      Multiple areas of skin thickening with associated subjacent complex fluid are seen in the superficial soft tissues of the left pectoralis region (for example axial image 30), as well as in the posterior and superior left thorax (axial image 12), as   described  Consider cellulitis with developing abscesses    In addition, a few bubbles of air are seen in the anterior left chest subcutaneous tissues (axial image 8) which could be related to recent intervention but also raises suspicion for possible   gas-forming infection      Partially distended bladder with catheter seen within the bladder lumen  Mild circumferential bladder wall thickening noted, probably exaggerated by underdistention  A cystitis could be considered in the appropriate clinical setting      Renal cysts, degenerative changes of the shoulder, spine, and hips, and other findings as above          ASSESSMENT/PLAN:     1  Acute onset of melena today with drop in hemoglobin approx 1 g since yesterday; rule out bleeding peptic ulcer, AVM, malignancy, etc   He appears relatively stable hemodynamically, given the timeframe of his symptoms, and is currently receiving PRBC transfusion  He also was noted to have eaten breakfast this morning    - Order NPO status now  - Start Protonix drip  - Continue transfusion; monitor hemoglobin closely and transfuse further if needed  - If patient becomes hemodynamically unstable or drops his hemoglobin despite transfusions, will plan for urgent EGD this evening  Otherwise will plan for EGD tomorrow  - Will need to discuss with patient's family about risks of procedure, given his significant comorbidities (CHF with EF 20%; pressor-dependent sepsis)  - I discussed this patient's case and plan with ICU team      2  Septic shock with MSSA bacteremia, thought to be secondary to chest wall cellulitis and abscesses, s/p I+D and currently on ABX      3  Acute kidney injury      4  Severe LV systolic dysfunction, LV EF estimated 20% on echocardiogram 10/26      The patient was seen and examined by Dr Susan Montilla, all key medical decisions were made with Dr Susan Montilla  Thank you for allowing us to participate in the care of this pleasant patient  We will follow up with you closely

## 2018-10-30 NOTE — ANESTHESIA POSTPROCEDURE EVALUATION
Post-Op Assessment Note      CV Status:  Stable    Mental Status:  Awake and lethargic    Hydration Status:  Euvolemic    PONV Controlled:  Controlled    Airway Patency:  Patent    Post Op Vitals Reviewed: Yes          Staff: Anesthesiologist           BP   106/40   Temp      Pulse 107   Resp   24   SpO2   97

## 2018-10-30 NOTE — PLAN OF CARE
CARDIOVASCULAR - ADULT     Maintains optimal cardiac output and hemodynamic stability Progressing     Absence of cardiac dysrhythmias or at baseline rhythm Progressing        DISCHARGE PLANNING     Discharge to home or other facility with appropriate resources Progressing        DISCHARGE PLANNING - CARE MANAGEMENT     Discharge to post-acute care or home with appropriate resources Progressing        GENITOURINARY - ADULT     Maintains or returns to baseline urinary function Progressing     Urinary catheter remains patent Progressing        INFECTION - ADULT     Absence or prevention of progression during hospitalization Progressing        Knowledge Deficit     Patient/family/caregiver demonstrates understanding of disease process, treatment plan, medications, and discharge instructions Progressing        METABOLIC, FLUID AND ELECTROLYTES - ADULT     Electrolytes maintained within normal limits Progressing     Fluid balance maintained Progressing     Glucose maintained within target range Progressing        Nutrition/Hydration-ADULT     Nutrient/Hydration intake appropriate for improving, restoring or maintaining nutritional needs Progressing        PAIN - ADULT     Verbalizes/displays adequate comfort level or baseline comfort level Progressing        Potential for Falls     Patient will remain free of falls Progressing        Prexisting or High Potential for Compromised Skin Integrity     Skin integrity is maintained or improved Progressing        RESPIRATORY - ADULT     Achieves optimal ventilation and oxygenation Progressing        SAFETY ADULT     Maintain or return to baseline ADL function Progressing     Maintain or return mobility status to optimal level Progressing        SKIN/TISSUE INTEGRITY - ADULT     Incision(s), wounds(s) or drain site(s) healing without S/S of infection Progressing

## 2018-10-30 NOTE — ANESTHESIA PREPROCEDURE EVALUATION
Review of Systems/Medical History  Patient summary reviewed  Chart reviewed      Cardiovascular  EKG reviewed, Exercise tolerance (METS): <4,  Hyperlipidemia, Hypertension , CAD , History of CABG, CHF heart failure unspecified and uncompensated CHF,   Comment: EF 20%,  Pulmonary  Pneumonia, Pleural effusion ,        GI/Hepatic       Kidney disease ARF,        Endo/Other  Diabetes poorly controlled type 2 Insulin,   Comment: Cellulitis, herpes Zoster infection    GYN       Hematology  Anemia acute blood loss anemia,    Comment: Septic shock Musculoskeletal       Neurology   Psychology     Dementia                Anesthesia Plan  ASA Score- 4 Emergent    Anesthesia Type- IV sedation with anesthesia with ASA Monitors  Additional Monitors:   Airway Plan:     Comment: Risk about possible intubation were explained in detail  Family at bedside, expressing full understanding  Patient currently on Vasopressors  Will receive second unit of blood prior and during the procedure  Plan Factors-    Induction- intravenous  Postoperative Plan-     Informed Consent- Anesthetic plan and risks discussed with healthcare power of  and son (Son at the bedside  Patient mildly confused )

## 2018-10-30 NOTE — SOCIAL WORK
Placed Bundle Program Notification Letter    Will contact son to discuss enrollment in Medicare Bundle Program

## 2018-10-30 NOTE — ANESTHESIA PROCEDURE NOTES
Arterial Line Insertion  Date/Time: 10/30/2018 5:40 PM  Performed by: Romana Linker  Authorized by: Romana Linker   Consent: The procedure was performed in an emergent situation  Verbal consent obtained  Written consent obtained  Consent given by: power of   Patient identity confirmed: arm band  Time out: Immediately prior to procedure a "time out" was called to verify the correct patient, procedure, equipment, support staff and site/side marked as required  Preparation: Patient was prepped and draped in the usual sterile fashion    Indications: multiple ABGs, respiratory failure and hemodynamic monitoring  Orientation:  Right  Location: radial artery  Anesthesia: local infiltration  Local Anesthetic: lidocaine 1% without epinephrine  Anesthetic total: 3 mL    Sedation:  Patient sedated: yes  Sedation type: moderate (conscious) sedation  Sedatives: propofol      Procedure Details:  Needle gauge: 20  Seldinger technique: Seldinger technique used  Number of attempts: 1    Post-procedure:  Post-procedure: chlorhexidine patch applied and line sutured  Waveform: good waveform  Post-procedure CNS: normal  Patient tolerance: Patient tolerated the procedure well with no immediate complications

## 2018-10-30 NOTE — PLAN OF CARE
Problem: HEMATOLOGIC - ADULT  Goal: Maintains hematologic stability  INTERVENTIONS  - Assess for signs and symptoms of bleeding or hemorrhage  - Monitor labs  - Administer supportive blood products/factors as ordered and appropriate   Outcome: Progressing

## 2018-10-30 NOTE — ADDENDUM NOTE
Addendum  created 10/30/18 1914 by Zeynep Rodríguez MD    Anesthesia Intra Flowsheets edited, Sign clinical note

## 2018-10-30 NOTE — OP NOTE
ESOPHAGOGASTRODUODENOSCOPY    PROCEDURE: EGD    SEDATION: Monitored anesthesia care, check anesthesia records    ASA Class: 4    INDICATIONS:  Melena, acute blood loss anemia  CONSENT:  Informed consent was obtained for the procedure, including sedation after explaining the risks and benefits of the procedure  Risks including but not limited to bleeding, perforation, infection, and missed lesion  PREPARATION:   Telemetry, pulse oximetry, blood pressure were monitored throughout the procedure  Patient was identified by myself both verbally and by visual inspection of ID band  DESCRIPTION:   Patient was placed in the left lateral decubitus position and was sedated with the above medication  The gastroscope was introduced in to the oropharynx and the esophagus was intubated under direct visualization  Scope was passed down the esophagus up to 2nd part of the duodenum  A careful inspection was made as the gastroscope was withdrawn, including a retroflexed view of the stomach; findings and interventions are described below  FINDINGS:    #1  Esophagus- whitish plaques noted starting from mid esophagus to the distal esophagus, suspicious for Candida esophagitis, biopsy obtained  #2  Stomach- mild gastritis noted in the antrum  There were no gastric ulcers  Retroflexed view was unremarkable  There was bile noted in the gastric lumen  #3  Duodenum- large duodenal ulcer along the duodenal sweep measuring 2 cm, with red spot, this was obliterated using gold probe  The ulcer bed was subsequently closed using 2 hemoclips  There was bile noted in the duodenal bulb, duodenal sweep and D2  There was no active bleeding noted  IMPRESSIONS:      See above  RECOMMENDATIONS:     1  Continue Protonix drip for 72 hours  2  Monitor H&H and transfuse as necessary  3  Avoid the use of NSAIDs  4  NPO today  5  If Hemoglobin remains stable, can start a clear liquid diet tomorrow    6  Consider starting fluconazole for Candida esophagitis  7  Follow-up biopsy results  COMPLICATIONS:  None; patient tolerated the procedure well            DISPOSITION: PACU           CONDITION: Stable

## 2018-10-30 NOTE — SPEECH THERAPY NOTE
Speech Language/Pathology  Missed Treatment Note      Attempted to see patient for treatment of dysphagia  RN with patient who is receiving blood  RN reports that patient has limited PO intake as he may not be served his food preferences  RN is now crushing meds as he attempts to chew them  Will return for follow up  Recommend that dietician consult with patient for food preferences      RENETTA Chen , [address] Pathologist  [phone]

## 2018-10-30 NOTE — PROGRESS NOTES
Daily Progress Note - Critical Care/ Stepdown   Gerardine Flair [de-identified] y o  male MRN: [id number]  Unit/Bed#: ICU 08 Encounter: [id number]    ______________________________________________________________________  Assessment:   Principal Problem:    Septic shock ([address] )  Active Problems:    Bacteremia due to Staphylococcus aureus    Necrotizing cellulitis of chest wall    Respiratory infection    DM2 (diabetes mellitus, type 2) (Formerly [facility])    CAD (coronary artery disease)    CKD (chronic kidney disease) stage 3, GFR 30-59 ml/min (Formerly [facility])    Cardiomyopathy, ischemic    Atrial fibrillation ([facility])    Shingles    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[facility])    Iron deficiency anemia due to chronic blood loss    Melena  Resolved Problems:    Acute respiratory failure secondary to septic shock    Urinary tract infection    Hyponatremia    CHRIS (acute kidney injury) (Formerly [facility])    Lactic acid acidosis    DKA (diabetic ketoacidoses) (Formerly [facility])    Lethargy    Sepsis ([facility])    Cellulitis of left axilla    Acute cystitis with hematuria      * Septic shock ([facility])   Assessment & Plan    · Improving  Vasopressor d/zaida  · Source likely presumed cellulitis/absess of chest wall with pneumonia and UTI  · Fluid resuscitation with 30ml/kg and started on vasopressors  · Continues on Levophed to mics  · Poor EF on echo  Cardiac index and output on Flow track remains acceptable  · [place] protocol - Stress Dose Steroids, Vit C, thiamine  · Lactic acid normalized  · ABX: cefepime, clindamycin and valacyclovir  Will continue  · ID consult  · Performed aggressive debridement in OR 10/26 of chest wall cellulitis, wound vac in place   Scant serosanguineous output    · Return to OR likely Monday to address posterior aspect of cellulitis  · Blood culture staph aureus x 1  · Wound culture: 3+ staph aureus   · Urine cultures > 100,000 mixed containments        Bacteremia due to Staphylococcus aureus   Assessment & Plan    Blood culture x 1 MSSA  C/w ancef day 6 of total antibiotics  Blood culture x 2 pending     Acute respiratory failure secondary to septic shockresolved as of 10/28/2018   Assessment & Plan    · Resolved  ·  Patient extubated on 10/26  · Continues on nasal cannula  Wean as tolerated  Necrotizing cellulitis of chest wall   Assessment & Plan    · Aggressive  debridement in OR on 10/26 by Dr Roberto Carlos Atwood, wound VAC in place  · Continue with antibiotics:  cefepime, clindamycin, and vacyclovir for HSV infection  · Debridement in OR 10/29 for , wound vac change  · Antibiotics deescalated to Ancef as per ID recs, day 7 of total antibiotics        Urinary tract infectionresolved as of 10/29/2018   Assessment & Plan    · Urinalysis concerning for possible urinary tract infection  Urine culture with > 100,000 CFU of mixed contaminants  · Continue cefepime     Respiratory infection   Assessment & Plan    · Present on admission as evidence by CT scan suggestive of infectious process  · No sputum culture to confirm pneumonia  · Respiratory status improved  Extubated on the 26th  Now 2 L nasal cannula  · Continue to treat with empiric antibiotics     Melena   Assessment & Plan    Down trending hemoglobin, 7 3 this a m , 2 units packed red blood cells transfused, GI consulted for colonoscopy     Iron deficiency anemia due to chronic blood loss   Assessment & Plan    · Secondary to sepsis and acuity of condition  · Hemoglobin this morning 7 1  Transfuse 1 unit packed red blood cells in light of patient's pressor needs and cardiac dysfunction  · Repeat H&H 7 3 this a m  With continuous melena  · 2 units packed red blood cells given today, GI consulted, colonoscopy today to rule out gut ischemia vs  GI bleed     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HCC)   Assessment & Plan    · Known EF of 20%  · Judicious fluid resuscitation  · Consider diuresis for evidence of respiratory distress     Shingles   Assessment & Plan    · Patient tolerating p o  Intake  Transition to valacyclovir     Atrial fibrillation (HCC)   Assessment & Plan    · History of AFib in the setting of electrolyte abnormalities in the past   Monitor on telemetry  · Current Sinus Tachy  · Holding BB shock state and need for beta agonist therapy  · No indication for anticoagulation at this time     Cardiomyopathy, ischemic   Assessment & Plan    · EF 35 to 40%  Recent stress test in May of 2018 showed reversible ischemic changes  Cardiology following as an outpatient with medical management  · ECHO repeat with EF 20%  · Cardiac output and indexes while patient was intubated were acceptable  · Vasopressor d/zaida     CKD (chronic kidney disease) stage 3, GFR 30-59 ml/min (Formerly [facility])   Assessment & Plan    Baseline 0 9-1 3  Current creatinine 1 08  Adequate urine output at this time     CAD (coronary artery disease)   Assessment & Plan    Troponin negative  Echo obtained,  EF 20%     DM2 (diabetes mellitus, type 2) [facility])   Assessment & Plan    Lab Results   Component Value Date    HGBA1C 12 0 (H) 10/26/2018       Recent Labs      10/28/18   0806  10/28/18   1010  10/28/18   1209  10/28/18   1632   POCGLU  188*  197*  211*  222*     · DKA resolved  · Blood sugar remains mildly elevated  Continue b i d  Lantus  Increase sliding scale algorithm    · Goal blood sugar under 180           Acute cystitis with hematuriaresolved as of 10/29/2018   Assessment & Plan    · Continue antibiotic regimen with cefepime, clindamycin, urine culture with greater than 100,000 colony-forming units of mixed roberto     Lethargyresolved as of 10/27/2018   Assessment & Plan    - improving  Patient awake and alert  Continue environmental controls        DKA (diabetic ketoacidoses) (Formerly [facility])resolved as of 10/26/2018   Assessment & Plan    Lab Results   Component Value Date    HGBA1C 12 0 (H) 10/26/2018       Recent Labs      10/28/18   0000  10/28/18   0806  10/28/18   1010  10/28/18   1209   POCGLU  206*  188*  197*  211*       Blood Sugar Average: Last 72 hrs:  (P) 207 29717     · DKA resolved  · Blood sugar remains mildly elevated  Continue b i d  Lantus  Increase sliding scale algorithm  · Goal blood sugar under 180     Lactic acid acidosisresolved as of 10/26/2018   Assessment & Plan    · Resolved  · Secondary to septic shock and DKA, trend  · Cleared  · CT CAP showing:  Left pectoral cellulitis with gas-forming pockets and right lower lobe pneumonia  · Continue to trend  · Dose with Thiamine  · Surgery consulted, went to OR for I%D  · Continue with vancomycin, clindamycin, cefepime, acyclovir     CHRIS (acute kidney injury) (HCC)resolved as of 10/27/2018   Assessment & Plan    · Resolved  · Likely Pre renal   Likely secondary to septic shock and DKA  · Improving with fluids  · FENa consistent with Pre-renal cause  · Continue fluid resuscitation and monitor urine output  · Gordon catheter in place     Hyponatremiaresolved as of 10/27/2018   Assessment & Plan    · Resolved  · Likely hypovolemic hyponatremia in the setting of dehydration, sepsis, and DKA  · Possibly a component of pseudo hyponatremia         Plan:    Neuro:   · Delirium: CAM ICU positive yes   · If yes, intervention:  Regulate sleep-wake cycle  · Pain controlled with:  P o   Tylenol      CV:   · Septic shock secondary to Staph aureus bacteremia  · Cardiac infusions: Levophed, 3 mcg/min  · Acute anemia:  Hemoglobin 7 3 this a m , transfused 2 units packed red blood cells, visible melena, GI consulted for colonoscopy  · Rhythm: Sinus Tachycardia  · Follow rhythm on telemetry    Pulm:   · Stable on nasal cannula oxygen as needed     GI:   · Nutrition/diet plan:  NPO for colonoscopy  · Stress ulcer prophylaxis: Protonix IV  · Bowel regimen: None currently  · Continuous visible melena per rectum    FEN:   · Fluid/Diuretic plan: No intervention  · Goal 24 hour fluid balance:  Net even  · Electrolytes repleted: yes  · Goal: K >4 0, Mag >2 0, and Phos >3 0    :   · Indwelling Heidi present: yes   · Urinary catheter still needed for Strict I and O in a critically ill patient  ID:   · Staph aureus bacteremia  · Abx ordered: Ancef  · Day # 6, Id consulted, repeat blood cultures x2 pending  · Trend temps and WBC count    Heme:   · Acute anemia with continuous melena per rectum  · Hemoglobin 7 3 this a m , transfuse 2 units packed red blood cells  · GI consulted for colonoscopy  · Trend hgb and plts    Endo:   · Glycemic control plan: Blood glucose controlled on current regimen    Msk/Skin:  · Mobility goal:  Bed ridden at this time  · PT consult: yes  · OT consult: yes  · Frequent turning and off-loading    Family:  · Family updated within 24 hours: yes   · Family meeting planned today: no     Lines:  · Central venous access: triple lumen catheter - 20 cm  · Keep central line today for meds requiring central line, hemodynamic monitoring  VTE Prophylaxis:  · Pharmacologic Prophylaxis: Reason for no pharmacologic prophylaxis Melena per rectum with anemia  · Mechanical Prophylaxis: sequential compression device    Disposition: Continue ICU care    Code Status: Level 2 - DNAR: but accepts endotracheal intubation    Counseling / Coordination of Care  Total Critical Care time spent 25 minutes excluding procedures, teaching and family updates  ______________________________________________________________________    HPI/24hr events:  Patient had change in mental status overnight, becoming more confused than usual   Still requiring Levophed  Continues to exhibit sinus tachycardia  Continuous melena per rectum started today, GI consulted for colonoscopy     ______________________________________________________________________    Physical Exam:   Physical Exam   Constitutional: He is oriented to person, place, and time  He appears well-developed and well-nourished  No distress  HENT:   Head: Normocephalic and atraumatic  Neck: No JVD present     Cardiovascular: Regular rhythm, normal heart sounds and intact distal pulses  Exam reveals no gallop and no friction rub  No murmur heard  Sinus tachycardia   Pulmonary/Chest: Effort normal  He has no wheezes  He has no rales  Diminished breath sounds bilaterally   Abdominal: Soft  Bowel sounds are normal  He exhibits no distension  There is no tenderness  There is no guarding  Gordon catheter in place draining light yellow urine   Genitourinary:   Genitourinary Comments: Melena per rectum   Musculoskeletal: He exhibits edema  Neurological: He is alert and oriented to person, place, and time  Skin: Skin is warm and dry  No rash noted  He is not diaphoretic  No pallor  Wound VAC placed along left axilla and anterior chest wall   Psychiatric: He has a normal mood and affect  His behavior is normal        ______________________________________________________________________  Vitals:    10/30/18 1530 10/30/18 1556 10/30/18 1644 10/30/18 1645   BP: 125/58   (!) 88/65   BP Location:       Pulse: (!) 113   (!) 112   Resp: (!) 23   18   Temp:  97 7 °F (36 5 °C) (P) 97 7 °F (36 5 °C) 97 7 °F (36 5 °C)   TempSrc:  Temporal (P) Temporal    SpO2: 98%      Weight:       Height:         Arterial Line BP: 143/132  Arterial Line MAP (mmHg): 140 mmHg     Temperature:   Temp (24hrs), Av 6 °F (36 4 °C), Min:97 1 °F (36 2 °C), Max:97 8 °F (36 6 °C)    Current Temperature: 97 7 °F (36 5 °C)    Weights:   IBW: 69 55 kg    Body mass index is 23 22 kg/m²    Weight (last 2 days)     Date/Time   Weight    10/30/18 0600  70 3 (154 98)    10/28/18 0500  67 7 (149 25)              Hemodynamic Monitoring:  Hourly vital signs       Non-Invasive/Invasive Ventilation Settings:  Respiratory    Lab Data (Last 4 hours)    None         O2/Vent Data (Last 4 hours)    None              Lab Results   Component Value Date    PHART 7 539 (H) 10/30/2018    [id number] 25 6 (LL) 10/30/2018    PO2ART 70 7 (L) 10/30/2018    JPA3VNE 21 3 (L) 10/30/2018    BEART -0 9 10/30/2018    SOURCE Radial, Left 10/30/2018     SpO2: SpO2: 98 %    Intake and Outputs:  I/O       10/28 0701 - 10/29 0700 10/29 0701 - 10/30 0700 10/30 0701 - 10/31 0700    P  O  540 360 120    I V  (mL/kg) 632 (9 3) 430 7 (6 1) 200 (2 8)    Blood 350  350    IV Piggyback 250 760 200    Total Intake(mL/kg) 1772 (26 2) 1550 7 (22 1) 870 (12 4)    Urine (mL/kg/hr) 1070 (0 7) 1246 (0 7) 350 (0 5)    Drains 50      Blood  20     Total Output 1120 1266 350    Net +652 +284 7 +520           Unmeasured Stool Occurrence   4 x            Nutrition:        Diet Orders            Start     Ordered    10/30/18 1300  Diet NPO  Diet effective now     Question Answer Comment   Diet Type NPO    RD to adjust diet per protocol?  Yes        10/30/18 1259    10/29/18 1450  Room Service  Once     Question:  Type of Service  Answer:  Room Service - Appropriate with Assistance    10/29/18 1452          Labs:     Results from last 7 days  Lab Units 10/30/18  1612 10/30/18  0544 10/29/18  0548 10/28/18  0456 10/27/18  0428 10/26/18  1003   WBC Thousand/uL  --  12 67* 14 58* 16 88* 17 35* 17 71*   HEMOGLOBIN g/dL 7 9* 7 3* 8 5* 7 1* 8 0* 9 3*   HEMATOCRIT % 23 5* 22 1* 26 0* 21 1* 23 4* 25 8*   PLATELETS Thousands/uL  --  242 229 205 256 294   NEUTROS PCT %  --   --   --   --   --  89*   MONOS PCT %  --   --   --   --   --  4   MONO PCT MAN %  --   --  2* 3* 5  --        Results from last 7 days  Lab Units 10/30/18  0544 10/29/18  1836 10/29/18  0548 10/28/18  0456 10/27/18  0428  10/26/18  1004 10/26/18  0817 10/26/18  0732   SODIUM mmol/L 138  --  139 139 135*  < > 134*  --   --    POTASSIUM mmol/L 4 5 3 9 3 4* 3 7 4 3  < > 3 4*  --   --    CHLORIDE mmol/L 106  --  105 107 103  < > 101  --   --    CO2 mmol/L 24  --  25 24 25  < > 22  --   --    BUN mg/dL 49*  --  40* 37* 42*  < > 66*  --   --    CREATININE mg/dL 1 36*  --  1 32* 1 08 1 11  < > 1 49*  --   --    CALCIUM mg/dL 7 5*  --  7 7* 7 8* 7 7*  < > 8 2*  --   --    ALK PHOS U/L  --   --  71  --  65  -- 67  --   --    ALT U/L  --   --  20  --  12  --  12  --   --    AST U/L  --   --  22  --  17  --  14  --   --    GLUCOSE, ISTAT mg/dl  --   --   --   --   --   --   --  115 120   < > = values in this interval not displayed  Results from last 7 days  Lab Units 10/30/18  0544 10/29/18  1836 10/29/18  0548   MAGNESIUM mg/dL 2 5 2 1 2 2     Lab Results   Component Value Date    PHOS 2 1 (L) 10/30/2018    PHOS 2 6 10/28/2018    PHOS 2 8 10/27/2018        Results from last 7 days  Lab Units 10/29/18  0548 10/26/18  0526   INR  1 04 1 09   PTT seconds  --  27       0  Lab Value Date/Time   TROPONINI <0 02 10/25/2018 1534   TROPONINI <0 02 05/09/2018 1927       Results from last 7 days  Lab Units 10/26/18  1706 10/26/18  1004 10/26/18  0528   LACTIC ACID mmol/L 2 0 1 8 3 3*     ABG:  Lab Results   Component Value Date    PHART 7 539 (H) 10/30/2018    [id number] 25 6 (LL) 10/30/2018    PO2ART 70 7 (L) 10/30/2018    [id number] 21 3 (L) 10/30/2018    BEART -0 9 10/30/2018    SOURCE Radial, Left 10/30/2018       Imaging:       Micro:  Lab Results   Component Value Date    BLOODCX Staphylococcus aureus (A) 10/25/2018    BLOODCX No Growth After 4 Days   10/25/2018    URINECX >100,000 cfu/ml  10/25/2018    URINECX >100,000 cfu/ml Klebsiella oxytoca (A) 05/09/2018    WOUNDCULT 4+ Growth of Staphylococcus aureus (A) 10/26/2018    WOUNDCULT 4+ Growth of Staphylococcus aureus (A) 10/25/2018       Allergies: No Known Allergies  Medications:   Scheduled Meds:    Current Facility-Administered Medications:  IV infusion builder  Intravenous Q6H MIGUEL Farrell Last Rate: 100 mL/hr at 10/30/18 1544   atorvastatin 10 mg Oral Daily Jeff Giraldo MD    cefazolin 2,000 mg Intravenous Q8H Jason Wheeler PA-C Last Rate: Stopped (10/30/18 3766)   collagenase  Topical Daily Mele Corley MD    hydrocortisone sodium succinate 50 mg Intravenous Q12H [address] Jeff Giraldo MD    influenza vaccine 0 5 mL Intramuscular Prior to discharge Stefani Gorman Melissa Gilbert MD    insulin glargine 24 Units Subcutaneous HS Margaretha Dandy, MD    insulin lispro 2-12 Units Subcutaneous HS Margaretha Dandy, MD    insulin lispro 2-12 Units Subcutaneous TID AC Margaretha Dandy, MD    melatonin 6 mg Oral HS Margaretha Dandy, MD    norepinephrine 1-30 mcg/min Intravenous Titrated Margaretha Dandy, MD Last Rate: 2 mcg/min (10/30/18 1242)   oxyCODONE 5 mg Oral Q4H PRN Margaretha Dandy, MD    pantoprozole (PROTONIX) infusion (Continuous) 8 mg/hr Intravenous Continuous Colleen Martinez PA-C Last Rate: 8 mg/hr (10/30/18 1439)   senna 1 tablet Oral HS Margaretha Dandy, MD    thiamine 200 mg Intravenous Q12H Eduard Counter, CRNP Last Rate: Stopped (10/30/18 0915)   valACYclovir 1,000 mg Oral Q12H [address] Jarod Garcia MD      Continuous Infusions:    norepinephrine 1-30 mcg/min Last Rate: 2 mcg/min (10/30/18 1242)   pantoprozole (PROTONIX) infusion (Continuous) 8 mg/hr Last Rate: 8 mg/hr (10/30/18 1439)     PRN Meds:    influenza vaccine 0 5 mL Prior to discharge   oxyCODONE 5 mg Q4H PRN       Invasive lines and devices:   Invasive Devices     Central Venous Catheter Line            CVC Central Lines 10/25/18 Triple Right Femoral 4 days          Peripheral Intravenous Line            Peripheral IV 10/30/18 Right Antecubital less than 1 day          Drain            Urethral Catheter Latex 16 Fr  4 days                   SIGNATURE: Margaretha Dandy, MD  DATE: October 30, 2018

## 2018-10-30 NOTE — PROGRESS NOTES
Progress Note - Infectious Disease   Ab Cabrera [de-identified] y o  male MRN: [id number]  Unit/Bed#: ICU 08 Encounter: [id number]      Impression/Plan:  1  Septic shock-POA  Leukocytosis and tachycardia and hypotension  Appears to be all secondary to MSSA bacteremia in the setting of chest wall abscess formation  No other clear sources appreciated  Patient is clinically improved overall  The patient's heart rate has come down, and the white cell count has come down  He seems to be tolerating the antibiotics without difficulty  He is still requiring vasopressor support  -continue cefazolin for now at current dose  -recheck CBC with diff and creatinine tomorrow  -follow-up procalcitonin level  -recheck procalcitonin level tomorrow  -supportive care in the intensive care unit     2  MSSA bacteremia-appears to be secondary to chest wall abscesses  No other clear source appreciated  Consideration for the possibility of endocarditis but this is less likely with only 1 of 2 blood cultures positive and the transthoracic echocardiogram without valvular vegetations    -the antibiotics as above  -follow up repeat blood cultures  -no additional ID workup for now     3  MSSA chest wall abscesses-status post extensive I and D x2 with a VAC dressing in place  Postop day 3 /0  He seems to have improved clinically  His pain seems reasonably well controlled  This all possibly as a complication of zoster   -antibiotics as above  -vac dressing and local care  -close surgical follow-up     4  Herpes zoster-hard to make a definitive diagnosis at this point as the rash is relatively advanced in its presentation   -continue dose adjusted Valtrex  -plan 7 days total treatment  -serial exams       5  Acute kidney injury-likely a pre renal issue  Had initially improved but now the renal function is worse today  Possibly medication effect    The renal function seems to have plateaued  -dose adjusted antiviral   -recheck BMP tomorrow  -volume management  -no additional ID workup for now      Antibiotics:  Cefazolin 2  Antibiotics 6  Postop day 4  Valtrex 3  Antiviral 6    Subjective:  Patient has no fever, chills, sweats; no nausea, vomiting, diarrhea; no cough, shortness of breath; no pain  No new symptoms  He is still requiring vasopressor support  He seems more confused today  He is angry and agitated  Objective:  Vitals:  Temp:  [96 7 °F (35 9 °C)-97 8 °F (36 6 °C)] 97 8 °F (36 6 °C)  HR:  [] 110  Resp:  [18-27] 24  BP: ()/(43-77) 101/61  SpO2:  [94 %-100 %] 97 %  Temp (24hrs), Av 3 °F (36 3 °C), Min:96 7 °F (35 9 °C), Max:97 8 °F (36 6 °C)  Current: Temperature: 97 8 °F (36 6 °C)    Physical Exam:   General Appearance:  Alert, interactive, nontoxic, no acute distress  Throat: Oropharynx moist without lesions  Lungs:   Decreased breath sounds bilaterally; no wheezes, rhonchi or rales; respirations unlabored   Chest wall: Left chest wall into the axilla with a VAC drain in place  No spreading erythema  Heart:  Tachycardic; no murmur, rub or gallop   Abdomen:   Soft, non-tender, non-distended, positive bowel sounds  Extremities: No clubbing, cyanosis   Skin: No new rashes or lesions  No draining wounds noted         Labs, Imaging, & Other studies:   All pertinent labs and imaging studies were personally reviewed    Results from last 7 days  Lab Units 10/30/18  0544 10/29/18  0548 10/28/18  0456   WBC Thousand/uL 12 67* 14 58* 16 88*   HEMOGLOBIN g/dL 7 3* 8 5* 7 1*   PLATELETS Thousands/uL 242 229 205       Results from last 7 days  Lab Units 10/30/18  0544  10/29/18  0548 10/28/18  0456 10/27/18  0428  10/26/18  1004 10/26/18  0817   SODIUM mmol/L 138  --  139 139 135*  < > 134*  --    POTASSIUM mmol/L 4 5  < > 3 4* 3 7 4 3  < > 3 4*  --    CHLORIDE mmol/L 106  --  105 107 103  < > 101  --    CO2 mmol/L 24  --  25 24 25  < > 22  --    BUN mg/dL 49*  --  40* 37* 42*  < > 66*  --    CREATININE mg/dL 1 36*  --  1 32* 1 08 1 11  < > 1 49*  --    EGFR ml/min/1 73sq m 49  --  51 64 62  < > 44  --    GLUCOSE, ISTAT mg/dl  --   --   --   --   --   --   --  115   CALCIUM mg/dL 7 5*  --  7 7* 7 8* 7 7*  < > 8 2*  --    AST U/L  --   --  22  --  17  --  14  --    ALT U/L  --   --  20  --  12  --  12  --    ALK PHOS U/L  --   --  71  --  65  --  67  --    < > = values in this interval not displayed  Results from last 7 days  Lab Units 10/29/18  1108 10/26/18  0749 10/26/18  0742 10/25/18  2007 10/25/18  1730 10/25/18  1534 10/25/18  1530   BLOOD CULTURE   --   --   --   --   --  Staphylococcus aureus* No Growth After 4 Days  GRAM STAIN RESULT  No Polys  3+ Gram positive cocci in clusters No Polys  2+ Gram positive cocci in clusters 2+ Polys  3+ Gram positive cocci in clusters  --  1+ Polys  2+ Gram positive cocci in pairs  2+ Gram positive cocci in clusters Gram positive cocci in clusters  --    URINE CULTURE   --   --   --   --  >100,000 cfu/ml   --   --    WOUND CULTURE   --   --  4+ Growth of Staphylococcus aureus*  --  4+ Growth of Staphylococcus aureus*  --   --    MRSA CULTURE ONLY   --   --   --  No Methicillin Resistant Staphlyococcus aureus (MRSA) isolated  --   --   --      Chest x-ray-right airspace disease  Questionable left cardiac airspace disease  Unchanged from previous      Images reviewed by me in PACS

## 2018-10-30 NOTE — PROGRESS NOTES
Progress Note - General Surgery   Robby Gambino [de-identified] y o  male MRN: [id number]  Unit/Bed#: ICU 08 Encounter: [id number]    Assessment:  1) POD #1 s/p repeat wound check/debridement and wound vac application - confused, tachycardic, tachypneic, some of it likely dilutional, some of it likely due to low hemoglobin, hemoglobin dropped to 7 3, pain well controlled currently, leukocytosis improved, wound culture positive for Staph, creatinine experience a little increase, wound VAC in place with appropriate seal    Plan:  1) POD #1 s/p repeat wound check/debridement and wound vac application -   - wound VAC will not need to be changed for another 4 days  - change back dressing once daily with Allevyn border dressing  - transfuse as indicated, would recommend transfusing until hemoglobin of 9 based on cardiac demands  - confusion could likely be to anemia and polypharmacy, will default to critical care team  - repeat labs with CBC, BMP, Mag, phos  - will plan for a wound VAC change down the road  - would recommend diuresing, some fluid overload is noted/3rd space fluid  - p r n  Analgesia  - continue antibiotics intravenously  - DVT prophylaxis    Subjective/Objective   Chief Complaint: They got my left hand but I wanted my right hand, they did at wrong    Subjective:  Patient was seen examined at bedside  Patient unfortunately appears a little confused  Acute onset of altered mental status could be secondary to multiple events possibly due to polypharmacy, analgesic medications, anemia, sepsis  Patient does appear improved compared to last exam however  Patient currently is not able to really communicate if he is comfortable or his pain status but based on general appearance 1 would assume he is not experiencing too much pain  Patient continues to report that they did wrong and that he wanted to use his right hand but he has to do his left hand the way things were set up    When probed for more information he just continues to say that you "[email or web address] com up!"    Objective:     Blood pressure 93/55, pulse (!) 127, temperature 97 8 °F (36 6 °C), temperature source Temporal, resp  rate (!) 28, height 5' 8 5" (1 74 m), weight 70 3 kg (154 lb 15 7 oz), SpO2 97 %  ,Body mass index is 23 22 kg/m²  Intake/Output Summary (Last 24 hours) at 10/30/18 1051  Last data filed at 10/30/18 1001   Gross per 24 hour   Intake          1810 72 ml   Output             1216 ml   Net           594 72 ml       Invasive Devices     Central Venous Catheter Line            CVC Central Lines 10/25/18 Triple Right Femoral 4 days          Peripheral Intravenous Line            Peripheral IV 10/30/18 Right Antecubital less than 1 day          Drain            Urethral Catheter Latex 16 Fr  4 days                Physical Exam: BP 93/55   Pulse (!) 127   Temp 97 8 °F (36 6 °C) (Temporal)   Resp (!) 28   Ht 5' 8 5" (1 74 m)   Wt 70 3 kg (154 lb 15 7 oz)   SpO2 97%   BMI 23 22 kg/m²   General appearance: alert and oriented, in no acute distress  Head: Normocephalic, without obvious abnormality, atraumatic  Lungs: clear to auscultation bilaterally and could not auscultate back, decreased respiration  Heart: rate controlled afib vs sinus tachycardia  Extremities: significant 2+ pitting edemain b/l upper extremities  Skin: wound vac and ioban in place  Neurologic: somewhat disoriented and confused    Lab, Imaging and other studies:  I have personally reviewed pertinent lab results    , CBC:   Lab Results   Component Value Date    WBC 12 67 (H) 10/30/2018    HGB 7 3 (L) 10/30/2018    HCT 22 1 (L) 10/30/2018     (H) 10/30/2018     10/30/2018    MCH 34 8 (H) 10/30/2018    MCHC 33 0 10/30/2018    RDW 15 7 (H) 10/30/2018    MPV 11 2 10/30/2018   , CMP:   Lab Results   Component Value Date     10/30/2018    K 4 5 10/30/2018     10/30/2018    CO2 24 10/30/2018    BUN 49 (H) 10/30/2018    CREATININE 1 36 (H) 10/30/2018    CALCIUM 7 5 (L) 10/30/2018    EGFR 49 10/30/2018     VTE Pharmacologic Prophylaxis: Heparin  VTE Mechanical Prophylaxis: sequential compression device

## 2018-10-31 ENCOUNTER — APPOINTMENT (INPATIENT)
Dept: NON INVASIVE DIAGNOSTICS | Facility: HOSPITAL | Age: 78
DRG: 853 | End: 2018-10-31
Payer: MEDICARE

## 2018-10-31 PROBLEM — E43 SEVERE PROTEIN-CALORIE MALNUTRITION (HCC): Chronic | Status: ACTIVE | Noted: 2018-05-14

## 2018-10-31 PROBLEM — K92.1 MELENA: Chronic | Status: ACTIVE | Noted: 2018-10-25

## 2018-10-31 PROBLEM — I48.91 ATRIAL FIBRILLATION (HCC): Chronic | Status: ACTIVE | Noted: 2018-05-12

## 2018-10-31 PROBLEM — K26.9 DUODENAL ULCER: Status: ACTIVE | Noted: 2018-10-25

## 2018-10-31 PROBLEM — K20.90 ESOPHAGITIS: Status: ACTIVE | Noted: 2018-10-31

## 2018-10-31 LAB
ABO GROUP BLD BPU: NORMAL
ALBUMIN SERPL BCP-MCNC: 1.7 G/DL (ref 3.5–5)
ALP SERPL-CCNC: 63 U/L (ref 46–116)
ALT SERPL W P-5'-P-CCNC: 14 U/L (ref 12–78)
ANION GAP SERPL CALCULATED.3IONS-SCNC: 10 MMOL/L (ref 4–13)
ANION GAP SERPL CALCULATED.3IONS-SCNC: 12 MMOL/L (ref 4–13)
ANISOCYTOSIS BLD QL SMEAR: PRESENT
AST SERPL W P-5'-P-CCNC: 22 U/L (ref 5–45)
BASOPHILS # BLD MANUAL: 0 THOUSAND/UL (ref 0–0.1)
BASOPHILS NFR MAR MANUAL: 0 % (ref 0–1)
BILIRUB SERPL-MCNC: 0.5 MG/DL (ref 0.2–1)
BPU ID: NORMAL
BUN SERPL-MCNC: 43 MG/DL (ref 5–25)
BUN SERPL-MCNC: 46 MG/DL (ref 5–25)
CALCIUM SERPL-MCNC: 7.3 MG/DL (ref 8.3–10.1)
CALCIUM SERPL-MCNC: 7.5 MG/DL (ref 8.3–10.1)
CHLORIDE SERPL-SCNC: 108 MMOL/L (ref 100–108)
CHLORIDE SERPL-SCNC: 108 MMOL/L (ref 100–108)
CO2 SERPL-SCNC: 22 MMOL/L (ref 21–32)
CO2 SERPL-SCNC: 22 MMOL/L (ref 21–32)
CREAT SERPL-MCNC: 1.19 MG/DL (ref 0.6–1.3)
CREAT SERPL-MCNC: 1.27 MG/DL (ref 0.6–1.3)
CROSSMATCH: NORMAL
EOSINOPHIL # BLD MANUAL: 0 THOUSAND/UL (ref 0–0.4)
EOSINOPHIL NFR BLD MANUAL: 0 % (ref 0–6)
ERYTHROCYTE [DISTWIDTH] IN BLOOD BY AUTOMATED COUNT: 19.1 % (ref 11.6–15.1)
GFR SERPL CREATININE-BSD FRML MDRD: 53 ML/MIN/1.73SQ M
GFR SERPL CREATININE-BSD FRML MDRD: 57 ML/MIN/1.73SQ M
GLUCOSE SERPL-MCNC: 113 MG/DL (ref 65–140)
GLUCOSE SERPL-MCNC: 126 MG/DL (ref 65–140)
GLUCOSE SERPL-MCNC: 130 MG/DL (ref 65–140)
GLUCOSE SERPL-MCNC: 158 MG/DL (ref 65–140)
GLUCOSE SERPL-MCNC: 159 MG/DL (ref 65–140)
GLUCOSE SERPL-MCNC: 177 MG/DL (ref 65–140)
HCT VFR BLD AUTO: 29.3 % (ref 36.5–49.3)
HCT VFR BLD AUTO: 29.8 % (ref 36.5–49.3)
HGB BLD-MCNC: 9.7 G/DL (ref 12–17)
HGB BLD-MCNC: 9.7 G/DL (ref 12–17)
LYMPHOCYTES # BLD AUTO: 0.51 THOUSAND/UL (ref 0.6–4.47)
LYMPHOCYTES # BLD AUTO: 3 % (ref 14–44)
MACROCYTES BLD QL AUTO: PRESENT
MAGNESIUM SERPL-MCNC: 2.3 MG/DL (ref 1.6–2.6)
MAGNESIUM SERPL-MCNC: 2.3 MG/DL (ref 1.6–2.6)
MCH RBC QN AUTO: 29.8 PG (ref 26.8–34.3)
MCHC RBC AUTO-ENTMCNC: 32.6 G/DL (ref 31.4–37.4)
MCV RBC AUTO: 91 FL (ref 82–98)
MONOCYTES # BLD AUTO: 0 THOUSAND/UL (ref 0–1.22)
MONOCYTES NFR BLD: 0 % (ref 4–12)
NEUTROPHILS # BLD MANUAL: 16.4 THOUSAND/UL (ref 1.85–7.62)
NEUTS BAND NFR BLD MANUAL: 12 % (ref 0–8)
NEUTS SEG NFR BLD AUTO: 85 % (ref 43–75)
NRBC BLD AUTO-RTO: 1 /100 WBCS
NRBC BLD AUTO-RTO: 2 /100 WBC (ref 0–2)
PHOSPHATE SERPL-MCNC: 2.5 MG/DL (ref 2.3–4.1)
PLATELET # BLD AUTO: 206 THOUSANDS/UL (ref 149–390)
PLATELET BLD QL SMEAR: ADEQUATE
PMV BLD AUTO: 11 FL (ref 8.9–12.7)
POLYCHROMASIA BLD QL SMEAR: PRESENT
POTASSIUM SERPL-SCNC: 4 MMOL/L (ref 3.5–5.3)
POTASSIUM SERPL-SCNC: 4.1 MMOL/L (ref 3.5–5.3)
PROCALCITONIN SERPL-MCNC: 0.33 NG/ML
PROT SERPL-MCNC: 4.6 G/DL (ref 6.4–8.2)
RBC # BLD AUTO: 3.26 MILLION/UL (ref 3.88–5.62)
RBC MORPH BLD: PRESENT
SODIUM SERPL-SCNC: 140 MMOL/L (ref 136–145)
SODIUM SERPL-SCNC: 142 MMOL/L (ref 136–145)
TOTAL CELLS COUNTED SPEC: 100
TOXIC GRANULES BLD QL SMEAR: PRESENT
UNIT DISPENSE STATUS: NORMAL
UNIT PRODUCT CODE: NORMAL
UNIT RH: NORMAL
WBC # BLD AUTO: 16.91 THOUSAND/UL (ref 4.31–10.16)

## 2018-10-31 PROCEDURE — 85014 HEMATOCRIT: CPT | Performed by: PHYSICIAN ASSISTANT

## 2018-10-31 PROCEDURE — 77001 FLUOROGUIDE FOR VEIN DEVICE: CPT | Performed by: RADIOLOGY

## 2018-10-31 PROCEDURE — 84100 ASSAY OF PHOSPHORUS: CPT | Performed by: PHYSICIAN ASSISTANT

## 2018-10-31 PROCEDURE — 99222 1ST HOSP IP/OBS MODERATE 55: CPT | Performed by: INTERNAL MEDICINE

## 2018-10-31 PROCEDURE — 99233 SBSQ HOSP IP/OBS HIGH 50: CPT | Performed by: INTERNAL MEDICINE

## 2018-10-31 PROCEDURE — 76937 US GUIDE VASCULAR ACCESS: CPT

## 2018-10-31 PROCEDURE — G0008 ADMIN INFLUENZA VIRUS VAC: HCPCS | Performed by: STUDENT IN AN ORGANIZED HEALTH CARE EDUCATION/TRAINING PROGRAM

## 2018-10-31 PROCEDURE — C9113 INJ PANTOPRAZOLE SODIUM, VIA: HCPCS | Performed by: PHYSICIAN ASSISTANT

## 2018-10-31 PROCEDURE — G8979 MOBILITY GOAL STATUS: HCPCS

## 2018-10-31 PROCEDURE — 80053 COMPREHEN METABOLIC PANEL: CPT | Performed by: NURSE PRACTITIONER

## 2018-10-31 PROCEDURE — 77001 FLUOROGUIDE FOR VEIN DEVICE: CPT

## 2018-10-31 PROCEDURE — 83735 ASSAY OF MAGNESIUM: CPT | Performed by: PHYSICIAN ASSISTANT

## 2018-10-31 PROCEDURE — 02HV33Z INSERTION OF INFUSION DEVICE INTO SUPERIOR VENA CAVA, PERCUTANEOUS APPROACH: ICD-10-PCS | Performed by: STUDENT IN AN ORGANIZED HEALTH CARE EDUCATION/TRAINING PROGRAM

## 2018-10-31 PROCEDURE — 85018 HEMOGLOBIN: CPT | Performed by: PHYSICIAN ASSISTANT

## 2018-10-31 PROCEDURE — 84145 PROCALCITONIN (PCT): CPT | Performed by: INTERNAL MEDICINE

## 2018-10-31 PROCEDURE — 90662 IIV NO PRSV INCREASED AG IM: CPT | Performed by: STUDENT IN AN ORGANIZED HEALTH CARE EDUCATION/TRAINING PROGRAM

## 2018-10-31 PROCEDURE — G8978 MOBILITY CURRENT STATUS: HCPCS

## 2018-10-31 PROCEDURE — 85027 COMPLETE CBC AUTOMATED: CPT | Performed by: INTERNAL MEDICINE

## 2018-10-31 PROCEDURE — 99232 SBSQ HOSP IP/OBS MODERATE 35: CPT | Performed by: INTERNAL MEDICINE

## 2018-10-31 PROCEDURE — 97163 PT EVAL HIGH COMPLEX 45 MIN: CPT

## 2018-10-31 PROCEDURE — 80048 BASIC METABOLIC PNL TOTAL CA: CPT | Performed by: PHYSICIAN ASSISTANT

## 2018-10-31 PROCEDURE — 76937 US GUIDE VASCULAR ACCESS: CPT | Performed by: RADIOLOGY

## 2018-10-31 PROCEDURE — 36569 INSJ PICC 5 YR+ W/O IMAGING: CPT | Performed by: RADIOLOGY

## 2018-10-31 PROCEDURE — C1751 CATH, INF, PER/CENT/MIDLINE: HCPCS

## 2018-10-31 PROCEDURE — 99232 SBSQ HOSP IP/OBS MODERATE 35: CPT | Performed by: SURGERY

## 2018-10-31 PROCEDURE — 82948 REAGENT STRIP/BLOOD GLUCOSE: CPT

## 2018-10-31 PROCEDURE — 36569 INSJ PICC 5 YR+ W/O IMAGING: CPT

## 2018-10-31 PROCEDURE — 85007 BL SMEAR W/DIFF WBC COUNT: CPT | Performed by: INTERNAL MEDICINE

## 2018-10-31 RX ORDER — FLUCONAZOLE 200 MG/100ML
100 INJECTION, SOLUTION INTRAVENOUS EVERY 24 HOURS
Status: DISCONTINUED | OUTPATIENT
Start: 2018-10-31 | End: 2018-11-01

## 2018-10-31 RX ORDER — ACETAMINOPHEN 325 MG/1
650 TABLET ORAL EVERY 6 HOURS PRN
Status: DISCONTINUED | OUTPATIENT
Start: 2018-10-31 | End: 2018-11-06

## 2018-10-31 RX ORDER — QUETIAPINE FUMARATE 25 MG/1
50 TABLET, FILM COATED ORAL ONCE
Status: COMPLETED | OUTPATIENT
Start: 2018-10-31 | End: 2018-10-31

## 2018-10-31 RX ORDER — LIDOCAINE HYDROCHLORIDE 10 MG/ML
INJECTION, SOLUTION INFILTRATION; PERINEURAL CODE/TRAUMA/SEDATION MEDICATION
Status: COMPLETED | OUTPATIENT
Start: 2018-10-31 | End: 2018-10-31

## 2018-10-31 RX ADMIN — VALACYCLOVIR HYDROCHLORIDE 1000 MG: 500 TABLET, FILM COATED ORAL at 09:26

## 2018-10-31 RX ADMIN — QUETIAPINE 50 MG: 25 TABLET ORAL at 20:30

## 2018-10-31 RX ADMIN — CEFAZOLIN SODIUM 2000 MG: 2 SOLUTION INTRAVENOUS at 14:16

## 2018-10-31 RX ADMIN — ATORVASTATIN CALCIUM 10 MG: 10 TABLET, FILM COATED ORAL at 17:17

## 2018-10-31 RX ADMIN — INSULIN GLARGINE 12 UNITS: 100 INJECTION, SOLUTION SUBCUTANEOUS at 21:14

## 2018-10-31 RX ADMIN — CEFAZOLIN SODIUM 2000 MG: 2 SOLUTION INTRAVENOUS at 21:15

## 2018-10-31 RX ADMIN — COLLAGENASE SANTYL 1 APPLICATION: 250 OINTMENT TOPICAL at 09:18

## 2018-10-31 RX ADMIN — SODIUM CHLORIDE 8 MG/HR: 9 INJECTION, SOLUTION INTRAVENOUS at 10:11

## 2018-10-31 RX ADMIN — HYDROCORTISONE SODIUM SUCCINATE 50 MG: 100 INJECTION, POWDER, FOR SOLUTION INTRAMUSCULAR; INTRAVENOUS at 09:25

## 2018-10-31 RX ADMIN — SENNOSIDES 8.6 MG: 8.6 TABLET, FILM COATED ORAL at 21:14

## 2018-10-31 RX ADMIN — CEFAZOLIN SODIUM 2000 MG: 2 SOLUTION INTRAVENOUS at 06:45

## 2018-10-31 RX ADMIN — VALACYCLOVIR HYDROCHLORIDE 1000 MG: 500 TABLET, FILM COATED ORAL at 20:31

## 2018-10-31 RX ADMIN — INFLUENZA A VIRUS A/MICHIGAN/45/2015 X-275 (H1N1) ANTIGEN (FORMALDEHYDE INACTIVATED), INFLUENZA A VIRUS A/SINGAPORE/INFIMH-16-0019/2016 IVR-186 (H3N2) ANTIGEN (FORMALDEHYDE INACTIVATED), AND INFLUENZA B VIRUS B/MARYLAND/15/2016 BX-69A (A B/COLORADO/6/2017-LIKE VIRUS) ANTIGEN (FORMALDEHYDE INACTIVATED) 0.5 ML: 60; 60; 60 INJECTION, SUSPENSION INTRAMUSCULAR at 17:04

## 2018-10-31 RX ADMIN — SODIUM CHLORIDE 8 MG/HR: 9 INJECTION, SOLUTION INTRAVENOUS at 00:29

## 2018-10-31 RX ADMIN — SODIUM CHLORIDE 8 MG/HR: 9 INJECTION, SOLUTION INTRAVENOUS at 15:38

## 2018-10-31 RX ADMIN — LIDOCAINE HYDROCHLORIDE 2 ML: 10 INJECTION, SOLUTION INFILTRATION; PERINEURAL at 15:04

## 2018-10-31 RX ADMIN — Medication 100 MG: at 17:19

## 2018-10-31 RX ADMIN — HYDROCORTISONE SODIUM SUCCINATE 25 MG: 100 INJECTION, POWDER, FOR SOLUTION INTRAMUSCULAR; INTRAVENOUS at 20:31

## 2018-10-31 NOTE — PROGRESS NOTES
Progress Note - Infectious Disease   Gloria [facility] [de-identified] y o  male MRN: [id number]  Unit/Bed#: ICU 08 Encounter: [id number]      Impression/Plan:  1  Septic shock-POA   Leukocytosis and tachycardia and hypotension   Appears to be all secondary to MSSA bacteremia in the setting of chest wall abscess formation   No other clear sources appreciated   Patient is clinically improved overall  The patient's heart rate has come down, and the white cell count has come down   He seems to be tolerating the antibiotics without difficulty  He is still requiring vasopressor support but the procalcitonin level is decreasing  -continue cefazolin for now at current dose  -recheck CBC with diff and creatinine tomorrow  -follow-up repeat procalcitonin level  -supportive care in the intensive care unit     2  MSSA bacteremia-appears to be secondary to chest wall abscesses   No other clear source appreciated  Consideration for the possibility of endocarditis but this is less likely with only 1 of 2 blood cultures positive and the transthoracic echocardiogram without valvular vegetations    -the antibiotics as above  -follow up repeat blood cultures  -no additional ID workup for now  -with severity and complexity of infection, plan 4 weeks of intravenous antibiotics     3  MSSA chest wall abscesses-status post extensive I and D x2 with a VAC dressing in place  Jovanni Antoine seems to have improved clinically   His pain seems reasonably well controlled   This all possibly as a complication of zoster   -antibiotics as above  -vac dressing and local care  -close surgical follow-up     4  Herpes zoster-hard to make a definitive diagnosis at this point as the rash is relatively advanced in its presentation   -discontinue Valtrex after last dose today  -discontinue isolation  -serial exams       5  Acute kidney injury-likely a pre renal issue   Had initially improved but now the renal function is worse today  Possibly medication effect    The renal function seems to have plateaued  -dose adjusted antiviral   -recheck BMP tomorrow  -volume management  -no additional ID workup for now      6  Esophageal candidiasis-started on fluconazole  -decreased fluconazole to 100 mg IV Q 24 hr  -once off pressors, can change the fluconazole to oral    Antibiotics:  Cefazolin 3  Antibiotics 7  Postop day 5/2  Valtrex 4  Antiviral 7  Fluconazole 2    Subjective:  Patient has no fever, chills, sweats; no nausea, vomiting, diarrhea; no cough, shortness of breath; no increased pain  No new symptoms  He developed a GI bleed yesterday and was found to have a large duodenal ulcer  Also found to have esophageal candidiasis  Patient now with decreased vasopressor support  He remains confused and belligerent    Objective:  Vitals:  Temp:  [97 4 °F (36 3 °C)-98 8 °F (37 1 °C)] 97 5 °F (36 4 °C)  HR:  [] 100  Resp:  [15-29] 28  BP: ()/(42-80) 105/67  SpO2:  [95 %-99 %] 99 %  Temp (24hrs), Av 8 °F (36 6 °C), Min:97 4 °F (36 3 °C), Max:98 8 °F (37 1 °C)  Current: Temperature: 97 5 °F (36 4 °C)    Physical Exam:   General Appearance:  Alert, interactive, confused, belligerent   Throat: Oropharynx moist without lesions  Lungs:   Decreased breath sounds bilaterally; no wheezes, rhonchi or rales; respirations unlabored   Chest wall: Left-sided chest wall and axilla with VAC in place   Heart:  Tachycardic; no murmur, rub or gallop   Abdomen:   Soft, non-tender, non-distended, positive bowel sounds  Extremities: No clubbing, cyanosis or edema   Skin: No new rashes or lesions  No draining wounds noted    No active zoster lesions       Labs, Imaging, & Other studies:   All pertinent labs and imaging studies were personally reviewed    Results from last 7 days  Lab Units 10/31/18  0513 10/31/18  0023 10/30/18  1612 10/30/18  0544 10/29/18  0548   WBC Thousand/uL 16 91*  --   --  12 67* 14 58*   HEMOGLOBIN g/dL 9 7* 9 7* 7 9* 7 3* 8 5*   PLATELETS Thousands/uL 206  -- --  242 229       Results from last 7 days  Lab Units 10/31/18  0513 10/31/18  0023 10/30/18  0544  10/29/18  0548  10/27/18  0428  10/26/18  0817   SODIUM mmol/L 142 140 138  --  139  < > 135*  < >  --    POTASSIUM mmol/L 4 0 4 1 4 5  < > 3 4*  < > 4 3  < >  --    CHLORIDE mmol/L 108 108 106  --  105  < > 103  < >  --    CO2 mmol/L 22 22 24  --  25  < > 25  < >  --    BUN mg/dL 43* 46* 49*  --  40*  < > 42*  < >  --    CREATININE mg/dL 1 19 1 27 1 36*  --  1 32*  < > 1 11  < >  --    EGFR ml/min/1 73sq m 57 53 49  --  51  < > 62  < >  --    GLUCOSE, ISTAT mg/dl  --   --   --   --   --   --   --   --  115   CALCIUM mg/dL 7 5* 7 3* 7 5*  --  7 7*  < > 7 7*  < >  --    AST U/L 22  --   --   --  22  --  17  < >  --    ALT U/L 14  --   --   --  20  --  12  < >  --    ALK PHOS U/L 63  --   --   --  71  --  65  < >  --    < > = values in this interval not displayed  Results from last 7 days  Lab Units 10/29/18  1442 10/29/18  1435 10/29/18  1108 10/26/18  0749 10/26/18  0742 10/25/18  2007 10/25/18  1730 10/25/18  1534 10/25/18  1530   BLOOD CULTURE  No Growth at 24 hrs  No Growth at 24 hrs   --   --   --   --   --  Staphylococcus aureus* No Growth After 5 Days     GRAM STAIN RESULT   --   --  No Polys  3+ Gram positive cocci in clusters No Polys  2+ Gram positive cocci in clusters 2+ Polys  3+ Gram positive cocci in clusters  --  1+ Polys  2+ Gram positive cocci in pairs  2+ Gram positive cocci in clusters Gram positive cocci in clusters  --    URINE CULTURE   --   --   --   --   --   --  >100,000 cfu/ml   --   --    WOUND CULTURE   --   --   --   --  4+ Growth of Staphylococcus aureus*  --  4+ Growth of Staphylococcus aureus*  --   --    MRSA CULTURE ONLY   --   --   --   --   --  No Methicillin Resistant Staphlyococcus aureus (MRSA) isolated  --   --   --

## 2018-10-31 NOTE — PLAN OF CARE
CARDIOVASCULAR - ADULT     Maintains optimal cardiac output and hemodynamic stability Progressing     Absence of cardiac dysrhythmias or at baseline rhythm Progressing        DISCHARGE PLANNING     Discharge to home or other facility with appropriate resources Progressing        DISCHARGE PLANNING - CARE MANAGEMENT     Discharge to post-acute care or home with appropriate resources Progressing        GASTROINTESTINAL - ADULT     Maintains or returns to baseline bowel function Progressing     Maintains adequate nutritional intake Progressing        GENITOURINARY - ADULT     Maintains or returns to baseline urinary function Progressing     Urinary catheter remains patent Progressing        HEMATOLOGIC - ADULT     Maintains hematologic stability Progressing        INFECTION - ADULT     Absence or prevention of progression during hospitalization Progressing        Knowledge Deficit     Patient/family/caregiver demonstrates understanding of disease process, treatment plan, medications, and discharge instructions Progressing        METABOLIC, FLUID AND ELECTROLYTES - ADULT     Electrolytes maintained within normal limits Progressing     Fluid balance maintained Progressing     Glucose maintained within target range Progressing        Nutrition/Hydration-ADULT     Nutrient/Hydration intake appropriate for improving, restoring or maintaining nutritional needs Progressing        PAIN - ADULT     Verbalizes/displays adequate comfort level or baseline comfort level Progressing        Potential for Falls     Patient will remain free of falls Progressing        Prexisting or High Potential for Compromised Skin Integrity     Skin integrity is maintained or improved Progressing        RESPIRATORY - ADULT     Achieves optimal ventilation and oxygenation Progressing        SAFETY ADULT     Maintain or return to baseline ADL function Progressing     Maintain or return mobility status to optimal level Progressing SKIN/TISSUE INTEGRITY - ADULT     Incision(s), wounds(s) or drain site(s) healing without S/S of infection Progressing

## 2018-10-31 NOTE — PROGRESS NOTES
Progress Note - Hoseana Duty [de-identified] y o  male MRN: [id number]    Unit/Bed#: ICU 08 Encounter: [id number]        Subjective:   Patient appears confused, is able to answer basic questions but goes off on tangents and is intermittently irritable  He denies any abdominal pain or nausea at this time  Nursing reports that he had 1 small black colored bowel movement earlier this morning, but otherwise has not been putting out stool continuously like yesterday  No significant bowel movements reported overnight  Objective:     Vitals: Blood pressure 112/66, pulse (!) 112, temperature 97 6 °F (36 4 °C), temperature source Temporal, resp  rate (!) 23, height 5' 8 5" (1 74 m), weight 80 2 kg (176 lb 12 9 oz), SpO2 96 %  ,Body mass index is 26 49 kg/m²  Intake/Output Summary (Last 24 hours) at 10/31/18 1145  Last data filed at 10/31/18 0901   Gross per 24 hour   Intake          2435 49 ml   Output             1135 ml   Net          1300 49 ml       Physical Exam:   General appearance: alert, confused, appears stated age and cooperative  Lungs: clear to auscultation bilaterally, no labored breathing/accessory muscle use  Heart: regular rate and rhythm, S1, S2 normal, no murmur, click, rub or gallop  Abdomen: soft, non-tender; bowel sounds normal; no masses,  no organomegaly  Extremities: no edema    Invasive Devices     Central Venous Catheter Line            CVC Central Lines 10/25/18 Triple Right Femoral 5 days          Drain            Urethral Catheter Latex 16 Fr  5 days                Lab, Imaging and other studies: I have personally reviewed pertinent reports  Admission on 10/25/2018   No results displayed because visit has over 200 results  Results for Marisela Rich (MRN [id number]) as of 10/31/2018 11:46   Ref   Range 10/31/2018 06:15 10/31/2018 06:15 10/31/2018 06:15 10/31/2018 07:19 10/31/2018 11:09   POC Glucose Latest Ref Range: 65 - 140 mg/dl    158 (H) 159 (H)   Crossmatch Unknown Compatible Compatible Compatible     Unit ABO Unknown [facility] [place]     Unit [facility] Unknown POS POS POS     Unit Number Unknown [id number]     Unit Dispense Status Unknown Presumed Trans Presumed Trans Presumed Trans       EGD yesterday  FINDINGS:     #1  Esophagus- whitish plaques noted starting from mid esophagus to the distal esophagus, suspicious for Candida esophagitis, biopsy obtained      #2  Stomach- mild gastritis noted in the antrum  There were no gastric ulcers  Retroflexed view was unremarkable  There was bile noted in the gastric lumen      #3  Duodenum- large duodenal ulcer along the duodenal sweep measuring 2 cm, with red spot, this was obliterated using gold probe  The ulcer bed was subsequently closed using 2 hemoclips  There was bile noted in the duodenal bulb, duodenal sweep and D2  There was no active bleeding noted  IMPRESSIONS:       See above      RECOMMENDATIONS:      1  Continue Protonix drip for 72 hours  2  Monitor H&H and transfuse as necessary  3  Avoid the use of NSAIDs  4  NPO today  5  If Hemoglobin remains stable, can start a clear liquid diet tomorrow  6  Consider starting fluconazole for Candida esophagitis  7  Follow-up biopsy results  Assessment/Plan:    1  Upper GI bleed secondary to large duodenal ulcer, status post EGD yesterday with treatment with gold probe and epinephrine  No evidence of any active bleeding at this time, dark stool this morning was likely residual   Hemoglobin appears to be stable    - continue Protonix drip for 72 hr total  - will advance to clear liquid diet at this time, would not advance beyond that today  - monitor hemoglobin closely, transfuse if needed  - plan for repeat EGD if hemoglobin becomes unstable or patient has other signs of active rebleeding such as persistent melena, hematemesis, etc  - avoid NSAIDs      2   Esophageal candidiasis, patient had white plaques noted in the esophagus on EGD yesterday and was reported with some dysphagia earlier in the admission    - infectious disease follow-up, continue fluconazole  - follow-up on biopsy results

## 2018-10-31 NOTE — PROGRESS NOTES
General Surgery Progress Note    Chief Complaint: "That right, you son of a xxxxx!?"     Subjective/24 hour/interim events:  Clearly, Codie Loco is still altered  Otherwise had some GI bleeding in needed 3 units in total of PRBC transfusion yesterday with an EGD with an ulcer that was demonstrated and cauterized  Otherwise currently on 2 micrograms/minute of norepinephrine  Patient has no fevers recorded  Temperature curve is otherwise normal for at least the last 6 hr     Objective:  /67   Pulse 100   Temp 97 5 °F (36 4 °C) (Temporal)   Resp (!) 28   Ht 5' 8 5" (1 74 m)   Wt 80 2 kg (176 lb 12 9 oz)   SpO2 99%   BMI 26 49 kg/m²   General:  No acute distress, but clearly confused  Chest:  The Prevena/modified Eckhauser VAC is in place and is holding suction well  Back:  Personally examined along with Trecia Gottron, OTILIO  We irrigated the area and subsequently washed off all the old slough along with the Santyl and noted that there was good improvement  Reapplied Santyl and replaced the Allevyn dressing  Ext:  The right thigh, which was laying on the central line on Monday during the operation, has not demonstrated any skin breakdown or any ulceration  Pertinent labs reviewed  White blood cell count up from 12 K to 16 K today  Pertinent images and available reads personally reviewed    Pertinent notes reviewed    Assessment and Plan:  I am not too surprised that he still on 2 micrograms/minute of norepinephrine as he had the GI bleed yesterday and needed 3 units in total of PRBCs  We had keep in mind a course that he has only an EF of 20% and that he will need the oxygen carrying capacity the to maintain his proper perfusion  In addition, the white blood cell count bump today could be explained by the transfusion and therefore I am not compelled to take down the [facility] dressing as he has not strong of fever  Should his white count rise again tomorrow, I will take down the dressing and take a look  Otherwise the back looks all right without any ongoing signs or evolution of any abscess  No obvious surgical intervention is needed at this point, but we will certainly continue to follow  Please call with any interim queries  Counseling / Coordination of Care  Total floor/unit time spent today 20 minutes  Greater than 50% of total time was spent with the patient and/or family counseling and/or coordination of care  A description of the counseling / coordination of care:  I performed an interim history, pertinent images and labs, performed a physical examination to arrive at the plan delineated above with associated thought processes  Plan conveyed to nurse Dominique Zarate, in person  Plan discussed with MIGUEL Barba of primary team     *The areas in bold, if present within the assessment and plan, are summary statements/bullet points for nursing and consultant/primary teams  * Points delineated in red, if present, are not points of contention and should not be taken by any practitioner, nurse, therapist, , or anyone else with access to the patient chart  Hand off errors are prevalent and therefore, any changes or updates shown in red are to communicate to the next practitioner, points not discussed in handout but can affect patient care and management (yet not important enough to wake, or disturb, or alert the next practitioner in an emergent fashion)  RENETAT [place]    [address] [place] [address] [facility]  Office [phone]  Fax [phone]

## 2018-10-31 NOTE — PLAN OF CARE
Problem: Potential for Falls  Goal: Patient will remain free of falls  INTERVENTIONS:  - Assess patient frequently for physical needs  -  Identify cognitive and physical deficits and behaviors that affect risk of falls    -  [place] fall precautions as indicated by assessment   - Educate patient/family on patient safety including physical limitations  - Instruct patient to call for assistance with activity based on assessment  - Modify environment to reduce risk of injury  - Consider OT/PT consult to assist with strengthening/mobility   Outcome: Progressing      Problem: Prexisting or High Potential for Compromised Skin Integrity  Goal: Skin integrity is maintained or improved  INTERVENTIONS:  - Identify patients at risk for skin breakdown  - Assess and monitor skin integrity  - Assess and monitor nutrition and hydration status  - Monitor labs (i e  albumin)  - Assess for incontinence   - Turn and reposition patient  - Assist with mobility/ambulation  - Relieve pressure over bony prominences  - Avoid friction and shearing  - Provide appropriate hygiene as needed including keeping skin clean and dry  - Evaluate need for skin moisturizer/barrier cream  - Collaborate with interdisciplinary team (i e  Nutrition, Rehabilitation, etc )   - Patient/family teaching   Outcome: Progressing      Problem: PAIN - ADULT  Goal: Verbalizes/displays adequate comfort level or baseline comfort level  Interventions:  - Encourage patient to monitor pain and request assistance  - Assess pain using appropriate pain scale  - Administer analgesics based on type and severity of pain and evaluate response  - Implement non-pharmacological measures as appropriate and evaluate response  - Consider cultural and social influences on pain and pain management  - Notify physician/advanced practitioner if interventions unsuccessful or patient reports new pain   Outcome: Progressing      Problem: INFECTION - ADULT  Goal: Absence or prevention of progression during hospitalization  INTERVENTIONS:  - Assess and monitor for signs and symptoms of infection  - Monitor lab/diagnostic results  - Monitor all insertion sites, i e  indwelling lines, tubes, and drains  - Monitor endotracheal (as able) and nasal secretions for changes in amount and color  - [place] appropriate cooling/warming therapies per order  - Administer medications as ordered  - Instruct and encourage patient and family to use good hand hygiene technique  - Identify and instruct in appropriate isolation precautions for identified infection/condition   Outcome: Progressing      Problem: SAFETY ADULT  Goal: Maintain or return to baseline ADL function  INTERVENTIONS:  -  Assess patient's ability to carry out ADLs; assess patient's baseline for ADL function and identify physical deficits which impact ability to perform ADLs (bathing, care of mouth/teeth, toileting, grooming, dressing, etc )  - Assess/evaluate cause of self-care deficits   - Assess range of motion  - Assess patient's mobility; develop plan if impaired  - Assess patient's need for assistive devices and provide as appropriate  - Encourage maximum independence but intervene and supervise when necessary  ¯ Involve family in performance of ADLs  ¯ Assess for home care needs following discharge   ¯ Request OT consult to assist with ADL evaluation and planning for discharge  ¯ Provide patient education as appropriate   Outcome: Progressing    Goal: Maintain or return mobility status to optimal level  INTERVENTIONS:  - Assess patient's baseline mobility status (ambulation, transfers, stairs, etc )    - Identify cognitive and physical deficits and behaviors that affect mobility  - Identify mobility aids required to assist with transfers and/or ambulation (gait belt, sit-to-stand, lift, walker, cane, etc )  - [place] fall precautions as indicated by assessment  - Record patient progress and toleration of activity level on Mobility SBAR; progress patient to next Phase/Stage  - Instruct patient to call for assistance with activity based on assessment  - Request Rehabilitation consult to assist with strengthening/weightbearing, etc    Outcome: Progressing      Problem: DISCHARGE PLANNING  Goal: Discharge to home or other facility with appropriate resources  INTERVENTIONS:  - Identify barriers to discharge w/patient and caregiver  - Arrange for needed discharge resources and transportation as appropriate  - Identify discharge learning needs (meds, wound care, etc )  - Arrange for interpretive services to assist at discharge as needed  - Refer to Case Management Department for coordinating discharge planning if the patient needs post-hospital services based on physician/advanced practitioner order or complex needs related to functional status, cognitive ability, or social support system   Outcome: Progressing      Problem: Knowledge Deficit  Goal: Patient/family/caregiver demonstrates understanding of disease process, treatment plan, medications, and discharge instructions  Complete learning assessment and assess knowledge base  Interventions:  - Provide teaching at level of understanding  - Provide teaching via preferred learning methods   Outcome: Progressing      Problem: CARDIOVASCULAR - ADULT  Goal: Maintains optimal cardiac output and hemodynamic stability  INTERVENTIONS:  - Monitor I/O, vital signs and rhythm  - Monitor for S/S and trends of decreased cardiac output i e  bleeding, hypotension  - Administer and titrate ordered vasoactive medications to optimize hemodynamic stability  - Assess quality of pulses, skin color and temperature  - Assess for signs of decreased coronary artery perfusion - ex   Angina  - Instruct patient to report change in severity of symptoms   Outcome: Progressing    Goal: Absence of cardiac dysrhythmias or at baseline rhythm  INTERVENTIONS:  - Continuous cardiac monitoring, monitor vital signs, obtain 12 lead EKG if indicated  - Administer antiarrhythmic and heart rate control medications as ordered  - Monitor electrolytes and administer replacement therapy as ordered   Outcome: Progressing      Problem: RESPIRATORY - ADULT  Goal: Achieves optimal ventilation and oxygenation  INTERVENTIONS:  - Assess for changes in respiratory status  - Assess for changes in mentation and behavior  - Position to facilitate oxygenation and minimize respiratory effort  - Oxygen administration by appropriate delivery method based on oxygen saturation (per order) or ABGs  - Initiate smoking cessation education as indicated  - Encourage broncho-pulmonary hygiene including cough, deep breathe, Incentive Spirometry  - Assess the need for suctioning and aspirate as needed  - Assess and instruct to report SOB or any respiratory difficulty  - Respiratory Therapy support as indicated    Outcome: Progressing      Problem: GENITOURINARY - ADULT  Goal: Maintains or returns to baseline urinary function  INTERVENTIONS:  - Assess urinary function  - Encourage oral fluids to ensure adequate hydration  - Administer IV fluids as ordered to ensure adequate hydration  - Administer ordered medications as needed  - Offer frequent toileting  - Follow urinary retention protocol if ordered   Outcome: Progressing    Goal: Urinary catheter remains patent  INTERVENTIONS:  - Assess patency of urinary catheter  - If patient has a chronic luis, consider changing catheter if non-functioning  - Follow guidelines for intermittent irrigation of non-functioning urinary catheter   Outcome: Progressing      Problem: METABOLIC, FLUID AND ELECTROLYTES - ADULT  Goal: Electrolytes maintained within normal limits  INTERVENTIONS:  - Monitor labs and assess patient for signs and symptoms of electrolyte imbalances  - Administer electrolyte replacement as ordered  - Monitor response to electrolyte replacements, including repeat lab results as appropriate  - Instruct patient on fluid and nutrition as appropriate   Outcome: Progressing    Goal: Fluid balance maintained  INTERVENTIONS:  - Monitor labs and assess for signs and symptoms of volume excess or deficit  - Monitor I/O and WT  - Instruct patient on fluid and nutrition as appropriate   Outcome: Progressing      Problem: SKIN/TISSUE INTEGRITY - ADULT  Goal: Incision(s), wounds(s) or drain site(s) healing without S/S of infection  INTERVENTIONS  - Assess and document risk factors for skin impairment   - Assess and document dressing, incision, wound bed, drain sites and surrounding tissue  - Initiate Nutrition services consult and/or wound management as needed   Outcome: Progressing      Problem: Nutrition/Hydration-ADULT  Goal: Nutrient/Hydration intake appropriate for improving, restoring or maintaining nutritional needs  Monitor and assess patient's nutrition/hydration status for malnutrition (ex- brittle hair, bruises, dry skin, pale skin and conjunctiva, muscle wasting, smooth red tongue, and disorientation)  Collaborate with interdisciplinary team and initiate plan and interventions as ordered  Monitor patient's weight and dietary intake as ordered or per policy  Utilize nutrition screening tool and intervene per policy  Determine patient's food preferences and provide high-protein, high-caloric foods as appropriate       INTERVENTIONS:  - Monitor oral intake, urinary output, labs, and treatment plans  - Assess nutrition and hydration status and recommend course of action  - Evaluate amount of meals eaten  - Assist patient with eating if necessary   - Allow adequate time for meals  - Recommend/ encourage appropriate diets, oral nutritional supplements, and vitamin/mineral supplements  - Order, calculate, and assess calorie counts as needed  - Recommend, monitor, and adjust tube feedings and TPN/PPN based on assessed needs  - Assess need for intravenous fluids  - Provide specific nutrition/hydration education as appropriate  - Include patient/family/caregiver in decisions related to nutrition   Outcome: Progressing      Problem: DISCHARGE PLANNING - CARE MANAGEMENT  Goal: Discharge to post-acute care or home with appropriate resources  INTERVENTIONS:  - Conduct assessment to determine patient/family and health care team treatment goals, and need for post-acute services based on payer coverage, community resources, and patient preferences, and barriers to discharge  - Address psychosocial, clinical, and financial barriers to discharge as identified in assessment in conjunction with the patient/family and health care team  - Arrange appropriate level of post-acute services according to patients   needs and preference and payer coverage in collaboration with the physician and health care team  - Communicate with and update the patient/family, physician, and health care team regarding progress on the discharge plan  - Arrange appropriate transportation to post-acute venues   Outcome: Progressing      Problem: GASTROINTESTINAL - ADULT  Goal: Maintains or returns to baseline bowel function  INTERVENTIONS:  - Assess bowel function  - Encourage oral fluids to ensure adequate hydration  - Administer IV fluids as ordered to ensure adequate hydration  - Administer ordered medications as needed  - Encourage mobilization and activity  - Nutrition services referral to assist patient with appropriate food choices   Outcome: Progressing    Goal: Maintains adequate nutritional intake  INTERVENTIONS:  - Monitor percentage of each meal consumed  - Identify factors contributing to decreased intake, treat as appropriate  - Assist with meals as needed  - Monitor I&O, WT and lab values  - Obtain nutrition services referral as needed   Outcome: Progressing      Problem: HEMATOLOGIC - ADULT  Goal: Maintains hematologic stability  INTERVENTIONS  - Assess for signs and symptoms of bleeding or hemorrhage  - Monitor labs  - Administer supportive blood products/factors as ordered and appropriate   Outcome: Progressing

## 2018-10-31 NOTE — PHYSICAL THERAPY NOTE
PT EVALUATION       10/31/18 1020   Note Type   Note type Eval only   Pain Assessment   Pain Assessment No/denies pain   Home Living   Type of Home Mobile home  (2 KALEN with rail)   Home Layout One level   Home Equipment Cane   Prior Function   Level of [place] Independent with ADLs and functional mobility  (amb without AD PTA)   Lives With Alone   Receives Help From Family   ADL Assistance Independent   IADLs (unsure of prior function with IADLs)   Falls in the last 6 months 1 to 4   Restrictions/Precautions   Other Precautions Cognitive; Fall Risk;Multiple lines; Bed Alarm; Chair Alarm  (Femoral Line RLE so no OOB/limited rom RLE today per RN)   General   Additional Pertinent History Pt adm after being found on floor after a fall  10/26/18 chest wall debridement and wound vac placement;10/30 EGD  Family/Caregiver Present No   Cognition   Overall Cognitive Status Impaired   Arousal/Participation Cooperative   Orientation Level Oriented to person;Oriented to time;Oriented to situation;Disoriented to place   Following Commands Follows one step commands without difficulty   RLE Assessment   RLE Assessment WFL  (except hip NT due to femoral line;knee/ankle 3- to 3/5)   LLE Assessment   LLE Assessment WFL  (3- to 3/5)   Light Touch   RLE Light Touch Grossly intact   LLE Light Touch Grossly intact   Bed Mobility   Rolling R 4  Minimal assistance   Additional items (per RN)   Rolling L 4  Minimal assistance   Additional items (per RN)   Transfers   Additional Comments trans - NA due to femoral line RLE   Ambulation/Elevation   Gait pattern (gait - NA due to femoral line RLE)   Activity Tolerance   Activity Tolerance Patient tolerated treatment well   Assessment   Prognosis Good   Problem List Decreased strength;Decreased range of motion;Decreased endurance; Impaired balance;Decreased mobility; Decreased coordination;Decreased cognition; Impaired judgement;Decreased safety awareness;Decreased skin integrity   Assessment Patient seen for Physical Therapy evaluation  Patient admitted with Septic shock ([facility] [address] )  Comorbidities affecting patient's physical performance include: DM2, CAD, CABG, CKD 3, cardiomyopathy, gout, HTN, A-Fib, anemia, shingles, abscess of chest wall, CHF  Personal factors affecting patient at time of initial evaluation include: lives in one story house, stairs to enter home, inability to ambulate household distances, inability to navigate community distances, inability to navigate level surfaces without external assistance, inability to perform dynamic tasks in community, decreased cognition, positive fall history, inability to perform physical activity, limited insight into impairments, inability to perform ADLS and inability to perform IADLS   Prior to admission, patient was independent with functional mobility without assistive device, independent with ADLS, living alone in one story home with 2 steps to enter and ambulating household distance  Please find objective findings from Physical Therapy assessment regarding body systems outlined above with impairments and limitations including weakness, decreased ROM, impaired balance, decreased endurance, impaired coordination, gait deviations, decreased activity tolerance, decreased functional mobility tolerance, decreased safety awareness, impaired judgement, fall risk and decreased cognition  The Barthel Index was used as a functional outcome tool presenting with a score of 10 today indicating marked limitations of functional mobility and ADLS  Patient's clinical presentation is currently unstable/unpredictable as seen in patient's presentation of vital sign response, varying levels of cognitive performance, increased fall risk, new onset of impairment of functional mobility, decreased endurance and new onset of weakness  Pt would benefit from continued Physical Therapy treatment to address deficits as defined above and maximize level of functional mobility   As demonstrated by objective findings, the assigned level of complexity for this evaluation is high  Goals   Patient Goals go home   STG Expiration Date 11/07/18   Short Term Goal #1 bed mob - mod A; trans - mod A   Short Term Goal #2 amb w LRD and mod A x 20 feet so pt can amb short distance in home; balance w LRD - F/F+ for mobility/ADLs   LTG Expiration Date 11/14/18   Long Term Goal #1 bed mob - I; trans - I; up/down 2 steps - I so pt can enter/exit his home   Long Term Goal #2 amb w/wout LRD both home/community distances as previous; balance w/wout AD - F+/G; strength LEs - 4/5   Treatment Day 0   Plan   Treatment/Interventions ADL retraining;Functional transfer training;LE strengthening/ROM; Elevations; Therapeutic exercise; Endurance training;Cognitive reorientation;Patient/family training;Equipment eval/education   PT Frequency 5x/wk   Recommendation   Recommendation (pending progress and further eval;STR?)   Equipment Recommended (to be assessed with mobility when pt medically cleared)   Additional Comments Per RN, pt going for PICC line today, then femoral line will be dc and pt will be able to be mobilized   Barthel Index   Feeding 5   Bathing 0   Grooming Score 0   Dressing Score 0   Bladder Score 0   Bowels Score 5   Toilet Use Score 0   Transfers (Bed/Chair) Score 0   Mobility (Level Surface) Score 0   Stairs Score 0   Barthel Index Score 8   Licensure   [place] License Number  [place], [place] [id number]

## 2018-10-31 NOTE — PROCEDURES
PICC Line Insertion  Date/Time: 10/31/2018 3:07 PM  Performed by: Miguel Angel Elliott by: Ashlyn Ordonez     Patient location:  IR  Other Assisting Provider: Yes (comment) JAIME Boone)    Consent:     Consent obtained:  Verbal    Consent given by:  Guardian    Risks discussed:  Arterial puncture, bleeding, incorrect placement, nerve damage, infection and pneumothorax    Alternatives discussed:  No treatment  Universal protocol:     Procedure explained and questions answered to patient or proxy's satisfaction: yes      Relevant documents present and verified: yes      Test results available and properly labeled: yes      Radiology Images displayed and confirmed  If images not available, report reviewed: yes      Required blood products, implants, devices, and special equipment available: yes      Site/side marked: yes      Immediately prior to procedure, a time out was called: yes      Patient identity confirmed:  Verbally with patient, arm band and hospital-assigned identification number  Pre-procedure details:     Hand hygiene: Hand hygiene performed prior to insertion      Sterile barrier technique: All elements of maximal sterile technique followed      Skin preparation:  ChloraPrep    Skin preparation agent: Skin preparation agent completely dried prior to procedure    Indications:     PICC line indications: long term antibiotics    Anesthesia (see MAR for exact dosages):      Anesthesia method:  Local infiltration    Local anesthetic:  Lidocaine 1% w/o epi  Procedure details:     Location:  [place]    Vessel type: vein      Laterality:  Right    Approach: percutaneous technique used      Patient position:  Flat    Procedural supplies:  Double lumen    Catheter size:  5 Fr    Landmarks identified: yes      Ultrasound guidance: yes      Sterile ultrasound techniques: Sterile gel and sterile probe covers were used      Number of attempts:  1    Successful placement: yes      Total catheter length (cm):  49    Catheter out on skin (cm):  0    Arm circumference:  30  Post-procedure details:     Post-procedure:  Dressing applied and securement device placed    Assessment:  Blood return through all ports and placement verified by x-ray    Post-procedure complications: none      Patient tolerance of procedure: Tolerated well, no immediate complications    Observer: Yes      Observer name:  MICHELLE Samuels

## 2018-10-31 NOTE — SPEECH THERAPY NOTE
Chart-reviewed- patient is s/p EGD with duodenal ulcer  He is currently on clear liquid diet- will re-attempt again within 24-36 hours      RENETTA Clayton S , [id number] [facility]  Speech Language Pathologist   [phone]

## 2018-10-31 NOTE — PROGRESS NOTES
Discussion with pt's son Jewel Davis and daughter in-law at bedside with current status and poor prognosis given age and multiple comorbities  They express concern, as pt's son states, "I'm afraid we are going against his wishes"  Pt's son explains that he is not sure his father would want aggressive treatment and invasive procedures  They are also concerned with the patient's current confusion, as pt is currently experiencing some confusion likely related to acute infectious process and underlying GI bleeding as well as probable ICU delerium  Discussed current plan of care to proceed with monitoring of hemoglobin levels and hemodynamics, continuation of vasoactive medications and current level 2 code status  Pt states he is the only child and is the medical decision maker  Pt's son expresses understanding of pt's poor prognosis and wishes to proceed with current medical regimen  In the event of the need to escalate care, pt's son would like to be notified and continue goals of care discussion

## 2018-10-31 NOTE — PROGRESS NOTES
Pt noted to have dark brown/blck BM  Occult blood check pending  Pt was to receive 1 unit of PRBC earlier this am for hemoglobin of 7 3   GI consulted

## 2018-10-31 NOTE — CONSULTS
Consultation - Cardiology   Mony Wynn [de-identified] y o  male MRN: [id number]  Unit/Bed#: ICU 08 Encounter: [id number]  10/31/18  11:22 AM          Physician Requesting Consult: Ben Harkins DO  Reason for Consult / Principal Problem: CHF/PVCs      Assessment:  1  Chronic congestive heart failure with systolic dysfunction  EF is 20% with current worsening possibly due to sepsis  He does not appear to be in acute CHF with only small pleural effusions present   - Continue treatment of infection  - was using metoprolol, aldactone and lisinopril at home which are on hold due to hypotension  Resume metoprolol if BP allows  2  Sinus tachycardia/Frequent PVCs secondary to infection/levophed  Taper off levophed if BP allows  - resume metoprolol if BP allows  3  CAD with previous CABG - ASA on hold due to GI bleed/ulcer  4  DM  5  Dyslipidemia - continue statin    Plan:  As above  Discussed with ICU team   Will continue to follow  History of Present Illness   HPI: Mony Wynn is a [de-identified]y o  year old male who presents with cellulitis of his chest wall  Complicating cellulitis was septic shock with bacteremia of methicillin sensitive Staph aureus  He required intubation and IV pressors and developed a GI bleed  EGD was done yesterday showing a large duodenal ulcer which was not actively bleeding  Echocardiogram was done earlier this admission showing ejection fraction of 20%  Previous ejection fraction was 40% on echocardiogram in May 2018  He denies any shortness of breath or chest pain  History is limited as patient has delirium  He remains on Levophed infusion  Heart rate has been elevated and he has had frequent PVCs on telemetry  There have been no significant prolonged episodes of VT with the longest fun of PVCs lasting 3 beats  Currently, he is sitting up, eating ice cream   He denies any chest pain or shortness of breath at this time     Patient has a history of coronary artery bypass grafting in 2017 and follows with Dr Osmani Soto from [address]  Review of Systems:    Review of Systems   Unable to perform ROS: Psychiatric disorder       Historical Information   Past Medical History:   Diagnosis Date    [facility]congest[facility] [address] [facility]     [facility]     History of open heart surgery     Hyperlipidemia     Hypertension      Past Surgical History:   Procedure Laterality Date    CARDIAC SURGERY      WOUND DEBRIDEMENT Left 10/26/2018    Procedure: Chest wall wound debridement (extensive) with pulse lavage and wound vac placement;  Surgeon: Dorothea Bates MD;  Location: [address] [place] [address];  Service: General    WOUND DEBRIDEMENT Left 10/29/2018    Procedure: DEBRIDEMENT WOUND AND DRESSING CHANGE ([address]); Surgeon: Dorothea Bates MD;  Location: [address] [place] [address];  Service: General     History   Alcohol Use No     History   Drug Use No     History   Smoking Status    Former Smoker    Types: Pipe   Smokeless Tobacco    Never Used       Family History: History reviewed  No pertinent family history      Meds/Allergies   current meds:   Current Facility-Administered Medications   Medication Dose Route Frequency    atorvastatin (LIPITOR) tablet 10 mg  10 mg Oral Daily    ceFAZolin (ANCEF) IVPB (premix) 2,000 mg  2,000 mg Intravenous Q8H    collagenase (SANTYL) ointment   Topical Daily    fluconazole (DIFLUCAN) (LOW DOSE) IVPB 100 mg  100 mg Intravenous Q24H    hydrocortisone sodium succinate (PF) (Solu-CORTEF) injection 50 mg  50 mg Intravenous Q12H [address]    influenza vaccine, high-dose (FLUZONE HIGH-DOSE) IM injection WIN 0 5 mL  0 5 mL Intramuscular Prior to discharge    insulin glargine (LANTUS) subcutaneous injection 12 Units 0 12 mL  12 Units Subcutaneous HS    insulin lispro (HumaLOG) 100 units/mL subcutaneous injection 2-12 Units  2-12 Units Subcutaneous HS    insulin lispro (HumaLOG) 100 units/mL subcutaneous injection 2-12 Units  2-12 Units Subcutaneous TID AC    melatonin tablet 6 mg  6 mg Oral HS    norepinephrine (LEVOPHED) 8 mg (DOUBLE CONCENTRATION) IV in sodium chloride 0 9% 250 mL  1-30 mcg/min Intravenous Titrated    oxyCODONE (ROXICODONE) IR tablet 5 mg  5 mg Oral Q4H PRN    pantoprazole (PROTONIX) 80 mg in sodium chloride 0 9 % 100 mL infusion  8 mg/hr Intravenous Continuous    senna (SENOKOT) tablet 8 6 mg  1 tablet Oral HS    valACYclovir (VALTREX) tablet 1,000 mg  1,000 mg Oral Q12H [facility]     No Known Allergies    Objective   Vitals: Blood pressure 106/61, pulse 102, temperature 97 6 °F (36 4 °C), temperature source Temporal, resp  rate 15, height 5' 8 5" (1 74 m), weight 80 2 kg (176 lb 12 9 oz), SpO2 97 %  , Body mass index is 26 49 kg/m²  Physical Exam   Constitutional: He appears healthy  No distress  HENT:   Nose: Nose normal    Mouth/Throat: Oropharynx is clear  Eyes: Pupils are equal, round, and reactive to light  Conjunctivae are normal    Neck: Neck supple  No JVD present  Cardiovascular: Regular rhythm  Tachycardia present  Exam reveals no distant heart sounds and no friction rub  Murmur heard  Pulmonary/Chest: Effort normal and breath sounds normal  He has no wheezes  He has no rales  He exhibits no tenderness  Abdominal: Soft  He exhibits no distension  There is no tenderness  Musculoskeletal: He exhibits no edema  Neurological: He is alert  He exhibits altered mental status  Skin: Skin is warm and dry  No rash noted         Lab Results:     Troponins:   Results from last 7 days  Lab Units 10/27/18  0428 10/26/18  0525 10/25/18  1534   CK TOTAL U/L 49 112 131   TROPONIN I ng/mL  --   --  <0 02       CBC with diff:   Results from last 7 days  Lab Units 10/31/18  0513 10/31/18  0023 10/30/18  1612 10/30/18  0544 10/29/18  0548 10/28/18  0456 10/27/18  0428 10/26/18  1003  10/26/18  0523   WBC Thousand/uL 16 91*  --   --  12 67* 14 58* 16 88* 17 35* 17 71*  --  17 71*   HEMOGLOBIN g/dL 9 7* 9 7* 7 9* 7 3* 8 5* 7 1* 8  0* 9 3*  --  9 4*   I STAT HEMOGLOBIN   --   --   --   --   --   --   --   --   < >  --    HEMATOCRIT % 29 8* 29 3* 23 5* 22 1* 26 0* 21 1* 23 4* 25 8*  < > 26 4*   MCV fL 91  --   --  105* 103* 106* 103* 99*  --  100*   PLATELETS Thousands/uL 206  --   --  242 229 205 256 294  --  324   MCH pg 29 8  --   --  34 8* 33 7 35 5* 35 2* 35 6*  --  35 7*   MCHC g/dL 32 6  --   --  33 0 32 7 33 6 34 2 36 0  --  35 6   RDW % 19 1*  --   --  15 7* 16 0* 14 1 13 4 12 6  --  12 4   MPV fL 11 0  --   --  11 2 11 3 10 9 10 6 10 4  --  10 9   NRBC AUTO /100 WBCs 1  --   --   --  0 0 0 0  --  0   NRBC /100 WBC 2  --   --   --   --   --   --   --   --   --    < > = values in this interval not displayed      CMP:   Results from last 7 days  Lab Units 10/31/18  0513 10/31/18  0023 10/30/18  4599 10/29/18  1836 10/29/18  7782 10/28/18  7783 10/27/18  7303 10/26/18  2053  10/26/18  1004 10/26/18  0817 10/26/18  0732 10/26/18  0525  10/25/18  1534   SODIUM mmol/L 142 140 138  --  139 139 135* 132*  < > 134*  --   --  130*  < > 111*   POTASSIUM mmol/L 4 0 4 1 4 5 3 9 3 4* 3 7 4 3 3 9  < > 3 4*  --   --  3 9  < > 5 4*   CHLORIDE mmol/L 108 108 106  --  105 107 103 101  < > 101  --   --  98*  < > 75*   CO2 mmol/L 22 22 24  --  25 24 25 22  < > 22  --   --  22  < > 14*   BUN mg/dL 43* 46* 49*  --  40* 37* 42* 55*  < > 66*  --   --  83*  < > 118*   CREATININE mg/dL 1 19 1 27 1 36*  --  1 32* 1 08 1 11 1 30  < > 1 49*  --   --  1 87*  < > 3 10*   GLUCOSE, ISTAT mg/dl  --   --   --   --   --   --   --   --   --   --  115 120  --   --   --    CALCIUM mg/dL 7 5* 7 3* 7 5*  --  7 7* 7 8* 7 7* 7 8*  < > 8 2*  --   --  8 3  < > 8 8   AST U/L 22  --   --   --  22  --  17  --   --  14  --   --  15  --  11   ALT U/L 14  --   --   --  20  --  12  --   --  12  --   --  12  --  17   ALK PHOS U/L 63  --   --   --  71  --  65  --   --  67  --   --  68  --  124*   EGFR ml/min/1 73sq m 57 53 49  --  51 64 62 52  < > 44  --   --  33  < > 18   < > = values in this interval not displayed  Magnesium:   Results from last 7 days  Lab Units 10/31/18  0513 10/31/18  0023 10/30/18  0544 10/29/18  1836 10/29/18  0548 10/28/18  0456 10/27/18  0428   MAGNESIUM mg/dL 2 3 2 3 2 5 2 1 2 2 2 2 2 1       Coags:   Results from last 7 days  Lab Units 10/29/18  0548 10/26/18  0526   PTT seconds  --  27   INR  1 04 1 09       Lipid Profile:         Cardiac testing:   Results for orders placed during the hospital encounter of 10/25/18   Echo complete with contrast if indicated    [address]  [facility] [facility]  [phone]    Transthoracic Echocardiogram  2D, M-mode, Doppler, and Color Doppler    Study date:  26-Oct-2018    Patient: Elisa Moyer  MR number: [id number]  Account number: [de-identified]  : 1940  Age: [address] years  Gender: Male  Status: Inpatient  Location: Bedside  Height: 68 in  Weight: 155 8 lb  BP: 83/ 53 mmHg    Indications: Heart Failure    Diagnoses: I50 9 - Heart failure, unspecified    Sonographer:  ADRIEL Rousseau  Primary Physician:  Lambert Salazar DO  Referring Physician:  May Combs PA-C  Group:  [facility]  Interpreting Physician:  Toan Berumen DO    IMPRESSIONS:  These features are consistent with dilated cardiomyopathy  SUMMARY    LEFT VENTRICLE:  The ventricle was moderately dilated  Systolic function was severely reduced by visual assessment  Ejection fraction was estimated to be 20 %  There were no regional wall motion abnormalities  There was mild concentric hypertrophy  Doppler parameters were consistent with abnormal left ventricular relaxation (grade 1 diastolic dysfunction)  LEFT ATRIUM:  The atrium was moderately dilated  MITRAL VALVE:  There was moderate regurgitation  AORTIC VALVE:  There was no evidence for stenosis  There was no regurgitation  TRICUSPID VALVE:  There was mild regurgitation    Pulmonary artery systolic pressure was mildly increased  HISTORY: PRIOR HISTORY: HTN, Hyperlipidemia, DM, CHF, CABG    PROCEDURE: The procedure was performed at the bedside  This was a routine study  The transthoracic approach was used  The study included complete 2D imaging, M-mode, complete spectral Doppler, and color Doppler  The heart rate was 115 bpm,  at the start of the study  Images were not obtained from the subcostal or suprasternal notch acoustic windows  Intravenous contrast (Definity solution [1 3 ml Definity/8 7ml normal saline solution], 1 ml) was administered to opacify the  left ventricle  Echocardiographic views were limited due to restricted patient mobility, surgical dressings, poor acoustic window availability, lung interference, and patient on mechanical ventilator  This was a technically difficult study  LEFT VENTRICLE: The ventricle was moderately dilated  Systolic function was severely reduced by visual assessment  Ejection fraction was estimated to be 20 %  There were no regional wall motion abnormalities  There was mild concentric  hypertrophy  DOPPLER: Doppler parameters were consistent with abnormal left ventricular relaxation (grade 1 diastolic dysfunction)  RIGHT VENTRICLE: The size was normal  Systolic function was normal  DOPPLER: Systolic pressure was within the normal range  LEFT ATRIUM: The atrium was moderately dilated  RIGHT ATRIUM: Size was normal     MITRAL VALVE: There was annular calcification  Valve structure was normal  There was normal leaflet separation  No echocardiographic evidence for prolapse  DOPPLER: The transmitral velocity was within the normal range  There was no  evidence for stenosis  There was moderate regurgitation  AORTIC VALVE: The valve was trileaflet  Leaflets exhibited normal thickness, mild calcification, normal cuspal separation, and sclerosis  DOPPLER: Transaortic velocity was within the normal range  There was no evidence for stenosis  There  was no regurgitation      TRICUSPID VALVE: The valve structure was normal  There was normal leaflet separation  DOPPLER: The transtricuspid velocity was within the normal range  There was no evidence for stenosis  There was mild regurgitation  Pulmonary artery  systolic pressure was mildly increased  Estimated peak PA pressure was 43 mmHg  PULMONIC VALVE: Leaflets exhibited normal thickness, no calcification, and normal cuspal separation  DOPPLER: The transpulmonic velocity was within the normal range  There was no regurgitation  PERICARDIUM: There was no thickening  There was no pericardial effusion  AORTA: The root exhibited normal size  PULMONARY ARTERY: The size was normal  The morphology appeared normal     SYSTEM MEASUREMENT TABLES    2D mode  AoR Diam 2D: 3 7 cm  LA Diam (2D): 4 6 cm  LA/Ao (2D): 1 24  FS (2D Teich): 13 7 %  IVSd (2D): 1 17 cm  LVDEV: 189 cm³  LVEDV MOD BP: 180 cm³  LVESV: 135 cm³  LVIDd(2D): 6 13 cm  LVISd (2D): 5 29 cm  LVPWd (2D): 1 09 cm  SV (Teich): 54 cm³    Apical four chamber  LVEF A4C: 24 %    Apical two chamber  LVEF A2C: 13 %    Unspecified Scan Mode  MV Peak E Travon  Mean: 1120 mm/s  MVA (PHT): 7 33 cm squared  PHT: 30 ms  Max P mm[Hg]  V Max: 2970 mm/s  Vmax: 2770 mm/s  RA Area: 19 cm squared  RA Volume: 48 4 cm³  TAPSE: 1 2 cm    [facility] Accredited Echocardiography Laboratory    Prepared and electronically signed by    Warden Domenica DO  Signed 26-Oct-2018 16:55:41       No results found for this or any previous visit  No results found for this or any previous visit  No results found for this or any previous visit  No results found for this or any previous visit    Results for orders placed during the hospital encounter of 18   NM myocardial perfusion spect (rx stress and/or rest)    [address]  [address] [facility] [address] [phone]    Rest/Stress Gated SPECT Myocardial Perfusion Imaging After Regadenoson    Patient: CHRISTIAN LEYVA IRMA  MR number: [id number]  Account number: [de-identified]  : 1940  Age: 76 years  Gender: Male  Status: Inpatient  Location: Stress lab  Height: 68 in  Weight: 147 lb  BP: 122/ 60 mmHg    Allergies: NO KNOWN ALLERGIES    Diagnosis: I25 5 - Ischemic cardiomyopathy    Primary Physician:  Jonathan Peralta DO  RN:  NATALY Carreon  Group:  [facility]  Report Prepared By[de-identified]  NATALY Carreon  Interpreting Physician:  Claire Cheung MD    INDICATIONS: Evaluation of known coronary artery disease  HISTORY: The patient is a 76year old  male  Chest pain status: no chest pain  Other symptoms: decreased effort tolerance  Coronary artery disease risk factors: dyslipidemia, hypertension, and diabetes mellitus  Cardiovascular  history:Ischemic cardiomyopathy, coronary artery disease and congestive heart failure  Prior cardiovascular procedures: coronary artery bypass grafting  Co-morbidity: age Medications: a beta blocker, aspirin, a lipid lowering agent, and  diabetic medications  PHYSICAL EXAM: Baseline physical exam screening:Weakness of extremities noted, no wheezes audible  REST ECG: Normal sinus rhythm  The ECG showed ventricular couplets  PROCEDURE: The study was performed in the stress lab  A regadenoson infusion pharmacologic stress test was performed  Gated SPECT myocardial perfusion imaging was performed after stress and at rest  Systolic blood pressure was 122 mmHg, at  the start of the study  Diastolic blood pressure was 60 mmHg, at the start of the study  The heart rate was 74 bpm, at the start of the study  IV double checked  Regadenoson protocol:  Time HR bpm SBP mmHg DBP mmHg Symptoms Rhythm/conduct  Baseline 09:54 74 122 60 none NSR, ventricular couplets  Immediate 09:56 102 97 54 none occasional PVC's  1 min 09:57 98 101 56 none --  2 min 09:58 98 102 58 none --  3 min 09:59 99 106 55 none --  4 min 10:00 97 105 56 none --  No medications or fluids given      STRESS SUMMARY: Duration of pharmacologic stress was 3 min  Maximal heart rate during stress was 102 bpm  The heart rate response to stress was normal  There was normal resting blood pressure with an appropriate response to stress  The  rate-pressure product for the peak heart rate and blood pressure was 70243  There was no chest pain during stress  The stress test was terminated due to protocol completion  Pre oxygen saturation: 99 %  Peak oxygen saturation: 100 %  The  stress ECG was equivocal for ischemia  Arrhythmia during stress: isolated premature ventricular beats  ISOTOPE ADMINISTRATION:  Resting isotope administration Stress isotope administration  Agent Tetrofosmin Tetrofosmin  Dose 10 6 mCi 32 2 mCi  Date 05/15/2018 05/15/2018    The radiopharmaceutical was injected at the peak effect of pharmacologic stress  MYOCARDIAL PERFUSION IMAGING:  The left ventricle was dilated  The TID ratio was 1 12  PERFUSION DEFECTS:  -  There was a moderate-sized, moderately severe, partially reversible myocardial perfusion defect of the entire inferior and apical wall  GATED SPECT:  The calculated left ventricular ejection fraction was 35 %  Left ventricular ejection fraction was moderately decreased by visual estimate  There was moderate global left ventricular hypokinesis with paradoxical septal motion  There was  moderately reduced myocardial thickening and motion of the inferior wall of the left ventricle  SUMMARY:  -  Stress results: There was no chest pain during stress  -  ECG conclusions: The stress ECG was equivocal for ischemia  -  Perfusion imaging: There was a moderate-sized, moderately severe, partially reversible myocardial perfusion defect of the entire inferior and apical wall  -  Gated SPECT: The calculated left ventricular ejection fraction was 35 %  Left ventricular ejection fraction was moderately decreased by visual estimate   There was moderate global left ventricular hypokinesis with paradoxical septal  motion  There was moderately reduced myocardial thickening and motion of the inferior wall of the left ventricle  IMPRESSIONS: Abnormal study after pharmacologic vasodilation  There was a moderate-sized infarct and a small amount of ischemia  Left ventricular systolic function was reduced, with regional wall motion abnormalities  Prepared and signed by    Rosa Crawford MD  Signed 05/16/2018 07:35:18         EKG: Personally reviewed: Sinus tachycardia at 110 bpm  TELE: Sinus tachycardia with frequent PVCs    Longest run was 3 beats    Imaging: I have personally reviewed pertinent films in PACS

## 2018-10-31 NOTE — PROGRESS NOTES
Daily Progress Note - Critical Care/ Stepdown   Verdia Dance [de-identified] y o  male MRN: [id number]  Unit/Bed#: ICU 08 Encounter: [id number]    ______________________________________________________________________  Assessment:   Principal Problem:    Septic shock ([facility] [address] )  Active Problems:    Bacteremia due to Staphylococcus aureus    Abscess of chest wall    Respiratory infection    DM2 (diabetes mellitus, type 2) ([facility])    CAD (coronary artery disease)    CKD (chronic kidney disease) stage 3, GFR 30-59 ml/min ([facility])    Cardiomyopathy, ischemic    Atrial fibrillation ([facility])    Shingles    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[facility])    Iron deficiency anemia due to chronic blood loss    Duodenal ulcer    Esophagitis  Resolved Problems:    Acute respiratory failure secondary to septic shock    Urinary tract infection    Hyponatremia    CHRIS (acute kidney injury) ([facility])    Lactic acid acidosis    DKA (diabetic ketoacidoses) ([facility])    Lethargy    Sepsis ([facility])    Cellulitis of left axilla    Acute cystitis with hematuria      * Septic shock ([facility] [address] )   Assessment & Plan    · Source likely presumed cellulitis/absess of chest wall   · Fluid resuscitation with 30ml/kg and started on vasopressor with Levophed  · Continues on Levophed 3 mics, vasopressor DCed  · Poor EF on echo  Cardiac index and output on Flow track remains acceptable  · [place] protocol - Stress Dose Steroids, Vit C, thiamine  · Lactic acid normalized  · ID consult  · Performed aggressive debridement in OR 10/26 of chest wall cellulitis, wound vac in place   Scant serosanguineous output  · Return to OR for debridement 10/29, wound vac in place  · Blood culture staph aureus x 1  · Wound culture: 3+ staph aureus   · Urine cultures > 100,000 mixed containments   · ABX: Ancef and valacyclovir day #7    Will continue  · Improving, Continue to wean Levophed, repeat blood cultures no growth at 24hr  · Repeat procal pending today  · WBCs increased from 12-16 today, surgery will consider returning to OR tomorrow if white count continues to trend upward       Bacteremia due to Staphylococcus aureus   Assessment & Plan    Blood culture x 1 MSSA  C/w ancef day 7 of total antibiotics  Blood culture x 2 pending  Continue to wean levophed     Acute respiratory failure secondary to septic shockresolved as of 10/28/2018   Assessment & Plan    · Resolved  ·  Patient extubated on 10/26  · Continues on nasal cannula  Wean as tolerated  Abscess of chest wall   Assessment & Plan    · Aggressive  debridement in OR on 10/26 by Dr  Alonso Or, wound VAC in place  · Debridement in OR 10/29 for , wound vac change  · modified Eckhauser-VAC in place, needs to be changed in 5 days  · Antibiotics deescalated to Ancef as per ID recs, day 7 of total antibiotics, vacyclovir for HSV infection       Urinary tract infectionresolved as of 10/29/2018   Assessment & Plan    · Urinalysis concerning for possible urinary tract infection  Urine culture with > 100,000 CFU of mixed contaminants  · Continue cefepime     Respiratory infection   Assessment & Plan    · Present on admission as evidence by CT scan suggestive of infectious process  · No sputum culture to confirm pneumonia  · Respiratory status improved  Extubated on the 26th  Now 2 L nasal cannula     · Continue to treat with empiric antibiotics     Esophagitis   Assessment & Plan    · EGD on 10/31: duodenal bulb ulcer with possible candida esophagitis  · protonix drip with fluconazole   · Biopsies pending      Duodenal ulcer   Assessment & Plan    · Melena developed on 10/31  · EGD on 10/31: duodenal bulb ulcer with possible candida esophagitis  · Transfused 3 units PRBCs  · protonix drip started 10/30 for 72 hours with fluconazole   · Hemodynamically stable overnight  · Will initiate clear liquid diet today       Iron deficiency anemia due to chronic blood loss   Assessment & Plan    · Secondary to sepsis and acuity of condition  · Hemoglobin 7 1, transfused 3 units PRBCs, repeat Hgb 9 7  · EGD on 10/31: duodenal bulb ulcer with possible candida esophagitis  · protonix drip with fluconazole   · Hemodynamically stable overnight       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HCC)   Assessment & Plan    · Known EF of 20%  · Judicious fluid resuscitation  · Consider diuresis for evidence of respiratory distress     Shingles   Assessment & Plan    · Patient tolerating p o  Intake  Transition to valacyclovir     Atrial fibrillation (HCC)   Assessment & Plan    · History of AFib in the setting of electrolyte abnormalities in the past   Monitor on telemetry  · Current Sinus Tachy  · Holding BB shock state and need for beta agonist therapy  · No indication for anticoagulation at this time  · Frequent PVCs on telemetry, cardiology consult     Cardiomyopathy, ischemic   Assessment & Plan    · EF 35 to 40%  Recent stress test in May of 2018 showed reversible ischemic changes  Cardiology following as an outpatient with medical management    · ECHO repeat with EF 20%  · Cardiac output and indexes while patient was intubated were acceptable  · Vasopressor d/zaida  · Frequent PVCs on telemetry, Cardiology consulted     CKD (chronic kidney disease) stage 3, GFR 30-59 ml/min ([facility])   Assessment & Plan    Baseline 0 9-1 3  Current creatinine 1 19  Adequate urine output at this time     CAD (coronary artery disease)   Assessment & Plan    Troponin negative  Echo obtained,  EF 20%     DM2 (diabetes mellitus, type 2) [facility])   Assessment & Plan    Lab Results   Component Value Date    HGBA1C 12 0 (H) 10/26/2018       Recent Labs      10/28/18   0806  10/28/18   1010  10/28/18   1209  10/28/18   1632   POCGLU  188*  197*  211*  222*     · DKA resolved  · Blood sugar stable, c/w BID lantus  · Goal blood sugar under 180           Acute cystitis with hematuriaresolved as of 10/29/2018   Assessment & Plan    · Continue antibiotic regimen with cefepime, clindamycin, urine culture with greater than 100,000 colony-forming units of mixed roberto     Lethargyresolved as of 10/27/2018   Assessment & Plan    - improving  Patient awake and alert  Continue environmental controls        DKA (diabetic ketoacidoses) (HCC)resolved as of 10/26/2018   Assessment & Plan    Lab Results   Component Value Date    HGBA1C 12 0 (H) 10/26/2018       Recent Labs      10/28/18   0000  10/28/18   0806  10/28/18   1010  10/28/18   1209   POCGLU  206*  188*  197*  211*       Blood Sugar Average: Last 72 hrs:  (P) 085 [id number]     · DKA resolved  · Blood sugar remains mildly elevated  Continue b i d  Lantus  Increase sliding scale algorithm  · Goal blood sugar under 180     Lactic acid acidosisresolved as of 10/26/2018   Assessment & Plan    · Resolved  · Secondary to septic shock and DKA, trend  · Cleared  · CT CAP showing:  Left pectoral cellulitis with gas-forming pockets and right lower lobe pneumonia  · Continue to trend  · Dose with Thiamine  · Surgery consulted, went to OR for I%D  · Continue with vancomycin, clindamycin, cefepime, acyclovir     CHRIS (acute kidney injury) (HCC)resolved as of 10/27/2018   Assessment & Plan    · Resolved  · Likely Pre renal   Likely secondary to septic shock and DKA  · Improving with fluids  · FENa consistent with Pre-renal cause  · Continue fluid resuscitation and monitor urine output  · Gordon catheter in place     Hyponatremiaresolved as of 10/27/2018   Assessment & Plan    · Resolved  · Likely hypovolemic hyponatremia in the setting of dehydration, sepsis, and DKA  · Possibly a component of pseudo hyponatremia         Plan:    Neuro:   · Delirium: CAM ICU positive yes   · If yes, intervention:  Consider Seroquel or possible Precedex drip this evening in order to initiate sleep tonight  · Pain controlled with:  P o   Tylenol  · Pain score: moderate  · Regulate sleep/wake cycle    CV:   · Cardiac infusions: Levophed, 2 mcg/min, continue to wean  · Received 3 units packed red blood cells for GI bleed yesterday  · Repeat echo showed EF of 20%  · Rhythm: Sinus Tachycardia  · Follow rhythm on telemetry with frequent PVCs, cardiology consulted    Pulm:   · Stable on 2 liters nasal cannula, oxygenating appropriately    GI:   · Duodenal ulcer with possible Candida esophagitis, infused with 2 units packed red blood cells, EGD yesterday cauterized and clipped, Protonix strip with fluconazole  · Nutrition/diet plan:  NPO, consider clear liquid diet today  · Bowel regimen: None currently  · Last BM:  Melena this a m  FEN:   · Currently NPO, will consider clear liquid diet today  · Fluid/Diuretic plan: No intervention  · Goal 24 hour fluid balance:  Net even  · Electrolytes repleted: yes  · Goal: K >4 0, Mag >2 0, and Phos >3 0    :   · Indwelling Gordon present: yes   · Urinary catheter still needed for Strict I and O in a critically ill patient      ID:   · MSSA bacteremia secondary to chest wall abscess  · Ancef day# 7, Procal pending today  · WBC count increased from 12-16 today, afebrile, surgery will consider returning to OR tomorrow if white count continues to trend upward  · Id consulted  · Repeat Blood cultures x2 no growth at 24hrs   · Trend temps and WBC count    Heme:   · Acute blood loss anemia  · 3 units packed red blood cells transfused yesterday  · Hemoglobin stable at 9 7  · Tachycardic, continue to wean Levophed  · Trend hgb and plts    Endo:   · Type 2 DM, continue with insulin sliding scale and Lantus  · Glycemic control plan: Blood glucose controlled on current regimen    Msk/Skin:  · Mobility goal:  Up and out of bed as tolerated  · PT consult: yes  · OT consult: yes  · Frequent turning and off-loading    Family:  · Family updated within 24 hours: yes   · Family meeting planned today: no     Lines:  · Plan for PICC line placement today  · Triple lumen femoral catheter in place    VTE Prophylaxis:  · Pharmacologic Prophylaxis: Reason for no pharmacologic prophylaxis GI bleed  · Mechanical Prophylaxis: sequential compression device    Disposition: Continue ICU care    Code Status: Level 2 - DNAR: but accepts endotracheal intubation    Counseling / Coordination of Care  Total Critical Care time spent 25 minutes excluding procedures, teaching and family updates  ______________________________________________________________________    HPI/24hr events: Melanic stool developed yesterday, 4 episodes overnight  EGD revealed duodenal ulcer  Transfused 3 units PRBCs  Protonix drip with fluconazole for possible candida esophagitis  NPO  Hgb stable, remained hemodynamically stable overnight  Altered mental status overnight with delirium  ______________________________________________________________________    Physical Exam:   Physical Exam   Constitutional: He appears well-developed and well-nourished  No distress  HENT:   Head: Normocephalic and atraumatic  Neck: No JVD present  Cardiovascular: Regular rhythm, normal heart sounds and intact distal pulses  Exam reveals no gallop and no friction rub  No murmur heard  Tachycardic   Pulmonary/Chest: Effort normal    Diminished breath sounds bilaterally, no wheezes or rales   Abdominal: Soft  Bowel sounds are normal  He exhibits no distension and no mass  There is no tenderness  There is no guarding  Gordon catheter in place draining light yellow urine   Musculoskeletal: He exhibits edema  Neurological: He is alert  Oriented to person only   Skin: Skin is warm  Capillary refill takes less than 2 seconds  He is not diaphoretic     Anterior chest, left axilla, posterior scapular region wound VAC in place   Psychiatric:   Paranoid behavior       ______________________________________________________________________  Vitals:    10/31/18 1100 10/31/18 1118 10/31/18 1130 10/31/18 1200   BP: (!) 83/53  112/66 105/62   BP Location:    Right arm   Pulse: 99  (!) 112 (!) 115   Resp: 15  (!) 23 16   Temp:  97 6 °F (36 4 °C)     TempSrc:  Temporal     SpO2: 95%  96% 96%   Weight:       Height:         Arterial Line BP: 77/68  Arterial Line MAP (mmHg): 72 mmHg     Temperature:   Temp (24hrs), Av 8 °F (36 6 °C), Min:97 4 °F (36 3 °C), Max:98 8 °F (37 1 °C)    Current Temperature: 97 6 °F (36 4 °C)    Weights:   IBW: 69 55 kg    Body mass index is 26 49 kg/m²  Weight (last 2 days)     Date/Time   Weight    10/31/18 0600  80 2 (176 81)    10/30/18 06  70 3 (154 98)              Hemodynamic Monitoring:  Frequent vital signs       Non-Invasive/Invasive Ventilation Settings:  Respiratory    Lab Data (Last 4 hours)    None         O2/Vent Data (Last 4 hours)    None              No results found for: PHART, TIZ1NGV, PO2ART, YMT0HWQ, T8UUNLDX, BEART, SOURCE  SpO2: SpO2: 96 %    Intake and Outputs:  I/O       10/29 0701 - 10/30 0700 10/30 0701 - 10/31 0700 10/31 0701 -  0700    P  O  360 120     I V  (mL/kg) 430 7 (6 1) 805 5 (10)     Blood  1050     IV Piggyback 760 550     Total Intake(mL/kg) 1550 7 (22 1) 2525 5 (31 5)     Urine (mL/kg/hr) 1246 (0 7) 1195 (0 6)     Drains  40     Blood 20      Total Output 1266 1235      Net +284 7 +1290 5             Unmeasured Stool Occurrence  10 x             Nutrition:        Diet Orders            Start     Ordered    10/31/18 0916  Diet Clear Liquid  Diet effective now     Question Answer Comment   Diet Type Clear Liquid    RD to adjust diet per protocol?  Yes        10/31/18 0915    10/29/18 1450  Room Service  Once     Question:  Type of Service  Answer:  Room Service - Appropriate with Assistance    10/29/18 1452            Labs:     Results from last 7 days  Lab Units 10/31/18  0513 10/31/18  0023 10/30/18  1612 10/30/18  0544 10/29/18  0548 10/28/18  0456  10/26/18  1003   WBC Thousand/uL 16 91*  --   --  12 67* 14 58* 16 88*  < > 17 71*   HEMOGLOBIN g/dL 9 7* 9 7* 7 9* 7 3* 8 5* 7 1*  < > 9 3*   HEMATOCRIT % 29 8* 29 3* 23 5* 22 1* 26 0* 21 1*  < > 25 8*   PLATELETS Thousands/uL 206  --   --  242 229 205  < > 294   NEUTROS PCT %  --   --   --   --   --   --   --  89*   MONOS PCT %  --   --   --   --   --   --   --  4   MONO PCT MAN % 0*  --   --   --  2* 3*  < >  --    < > = values in this interval not displayed  Results from last 7 days  Lab Units 10/31/18  0513 10/31/18  0023 10/30/18  0544  10/29/18  0548  10/27/18  0428  10/26/18  0817 10/26/18  0732   SODIUM mmol/L 142 140 138  --  139  < > 135*  < >  --   --    POTASSIUM mmol/L 4 0 4 1 4 5  < > 3 4*  < > 4 3  < >  --   --    CHLORIDE mmol/L 108 108 106  --  105  < > 103  < >  --   --    CO2 mmol/L 22 22 24  --  25  < > 25  < >  --   --    BUN mg/dL 43* 46* 49*  --  40*  < > 42*  < >  --   --    CREATININE mg/dL 1 19 1 27 1 36*  --  1 32*  < > 1 11  < >  --   --    CALCIUM mg/dL 7 5* 7 3* 7 5*  --  7 7*  < > 7 7*  < >  --   --    ALK PHOS U/L 63  --   --   --  71  --  65  < >  --   --    ALT U/L 14  --   --   --  20  --  12  < >  --   --    AST U/L 22  --   --   --  22  --  17  < >  --   --    GLUCOSE, ISTAT mg/dl  --   --   --   --   --   --   --   --  115 120   < > = values in this interval not displayed      Results from last 7 days  Lab Units 10/31/18  0513 10/31/18  0023 10/30/18  0544   MAGNESIUM mg/dL 2 3 2 3 2 5     Lab Results   Component Value Date    PHOS 2 5 10/31/2018    PHOS 2 1 (L) 10/30/2018    PHOS 2 6 10/28/2018        Results from last 7 days  Lab Units 10/29/18  0548 10/26/18  0526   INR  1 04 1 09   PTT seconds  --  27       0  Lab Value Date/Time   TROPONINI <0 02 10/25/2018 1534   TROPONINI <0 02 05/09/2018 1927       Results from last 7 days  Lab Units 10/26/18  1706 10/26/18  1004 10/26/18  0528   LACTIC ACID mmol/L 2 0 1 8 3 3*     ABG:  Lab Results   Component Value Date    PHART 7 539 (H) 10/30/2018    DMJ2UGX 25 6 (LL) 10/30/2018    PO2ART 70 7 (L) 10/30/2018    CBU4AEX 21 3 (L) 10/30/2018    BEART -0 9 10/30/2018    SOURCE Radial, Left 10/30/2018       Imaging:     Micro:  Lab Results Component Value Date    BLOODCX No Growth at 24 hrs  10/29/2018    BLOODCX No Growth at 24 hrs  10/29/2018    BLOODCX Staphylococcus aureus (A) 10/25/2018    URINECX >100,000 cfu/ml  10/25/2018    URINECX >100,000 cfu/ml Klebsiella oxytoca (A) 05/09/2018    WOUNDCULT 4+ Growth of Staphylococcus aureus (A) 10/26/2018    WOUNDCULT 4+ Growth of Staphylococcus aureus (A) 10/25/2018       Allergies: No Known Allergies  Medications:   Scheduled Meds:    Current Facility-Administered Medications:  atorvastatin 10 mg Oral Daily Claudell Che, MD    cefazolin 2,000 mg Intravenous Q8H Jonathan Benz PA-C Last Rate: Stopped (10/31/18 0715)   collagenase  Topical Daily Hiren Coley MD    fluconazole 100 mg Intravenous Q24H Misty Montoya MD    hydrocortisone sodium succinate 25 mg Intravenous Q12H [facility] Claudell Che, MD    influenza vaccine 0 5 mL Intramuscular Prior to discharge Claudell Che, MD    insulin glargine 12 Units Subcutaneous HS Claudell Che, MD    insulin lispro 2-12 Units Subcutaneous HS Claudell Che, MD    insulin lispro 2-12 Units Subcutaneous TID Radha Keita MD    melatonin 6 mg Oral HS Claudell Che, MD    norepinephrine 1-30 mcg/min Intravenous Titrated Claudell Che, MD Last Rate: Stopped (10/31/18 1205)   oxyCODONE 5 mg Oral Q4H PRN Claudell Che, MD    pantoprozole (PROTONIX) infusion (Continuous) 8 mg/hr Intravenous Continuous Colleen Martinez PA-C Last Rate: 8 mg/hr (10/31/18 1011)   QUEtiapine 50 mg Oral Once Claudell Che, MD    senna 1 tablet Oral HS Claudell Che, MD    valACYclovir 1,000 mg Oral Q12H [facility] Misty Montoya MD      Continuous Infusions:    norepinephrine 1-30 mcg/min Last Rate: Stopped (10/31/18 1205)   pantoprozole (PROTONIX) infusion (Continuous) 8 mg/hr Last Rate: 8 mg/hr (10/31/18 1011)     PRN Meds:    influenza vaccine 0 5 mL Prior to discharge   oxyCODONE 5 mg Q4H PRN       Invasive lines and devices:   Invasive Devices     Central Venous Catheter Line            CVC Central Lines 10/25/18 Triple Right Femoral 5 days          Drain            Urethral Catheter Latex 16 Fr  5 days                   SIGNATURE: Ryan Mcneil MD  DATE: October 31, 2018

## 2018-11-01 ENCOUNTER — APPOINTMENT (INPATIENT)
Dept: RADIOLOGY | Facility: HOSPITAL | Age: 78
DRG: 853 | End: 2018-11-01
Payer: MEDICARE

## 2018-11-01 PROBLEM — B02.9 SHINGLES: Status: RESOLVED | Noted: 2018-10-25 | Resolved: 2018-11-01

## 2018-11-01 PROBLEM — R41.0 DELIRIUM: Status: ACTIVE | Noted: 2018-11-01

## 2018-11-01 LAB
ANION GAP SERPL CALCULATED.3IONS-SCNC: 6 MMOL/L (ref 4–13)
ANISOCYTOSIS BLD QL SMEAR: PRESENT
ATRIAL RATE: 124 BPM
BACTERIA SPEC ANAEROBE CULT: NORMAL
BACTERIA TISS AEROBE CULT: ABNORMAL
BASOPHILS # BLD MANUAL: 0 THOUSAND/UL (ref 0–0.1)
BASOPHILS NFR MAR MANUAL: 0 % (ref 0–1)
BUN SERPL-MCNC: 31 MG/DL (ref 5–25)
CALCIUM SERPL-MCNC: 7.2 MG/DL (ref 8.3–10.1)
CHLORIDE SERPL-SCNC: 109 MMOL/L (ref 100–108)
CO2 SERPL-SCNC: 25 MMOL/L (ref 21–32)
CREAT SERPL-MCNC: 0.98 MG/DL (ref 0.6–1.3)
EOSINOPHIL # BLD MANUAL: 0 THOUSAND/UL (ref 0–0.4)
EOSINOPHIL NFR BLD MANUAL: 0 % (ref 0–6)
ERYTHROCYTE [DISTWIDTH] IN BLOOD BY AUTOMATED COUNT: 19.7 % (ref 11.6–15.1)
GFR SERPL CREATININE-BSD FRML MDRD: 73 ML/MIN/1.73SQ M
GLUCOSE SERPL-MCNC: 130 MG/DL (ref 65–140)
GLUCOSE SERPL-MCNC: 221 MG/DL (ref 65–140)
GLUCOSE SERPL-MCNC: 96 MG/DL (ref 65–140)
GRAM STN SPEC: ABNORMAL
GRAM STN SPEC: ABNORMAL
HCT VFR BLD AUTO: 25.1 % (ref 36.5–49.3)
HCT VFR BLD AUTO: 25.8 % (ref 36.5–49.3)
HGB BLD-MCNC: 8.2 G/DL (ref 12–17)
HGB BLD-MCNC: 8.6 G/DL (ref 12–17)
LYMPHOCYTES # BLD AUTO: 0.37 THOUSAND/UL (ref 0.6–4.47)
LYMPHOCYTES # BLD AUTO: 3 % (ref 14–44)
MAGNESIUM SERPL-MCNC: 2.2 MG/DL (ref 1.6–2.6)
MCH RBC QN AUTO: 30.8 PG (ref 26.8–34.3)
MCHC RBC AUTO-ENTMCNC: 32.7 G/DL (ref 31.4–37.4)
MCV RBC AUTO: 94 FL (ref 82–98)
METAMYELOCYTES NFR BLD MANUAL: 1 % (ref 0–1)
MONOCYTES # BLD AUTO: 0.12 THOUSAND/UL (ref 0–1.22)
MONOCYTES NFR BLD: 1 % (ref 4–12)
NEUTROPHILS # BLD MANUAL: 11.77 THOUSAND/UL (ref 1.85–7.62)
NEUTS BAND NFR BLD MANUAL: 9 % (ref 0–8)
NEUTS SEG NFR BLD AUTO: 86 % (ref 43–75)
NRBC BLD AUTO-RTO: 1 /100 WBC (ref 0–2)
NRBC BLD AUTO-RTO: 1 /100 WBCS
P AXIS: 26 DEGREES
PHOSPHATE SERPL-MCNC: 2.3 MG/DL (ref 2.3–4.1)
PLATELET # BLD AUTO: 190 THOUSANDS/UL (ref 149–390)
PLATELET BLD QL SMEAR: ADEQUATE
PMV BLD AUTO: 10.7 FL (ref 8.9–12.7)
POLYCHROMASIA BLD QL SMEAR: PRESENT
POTASSIUM SERPL-SCNC: 3.4 MMOL/L (ref 3.5–5.3)
PR INTERVAL: 160 MS
QRS AXIS: 14 DEGREES
QRSD INTERVAL: 90 MS
QT INTERVAL: 298 MS
QTC INTERVAL: 428 MS
RBC # BLD AUTO: 2.66 MILLION/UL (ref 3.88–5.62)
RBC MORPH BLD: NORMAL
SODIUM SERPL-SCNC: 140 MMOL/L (ref 136–145)
T WAVE AXIS: 195 DEGREES
TOTAL CELLS COUNTED SPEC: 100
TOXIC GRANULES BLD QL SMEAR: PRESENT
VENTRICULAR RATE: 124 BPM
WBC # BLD AUTO: 12.39 THOUSAND/UL (ref 4.31–10.16)

## 2018-11-01 PROCEDURE — 85014 HEMATOCRIT: CPT | Performed by: STUDENT IN AN ORGANIZED HEALTH CARE EDUCATION/TRAINING PROGRAM

## 2018-11-01 PROCEDURE — 83735 ASSAY OF MAGNESIUM: CPT | Performed by: NURSE PRACTITIONER

## 2018-11-01 PROCEDURE — C9113 INJ PANTOPRAZOLE SODIUM, VIA: HCPCS | Performed by: STUDENT IN AN ORGANIZED HEALTH CARE EDUCATION/TRAINING PROGRAM

## 2018-11-01 PROCEDURE — 97167 OT EVAL HIGH COMPLEX 60 MIN: CPT

## 2018-11-01 PROCEDURE — 99232 SBSQ HOSP IP/OBS MODERATE 35: CPT | Performed by: INTERNAL MEDICINE

## 2018-11-01 PROCEDURE — 82948 REAGENT STRIP/BLOOD GLUCOSE: CPT

## 2018-11-01 PROCEDURE — 99232 SBSQ HOSP IP/OBS MODERATE 35: CPT | Performed by: SURGERY

## 2018-11-01 PROCEDURE — 85007 BL SMEAR W/DIFF WBC COUNT: CPT | Performed by: NURSE PRACTITIONER

## 2018-11-01 PROCEDURE — G8988 SELF CARE GOAL STATUS: HCPCS

## 2018-11-01 PROCEDURE — G8987 SELF CARE CURRENT STATUS: HCPCS

## 2018-11-01 PROCEDURE — 99233 SBSQ HOSP IP/OBS HIGH 50: CPT | Performed by: INTERNAL MEDICINE

## 2018-11-01 PROCEDURE — C9113 INJ PANTOPRAZOLE SODIUM, VIA: HCPCS | Performed by: PHYSICIAN ASSISTANT

## 2018-11-01 PROCEDURE — 80048 BASIC METABOLIC PNL TOTAL CA: CPT | Performed by: NURSE PRACTITIONER

## 2018-11-01 PROCEDURE — 71045 X-RAY EXAM CHEST 1 VIEW: CPT

## 2018-11-01 PROCEDURE — 93010 ELECTROCARDIOGRAM REPORT: CPT | Performed by: INTERNAL MEDICINE

## 2018-11-01 PROCEDURE — 97530 THERAPEUTIC ACTIVITIES: CPT

## 2018-11-01 PROCEDURE — 85018 HEMOGLOBIN: CPT | Performed by: STUDENT IN AN ORGANIZED HEALTH CARE EDUCATION/TRAINING PROGRAM

## 2018-11-01 PROCEDURE — 84100 ASSAY OF PHOSPHORUS: CPT | Performed by: NURSE PRACTITIONER

## 2018-11-01 PROCEDURE — 85027 COMPLETE CBC AUTOMATED: CPT | Performed by: NURSE PRACTITIONER

## 2018-11-01 PROCEDURE — 97535 SELF CARE MNGMENT TRAINING: CPT

## 2018-11-01 RX ORDER — POTASSIUM CHLORIDE 29.8 MG/ML
40 INJECTION INTRAVENOUS ONCE
Status: COMPLETED | OUTPATIENT
Start: 2018-11-01 | End: 2018-11-01

## 2018-11-01 RX ORDER — INSULIN GLARGINE 100 [IU]/ML
15 INJECTION, SOLUTION SUBCUTANEOUS
Status: DISCONTINUED | OUTPATIENT
Start: 2018-11-01 | End: 2018-11-04

## 2018-11-01 RX ORDER — FLUCONAZOLE 100 MG/1
100 TABLET ORAL EVERY 24 HOURS
Status: DISCONTINUED | OUTPATIENT
Start: 2018-11-01 | End: 2018-11-04

## 2018-11-01 RX ORDER — ALBUMIN (HUMAN) 12.5 G/50ML
25 SOLUTION INTRAVENOUS ONCE
Status: COMPLETED | OUTPATIENT
Start: 2018-11-01 | End: 2018-11-01

## 2018-11-01 RX ORDER — QUETIAPINE FUMARATE 25 MG/1
50 TABLET, FILM COATED ORAL
Status: DISCONTINUED | OUTPATIENT
Start: 2018-11-01 | End: 2018-11-05

## 2018-11-01 RX ORDER — ALBUMIN (HUMAN) 12.5 G/50ML
12.5 SOLUTION INTRAVENOUS ONCE
Status: COMPLETED | OUTPATIENT
Start: 2018-11-01 | End: 2018-11-01

## 2018-11-01 RX ADMIN — QUETIAPINE 50 MG: 25 TABLET ORAL at 22:59

## 2018-11-01 RX ADMIN — SODIUM CHLORIDE 8 MG/HR: 9 INJECTION, SOLUTION INTRAVENOUS at 01:25

## 2018-11-01 RX ADMIN — ALBUMIN HUMAN 25 G: 0.25 SOLUTION INTRAVENOUS at 12:41

## 2018-11-01 RX ADMIN — POTASSIUM CHLORIDE 40 MEQ: 400 INJECTION, SOLUTION INTRAVENOUS at 07:25

## 2018-11-01 RX ADMIN — INSULIN GLARGINE 15 UNITS: 100 INJECTION, SOLUTION SUBCUTANEOUS at 23:00

## 2018-11-01 RX ADMIN — CEFAZOLIN SODIUM 2000 MG: 2 SOLUTION INTRAVENOUS at 22:59

## 2018-11-01 RX ADMIN — ATORVASTATIN CALCIUM 10 MG: 10 TABLET, FILM COATED ORAL at 17:10

## 2018-11-01 RX ADMIN — HYDROCORTISONE SODIUM SUCCINATE 25 MG: 100 INJECTION, POWDER, FOR SOLUTION INTRAMUSCULAR; INTRAVENOUS at 08:22

## 2018-11-01 RX ADMIN — FLUCONAZOLE 100 MG: 100 TABLET ORAL at 17:10

## 2018-11-01 RX ADMIN — CEFAZOLIN SODIUM 2000 MG: 2 SOLUTION INTRAVENOUS at 14:39

## 2018-11-01 RX ADMIN — ALBUMIN HUMAN 12.5 G: 0.25 SOLUTION INTRAVENOUS at 04:09

## 2018-11-01 RX ADMIN — SODIUM CHLORIDE 8 MG/HR: 9 INJECTION, SOLUTION INTRAVENOUS at 15:14

## 2018-11-01 RX ADMIN — CEFAZOLIN SODIUM 2000 MG: 2 SOLUTION INTRAVENOUS at 05:29

## 2018-11-01 RX ADMIN — COLLAGENASE SANTYL: 250 OINTMENT TOPICAL at 08:21

## 2018-11-01 NOTE — PROGRESS NOTES
Progress Note - Infectious Disease   Elliott Briscoe [de-identified] y o  male MRN: [id number]  Unit/Bed#: ICU 08 Encounter: [id number]      Impression/Plan:  1  Septic shock-POA   Leukocytosis and tachycardia and hypotension   Appears to be all secondary to MSSA bacteremia in the setting of chest wall abscess formation   No other clear sources appreciated   Patient is clinically improved overall   The patient's heart rate has come down, and the white cell count has come down   He seems to be tolerating the antibiotics without difficulty   Patient now off vasopressors   -antibiotic plan as below  -recheck CBC with diff and creatinine tomorrow  -supportive care in the intensive care unit     2  MSSA bacteremia-appears to be secondary to chest wall abscesses   No other clear source appreciated  Consideration for the possibility of endocarditis but this is less likely with only 1 of 2 blood cultures positive and the transthoracic echocardiogram without valvular vegetations  Repeat blood cultures are negative thus far   -continue high-dose IV cefazolin  -follow up repeat blood cultures to ensure clearance of bacteremia  -no additional ID workup for now  -with severity and complexity of infection, plan 4 weeks of intravenous antibiotics     3  MSSA chest wall abscesses-status post extensive I and D x2 with a VAC dressing in place  [facility] seems to have improved clinically   His pain seems reasonably well controlled   This all possibly as a complication of zoster   -antibiotics as above  -vac dressing and local care  -close surgical follow-up     4  Herpes zoster-hard to make a definitive diagnosis at this point as the rash is relatively advanced in its presentation  Patient now status post 7 day course of Valtrex      5  Acute kidney injury-likely a pre renal issue   Had initially improved but now the renal function is worse today  Possibly medication effect   Renal function has improved and remains stable    -recheck BMP tomorrow  -volume management  -no additional ID workup for now       6  Esophageal candidiasis-started on fluconazole   -change to oral fluconazole today  Plan for 14 day course      Antibiotics:  Cefazolin 4  Antibiotics 8  Postop day 6/3  Fluconazole 3    Discussed above with patient, and with critical care team        Subjective:  Patient tolerating oral intake  No fevers, chills  Off pressors  Objective:  Vitals:  Temp:  [97 3 °F (36 3 °C)-97 8 °F (36 6 °C)] 97 4 °F (36 3 °C)  HR:  [] 119  Resp:  [13-34] 34  BP: ()/(45-65) 100/65  SpO2:  [92 %-98 %] 96 %  Temp (24hrs), Av 5 °F (36 4 °C), Min:97 3 °F (36 3 °C), Max:97 8 °F (36 6 °C)  Current: Temperature: (!) 97 4 °F (36 3 °C)    Physical Exam:   General:  No acute distress, chronically ill-appearing  Eyes:  Conjunctive clear with no hemorrhages or effusions  Oropharynx:  No ulcers, no lesions  Neck:  Supple, no lymphadenopathy  Lungs:  Clear to auscultation bilaterally, no accessory muscle use  Cardiac:  Regular rate and rhythm, no murmurs  Chest wall wound VAC in place  Abdomen:  Soft, non-tender, non-distented  Extremities:  No peripheral cyanosis, clubbing, or edema  Skin:  No rashes, no ulcers  Neurological:  Moves all four extremities spontaneously, sensation grossly intact    Lab Results:  I have personally reviewed pertinent labs      Results from last 7 days  Lab Units 18  0510 10/31/18  3368 10/31/18  0023  10/29/18  5954  10/27/18  0428  10/26/18  0817 10/26/18  0732   SODIUM mmol/L 140 142 140  < > 139  < > 135*  < >  --   --    POTASSIUM mmol/L 3 4* 4 0 4 1  < > 3 4*  < > 4 3  < >  --   --    CHLORIDE mmol/L 109* 108 108  < > 105  < > 103  < >  --   --    CO2 mmol/L 25 22 22  < > 25  < > 25  < >  --   --    BUN mg/dL 31* 43* 46*  < > 40*  < > 42*  < >  --   --    CREATININE mg/dL 0 98 1 19 1 27  < > 1 32*  < > 1 11  < >  --   --    EGFR ml/min/1 73sq m 73 57 53  < > 51  < > 62  < >  --   --    GLUCOSE, ISTAT mg/dl  --   -- --   --   --   --   --   --  115 120   CALCIUM mg/dL 7 2* 7 5* 7 3*  < > 7 7*  < > 7 7*  < >  --   --    AST U/L  --  22  --   --  22  --  17  < >  --   --    ALT U/L  --  14  --   --  20  --  12  < >  --   --    ALK PHOS U/L  --  63  --   --  71  --  65  < >  --   --    < > = values in this interval not displayed  Results from last 7 days  Lab Units 11/01/18  0510 10/31/18  0513 10/31/18  0023  10/30/18  0544   WBC Thousand/uL 12 39* 16 91*  --   --  12 67*   HEMOGLOBIN g/dL 8 2* 9 7* 9 7*  < > 7 3*   PLATELETS Thousands/uL 190 206  --   --  242   < > = values in this interval not displayed  Results from last 7 days  Lab Units 10/29/18  1442 10/29/18  1435 10/29/18  1108 10/26/18  0749 10/26/18  0742 10/25/18  2007 10/25/18  1730 10/25/18  1534 10/25/18  1530   BLOOD CULTURE  No Growth at 48 hrs  No Growth at 48 hrs   --   --   --   --   --  Staphylococcus aureus* No Growth After 5 Days  GRAM STAIN RESULT   --   --  No Polys  3+ Gram positive cocci in clusters No Polys  2+ Gram positive cocci in clusters 2+ Polys  3+ Gram positive cocci in clusters  --  1+ Polys  2+ Gram positive cocci in pairs  2+ Gram positive cocci in clusters Gram positive cocci in clusters  --    URINE CULTURE   --   --   --   --   --   --  >100,000 cfu/ml   --   --    WOUND CULTURE   --   --   --   --  4+ Growth of Staphylococcus aureus*  --  4+ Growth of Staphylococcus aureus*  --   --    MRSA CULTURE ONLY   --   --   --   --   --  No Methicillin Resistant Staphlyococcus aureus (MRSA) isolated  --   --   --        Imaging Studies:   I have personally reviewed pertinent imaging study reports and images in PACS  Chest x-ray shows bibasilar densities which are stable  EKG, Pathology, and Other Studies:   I have personally reviewed pertinent reports

## 2018-11-01 NOTE — PHYSICAL THERAPY NOTE
PT TREATMENT     11/01/18 1201   Pain Assessment   Pain Assessment No/denies pain   Restrictions/Precautions   Other Precautions Fall Risk;O2;Telemetry;Multiple lines; Chair Alarm; Bed Alarm;Cognitive  (wound vac, luis)   General   Chart Reviewed Yes   Cognition   Overall Cognitive Status WFL   Subjective   Subjective little meaningful communication, pt swearing   Transfers   Sit to Stand 4  Minimal assistance   Additional items Assist x 2;Verbal cues; Increased time required   Stand to Sit 4  Minimal assistance   Additional items Assist x 2;Verbal cues; Increased time required   Additional items (Assist to reposition in chair with pillows, pt leans L)   Additional Comments mod assist x 2 to scoot back in chair   Ambulation/Elevation   Gait pattern Wide KAREEM   Gait Assistance 4  Minimal assist   Additional items Assist x 2  (chair follow)   Assistive Device Rolling walker   Distance 10 feet   Balance   Static Sitting Fair   Dynamic Sitting Poor   Static Standing Fair   Dynamic Standing Poor   Assessment   Prognosis Good   Problem List Decreased strength;Decreased endurance; Impaired balance;Decreased mobility; Decreased safety awareness; Impaired judgement;Decreased cognition   Assessment Skilled assist x 2 required for transfers and ambulation due to multiple lines/wires in icu, also for bilateral tactile cues for hand placement with sit to stand transfers  Pt's gait is very slow but generally steady  Sp02 97% on RA with activity  Goals   Treatment Day 1   Plan   Treatment/Interventions ADL retraining;Functional transfer training;LE strengthening/ROM; Elevations; Therapeutic exercise; Endurance training;Cognitive reorientation;Patient/family training;Equipment eval/education; Bed mobility;Gait training;Spoke to nursing;Spoke to advanced practitioner   Progress Progressing toward goals   PT Frequency 5x/wk   Recommendation   Recommendation [facility])   Licensure   [place] License Number  Ap García PT  [id number]

## 2018-11-01 NOTE — PLAN OF CARE
Problem: PHYSICAL THERAPY ADULT  Goal: Performs mobility at highest level of function for planned discharge setting  See evaluation for individualized goals  Outcome: Progressing  Prognosis: Good  Problem List: Decreased strength, Decreased endurance, Impaired balance, Decreased mobility, Decreased safety awareness, Impaired judgement, Decreased cognition  Assessment: Skilled assist x 2 required for transfers and ambulation due to multiple lines/wires in icu, also for bilateral tactile cues for hand placement with sit to stand transfers  Pt's gait is very slow but generally steady  Sp02 97% on RA with activity  Recommendation:  (STR)          See flowsheet documentation for full assessment

## 2018-11-01 NOTE — PROGRESS NOTES
Progress Note - Javi Dumont [de-identified] y o  male MRN: [id number]    Unit/Bed#: ICU 08 Encounter: [id number]        Subjective:   Patient appears confused, unable to give any significant history  Nursing reports the patient has not had any bowel movements overnight or this morning, has been on clear liquid diet  Objective:     Vitals: Blood pressure 100/65, pulse (!) 119, temperature (!) 97 4 °F (36 3 °C), temperature source Temporal, resp  rate (!) 34, height 5' 8 5" (1 74 m), weight 76 2 kg (167 lb 15 9 oz), SpO2 96 %  ,Body mass index is 25 17 kg/m²  Intake/Output Summary (Last 24 hours) at 11/01/18 1248  Last data filed at 11/01/18 0800   Gross per 24 hour   Intake             1210 ml   Output             1125 ml   Net               85 ml       Physical Exam:   General appearance: alert, confused, appears stated age   Lungs: clear to auscultation bilaterally, no labored breathing/accessory muscle use  Heart: regular rate and rhythm, S1, S2 normal, no murmur, click, rub or gallop  Abdomen: soft, non-tender; bowel sounds normal; no masses,  no organomegaly  Extremities: no edema    Invasive Devices     Peripherally Inserted Central Catheter Line            PICC Line 74/16/64 Right Basilic less than 1 day          Drain            Urethral Catheter Latex 16 Fr  6 days                Lab, Imaging and other studies: I have personally reviewed pertinent reports  Admission on 10/25/2018   No results displayed because visit has over 200 results  Results for Milton Leslie (MRN [id number]) as of 11/1/2018 12:49   Ref   Range 10/30/2018 11:20 10/30/2018 11:43 10/30/2018 16:12 10/30/2018 16:37 10/30/2018 17:29 10/30/2018 21:29 10/31/2018 00:23 10/31/2018 05:13 10/31/2018 06:15 10/31/2018 06:15 10/31/2018 06:15 10/31/2018 07:19 10/31/2018 11:09 10/31/2018 16:08 10/31/2018 21:10 11/1/2018 05:10 11/1/2018 10:57   POC Glucose Latest Ref Range: 65 - 140 mg/dl  240 (H)  172 (H)  126      158 (H) 159 (H) 177 (H) 126 221 (H)   eGFR Latest Units: ml/min/1 73sq m       53 57        73    Sodium Latest Ref Range: 136 - 145 mmol/L       140 142        140    Potassium Latest Ref Range: 3 5 - 5 3 mmol/L       4 1 4 0        3 4 (L)    Chloride Latest Ref Range: 100 - 108 mmol/L       108 108        109 (H)    CO2 Latest Ref Range: 21 - 32 mmol/L       22 22        25    Anion Gap Latest Ref Range: 4 - 13 mmol/L       10 12        6    BUN Latest Ref Range: 5 - 25 mg/dL       46 (H) 43 (H)        31 (H)    Creatinine Latest Ref Range: 0 60 - 1 30 mg/dL       1 27 1 19        0 98    Glucose Latest Ref Range: 65 - 140 mg/dL       113 130        96    Calcium Latest Ref Range: 8 3 - 10 1 mg/dL       7 3 (L) 7 5 (L)        7 2 (L)    AST Latest Ref Range: 5 - 45 U/L        22            ALT Latest Ref Range: 12 - 78 U/L        14            Alkaline Phosphatase Latest Ref Range: 46 - 116 U/L        63            Total Protein Latest Ref Range: 6 4 - 8 2 g/dL        4 6 (L)            Albumin Latest Ref Range: 3 5 - 5 0 g/dL        1 7 (L)            TOTAL BILIRUBIN Latest Ref Range: 0 20 - 1 00 mg/dL        0 50            Phosphorus Latest Ref Range: 2 3 - 4 1 mg/dL        2 5        2 3    Magnesium Latest Ref Range: 1 6 - 2 6 mg/dL       2 3 2 3        2 2    WBC Latest Ref Range: 4 31 - 10 16 Thousand/uL        16 91 (H)        12 39 (H)    Red Blood Cell Count Latest Ref Range: 3 88 - 5 62 Million/uL        3 26 (L)        2 66 (L)    Hemoglobin Latest Ref Range: 12 0 - 17 0 g/dL   7 9 (L)    9 7 (L) 9 7 (L)        8 2 (L)    HCT Latest Ref Range: 36 5 - 49 3 %   23 5 (L)    29 3 (L) 29 8 (L)        25 1 (L)    MCV Latest Ref Range: 82 - 98 fL        91        94    MCH Latest Ref Range: 26 8 - 34 3 pg        29 8        30 8    MCHC Latest Ref Range: 31 4 - 37 4 g/dL        32 6        32 7    RDW Latest Ref Range: 11 6 - 15 1 %        19 1 (H)        19 7 (H)    Platelet Count Latest Ref Range: 149 - 390 Thousands/uL        206 190    MPV Latest Ref Range: 8 9 - 12 7 fL        11 0        10 7    Platelet Estimate Latest Ref Range: Adequate         Adequate        Adequate    nRBC Latest Units: /100 WBCs        1        1    Segs Relative Latest Ref Range: 43 - 75 %        85 (H)        86 (H)    External Abs Neutrophils Latest Ref Range: 1 85 - 7 62 Thousand/uL        16 40 (H)        11 77 (H)    Bands Relative Latest Ref Range: 0 - 8 %        12 (H)        9 (H)    Lymphocytes % Latest Ref Range: 14 - 44 %        3 (L)        3 (L)    Eosinophils Latest Ref Range: 0 - 6 %        0        0    Basophils Relative Latest Ref Range: 0 - 1 %        0        0    Metamyelocytes% Latest Ref Range: 0 - 1 %                1    Absolute Eosinophils Latest Ref Range: 0 00 - 0 40 Thousand/uL        0 00        0 00    Basophils Absolute Latest Ref Range: 0 00 - 0 10 Thousand/uL        0 00        0 00    Total Counted Unknown        100        100    NRBC's Latest Ref Range: 0 - 2 /100 WBC        2        1    RBC Morphology Unknown        Present        Normal    Polychromasia Unknown        Present        Present    Toxic Granulation Unknown        Present        Present    Anisocytosis Unknown        Present        Present    Macrocytes Unknown        Present            Crossmatch Unknown         Compatible Compatible Compatible         Unit ABO Unknown         O [place] citizen         Unit RH Unknown         POS POS POS         Unit Number Unknown         [id number]         Unit Dispense Status Unknown         Presumed Trans Presumed Trans Presumed Trans         FECAL OCCULT BLOOD DIAGNOSTIC Latest Ref Range: Negative  Positive (A)                   Fecal Occult Blood Diagnostic 2 Latest Ref Range: Negative  Pend                   Fecal Occult Blood Diagnostic 3 Latest Ref Range: Negative  Pend                   TISSUE EXAM Unknown     Rpt ((NONE))               LYMPHOCYTES ABSOLUTE Latest Ref Range: 0 60 - 4 47 Thousand/uL        0 51 (L)        0 37 (L)    Monocytes Absolute Latest Ref Range: 0 00 - 1 22 Thousand/uL        0 00        0 12    Monocytes Latest Ref Range: 4 - 12 %        0 (L)        1 (L)        Assessment/Plan:    1  Acute blood-loss anemia appearing to be secondary to upper GI bleed from duodenal ulcer; EGD 2 days ago showed large duodenal ulcer with dark spot which was cauterized and clipped  No clinical evidence of active GI bleeding at this time    - advance to full liquid diet  - monitor hemoglobin closely, transfuse if needed  - avoid NSAIDs  - continue Protonix drip to 72 hr total      2   Esophageal candidiasis, visualized on EGD 2 days ago    - follow-up on biopsy results  - continue fluconazole as per ID

## 2018-11-01 NOTE — PROGRESS NOTES
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  · TTE 10/26 shows EF 20%, G1DD, moderate MR  Dilated cardiomyopathy  Notably worse compared to prior echo in may which showed EF 40%  · Home antihyperetnsive medications held due to hypotension, secondary to septic shock/infection  Can resume metoprolol if BP improves  Still running low with recent BP 99/62  · Continue treatment for sepsis and resume home heart failure medications as able  2  Sinus Tachycardia with PVCs  · Likely secondary to acute infection and levophed administration  · Levophed has been discontinued, but patient remains tachycardic  However, this may be expected as patient is still acutely infected and septic  · Also likely some component of agitation is at play, as the patient is delirious, angry and belligerent on exam    · Continue telemetry monitoring  3  CAD s/p CABG  · ASA held due to GI bleed  · Continue statin  4  Dyslipidemia  · Stable  Continue statin  Subjective/Objective   Chief Complaint: Sepsis  Heart failure, tachycardia  Subjective:  Patient was seen examined at bedside  Exam is limited as the patient is angry and belligerent  Denies all complaints  And repeatedly threatens any stuff to come by, however is not acting on any of his threats  Though he seems lucid, he is likely experiencing delirium       Objective:     Vitals: /65   Pulse (!) 119   Temp (!) 97 3 °F (36 3 °C) (Temporal)   Resp (!) 34   Ht 5' 8 5" (1 74 m)   Wt 76 2 kg (167 lb 15 9 oz)   SpO2 96%   BMI 25 17 kg/m²   Vitals:    10/31/18 0600 11/01/18 0600   Weight: 80 2 kg (176 lb 12 9 oz) 76 2 kg (167 lb 15 9 oz)     Orthostatic Blood Pressures      Most Recent Value   Blood Pressure  100/65 filed at 11/01/2018 0900   Patient Position - Orthostatic VS Lying filed at 11/01/2018 0700            Intake/Output Summary (Last 24 hours) at 11/01/18 0917  Last data filed at 11/01/18 0800   Gross per 24 hour   Intake          1474 26 ml   Output             1375 ml   Net            99 26 ml       Invasive Devices     Peripherally Inserted Central Catheter Line            PICC Line 83/42/08 Right Basilic less than 1 day          Drain            Urethral Catheter Latex 16 Fr  6 days                Review of Systems: Unable to perform - patient is belligerent / delirious  Physical Exam: Limited due to patient's combativeness   BP (!) 89/53   Pulse 100   Temp (!) 97 4 °F (36 3 °C) (Temporal)   Resp (!) 25   Ht 5' 8 5" (1 74 m)   Wt 76 2 kg (167 lb 15 9 oz)   SpO2 98%   BMI 25 17 kg/m²   General appearance: alert, uncooperative and delirious  Lungs: clear to auscultation bilaterally  Heart: Tachycardia  (+) Murmur  Abdomen: Soft, nontender abdomen    Lab Results: I have personally reviewed pertinent lab results  Imaging: I have personally reviewed pertinent reports  Telemetry: Sinus tachycardia 110's  VTE Pharmacologic Prophylaxis: Reason for no pharmacologic prophylaxis GI bleed  VTE Mechanical Prophylaxis: sequential compression device    Counseling / Coordination of Care  Total time spent today 15 minutes  Greater than 50% of total time was spent with the patient and / or family counseling and / or coordination of care  A description of the counseling / coordination of care: Rigo Forbes DO  11/01/18  3:21 PM    Some portions of this record may have been generated with voice recognition software  There may be translation, syntax, or grammatical errors  Occasional wrong word or "sound-a-like" substitutions may have occurred due to the inherent limitations of the voice recognition software  Read the chart carefully and recognize, using context, where substations may have occurred   If you have any questions, please contact the dictating provider for clarification or correction, as needed

## 2018-11-01 NOTE — PROGRESS NOTES
Daily Progress Note - Critical Care/ Stepdown   Rasheeda Roman [de-identified] y o  male MRN: [id number]  Unit/Bed#: ICU 08 Encounter: [id number]    ______________________________________________________________________  Assessment:   Principal Problem:    Septic shock ([address] )  Active Problems:    Bacteremia due to Staphylococcus aureus    Abscess of chest wall    Respiratory infection    DM2 (diabetes mellitus, type 2) ([facility])    CAD (coronary artery disease)    CKD (chronic kidney disease) stage 3, GFR 30-59 ml/min ([facility])    Cardiomyopathy, ischemic    Atrial fibrillation ([facility])    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[facility])    Iron deficiency anemia due to chronic blood loss    Duodenal ulcer    Esophagitis    Delirium  Resolved Problems:    Acute respiratory failure secondary to septic shock    Urinary tract infection    Hyponatremia    CHRIS (acute kidney injury) ([facility])    Shingles    Lactic acid acidosis    DKA (diabetic ketoacidoses) ([facility])    Lethargy    Sepsis ([facility])    Cellulitis of left axilla    Acute cystitis with hematuria      * Septic shock ([facility])   Assessment & Plan    · Source likely presumed cellulitis/absess of chest wall   · Fluid resuscitation with 30ml/kg   · Levophed and vasopressor d/zaida,   · Poor EF 20% on Echo  · [place] protocol - Stress Dose Steroids, Vit C, thiamine, currently weaning steroids  · ID consult  · Performed aggressive debridement in OR 10/26 of chest wall cellulitis, wound vac in place   Scant serosanguineous output  · Return to OR for debridement 10/29, wound vac in place  · Blood culture staph aureus x 1  · Wound culture: 3+ staph aureus   · Urine cultures > 100,000 mixed containments   · ABX: Ancef day #8    Will continue  · Valtrex d/zaida  · WBCs increased but trending down, 12 39 today  · Repeat blood cultures no growth at 48hours       Bacteremia due to Staphylococcus aureus   Assessment & Plan    Blood culture x 1 MSSA  C/w ancef day 8 of total antibiotics  Blood culture x 2 - no growth at 48 hours       Acute respiratory failure secondary to septic shockresolved as of 10/28/2018   Assessment & Plan    · Resolved  ·  Patient extubated on 10/26  · Continues on nasal cannula  Wean as tolerated  Abscess of chest wall   Assessment & Plan    · Aggressive  debridement in OR on 10/26 by Dr Kaykay Gregory, wound VAC in place  · Debridement again in OR 10/29  , wound vac change  · modified Eckhauser-VAC in place on 10/29, needs to be changed in 5 days  · Antibiotics deescalated to Ancef as per ID recs, day 8 of total antibiotics       Urinary tract infectionresolved as of 10/29/2018   Assessment & Plan    · Urinalysis concerning for possible urinary tract infection  Urine culture with > 100,000 CFU of mixed contaminants  · Continue cefepime     Respiratory infection   Assessment & Plan    · Present on admission as evidence by CT scan suggestive of infectious process  · No sputum culture to confirm pneumonia  · Respiratory status improved  Extubated on the 26th  Now 2 L nasal cannula     · Continue to treat with empiric antibiotics  · Repeat cxr pending today     Delirium   Assessment & Plan    · Likely secondary to ICU delirium and sleep deprivation  · Gave Seroquel 50mg last night, patient did sleep overnight, will continue nightly  · Consider Psych consult for any further intervention     Esophagitis   Assessment & Plan    · EGD on 10/31: duodenal bulb ulcer with possible candida esophagitis  · protonix drip with fluconazole   · Biopsies pending      Duodenal ulcer   Assessment & Plan    · Melena developed on 10/30, EGD: duodenal bulb ulcer with possible candida esophagitis  · Transfused 3 units PRBCs  · protonix drip started 10/30 for 72 hours with fluconazole   · Hemodynamically stable overnight  · Tolerated clear        Iron deficiency anemia due to chronic blood loss   Assessment & Plan    · Secondary to sepsis and acuity of condition  · Hemoglobin 7 1 on 10/31, transfused 3 units PRBCs, repeat Hgb 9 7  · EGD on 10/31: duodenal bulb ulcer with possible candida esophagitis  · protonix drip with fluconazole   · Hgb today 8 2, hemodynamically stable, continue to monitor       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HCC)   Assessment & Plan    · Known EF of 20%, no evidence of fluid overload at this time  · Continue to monitor strict I/Os  · Consider diuresis for evidence of respiratory distress     Atrial fibrillation (HCC)   Assessment & Plan    · History of AFib in the setting of electrolyte abnormalities in the past   Monitor on telemetry  · Current Sinus Tachy  · No indication for anticoagulation at this time  · Frequent PVCs on telemetry, cardiology consult, resume beta blocker when blood pressure can tolerate     Cardiomyopathy, ischemic   Assessment & Plan    · EF 35 to 40%  Recent stress test in May of 2018 showed reversible ischemic changes  Cardiology following as an outpatient with medical management    · ECHO repeat with EF 20%  · Cardiac output and indexes while patient was intubated were acceptable  · Vasopressor d/zaida  · Frequent PVCs on telemetry, Cardiology consulted, recommended restarting metoprolol as soon as blood pressure will tolerate      CKD (chronic kidney disease) stage 3, GFR 30-59 ml/min ([facility])   Assessment & Plan    Stable, Baseline 0 9-1 3, Current creatinine 0 98  Adequate urine output at this time     CAD (coronary artery disease)   Assessment & Plan    Troponin negative  Echo obtained,  EF 20%     DM2 (diabetes mellitus, type 2) [facility])   Assessment & Plan    Lab Results   Component Value Date    HGBA1C 12 0 (H) 10/26/2018       Recent Labs      10/28/18   0806  10/28/18   1010  10/28/18   1209  10/28/18   1632   POCGLU  188*  197*  211*  222*     · DKA resolved  · Blood sugar stable, c/w BID lantus  · Goal blood sugar under 180           Acute cystitis with hematuriaresolved as of 10/29/2018   Assessment & Plan    · Continue antibiotic regimen with cefepime, clindamycin, urine culture with greater than 100,000 colony-forming units of mixed roberto     Lethargyresolved as of 10/27/2018   Assessment & Plan    - improving  Patient awake and alert  Continue environmental controls        DKA (diabetic ketoacidoses) (HCC)resolved as of 10/26/2018   Assessment & Plan    Lab Results   Component Value Date    HGBA1C 12 0 (H) 10/26/2018       Recent Labs      10/28/18   0000  10/28/18   0806  10/28/18   1010  10/28/18   1209   POCGLU  206*  188*  197*  211*       Blood Sugar Average: Last 72 hrs:  (P) 163 16049     · DKA resolved  · Blood sugar remains mildly elevated  Continue b i d  Lantus  Increase sliding scale algorithm  · Goal blood sugar under 180     Lactic acid acidosisresolved as of 10/26/2018   Assessment & Plan    · Resolved  · Secondary to septic shock and DKA, trend  · Cleared  · CT CAP showing:  Left pectoral cellulitis with gas-forming pockets and right lower lobe pneumonia  · Continue to trend  · Dose with Thiamine  · Surgery consulted, went to OR for I%D  · Continue with vancomycin, clindamycin, cefepime, acyclovir     Shinglesresolved as of 11/1/2018   Assessment & Plan    · Patient tolerating p o  Intake  Transition to valacyclovir     CHRIS (acute kidney injury) (HCC)resolved as of 10/27/2018   Assessment & Plan    · Resolved  · Likely Pre renal   Likely secondary to septic shock and DKA    · Improving with fluids  · FENa consistent with Pre-renal cause  · Continue fluid resuscitation and monitor urine output  · Gordon catheter in place     Hyponatremiaresolved as of 10/27/2018   Assessment & Plan    · Resolved  · Likely hypovolemic hyponatremia in the setting of dehydration, sepsis, and DKA  · Possibly a component of pseudo hyponatremia         Plan:    Neuro:   · Delirium: CAM ICU positive yes   · If yes, intervention: Seroquel at night  · Pain controlled with: tylenol  · Pain score: mild  · Regulate sleep/wake cycle    CV:   · Septic shock, improving, Levophed discontinued, blood pressure stable with albumin  · Systolic heart failure with ischemic cardiomyopathy, recent EF 20%, cards consulted, resumed metoprolol as soon as blood pressure can tolerate  · Acute blood loss anemia likely secondary to duodenal ulcer found on EGD  · Transfused 3 units PRBCs  · Hgb stable, 8 2 this AM  · Hemodynamically stable at this time  · Rhythm: Sinus Tachycardia, resume metoprolol when   · Follow rhythm on telemetry    Pulm:   · Repeat chest xray today for suspected respiratory illness that was present on admission  · Continue empiric antibiotics  · Stable on 2 L NC O2    GI:   · Duodenal ulcer present on EGD on 10/30, tranfused 3 units PRBCs  · Three dark stools overnight  · Nutrition/diet plan: clear liquid diet  · Pantoprazole drip with fluconazole for possible candida esophagitis, biopsies pending  · Bowel regimen: Sennakot    FEN:   · CKD stage 3, Cr stable at 0 98 today  · Fluid/Diuretic plan: No intervention  · Goal 24 hour fluid balance: net even, 1325ml output or urine  · Electrolytes repleted: yes  · Goal: K >4 0, Mag >2 0, and Phos >3 0    :   · Indwelling Gordon present: yes, will re    ID:   · Septic shock, MSSA bacteremia secondary to chest wall abcess  · Abx ordered:  Ancef  · Day # 8   · WBCs trending down, 12 39    Heme:   · Acute blood loss anemia secondary to duodenal ulcer evident on EGD on 10/30  · Transfused 3 units PRBCs on 10/30  · Hgb stable, 8 3 today  · Remained hemodynamically stable overnight, only requiring albumin  · Continue to monitor CBC    Endo:   · Uncontrolled Type 2 DM, A1c 12  · Glycemic control plan: Blood glucose controlled on current regimen, c/w lantus BID and ISS    Msk/Skin:  · Mobility goal: up and out of bed as tolerated  · PT consult: yes  · OT consult: yes  · Frequent turning and off-loading    Family:  · Family updated within 24 hours: yes   · Family meeting planned today: no     Lines:  · PICC in place    VTE Prophylaxis:  · Pharmacologic Prophylaxis: Reason for no pharmacologic prophylaxis GI bleed  · Mechanical Prophylaxis: sequential compression device    Disposition: Continue ICU care    Code Status: Level 2 - DNAR: but accepts endotracheal intubation    Counseling / Coordination of Care  Total Critical Care time spent 25 minutes excluding procedures, teaching and family updates  ______________________________________________________________________    HPI/24hr events: Levophed valtrex discontinued yesterday afternoon  PICC line inserted yesterday without complication  Episodes of hypotension around 4AM in 44O systolic, given albumin, now 87G systolic  Slept overnight with Seroquel      ______________________________________________________________________    Physical Exam:   Physical Exam   Constitutional: He is oriented to person, place, and time  He appears well-developed and well-nourished  No distress  HENT:   Head: Normocephalic and atraumatic  Neck: No JVD present  Cardiovascular: Regular rhythm and normal heart sounds  Exam reveals no gallop and no friction rub  No murmur heard  Tachycardic    Abdominal: Soft  Bowel sounds are normal  He exhibits no distension and no mass  There is no tenderness  There is no guarding  Gordon catheter in place   Musculoskeletal: He exhibits edema  Neurological: He is alert and oriented to person, place, and time  Skin: Skin is warm  Capillary refill takes less than 2 seconds  He is not diaphoretic     Psychiatric:   Abrasive inapropriate behavior        ______________________________________________________________________  Vitals:    11/01/18 0600 11/01/18 0700 11/01/18 0800 11/01/18 0900   BP: 95/62 101/60 99/62 100/65   BP Location:  Left arm     Pulse: 104 (!) 109 (!) 109 (!) 119   Resp: 19 20 21 (!) 34   Temp:  (!) 97 3 °F (36 3 °C)     TempSrc:  Temporal     SpO2: 97% 98% 98% 96%   Weight: 76 2 kg (167 lb 15 9 oz)      Height:         Arterial Line BP: 77/68  Arterial Line MAP (mmHg): 72 mmHg     Temperature:   Temp (24hrs), Av 6 °F (36 4 °C), Min:97 3 °F (36 3 °C), Max:97 8 °F (36 6 °C)    Current Temperature: (!) 97 3 °F (36 3 °C)    Weights:   IBW: 69 55 kg    Body mass index is 25 17 kg/m²  Weight (last 2 days)     Date/Time   Weight    18 06  76 2 (167 99)    10/31/18 06  80 2 (176 81)    10/30/18 06  70 3 (154 98)              Hemodynamic Monitoring:  Frequent vital signs       Non-Invasive/Invasive Ventilation Settings:  Respiratory    Lab Data (Last 4 hours)    None         O2/Vent Data (Last 4 hours)    None              No results found for: PHART, SBV5VIM, PO2ART, VHS2XFM, B7JWHDOR, BEART, SOURCE  SpO2: SpO2: 96 %    Intake and Outputs:  I/O       10/30 0701 - 10/31 0700 10/31 0701 -  07    P  O  120 900    I V  (mL/kg) 805 5 (10) 504 3 (6 6)    Blood 1050     IV Piggyback 550 250    Total Intake(mL/kg) 2525 5 (31 5) 1654 3 (21 7)    Urine (mL/kg/hr) 1195 (0 6) 1325 (0 7)    Drains 40 0    Total Output 1235 1325    Net +1290 5 +329 3          Unmeasured Stool Occurrence 10 x 5 x            Nutrition:        Diet Orders            Start     Ordered    10/31/18 0916  Diet Clear Liquid  Diet effective now     Question Answer Comment   Diet Type Clear Liquid    RD to adjust diet per protocol?  Yes        10/31/18 0915    10/29/18 1450  Room Service  Once     Question:  Type of Service  Answer:  Room Service - Appropriate with Assistance    10/29/18 1452            Labs:     Results from last 7 days  Lab Units 18  0510 10/31/18  0513 10/31/18  0023  10/30/18  0544 10/29/18  0548  10/26/18  1003   WBC Thousand/uL 12 39* 16 91*  --   --  12 67* 14 58*  < > 17 71*   HEMOGLOBIN g/dL 8 2* 9 7* 9 7*  < > 7 3* 8 5*  < > 9 3*   HEMATOCRIT % 25 1* 29 8* 29 3*  < > 22 1* 26 0*  < > 25 8*   PLATELETS Thousands/uL 190 206  --   --  242 229  < > 294   NEUTROS PCT %  --   --   --   --   --   --   --  89*   MONOS PCT %  --   --   --   --   -- --   --  4   MONO PCT MAN % 1* 0*  --   --   --  2*  < >  --    < > = values in this interval not displayed  Results from last 7 days  Lab Units 11/01/18  0510 10/31/18  7411 10/31/18  0023  10/29/18  4814  10/27/18  0428  10/26/18  0817 10/26/18  0732   SODIUM mmol/L 140 142 140  < > 139  < > 135*  < >  --   --    POTASSIUM mmol/L 3 4* 4 0 4 1  < > 3 4*  < > 4 3  < >  --   --    CHLORIDE mmol/L 109* 108 108  < > 105  < > 103  < >  --   --    CO2 mmol/L 25 22 22  < > 25  < > 25  < >  --   --    BUN mg/dL 31* 43* 46*  < > 40*  < > 42*  < >  --   --    CREATININE mg/dL 0 98 1 19 1 27  < > 1 32*  < > 1 11  < >  --   --    CALCIUM mg/dL 7 2* 7 5* 7 3*  < > 7 7*  < > 7 7*  < >  --   --    ALK PHOS U/L  --  63  --   --  71  --  65  < >  --   --    ALT U/L  --  14  --   --  20  --  12  < >  --   --    AST U/L  --  22  --   --  22  --  17  < >  --   --    GLUCOSE, ISTAT mg/dl  --   --   --   --   --   --   --   --  115 120   < > = values in this interval not displayed  Results from last 7 days  Lab Units 11/01/18  0510 10/31/18  0513 10/31/18  0023   MAGNESIUM mg/dL 2 2 2 3 2 3     Lab Results   Component Value Date    PHOS 2 3 11/01/2018    PHOS 2 5 10/31/2018    PHOS 2 1 (L) 10/30/2018        Results from last 7 days  Lab Units 10/29/18  0548 10/26/18  0526   INR  1 04 1 09   PTT seconds  --  27       0  Lab Value Date/Time   TROPONINI <0 02 10/25/2018 1534   TROPONINI <0 02 05/09/2018 1927       Results from last 7 days  Lab Units 10/26/18  1706 10/26/18  1004 10/26/18  0528   LACTIC ACID mmol/L 2 0 1 8 3 3*     ABG:  Lab Results   Component Value Date    PHART 7 539 (H) 10/30/2018    VDS6MUR 25 6 (LL) 10/30/2018    PO2ART 70 7 (L) 10/30/2018    YWF6VOM 21 3 (L) 10/30/2018    BEART -0 9 10/30/2018    SOURCE Radial, Left 10/30/2018       Imaging:   CXR:         Micro:  Lab Results   Component Value Date    BLOODCX No Growth at 48 hrs  10/29/2018    BLOODCX No Growth at 48 hrs   10/29/2018    BLOODCX Staphylococcus aureus (A) 10/25/2018    URINECX >100,000 cfu/ml  10/25/2018    URINECX >100,000 cfu/ml Klebsiella oxytoca (A) 05/09/2018    WOUNDCULT 4+ Growth of Staphylococcus aureus (A) 10/26/2018    WOUNDCULT 4+ Growth of Staphylococcus aureus (A) 10/25/2018       Allergies: No Known Allergies  Medications:   Scheduled Meds:    Current Facility-Administered Medications:  acetaminophen 650 mg Oral Q6H PRN MIGUEL Schmidt    atorvastatin 10 mg Oral Daily Jaz Bang MD    cefazolin 2,000 mg Intravenous Q8H Eric Sims PA-C Last Rate: Stopped (11/01/18 0559)   collagenase  Topical Daily Dorothea Bates MD    fluconazole 100 mg Intravenous Q24H Hugh Fernando MD Last Rate: Stopped (10/31/18 1750)   hydrocortisone sodium succinate 25 mg Intravenous Q12H Robby Richardson MD    insulin glargine 12 Units Subcutaneous HS Jaz Bang MD    insulin lispro 2-12 Units Subcutaneous HS Jaz Bang MD    insulin lispro 2-12 Units Subcutaneous TID AC Jaz Bang MD    pantoprozole (PROTONIX) infusion (Continuous) 8 mg/hr Intravenous Continuous Colleen Martinez PA-C Last Rate: 8 mg/hr (11/01/18 0125)   potassium chloride 40 mEq Intravenous Once Jaz Bang MD Last Rate: 40 mEq (11/01/18 0725)   QUEtiapine 50 mg Oral HS Jaz Bang MD    senna 1 tablet Oral HS Jaz Bang MD      Continuous Infusions:    pantoprozole (PROTONIX) infusion (Continuous) 8 mg/hr Last Rate: 8 mg/hr (11/01/18 0125)     PRN Meds:    acetaminophen 650 mg Q6H PRN       Invasive lines and devices:   Invasive Devices     Peripherally Inserted Central Catheter Line            PICC Line 90/49/80 Right Basilic less than 1 day          Drain            Urethral Catheter Latex 16 Fr  6 days                   SIGNATURE: Jaz Bang MD  DATE: November 1, 2018

## 2018-11-01 NOTE — PROGRESS NOTES
General Surgery Progress Note    Chief Complaint: Patient mumbling on exam    Subjective/24 hour/interim events:  Cesar Benson still is somewhat confused today, but has not really had any further bleeding issues and has been off his pressors  No documented fevers  Objective:  /65   Pulse (!) 119   Temp (!) 97 4 °F (36 3 °C) (Temporal)   Resp (!) 34   Ht 5' 8 5" (1 74 m)   Wt 76 2 kg (167 lb 15 9 oz)   SpO2 96%   BMI 25 17 kg/m²   General:  No acute distress, but still confused  Chest:  The Prevena/modified Eckhauser VAC is in place and is holding suction well  Back:  Personally examined along with CLAUDIO THOMSON RN  No obvious spreading of the erythema is noted  No fluctuance is noted  Wounds were cleaned and Santyl was reapplied  Ext:  The right thigh, which was laying on the central line on Monday during the operation, has not demonstrated any skin breakdown or any ulceration  Pertinent labs reviewed  White blood cell count has dropped back down from 16 K to 12 K today  Pertinent images and available reads personally reviewed    Pertinent notes reviewed    Assessment and Plan:  Cesar Benson is doing better in the sense that he is not on any pressors  His white blood cell count has gone down  Overall all his wounds look appropriate and therefore I will still plan take down the wound VAC from his chest and axilla this upcoming Saturday  Otherwise, his back looks well and Santyl can be applied daily  I will continue to follow  Please call with any interim queries  Counseling / Coordination of Care  Total floor/unit time spent today 20 minutes  Greater than 50% of total time was spent with the patient and/or family counseling and/or coordination of care  A description of the counseling / coordination of care:  I performed an interim history, pertinent images and labs, performed a physical examination to arrive at the plan delineated above with associated thought processes       Plan conveyed to nurse Ish Ahumada, in person  Plan discussed with MIGUEL Coronel of primary team     *The areas in bold, if present within the assessment and plan, are summary statements/bullet points for nursing and consultant/primary teams  * Points delineated in red, if present, are not points of contention and should not be taken by any practitioner, nurse, therapist, , or anyone else with access to the patient chart  Hand off errors are prevalent and therefore, any changes or updates shown in red are to communicate to the next practitioner, points not discussed in handout but can affect patient care and management (yet not important enough to wake, or disturb, or alert the next practitioner in an emergent fashion)  RENETTA Schreiber    [address] [place] [address] [facility]  Office [phone]  Fax [phone]

## 2018-11-01 NOTE — OCCUPATIONAL THERAPY NOTE
OT EVALUATION     11/01/18 4085   Note Type   Note type Eval/Treat   Restrictions/Precautions   Other Precautions Cognitive; Chair Alarm; Bed Alarm;Multiple lines;Telemetry; Fall Risk   Pain Assessment   Pain Assessment No/denies pain   Home Living   Type of Home Mobile home   Home Layout One level;Stairs to enter with rails  (2 KALEN with rail)   Bathroom Shower/Tub Tub/shower unit   Bathroom Toilet Standard   Bathroom Equipment Grab bars in shower   [address]   Prior Function   Level of [place] Independent with ADLs and functional mobility   Lives With Alone   Receives Help From Family   ADL Assistance Independent   IADLs Independent   Falls in the last 6 months 1 to 4   Vocational 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[employer]")   Comments Pt was ambulating independently without AD or with SPC as needed PTA  Works his own farm, drives tractors, all ADL/IADL without assist    Psychosocial   Psychosocial (WDL) X   Patient Behaviors/Mood Aggressive verbally, others;Calm;Delusions   Ability to Express Feelings Able to express   Ability to Express Needs Needs assistance   Ability to Express Thoughts Needs assistance   Ability to Understand Others Usually understands   Subjective   Subjective "Get the lead out of your a-- and make sure the burners are on   You can't let the fire go out or we'll die "   ADL   Where Assessed Supine, bed   Eating Assistance 4  Minimal Assistance   Eating Deficit Increased time to complete;Verbal cueing;Setup   UB Bathing Assistance 2  Maximal Assistance   LB Bathing Assistance 2  Maximal Assistance   UB Dressing Assistance 2  Maximal Assistance   LB Dressing Assistance 2  Maximal Assistance   Toileting Assistance  2  Maximal Assistance   Bed Mobility   Rolling R 3  Moderate assistance   Additional items Assist x 1;Bedrails;Verbal cues   Rolling L 3  Moderate assistance   Additional items Assist x 1;Bedrails;Verbal cues   Supine to Sit 2  Maximal assistance   Additional items Assist x 1   Transfers   Sit to Stand 4  Minimal assistance   Additional items Assist x 1; Increased time required;Verbal cues   Stand to Sit 4  Minimal assistance   Additional items Assist x 1; Increased time required;Verbal cues   Stand pivot 4  Minimal assistance   Additional items Assist x 2;Verbal cues   Balance   Static Sitting Fair -   Dynamic Sitting Poor   Static Standing Fair -   Dynamic Standing Poor +   Ambulatory Fair -  (with RW)   Activity Tolerance   Activity Tolerance Patient limited by fatigue;Treatment limited secondary to medical complications (Comment)  (multiple lines, tachycardia)   Medical Staff Made Aware yes   Nurse Made Aware Rose Pedersen RN   RUE Assessment   RUE Assessment WFL  (arthritic throughout)   LUE Assessment   LUE Assessment WFL  (arthritic throughout)   Edema   LUE Edema +1  (arm weeping)   Hand Function   Gross Motor Coordination Impaired   Fine Motor Coordination Impaired   Vision-Basic Assessment   Current Vision Wears glasses all the time   Perception   Spatial Orientation impaired, pt falling over to right side   Cognition   Overall Cognitive Status Impaired   Arousal/Participation Alert; Cooperative   Attention Attends with cues to redirect   Orientation Level Oriented to person;Disoriented to place; Disoriented to time;Disoriented to situation   Memory Decreased short term memory;Decreased recall of recent events   Following Commands Follows one step commands with increased time or repetition   Comments delusional at times   Assessment   Limitation Decreased ADL status; Decreased UE ROM; Decreased UE strength;Decreased Safe judgement during ADL;Decreased cognition;Decreased endurance;Decreased fine motor control;Decreased self-care trans;Decreased high-level ADLs;Mood limitation  (decreased balance)   Prognosis Good   Assessment Patient evaluated by Occupational Therapy  Patient admitted with Septic shock ([facility] [address] )    The patients occupational profile, medical and therapy history includes a extensive additional review of physical, cognitive, or psychosocial history related to current functional performance  Comorbidities affecting functional mobility and ADLS include: DM2, CAD, CABG, CKD 3, CHF cardiomyopathy, gout, HTN, A-Fib, anemia, shingles, aggressive debridement in OR 10/26 of chest wall cellulitis/abscess with wound vac, Return to OR for debridement 10/29  Prior to admission, patient was independent with functional mobility without assistive device, independent with ADLS, independent with IADLS, living alone in 1 story home with 3 steps to enter, ambulating household distance and ambulating community distances  The evaluation identifies the following performance deficits: weakness, decreased ROM, impaired balance, decreased endurance, decreased coordination, increased fall risk, new onset of impairment of functional mobility, decreased ADLS, decreased IADLS, decreased activity tolerance, decreased safety awareness, impaired judgement, decreased cognition, decreased strength and impaired psychosocial skills, that result in activity limitations and/or participation restrictions  This evaluation requires clinical decision making of high complexity, because the patient presents with comorbidites that affect occupational performance and required significant modification of tasks or assistance with consideration of multiple treatment options  The Barthel Index was used as a functional outcome tool presenting with a score of 20, indicating marked limitations of functional mobility and ADLS  Patient will benefit from skilled Occupational Therapy services to address above deficits and facilitate a safe return to prior level of function     Goals   Patient Goals "get back home and get the grass in"   STG Time Frame (1-7)   Short Term Goal #1 Patient will increase standing tolerance to 4 minutes during ADL task to decrease assistance level and decrease fall risk; Patient will increase bed mobility to min assist in preparation for ADLS and transfers; Patient will increase functional mobility to and from bathroom with rolling walker with min assist to increase performance with ADLS and to use a toilet; Patient will tolerate 10 minutes of UE ROM/strengthening to increase general activity tolerance and performance in ADLS/IADLS; Patient will be able to to verbalize understanding and perform energy conservation/proper body mechanics during ADLS and functional mobility at least 75% of the time with minimal cueing to decrease signs of fatigue and increase stamina to return to prior level of function; Patient will increase static/dynamic sitting balance to min assist to improve the ability to sit at edge of bed or on a chair for ADLS;  Patient will increase static standing balance to min assist to improve postural stability and decrease fall risk during standing ADLS and transfers  LTG Time Frame (8-14)   Long Term Goal #1 Patient will increase standing tolerance to 8 minutes during ADL task to decrease assistance level and decrease fall risk; Patient will increase bed mobility to independent in preparation for ADLS and transfers;  Patient will increase functional mobility to and from bathroom with assistive device independently to increase performance with ADLS and to use a toilet; Patient will improve functional activity tolerance to 15 minutes of sustained functional tasks to increase participation in basic self-care and decrease assistance level;  Patient will be able to to verbalize understanding and perform energy conservation/proper body mechanics during ADLS and functional mobility at least 90% of the time with nocueing to decrease signs of fatigue and increase stamina to return to prior level of function; Patient will increase static/dynamic sitting balance to independent to improve the ability to sit at edge of bed or on a chair for ADLS;  Patient will increase static/dynamic standing balance to independent to improve postural stability and decrease fall risk during standing ADLS and transfers  Functional Transfer Goals   Pt Will Perform All Functional Transfers With min assist;With assistive devices; With good judgment/safety  (LTG - Independent with AD)   ADL Goals   Pt Will Perform Eating In chair; With stand by assist;With setup  (LTG - Independent)   Pt Will Perform Grooming In chair; With mod assist  (LTG - Independent)   Pt Will Perform Bathing In chair; With mod assist  (LTG - Independent in shower)   Pt Will Perform UE Dressing In chair; With mod assist  (LTG - Independent)   Pt Will Perform LE Dressing In chair; With mod assist  (LTG - Independent)   Pt Will Perform Toileting With mod assist;With bedside commode  (LTG - Independent in bathroom)   Plan   Treatment Interventions ADL retraining;Functional transfer training;UE strengthening/ROM; Endurance training;Cognitive reorientation;Patient/family training;Equipment evaluation/education; Neuromuscular reeducation; Fine motor coordination activities; Compensatory technique education;Cardiac education; Energy conservation; Activityengagement   Goal Expiration Date 11/15/18   Treatment Day 1   OT Frequency 5x/wk   Additional Treatment Session   Start Time 1145   End Time 1205   Treatment Assessment Pt seen for ADL training  Supine to sit MaxA with cues  Static unsupported sitting balance at edge of bed with ModA to prevent leaning heavily to left side, partly due to weakness and possibly due to arthritis/spine deformity  SPT bed to recliner chair with Luis Enrique of 2 and RW and mod cues  MaxA to position upright in chair with several pillows for support to maintain midline  Pt able to feed himself with Luis Enrique and set-up, but frequent spilling  Broth soup taken in by straw vs  Spoon to decrease spillage  Patient left OOB in chair with all needs within reach, tab alarm in place  Pt tachycardic after exertion   MD and nursing informed  Cont OT per POC  Recommendation   OT Discharge Recommendation Short Term Rehab   Barthel Index   Feeding 5   Bathing 0   Grooming Score 0   Dressing Score 0   Bladder Score 0   Bowels Score 5   Toilet Use Score 5   Transfers (Bed/Chair) Score 5   Mobility (Level Surface) Score 0   Stairs Score 0   Barthel Index Score 20   Licensure   [place] License Number  Leslie Ishikawa   Niall Jonathan Johnson, OTR/L, [place] Lic# [id number]

## 2018-11-02 PROBLEM — A41.9 SEPTIC SHOCK (HCC): Status: RESOLVED | Noted: 2018-10-25 | Resolved: 2018-11-02

## 2018-11-02 PROBLEM — R65.21 SEPTIC SHOCK (HCC): Status: RESOLVED | Noted: 2018-10-25 | Resolved: 2018-11-02

## 2018-11-02 LAB
ANION GAP SERPL CALCULATED.3IONS-SCNC: 6 MMOL/L (ref 4–13)
BASOPHILS # BLD MANUAL: 0 THOUSAND/UL (ref 0–0.1)
BASOPHILS NFR MAR MANUAL: 0 % (ref 0–1)
BUN SERPL-MCNC: 24 MG/DL (ref 5–25)
CALCIUM SERPL-MCNC: 7.1 MG/DL (ref 8.3–10.1)
CHLORIDE SERPL-SCNC: 110 MMOL/L (ref 100–108)
CO2 SERPL-SCNC: 25 MMOL/L (ref 21–32)
CREAT SERPL-MCNC: 0.92 MG/DL (ref 0.6–1.3)
EOSINOPHIL # BLD MANUAL: 0 THOUSAND/UL (ref 0–0.4)
EOSINOPHIL NFR BLD MANUAL: 0 % (ref 0–6)
ERYTHROCYTE [DISTWIDTH] IN BLOOD BY AUTOMATED COUNT: 19.5 % (ref 11.6–15.1)
GFR SERPL CREATININE-BSD FRML MDRD: 78 ML/MIN/1.73SQ M
GLUCOSE SERPL-MCNC: 134 MG/DL (ref 65–140)
GLUCOSE SERPL-MCNC: 181 MG/DL (ref 65–140)
GLUCOSE SERPL-MCNC: 190 MG/DL (ref 65–140)
GLUCOSE SERPL-MCNC: 209 MG/DL (ref 65–140)
GLUCOSE SERPL-MCNC: 60 MG/DL (ref 65–140)
GLUCOSE SERPL-MCNC: 61 MG/DL (ref 65–140)
GLUCOSE SERPL-MCNC: 92 MG/DL (ref 65–140)
HCT VFR BLD AUTO: 23.3 % (ref 36.5–49.3)
HEMOCCULT STL QL: POSITIVE
HGB BLD-MCNC: 7.6 G/DL (ref 12–17)
LYMPHOCYTES # BLD AUTO: 0.85 THOUSAND/UL (ref 0.6–4.47)
LYMPHOCYTES # BLD AUTO: 7 % (ref 14–44)
MACROCYTES BLD QL AUTO: PRESENT
MAGNESIUM SERPL-MCNC: 2.1 MG/DL (ref 1.6–2.6)
MCH RBC QN AUTO: 30.9 PG (ref 26.8–34.3)
MCHC RBC AUTO-ENTMCNC: 32.6 G/DL (ref 31.4–37.4)
MCV RBC AUTO: 95 FL (ref 82–98)
MICROCYTES BLD QL AUTO: PRESENT
MONOCYTES # BLD AUTO: 0.48 THOUSAND/UL (ref 0–1.22)
MONOCYTES NFR BLD: 4 % (ref 4–12)
NEUTROPHILS # BLD MANUAL: 10.76 THOUSAND/UL (ref 1.85–7.62)
NEUTS BAND NFR BLD MANUAL: 2 % (ref 0–8)
NEUTS SEG NFR BLD AUTO: 87 % (ref 43–75)
NRBC BLD AUTO-RTO: 0 /100 WBCS
NRBC BLD AUTO-RTO: 1 /100 WBC (ref 0–2)
PHOSPHATE SERPL-MCNC: 1.9 MG/DL (ref 2.3–4.1)
PLATELET # BLD AUTO: 188 THOUSANDS/UL (ref 149–390)
PLATELET BLD QL SMEAR: ADEQUATE
PMV BLD AUTO: 11.2 FL (ref 8.9–12.7)
POTASSIUM SERPL-SCNC: 3.5 MMOL/L (ref 3.5–5.3)
RBC # BLD AUTO: 2.46 MILLION/UL (ref 3.88–5.62)
RBC MORPH BLD: PRESENT
SODIUM SERPL-SCNC: 141 MMOL/L (ref 136–145)
TOTAL CELLS COUNTED SPEC: 100
TOXIC GRANULES BLD QL SMEAR: PRESENT
WBC # BLD AUTO: 12.09 THOUSAND/UL (ref 4.31–10.16)

## 2018-11-02 PROCEDURE — 99232 SBSQ HOSP IP/OBS MODERATE 35: CPT | Performed by: INTERNAL MEDICINE

## 2018-11-02 PROCEDURE — 99233 SBSQ HOSP IP/OBS HIGH 50: CPT | Performed by: INTERNAL MEDICINE

## 2018-11-02 PROCEDURE — 84100 ASSAY OF PHOSPHORUS: CPT | Performed by: STUDENT IN AN ORGANIZED HEALTH CARE EDUCATION/TRAINING PROGRAM

## 2018-11-02 PROCEDURE — 97110 THERAPEUTIC EXERCISES: CPT

## 2018-11-02 PROCEDURE — P9021 RED BLOOD CELLS UNIT: HCPCS

## 2018-11-02 PROCEDURE — C9113 INJ PANTOPRAZOLE SODIUM, VIA: HCPCS | Performed by: STUDENT IN AN ORGANIZED HEALTH CARE EDUCATION/TRAINING PROGRAM

## 2018-11-02 PROCEDURE — 83735 ASSAY OF MAGNESIUM: CPT | Performed by: STUDENT IN AN ORGANIZED HEALTH CARE EDUCATION/TRAINING PROGRAM

## 2018-11-02 PROCEDURE — 85027 COMPLETE CBC AUTOMATED: CPT | Performed by: STUDENT IN AN ORGANIZED HEALTH CARE EDUCATION/TRAINING PROGRAM

## 2018-11-02 PROCEDURE — 80048 BASIC METABOLIC PNL TOTAL CA: CPT | Performed by: STUDENT IN AN ORGANIZED HEALTH CARE EDUCATION/TRAINING PROGRAM

## 2018-11-02 PROCEDURE — 82948 REAGENT STRIP/BLOOD GLUCOSE: CPT

## 2018-11-02 PROCEDURE — 85007 BL SMEAR W/DIFF WBC COUNT: CPT | Performed by: STUDENT IN AN ORGANIZED HEALTH CARE EDUCATION/TRAINING PROGRAM

## 2018-11-02 RX ORDER — PANTOPRAZOLE SODIUM 40 MG/1
40 TABLET, DELAYED RELEASE ORAL
Status: DISCONTINUED | OUTPATIENT
Start: 2018-11-02 | End: 2018-11-04

## 2018-11-02 RX ADMIN — QUETIAPINE 50 MG: 25 TABLET ORAL at 21:49

## 2018-11-02 RX ADMIN — SODIUM CHLORIDE 8 MG/HR: 9 INJECTION, SOLUTION INTRAVENOUS at 00:50

## 2018-11-02 RX ADMIN — SENNOSIDES 8.6 MG: 8.6 TABLET, FILM COATED ORAL at 21:49

## 2018-11-02 RX ADMIN — INSULIN GLARGINE 15 UNITS: 100 INJECTION, SOLUTION SUBCUTANEOUS at 21:46

## 2018-11-02 RX ADMIN — INSULIN LISPRO 2 UNITS: 100 INJECTION, SOLUTION INTRAVENOUS; SUBCUTANEOUS at 21:47

## 2018-11-02 RX ADMIN — FLUCONAZOLE 100 MG: 100 TABLET ORAL at 17:24

## 2018-11-02 RX ADMIN — METOPROLOL TARTRATE 12.5 MG: 25 TABLET ORAL at 08:34

## 2018-11-02 RX ADMIN — POTASSIUM PHOSPHATE, MONOBASIC AND POTASSIUM PHOSPHATE, DIBASIC 21 MMOL: 224; 236 INJECTION, SOLUTION, CONCENTRATE INTRAVENOUS at 07:50

## 2018-11-02 RX ADMIN — CEFAZOLIN SODIUM 2000 MG: 2 SOLUTION INTRAVENOUS at 14:18

## 2018-11-02 RX ADMIN — ATORVASTATIN CALCIUM 10 MG: 10 TABLET, FILM COATED ORAL at 17:24

## 2018-11-02 RX ADMIN — COLLAGENASE SANTYL 1 APPLICATION: 250 OINTMENT TOPICAL at 08:34

## 2018-11-02 RX ADMIN — PANTOPRAZOLE SODIUM 40 MG: 40 TABLET, DELAYED RELEASE ORAL at 16:03

## 2018-11-02 RX ADMIN — CEFAZOLIN SODIUM 2000 MG: 2 SOLUTION INTRAVENOUS at 06:20

## 2018-11-02 RX ADMIN — CEFAZOLIN SODIUM 2000 MG: 2 SOLUTION INTRAVENOUS at 21:45

## 2018-11-02 NOTE — PROGRESS NOTES
Daily Progress Note - Critical Care/ Stepdown   Antonieta Perez [de-identified] y o  male MRN: [id number]  Unit/Bed#: ICU 08 Encounter: [id number]    ______________________________________________________________________  Assessment:   Principal Problem:    Abscess of chest wall  Active Problems:    Iron deficiency anemia due to chronic blood loss    Bacteremia due to Staphylococcus aureus    Respiratory infection    Duodenal ulcer    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[facility])    Atrial fibrillation ([facility])    CKD (chronic kidney disease) stage 3, GFR 30-59 ml/min ([facility])    Cardiomyopathy, ischemic    DM2 (diabetes mellitus, type 2) ([facility])    CAD (coronary artery disease)    Esophagitis    Delirium  Resolved Problems:    Septic shock ([facility])    Shingles    CHRIS (acute kidney injury) ([facility])    Lactic acid acidosis    Acute respiratory failure secondary to septic shock    Urinary tract infection    DKA (diabetic ketoacidoses) ([facility])    Hyponatremia    Acute cystitis with hematuria    Lethargy    Sepsis ([facility] [address] )    Cellulitis of left axilla      * Abscess of chest wall   Assessment & Plan    · Aggressive  debridement in OR on 10/26 by Dr Verito Figueroa, wound VAC in place  · Debridement again in OR 10/29  , wound vac change  · modified Eckhauser-VAC in place on 10/29, needs to be changed in 5 days  · Antibiotics deescalated to Ancef as per ID recs, day 8 of total antibiotics  · Source likely presumed cellulitis/absess of chest wall   · Septic shock resolved, poor EF 20% on Echo  · [place] protocol - Stress Dose Steroids, Vit C, thiamine, weaned off steroids 11/1  · ID following  · Blood culture staph aureus x 1  · Wound culture: 3+ staph aureus   · Urine cultures > 100,000 mixed containments   · ABX: Ancef day #8    Will continue  · Valtrex d/zaida  · WBCs trended down, 12 09 today  · Repeat blood cultures no growth at 48hours       Septic shock ([facility])resolved as of 11/2/2018   Assessment & Plan    · Source likely presumed cellulitis/absess of chest wall   · Fluid resuscitation with 30ml/kg   · Levophed and vasopressor d/zaida,   · Poor EF 20% on Echo  · [place] protocol - Stress Dose Steroids, Vit C, thiamine, currently weaning steroids  · ID consult  · Performed aggressive debridement in OR 10/26 of chest wall cellulitis, wound vac in place   Scant serosanguineous output  · Return to OR for debridement 10/29, wound vac in place  · Blood culture staph aureus x 1  · Wound culture: 3+ staph aureus   · Urine cultures > 100,000 mixed containments   · ABX: Ancef day #8  Will continue  · Valtrex d/zaida  · WBCs increased but trending down, 12 39 today  · Repeat blood cultures no growth at 48hours       Shinglesresolved as of 11/1/2018   Assessment & Plan    · Patient tolerating p o  Intake  Transition to valacyclovir     Iron deficiency anemia due to chronic blood loss   Assessment & Plan    · Secondary to sepsis and acuity of condition  · Hemoglobin 7 1 on 10/31, transfused 3 units PRBCs, repeat Hgb 9 7 has trended down to 7 6 today  · EGD on 10/31: duodenal bulb ulcer with possible candida esophagitis  · Will transfuse 1 u PRBC today       Acute respiratory failure secondary to septic shockresolved as of 10/28/2018   Assessment & Plan    · Resolved  ·  Patient extubated on 10/26  · Continues on nasal cannula  Wean as tolerated  Lactic acid acidosisresolved as of 10/26/2018   Assessment & Plan    · Resolved  · Secondary to septic shock and DKA, trend  · Cleared  · CT CAP showing:  Left pectoral cellulitis with gas-forming pockets and right lower lobe pneumonia  · Continue to trend  · Dose with Thiamine  · Surgery consulted, went to OR for I%D  · Continue with vancomycin, clindamycin, cefepime, acyclovir     CHRIS (acute kidney injury) (HCC)resolved as of 10/27/2018   Assessment & Plan    · Resolved  · Likely Pre renal   Likely secondary to septic shock and DKA    · Improving with fluids  · FENa consistent with Pre-renal cause  · Continue fluid resuscitation and monitor urine output  · Gordon catheter in place     Bacteremia due to Staphylococcus aureus   Assessment & Plan    · Blood culture x 1 MSSA  · C/w ancef day 8 of total antibiotics  · Blood culture x 2 - no growth at 48 hours       DKA (diabetic ketoacidoses) (HCC)resolved as of 10/26/2018   Assessment & Plan    Lab Results   Component Value Date    HGBA1C 12 0 (H) 10/26/2018       Recent Labs      10/28/18   0000  10/28/18   0806  10/28/18   1010  10/28/18   1209   POCGLU  206*  188*  197*  211*       Blood Sugar Average: Last 72 hrs:  (P) [phone]     · DKA resolved  · Blood sugar remains mildly elevated  Continue b i d  Lantus  Increase sliding scale algorithm  · Goal blood sugar under 180     Urinary tract infectionresolved as of 10/29/2018   Assessment & Plan    · Urinalysis concerning for possible urinary tract infection  Urine culture with > 100,000 CFU of mixed contaminants  · Continue cefepime     Respiratory infection   Assessment & Plan    · Present on admission as evidence by CT scan suggestive of infectious process  · No sputum culture to confirm pneumonia  · Respiratory status improved  Extubated on the 26th  Tolerating room air     · Continue to treat with empiric antibiotics     Duodenal ulcer   Assessment & Plan    · Melena developed on 10/30, EGD: duodenal bulb ulcer with possible candida esophagitis  · Transfused 3 units PRBCs  · protonix drip started 10/30 for 72 hours, off today with transition to PO BID   · Tolerating full liquids, will probably advance to regular diet today if cleared by GI       Hyponatremiaresolved as of 10/27/2018   Assessment & Plan    · Resolved  · Likely hypovolemic hyponatremia in the setting of dehydration, sepsis, and DKA  · Possibly a component of pseudo hyponatremia     Acute cystitis with hematuriaresolved as of 10/29/2018   Assessment & Plan    · Continue antibiotic regimen with cefepime, clindamycin, urine culture with greater than 100,000 colony-forming units of mixed roberto     Lethargyresolved as of 10/27/2018   Assessment & Plan    - improving  Patient awake and alert  Continue environmental controls        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HCC)   Assessment & Plan    · Known EF of 20%, no evidence of fluid overload at this time  · Continue to monitor I/Os     Atrial fibrillation ([facility] [address] )   Assessment & Plan    · History of AFib in the setting of electrolyte abnormalities in the past   Monitor on telemetry  · Current NSR to Sinus Tach  · No indication for anticoagulation at this time  · Frequent PVCs on telemetry, cardiology consulted, resume beta blocker when blood pressure can tolerate     CKD (chronic kidney disease) stage 3, GFR 30-59 ml/min ([facility])   Assessment & Plan    · Stable, Baseline 0 9-1 3  · Adequate urine output at this time     Cardiomyopathy, ischemic   Assessment & Plan    · EF prior was 35 to 40%  Recent stress test in May of 2018 showed reversible ischemic changes  Cardiology following as an outpatient with medical management    · ECHO repeat with EF 20%  · Cardiac output and indexes while patient was intubated were acceptable  · Off all pressers   · Frequent PVCs less on telemetry  · Cardiology following  · Start metoprolol 12 5 mg BID when BP improves and Sys > 100     CAD (coronary artery disease)   Assessment & Plan    · Troponin negative  · Echo obtained,  EF 20%     DM2 (diabetes mellitus, type 2) [facility])   Assessment & Plan    Lab Results   Component Value Date    HGBA1C 12 0 (H) 10/26/2018       Recent Labs      10/28/18   0806  10/28/18   1010  10/28/18   1209  10/28/18   1632   POCGLU  188*  197*  211*  222*     · Blood sugar stable, c/w BID lantus  · Goal blood sugar under 180           Delirium   Assessment & Plan    · Likely secondary to ICU delirium and sleep deprivation  · Cont Seroquel 50mg at bedtime, patient did sleep well again overnight  · Consider Psych consult for any further intervention Esophagitis   Assessment & Plan    · EGD on 10/31: duodenal bulb ulcer with possible candida esophagitis  · Protonix PO to start today          Plan:    Neuro:   · Delirium: CAM ICU positive yes   · If yes, intervention: good sleep hygiene, frequent orientation, limit stimulation  · Regulate sleep/wake cycle    CV:   · Rhythm: NSR  · Follow rhythm on telemetry    Pulm:   · Tolerating room air}     GI:   · Nutrition/diet plan: Full liquids advance to regular today  · Stress ulcer prophylaxis: Protonix PO  · Bowel regimen: None currently  · Last BM: 11/1    FEN:   · Potassium 3 5 and Phosp 1 9 will give Kphos 21 mmol  · Electrolytes repleted: yes  · Goal: K >4 0, Mag >2 0, and Phos >3 0    :   · Indwelling Gordon present: no     ID:   · Complete antibx course  · Abx ordered: Ancef and Fluconazole  · Day # 5 of 20 day course  · Trend temps and WBC count    Heme:   · Hgb down will transfuse 1 unit PRBC today  · Trend hgb and plts daily    Endo:   · BS controlled  · Glycemic control plan: Blood glucose controlled on current regimen    Msk/Skin:  · Mobility goal: OOB as tolerated  · PT consult: yes  · OT consult: yes  · Frequent turning and off-loading    Family:  · Family updated within 24 hours: yes   · Family meeting planned today: no     Lines:  · Central venous access: PICC line  · Keep central line today for meds requiring central line, no peripheral access  VTE Prophylaxis:  · Pharmacologic Prophylaxis: Reason for no pharmacologic prophylaxis active GI bleed  · Mechanical Prophylaxis: sequential compression device    Disposition: Continue Stepdown    Code Status: Level 2 - DNAR: but accepts endotracheal intubation    Counseling / Coordination of Care  Total time spent today 38 minutes  Greater than 50% of total time was spent with the patient and / or family counseling and / or coordination of care   A description of the counseling / coordination of care: reviewing record, evaluating patient, placing orders, writing note  ______________________________________________________________________    HPI/24hr events: No issues overnight he slept well throughout; remains angry at times with vulgar language but otherwise cooperative and participates willingly with care; tolerating diet, good urine output; denies any pain and states he feels like "shit", otherwise no complaints offered    ______________________________________________________________________    Physical Exam:   Physical Exam   Constitutional: He appears well-developed and well-nourished  No distress  HENT:   Head: Normocephalic  Eyes: Pupils are equal, round, and reactive to light  Neck: Normal range of motion  Cardiovascular: Normal rate and regular rhythm  Pulmonary/Chest: Effort normal and breath sounds normal  No respiratory distress  Abdominal: Soft  Bowel sounds are normal  He exhibits no distension  There is no tenderness  Musculoskeletal: Normal range of motion  Neurological: He is alert  He is disoriented  Skin: Skin is warm and dry  He is not diaphoretic  Left upper chest w/ VAC dressing intact, no surrounding redness or drainage   Psychiatric: His affect is angry and inappropriate  Cognition and memory are impaired  Nursing note and vitals reviewed  ______________________________________________________________________  Vitals:    18 1315 18 1319 18 1345 18 1400   BP: 94/50 99/52 95/55 95/50   BP Location:       Pulse: (!) 115  (!) 111 (!) 114   Resp: (!) 23  (!) 25 21   Temp: 97 7 °F (36 5 °C)      TempSrc: Temporal      SpO2: 96%  96% 96%   Weight:       Height:         Arterial Line BP: 77/68  Arterial Line MAP (mmHg): 72 mmHg     Temperature:   Temp (24hrs), Av 5 °F (30 8 °C), Min:46 4 °F (8 °C), Max:98 °F (36 7 °C)    Current Temperature: 97 7 °F (36 5 °C)    Weights:   IBW: 69 55 kg    Body mass index is 25 27 kg/m²    Weight (last 2 days)     Date/Time   Weight    18 0600  76 5 (168 65)    11/01/18 0600  76 2 (167 99)    10/31/18 0600  80 2 (176 81)              Hemodynamic Monitoring:  N/A       Non-Invasive/Invasive Ventilation Settings:  Respiratory    Lab Data (Last 4 hours)    None         O2/Vent Data (Last 4 hours)    None              No results found for: PHART, WUC9JTU, PO2ART, IQK2YSI, Z3JTQFZI, BEART, SOURCE  SpO2: SpO2: 96 %, SpO2 Device: O2 Device: None (Room air)    Intake and Outputs:  I/O       10/31 0701 - 11/01 0700 11/01 0701 - 11/02 0700 11/02 0701 - 11/03 0700    P  O  900 360 240    I V  (mL/kg) 504 3 (6 6) 119 8 (1 6)     IV Piggyback 250      Total Intake(mL/kg) 1654 3 (21 7) 479 8 (6 3) 240 (3 1)    Urine (mL/kg/hr) 1325 (0 7) 446 (0 2)     Drains 0      Total Output 1325 446      Net +329 3 +33 8 +240           Unmeasured Urine Occurrence   1 x    Unmeasured Stool Occurrence 5 x 2 x 1 x          Nutrition:        Diet Orders            Start     Ordered    11/02/18 1315  Diet GI; Non Ulcerogenic  Diet effective now     Question Answer Comment   Diet Type GI    GI Non Ulcerogenic    RD to adjust diet per protocol?  Yes        11/02/18 1314    11/02/18 1113  Dietary nutrition supplements  Once     Question Answer Comment   Select Supplement: Ensure Enlive-Vanilla    Frequency Lunch, Dinner        11/02/18 1112    10/29/18 1450  Room Service  Once     Question:  Type of Service  Answer:  Room Service - Appropriate with Assistance    10/29/18 1452          Labs:     Results from last 7 days  Lab Units 11/02/18  0632 11/01/18  1532 11/01/18  0510 10/31/18  0513   WBC Thousand/uL 12 09*  --  12 39* 16 91*   HEMOGLOBIN g/dL 7 6* 8 6* 8 2* 9 7*   HEMATOCRIT % 23 3* 25 8* 25 1* 29 8*   PLATELETS Thousands/uL 188  --  190 206   MONO PCT % 4  --  1* 0*       Results from last 7 days  Lab Units 11/02/18  0632 11/01/18  0510 10/31/18  0513  10/29/18  0548  10/27/18  0428   POTASSIUM mmol/L 3 5 3 4* 4 0  < > 3 4*  < > 4 3   CHLORIDE mmol/L 110* 109* 108  < > 105  < > 103   CO2 mmol/L 25 25 22  < > 25  < > 25   BUN mg/dL 24 31* 43*  < > 40*  < > 42*   CREATININE mg/dL 0 92 0 98 1 19  < > 1 32*  < > 1 11   CALCIUM mg/dL 7 1* 7 2* 7 5*  < > 7 7*  < > 7 7*   ALK PHOS U/L  --   --  63  --  71  --  65   ALT U/L  --   --  14  --  20  --  12   AST U/L  --   --  22  --  22  --  17   < > = values in this interval not displayed  Results from last 7 days  Lab Units 11/02/18  0632 11/01/18  0510 10/31/18  0513   MAGNESIUM mg/dL 2 1 2 2 2 3     Lab Results   Component Value Date    PHOS 1 9 (L) 11/02/2018    PHOS 2 3 11/01/2018    PHOS 2 5 10/31/2018        Results from last 7 days  Lab Units 10/29/18  0548   INR  1 04       0  Lab Value Date/Time   TROPONINI <0 02 10/25/2018 1534   TROPONINI <0 02 05/09/2018 1927       Results from last 7 days  Lab Units 10/26/18  1706   LACTIC ACID mmol/L 2 0     ABG:  Lab Results   Component Value Date    PHART 7 539 (H) 10/30/2018    QLT0ZYR 25 6 (LL) 10/30/2018    PO2ART 70 7 (L) 10/30/2018    DAB8RBZ 21 3 (L) 10/30/2018    BEART -0 9 10/30/2018    SOURCE Radial, Left 10/30/2018       Imaging: CXR: None today I have personally reviewed pertinent reports  EKG: NSR    Micro:  Lab Results   Component Value Date    BLOODCX No Growth at 72 hrs  10/29/2018    BLOODCX No Growth at 72 hrs   10/29/2018    BLOODCX Staphylococcus aureus (A) 10/25/2018    URINECX >100,000 cfu/ml  10/25/2018    URINECX >100,000 cfu/ml Klebsiella oxytoca (A) 05/09/2018    WOUNDCULT 4+ Growth of Staphylococcus aureus (A) 10/26/2018    WOUNDCULT 4+ Growth of Staphylococcus aureus (A) 10/25/2018       Allergies: No Known Allergies  Medications:   Scheduled Meds:  Current Facility-Administered Medications:  acetaminophen 650 mg Oral Q6H PRN MIGUEL Grier    atorvastatin 10 mg Oral Daily Virginie Tobias MD    cefazolin 2,000 mg Intravenous Q8H Virginie Tobias MD Last Rate: 2,000 mg (11/02/18 1418)   collagenase  Topical Daily Torrie Jamison MD    fluconazole 100 mg Oral Q24H Grey Bimler MD Saleem    insulin glargine 15 Units Subcutaneous HS Doug Guzman MD    insulin lispro 2-12 Units Subcutaneous HS Doug Guzman MD    insulin lispro 2-12 Units Subcutaneous TID [facility] Doug Guzman MD    metoprolol tartrate 12 5 mg Oral Q12H [address] Doug Guzman MD    pantoprozole (PROTONIX) infusion (Continuous) 8 mg/hr Intravenous Continuous Doug Guzman MD Last Rate: 8 mg/hr (11/02/18 0701)   pantoprazole 40 mg Oral BID AC MIGUEL Hutson    QUEtiapine 50 mg Oral HS Doug Guzman MD    senna 1 tablet Oral HS Doug Guzman MD      Continuous Infusions:  pantoprozole (PROTONIX) infusion (Continuous) 8 mg/hr Last Rate: 8 mg/hr (11/02/18 0701)     PRN Meds:    acetaminophen 650 mg Q6H PRN       Invasive lines and devices: Invasive Devices     Peripherally Inserted Central Catheter Line            PICC Line 40/64/52 Right Basilic 1 day          Drain            Negative Pressure Wound Therapy (V A C ) Chest Anterior; Left 6 days    External Urinary Catheter Small less than 1 day                   SIGNATURE: MIGUEL Hutson  DATE: November 2, 2018

## 2018-11-02 NOTE — PROGRESS NOTES
Progress Note - Infectious Disease   Yvan Das [de-identified] y o  male MRN: [id number]  Unit/Bed#: ICU 08 Encounter: [id number]      Impression/Plan:  1  Septic shock-POA   Leukocytosis and tachycardia and hypotension   Appears to be all secondary to MSSA bacteremia in the setting of chest wall abscess formation   No other clear sources appreciated   Patient is clinically improved overall   The patient's heart rate has come down, and the white cell count has come down   He seems to be tolerating the antibiotics without difficulty   Patient remains off vasopressors   -antibiotic plan as below  -recheck CBC with diff and creatinine tomorrow  -supportive care in the intensive care unit     2  MSSA bacteremia-appears to be secondary to chest wall abscesses   No other clear source appreciated  Consideration for the possibility of endocarditis but this is less likely with only 1 of 2 blood cultures positive and the transthoracic echocardiogram without valvular vegetations  Repeat blood cultures remain negative   -continue high-dose IV cefazolin  -follow up repeat blood cultures to ensure clearance of bacteremia  -no additional ID workup for now  -with severity and complexity of infection, plan 4 weeks of intravenous antibiotics from the date of 1st negative blood culture, through 11/25      3  MSSA chest wall abscesses-status post extensive I and D x2 with a VAC dressing in place  [facility] seems to have improved clinically   His pain seems reasonably well controlled   This all possibly as a complication of zoster   -antibiotics as above  -vac dressing and local care  -close surgical follow-up     4  Herpes zoster-hard to make a definitive diagnosis at this point as the rash is relatively advanced in its presentation  Patient now status post 7 day course of Valtrex      5  Acute kidney injury-likely a pre renal issue   Had initially improved but now the renal function is worse today   Possibly medication effect   Renal function has improved and remains stable  -recheck BMP tomorrow  -volume management  -no additional ID workup for now       6  Esophageal candidiasis-started on fluconazole   -continue with oral fluconazole  Plan for 14 day course, through       Antibiotics:  Cefazolin 5  Antibiotics 9  Postop day 7/  Fluconazole 4     Discussed above with patient, and with critical care team   Will plan to see patient again on   Please call with any new questions in the interim  Subjective:  Patient with no new complaints  Denies pain  Tolerating oral intake  Denies fevers, chills, or sweats  Denies nausea, vomiting, or diarrhea  Objective:  Vitals:  Temp:  [46 4 °F (8 °C)-98 °F (36 7 °C)] 97 7 °F (36 5 °C)  HR:  [] 115  Resp:  [13-27] 23  BP: ()/(50-68) 99/52  SpO2:  [95 %-98 %] 96 %  Temp (24hrs), Av 5 °F (30 8 °C), Min:46 4 °F (8 °C), Max:98 °F (36 7 °C)  Current: Temperature: 97 7 °F (36 5 °C)    Physical Exam:   General:  No acute distress  Psychiatric:  Awake and alert  Pulmonary:  Normal respiratory excursion without accessory muscle use  Abdomen:  Soft, nontender  Extremities:  No edema  Skin:  No rashes    Lab Results:  I have personally reviewed pertinent labs  Results from last 7 days  Lab Units 18  0632 18  0510 10/31/18  0513  10/29/18  0548  10/27/18  0428   POTASSIUM mmol/L 3 5 3 4* 4 0  < > 3 4*  < > 4 3   CHLORIDE mmol/L 110* 109* 108  < > 105  < > 103   CO2 mmol/L 25 25 22  < > 25  < > 25   BUN mg/dL 24 31* 43*  < > 40*  < > 42*   CREATININE mg/dL 0 92 0 98 1 19  < > 1 32*  < > 1 11   EGFR ml/min/1 73sq m 78 73 57  < > 51  < > 62   CALCIUM mg/dL 7 1* 7 2* 7 5*  < > 7 7*  < > 7 7*   AST U/L  --   --  22  --  22  --  17   ALT U/L  --   --  14  --  20  --  12   ALK PHOS U/L  --   --  63  --  71  --  65   < > = values in this interval not displayed      Results from last 7 days  Lab Units 18  0632 18  1532 18  0510 10/31/18  0513   WBC Thousand/uL 12 09*  -- 12 39* 16 91*   HEMOGLOBIN g/dL 7 6* 8 6* 8 2* 9 7*   PLATELETS Thousands/uL 188  --  190 206       Results from last 7 days  Lab Units 10/29/18  1442 10/29/18  1435 10/29/18  1108   BLOOD CULTURE  No Growth at 72 hrs  No Growth at 72 hrs   --    GRAM STAIN RESULT   --   --  No Polys  3+ Gram positive cocci in clusters       Imaging Studies:   I have personally reviewed pertinent imaging study reports and images in PACS  EKG, Pathology, and Other Studies:   I have personally reviewed pertinent reports

## 2018-11-02 NOTE — SPEECH THERAPY NOTE
Speech Language/Pathology  Missed Treatment Note    Patient continues on GI diet  Will follow when diet can medically be advanced      RENETTA Blum , [address] Pathologist  [phone]

## 2018-11-02 NOTE — PROGRESS NOTES
Progress Note - Andrena Fothergill [de-identified] y o  male MRN: [id number]    Unit/Bed#: ICU 08 Encounter: [id number]        Subjective:   Patient is awake and alert, mostly with none pertinent speech, appears irritable  Nursing reports he had 1 dark colored stool this morning which was small  Patient denies any abdominal pain  No vomiting episodes reported  Tolerating clear liquid diet  Objective:     Vitals: Blood pressure 104/62, pulse 105, temperature 98 °F (36 7 °C), temperature source Temporal, resp  rate 15, height 5' 8 5" (1 74 m), weight 76 5 kg (168 lb 10 4 oz), SpO2 96 %  ,Body mass index is 25 27 kg/m²  Intake/Output Summary (Last 24 hours) at 11/02/18 1042  Last data filed at 11/02/18 0800   Gross per 24 hour   Intake           479 83 ml   Output              261 ml   Net           218 83 ml       Physical Exam:   General appearance: alert, appears stated age; irritable  Lungs: clear to auscultation bilaterally, no labored breathing/accessory muscle use  Heart: regular rate and rhythm, S1, S2 normal, no murmur, click, rub or gallop  Abdomen: soft, non-tender; bowel sounds normal; no masses,  no organomegaly  Extremities: no edema    Invasive Devices     Peripherally Inserted Central Catheter Line            PICC Line 68/00/04 Right Basilic 1 day          Drain            Negative Pressure Wound Therapy (V A C ) Chest Anterior; Left 6 days                Lab, Imaging and other studies: I have personally reviewed pertinent reports  Admission on 10/25/2018   No results displayed because visit has over 200 results  Results for Lilia Hearn (MRN [id number]) as of 11/2/2018 10:42   Ref   Range 11/1/2018 15:32 11/1/2018 17:01 11/1/2018 21:01 11/2/2018 06:32 11/2/2018 06:36 11/2/2018 07:41   POC Glucose Latest Ref Range: 65 - 140 mg/dl  209 (H) 130  61 (L) 92   eGFR Latest Units: ml/min/1 73sq m    78     Sodium Latest Ref Range: 136 - 145 mmol/L    141     Potassium Latest Ref Range: 3 5 - 5 3 mmol/L 3 5     Chloride Latest Ref Range: 100 - 108 mmol/L    110 (H)     CO2 Latest Ref Range: 21 - 32 mmol/L    25     Anion Gap Latest Ref Range: 4 - 13 mmol/L    6     BUN Latest Ref Range: 5 - 25 mg/dL    24     Creatinine Latest Ref Range: 0 60 - 1 30 mg/dL    0 92     Glucose Latest Ref Range: 65 - 140 mg/dL    60 (L)     Calcium Latest Ref Range: 8 3 - 10 1 mg/dL    7 1 (L)     Phosphorus Latest Ref Range: 2 3 - 4 1 mg/dL    1 9 (L)     Magnesium Latest Ref Range: 1 6 - 2 6 mg/dL    2 1     WBC Latest Ref Range: 4 31 - 10 16 Thousand/uL    12 09 (H)     Red Blood Cell Count Latest Ref Range: 3 88 - 5 62 Million/uL    2 46 (L)     Hemoglobin Latest Ref Range: 12 0 - 17 0 g/dL 8 6 (L)   7 6 (L)     HCT Latest Ref Range: 36 5 - 49 3 % 25 8 (L)   23 3 (L)     MCV Latest Ref Range: 82 - 98 fL    95     MCH Latest Ref Range: 26 8 - 34 3 pg    30 9     MCHC Latest Ref Range: 31 4 - 37 4 g/dL    32 6     RDW Latest Ref Range: 11 6 - 15 1 %    19 5 (H)     Platelet Count Latest Ref Range: 149 - 390 Thousands/uL    188     MPV Latest Ref Range: 8 9 - 12 7 fL    11 2     Platelet Estimate Latest Ref Range: Adequate     Adequate     nRBC Latest Units: /100 WBCs    0     Segs Relative Latest Ref Range: 43 - 75 %    87 (H)     External Abs Neutrophils Latest Ref Range: 1 85 - 7 62 Thousand/uL    10 76 (H)     Bands Relative Latest Ref Range: 0 - 8 %    2     Lymphocytes % Latest Ref Range: 14 - 44 %    7 (L)     Eosinophils Latest Ref Range: 0 - 6 %    0     Basophils Relative Latest Ref Range: 0 - 1 %    0     Absolute Eosinophils Latest Ref Range: 0 00 - 0 40 Thousand/uL    0 00     Basophils Absolute Latest Ref Range: 0 00 - 0 10 Thousand/uL    0 00     Total Counted Unknown    100     NRBC's Latest Ref Range: 0 - 2 /100 WBC    1     RBC Morphology Unknown    Present     Toxic Granulation Unknown    Present     Macrocytes Unknown    Present     Microcytes Unknown    Present     LYMPHOCYTES ABSOLUTE Latest Ref Range: 0 60 - 4 47 Thousand/uL    0 85     Monocytes Absolute Latest Ref Range: 0 00 - 1 22 Thousand/uL    0 48     Monocytes Latest Ref Range: 4 - 12 %    4         Assessment/Plan:    1  Upper GI bleed secondary to large duodenal ulcer status post EGD with cauterization and epinephrine treatment several days ago  His hemoglobin does appear decreased this morning, he was reported with 1 dark-colored stool but has not had any significant stool output since the procedure otherwise; likely this stool is residual   Appears hemodynamically stable      - I discussed patient's case and plan with ICU team  - monitor hemoglobin closely, consider transfusion today, versus checking hemoglobin again later this morning/early afternoon  - if patient has convincing evidence of active GI bleeding such as continue drop in hemoglobin, development of hematemesis, or persistent melena, then plan for repeating EGD  - if he continues without signs of active GI bleeding after transfusion or after recheck if hemoglobin today, then may advance diet

## 2018-11-02 NOTE — PLAN OF CARE
Plan of care cont      CARDIOVASCULAR - ADULT     Maintains optimal cardiac output and hemodynamic stability Progressing     Absence of cardiac dysrhythmias or at baseline rhythm Progressing        DISCHARGE PLANNING     Discharge to home or other facility with appropriate resources Progressing        DISCHARGE PLANNING - CARE MANAGEMENT     Discharge to post-acute care or home with appropriate resources Progressing        GASTROINTESTINAL - ADULT     Maintains or returns to baseline bowel function Progressing     Maintains adequate nutritional intake Progressing        GENITOURINARY - ADULT     Maintains or returns to baseline urinary function Progressing     Urinary catheter remains patent Progressing        HEMATOLOGIC - ADULT     Maintains hematologic stability Progressing        INFECTION - ADULT     Absence or prevention of progression during hospitalization Progressing        Knowledge Deficit     Patient/family/caregiver demonstrates understanding of disease process, treatment plan, medications, and discharge instructions Progressing        METABOLIC, FLUID AND ELECTROLYTES - ADULT     Electrolytes maintained within normal limits Progressing     Fluid balance maintained Progressing     Glucose maintained within target range Progressing        Nutrition/Hydration-ADULT     Nutrient/Hydration intake appropriate for improving, restoring or maintaining nutritional needs Progressing        PAIN - ADULT     Verbalizes/displays adequate comfort level or baseline comfort level Progressing        Potential for Falls     Patient will remain free of falls Progressing        Prexisting or High Potential for Compromised Skin Integrity     Skin integrity is maintained or improved Progressing        RESPIRATORY - ADULT     Achieves optimal ventilation and oxygenation Progressing        SAFETY ADULT     Maintain or return to baseline ADL function Progressing     Maintain or return mobility status to optimal level Progressing        SKIN/TISSUE INTEGRITY - ADULT     Incision(s), wounds(s) or drain site(s) healing without S/S of infection Progressing
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
## 2018-11-02 NOTE — SOCIAL WORK
CM spoke with the pt at the bedside re: DCP  Pt asked where he was going to go when DC  CM advised that he has been recommended for STR  Pt agreeable for this at this time, wanted to know where to go  After questioning, pt opted to stay local, would like to try [address]  Pt lives in [facility], asked about [facility]  Pt is familiar and on board with a referral to [facility] as well  CM will touch base with family re: this matter as well  Referrals placed based on pt choice and understanding of STR  Discussed IMM and signed, copy provided at bedside

## 2018-11-02 NOTE — UTILIZATION REVIEW
Continued Stay Review    Date: 11/2/18    Vital Signs: BP 95/50   Pulse (!) 114   Temp 97 7 °F (36 5 °C) (Temporal)   Resp 21   Ht 5' 8 5" (1 74 m)   Wt 76 5 kg (168 lb 10 4 oz)   SpO2 96%   BMI 25 27 kg/m²     PRBC 2 UNITS  GI DIET    Medications:   Scheduled Meds:   Current Facility-Administered Medications:  acetaminophen 650 mg Oral Q6H PRN MIGUEL Toussaint    atorvastatin 10 mg Oral Daily Emiliano Montoya MD    cefazolin 2,000 mg Intravenous Q8H Emiliano Montoya MD Last Rate: 2,000 mg (11/02/18 8040)   collagenase  Topical Daily Yulia Patch, MD    fluconazole 100 mg Oral Q24H Emiliano Montoya MD    insulin glargine 15 Units Subcutaneous HS Emiliano Montoya MD    insulin lispro 2-12 Units Subcutaneous HS Emiliano Montoya MD    insulin lispro 2-12 Units Subcutaneous TID [facility] Emiliano Montoya MD    metoprolol tartrate 12 5 mg Oral Q12H [facility] Emiliano Montoya MD    pantoprozole (PROTONIX) infusion (Continuous) 8 mg/hr Intravenous Continuous Emiliano Montoya MD Last Rate: 8 mg/hr (11/02/18 0701)   pantoprazole 40 mg Oral BID AC MIGUEL Toussaint    QUEtiapine 50 mg Oral HS Emiliano Montoya, MD    senna 1 tablet Oral HS Emiliano Montoya MD      Continuous Infusions:   pantoprozole (PROTONIX) infusion (Continuous) 8 mg/hr Last Rate: 8 mg/hr (11/02/18 0701)     PRN Meds:   acetaminophen    Abnormal Labs/Diagnostic Results:   CHLOR 110  WBC 12 09  HGB 7 6  HCT 23 3  GLU 60  PHOS 1 9    Age/Sex: [de-identified] y o  male     Assessment/Plan: [de-identified] YO MALE  ID NOTE:  1   Septic shock-POA   Leukocytosis and tachycardia and hypotension   Appears to be all secondary to MSSA bacteremia in the setting of chest wall abscess formation   No other clear sources appreciated   Patient is clinically improved overall   The patient's heart rate has come down, and the white cell count has come down   He seems to be tolerating the antibiotics without difficulty   Patient remains off vasopressors   -antibiotic plan as below  -recheck CBC with diff and creatinine tomorrow  -supportive care in the intensive care unit     2  MSSA bacteremia-appears to be secondary to chest wall abscesses   No other clear source appreciated  Consideration for the possibility of endocarditis but this is less likely with only 1 of 2 blood cultures positive and the transthoracic echocardiogram without valvular vegetations   Repeat blood cultures remain negative   -continue high-dose IV cefazolin  -follow up repeat blood cultures to ensure clearance of bacteremia  -no additional ID workup for now  -with severity and complexity of infection, plan 4 weeks of intravenous antibiotics from the date of 1st negative blood culture, through 11/25      3  MSSA chest wall abscesses-status post extensive I and D x2 with a VAC dressing in place  Jovanni Antoine seems to have improved clinically   His pain seems reasonably well controlled   This all possibly as a complication of zoster   -antibiotics as above  -vac dressing and local care  -close surgical follow-up     4  Herpes zoster-hard to make a definitive diagnosis at this point as the rash is relatively advanced in its presentation   Patient now status post 7 day course of Valtrex      5  Acute kidney injury-likely a pre renal issue   Had initially improved but now the renal function is worse today  Possibly medication effect   Renal function has improved and remains stable  -recheck BMP tomorrow  -volume management  -no additional ID workup for now       6  Esophageal candidiasis-started on fluconazole   -continue with oral fluconazole   Plan for 14 day course, through 11/12      GI NOTE:     1  Upper GI bleed secondary to large duodenal ulcer status post EGD with cauterization and epinephrine treatment several days ago    His hemoglobin does appear decreased this morning, he was reported with 1 dark-colored stool but has not had any significant stool output since the procedure otherwise; likely this stool is residual   Appears hemodynamically stable      - I discussed patient's case and plan with ICU team  - monitor hemoglobin closely, consider transfusion today, versus checking hemoglobin again later this morning/early afternoon  - if patient has convincing evidence of active GI bleeding such as continue drop in hemoglobin, development of hematemesis, or persistent melena, then plan for repeating EGD  - if he continues without signs of active GI bleeding after transfusion or after recheck if hemoglobin today, then may advance diet      CARDIOLOGY NOTE:1  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  · Home antihypertensive regimen initially held due to hypotension  · Restarted metoprolol at half home dose of 12 5 mg bid as the patient's BP has somewhat improved since yesterday  Hold if SBP is under 100  · Continue treatment for sepsis / infection  · Reevaluate with TTE when patient's acute disease status improves       2  Sinus Tachycardia with PVCs  · Patient continues to be tachycardic, likely secondary to disease state vs anxiety/agitation  · Metoprolol resumed at half home dose as noted above, dependent of SBP > 100  Will assess effects on heart rate  · Continue to monitor       3  CAD s/p CABG  · ASA held due to GI bleed  · Continue statin       4  Dyslipidemia  · Stable   Continue statin        Discharge Plan: TO BE DETERMINED

## 2018-11-02 NOTE — PLAN OF CARE
Problem: PHYSICAL THERAPY ADULT  Goal: Performs mobility at highest level of function for planned discharge setting  See evaluation for individualized goals  Outcome: Progressing  Prognosis: Good  Problem List: Decreased strength, Decreased endurance, Impaired balance, Decreased mobility, Decreased cognition  Assessment: Pt more coherent today, follows commands but has a short attention span  Recommendation:  (STR)          See flowsheet documentation for full assessment

## 2018-11-02 NOTE — PHYSICAL THERAPY NOTE
PT TREATMENT     11/02/18 1257   Pain Assessment   Pain Assessment No/denies pain   Restrictions/Precautions   Other Precautions Bed Alarm; Chair Alarm; Fall Risk;Cognitive  (wound vac)   General   Chart Reviewed Yes   Cognition   Overall Cognitive Status Impaired   Arousal/Participation Cooperative   Subjective   Subjective "I'm dead as a doornail"   Bed Mobility   Supine to Sit 2  Maximal assistance   Transfers   Sit to Stand 4  Minimal assistance   Stand to Sit 4  Minimal assistance   Stand pivot 4  Minimal assistance   Additional items (with walker)   Ambulation/Elevation   Gait pattern (slow marlyn)   Gait Assistance 4  Minimal assist   Assistive Device Rolling walker   Distance 5 feet   Exercises   Hip Flexion 5 reps; Sitting;Bilateral;AROM   Knee AROM Long Arc Quad 5 reps; Sitting;Bilateral;AROM   Ankle Pumps 10 reps; Sitting;Bilateral;AROM   Assessment   Prognosis Good   Problem List Decreased strength;Decreased endurance; Impaired balance;Decreased mobility; Decreased cognition   Assessment Pt more coherent today, follows commands but has a short attention span  Goals   Treatment Day 2   Plan   Treatment/Interventions ADL retraining;Functional transfer training;LE strengthening/ROM; Therapeutic exercise; Endurance training;Gait training;Bed mobility; Equipment eval/education;Patient/family training   Progress Progressing toward goals   PT Frequency 5x/wk   Recommendation   Recommendation [facility])   Licensure   [place] License Number  Rene MAK  [id number]

## 2018-11-03 PROBLEM — B37.81 CANDIDA ESOPHAGITIS (HCC): Status: ACTIVE | Noted: 2018-11-03

## 2018-11-03 PROBLEM — K26.4 GASTROINTESTINAL HEMORRHAGE ASSOCIATED WITH DUODENAL ULCER: Status: ACTIVE | Noted: 2018-11-03

## 2018-11-03 PROBLEM — R33.9 URINARY RETENTION: Status: ACTIVE | Noted: 2018-11-03

## 2018-11-03 PROBLEM — J98.8 RESPIRATORY INFECTION: Status: RESOLVED | Noted: 2018-10-26 | Resolved: 2018-11-03

## 2018-11-03 PROBLEM — D62 ACUTE BLOOD LOSS ANEMIA: Status: ACTIVE | Noted: 2018-10-28

## 2018-11-03 LAB
ABO GROUP BLD BPU: NORMAL
ABO GROUP BLD: NORMAL
ANION GAP SERPL CALCULATED.3IONS-SCNC: 6 MMOL/L (ref 4–13)
ANION GAP SERPL CALCULATED.3IONS-SCNC: 7 MMOL/L (ref 4–13)
BACTERIA BLD CULT: NORMAL
BACTERIA BLD CULT: NORMAL
BASOPHILS # BLD MANUAL: 0 THOUSAND/UL (ref 0–0.1)
BASOPHILS NFR MAR MANUAL: 0 % (ref 0–1)
BLD GP AB SCN SERPL QL: NEGATIVE
BPU ID: NORMAL
BUN SERPL-MCNC: 21 MG/DL (ref 5–25)
BUN SERPL-MCNC: 28 MG/DL (ref 5–25)
CALCIUM SERPL-MCNC: 7 MG/DL (ref 8.3–10.1)
CALCIUM SERPL-MCNC: 7.2 MG/DL (ref 8.3–10.1)
CHLORIDE SERPL-SCNC: 107 MMOL/L (ref 100–108)
CHLORIDE SERPL-SCNC: 108 MMOL/L (ref 100–108)
CO2 SERPL-SCNC: 24 MMOL/L (ref 21–32)
CO2 SERPL-SCNC: 26 MMOL/L (ref 21–32)
CREAT SERPL-MCNC: 0.89 MG/DL (ref 0.6–1.3)
CREAT SERPL-MCNC: 1.06 MG/DL (ref 0.6–1.3)
CROSSMATCH: NORMAL
EOSINOPHIL # BLD MANUAL: 0.29 THOUSAND/UL (ref 0–0.4)
EOSINOPHIL NFR BLD MANUAL: 3 % (ref 0–6)
ERYTHROCYTE [DISTWIDTH] IN BLOOD BY AUTOMATED COUNT: 18.7 % (ref 11.6–15.1)
GFR SERPL CREATININE-BSD FRML MDRD: 66 ML/MIN/1.73SQ M
GFR SERPL CREATININE-BSD FRML MDRD: 81 ML/MIN/1.73SQ M
GLUCOSE SERPL-MCNC: 156 MG/DL (ref 65–140)
GLUCOSE SERPL-MCNC: 241 MG/DL (ref 65–140)
GLUCOSE SERPL-MCNC: 257 MG/DL (ref 65–140)
GLUCOSE SERPL-MCNC: 259 MG/DL (ref 65–140)
GLUCOSE SERPL-MCNC: 97 MG/DL (ref 65–140)
GLUCOSE SERPL-MCNC: 99 MG/DL (ref 65–140)
HCT VFR BLD AUTO: 26.7 % (ref 36.5–49.3)
HGB BLD-MCNC: 7.8 G/DL (ref 12–17)
HGB BLD-MCNC: 8.5 G/DL (ref 12–17)
LG PLATELETS BLD QL SMEAR: PRESENT
LYMPHOCYTES # BLD AUTO: 0.88 THOUSAND/UL (ref 0.6–4.47)
LYMPHOCYTES # BLD AUTO: 9 % (ref 14–44)
MAGNESIUM SERPL-MCNC: 1.9 MG/DL (ref 1.6–2.6)
MAGNESIUM SERPL-MCNC: 2 MG/DL (ref 1.6–2.6)
MCH RBC QN AUTO: 30.6 PG (ref 26.8–34.3)
MCHC RBC AUTO-ENTMCNC: 31.8 G/DL (ref 31.4–37.4)
MCV RBC AUTO: 96 FL (ref 82–98)
MONOCYTES # BLD AUTO: 0.39 THOUSAND/UL (ref 0–1.22)
MONOCYTES NFR BLD: 4 % (ref 4–12)
NEUTROPHILS # BLD MANUAL: 8.18 THOUSAND/UL (ref 1.85–7.62)
NEUTS BAND NFR BLD MANUAL: 3 % (ref 0–8)
NEUTS SEG NFR BLD AUTO: 81 % (ref 43–75)
NRBC BLD AUTO-RTO: 0 /100 WBCS
PHOSPHATE SERPL-MCNC: 2.1 MG/DL (ref 2.3–4.1)
PLATELET # BLD AUTO: 196 THOUSANDS/UL (ref 149–390)
PLATELET BLD QL SMEAR: ADEQUATE
PMV BLD AUTO: 11 FL (ref 8.9–12.7)
POTASSIUM SERPL-SCNC: 3.8 MMOL/L (ref 3.5–5.3)
POTASSIUM SERPL-SCNC: 4.3 MMOL/L (ref 3.5–5.3)
RBC # BLD AUTO: 2.78 MILLION/UL (ref 3.88–5.62)
RBC MORPH BLD: NORMAL
RH BLD: POSITIVE
SODIUM SERPL-SCNC: 138 MMOL/L (ref 136–145)
SODIUM SERPL-SCNC: 140 MMOL/L (ref 136–145)
SPECIMEN EXPIRATION DATE: NORMAL
TOTAL CELLS COUNTED SPEC: 100
TOXIC GRANULES BLD QL SMEAR: PRESENT
UNIT DISPENSE STATUS: NORMAL
UNIT PRODUCT CODE: NORMAL
UNIT RH: NORMAL
WBC # BLD AUTO: 9.74 THOUSAND/UL (ref 4.31–10.16)

## 2018-11-03 PROCEDURE — 99232 SBSQ HOSP IP/OBS MODERATE 35: CPT | Performed by: INTERNAL MEDICINE

## 2018-11-03 PROCEDURE — 99232 SBSQ HOSP IP/OBS MODERATE 35: CPT | Performed by: SURGERY

## 2018-11-03 PROCEDURE — 85027 COMPLETE CBC AUTOMATED: CPT | Performed by: NURSE PRACTITIONER

## 2018-11-03 PROCEDURE — 86920 COMPATIBILITY TEST SPIN: CPT

## 2018-11-03 PROCEDURE — 84100 ASSAY OF PHOSPHORUS: CPT | Performed by: NURSE PRACTITIONER

## 2018-11-03 PROCEDURE — 83735 ASSAY OF MAGNESIUM: CPT | Performed by: NURSE PRACTITIONER

## 2018-11-03 PROCEDURE — 97110 THERAPEUTIC EXERCISES: CPT

## 2018-11-03 PROCEDURE — 80048 BASIC METABOLIC PNL TOTAL CA: CPT | Performed by: NURSE PRACTITIONER

## 2018-11-03 PROCEDURE — 83735 ASSAY OF MAGNESIUM: CPT | Performed by: PHYSICIAN ASSISTANT

## 2018-11-03 PROCEDURE — 86901 BLOOD TYPING SEROLOGIC RH(D): CPT | Performed by: NURSE PRACTITIONER

## 2018-11-03 PROCEDURE — 85007 BL SMEAR W/DIFF WBC COUNT: CPT | Performed by: NURSE PRACTITIONER

## 2018-11-03 PROCEDURE — 82948 REAGENT STRIP/BLOOD GLUCOSE: CPT

## 2018-11-03 PROCEDURE — 86850 RBC ANTIBODY SCREEN: CPT | Performed by: NURSE PRACTITIONER

## 2018-11-03 PROCEDURE — 86900 BLOOD TYPING SEROLOGIC ABO: CPT | Performed by: NURSE PRACTITIONER

## 2018-11-03 PROCEDURE — P9021 RED BLOOD CELLS UNIT: HCPCS

## 2018-11-03 PROCEDURE — 85018 HEMOGLOBIN: CPT | Performed by: PHYSICIAN ASSISTANT

## 2018-11-03 PROCEDURE — 80048 BASIC METABOLIC PNL TOTAL CA: CPT | Performed by: PHYSICIAN ASSISTANT

## 2018-11-03 RX ORDER — TAMSULOSIN HYDROCHLORIDE 0.4 MG/1
0.4 CAPSULE ORAL
Status: DISCONTINUED | OUTPATIENT
Start: 2018-11-03 | End: 2018-11-18 | Stop reason: HOSPADM

## 2018-11-03 RX ORDER — MAGNESIUM SULFATE HEPTAHYDRATE 40 MG/ML
2 INJECTION, SOLUTION INTRAVENOUS ONCE
Status: COMPLETED | OUTPATIENT
Start: 2018-11-03 | End: 2018-11-03

## 2018-11-03 RX ADMIN — INSULIN GLARGINE 15 UNITS: 100 INJECTION, SOLUTION SUBCUTANEOUS at 21:34

## 2018-11-03 RX ADMIN — QUETIAPINE 50 MG: 25 TABLET ORAL at 21:35

## 2018-11-03 RX ADMIN — PANTOPRAZOLE SODIUM 40 MG: 40 TABLET, DELAYED RELEASE ORAL at 06:03

## 2018-11-03 RX ADMIN — SENNOSIDES 8.6 MG: 8.6 TABLET, FILM COATED ORAL at 21:35

## 2018-11-03 RX ADMIN — FLUCONAZOLE 100 MG: 100 TABLET ORAL at 17:43

## 2018-11-03 RX ADMIN — MAGNESIUM SULFATE HEPTAHYDRATE 2 G: 40 INJECTION, SOLUTION INTRAVENOUS at 17:42

## 2018-11-03 RX ADMIN — CEFAZOLIN SODIUM 2000 MG: 2 SOLUTION INTRAVENOUS at 21:34

## 2018-11-03 RX ADMIN — ATORVASTATIN CALCIUM 10 MG: 10 TABLET, FILM COATED ORAL at 17:43

## 2018-11-03 RX ADMIN — INSULIN LISPRO 4 UNITS: 100 INJECTION, SOLUTION INTRAVENOUS; SUBCUTANEOUS at 21:35

## 2018-11-03 RX ADMIN — PANTOPRAZOLE SODIUM 40 MG: 40 TABLET, DELAYED RELEASE ORAL at 16:22

## 2018-11-03 RX ADMIN — METOPROLOL TARTRATE 12.5 MG: 25 TABLET ORAL at 09:36

## 2018-11-03 RX ADMIN — TAMSULOSIN HYDROCHLORIDE 0.4 MG: 0.4 CAPSULE ORAL at 16:22

## 2018-11-03 RX ADMIN — CEFAZOLIN SODIUM 2000 MG: 2 SOLUTION INTRAVENOUS at 15:13

## 2018-11-03 RX ADMIN — COLLAGENASE SANTYL 1 APPLICATION: 250 OINTMENT TOPICAL at 09:23

## 2018-11-03 RX ADMIN — CEFAZOLIN SODIUM 2000 MG: 2 SOLUTION INTRAVENOUS at 05:43

## 2018-11-03 RX ADMIN — CALCIUM GLUCONATE 2 G: 98 INJECTION, SOLUTION INTRAVENOUS at 18:15

## 2018-11-03 NOTE — PROGRESS NOTES
General Surgery Progress Note    Chief Complaint: "I don't know you   "    Subjective/24 hour/interim events:  Still a bit confused, Lindsey Luis remains off pressors  Still making good urine  Otherwise, no fevers noted  Objective:  BP 94/54   Pulse 100   Temp (!) 97 °F (36 1 °C) (Temporal)   Resp 20   Ht 5' 8 5" (1 74 m)   Wt 79 5 kg (175 lb 4 3 oz)   SpO2 97%   BMI 26 26 kg/m²   General: NAD  Pulm: No tachypnea noted  Left Chest/Axilla: PreVena incisional VAC and selwyn removed  The wound looks much better than I had expected and no sutures need to be removed as there are no collections or any redness  Minimal scant drainage  No signs of infection noted  Back: Examined in person with OTILIO Chow enzymatic debridement noted  Wound cleaned and noted to be doing better  The erythema around the wound has decreased significantly  Pertinent labs reviewed  WBC normalized  Pertinent images and available reads personally reviewed    Pertinent notes reviewed    Assessment and Plan:  1  I will place different wound instructions for the chest and axilla: Will need daily and prn dressing change with simply ABD and tape  2  Back wound to be treated as previous with Santyl daily and Allevyn dressing  Counseling / Coordination of Care  Total floor/unit time spent today 20 minutes  Greater than 50% of total time was spent with the patient and/or family counseling and/or coordination of care  A description of the counseling / coordination of care:  I performed an interim history, pertinent images and labs, performed a physical examination to arrive at the plan delineated above with associated thought processes  Plan conveyed to nurse Chow, in person  Plan discussed with Mariely Noel PA-C of primary team     *The areas in bold, if present within the assessment and plan, are summary statements/bullet points for nursing and consultant/primary teams      * Points delineated in red, if present, are not points of contention and should not be taken by any practitioner, nurse, therapist, , or anyone else with access to the patient chart  Hand off errors are prevalent and therefore, any changes or updates shown in red are to communicate to the next practitioner, points not discussed in handout but can affect patient care and management (yet not important enough to wake, or disturb, or alert the next practitioner in an emergent fashion)  RENETTA Gutierrez    [address] [place] [address] [facility]  Office [phone]  Fax [phone]

## 2018-11-03 NOTE — PROGRESS NOTES
Progress Note - Teresita Lee [de-identified] y o  male MRN: [id number]    Unit/Bed#: ICU 08 Encounter: [id number]        Subjective:   Patient is awake and alert, appears pleasant  Denies any abdominal pain, nausea or vomiting  Reports to be tolerating diet  Objective:     Vitals: Blood pressure 108/59, pulse (!) 109, temperature (!) 97 2 °F (36 2 °C), temperature source Temporal, resp  rate 18, height 5' 8 5" (1 74 m), weight 79 5 kg (175 lb 4 3 oz), SpO2 97 %  ,Body mass index is 26 26 kg/m²  Intake/Output Summary (Last 24 hours) at 11/03/18 1313  Last data filed at 11/03/18 1000   Gross per 24 hour   Intake           735 76 ml   Output             2000 ml   Net         -1264 24 ml       Physical Exam:   General appearance: alert, appears stated age and cooperative  Lungs: clear to auscultation bilaterally, no labored breathing/accessory muscle use  Heart: regular rate and rhythm, S1, S2 normal, no murmur, click, rub or gallop  Abdomen: soft, non-tender; bowel sounds normal; no masses,  no organomegaly  Extremities: no edema    Invasive Devices     Peripherally Inserted Central Catheter Line            PICC Line 27/11/80 Right Basilic 2 days          Drain            Urethral Catheter Non-latex 16 Fr  less than 1 day                Lab, Imaging and other studies: I have personally reviewed pertinent reports  Admission on 10/25/2018   No results displayed because visit has over 200 results  Results for Misael Stoddard (MRN [id number]) as of 11/3/2018 13:14   Ref   Range 11/2/2018 20:21 11/3/2018 04:57 11/3/2018 06:15 11/3/2018 07:20 11/3/2018 11:01   POC Glucose Latest Ref Range: 65 - 140 mg/dl 181 (H)   97 156 (H)   eGFR Latest Units: ml/min/1 73sq m  81      Sodium Latest Ref Range: 136 - 145 mmol/L  140      Potassium Latest Ref Range: 3 5 - 5 3 mmol/L  3 8      Chloride Latest Ref Range: 100 - 108 mmol/L  108      CO2 Latest Ref Range: 21 - 32 mmol/L  26      Anion Gap Latest Ref Range: 4 - 13 mmol/L 6      BUN Latest Ref Range: 5 - 25 mg/dL  21      Creatinine Latest Ref Range: 0 60 - 1 30 mg/dL  0 89      Glucose Latest Ref Range: 65 - 140 mg/dL  99      Calcium Latest Ref Range: 8 3 - 10 1 mg/dL  7 2 (L)      Phosphorus Latest Ref Range: 2 3 - 4 1 mg/dL  2 1 (L)      Magnesium Latest Ref Range: 1 6 - 2 6 mg/dL  2 0      WBC Latest Ref Range: 4 31 - 10 16 Thousand/uL  9 74      Red Blood Cell Count Latest Ref Range: 3 88 - 5 62 Million/uL  2 78 (L)      Hemoglobin Latest Ref Range: 12 0 - 17 0 g/dL  8 5 (L)      HCT Latest Ref Range: 36 5 - 49 3 %  26 7 (L)      MCV Latest Ref Range: 82 - 98 fL  96      MCH Latest Ref Range: 26 8 - 34 3 pg  30 6      MCHC Latest Ref Range: 31 4 - 37 4 g/dL  31 8      RDW Latest Ref Range: 11 6 - 15 1 %  18 7 (H)      Platelet Count Latest Ref Range: 149 - 390 Thousands/uL  196      MPV Latest Ref Range: 8 9 - 12 7 fL  11 0      Platelet Estimate Latest Ref Range: Adequate   Adequate      nRBC Latest Units: /100 WBCs  0      Segs Relative Latest Ref Range: 43 - 75 %  81 (H)      External Abs Neutrophils Latest Ref Range: 1 85 - 7 62 Thousand/uL  8 18 (H)      Bands Relative Latest Ref Range: 0 - 8 %  3      Lymphocytes % Latest Ref Range: 14 - 44 %  9 (L)      Eosinophils Latest Ref Range: 0 - 6 %  3      Basophils Relative Latest Ref Range: 0 - 1 %  0      Absolute Eosinophils Latest Ref Range: 0 00 - 0 40 Thousand/uL  0 29      Basophils Absolute Latest Ref Range: 0 00 - 0 10 Thousand/uL  0 00      Total Counted Unknown  100      RBC Morphology Unknown  Normal      Toxic Granulation Unknown  Present      Large Platelet Unknown  Present      Crossmatch Unknown   Compatible     Unit ABO Unknown   O     Unit RH Unknown   POS     Unit Number Unknown   [id number]     Unit Dispense Status Unknown   Presumed Trans     LYMPHOCYTES ABSOLUTE Latest Ref Range: 0 60 - 4 47 Thousand/uL  0 88      Monocytes Absolute Latest Ref Range: 0 00 - 1 22 Thousand/uL  0 39      Monocytes Latest Ref Range: 4 - 12 %  4          Assessment/Plan:    Acute upper GI bleed with acute blood loss anemia secondary to duodenal ulcer, status post EGD with cauterization last week  No evidence of any active GI bleeding since the procedure    Hemoglobin appears  Stable accounting for blood transfusions    - continue IV Protonix twice daily  - monitor hemoglobin closely, transfuse further if needed  - if patient has significant melena, hematemesis or unstable hemoglobin, consider repeating EGD  - diet as tolerated

## 2018-11-03 NOTE — OCCUPATIONAL THERAPY NOTE
OT TREATMENT       11/03/18 1117   Restrictions/Precautions   Other Precautions Cognitive; Chair Alarm; Bed Alarm; Fall Risk  (wound vac)   Pain Assessment   Pain Assessment No/denies pain   Pain Score No Pain   Therapeutic Exercise - ROM   UE-ROM Yes   ROM- Right Upper Extremities   R Shoulder AROM; Flexion;ABduction   R Elbow AROM;Elbow flexion;Elbow extension   R Wrist AROM; Wrist flexion;Wrist extension;Radial deviation;Ulnar deviation   R Hand AROM; Thumb; Index finger; Long finger;Ring finger;Little finger   R Position Seated   R Weight/Reps/Sets x10 no added weight   ROM - Left Upper Extremities    L Shoulder AROM; Flexion;ABduction   L Elbow AROM;Elbow flexion;Elbow extension   L Wrist AROM; Wrist flexion;Wrist extension;Radial deviation;Ulnar deviation   L Hand AROM; Thumb; Index finger; Long finger;Ring finger;Little finger   L Position Seated   L Weight/Reps/Sets x10 no added weight   Cognition   Arousal/Participation Alert; Responsive; Cooperative   Attention Within functional limits   Following Commands Follows all commands and directions without difficulty   Assessment   Assessment pt received in bedside chair, daughter present  Initially resisitive but encourage by daughter to participate in UB ex  Pt agreeable  COmpleted all exercises seated in bedside recliner  Needs VCs and encouragement throughout but does tolerate  Plan   Treatment Interventions ADL retraining;Functional transfer training;UE strengthening/ROM; Endurance training;Cognitive reorientation;Patient/family training;Equipment evaluation/education; Neuromuscular reeducation; Compensatory technique education; Energy conservation   Goal Expiration Date 11/15/18   Treatment Day 2   OT Frequency 5x/wk   Recommendation   OT Discharge Recommendation [id number] [employer] License Number  Erskin Chant OTR/L [id number]

## 2018-11-03 NOTE — PLAN OF CARE
Problem: SLP ADULT - SWALLOWING, IMPAIRED  Goal: Initial SLP swallow eval performed  Outcome: Completed Date Met: 10/28/18

## 2018-11-03 NOTE — PROGRESS NOTES
Daily Progress Note - Critical Care/ Stepdown   Ab Cabrera [de-identified] y o  male MRN: [id number]  Unit/Bed#: ICU 08 Encounter: [id number]    ______________________________________________________________________  Assessment:   Principal Problem:    Abscess of chest wall  Active Problems:    DM2 (diabetes mellitus, type 2) ([facility])    Acute blood loss anemia    Bacteremia due to Staphylococcus aureus    Duodenal ulcer    Gastrointestinal hemorrhage associated with duodenal ulcer    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[facility])    Urinary retention    CKD (chronic kidney disease) stage 3, GFR 30-59 ml/min ([facility])    CAD (coronary artery disease)    Cardiomyopathy, ischemic    Atrial fibrillation ([facility])    Severe protein-calorie malnutrition ([facility])    Esophagitis    Delirium  Resolved Problems:    Septic shock ([facility])    CHRIS (acute kidney injury) ([facility])    Hyponatremia    Urinary tract infection    Lactic acid acidosis    Shingles    Respiratory infection    Acute cystitis with hematuria    DKA (diabetic ketoacidoses) ([facility])    Acute respiratory failure secondary to septic shock    Lethargy    Sepsis ([facility])    Cellulitis of left axilla      * Abscess of chest wall   Assessment & Plan    · Aggressive  debridement in OR on 10/26 by Dr Sam Butts, wound VAC discontinued this a m  · Antibiotics deescalated to Ancef as per ID recs, patient will require IV antibiotics until November 25th  Patient has PICC line for outpatient antibiotic therapy  · ID following  · Blood culture staph aureus x 1 - will treat as bacteremia  · Wound culture: 3+ staph aureus   · Urine cultures > 100,000 mixed containments   · ABX: Ancef day #6  Antibiotic therapy day 10  WBC normalized  · Repeat blood cultures from 10/29 no growth to date  · Wound VAC removed today  Continue local wound care       Septic shock ([facility])resolved as of 11/2/2018   Assessment & Plan    · Resolved at this time    Vasopressor therapy discontinued October 30th  ·   Source likely presumed cellulitis/absess of chest wall   · Patient was initially Fluid resuscitation with 30ml/kg   · Levophed and vasopressor d/zaida 10/30  · Poor EF 20% on Echo  · [place] protocol - Stress Dose Steroids, Vit C, thiamine- now discontinued 11 1  · ID consult  ·  debridement in OR 10/26 of chest wall cellulitis, wound vac in place   Scant serosanguineous output  · Return to OR for debridement 10/29, wound vac in place  · Wound culture: 3+ staph aureus   · Urine cultures > 100,000 mixed containments   · ABX: Ancef day #6  Fluconazole day #5  Total day 10 of antibiotics  Day   · Valtrex d/zaida  · WBCs normalized  · Repeat blood cultures no growth since 10/29       CHRIS (acute kidney injury) (HCC)resolved as of 10/27/2018   Assessment & Plan    · Resolved  · Likely Pre renal   Likely secondary to septic shock and DKA  · Improving with fluids  · FENa consistent with Pre-renal cause  · Gordon catheter in place     Duodenal ulcer   Assessment & Plan    · Melena developed on 10/30, EGD: duodenal bulb ulcer with possible candida esophagitis  · GI following  · Transfused 5 units PRBCs this admission  · Hemoglobin stable at this time  No further episodes of melena  · Continue b i d  Protonix       Bacteremia due to Staphylococcus aureus   Assessment & Plan    · Blood culture x 1 MSSA  · Repeat blood cultures no growth to date  · Per ID will need 4 wks antibiotics from negative blood cultures through 11/25  · PICC line in place       Acute blood loss anemia   Assessment & Plan    · Initially felt to be  chronic  · Patient continue with tachycardia and drifting hemoglobin and later developed guanakito melena  · EGD with duodenal bulb ulcer with intervention  · Transfuse 5 units of packed red blood cells total this admission  1 unit 10/28, 3 units 10/30, 1 unit 11/2  Hemoglobin today 8 5 and stable       DM2 (diabetes mellitus, type 2) ([facility] [address] )   Assessment & Plan    Lab Results   Component Value Date    HGBA1C 12 0 (H) 10/26/2018       Recent Labs      11/02/18   1104  11/02/18   1601  11/02/18   2021  11/03/18   0720   POCGLU  134  190*  181*  97     · Blood sugar stable, c/w BID lantus  · Goal blood sugar under 180           Gastrointestinal hemorrhage associated with duodenal ulcer   Assessment & Plan    · Patient received 5 units packed red blood cells  · hemoglobin stable at this time  · Continue to follow H&H  · B i d  Protonix  · No further bloody bowel movements     Urinary retention   Assessment & Plan    · Pt failed voiding trial 11/3  · Straight cath x 2 for > 700 ml  · Gordon catheter placed  · Likely secondary to BPH  Will start Flomax       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HCC)   Assessment & Plan    · Known EF of 20%, no evidence of fluid overload at this time  · Continue to monitor I/Os  · Repeat echocardiogram on Monday  Patient may need LifeVest prior to discharge home if EF is still below 30%  · Continues with episodes of PVCs and an episode of nonsustained ventricular tachycardia for 8 beats overnight on November 3rd  · Unable to tolerate metoprolol secondary to borderline hypotension     CKD (chronic kidney disease) stage 3, GFR 30-59 ml/min ([facility])   Assessment & Plan    · Stable, Baseline 0 9-1 3  · Adequate urine output at this time  · Patient is diuretic dependent at home with torsemide 10 mg daily and Aldactone 12 5 mg daily     Lactic acid acidosisresolved as of 10/26/2018   Assessment & Plan    · Resolved       Urinary tract infectionresolved as of 10/29/2018   Assessment & Plan    · Resolved  · Urine culture with greater than 100,000 CFU of mixed contaminants       Hyponatremiaresolved as of 10/27/2018   Assessment & Plan    · Resolved  · Likely hypovolemic hyponatremia in the setting of dehydration, sepsis, and DKA  · Possibly a component of pseudo hyponatremia     Atrial fibrillation ([facility] [address] )   Assessment & Plan    · History of AFib in the setting of electrolyte abnormalities in the past   Monitor on telemetry  · Current NSR to Sinus Tach  · Continue to hold on anticoagulation at this time  Particularly in light of recent episode of GI bleeding  · Frequent PVCs on telemetry, cardiology consulted, resume beta blocker when blood pressure can tolerate     Cardiomyopathy, ischemic   Assessment & Plan    · EF prior was 35 to 40%  Recent stress test in May of 2018 showed reversible ischemic changes  Cardiology following as an outpatient with medical management  · ECHO repeat with EF 20% possibly related to sepsis  Repeat echo cardiogram on Monday  · Cardiac output and indexes while patient was intubated were acceptable  · Continues off of pressors  · Frequent PVCs on telemetry including 8 beat run of NSVT  · Cardiology following  · Start metoprolol 12 5 mg BID when BP improves and Sys > 100  Patient is still unable to tolerate beta-blocker     CAD (coronary artery disease)   Assessment & Plan    · Troponin negative  · Continue statin  Hold aspirin in light of GI bleeding  · Echo obtained,  EF 20%     Shinglesresolved as of 11/1/2018   Assessment & Plan    · Now resolved  · Antiviral therapy discontinued on October 31st     Respiratory infectionresolved as of 11/3/2018   Assessment & Plan    · Resolved  ·  Present on admission as evidence by CT scan suggestive of infectious process  · No sputum culture to confirm pneumonia    · Respiratory status improved  Extubated on the 26th  Tolerating room air  Acute cystitis with hematuriaresolved as of 10/29/2018   Assessment & Plan    · Resolved  · Urine culture with mixed roberto         DKA (diabetic ketoacidoses) ([facility])resolved as of 10/26/2018   Assessment & Plan    Lab Results   Component Value Date    HGBA1C 12 0 (H) 10/26/2018       Recent Labs      11/02/18   1104  11/02/18   1601  11/02/18   2021 11/03/18   0720   POCGLU  134  190*  181*  97       Blood Sugar Average: Last 72 hrs:  (P) 182 [id number]     · DKA resolved  · Blood sugars well controlled at this time  · Continue b i d  Lantus and sliding scale insulin  · Goal blood sugar under 180     Acute respiratory failure secondary to septic shockresolved as of 10/28/2018   Assessment & Plan    · Resolved  ·  Patient extubated on 10/26  · Now on room air     Lethargyresolved as of 10/27/2018   Assessment & Plan    Likely related hypoactive delirium  Continue environmental controls  Seroquel HS  Delirium   Assessment & Plan    · Likely secondary to ICU delirium and sleep deprivation  · Cont Seroquel 50mg at bedtime, patient did sleep well again overnight  · Consider Psych consult for any further intervention     Esophagitis   Assessment & Plan    · EGD on 10/31: duodenal bulb ulcer with possible candida esophagitis  · Fluconazole for 14 day course through 11/12  Currently day 3 of 14  · Protonix PO      Severe protein-calorie malnutrition (HCC)   Assessment & Plan    Malnutrition Findings:     patient with temporal wasting and muscle wasting  Appreciate nutrition input       BMI Findings: Body mass index is 26 26 kg/m²  Plan:    Neuro:   · Delirium: CAM ICU positive yes   · If yes, intervention:  Environmental controls  Seroquel HS  · Pain controlled with:  P r n  Acetaminophen  · Pain score: none  · Regulate sleep/wake cycle    CV:     · Severe sepsis with septic shock:  Likely source is chest wall abscess and MSSA bacteremia  Shock resolved pressors weaned off since October 30th  Continue current antibiotic regimen  Patient will require outpatient antibiotics through November 25th  · Paroxysmal AFib:  No anticoagulation at this time in light of GI bleeding  Currently sinus rhythm  Monitor on telemetry  · Systolic heart failure with an EF of 20%  Repeat echocardiogram   Could represent a myocardial stunning secondary to sepsis  Cardiology consulted  If EF remains low  Patient may require life vest prior to discharge    Patient still unable tolerate beta-blocker  · Rhythm: NSR  · Follow rhythm on telemetry    Pulm:   · Acute respiratory failure secondary to septic shock  Resolved  Patient extubated on 10/27  Now room air    GI:   · Nutrition/diet plan:  Non ulcerogenic diet  · Acute GI bleeding and acute blood loss anemia secondary to duodenal ulcer  EGD performed October 30th with intervention  No further episodes of bleeding  Continue b i d  Protonix  Patient has received 5 units packed red blood cells  Hemoglobin currently is 8 3  Continue to monitor  FEN:   · No IV fluids  · Fluid/Diuretic plan: No intervention  · Goal 24 hour fluid balance:  Net even  · Electrolytes repleted: yes  · Goal: K >4 0, Mag >2 0, and Phos >3 0    :   · Acute kidney injury:  Resolved  Likely pre renal secondary to severe sepsis with septic shock  Urine output adequate at this time  Patient is diuretic dependent as an outpatient  Continue to monitor  · BPH:  Patient straight cath x2 for volumes greater than 700 cc  Start Flomax  Repeat voiding trial  ·   Indwelling Gordon present: yes   · Urinary catheter still needed for Strict I and O in a critically ill patient and Failed voiding trial     ID:   · Abscess of chest wall:  Wound VAC removed today  No further plans for OR washout  Continue IV antibiotics  · MSSA bacteremia:  Patient will need IV antibiotics through November 25th  · PICC line in place  · Blood cultures from October 29th with no growth to date  · Abx ordered: Ancef and fluconazole  Patient will need IV antibiotics through November 25th  · Day # 6 of Ancef  Day 10 of antibiotic therapy  · Trend temps and WBC count    Heme:   · Acute blood loss anemia secondary to acute GI bleeding  EGD with intervention of ulcer on duodenal bulb  No further episodes of melena  Patient has been transfused a total of 5 units packed red blood cells  Hemoglobin stable at 8 3   B i d  Protonix  Continue to monitor    · Esophagitis: Incidentally found during EGD  Continue with fluconazole  · Trend hgb and plts    Endo:   · Type 2 diabetes:  Patient arrived hospital in DKA, now resolved  Blood sugars well controlled on 15 units of Lantus with 2 units of Humalog with meals and sliding scale coverage  Msk/Skin:  · Mobility goal:  Out of bed  · PT consult: yes  · OT consult: yes  · Frequent turning and off-loading    Family:  · Family updated within 24 hours: Yes   · Family meeting planned today: no     Lines:  · PICC line  · Gordon catheter    VTE Prophylaxis:  · Pharmacologic Prophylaxis: Reason for no pharmacologic prophylaxis GI bleeding  · Mechanical Prophylaxis: sequential compression device    Disposition: Continue Stepdown    Code Status: Level 2 - DNAR: but accepts endotracheal intubation    Counseling / Coordination of Care  Total time spent today 48 minutes  Greater than 50% of total time was spent with the patient and / or family counseling and / or coordination of care  A description of the counseling / coordination of care: Discussed with patient  Discussed with nursing  Discussed with attending   ______________________________________________________________________    HPI/24hr events:  Patient received 1 unit packed red blood cells yesterday  Hemoglobin remained stable  Patient slept well overnight on Seroquel  More oriented this morning than reported previously  ______________________________________________________________________    Physical Exam:   Physical Exam   Constitutional: Vital signs are normal  He is cooperative  He has a sickly appearance  No distress  Eyes: Pupils are equal, round, and reactive to light  No scleral icterus  Neck: Neck supple  No JVD present  Cardiovascular: Normal rate and regular rhythm  Exam reveals no gallop and no friction rub  No murmur heard  Pulmonary/Chest: Effort normal and breath sounds normal    Abdominal: Soft  He exhibits distension  There is no tenderness  Musculoskeletal: Normal range of motion  He exhibits no edema  Neurological: He is alert  He is disoriented  No cranial nerve deficit  Skin: Skin is warm and dry  Capillary refill takes less than 2 seconds  He is not diaphoretic        ______________________________________________________________________  Vitals:    18 0413 18 0500 18 0600 18 0718   BP:  93/58 94/54    BP Location:       Pulse:  100 100    Resp:  15 20    Temp: 97 8 °F (36 6 °C)   (!) 97 °F (36 1 °C)   TempSrc: Temporal   Temporal   SpO2:  97% 97%    Weight:   79 5 kg (175 lb 4 3 oz)    Height:         Arterial Line BP: 77/68  Arterial Line MAP (mmHg): 72 mmHg     Temperature:   Temp (24hrs), Av 6 °F (36 4 °C), Min:97 °F (36 1 °C), Max:97 9 °F (36 6 °C)    Current Temperature: (!) 97 °F (36 1 °C)    Weights:   IBW: 69 55 kg    Body mass index is 26 26 kg/m²  Weight (last 2 days)     Date/Time   Weight    18 0600  79 5 (175 27)    18 0600  76 5 (168 65)    18 0600  76 2 (167 99)              Hemodynamic Monitoring:  N/A       Non-Invasive/Invasive Ventilation Settings:  Respiratory    Lab Data (Last 4 hours)    None         O2/Vent Data (Last 4 hours)    None              No results found for: PHART, PDW5NGA, PO2ART, EXH3QGF, U9FUCSOZ, BEART, SOURCE  SpO2: SpO2: 97 %, SpO2 Activity: SpO2 Activity: At Rest, SpO2 Device: O2 Device: None (Room air)    Intake and Outputs:  I/O       701 -  07 -  07 -  07    P  O  360 480     I V  (mL/kg) 119 8 (1 6) 135 8 (1 7)     Blood  350     IV Piggyback  350     Total Intake(mL/kg) 479 8 (6 3) 1315 8 (16 6)     Urine (mL/kg/hr) 446 (0 2) 1775 (0 9)     Total Output 446 1775      Net +33 8 -459 2             Unmeasured Urine Occurrence  2 x     Unmeasured Stool Occurrence 2 x 1 x             Nutrition:        Diet Orders            Start     Ordered    18 1315  Diet GI; Non Ulcerogenic  Diet effective now Question Answer Comment   Diet Type GI    GI Non Ulcerogenic    RD to adjust diet per protocol? Yes        11/02/18 1314    11/02/18 1113  Dietary nutrition supplements  Once     Question Answer Comment   Select Supplement: Ensure Enlive-Vanilla    Frequency Lunch, Dinner        11/02/18 1112    10/29/18 1450  Room Service  Once     Question:  Type of Service  Answer:  Room Service - Appropriate with Assistance    10/29/18 1452          Labs:     Results from last 7 days  Lab Units 11/03/18  0457 11/02/18  0632 11/01/18  1532 11/01/18  0510   WBC Thousand/uL 9 74 12 09*  --  12 39*   HEMOGLOBIN g/dL 8 5* 7 6* 8 6* 8 2*   HEMATOCRIT % 26 7* 23 3* 25 8* 25 1*   PLATELETS Thousands/uL 196 188  --  190   MONO PCT % 4 4  --  1*       Results from last 7 days  Lab Units 11/03/18  0457 11/02/18  7858 11/01/18  0510 10/31/18  0513  10/29/18  0548   POTASSIUM mmol/L 3 8 3 5 3 4* 4 0  < > 3 4*   CHLORIDE mmol/L 108 110* 109* 108  < > 105   CO2 mmol/L 26 25 25 22  < > 25   BUN mg/dL 21 24 31* 43*  < > 40*   CREATININE mg/dL 0 89 0 92 0 98 1 19  < > 1 32*   CALCIUM mg/dL 7 2* 7 1* 7 2* 7 5*  < > 7 7*   ALK PHOS U/L  --   --   --  63  --  71   ALT U/L  --   --   --  14  --  20   AST U/L  --   --   --  22  --  22   < > = values in this interval not displayed      Results from last 7 days  Lab Units 11/03/18  0457 11/02/18  0632 11/01/18  0510   MAGNESIUM mg/dL 2 0 2 1 2 2     Lab Results   Component Value Date    PHOS 2 1 (L) 11/03/2018    PHOS 1 9 (L) 11/02/2018    PHOS 2 3 11/01/2018        Results from last 7 days  Lab Units 10/29/18  0548   INR  1 04       0  Lab Value Date/Time   TROPONINI <0 02 10/25/2018 1534   TROPONINI <0 02 05/09/2018 1927         ABG:  Lab Results   Component Value Date    PHART 7 539 (H) 10/30/2018    SCZ9KIT 25 6 (LL) 10/30/2018    PO2ART 70 7 (L) 10/30/2018    KKG4HEB 21 3 (L) 10/30/2018    BEART -0 9 10/30/2018    SOURCE Radial, Left 10/30/2018       Imaging: CXR:   XR chest portable ICU   Final Result      Cardiomegaly  Bibasilar parenchymal densities unchanged  Workstation performed: [id number]         IR PICC line   Final Result   Impression:       Successful placement of a 49cm right upper extremity double lumen Power PICC  Workstation performed: [id number]         XR chest portable ICU   Final Result      No significant interval change since 10/29/2018            Workstation performed: [id number]         XR chest portable   Final Result   Persistent right basilar pleural effusion and associated probable atelectasis  Workstation performed: [id number]         XR chest portable ICU   Final Result      Endotracheal tube ending 3 3 cm above the yohana  Consolidation at the right lung base  Workstation performed: [id number]         CT chest abdomen pelvis wo contrast   Final Result      Patchy airspace opacities are seen in the right lower lobe superior segment  There is a large dense consolidation in the right middle lobe, lateral segment, nonspecific but suspicious for infection/pneumonia in the appropriate clinical setting  Cardiomegaly, bilateral pleural effusions, and body wall edema suggests a superimposed component of fluid overload/CHF  Multiple areas of skin thickening with associated subjacent complex fluid are seen in the superficial soft tissues of the left pectoralis region (for example axial image 30), as well as in the posterior and superior left thorax (axial image 12), as    described  Consider cellulitis with developing abscesses  In addition, a few bubbles of air are seen in the anterior left chest subcutaneous tissues (axial image 8) which could be related to recent intervention but also raises suspicion for possible    gas-forming infection  Partially distended bladder with catheter seen within the bladder lumen  Mild circumferential bladder wall thickening noted, probably exaggerated by underdistention    A cystitis could be considered in the appropriate clinical setting  Renal cysts, degenerative changes of the shoulder, spine, and hips, and other findings as above  Findings discussed with FRITZ Alexis by Dr Ana Cole at 4:18 AM on 10/26/2018  Workstation performed: [id number]         CT head wo contrast   Final Result      No acute intracranial abnormality  Other nonacute findings as above  Workstation performed: [id number]         XR spine lumbar 2 or 3 views injury   Final Result      Levoscoliosis and moderate multilevel degenerative change  Workstation performed: [id number]         XR chest 1 view   Final Result      Focal consolidation in the right lower lobe  This may represent pneumonia  If there is any trauma to the right chest, pulmonary contusion is a consideration  Recommend follow up to resolution  The study was marked in EPIC for significant notification  Workstation performed: [id number]         XR spine thoracic 3 views   Final Result      No acute osseous abnormality  Workstation performed: [id number]         XR shoulder 2+ views LEFT   Final Result      No acute osseous abnormality  Workstation performed: [id number]         CT spine cervical without contrast   Final Result      No cervical spine fracture or traumatic malalignment  Workstation performed: [id number]         CT head without contrast   Final Result      No acute intracranial abnormality  Workstation performed: [id number]            I have personally reviewed pertinent reports  and I have personally reviewed pertinent films in PACS    EKG: NSR on tele    Micro:  Lab Results   Component Value Date    BLOODCX No Growth After 4 Days  10/29/2018    BLOODCX No Growth After 4 Days   10/29/2018    BLOODCX Staphylococcus aureus (A) 10/25/2018    URINECX >100,000 cfu/ml  10/25/2018    URINECX >100,000 cfu/ml Klebsiella oxytoca (A) 05/09/2018 WOUNDCULT 4+ Growth of Staphylococcus aureus (A) 10/26/2018    WOUNDCULT 4+ Growth of Staphylococcus aureus (A) 10/25/2018       Allergies: No Known Allergies  Medications:   Scheduled Meds:    Current Facility-Administered Medications:  acetaminophen 650 mg Oral Q6H PRN MIGUEL Schmidt    atorvastatin 10 mg Oral Daily Jaz Bang MD    cefazolin 2,000 mg Intravenous Q8H Jaz Bang MD Last Rate: 2,000 mg (11/03/18 0543)   collagenase  Topical Daily Dorothea Bates MD    fluconazole 100 mg Oral Q24H Jaz Bang MD    insulin glargine 15 Units Subcutaneous HS Jaz Bang MD    insulin lispro 2-12 Units Subcutaneous HS Jaz Bang MD    insulin lispro 2-12 Units Subcutaneous TID [facility] Jaz Bang MD    metoprolol tartrate 12 5 mg Oral Q12H Robby Richardson MD    pantoprazole 40 mg Oral BID AC MIGUEL Schmidt    QUEtiapine 50 mg Oral HS Jaz Bang MD    senna 1 tablet Oral HS Jaz Bang MD      Continuous Infusions:   PRN Meds:    acetaminophen 650 mg Q6H PRN       Invasive lines and devices: Invasive Devices     Peripherally Inserted Central Catheter Line            PICC Line 35/73/23 Right Basilic 2 days          Drain            Negative Pressure Wound Therapy (V A C ) Chest Anterior; Left 7 days    Urethral Catheter Non-latex 16 Fr  less than 1 day                   SIGNATURE: Torito Phoenix PA-C  DATE: November 3, 2018

## 2018-11-04 ENCOUNTER — ANESTHESIA EVENT (INPATIENT)
Dept: GASTROENTEROLOGY | Facility: AMBULARY SURGERY CENTER | Age: 78
DRG: 853 | End: 2018-11-04
Payer: MEDICARE

## 2018-11-04 PROBLEM — R41.0 DELIRIUM: Status: RESOLVED | Noted: 2018-11-01 | Resolved: 2018-11-04

## 2018-11-04 PROBLEM — K20.90 ESOPHAGITIS: Status: RESOLVED | Noted: 2018-10-31 | Resolved: 2018-11-04

## 2018-11-04 LAB
ABO GROUP BLD BPU: NORMAL
ALBUMIN SERPL BCP-MCNC: 1.4 G/DL (ref 3.5–5)
ALBUMIN SERPL BCP-MCNC: 1.6 G/DL (ref 3.5–5)
ALP SERPL-CCNC: 58 U/L (ref 46–116)
ALP SERPL-CCNC: 64 U/L (ref 46–116)
ALT SERPL W P-5'-P-CCNC: 6 U/L (ref 12–78)
ALT SERPL W P-5'-P-CCNC: 6 U/L (ref 12–78)
ANION GAP SERPL CALCULATED.3IONS-SCNC: 7 MMOL/L (ref 4–13)
ANION GAP SERPL CALCULATED.3IONS-SCNC: 9 MMOL/L (ref 4–13)
AST SERPL W P-5'-P-CCNC: 11 U/L (ref 5–45)
AST SERPL W P-5'-P-CCNC: 15 U/L (ref 5–45)
BASOPHILS # BLD AUTO: 0.02 THOUSANDS/ΜL (ref 0–0.1)
BASOPHILS NFR BLD AUTO: 0 % (ref 0–1)
BILIRUB SERPL-MCNC: 0.3 MG/DL (ref 0.2–1)
BILIRUB SERPL-MCNC: 0.3 MG/DL (ref 0.2–1)
BPU ID: NORMAL
BUN SERPL-MCNC: 31 MG/DL (ref 5–25)
BUN SERPL-MCNC: 33 MG/DL (ref 5–25)
CA-I BLD-SCNC: 1.05 MMOL/L (ref 1.12–1.32)
CALCIUM SERPL-MCNC: 7 MG/DL (ref 8.3–10.1)
CALCIUM SERPL-MCNC: 7.5 MG/DL (ref 8.3–10.1)
CHLORIDE SERPL-SCNC: 107 MMOL/L (ref 100–108)
CHLORIDE SERPL-SCNC: 108 MMOL/L (ref 100–108)
CO2 SERPL-SCNC: 24 MMOL/L (ref 21–32)
CO2 SERPL-SCNC: 24 MMOL/L (ref 21–32)
CREAT SERPL-MCNC: 0.94 MG/DL (ref 0.6–1.3)
CREAT SERPL-MCNC: 1 MG/DL (ref 0.6–1.3)
CROSSMATCH: NORMAL
EOSINOPHIL # BLD AUTO: 0.02 THOUSAND/ΜL (ref 0–0.61)
EOSINOPHIL NFR BLD AUTO: 0 % (ref 0–6)
ERYTHROCYTE [DISTWIDTH] IN BLOOD BY AUTOMATED COUNT: 16.4 % (ref 11.6–15.1)
ERYTHROCYTE [DISTWIDTH] IN BLOOD BY AUTOMATED COUNT: 17.3 % (ref 11.6–15.1)
ERYTHROCYTE [DISTWIDTH] IN BLOOD BY AUTOMATED COUNT: 18.7 % (ref 11.6–15.1)
GFR SERPL CREATININE-BSD FRML MDRD: 71 ML/MIN/1.73SQ M
GFR SERPL CREATININE-BSD FRML MDRD: 76 ML/MIN/1.73SQ M
GLUCOSE SERPL-MCNC: 121 MG/DL (ref 65–140)
GLUCOSE SERPL-MCNC: 185 MG/DL (ref 65–140)
GLUCOSE SERPL-MCNC: 200 MG/DL (ref 65–140)
GLUCOSE SERPL-MCNC: 230 MG/DL (ref 65–140)
GLUCOSE SERPL-MCNC: 85 MG/DL (ref 65–140)
GLUCOSE SERPL-MCNC: 87 MG/DL (ref 65–140)
HCT VFR BLD AUTO: 21.9 % (ref 36.5–49.3)
HCT VFR BLD AUTO: 34.8 % (ref 36.5–49.3)
HCT VFR BLD AUTO: 35.4 % (ref 36.5–49.3)
HCT VFR BLD AUTO: 36.7 % (ref 36.5–49.3)
HGB BLD-MCNC: 11.6 G/DL (ref 12–17)
HGB BLD-MCNC: 11.9 G/DL (ref 12–17)
HGB BLD-MCNC: 11.9 G/DL (ref 12–17)
HGB BLD-MCNC: 6.9 G/DL (ref 12–17)
IMM GRANULOCYTES # BLD AUTO: 0.27 THOUSAND/UL (ref 0–0.2)
IMM GRANULOCYTES NFR BLD AUTO: 2 % (ref 0–2)
LYMPHOCYTES # BLD AUTO: 1.13 THOUSANDS/ΜL (ref 0.6–4.47)
LYMPHOCYTES NFR BLD AUTO: 9 % (ref 14–44)
MAGNESIUM SERPL-MCNC: 2.1 MG/DL (ref 1.6–2.6)
MCH RBC QN AUTO: 30.7 PG (ref 26.8–34.3)
MCH RBC QN AUTO: 30.9 PG (ref 26.8–34.3)
MCH RBC QN AUTO: 31.2 PG (ref 26.8–34.3)
MCHC RBC AUTO-ENTMCNC: 31.5 G/DL (ref 31.4–37.4)
MCHC RBC AUTO-ENTMCNC: 33.3 G/DL (ref 31.4–37.4)
MCHC RBC AUTO-ENTMCNC: 33.6 G/DL (ref 31.4–37.4)
MCV RBC AUTO: 92 FL (ref 82–98)
MCV RBC AUTO: 93 FL (ref 82–98)
MCV RBC AUTO: 98 FL (ref 82–98)
MONOCYTES # BLD AUTO: 0.52 THOUSAND/ΜL (ref 0.17–1.22)
MONOCYTES NFR BLD AUTO: 4 % (ref 4–12)
NEUTROPHILS # BLD AUTO: 11.15 THOUSANDS/ΜL (ref 1.85–7.62)
NEUTS SEG NFR BLD AUTO: 85 % (ref 43–75)
NRBC BLD AUTO-RTO: 0 /100 WBCS
PHOSPHATE SERPL-MCNC: 2 MG/DL (ref 2.3–4.1)
PLATELET # BLD AUTO: 158 THOUSANDS/UL (ref 149–390)
PLATELET # BLD AUTO: 161 THOUSANDS/UL (ref 149–390)
PLATELET # BLD AUTO: 164 THOUSANDS/UL (ref 149–390)
PMV BLD AUTO: 10.6 FL (ref 8.9–12.7)
PMV BLD AUTO: 10.9 FL (ref 8.9–12.7)
PMV BLD AUTO: 11.3 FL (ref 8.9–12.7)
POTASSIUM SERPL-SCNC: 3.5 MMOL/L (ref 3.5–5.3)
POTASSIUM SERPL-SCNC: 4.5 MMOL/L (ref 3.5–5.3)
PROCALCITONIN SERPL-MCNC: 0.16 NG/ML
PROT SERPL-MCNC: 3.8 G/DL (ref 6.4–8.2)
PROT SERPL-MCNC: 4.5 G/DL (ref 6.4–8.2)
RBC # BLD AUTO: 2.23 MILLION/UL (ref 3.88–5.62)
RBC # BLD AUTO: 3.78 MILLION/UL (ref 3.88–5.62)
RBC # BLD AUTO: 3.81 MILLION/UL (ref 3.88–5.62)
SODIUM SERPL-SCNC: 139 MMOL/L (ref 136–145)
SODIUM SERPL-SCNC: 140 MMOL/L (ref 136–145)
UNIT DISPENSE STATUS: NORMAL
UNIT PRODUCT CODE: NORMAL
UNIT RH: NORMAL
WBC # BLD AUTO: 13.11 THOUSAND/UL (ref 4.31–10.16)
WBC # BLD AUTO: 15.93 THOUSAND/UL (ref 4.31–10.16)
WBC # BLD AUTO: 18.09 THOUSAND/UL (ref 4.31–10.16)

## 2018-11-04 PROCEDURE — 99232 SBSQ HOSP IP/OBS MODERATE 35: CPT | Performed by: INTERNAL MEDICINE

## 2018-11-04 PROCEDURE — C9113 INJ PANTOPRAZOLE SODIUM, VIA: HCPCS | Performed by: PHYSICIAN ASSISTANT

## 2018-11-04 PROCEDURE — 99232 SBSQ HOSP IP/OBS MODERATE 35: CPT | Performed by: SURGERY

## 2018-11-04 PROCEDURE — 93005 ELECTROCARDIOGRAM TRACING: CPT

## 2018-11-04 PROCEDURE — 43255 EGD CONTROL BLEEDING ANY: CPT | Performed by: INTERNAL MEDICINE

## 2018-11-04 PROCEDURE — 94002 VENT MGMT INPAT INIT DAY: CPT

## 2018-11-04 PROCEDURE — P9021 RED BLOOD CELLS UNIT: HCPCS

## 2018-11-04 PROCEDURE — 85025 COMPLETE CBC W/AUTO DIFF WBC: CPT | Performed by: PHYSICIAN ASSISTANT

## 2018-11-04 PROCEDURE — 85018 HEMOGLOBIN: CPT | Performed by: PHYSICIAN ASSISTANT

## 2018-11-04 PROCEDURE — 84145 PROCALCITONIN (PCT): CPT | Performed by: PHYSICIAN ASSISTANT

## 2018-11-04 PROCEDURE — 80053 COMPREHEN METABOLIC PANEL: CPT | Performed by: PHYSICIAN ASSISTANT

## 2018-11-04 PROCEDURE — 99233 SBSQ HOSP IP/OBS HIGH 50: CPT | Performed by: INTERNAL MEDICINE

## 2018-11-04 PROCEDURE — 83735 ASSAY OF MAGNESIUM: CPT | Performed by: PHYSICIAN ASSISTANT

## 2018-11-04 PROCEDURE — 82330 ASSAY OF CALCIUM: CPT | Performed by: PHYSICIAN ASSISTANT

## 2018-11-04 PROCEDURE — 84100 ASSAY OF PHOSPHORUS: CPT | Performed by: PHYSICIAN ASSISTANT

## 2018-11-04 PROCEDURE — 0W3P8ZZ CONTROL BLEEDING IN GASTROINTESTINAL TRACT, VIA NATURAL OR ARTIFICIAL OPENING ENDOSCOPIC: ICD-10-PCS | Performed by: INTERNAL MEDICINE

## 2018-11-04 PROCEDURE — 82948 REAGENT STRIP/BLOOD GLUCOSE: CPT

## 2018-11-04 PROCEDURE — 94760 N-INVAS EAR/PLS OXIMETRY 1: CPT

## 2018-11-04 PROCEDURE — 85014 HEMATOCRIT: CPT | Performed by: PHYSICIAN ASSISTANT

## 2018-11-04 PROCEDURE — 85027 COMPLETE CBC AUTOMATED: CPT | Performed by: PHYSICIAN ASSISTANT

## 2018-11-04 RX ORDER — SUCCINYLCHOLINE/SOD CL,ISO/PF 100 MG/5ML
SYRINGE (ML) INTRAVENOUS AS NEEDED
Status: DISCONTINUED | OUTPATIENT
Start: 2018-11-04 | End: 2018-11-04 | Stop reason: SURG

## 2018-11-04 RX ORDER — FUROSEMIDE 10 MG/ML
40 INJECTION INTRAMUSCULAR; INTRAVENOUS ONCE
Status: COMPLETED | OUTPATIENT
Start: 2018-11-04 | End: 2018-11-04

## 2018-11-04 RX ORDER — METOCLOPRAMIDE HYDROCHLORIDE 5 MG/ML
10 INJECTION INTRAMUSCULAR; INTRAVENOUS ONCE
Status: COMPLETED | OUTPATIENT
Start: 2018-11-04 | End: 2018-11-04

## 2018-11-04 RX ORDER — PROPOFOL 10 MG/ML
INJECTION, EMULSION INTRAVENOUS AS NEEDED
Status: DISCONTINUED | OUTPATIENT
Start: 2018-11-04 | End: 2018-11-04 | Stop reason: SURG

## 2018-11-04 RX ORDER — PROPOFOL 10 MG/ML
INJECTION, EMULSION INTRAVENOUS CONTINUOUS PRN
Status: DISCONTINUED | OUTPATIENT
Start: 2018-11-04 | End: 2018-11-04 | Stop reason: SURG

## 2018-11-04 RX ORDER — INSULIN GLARGINE 100 [IU]/ML
10 INJECTION, SOLUTION SUBCUTANEOUS
Status: DISCONTINUED | OUTPATIENT
Start: 2018-11-04 | End: 2018-11-05

## 2018-11-04 RX ADMIN — SODIUM CHLORIDE 8 MG/HR: 9 INJECTION, SOLUTION INTRAVENOUS at 09:14

## 2018-11-04 RX ADMIN — SODIUM CHLORIDE 80 MG: 9 INJECTION, SOLUTION INTRAVENOUS at 08:55

## 2018-11-04 RX ADMIN — PANTOPRAZOLE SODIUM 40 MG: 40 TABLET, DELAYED RELEASE ORAL at 07:56

## 2018-11-04 RX ADMIN — METOPROLOL TARTRATE 12.5 MG: 25 TABLET ORAL at 22:28

## 2018-11-04 RX ADMIN — CEFAZOLIN SODIUM 2000 MG: 2 SOLUTION INTRAVENOUS at 06:36

## 2018-11-04 RX ADMIN — PROPOFOL 80 MG: 10 INJECTION, EMULSION INTRAVENOUS at 09:16

## 2018-11-04 RX ADMIN — INSULIN LISPRO 4 UNITS: 100 INJECTION, SOLUTION INTRAVENOUS; SUBCUTANEOUS at 12:34

## 2018-11-04 RX ADMIN — INSULIN LISPRO 3 UNITS: 100 INJECTION, SOLUTION INTRAVENOUS; SUBCUTANEOUS at 08:29

## 2018-11-04 RX ADMIN — TAMSULOSIN HYDROCHLORIDE 0.4 MG: 0.4 CAPSULE ORAL at 17:28

## 2018-11-04 RX ADMIN — CALCIUM GLUCONATE 2 G: 98 INJECTION, SOLUTION INTRAVENOUS at 08:36

## 2018-11-04 RX ADMIN — COLLAGENASE SANTYL 1 APPLICATION: 250 OINTMENT TOPICAL at 08:32

## 2018-11-04 RX ADMIN — METOCLOPRAMIDE 10 MG: 5 INJECTION, SOLUTION INTRAMUSCULAR; INTRAVENOUS at 08:24

## 2018-11-04 RX ADMIN — PROPOFOL 25 MCG/KG/MIN: 10 INJECTION, EMULSION INTRAVENOUS at 09:16

## 2018-11-04 RX ADMIN — FUROSEMIDE 40 MG: 10 INJECTION, SOLUTION INTRAMUSCULAR; INTRAVENOUS at 10:14

## 2018-11-04 RX ADMIN — CEFAZOLIN SODIUM 2000 MG: 2 SOLUTION INTRAVENOUS at 14:30

## 2018-11-04 RX ADMIN — SODIUM CHLORIDE 8 MG/HR: 9 INJECTION, SOLUTION INTRAVENOUS at 20:24

## 2018-11-04 RX ADMIN — Medication 100 MG: at 09:16

## 2018-11-04 RX ADMIN — ATORVASTATIN CALCIUM 10 MG: 10 TABLET, FILM COATED ORAL at 17:28

## 2018-11-04 RX ADMIN — INSULIN GLARGINE 10 UNITS: 100 INJECTION, SOLUTION SUBCUTANEOUS at 23:02

## 2018-11-04 RX ADMIN — SENNOSIDES 8.6 MG: 8.6 TABLET, FILM COATED ORAL at 22:47

## 2018-11-04 RX ADMIN — QUETIAPINE 50 MG: 25 TABLET ORAL at 22:46

## 2018-11-04 RX ADMIN — INSULIN LISPRO 3 UNITS: 100 INJECTION, SOLUTION INTRAVENOUS; SUBCUTANEOUS at 08:28

## 2018-11-04 RX ADMIN — CEFAZOLIN SODIUM 2000 MG: 2 SOLUTION INTRAVENOUS at 22:30

## 2018-11-04 NOTE — PROGRESS NOTES
General Surgery Progress Note    Chief Complaint: "I just woke up "    Subjective/24 hour/interim events:  Still making good urine  Otherwise, no fevers noted  However, patient has started to have some GI bleeding again and is due for another EGD this morning  Objective:  BP (!) 86/55   Pulse (!) 116   Temp 97 9 °F (36 6 °C) (Temporal)   Resp 17   Ht 5' 8 5" (1 74 m)   Wt 79 5 kg (175 lb 4 3 oz)   SpO2 97%   BMI 26 26 kg/m²   Tmax: 97 9  General: NAD  Pulm: No tachypnea noted  Left Chest/Axilla: The chest and axillary wounds actually look pretty good again this morning  No festering is noted I still do not think that I need to take out any sutures there  No erythema is noted  No drainage or purulence is noted  Back:  Currently dressed with Allevyn dressing that has already been changed this morning  In speaking to OTILIO Chow, this morning similar appearance to yesterday to with the enzymatic breakdown with collagenase working well and yielding the fibrinous exudate  Pertinent labs reviewed  White blood cell count today is 13 11 with a hemoglobin of 6 9  Pertinent images and available reads personally reviewed    Pertinent notes reviewed    Assessment and Plan:  77-year-old male s/p extensive incision, drainage, and debridement of left chest, axilla, and back - hx of Shingles with secondary Ampicillin-Resistant MSSA, currently on IV Ancef  1  Wound instructions for the chest and axilla: Will need daily and prn dressing change with simply ABD and tape  2  Back wound to be treated as previous with Santyl daily and Allevyn dressing  3  Patient to have an EGD today for another GI bleed  Counseling / Coordination of Care  Total floor/unit time spent today 20 minutes  Greater than 50% of total time was spent with the patient and/or family counseling and/or coordination of care    A description of the counseling / coordination of care:  I performed an interim history, pertinent images and labs, performed a physical examination to arrive at the plan delineated above with associated thought processes  Plan conveyed to nurseGala  Plan discussed with Karl Lange PA-C of primary team     *The areas in bold, if present within the assessment and plan, are summary statements/bullet points for nursing and consultant/primary teams  * Points delineated in red, if present, are not points of contention and should not be taken by any practitioner, nurse, therapist, , or anyone else with access to the patient chart  Hand off errors are prevalent and therefore, any changes or updates shown in red are to communicate to the next practitioner, points not discussed in handout but can affect patient care and management (yet not important enough to wake, or disturb, or alert the next practitioner in an emergent fashion)  RENETTA Morrison    [address] [place] [address] [facility]  Office [phone]  Fax [phone]

## 2018-11-04 NOTE — ANESTHESIA PREPROCEDURE EVALUATION
Review of Systems/Medical History  Patient summary reviewed  Chart reviewed  No history of anesthetic complications     Cardiovascular  Exercise tolerance (METS): <4,  Hyperlipidemia, Hypertension , CAD , History of CABG, Dysrhythmias , atrial fibrillation, CHF (Ef 25%) ,   Comment: LEFT VENTRICLE:  The ventricle was moderately dilated  Systolic function was severely reduced by visual assessment  Ejection fraction was estimated to be 20 %  There were no regional wall motion abnormalities  There was mild concentric hypertrophy  Doppler parameters were consistent with abnormal left ventricular relaxation (grade 1 diastolic dysfunction)      LEFT ATRIUM:  The atrium was moderately dilated      MITRAL VALVE:  There was moderate regurgitation      AORTIC VALVE:  There was no evidence for stenosis  There was no regurgitation      TRICUSPID VALVE:  There was mild regurgitation  Pulmonary artery systolic pressure was mildly increased    ,  Pulmonary       GI/Hepatic    GI bleeding , active,        Chronic kidney disease stage 3,        Endo/Other  Diabetes type 2 Insulin,      GYN       Hematology  Anemia acute blood loss anemia,    Comment: Patient very pale looking Musculoskeletal    Arthritis     Neurology   Psychology           Physical Exam    Airway    Mallampati score: II  TM Distance: >3 FB  Neck ROM: full     Dental       Cardiovascular  Cardiovascular exam normal    Pulmonary  Pulmonary exam normal     Other Findings        Anesthesia Plan  ASA Score- 4 Emergent    Anesthesia Type- general with ASA Monitors  Additional Monitors:   Airway Plan: ETT  Comment: Patient had two bites of pudding tdy morning around 7 am  Is tachycardic and tachypneic with very pale look  Blood on flow  Will rapid sequence him   Risk of aspiration aand possible postop ventilation explained in detail        Plan Factors-    Induction- intravenous      Postoperative Plan-     Informed Consent- Anesthetic plan and risks discussed with patient

## 2018-11-04 NOTE — PROGRESS NOTES
Daily Progress Note - Critical Care/ Stepdown   Mili Ortez [de-identified] y o  male MRN: [id number]  Unit/Bed#: ICU 08 Encounter: [id number]    ______________________________________________________________________  Assessment:   Principal Problem:    Abscess of chest wall  Active Problems:    Bacteremia due to Staphylococcus aureus    DM2 (diabetes mellitus, type 2) ([facility])    Acute blood loss anemia    Duodenal ulcer    Gastrointestinal hemorrhage associated with duodenal ulcer    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[facility])    Urinary retention    Candida esophagitis ([facility])    CKD (chronic kidney disease) stage 3, GFR 30-59 ml/min ([facility])    CAD (coronary artery disease)    Cardiomyopathy, ischemic    Atrial fibrillation ([facility])    Severe protein-calorie malnutrition ([facility])  Resolved Problems:    CHRIS (acute kidney injury) ([address] )    Septic shock ([facility])    Hyponatremia    Urinary tract infection    Lactic acid acidosis    Shingles    Respiratory infection    Acute cystitis with hematuria    DKA (diabetic ketoacidoses) ([facility])    Acute respiratory failure secondary to septic shock    Lethargy    Sepsis ([facility])    Cellulitis of left axilla    Esophagitis    Delirium      * Abscess of chest wall   Assessment & Plan    · Aggressive  debridement in OR on 10/26 by Dr Kristel Palma, wound VAC discontinued this a m  · Antibiotics deescalated to Ancef as per ID recs, patient will require IV antibiotics until November 25th  Patient has PICC line for outpatient antibiotic therapy  · ID following  · Blood culture staph aureus x 1 - will treat as bacteremia  · Wound culture: 3+ staph aureus   · Urine cultures > 100,000 mixed containments   · ABX: Ancef day #7  Antibiotic therapy day 11  · Repeat blood cultures from 10/29 no growth to date  · Wound VAC removed 11/3    Continue local wound care       Bacteremia due to Staphylococcus aureus   Assessment & Plan    · Admission Blood culture x 1 MSSA  · Repeat blood cultures no growth to date  · Per ID will need 4 wks antibiotics from negative blood cultures through 11/25  · PICC line in place       Septic shock (HCC)resolved as of 11/2/2018   Assessment & Plan    · Resolved at this time  Vasopressor therapy discontinued October 30th  ·   Source likely presumed cellulitis/absess of chest wall   · Patient was initially Fluid resuscitation with 30ml/kg   · Levophed and vasopressor d/zaida 10/30  · Poor EF 20% on Echo  · [place] protocol - Stress Dose Steroids, Vit C, thiamine- now discontinued 11 1  · ID consult  ·  debridement in OR 10/26 of chest wall cellulitis, wound vac in place   Scant serosanguineous output  · Return to OR for debridement 10/29, wound vac in place  · Wound culture: 3+ staph aureus   · Urine cultures > 100,000 mixed containments   · ABX: Ancef day #6  Fluconazole day #5  Total day 10 of antibiotics  Day   · Valtrex d/zaida  · WBCs normalized  · Repeat blood cultures no growth since 10/29       CHRIS (acute kidney injury) (HCC)resolved as of 10/27/2018   Assessment & Plan    · Resolved  · Likely Pre renal   Likely secondary to septic shock and DKA  · Improving with fluids  · FENa consistent with Pre-renal cause  · Gordon catheter in place     Duodenal ulcer   Assessment & Plan    · Melena developed on 10/30, EGD: duodenal bulb ulcer with possible candida esophagitis  · Repeat episode of melena with associated tachycardia and mild hypotension  Will transfuse an additional 2U PRBC  · Repeat endoscopy this am   · Reglan X1   · protonix gtt  · Transfused 7 units PRBCs this admission         Acute blood loss anemia   Assessment & Plan    · Secondary to bleeding gastric ulcer  · Patient continued with tachycardia and drifting hemoglobin and later developed guanakito melena  · EGD with duodenal bulb ulcer with intervention  · Pt has been Transfused 6 units of packed red blood cells total this admission  1 unit 10/28, 3 units 10/30, 1 unit 11/2  1 unit 11/3     · Large melanotic stool 11/4 early am with associated Hgb drift to 6 9  Will transfuse 2UPRBC and contact GI for repeat scope this am        DM2 (diabetes mellitus, type 2) [facility])   Assessment & Plan    Lab Results   Component Value Date    HGBA1C 12 0 (H) 10/26/2018       Recent Labs      11/03/18   1101  11/03/18   1624  11/03/18   2116  11/04/18   0714   POCGLU  156*  257*  241*  230*     · Blood sugar poorly controlled  Pt PO intake has improved  · Continue 15 U Lantus at bedtime  · Add 3U Humalog with meals  · Continue SSI algorithm 4  · Goal blood sugar under 180           Gastrointestinal hemorrhage associated with duodenal ulcer   Assessment & Plan    · Patient received 7 units packed red blood cells (including 2 U this am)  · hemoglobin 6 9 today  · Continue to follow H&H  · protonix gtt  · EGD this am       Candida esophagitis ([facility] )   Assessment & Plan    · Negative pathology - discontinue fluconizole     Urinary retention   Assessment & Plan    · Pt failed voiding trial 11/3  · Straight cath x 2 for > 700 ml  · Gordon catheter placed 11/3  · Likely secondary to BPH  Will start Flomax and repeat voiding trial 11/5       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HCC)   Assessment & Plan    · Known EF of 20%, no evidence of fluid overload at this time  May require lasix s/p blood transfusion  · Continue to monitor I/Os  · Repeat echocardiogram on Monday  Patient may need LifeVest prior to discharge home if EF is still below 30%  · Continues with episodes of PVCs  · Unable to tolerate metoprolol secondary to borderline hypotension    Some tachycardia is likely related to anemia     CKD (chronic kidney disease) stage 3, GFR 30-59 ml/min ([facility])   Assessment & Plan    · Stable, Baseline 0 9-1 3  · Adequate urine output at this time  · Patient is diuretic dependent at home with torsemide 10 mg daily and Aldactone 12 5 mg daily     Lactic acid acidosisresolved as of 10/26/2018   Assessment & Plan    · Resolved Urinary tract infectionresolved as of 10/29/2018   Assessment & Plan    · Resolved  · Urine culture with greater than 100,000 CFU of mixed contaminants  Hyponatremiaresolved as of 10/27/2018   Assessment & Plan    · Resolved  · Likely hypovolemic hyponatremia in the setting of dehydration, sepsis, and DKA  · Possibly a component of pseudo hyponatremia     Atrial fibrillation (HCC)   Assessment & Plan    · History of AFib in the setting of electrolyte abnormalities in the past   Monitor on telemetry  · Current NSR to Sinus Tach  · Continue to hold on anticoagulation at this time  Particularly in light of recent episode of GI bleeding  · Frequent PVCs on telemetry, cardiology consulted, resume beta blocker when blood pressure can tolerate     Cardiomyopathy, ischemic   Assessment & Plan    · EF prior was 35 to 40%  Recent stress test in May of 2018 showed reversible ischemic changes  Cardiology following as an outpatient with medical management  · ECHO repeat with EF 20% possibly related to sepsis  Repeat echo cardiogram on 11/5  · Cardiac output and indexes while patient was intubated were acceptable  · Frequent PVCs on telemetry  · Cardiology following  · Start metoprolol 12 5 mg BID when BP improves and Sys > 90  Patient is still unable to tolerate beta-blocker  CAD (coronary artery disease)   Assessment & Plan    · Troponin negative  · Continue statin  Hold aspirin in light of GI bleeding  · Echo obtained,  EF 20%     Shinglesresolved as of 11/1/2018   Assessment & Plan    · Now resolved  · Antiviral therapy discontinued on October 31st     Respiratory infectionresolved as of 11/3/2018   Assessment & Plan    · Resolved  ·  Present on admission as evidence by CT scan suggestive of infectious process  · No sputum culture to confirm pneumonia    · Respiratory status improved  Extubated on the 26th  Tolerating room air        Acute cystitis with hematuriaresolved as of 10/29/2018   Assessment & Plan    · Resolved  · Urine culture with mixed roberto  DKA (diabetic ketoacidoses) (HCC)resolved as of 10/26/2018   Assessment & Plan    Lab Results   Component Value Date    HGBA1C 12 0 (H) 10/26/2018       Recent Labs      11/03/18   1101  11/03/18   1624  11/03/18   2116  11/04/18   0714   POCGLU  156*  257*  241*  230*       Blood Sugar Average: Last 72 hrs:  (P) 488 [id number]     · DKA resolved  · Goal blood sugar under 180     Acute respiratory failure secondary to septic shockresolved as of 10/28/2018   Assessment & Plan    · Resolved  ·  Patient extubated on 10/26  · Now on room air     Lethargyresolved as of 10/27/2018   Assessment & Plan    Likely related hypoactive delirium  Continue environmental controls  Seroquel HS  Severe protein-calorie malnutrition ([address] )   Assessment & Plan    Malnutrition Findings:     patient with temporal wasting and muscle wasting  Appreciate nutrition input       BMI Findings: Body mass index is 26 26 kg/m²  Deliriumresolved as of 11/4/2018   Assessment & Plan    · Resolved  · Patient AAOX3  Sleeping well at night  · Cont Seroquel 50mg at bedtime, patient did sleep well again overnight       Esophagitisresolved as of 11/4/2018   Assessment & Plan    · Resolved  · EGD on 10/31: duodenal bulb ulcer with possible candida esophagitis  · Biopsies negative for candida - d/c fluconizole 11/4             Plan:     Neuro:   · Delirium: CAM ICU positive - no  ? If yes, intervention:  Environmental controls  Seroquel HS  · Pain controlled with:  P r n  Acetaminophen  ? Pain score: none  · Regulate sleep/wake cycle     CV:      Severe sepsis with septic shock: Resolved at this time  Vasopressor therapy discontinued October 30th  ·   Source likely presumed cellulitis/absess of chest wall   · Patient was initially Fluid resuscitation with 30ml/kg   · Levophed and vasopressor d/zaida 10/30    · Poor EF 20% on Echo  · [place] protocol - Stress Dose Steroids, Vit C, thiamine- now discontinued 11 1  · ID consult  ·  debridement in OR 10/26 of chest wall cellulitis, wound vac in place   Scant serosanguineous output  · Return to OR for debridement 10/29, wound vac in place  · Wound culture: 3+ staph aureus   · Urine cultures > 100,000 mixed containments   · ABX: Ancef day #6  Fluconazole day #5  Total day 10 of antibiotics  Day   · Valtrex d/zaida  · WBCs normalized  · Repeat blood cultures no growth since 10/29    Paroxysmal AFib:  No anticoagulation at this time in light of GI bleeding  Currently sinus rhythm  Monitor on telemetry  Systolic heart failure with an EF of 20%  Repeat echocardiogram next week prior to discharge  Could represent a myocardial stunning secondary to sepsis  Cardiology consulted  If EF remains low  Patient may require life vest prior to discharge  Patient still unable tolerate beta-blocker  · Rhythm: NSR  ? Follow rhythm on telemetry     Pulm:   · Acute respiratory failure secondary to septic shock  Resolved  Patient extubated on 10/27  Now room air     GI:   · Nutrition/diet plan:  Non ulcerogenic diet  Acute GI bleeding and acute blood loss anemia secondary to duodenal ulcer  EGD performed October 30th with intervention  patient with drifting hemoglobin and melanotic stool is morning  Transfused additional 2 units packed red blood cells  A m  Hemoglobin 6 5  Repeat EGD with 2 additional duodenal ulcers located and intervened on upon  Continue Protonix drip      FEN:   · No IV fluids  · Fluid/Diuretic plan:  Needs diuresis  Lasix IV 40 mg  ? Goal 24 hour fluid balance:  Net even  · Electrolytes repleted: yes  ? Goal: K >4 0, Mag >2 0, and Phos >3 0     :   · Acute kidney injury:  Resolved  Likely pre renal secondary to severe sepsis with septic shock  Urine output adequate at this time  Patient is diuretic dependent as an outpatient  Continue to monitor    · BPH:  Patient straight cath x2 for volumes greater than 700 cc  Start Flomax  Repeat voiding trial  ·   Indwelling Gordon present: yes   ? Urinary catheter still needed for Strict I and O in a critically ill patient and Failed voiding trial      ID:   · Abscess of chest wall:  Wound VAC removed 11/3  No further plans for OR washout  Continue IV antibiotics  · MSSA bacteremia:  Patient will need IV antibiotics through November 25th  · PICC line in place  · Blood cultures from October 29th with no growth to date  · Abx ordered: Ancef  Patient will need IV antibiotics through November 25th  · Trend temps and WBC count     Heme:   · Acute blood loss anemia secondary to acute GI bleeding  repeat hemoglobin after transfusion  ·   Esophagitis:  Incidentally found during EGD  negative biopsy  Discontinue fluconazole  · Trend hgb and plts     Endo:   Type 2 diabetes:  Patient arrived hospital in DKA, now resolved  blood sugars elevated  Patient with increased p o  Intake  Begin Humalog with meals        Msk/Skin:  · Mobility goal:  Out of bed  ? PT consult: yes  ? OT consult: yes  · Frequent turning and off-loading     Family:  · Family updated within 24 hours: Yes   · Family meeting planned today: no      Lines:  · PICC line  · Gordon catheter  VTE Prophylaxis:  · Pharmacologic Prophylaxis: Reason for no pharmacologic prophylaxis Acute upper GI bleeding and hemorrhagic shock  · Mechanical Prophylaxis: sequential compression device    Disposition: Continue Stepdown    Code Status: Level 2 - DNAR: but accepts endotracheal intubation    Counseling / Coordination of Care  Total time spent today 52 minutes  Greater than 50% of total time was spent with the patient and / or family counseling and / or coordination of care  A description of the counseling / coordination of care: Discussed with patient  Discussed with attending  Discussed with Gastroenterology  Discussed with nursing    Discussed with patient's family  ______________________________________________________________________    HPI/24hr events:  Patient with tachycardia  Repeat hemoglobin revealed hemoglobin of 7 5  Patient transfuse 1 unit packed red blood cells  Continue with tachycardia overnight  This a m  Approximately 4:00 a m  Patient with large melanotic stool  Repeat hemoglobin 6 5  Patient transfused 2 units packed red blood cells  Repeat EGD with identification of 2 additional duodenal ulcers  If patient requires reintervention consider contacting interventional radiology  ______________________________________________________________________    Physical Exam:   Physical Exam   Constitutional: He is oriented to person, place, and time  He is cooperative  He has a sickly appearance  He appears ill  No distress  HENT:   Head: Normocephalic and atraumatic  Eyes: Pupils are equal, round, and reactive to light  No scleral icterus  Neck: Normal range of motion  No JVD present  Cardiovascular: Normal heart sounds, intact distal pulses and normal pulses  An irregularly irregular rhythm present  Frequent extrasystoles are present  Tachycardia present  Frequent PVCs  Mild hypotension    Pulmonary/Chest: Effort normal  No respiratory distress  He has no rales  Abdominal: Soft  He exhibits no distension  There is no tenderness  Musculoskeletal: Normal range of motion  He exhibits no edema  Neurological: He is alert and oriented to person, place, and time  Skin: Skin is warm  Capillary refill takes less than 2 seconds  There is pallor     Large incision of left axilla       ______________________________________________________________________  Vitals:    11/04/18 0830 11/04/18 0845 11/04/18 0900 11/04/18 0908   BP: (!) 75/48 (!) 85/54 119/71 114/61   BP Location:       Pulse: (!) 125 (!) 123 (!) 120 (!) 119   Resp: 21 17 19 19   Temp: (!) 97 1 °F (36 2 °C)   (!) 97 1 °F (36 2 °C)   TempSrc:       SpO2: 98% 98% 98%    Weight: Height:         Arterial Line BP: 77/68  Arterial Line MAP (mmHg): 72 mmHg     Temperature:   Temp (24hrs), Av 6 °F (36 4 °C), Min:97 °F (36 1 °C), Max:97 9 °F (36 6 °C)    Current Temperature: (!) 97 1 °F (36 2 °C)    Weights:   IBW: 69 55 kg    Body mass index is 26 26 kg/m²  Weight (last 2 days)     Date/Time   Weight    18 06  79 5 (175 27)    18 06  76 5 (168 65)              Hemodynamic Monitoring:  N/A       Non-Invasive/Invasive Ventilation Settings:  Respiratory    Lab Data (Last 4 hours)    None         O2/Vent Data (Last 4 hours)    None              No results found for: PHART, ALE1IER, PO2ART, TLB4KPV, W8RDCGZH, BEART, SOURCE  SpO2: SpO2: 98 %, SpO2 Activity: SpO2 Activity: At Rest, SpO2 Device: O2 Device: None (Room air)    Intake and Outputs:  I/O       701 -  07 -  07 -  0700    P  O  480 390     I V  (mL/kg) 135 8 (1 7)      Blood 350 350     IV Piggyback 350 200 50    Total Intake(mL/kg) 1315 8 (16 6) 940 (11 8) 50 (0 6)    Urine (mL/kg/hr) 1775 (0 9) 1360 (0 7)     Total Output 1775 1360      Net -459 2 -420 +50           Unmeasured Urine Occurrence 2 x      Unmeasured Stool Occurrence 1 x 1 x         UOP:   /hour     Nutrition:        Diet Orders            Start     Ordered    18 0741  Diet NPO  Diet effective now     Question Answer Comment   Diet Type NPO    RD to adjust diet per protocol?  Yes        18 0741    18 1113  Dietary nutrition supplements  Once     Question Answer Comment   Select Supplement: Ensure Enlive-Vanilla    Frequency Lunch, Dinner        18 1112    10/29/18 1450  Room Service  Once     Question:  Type of Service  Answer:  Room Service - Appropriate with Assistance    10/29/18 1452            Labs:     Results from last 7 days  Lab Units 18  0632 18  1611 18  0457 18  0632   WBC Thousand/uL 13 11*  --  9 74 12 09*   HEMOGLOBIN g/dL 6 9* 7 8* 8 5* 7 6* HEMATOCRIT % 21 9*  --  26 7* 23 3*   PLATELETS Thousands/uL 164  --  196 188   NEUTROS PCT % 85*  --   --   --    MONOS PCT % 4  --   --   --    MONO PCT %  --   --  4 4       Results from last 7 days  Lab Units 11/04/18  0632 11/03/18  1611 11/03/18  0457  10/31/18  0513  10/29/18  0548   POTASSIUM mmol/L 4 5 4 3 3 8  < > 4 0  < > 3 4*   CHLORIDE mmol/L 108 107 108  < > 108  < > 105   CO2 mmol/L 24 24 26  < > 22  < > 25   BUN mg/dL 31* 28* 21  < > 43*  < > 40*   CREATININE mg/dL 0 94 1 06 0 89  < > 1 19  < > 1 32*   CALCIUM mg/dL 7 0* 7 0* 7 2*  < > 7 5*  < > 7 7*   ALK PHOS U/L 58  --   --   --  63  --  71   ALT U/L 6*  --   --   --  14  --  20   AST U/L 11  --   --   --  22  --  22   < > = values in this interval not displayed  Results from last 7 days  Lab Units 11/04/18  0632 11/03/18  1611 11/03/18  0457   MAGNESIUM mg/dL 2 1 1 9 2 0     Lab Results   Component Value Date    PHOS 2 0 (L) 11/04/2018    PHOS 2 1 (L) 11/03/2018    PHOS 1 9 (L) 11/02/2018        Results from last 7 days  Lab Units 10/29/18  0548   INR  1 04       0  Lab Value Date/Time   TROPONINI <0 02 10/25/2018 1534   TROPONINI <0 02 05/09/2018 1927         ABG:  Lab Results   Component Value Date    PHART 7 539 (H) 10/30/2018    RPG0EHH 25 6 (LL) 10/30/2018    PO2ART 70 7 (L) 10/30/2018    FSD1YYV 21 3 (L) 10/30/2018    BEART -0 9 10/30/2018    SOURCE Radial, Left 10/30/2018       Imaging: CXR:   XR chest portable ICU   Final Result      Cardiomegaly  Bibasilar parenchymal densities unchanged  Workstation performed: [id number]         IR PICC line   Final Result   Impression:       Successful placement of a 49cm right upper extremity double lumen Power PICC                 Workstation performed: [id number]         XR chest portable ICU   Final Result      No significant interval change since 10/29/2018            Workstation performed: [id number]         XR chest portable   Final Result   Persistent right basilar pleural effusion and associated probable atelectasis  Workstation performed: [id number]         XR chest portable ICU   Final Result      Endotracheal tube ending 3 3 cm above the yohana  Consolidation at the right lung base  Workstation performed: [id number]         CT chest abdomen pelvis wo contrast   Final Result      Patchy airspace opacities are seen in the right lower lobe superior segment  There is a large dense consolidation in the right middle lobe, lateral segment, nonspecific but suspicious for infection/pneumonia in the appropriate clinical setting  Cardiomegaly, bilateral pleural effusions, and body wall edema suggests a superimposed component of fluid overload/CHF  Multiple areas of skin thickening with associated subjacent complex fluid are seen in the superficial soft tissues of the left pectoralis region (for example axial image 30), as well as in the posterior and superior left thorax (axial image 12), as    described  Consider cellulitis with developing abscesses  In addition, a few bubbles of air are seen in the anterior left chest subcutaneous tissues (axial image 8) which could be related to recent intervention but also raises suspicion for possible    gas-forming infection  Partially distended bladder with catheter seen within the bladder lumen  Mild circumferential bladder wall thickening noted, probably exaggerated by underdistention  A cystitis could be considered in the appropriate clinical setting  Renal cysts, degenerative changes of the shoulder, spine, and hips, and other findings as above  Findings discussed with FRITZ Alexis by Dr Trinh Lopez at 4:18 AM on 10/26/2018  Workstation performed: [id number]         CT head wo contrast   Final Result      No acute intracranial abnormality  Other nonacute findings as above                    Workstation performed: [id number]         XR spine lumbar 2 or 3 views injury   Final Result      Levoscoliosis and moderate multilevel degenerative change  Workstation performed: [id number]         XR chest 1 view   Final Result      Focal consolidation in the right lower lobe  This may represent pneumonia  If there is any trauma to the right chest, pulmonary contusion is a consideration  Recommend follow up to resolution  The study was marked in EPIC for significant notification  Workstation performed: [id number]         XR spine thoracic 3 views   Final Result      No acute osseous abnormality  Workstation performed: [id number]         XR shoulder 2+ views LEFT   Final Result      No acute osseous abnormality  Workstation performed: [id number]         CT spine cervical without contrast   Final Result      No cervical spine fracture or traumatic malalignment  Workstation performed: [id number]         CT head without contrast   Final Result      No acute intracranial abnormality  Workstation performed: [id number]               Micro:  Lab Results   Component Value Date    BLOODCX No Growth After 5 Days  10/29/2018    BLOODCX No Growth After 5 Days   10/29/2018    BLOODCX Staphylococcus aureus (A) 10/25/2018    URINECX >100,000 cfu/ml  10/25/2018    URINECX >100,000 cfu/ml Klebsiella oxytoca (A) 05/09/2018    WOUNDCULT 4+ Growth of Staphylococcus aureus (A) 10/26/2018    WOUNDCULT 4+ Growth of Staphylococcus aureus (A) 10/25/2018       Allergies: No Known Allergies  Medications:   Scheduled Meds:    Current Facility-Administered Medications:  acetaminophen 650 mg Oral Q6H PRN MIGUEL [place]    atorvastatin 10 mg Oral Daily Amber Lentz MD    calcium gluconate 50 mL IVPB 2 g Intravenous Once Michae Line, PA-C Last Rate: 2 g (11/04/18 0836)   cefazolin 2,000 mg Intravenous Q8H Amber Lentz MD Last Rate: Stopped (11/04/18 0710)   collagenase  Topical Daily Yvette Mae MD    insulin glargine 15 Units Subcutaneous HS Caleb Her Kamla Llanes MD    insulin lispro 1-6 Units Subcutaneous TID AC Belen Tee PA-C    insulin lispro 3 Units Subcutaneous Daily With Breakfast Belen Tee PA-C    insulin lispro 3 Units Subcutaneous Daily With Lunch Belen Tee PA-C    insulin lispro 3 Units Subcutaneous Daily With [employer] EVY Tee    metoprolol tartrate 12 5 mg Oral Q12H [address] Belen Tee PA-C    pantoprozole (PROTONIX) infusion (Continuous) 8 mg/hr Intravenous Continuous Colleen Martinez PA-C    phenylephine  mcg/min Intravenous Titrated Barbara Stephens PA-C    QUEtiapine 50 mg Oral HS Doug Guzman MD    senna 1 tablet Oral HS Doug Guzman MD    tamsulosin 0 4 mg Oral Daily With [employer] EVY Tee      Continuous Infusions:    pantoprozole (PROTONIX) infusion (Continuous) 8 mg/hr   phenylephine  mcg/min     PRN Meds:    acetaminophen 650 mg Q6H PRN       Invasive lines and devices:   Invasive Devices     Peripherally Inserted Central Catheter Line            PICC Line 48/16/16 Right Basilic 3 days          Drain            Urethral Catheter Non-latex 16 Fr  1 day                   SIGNATURE: Barbara Stephens PA-C  DATE: November 4, 2018

## 2018-11-04 NOTE — PROGRESS NOTES
Patient has made his wishes that his daughter in-law Leidy Vaughn be his power of  and primary decision maker in lieu of his son Greyson Wang  Patient is awake alert oriented and appears consent tubal   Leidy Vaughn has expressed understanding

## 2018-11-04 NOTE — NURSING NOTE
Spoke with patient and daughter-in-law Marta Petty about patient's wishes for code status and the status of producing an Advanced Directive for his care  Patient is A+Ox4, communicating clearly and directly his needs and wishes at this time  Patient states he has an advanced directive but it is not updated or notarized  Patient stated that he wants his daughter-in-law Komal Kinds to be his health-care proxy, in the event that he is unable to make decisions regarding his medical care  Mr Jesusita Rivera and his daughter-in-law Marta Petty stated they would like Case Management to follow up with them regarding this matter during his stay    Communicated this to [facility], [facility] , and will follow up with the rest of the critical care team

## 2018-11-04 NOTE — OP NOTE
ESOPHAGOGASTRODUODENOSCOPY    PROCEDURE: EGD    SEDATION: Monitored anesthesia care, check anesthesia records    ASA Class: 4    INDICATIONS:  GI bleed    CONSENT:  Informed consent was obtained for the procedure, including sedation after explaining the risks and benefits of the procedure  Risks including but not limited to bleeding, perforation, infection, and missed lesion  PREPARATION:   Telemetry, pulse oximetry, blood pressure were monitored throughout the procedure  Patient was identified by myself both verbally and by visual inspection of ID band  DESCRIPTION:   Patient was placed in the left lateral decubitus position and was sedated with the above medication  The gastroscope was introduced in to the oropharynx and the esophagus was intubated under direct visualization  Scope was passed down the esophagus up to 2nd part of the duodenum  A careful inspection was made as the gastroscope was withdrawn, including a retroflexed view of the stomach; findings and interventions are described below  FINDINGS:    #1  Esophagus- severe grade D erosive esophagitis    #2  Stomach- normal stomach    #3  Duodenum- in the 1st portion of the duodenum just past the sweep, there were 2 ulcers in the posterior wall with 1 which had a visible vessel that was actively bleeding  This was injected with 7 cc of 1 in 100,000 epinephrine, tried to stop the bleeding with gold probe which was unsuccessful, 3 clips were successfully  There is no further bleeding  Extensively irrigated and washed for quite some time with no active bleeding  Patient tolerated the procedure well  He received blood in pressors during the procedure but was normotensive but end of the procedure           IMPRESSIONS:      GI bleed secondary to duodenal ulcer, a new ulcer in the posterior duodenal wall just past the sweep across from the initial ulcer had been identified with visible vessel treated with epinephrine, gold probe, 3 clips which were successfully placed with good hemostasis at the end  RECOMMENDATIONS:     Protonix drip  NPO  Follow CBC q 8 hours  Transfuse if indicated  Call for recurrent GI bleeding    COMPLICATIONS:  None; patient tolerated the procedure well            DISPOSITION: PACU           CONDITION: Stable

## 2018-11-04 NOTE — PROGRESS NOTES
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%  May be related to sepsis and tachycardia  May need repeat echo other issues are resolved  3  History of coronary artery disease status post CABG in 2017 follow-up [facility] an [facility]    4  Status post extensive drainage and debridement of left chest wall axilla and back with severe cellulitis and abscess     5  Tachycardia  Most likely secondary to above    6  Dyslipidemia on statin    7  Type 2 diabetes mellitus    Plan  Continue supportive care  As blood pressure improves we will titrate up beta-blockers and consider adding other medications  No evidence of fluid overload at this time    Subjective / Objective:   Patient seen and evaluated  Denies any abdominal pain  He just had cauterization office also done  He was initially admitted with cellulitis of his chest wall  It leg to septic shock and methicillin-resistant Staph aureus infection  His EF was found to be 20%  Previous ejection fraction was around 40%  He usually follows with Dr Jett Later from [facility]  He does have history of coronary artery disease and is status post coronary artery bypass surgery in 2017  He had earlier procedure done  Remains tachycardia getting blood at this time  Vitals: Blood pressure 125/74, pulse (!) 117, temperature (!) 97 °F (36 1 °C), temperature source Temporal, resp  rate (!) 25, height 5' 8 5" (1 74 m), weight 79 5 kg (175 lb 4 3 oz), SpO2 100 %    Vitals:    18 0600 18 0600   Weight: 76 5 kg (168 lb 10 4 oz) 79 5 kg (175 lb 4 3 oz)     Body mass index is 26 26 kg/m²  BP Readings from Last 3 Encounters:   11/04/18 125/74   05/16/18 107/58     Orthostatic Blood Pressures      Most Recent Value   Blood Pressure  125/74 filed at 11/04/2018 1005   Patient Position - Orthostatic VS  Lying filed at 11/04/2018 0400        I/O       11/02 0701 - 11/03 0700 11/03 0701 - 11/04 0700 11/04 0701 - 11/05 0700    P  O  480 390     I V  (mL/kg) 135 8 (1 7)      Blood 350 350 700    IV Piggyback 350 200 150    Total Intake(mL/kg) 1315 8 (16 6) 940 (11 8) 850 (10 7)    Urine (mL/kg/hr) 1775 (0 9) 1360 (0 7) 225 (0 8)    Total Output 1775 1360 225    Net -459 2 -420 +625           Unmeasured Urine Occurrence 2 x      Unmeasured Stool Occurrence 1 x 1 x         Invasive Devices     Peripherally Inserted Central Catheter Line            PICC Line 06/50/96 Right Basilic 3 days          Peripheral Intravenous Line            Peripheral IV 11/04/18 Left Antecubital less than 1 day          Drain            Urethral Catheter Non-latex 16 Fr  1 day                  Intake/Output Summary (Last 24 hours) at 11/04/18 1041  Last data filed at 11/04/18 1001   Gross per 24 hour   Intake             1640 ml   Output             1360 ml   Net              280 ml         Physical Exam:   Physical Exam    Neurologic:  Alert & awake and pleasantly confused  Constitutional:  Well developed, well nourished,  With no acute distress  Eyes:  PERRL, conjunctiva normal   HENT:  Atraumatic, external ears normal, nose normal,   NECK: Normal range of motion, no tenderness, neck is supple , No JVP  Respiratory:  Bilateral air entry mostly clear to auscultation  Cardiovascular: S1-S2 regular and tachycardic  GI:  Soft, nondistended, normal bowel sounds, nontender, no hepatosplenomegaly appreciated  Musculoskeletal:  No tenderness, no deformities    Chest wall wound appears to be clean and healing well  Extremities:  Mild edema and distal pulses are present  Psychiatric:  Speech and behavior appropriate Medications/ Allergies:     Current Facility-Administered Medications:  acetaminophen 650 mg Oral Q6H PRN MIGUEL Rausch    atorvastatin 10 mg Oral Daily Noemy White MD    cefazolin 2,000 mg Intravenous Q8H Noemy White MD Last Rate: Stopped (11/04/18 0710)   collagenase  Topical Daily Ayala Overton MD    insulin glargine 15 Units Subcutaneous HS Noemy White MD    insulin lispro 1-6 Units Subcutaneous TID AC Belen Tee PA-C    insulin lispro 3 Units Subcutaneous Daily With Breakfast Belen Tee PA-C    insulin lispro 3 Units Subcutaneous Daily With Lunch Belen Tee PA-C    insulin lispro 3 Units Subcutaneous Daily With [employer] EVY Tee    metoprolol tartrate 12 5 mg Oral Q12H [address] Belen eTe PA-C    pantoprozole (PROTONIX) infusion (Continuous) 8 mg/hr Intravenous Continuous Colleen Martinez PA-C Last Rate: 8 mg/hr (11/04/18 0914)   phenylephine  mcg/min Intravenous Titrated Juliano Cortez PA-C    QUEtiapine 50 mg Oral HS Noemy White MD    senna 1 tablet Oral HS Noemy White MD    [facility]        acetaminophen 650 mg Q6H PRN     No Known Allergies        Labs:   Troponins:      CBC with diff:  Results from last 7 days  Lab Units 11/04/18  0632 11/03/18  1611 11/03/18  0457 11/02/18  8043 11/01/18  1532 11/01/18  0510 10/31/18  0513 10/31/18  0023  10/30/18  0544 10/29/18  0548   WBC Thousand/uL 13 11*  --  9 74 12 09*  --  12 39* 16 91*  --   --  12 67* 14 58*   HEMOGLOBIN g/dL 6 9* 7 8* 8 5* 7 6* 8 6* 8 2* 9 7* 9 7*  < > 7 3* 8 5*   HEMATOCRIT % 21 9*  --  26 7* 23 3* 25 8* 25 1* 29 8* 29 3*  < > 22 1* 26 0*   MCV fL 98  --  96 95  --  94 91  --   --  105* 103*   PLATELETS Thousands/uL 164  --  196 188  --  190 206  --   --  242 229   MCH pg 30 9  --  30 6 30 9  --  30 8 29 8  --   --  34 8* 33 7   MCHC g/dL 31 5  --  31 8 32 6  --  32 7 32 6  --   --  33 0 32 7   RDW % 18 7*  --  18 7* 19 5*  --  19 7* 19 1*  --   --  15 7* 16 0*   MPV fL 11 3  --  11 0 11 2  --  10 7 11 0  --   --  11 2 11 3   NRBC AUTO /100 WBCs 0  --  0 0  --  1 1  --   --   --  0   NRBC /100 WBC  --   --   --  1  --  1 2  --   --   --   --    < > = values in this interval not displayed  CMP:  Results from last 7 days  Lab Units 11/04/18  0632 11/03/18  1611 11/03/18 0457 11/02/18  0296 11/01/18  0510 10/31/18  0513 10/31/18  0023  10/29/18  0548   POTASSIUM mmol/L 4 5 4 3 3 8 3 5 3 4* 4 0 4 1  < > 3 4*   CHLORIDE mmol/L 108 107 108 110* 109* 108 108  < > 105   CO2 mmol/L 24 24 26 25 25 22 22  < > 25   BUN mg/dL 31* 28* 21 24 31* 43* 46*  < > 40*   CREATININE mg/dL 0 94 1 06 0 89 0 92 0 98 1 19 1 27  < > 1 32*   CALCIUM mg/dL 7 0* 7 0* 7 2* 7 1* 7 2* 7 5* 7 3*  < > 7 7*   AST U/L 11  --   --   --   --  22  --   --  22   ALT U/L 6*  --   --   --   --  14  --   --  20   ALK PHOS U/L 58  --   --   --   --  63  --   --  71   EGFR ml/min/1 73sq m 76 66 81 78 73 57 53  < > 51   < > = values in this interval not displayed  Magnesium:  Results from last 7 days  Lab Units 11/04/18 0632 11/03/18  1611 11/03/18 0457 11/02/18  0054 11/01/18  0510 10/31/18  0513 10/31/18  0023   MAGNESIUM mg/dL 2 1 1 9 2 0 2 1 2 2 2 3 2 3     Coags:  Results from last 7 days  Lab Units 10/29/18  0548   INR  1 04         Imaging & Testing   I have personally reviewed pertinent reports  Ct Chest Abdomen Pelvis Wo Contrast    Result Date: 10/26/2018  Narrative: CT CHEST, ABDOMEN AND PELVIS WITHOUT IV CONTRAST INDICATION:   Sepsis  COMPARISON:  CT cervical spine dated 10/25/2018, radiographs of the left shoulder dated 10/25/2018 TECHNIQUE: CT examination of the chest, abdomen and pelvis was performed without intravenous contrast   Axial, sagittal, and coronal 2D reformatted images were created from the source data and submitted for interpretation  Radiation dose length product (DLP) for this visit:  652 78 mGy-cm     This examination, like all CT scans performed in the [facility], was performed utilizing techniques to minimize radiation dose exposure, including the use of iterative  reconstruction and automated exposure control  Enteric contrast was not administered  FINDINGS: CHEST LUNGS:  Some patchy airspace opacities are seen in the right lower lobe superior segment  There is a large dense consolidation in the right middle lobe, lateral segment  Some mucous plugging is suspected in a left upper lobe bronchus  There is mild dependent and bibasilar atelectasis  The lungs otherwise appear grossly clear  PLEURA:  Small bilateral pleural effusions are noted dependently  HEART/GREAT VESSELS:  There is left ventricular enlargement  At least moderate coronary atherosclerosis is noted  Status post CABG with a LIMA graft  Mild aortic atherosclerosis  No aortic aneurysm  MEDIASTINUM AND ALIZE:  Unremarkable  CHEST WALL AND LOWER NECK: Median sternotomy wires are noted  A few bubbles of air are seen in the anterior left chest subcutaneous tissues (axial image 8)  Multiple areas of skin thickening with associated subjacent complex fluid are seen in the superficial soft tissues of the left pectoralis region (for example axial image 30); this area measures approximately 5 5 x 1 1 x 4 0 cm in transverse, AP, and CC dimensions  There is body wall edema noted with some shotty left axillary lymph nodes  In  addition there is posterior skin thickening with some irregularity of the soft tissues of the posterior and superior left thorax (axial image 12)  Subjacent to this there is an apparent fluid collection measuring approximately 4 7 x 1 8 x 4 6 cm in size  ABDOMEN LIVER/BILIARY TREE:  Unremarkable  GALLBLADDER:  No calcified gallstones  No pericholecystic inflammatory change  SPLEEN:  Unremarkable  PANCREAS:  Mild atrophy    Otherwise grossly unremarkable ADRENAL GLANDS:  Grossly unremarkable KIDNEYS/URETERS:  Partially exophytic cyst in the posterior midportion of the right kidney measures approximately 1 5 cm in size  Exophytic cyst in the lower pole of the left kidney measures approximately 3 7 cm in size  Bilateral kidneys otherwise appear grossly unremarkable; no hydronephrosis  STOMACH AND BOWEL:  Grossly unremarkable APPENDIX:  A normal appendix was visualized  ABDOMINOPELVIC CAVITY:  No ascites or free intraperitoneal air  No lymphadenopathy  VESSELS:  Mild atherosclerosis; no aortic aneurysm PELVIS REPRODUCTIVE ORGANS:  Unremarkable for patient's age  URINARY BLADDER:  Bladder is partially distended  A catheter is seen within the bladder lumen  Some air is seen within the nondependent portion of the bladder  Mild circumferential bladder wall thickening is noted, probably exaggerated by underdistention  ABDOMINAL WALL/INGUINAL REGIONS:  A right-sided femoral venous catheter is noted  Some subcutaneous emphysema is seen in this region as well, probably related to recent intervention/placement  Diffuse body wall edema is noted  OSSEOUS STRUCTURES:  Mild curvature of the lumbar spine, apex to the left  Moderate degenerative changes of the bilateral hips, worse on the right  No acute fracture or destructive osseous lesion is identified  Prominent degenerative changes of the right shoulder  Multilevel degenerative changes of the spine with vacuum disc phenomenon at L4/L5  There is intervertebral disc space narrowing and facet joint arthropathy at L2/L3, L3/L4, and L4/L5  Impression: Patchy airspace opacities are seen in the right lower lobe superior segment  There is a large dense consolidation in the right middle lobe, lateral segment, nonspecific but suspicious for infection/pneumonia in the appropriate clinical setting  Cardiomegaly, bilateral pleural effusions, and body wall edema suggests a superimposed component of fluid overload/CHF   Multiple areas of skin thickening with associated subjacent complex fluid are seen in the superficial soft tissues of the left pectoralis region (for example axial image 30), as well as in the posterior and superior left thorax (axial image 12), as described  Consider cellulitis with developing abscesses  In addition, a few bubbles of air are seen in the anterior left chest subcutaneous tissues (axial image 8) which could be related to recent intervention but also raises suspicion for possible gas-forming infection  Partially distended bladder with catheter seen within the bladder lumen  Mild circumferential bladder wall thickening noted, probably exaggerated by underdistention  A cystitis could be considered in the appropriate clinical setting  Renal cysts, degenerative changes of the shoulder, spine, and hips, and other findings as above  Findings discussed with FRITZ Alexis by Dr Jourdan Mccall at 4:18 AM on 10/26/2018  Workstation performed: [id number]     Xr Chest Portable Icu    Result Date: 2018  Narrative: CHEST INDICATION:   PNA  COMPARISON:  10/29/2018 EXAM PERFORMED/VIEWS:  XR CHEST PORTABLE ICU 1 image FINDINGS: Cardiomediastinal silhouette appears enlarged  A median sternotomy has been performed  No evidence of heart failure  A PICC line enters on the right, the tip extends to the SVC  Bibasilar densities may represent infiltrate, atelectasis or fluid  Osseous structures appear within normal limits for patient age  Impression: Cardiomegaly  Bibasilar parenchymal densities unchanged   Workstation performed: [id number]       Cardiac testing:   Results for orders placed during the hospital encounter of 10/25/18   Echo complete with contrast if indicated    Narrative [address]  [address] [facility] [address]  [phone]    Transthoracic Echocardiogram  2D, M-mode, Doppler, and Color Doppler    Study date:  26-Oct-2018    Patient: Louis Cotter  MR number: [id number]  Account number: [de-identified]  : 1940  Age: [de-identified] years  Gender: Male  Status: Inpatient  Location: Bedside  Height: 68 in  Weight: 155 8 lb  BP: 83/ 53 mmHg    Indications: Heart Failure    Diagnoses: I50 9 - Heart failure, unspecified    Sonographer:  ADRIEL Enamorado  Primary Physician:  Starlyn Bosworth, DO  Referring Physician:  Lorenzo Palacios PA-C  Group:  [facility]  Interpreting Physician:  Nilam Castle DO    IMPRESSIONS:  These features are consistent with dilated cardiomyopathy  SUMMARY    LEFT VENTRICLE:  The ventricle was moderately dilated  Systolic function was severely reduced by visual assessment  Ejection fraction was estimated to be 20 %  There were no regional wall motion abnormalities  There was mild concentric hypertrophy  Doppler parameters were consistent with abnormal left ventricular relaxation (grade 1 diastolic dysfunction)  LEFT ATRIUM:  The atrium was moderately dilated  MITRAL VALVE:  There was moderate regurgitation  AORTIC VALVE:  There was no evidence for stenosis  There was no regurgitation  TRICUSPID VALVE:  There was mild regurgitation  Pulmonary artery systolic pressure was mildly increased  HISTORY: PRIOR HISTORY: HTN, Hyperlipidemia, DM, CHF, CABG    PROCEDURE: The procedure was performed at the bedside  This was a routine study  The transthoracic approach was used  The study included complete 2D imaging, M-mode, complete spectral Doppler, and color Doppler  The heart rate was 115 bpm,  at the start of the study  Images were not obtained from the subcostal or suprasternal notch acoustic windows  Intravenous contrast (Definity solution [1 3 ml Definity/8 7ml normal saline solution], 1 ml) was administered to opacify the  left ventricle  Echocardiographic views were limited due to restricted patient mobility, surgical dressings, poor acoustic window availability, lung interference, and patient on mechanical ventilator  This was a technically difficult study      LEFT VENTRICLE: The ventricle was moderately dilated  Systolic function was severely reduced by visual assessment  Ejection fraction was estimated to be 20 %  There were no regional wall motion abnormalities  There was mild concentric  hypertrophy  DOPPLER: Doppler parameters were consistent with abnormal left ventricular relaxation (grade 1 diastolic dysfunction)  RIGHT VENTRICLE: The size was normal  Systolic function was normal  DOPPLER: Systolic pressure was within the normal range  LEFT ATRIUM: The atrium was moderately dilated  RIGHT ATRIUM: Size was normal     MITRAL VALVE: There was annular calcification  Valve structure was normal  There was normal leaflet separation  No echocardiographic evidence for prolapse  DOPPLER: The transmitral velocity was within the normal range  There was no  evidence for stenosis  There was moderate regurgitation  AORTIC VALVE: The valve was trileaflet  Leaflets exhibited normal thickness, mild calcification, normal cuspal separation, and sclerosis  DOPPLER: Transaortic velocity was within the normal range  There was no evidence for stenosis  There  was no regurgitation  TRICUSPID VALVE: The valve structure was normal  There was normal leaflet separation  DOPPLER: The transtricuspid velocity was within the normal range  There was no evidence for stenosis  There was mild regurgitation  Pulmonary artery  systolic pressure was mildly increased  Estimated peak PA pressure was 43 mmHg  PULMONIC VALVE: Leaflets exhibited normal thickness, no calcification, and normal cuspal separation  DOPPLER: The transpulmonic velocity was within the normal range  There was no regurgitation  PERICARDIUM: There was no thickening  There was no pericardial effusion  AORTA: The root exhibited normal size      PULMONARY ARTERY: The size was normal  The morphology appeared normal     SYSTEM MEASUREMENT TABLES    2D mode  AoR Diam 2D: 3 7 cm  LA Diam (2D): 4 6 cm  LA/Ao (2D): 1 24  FS (2D Teich): 13 7 %  IVSd (2D): 1 17 cm  LVDEV: 189 cm³  LVEDV MOD BP: 180 cm³  LVESV: 135 cm³  LVIDd(2D): 6 13 cm  LVISd (2D): 5 29 cm  LVPWd (2D): 1 09 cm  SV (Teich): 54 cm³    Apical four chamber  LVEF A4C: 24 %    Apical two chamber  LVEF A2C: 13 %    Unspecified Scan Mode  MV Peak E Travon  Mean: 1120 mm/s  MVA (PHT): 7 33 cm squared  PHT: 30 ms  Max P mm[Hg]  V Max: 2970 mm/s  Vmax: 2770 mm/s  RA Area: 19 cm squared  RA Volume: 48 4 cm³  TAPSE: 1 2 cm    [employer] Accredited Echocardiography Laboratory    Prepared and electronically signed by    Sue Walden DO  Signed 26-Oct-2018 16:55:41             Dr Miguel Chatterjee MD [facility]      "This note has been constructed using a voice recognition system  Therefore there may be syntax, spelling, and/or grammatical errors   Please call if you have any questions  "

## 2018-11-05 PROBLEM — B37.81 CANDIDA ESOPHAGITIS (HCC): Status: RESOLVED | Noted: 2018-11-03 | Resolved: 2018-11-05

## 2018-11-05 LAB
ABO GROUP BLD BPU: NORMAL
ALBUMIN SERPL BCP-MCNC: 1.5 G/DL (ref 3.5–5)
ALP SERPL-CCNC: 60 U/L (ref 46–116)
ALT SERPL W P-5'-P-CCNC: 6 U/L (ref 12–78)
ANION GAP SERPL CALCULATED.3IONS-SCNC: 8 MMOL/L (ref 4–13)
AST SERPL W P-5'-P-CCNC: 15 U/L (ref 5–45)
BILIRUB SERPL-MCNC: 0.3 MG/DL (ref 0.2–1)
BPU ID: NORMAL
BUN SERPL-MCNC: 30 MG/DL (ref 5–25)
CA-I BLD-SCNC: 1.07 MMOL/L (ref 1.12–1.32)
CALCIUM SERPL-MCNC: 7.2 MG/DL (ref 8.3–10.1)
CHLORIDE SERPL-SCNC: 108 MMOL/L (ref 100–108)
CO2 SERPL-SCNC: 25 MMOL/L (ref 21–32)
CREAT SERPL-MCNC: 1.03 MG/DL (ref 0.6–1.3)
CROSSMATCH: NORMAL
ERYTHROCYTE [DISTWIDTH] IN BLOOD BY AUTOMATED COUNT: 17.5 % (ref 11.6–15.1)
GFR SERPL CREATININE-BSD FRML MDRD: 68 ML/MIN/1.73SQ M
GLUCOSE SERPL-MCNC: 105 MG/DL (ref 65–140)
GLUCOSE SERPL-MCNC: 106 MG/DL (ref 65–140)
GLUCOSE SERPL-MCNC: 107 MG/DL (ref 65–140)
GLUCOSE SERPL-MCNC: 91 MG/DL (ref 65–140)
GLUCOSE SERPL-MCNC: 99 MG/DL (ref 65–140)
HCT VFR BLD AUTO: 31.8 % (ref 36.5–49.3)
HCT VFR BLD AUTO: 34.2 % (ref 36.5–49.3)
HGB BLD-MCNC: 10.3 G/DL (ref 12–17)
HGB BLD-MCNC: 11 G/DL (ref 12–17)
MAGNESIUM SERPL-MCNC: 1.8 MG/DL (ref 1.6–2.6)
MCH RBC QN AUTO: 30.9 PG (ref 26.8–34.3)
MCHC RBC AUTO-ENTMCNC: 32.4 G/DL (ref 31.4–37.4)
MCV RBC AUTO: 96 FL (ref 82–98)
PHOSPHATE SERPL-MCNC: 2.6 MG/DL (ref 2.3–4.1)
PLATELET # BLD AUTO: 121 THOUSANDS/UL (ref 149–390)
PMV BLD AUTO: 11.2 FL (ref 8.9–12.7)
POTASSIUM SERPL-SCNC: 3.6 MMOL/L (ref 3.5–5.3)
PROT SERPL-MCNC: 4.1 G/DL (ref 6.4–8.2)
RBC # BLD AUTO: 3.33 MILLION/UL (ref 3.88–5.62)
SODIUM SERPL-SCNC: 141 MMOL/L (ref 136–145)
UNIT DISPENSE STATUS: NORMAL
UNIT PRODUCT CODE: NORMAL
UNIT RH: NORMAL
WBC # BLD AUTO: 12.25 THOUSAND/UL (ref 4.31–10.16)

## 2018-11-05 PROCEDURE — 99232 SBSQ HOSP IP/OBS MODERATE 35: CPT | Performed by: PHYSICIAN ASSISTANT

## 2018-11-05 PROCEDURE — 80053 COMPREHEN METABOLIC PANEL: CPT | Performed by: PHYSICIAN ASSISTANT

## 2018-11-05 PROCEDURE — 82948 REAGENT STRIP/BLOOD GLUCOSE: CPT

## 2018-11-05 PROCEDURE — 82330 ASSAY OF CALCIUM: CPT | Performed by: PHYSICIAN ASSISTANT

## 2018-11-05 PROCEDURE — C9113 INJ PANTOPRAZOLE SODIUM, VIA: HCPCS | Performed by: PHYSICIAN ASSISTANT

## 2018-11-05 PROCEDURE — 99233 SBSQ HOSP IP/OBS HIGH 50: CPT | Performed by: INTERNAL MEDICINE

## 2018-11-05 PROCEDURE — 99232 SBSQ HOSP IP/OBS MODERATE 35: CPT | Performed by: INTERNAL MEDICINE

## 2018-11-05 PROCEDURE — 85014 HEMATOCRIT: CPT | Performed by: STUDENT IN AN ORGANIZED HEALTH CARE EDUCATION/TRAINING PROGRAM

## 2018-11-05 PROCEDURE — 84100 ASSAY OF PHOSPHORUS: CPT | Performed by: ANESTHESIOLOGY

## 2018-11-05 PROCEDURE — 85018 HEMOGLOBIN: CPT | Performed by: STUDENT IN AN ORGANIZED HEALTH CARE EDUCATION/TRAINING PROGRAM

## 2018-11-05 PROCEDURE — 85027 COMPLETE CBC AUTOMATED: CPT | Performed by: PHYSICIAN ASSISTANT

## 2018-11-05 PROCEDURE — 99233 SBSQ HOSP IP/OBS HIGH 50: CPT | Performed by: ANESTHESIOLOGY

## 2018-11-05 PROCEDURE — 83735 ASSAY OF MAGNESIUM: CPT | Performed by: PHYSICIAN ASSISTANT

## 2018-11-05 RX ORDER — POTASSIUM CHLORIDE 14.9 MG/ML
20 INJECTION INTRAVENOUS ONCE
Status: COMPLETED | OUTPATIENT
Start: 2018-11-05 | End: 2018-11-05

## 2018-11-05 RX ORDER — LANOLIN ALCOHOL/MO/W.PET/CERES
6 CREAM (GRAM) TOPICAL
Status: DISCONTINUED | OUTPATIENT
Start: 2018-11-05 | End: 2018-11-18 | Stop reason: HOSPADM

## 2018-11-05 RX ORDER — POTASSIUM CHLORIDE 29.8 MG/ML
40 INJECTION INTRAVENOUS ONCE
Status: DISCONTINUED | OUTPATIENT
Start: 2018-11-05 | End: 2018-11-05

## 2018-11-05 RX ORDER — MAGNESIUM SULFATE HEPTAHYDRATE 40 MG/ML
2 INJECTION, SOLUTION INTRAVENOUS ONCE
Status: COMPLETED | OUTPATIENT
Start: 2018-11-05 | End: 2018-11-05

## 2018-11-05 RX ADMIN — SODIUM CHLORIDE 8 MG/HR: 9 INJECTION, SOLUTION INTRAVENOUS at 18:24

## 2018-11-05 RX ADMIN — POTASSIUM CHLORIDE 20 MEQ: 200 INJECTION, SOLUTION INTRAVENOUS at 11:12

## 2018-11-05 RX ADMIN — METOPROLOL TARTRATE 12.5 MG: 25 TABLET ORAL at 20:40

## 2018-11-05 RX ADMIN — CEFAZOLIN SODIUM 2000 MG: 2 SOLUTION INTRAVENOUS at 21:47

## 2018-11-05 RX ADMIN — CEFAZOLIN SODIUM 2000 MG: 2 SOLUTION INTRAVENOUS at 06:10

## 2018-11-05 RX ADMIN — MAGNESIUM SULFATE HEPTAHYDRATE 2 G: 40 INJECTION, SOLUTION INTRAVENOUS at 08:40

## 2018-11-05 RX ADMIN — POTASSIUM CHLORIDE 20 MEQ: 200 INJECTION, SOLUTION INTRAVENOUS at 08:40

## 2018-11-05 RX ADMIN — ATORVASTATIN CALCIUM 10 MG: 10 TABLET, FILM COATED ORAL at 17:24

## 2018-11-05 RX ADMIN — CEFAZOLIN SODIUM 2000 MG: 2 SOLUTION INTRAVENOUS at 14:32

## 2018-11-05 RX ADMIN — SODIUM CHLORIDE 8 MG/HR: 9 INJECTION, SOLUTION INTRAVENOUS at 08:32

## 2018-11-05 RX ADMIN — COLLAGENASE SANTYL: 250 OINTMENT TOPICAL at 08:40

## 2018-11-05 RX ADMIN — MELATONIN TAB 3 MG 6 MG: 3 TAB at 21:47

## 2018-11-05 RX ADMIN — METOPROLOL TARTRATE 12.5 MG: 25 TABLET ORAL at 08:45

## 2018-11-05 RX ADMIN — SENNOSIDES 8.6 MG: 8.6 TABLET, FILM COATED ORAL at 21:47

## 2018-11-05 RX ADMIN — TAMSULOSIN HYDROCHLORIDE 0.4 MG: 0.4 CAPSULE ORAL at 17:24

## 2018-11-05 RX ADMIN — ACETAMINOPHEN 650 MG: 325 TABLET, FILM COATED ORAL at 20:40

## 2018-11-05 NOTE — PROGRESS NOTES
Progress Note- Andrez Kuhn [de-identified] y o  male MRN: [id number]    Unit/Bed#: ICU 08 Encounter: [id number]      Assessment and Plan:    Acute upper GI bleed:  -repeat EGD 11/4 with 2 duodenal ulcers, 1 actively bleeding treated with epi, clips x 3  -hemoglobin 10 3 from 11 6  -small black stools but suspect this is old blood  -continue to monitor hemoglobin closely, if significant drop would consider repeat endoscopy  -continue PPI drip  -continue to monitor stools  -keep NPO for now, if stable can advance to clear liquids this afternoon    ______________________________________________________________________    Subjective:     Mr Wili Osman denies complaints  No abdominal pain, nausea, vomiting  Since the procedure he has had 2-3 small black stools       Medication Administration - last 24 hours from 11/04/2018 0840 to 11/05/2018 0840       Date/Time Order Dose Route Action Action by     11/04/2018 1728 atorvastatin (LIPITOR) tablet 10 mg 10 mg Oral Given Karrie Prado RN     11/04/2018 2247 senna (SENOKOT) tablet 8 6 mg 8 6 mg Oral Given Milagros Lawrence RN     11/05/2018 0610 ceFAZolin (ANCEF) IVPB (premix) 2,000 mg 2,000 mg Intravenous New Bag Milagros Lawrence RN     11/04/2018 2230 ceFAZolin (ANCEF) IVPB (premix) 2,000 mg 2,000 mg Intravenous New Bag Milagros Lawrence RN     11/04/2018 1500 ceFAZolin (ANCEF) IVPB (premix) 2,000 mg 0 mg Intravenous Stopped Karrie Prado RN     11/04/2018 1430 ceFAZolin (ANCEF) IVPB (premix) 2,000 mg 2,000 mg Intravenous [address] Karrie Prado, [facility]     11/04/2018 2246 QUEtiapine (SEROquel) tablet 50 mg 50 mg Oral Given Milagros Lawrence RN     11/04/2018 1728 tamsulosin (FLOMAX) capsule 0 4 mg 0 4 mg Oral Given Karrie Prado RN     11/04/2018 2228 metoprolol tartrate (LOPRESSOR) partial tablet 12 5 mg 12 5 mg Oral Given Milagros Lawrence RN     11/04/2018 7335 calcium gluconate 2 g in sodium chloride 0 9 % 50 mL IVPB 0 g Intravenous Josefina Levy RN 11/05/2018 0832 pantoprazole (PROTONIX) 80 mg in sodium chloride 0 9 % 100 mL infusion 8 mg/hr Intravenous [address] V, RN     11/05/2018 0800 pantoprazole (PROTONIX) 80 mg in sodium chloride 0 9 % 100 mL infusion 8 mg/hr Intravenous Rate/Dose Verify Emily ASIF RN     11/04/2018 2024 pantoprazole (PROTONIX) 80 mg in sodium chloride 0 9 % 100 mL infusion 8 mg/hr Intravenous New Bag Rashid Garcia RN     11/04/2018 1901 pantoprazole (PROTONIX) 80 mg in sodium chloride 0 9 % 100 mL infusion 8 mg/hr Intravenous Rate/Dose Verify Rashid Garcia RN     11/04/2018 0914 pantoprazole (PROTONIX) 80 mg in sodium chloride 0 9 % 100 mL infusion 8 mg/hr Intravenous [address] Kenrick Garcia, [address] [place] [address]     11/04/2018 0910 pantoprazole (PROTONIX) 80 mg in sodium chloride 0 9 % 100 mL IVPB 0 mg Intravenous Stopped Kenrick Garcia RN     11/04/2018 0855 pantoprazole (PROTONIX) 80 mg in sodium chloride 0 9 % 100 mL IVPB 80 mg Intravenous [address] Kenrick Garcia[address]     11/04/2018 4648 phenylephrine (TIFFANIE-SYNEPHRINE) 100 mg (DOUBLE CONCENTRATION) IV in sodium chloride 0 9% 250 mL 25 mcg/min Intravenous Not Given Kenrick Garcia RN     11/04/2018 1014 furosemide (LASIX) injection 40 mg 40 mg Intravenous Given Kenrick Garcia RN     11/04/2018 2302 insulin glargine (LANTUS) subcutaneous injection 10 Units 0 1 mL 10 Units Subcutaneous Given Rashid Garcia RN     11/05/2018 0636 insulin lispro (HumaLOG) 100 units/mL subcutaneous injection 2-12 Units 2 Units Subcutaneous Not Given Rashid Garcia RN     11/04/2018 2303 insulin lispro (HumaLOG) 100 units/mL subcutaneous injection 2-12 Units 2 Units Subcutaneous Not Given Rashid Garcia RN     11/04/2018 1728 insulin lispro (HumaLOG) 100 units/mL subcutaneous injection 2-12 Units 2 Units Subcutaneous Not Given Kenrick Garcia RN     11/04/2018 1234 insulin lispro (HumaLOG) 100 units/mL subcutaneous injection 2-12 Units 4 Units Subcutaneous Given Floridalma Hany Shuail Baron, RN     11/05/2018 0840 magnesium sulfate 2 g/50 mL IVPB (premix) 2 g 2 g Intravenous [address] Valeri ASIF RN     11/05/2018 3797 potassium chloride 40 mEq IVPB (premix) 40 mEq Intravenous Not Given Valeri ASIF RN     11/05/2018 0840 potassium chloride 20 mEq IVPB (premix) 20 mEq Intravenous New Bag Emily ASIF RN          Objective:     Vitals: Blood pressure 94/55, pulse 99, temperature (!) 97 1 °F (36 2 °C), temperature source Temporal, resp  rate 14, height 5' 8 5" (1 74 m), weight 72 8 kg (160 lb 7 9 oz), SpO2 99 %  ,Body mass index is 24 05 kg/m²  Intake/Output Summary (Last 24 hours) at 11/05/18 0840  Last data filed at 11/05/18 2344   Gross per 24 hour   Intake          1337 83 ml   Output             3200 ml   Net         -1862 17 ml       Physical Exam:   General Appearance: Awake and alert, in no acute distress  Abdomen: Soft, non-tender, non-distended; bowel sounds normal    Invasive Devices     Peripherally Inserted Central Catheter Line            PICC Line 28/51/20 Right Basilic 4 days          Peripheral Intravenous Line            Peripheral IV 11/04/18 Left Antecubital less than 1 day                Lab Results:  Admission on 10/25/2018   No results displayed because visit has over 200 results  Imaging Studies: I have personally reviewed pertinent imaging studies

## 2018-11-05 NOTE — OCCUPATIONAL THERAPY NOTE
Attempted OT/PT treatment however, pt refused  Will continue to follow     Kaylin Victor MS OTR/L [id number]

## 2018-11-05 NOTE — PLAN OF CARE
Problem: RESPIRATORY - ADULT  Goal: Achieves optimal ventilation and oxygenation  INTERVENTIONS:  - Assess for changes in respiratory status  - Assess for changes in mentation and behavior  - Position to facilitate oxygenation and minimize respiratory effort  - Oxygen administration by appropriate delivery method based on oxygen saturation (per order) or ABGs  - Initiate smoking cessation education as indicated  - Encourage broncho-pulmonary hygiene including cough, deep breathe, Incentive Spirometry  - Assess the need for suctioning and aspirate as needed  - Assess and instruct to report SOB or any respiratory difficulty  - Respiratory Therapy support as indicated    Outcome: Progressing      Problem: GENITOURINARY - ADULT  Goal: Maintains or returns to baseline urinary function  INTERVENTIONS:  - Assess urinary function  - Encourage oral fluids to ensure adequate hydration  - Administer IV fluids as ordered to ensure adequate hydration  - Administer ordered medications as needed  - Offer frequent toileting  - Follow urinary retention protocol if ordered   Outcome: Progressing

## 2018-11-05 NOTE — PROGRESS NOTES
Daily Progress Note - Critical Care/ Stepdown   Connor Liz [de-identified] y o  male MRN: [id number]  Unit/Bed#: ICU 08 Encounter: [id number]    ______________________________________________________________________  Assessment:   Principal Problem:    Abscess of chest wall  Active Problems:    Bacteremia due to Staphylococcus aureus    DM2 (diabetes mellitus, type 2) ([facility])    CAD (coronary artery disease)    CKD (chronic kidney disease) stage 3, GFR 30-59 ml/min ([facility])    Cardiomyopathy, ischemic    Benign essential hypertension    Atrial fibrillation ([facility])    Severe protein-calorie malnutrition ([facility])    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[facility])    Acute blood loss anemia    Urinary retention    Gastrointestinal hemorrhage associated with duodenal ulcer  Resolved Problems:    Septic shock ([facility])    Acute respiratory failure secondary to septic shock    Urinary tract infection    Respiratory infection    Hyponatremia    CHRIS (acute kidney injury) ([facility])    Shingles    Lactic acid acidosis    DKA (diabetic ketoacidoses) ([facility])    Lethargy    Sepsis ([facility])    Cellulitis of left axilla    Acute cystitis with hematuria    Esophagitis    Delirium    Candida esophagitis ([facility])      Septic shock ([facility])resolved as of 11/2/2018   Assessment & Plan    · Resolved at this time  Vasopressor therapy discontinued October 30th  ·   Source likely presumed cellulitis/absess of chest wall   · Patient was initially Fluid resuscitation with 30ml/kg   · Levophed and vasopressor d/zaida 10/30  · Poor EF 20% on Echo  · [place] protocol - Stress Dose Steroids, Vit C, thiamine- now discontinued 11 1  · ID consult  ·  debridement in OR 10/26 of chest wall cellulitis, wound vac in place   Scant serosanguineous output  · Return to OR for debridement 10/29, wound vac in place  · Wound culture: 3+ staph aureus   · Urine cultures > 100,000 mixed containments   · ABX: Ancef day #6  Fluconazole day #5  Total day 10 of antibiotics    Day   · Valtrex d/zaida  · WBCs normalized  · Repeat blood cultures no growth since 10/29       Bacteremia due to Staphylococcus aureus   Assessment & Plan    · Admission Blood culture x 1 MSSA  · Repeat blood cultures no growth to date  · Per ID will need 4 wks antibiotics from negative blood cultures through 11/25  · PICC line in place       Acute respiratory failure secondary to septic shockresolved as of 10/28/2018   Assessment & Plan    · Resolved  ·  Patient extubated on 10/26  · Now on room air     * Abscess of chest wall   Assessment & Plan    · Aggressive  debridement in OR on 10/26 by Dr Roberto Carlos Atwood, wound VAC discontinued this a m  · Antibiotics deescalated to Ancef as per ID recs, patient will require IV antibiotics until November 25th  Patient has PICC line for outpatient antibiotic therapy  · ID following  · Blood culture staph aureus x 1 - will treat as bacteremia  · Wound culture: 3+ staph aureus   · Urine cultures > 100,000 mixed containments   · ABX: Ancef day #8  Antibiotic therapy day 12  · Repeat blood cultures from 10/29 no growth to date  · Wound VAC removed 11/3  Continue local wound care       Urinary tract infectionresolved as of 10/29/2018   Assessment & Plan    · Resolved  · Urine culture with greater than 100,000 CFU of mixed contaminants  Respiratory infectionresolved as of 11/3/2018   Assessment & Plan    · Resolved  ·  Present on admission as evidence by CT scan suggestive of infectious process  · No sputum culture to confirm pneumonia    · Respiratory status improved  Extubated on the 26th  Tolerating room air  Gastrointestinal hemorrhage associated with duodenal ulcer   Assessment & Plan    · Patient received 9 units packed red blood cells total as admission  · EGD on 11/04 revealed duodenal bleeding ulcer  · Hemoglobin 10 3 today  · Continue to follow H&H  · protonix gtt       Urinary retention   Assessment & Plan    · Pt failed voiding trial 11/3    · Straight cath x 2 for > 700 ml  · Gordon catheter placed 11/3  · Likely secondary to BPH  Will start Flomax and repeat voiding trial today       Acute blood loss anemia   Assessment & Plan    · Secondary to bleeding gastric ulcer  · Patient continued with tachycardia and drifting hemoglobin and later developed guanakito melena  · EGD with duodenal bulb ulcer with intervention 11/4  · Pt has been Transfused 9 units of packed red blood cells total this admission  1 unit 10/28, 3 units 10/30, 1 unit 11/2  4 unit 11/4  ·  Protonix strip     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HCC)   Assessment & Plan    · Known EF of 20%, no evidence of fluid overload at this time  May require lasix s/p blood transfusion  · Continue to monitor I/Os  · Repeat echocardiogram on Monday  Patient may need LifeVest prior to discharge home if EF is still below 30%  · Continues with episodes of PVCs  ·  Some tachycardia is likely related to anemia, able to tolerate beta-blocker at this time     Severe protein-calorie malnutrition [facility])   Assessment & Plan    Malnutrition Findings:     patient with temporal wasting and muscle wasting  Appreciate nutrition input       BMI Findings: Body mass index is 26 26 kg/m²  Atrial fibrillation ([address] )   Assessment & Plan    · History of AFib in the setting of electrolyte abnormalities in the past   Monitor on telemetry  · Current NSR to Sinus Tach  · Continue to hold on anticoagulation at this time  Particularly in light of recent episode of GI bleeding  · Frequent PVCs on telemetry, cardiology consulted, beta-blocker resumed     Cardiomyopathy, ischemic   Assessment & Plan    · EF prior was 35 to 40%  Recent stress test in May of 2018 showed reversible ischemic changes  Cardiology following as an outpatient with medical management  · ECHO repeat with EF 20% possibly related to sepsis    Repeat echo cardiogram on 11/5  · Cardiac output and indexes while patient was intubated were acceptable  · Frequent PVCs on telemetry  · Cardiology following  · Start metoprolol 12 5 mg BID when BP improves and Sys > 90  Patient able to tolerate beta-blocker  CKD (chronic kidney disease) stage 3, GFR 30-59 ml/min ([facility])   Assessment & Plan    · Stable, Baseline 0 9-1 3  · Adequate urine output at this time  · Patient is diuretic dependent at home with torsemide 10 mg daily and Aldactone 12 5 mg daily     CAD (coronary artery disease)   Assessment & Plan    · Troponin negative  · Continue statin  Hold aspirin in light of GI bleeding  · Echo obtained,  EF 20%     DM2 (diabetes mellitus, type 2) [facility])   Assessment & Plan    Lab Results   Component Value Date    HGBA1C 12 0 (H) 10/26/2018       Recent Labs      11/03/18   1101  11/03/18   1624  11/03/18   2116  11/04/18   0714   POCGLU  156*  257*  241*  230*     · Blood sugar poorly controlled  NPO at this time  · Continue 10 U Lantus at bedtime  · Continue SSI algorithm 4  · Goal blood sugar under 180           Candida esophagitis ([facility])resolved as of 11/5/2018   Assessment & Plan    · Negative pathology - discontinue fluconizole     Deliriumresolved as of 11/4/2018   Assessment & Plan    · Resolved  · Patient AAOX3  Sleeping well at night  · Cont Seroquel 50mg at bedtime, patient did sleep well again overnight       Esophagitisresolved as of 11/4/2018   Assessment & Plan    · Resolved  · EGD on 10/31: duodenal bulb ulcer with possible candida esophagitis  · Biopsies negative for candida - d/c fluconizole 11/4       Acute cystitis with hematuriaresolved as of 10/29/2018   Assessment & Plan    · Resolved  · Urine culture with mixed roberto  Lethargyresolved as of 10/27/2018   Assessment & Plan    Likely related hypoactive delirium  Continue environmental controls  Seroquel HS          DKA (diabetic ketoacidoses) ([facility])resolved as of 10/26/2018   Assessment & Plan    Lab Results   Component Value Date    HGBA1C 12 0 (H) 10/26/2018 Recent Labs      11/03/18   1101  11/03/18   1624  11/03/18   2116  11/04/18   0714   POCGLU  156*  257*  241*  230*       Blood Sugar Average: Last 72 hrs:  (P) 428 [id number]     · DKA resolved  · Goal blood sugar under 180     Lactic acid acidosisresolved as of 10/26/2018   Assessment & Plan    · Resolved       Shinglesresolved as of 11/1/2018   Assessment & Plan    · Now resolved  · Antiviral therapy discontinued on October 31st     CHRIS (acute kidney injury) (HCC)resolved as of 10/27/2018   Assessment & Plan    · Resolved  · Likely Pre renal   Likely secondary to septic shock and DKA  · Improving with fluids  · FENa consistent with Pre-renal cause  · Gordon catheter in place     Hyponatremiaresolved as of 10/27/2018   Assessment & Plan    · Resolved  · Likely hypovolemic hyponatremia in the setting of dehydration, sepsis, and DKA  · Possibly a component of pseudo hyponatremia         Plan:    Neuro:   · Delirium: CAM ICU positive no   · If yes, intervention:  Seroquel at night for sleep  · Pain controlled with:  P o  Tylenol  · Pain score:0  · Regulate sleep/wake cycle    CV:   · Acute blood loss anemia secondary to bleeding duodenal ulcers, repeat EGD on 11/04 with intervention, transfused 4 units packed red blood cells yesterday, with total of 9 units for this admission, hemodynamically stable at this time  · Sinus Tachycardia likely secondary to acute blood loss, history of Afib, continue monitor on telemetry, continue with metoprolol, cardiology following  · 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, continue with metoprolol as blood pressure tolerated      Pulm:   · Stable on nasal cannula oxygen as needed     GI:   · Multiple bleeding duodenal ulcers, repeat EGD in 11-4 with intervention, transfused total of 9 units packed red blood cells during this admission  · Nutrition/diet plan:  NPO  · Protonix strip  · Last Melanic stool this a m      FEN:   · CKD stage 3, stable creatinine 1 03 today  · Fluid/Diuretic plan: No intervention  · Goal 24 hour fluid balance:  Net even  · Electrolytes repleted: yes  · Goal: K >4 0, Mag >2 0, and Phos >3 0    :   · Indwelling Gordon present: yes urinary retention  · Failed voiding trial, we will re-attempt voiding trial today  · Flomax started    ID:   · Staph aureus bacteremia likely secondary to chest wall abscess, septic shock resolved  · Abx ordered: Ancef  · Day # 12, continue until November 25th as recommended by ID  · PICC line in place  · Trend temps and WBC count    Heme:   · Acute blood loss anemia secondary to bleeding duodenal ulcer, found on EGD on 11/04 with intervention  · Transfused 4 units packed red blood cells yesterday, with total of 9 units during this admission  · Hemodynamically stable at this time, Hemoglobin stable, 10 3 this morning  · Trend hgb and plts    Endo:   · Uncontrolled type 2 diabetes mellitus, recent hemoglobin A1c 12  · Glycemic control plan: Blood glucose controlled on current regimen, insulin sliding scale algorithm for with Lantus 10 units at bedtime    Msk/Skin:  · Mobility goal:  Up and out of bed as tolerated  · PT consult: yes  · OT consult: yes  · Frequent turning and off-loading    Family:  · Family updated within 24 hours: yes   · Family meeting planned today: no     Lines:  · PICC line with peripheral IV    VTE Prophylaxis:  · Pharmacologic Prophylaxis: Reason for no pharmacologic prophylaxis GI bleed  · Mechanical Prophylaxis: sequential compression device    Disposition: Continue ICU care    Code Status: Level 2 - DNAR: but accepts endotracheal intubation    Counseling / Coordination of Care  Total Critical Care time spent 25 minutes excluding procedures, teaching and family updates  ______________________________________________________________________    HPI/24hr events:  Presented in septic shock secondary to chest wall abscess  Staph aureus bacteremia  Septic shock resolved weaned off pressors   Developed GI bleed over weekend  No acute events overnight, slightly tachycardic however blood pressure in 047-687 systolic  Yesterday patient became hemodynamically unstable, EGD revealed posterior duodenal wall ulcer with visibly bleeding vessel which was clipped  Transfused 4 units packed red blood cells yesterday  Repeat hemoglobin was 11 9  Patient was restarted on Protonix strip and remain NPO  Three bloody loose bowel movements reported overnight     ______________________________________________________________________    Physical Exam:   Physical Exam   Constitutional: He is oriented to person, place, and time  He appears well-developed and well-nourished  No distress  HENT:   Head: Normocephalic and atraumatic  Neck: No JVD present  Cardiovascular: Regular rhythm, normal heart sounds and intact distal pulses  Exam reveals no gallop and no friction rub  No murmur heard  Tachycardia   Pulmonary/Chest: Effort normal and breath sounds normal  No respiratory distress  He has no wheezes  He has no rales  Abdominal: Soft  Bowel sounds are normal  He exhibits no distension  There is no tenderness  There is no guarding  Musculoskeletal: He exhibits edema  Neurological: He is alert and oriented to person, place, and time  Skin: Capillary refill takes less than 2 seconds  He is not diaphoretic  No pallor         ______________________________________________________________________  Vitals:    18 0600 18 0653 18 0700 18 0800   BP:   97/56 94/55   BP Location:   Left arm Left arm   Pulse:   101 99   Resp:   17 14   Temp:  (!) 97 1 °F (36 2 °C)     TempSrc:  Temporal     SpO2:   98% 99%   Weight: 72 8 kg (160 lb 7 9 oz)      Height:         Arterial Line BP: 77/68  Arterial Line MAP (mmHg): 72 mmHg     Temperature:   Temp (24hrs), Av 3 °F (36 3 °C), Min:97 °F (36 1 °C), Max:98 °F (36 7 °C)    Current Temperature: (!) 97 1 °F (36 2 °C)    Weights:   IBW: 69 55 kg    Body mass index is 24 05 kg/m²  Weight (last 2 days)     Date/Time   Weight    11/05/18 0600  72 8 (160 5)    11/03/18 0600  79 5 (175 27)              Hemodynamic Monitoring:  Frequent vital signs       Non-Invasive/Invasive Ventilation Settings:  Respiratory    Lab Data (Last 4 hours)    None         O2/Vent Data (Last 4 hours)    None              No results found for: PHART, USE9MKH, PO2ART, QJR0PQW, U6RDXXKH, BEART, SOURCE  SpO2: SpO2: 99 %    Intake and Outputs:  I/O       11/03 0701 - 11/04 0700 11/04 0701 - 11/05 0700    P  O  390     I V  (mL/kg)  87 8 (1 1)    Blood 350 1050    IV Piggyback 200 250    Total Intake(mL/kg) 940 (11 8) 1387 8 (17 5)    Urine (mL/kg/hr) 1360 (0 7) 2475 (1 3)    Total Output 1360 2475    Net -420 -1087 2          Unmeasured Stool Occurrence 1 x 2 x            Nutrition:        Diet Orders            Start     Ordered    11/04/18 1012  Diet NPO  Diet effective now     Question Answer Comment   Diet Type NPO    RD to adjust diet per protocol? Yes        11/04/18 1011    11/02/18 1113  Dietary nutrition supplements  Once     Question Answer Comment   Select Supplement: Ensure Enlive-Vanilla    Frequency Lunch, Dinner        11/02/18 1112    10/29/18 1450  Room Service  Once     Question:  Type of Service  Answer:  Room Service - Appropriate with Assistance    10/29/18 1452            Labs:     Results from last 7 days  Lab Units 11/05/18  0554 11/04/18  2239 11/04/18  1629  11/04/18  0632  11/03/18  0457 11/02/18  0632   WBC Thousand/uL 12 25* 15 93* 18 09*  --  13 11*  --  9 74 12 09*   HEMOGLOBIN g/dL 10 3* 11 6* 11 9*  < > 6 9*  < > 8 5* 7 6*   HEMATOCRIT % 31 8* 34 8* 35 4*  < > 21 9*  --  26 7* 23 3*   PLATELETS Thousands/uL 121* 161 158  --  164  --  196 188   NEUTROS PCT %  --   --   --   --  85*  --   --   --    MONOS PCT %  --   --   --   --  4  --   --   --    MONO PCT %  --   --   --   --   --   --  4 4   < > = values in this interval not displayed      Results from last 7 days  Lab Units 11/05/18  0544 11/04/18  1629 11/04/18  0632   POTASSIUM mmol/L 3 6 3 5 4 5   CHLORIDE mmol/L 108 107 108   CO2 mmol/L 25 24 24   BUN mg/dL 30* 33* 31*   CREATININE mg/dL 1 03 1 00 0 94   CALCIUM mg/dL 7 2* 7 5* 7 0*   ALK PHOS U/L 60 64 58   ALT U/L 6* 6* 6*   AST U/L 15 15 11       Results from last 7 days  Lab Units 11/05/18  0544 11/04/18  0632 11/03/18  1611   MAGNESIUM mg/dL 1 8 2 1 1 9     Lab Results   Component Value Date    PHOS 2 0 (L) 11/04/2018    PHOS 2 1 (L) 11/03/2018    PHOS 1 9 (L) 11/02/2018            0  Lab Value Date/Time   TROPONINI <0 02 10/25/2018 1534   TROPONINI <0 02 05/09/2018 1927         ABG:  Lab Results   Component Value Date    PHART 7 539 (H) 10/30/2018    UWF8XIR 25 6 (LL) 10/30/2018    PO2ART 70 7 (L) 10/30/2018    ZYD9MKO 21 3 (L) 10/30/2018    BEART -0 9 10/30/2018    SOURCE Radial, Left 10/30/2018       Imaging:       Micro:  Lab Results   Component Value Date    BLOODCX No Growth After 5 Days  10/29/2018    BLOODCX No Growth After 5 Days   10/29/2018    BLOODCX Staphylococcus aureus (A) 10/25/2018    URINECX >100,000 cfu/ml  10/25/2018    URINECX >100,000 cfu/ml Klebsiella oxytoca (A) 05/09/2018    WOUNDCULT 4+ Growth of Staphylococcus aureus (A) 10/26/2018    WOUNDCULT 4+ Growth of Staphylococcus aureus (A) 10/25/2018       Allergies: No Known Allergies  Medications:   Scheduled Meds:    Current Facility-Administered Medications:  acetaminophen 650 mg Oral Q6H PRN MIGUEL Valle    atorvastatin 10 mg Oral Daily Jun Funes MD    cefazolin 2,000 mg Intravenous Q8H Jun Funes MD Last Rate: 2,000 mg (11/05/18 0610)   collagenase  Topical Daily Darlene Buck MD    insulin glargine 10 Units Subcutaneous HS Belen Tee PA-C    insulin lispro 2-12 Units Subcutaneous Q6H [address] Belen Tee PA-C    magnesium sulfate 2 g Intravenous Once Junmaribell Funes MD    metoprolol tartrate 12 5 mg Oral Q12H [address] Tonny Kan PA-C    pantoprozole (PROTONIX) infusion (Continuous) 8 mg/hr Intravenous Continuous Colleen Martinez PA-C Last Rate: 8 mg/hr (11/05/18 9559)   potassium chloride 20 mEq Intravenous Once Monica Mazariegos MD Last Rate: 20 mEq (11/05/18 0840)   potassium chloride 20 mEq Intravenous Once Monica Mazariegos MD    QUEtiapine 50 mg Oral HS Monica Mazariegos MD    senna 1 tablet Oral HS Monica Mazariegos MD    tamsulosin 0 4 mg [facility]      Continuous Infusions:    pantoprozole (PROTONIX) infusion (Continuous) 8 mg/hr Last Rate: 8 mg/hr (11/05/18 0832)     PRN Meds:    acetaminophen 650 mg Q6H PRN       Invasive lines and devices:   Invasive Devices     Peripherally Inserted Central Catheter Line            PICC Line 27/12/02 Right Basilic 4 days          Peripheral Intravenous Line            Peripheral IV 11/04/18 Left Antecubital 1 day                   SIGNATURE: Monica Mazariegos MD  DATE: November 5, 2018

## 2018-11-05 NOTE — PROGRESS NOTES
ICU Progress Note - Cardiology   Yvan Das [de-identified] y o  male MRN: [id number]  Unit/Bed#: ICU 09 Encounter: [id number]    Assessment/Plan:  MSSA Bacteremia  Septic Shock-POA  Upper GI bleed bleed found with large duodenal ulcer status post endoscopic therapy- hold aspirin until cleared by GI  Chronic combined systolic diastolic heart failure EF 20%- rechallenge with heart failure meds when hemodynamically stable  Consideration for AICD? Life vest prior to discharge if repeat limited echo 2d with reduced EF  Previous EF40%  History of a bypass grafting in 2017 followed by [facility]  Dyslipidemia on statin- atorvastatin 10mg  Type 2 diabetes mellitus    Subjective/Objective   Denies having chest pain  Objective:     Vitals: BP 96/57   Pulse 98   Temp (!) 97 °F (36 1 °C) (Temporal)   Resp 21   Ht 5' 8 5" (1 74 m)   Wt 72 8 kg (160 lb 7 9 oz)   SpO2 95%   BMI 24 05 kg/m²   Vitals:    11/03/18 0600 11/05/18 0600   Weight: 79 5 kg (175 lb 4 3 oz) 72 8 kg (160 lb 7 9 oz)     Orthostatic Blood Pressures      Most Recent Value   Blood Pressure  96/57 filed at 11/05/2018 1200   Patient Position - Orthostatic VS  Lying filed at 11/05/2018 0800            Intake/Output Summary (Last 24 hours) at 11/05/18 1441  Last data filed at 11/05/18 0610   Gross per 24 hour   Intake           137 83 ml   Output             1575 ml   Net         -1437 17 ml       Invasive Devices     Peripherally Inserted Central Catheter Line            PICC Line 87/89/99 Right Basilic 5 days          Peripheral Intravenous Line            Peripheral IV 11/04/18 Left Antecubital 1 day                Review of Systems: reviewed    Physical Exam   Constitutional: He is oriented to person, place, and time  No distress  HENT:   Head: Normocephalic and atraumatic  Right Ear: External ear normal    Left Ear: External ear normal    Eyes: Pupils are equal, round, and reactive to light   Conjunctivae are normal  Right eye exhibits no discharge  Left eye exhibits no discharge  No scleral icterus  Neck: Normal range of motion  Neck supple  No JVD present  No tracheal deviation present  No thyromegaly present  Cardiovascular: Normal rate and regular rhythm  Exam reveals gallop  Exam reveals no friction rub  Murmur heard  Pulmonary/Chest: Effort normal and breath sounds normal  No stridor  No respiratory distress  He has no wheezes  He has no rales  He exhibits no tenderness  Abdominal: Soft  Bowel sounds are normal  He exhibits distension  He exhibits no mass  There is no tenderness  There is no rebound and no guarding  Musculoskeletal: Normal range of motion  He exhibits edema  He exhibits no tenderness or deformity  Neurological: He is alert and oriented to person, place, and time  He has normal reflexes  No cranial nerve deficit  He exhibits normal muscle tone  Coordination normal    Skin: Skin is warm and dry  No rash noted  He is not diaphoretic  No erythema  No pallor  Psychiatric: He has a normal mood and affect  His behavior is normal  Judgment and thought content normal    Nursing note and vitals reviewed  Lab Results: I have personally reviewed pertinent lab results  Imaging: I have personally reviewed pertinent reports  EKG: reviewed  VTE Pharmacologic Prophylaxis: Sequential compression device (Venodyne)   VTE Mechanical Prophylaxis: sequential compression device    Counseling / Coordination of Care  Total time spent today 40 minutes  Greater than 50% of total time was spent with the patient and / or family counseling and / or coordination of care   A description of the counseling / coordination of care: hf

## 2018-11-05 NOTE — PROGRESS NOTES
Progress Note - Infectious Disease   Gloria Butts [de-identified] y o  male MRN: [id number]  Unit/Bed#: ICU 09 Encounter: [id number]      Impression/Plan:  1  Septic shock-POA   Leukocytosis and tachycardia and hypotension   Appears to be all secondary to MSSA bacteremia in the setting of chest wall abscess formation   No other clear sources appreciated   Patient is clinically improved overall   The patient's heart rate has come down, and the white cell count has come down   He seems to be tolerating the antibiotics without difficulty   Patient remains off vasopressors   -antibiotic plan as below  -recheck CBC with diff and creatinine tomorrow  -supportive care in the intensive care unit     2  MSSA bacteremia-appears to be secondary to chest wall abscesses   No other clear source appreciated  Consideration for the possibility of endocarditis but this is less likely with only 1 of 2 blood cultures positive and the transthoracic echocardiogram without valvular vegetations   Repeat blood cultures remain negative   -continue high-dose IV cefazolin  -follow up repeat blood cultures to ensure clearance of bacteremia  -no additional ID workup for now  -with severity and complexity of infection, plan 4 weeks of intravenous antibiotics from the date of 1st negative blood culture, through 11/25      3  MSSA chest wall abscesses-status post extensive I and D x2 with a VAC dressing in place  Jovanni Antoine seems to have improved clinically   His pain seems reasonably well controlled   This all possibly as a complication of zoster   -antibiotics as above  -vac dressing and local care  -close surgical follow-up     4  Herpes zoster-hard to make a definitive diagnosis at this point as the rash is relatively advanced in its presentation   Patient now status post 7 day course of Valtrex      5  Acute kidney injury-likely a pre renal issue   Had initially improved but now the renal function is worse today   Possibly medication effect   Renal function has improved and remains stable  -recheck BMP tomorrow  -volume management  -no additional ID workup for now       6  Esophageal candidiasis-started on fluconazole   -continue with oral fluconazole   Plan for 14 day course, through       Antibiotics:  Cefazolin 8  Antibiotics 12  Postop day 10/7  Fluconazole 7    Discussed with patient and family member at bedside  Subjective:  Patient seen on AM rounds  Denies fevers, chills, or sweats  Did have GI bleed, s/p EGD over the weekend  Objective:  Vitals:  Temp:  [97 °F (36 1 °C)-98 °F (36 7 °C)] 97 °F (36 1 °C)  HR:  [] 99  Resp:  [12-17] 14  BP: ()/(51-81) 94/55  SpO2:  [98 %-100 %] 99 %  Temp (24hrs), Av 5 °F (36 4 °C), Min:97 °F (36 1 °C), Max:98 °F (36 7 °C)  Current: Temperature: (!) 97 °F (36 1 °C)    Physical Exam:   General:  No acute distress  Psychiatric:  Awake and alert  Pulmonary:  Normal respiratory excursion without accessory muscle use  Abdomen:  Soft, nontender  Extremities:  + erythema  Skin:  Left sided chest wall with dressing intact    Lab Results:  I have personally reviewed pertinent labs  Results from last 7 days  Lab Units 18  0544 18  1629 18  0632   POTASSIUM mmol/L 3 6 3 5 4 5   CHLORIDE mmol/L 108 107 108   CO2 mmol/L 25 24 24   BUN mg/dL 30* 33* 31*   CREATININE mg/dL 1 03 1 00 0 94   EGFR ml/min/1 73sq m 68 71 76   CALCIUM mg/dL 7 2* 7 5* 7 0*   AST U/L 15 15 11   ALT U/L 6* 6* 6*   ALK PHOS U/L 60 64 58       Results from last 7 days  Lab Units 18  0554 18  2239 18  1629   WBC Thousand/uL 12 25* 15 93* 18 09*   HEMOGLOBIN g/dL 10 3* 11 6* 11 9*   PLATELETS Thousands/uL 121* 161 158       Results from last 7 days  Lab Units 10/29/18  1442 10/29/18  1435   BLOOD CULTURE  No Growth After 5 Days  No Growth After 5 Days  Imaging Studies:   I have personally reviewed pertinent imaging study reports and images in PACS      EKG, Pathology, and Other Studies:   I have personally reviewed pertinent reports

## 2018-11-06 LAB
ANION GAP SERPL CALCULATED.3IONS-SCNC: 7 MMOL/L (ref 4–13)
ATRIAL RATE: 120 BPM
ATRIAL RATE: 73 BPM
BASOPHILS # BLD MANUAL: 0 THOUSAND/UL (ref 0–0.1)
BASOPHILS # BLD MANUAL: 0 THOUSAND/UL (ref 0–0.1)
BASOPHILS NFR MAR MANUAL: 0 % (ref 0–1)
BASOPHILS NFR MAR MANUAL: 0 % (ref 0–1)
BUN SERPL-MCNC: 25 MG/DL (ref 5–25)
CALCIUM SERPL-MCNC: 7.2 MG/DL (ref 8.3–10.1)
CHLORIDE SERPL-SCNC: 108 MMOL/L (ref 100–108)
CO2 SERPL-SCNC: 24 MMOL/L (ref 21–32)
CREAT SERPL-MCNC: 0.94 MG/DL (ref 0.6–1.3)
EOSINOPHIL # BLD MANUAL: 0 THOUSAND/UL (ref 0–0.4)
EOSINOPHIL # BLD MANUAL: 0 THOUSAND/UL (ref 0–0.4)
EOSINOPHIL NFR BLD MANUAL: 0 % (ref 0–6)
EOSINOPHIL NFR BLD MANUAL: 0 % (ref 0–6)
ERYTHROCYTE [DISTWIDTH] IN BLOOD BY AUTOMATED COUNT: 17.5 % (ref 11.6–15.1)
ERYTHROCYTE [DISTWIDTH] IN BLOOD BY AUTOMATED COUNT: 17.7 % (ref 11.6–15.1)
GFR SERPL CREATININE-BSD FRML MDRD: 76 ML/MIN/1.73SQ M
GLUCOSE SERPL-MCNC: 78 MG/DL (ref 65–140)
GLUCOSE SERPL-MCNC: 79 MG/DL (ref 65–140)
GLUCOSE SERPL-MCNC: 89 MG/DL (ref 65–140)
GLUCOSE SERPL-MCNC: 90 MG/DL (ref 65–140)
GLUCOSE SERPL-MCNC: 93 MG/DL (ref 65–140)
GLUCOSE SERPL-MCNC: 95 MG/DL (ref 65–140)
HCT VFR BLD AUTO: 29.2 % (ref 36.5–49.3)
HCT VFR BLD AUTO: 30.5 % (ref 36.5–49.3)
HGB BLD-MCNC: 10.1 G/DL (ref 12–17)
HGB BLD-MCNC: 9.5 G/DL (ref 12–17)
LYMPHOCYTES # BLD AUTO: 0.52 THOUSAND/UL (ref 0.6–4.47)
LYMPHOCYTES # BLD AUTO: 0.95 THOUSAND/UL (ref 0.6–4.47)
LYMPHOCYTES # BLD AUTO: 6 % (ref 14–44)
LYMPHOCYTES # BLD AUTO: 9 % (ref 14–44)
MAGNESIUM SERPL-MCNC: 2.3 MG/DL (ref 1.6–2.6)
MCH RBC QN AUTO: 30.9 PG (ref 26.8–34.3)
MCH RBC QN AUTO: 31.4 PG (ref 26.8–34.3)
MCHC RBC AUTO-ENTMCNC: 32.5 G/DL (ref 31.4–37.4)
MCHC RBC AUTO-ENTMCNC: 33.1 G/DL (ref 31.4–37.4)
MCV RBC AUTO: 95 FL (ref 82–98)
MCV RBC AUTO: 95 FL (ref 82–98)
MONOCYTES # BLD AUTO: 0.11 THOUSAND/UL (ref 0–1.22)
MONOCYTES # BLD AUTO: 0.26 THOUSAND/UL (ref 0–1.22)
MONOCYTES NFR BLD: 1 % (ref 4–12)
MONOCYTES NFR BLD: 3 % (ref 4–12)
NEUTROPHILS # BLD MANUAL: 7.95 THOUSAND/UL (ref 1.85–7.62)
NEUTROPHILS # BLD MANUAL: 9.51 THOUSAND/UL (ref 1.85–7.62)
NEUTS BAND NFR BLD MANUAL: 4 % (ref 0–8)
NEUTS BAND NFR BLD MANUAL: 5 % (ref 0–8)
NEUTS SEG NFR BLD AUTO: 86 % (ref 43–75)
NEUTS SEG NFR BLD AUTO: 86 % (ref 43–75)
NRBC BLD AUTO-RTO: 0 /100 WBCS
NRBC BLD AUTO-RTO: 0 /100 WBCS
P AXIS: 34 DEGREES
P AXIS: 67 DEGREES
PHOSPHATE SERPL-MCNC: 2.4 MG/DL (ref 2.3–4.1)
PLATELET # BLD AUTO: 147 THOUSANDS/UL (ref 149–390)
PLATELET # BLD AUTO: 150 THOUSANDS/UL (ref 149–390)
PLATELET BLD QL SMEAR: ABNORMAL
PLATELET BLD QL SMEAR: ADEQUATE
PMV BLD AUTO: 10.5 FL (ref 8.9–12.7)
PMV BLD AUTO: 10.8 FL (ref 8.9–12.7)
POTASSIUM SERPL-SCNC: 4.2 MMOL/L (ref 3.5–5.3)
PR INTERVAL: 178 MS
PR INTERVAL: 192 MS
QRS AXIS: 34 DEGREES
QRS AXIS: 34 DEGREES
QRSD INTERVAL: 102 MS
QRSD INTERVAL: 102 MS
QT INTERVAL: 312 MS
QT INTERVAL: 436 MS
QTC INTERVAL: 430 MS
QTC INTERVAL: 480 MS
RBC # BLD AUTO: 3.07 MILLION/UL (ref 3.88–5.62)
RBC # BLD AUTO: 3.22 MILLION/UL (ref 3.88–5.62)
RBC MORPH BLD: NORMAL
RBC MORPH BLD: NORMAL
SODIUM SERPL-SCNC: 139 MMOL/L (ref 136–145)
T WAVE AXIS: 168 DEGREES
T WAVE AXIS: 200 DEGREES
TOTAL CELLS COUNTED SPEC: 100
TOTAL CELLS COUNTED SPEC: 100
TOXIC GRANULES BLD QL SMEAR: PRESENT
TOXIC GRANULES BLD QL SMEAR: PRESENT
VENTRICULAR RATE: 114 BPM
VENTRICULAR RATE: 73 BPM
WBC # BLD AUTO: 10.57 THOUSAND/UL (ref 4.31–10.16)
WBC # BLD AUTO: 8.74 THOUSAND/UL (ref 4.31–10.16)

## 2018-11-06 PROCEDURE — 97110 THERAPEUTIC EXERCISES: CPT

## 2018-11-06 PROCEDURE — 99233 SBSQ HOSP IP/OBS HIGH 50: CPT | Performed by: ANESTHESIOLOGY

## 2018-11-06 PROCEDURE — 84100 ASSAY OF PHOSPHORUS: CPT | Performed by: PHYSICIAN ASSISTANT

## 2018-11-06 PROCEDURE — 83735 ASSAY OF MAGNESIUM: CPT | Performed by: PHYSICIAN ASSISTANT

## 2018-11-06 PROCEDURE — 93010 ELECTROCARDIOGRAM REPORT: CPT | Performed by: INTERNAL MEDICINE

## 2018-11-06 PROCEDURE — 99232 SBSQ HOSP IP/OBS MODERATE 35: CPT | Performed by: SURGERY

## 2018-11-06 PROCEDURE — 85007 BL SMEAR W/DIFF WBC COUNT: CPT | Performed by: PHYSICIAN ASSISTANT

## 2018-11-06 PROCEDURE — C9113 INJ PANTOPRAZOLE SODIUM, VIA: HCPCS | Performed by: PHYSICIAN ASSISTANT

## 2018-11-06 PROCEDURE — 80048 BASIC METABOLIC PNL TOTAL CA: CPT | Performed by: PHYSICIAN ASSISTANT

## 2018-11-06 PROCEDURE — 93005 ELECTROCARDIOGRAM TRACING: CPT

## 2018-11-06 PROCEDURE — 99232 SBSQ HOSP IP/OBS MODERATE 35: CPT | Performed by: INTERNAL MEDICINE

## 2018-11-06 PROCEDURE — 85027 COMPLETE CBC AUTOMATED: CPT | Performed by: PHYSICIAN ASSISTANT

## 2018-11-06 PROCEDURE — 99232 SBSQ HOSP IP/OBS MODERATE 35: CPT | Performed by: PHYSICIAN ASSISTANT

## 2018-11-06 PROCEDURE — 82948 REAGENT STRIP/BLOOD GLUCOSE: CPT

## 2018-11-06 RX ORDER — POTASSIUM CHLORIDE 29.8 MG/ML
40 INJECTION INTRAVENOUS ONCE
Status: COMPLETED | OUTPATIENT
Start: 2018-11-06 | End: 2018-11-06

## 2018-11-06 RX ORDER — ALBUMIN, HUMAN INJ 5% 5 %
12.5 SOLUTION INTRAVENOUS ONCE
Status: COMPLETED | OUTPATIENT
Start: 2018-11-06 | End: 2018-11-06

## 2018-11-06 RX ORDER — OXYCODONE HYDROCHLORIDE 5 MG/1
2.5 TABLET ORAL EVERY 4 HOURS PRN
Status: DISCONTINUED | OUTPATIENT
Start: 2018-11-06 | End: 2018-11-07

## 2018-11-06 RX ORDER — MAGNESIUM SULFATE HEPTAHYDRATE 40 MG/ML
2 INJECTION, SOLUTION INTRAVENOUS ONCE
Status: COMPLETED | OUTPATIENT
Start: 2018-11-06 | End: 2018-11-06

## 2018-11-06 RX ORDER — ACETAMINOPHEN 325 MG/1
650 TABLET ORAL EVERY 6 HOURS SCHEDULED
Status: DISCONTINUED | OUTPATIENT
Start: 2018-11-06 | End: 2018-11-18 | Stop reason: HOSPADM

## 2018-11-06 RX ADMIN — METOPROLOL TARTRATE 12.5 MG: 25 TABLET ORAL at 21:44

## 2018-11-06 RX ADMIN — SODIUM CHLORIDE 8 MG/HR: 9 INJECTION, SOLUTION INTRAVENOUS at 05:07

## 2018-11-06 RX ADMIN — OXYCODONE HYDROCHLORIDE 2.5 MG: 5 TABLET ORAL at 14:02

## 2018-11-06 RX ADMIN — ACETAMINOPHEN 650 MG: 325 TABLET, FILM COATED ORAL at 23:24

## 2018-11-06 RX ADMIN — SODIUM CHLORIDE 8 MG/HR: 9 INJECTION, SOLUTION INTRAVENOUS at 14:05

## 2018-11-06 RX ADMIN — ALBUMIN HUMAN 12.5 G: 0.05 INJECTION, SOLUTION INTRAVENOUS at 00:49

## 2018-11-06 RX ADMIN — ACETAMINOPHEN 650 MG: 325 TABLET, FILM COATED ORAL at 17:20

## 2018-11-06 RX ADMIN — SODIUM CHLORIDE 8 MG/HR: 9 INJECTION, SOLUTION INTRAVENOUS at 17:28

## 2018-11-06 RX ADMIN — ATORVASTATIN CALCIUM 10 MG: 10 TABLET, FILM COATED ORAL at 17:18

## 2018-11-06 RX ADMIN — ACETAMINOPHEN 650 MG: 325 TABLET, FILM COATED ORAL at 08:30

## 2018-11-06 RX ADMIN — METOPROLOL TARTRATE 12.5 MG: 25 TABLET ORAL at 10:39

## 2018-11-06 RX ADMIN — TAMSULOSIN HYDROCHLORIDE 0.4 MG: 0.4 CAPSULE ORAL at 16:27

## 2018-11-06 RX ADMIN — MAGNESIUM SULFATE HEPTAHYDRATE 2 G: 40 INJECTION, SOLUTION INTRAVENOUS at 01:51

## 2018-11-06 RX ADMIN — MELATONIN TAB 3 MG 6 MG: 3 TAB at 21:45

## 2018-11-06 RX ADMIN — COLLAGENASE SANTYL: 250 OINTMENT TOPICAL at 08:31

## 2018-11-06 RX ADMIN — SENNOSIDES 8.6 MG: 8.6 TABLET, FILM COATED ORAL at 21:44

## 2018-11-06 RX ADMIN — CEFAZOLIN SODIUM 2000 MG: 2 SOLUTION INTRAVENOUS at 21:44

## 2018-11-06 RX ADMIN — CEFAZOLIN SODIUM 2000 MG: 2 SOLUTION INTRAVENOUS at 05:07

## 2018-11-06 RX ADMIN — CEFAZOLIN SODIUM 2000 MG: 2 SOLUTION INTRAVENOUS at 13:31

## 2018-11-06 RX ADMIN — POTASSIUM CHLORIDE 40 MEQ: 400 INJECTION, SOLUTION INTRAVENOUS at 01:51

## 2018-11-06 NOTE — PHYSICAL THERAPY NOTE
PT TREATMENT     11/06/18 1535   Pain Assessment   Pain Assessment 0-10   Pain Score 8  (L abdominal/rib area)   Restrictions/Precautions   Other Precautions Fall Risk;Pain;O2   General   Chart Reviewed Yes   Family/Caregiver Present No   Cognition   Arousal/Participation Cooperative   Subjective   Subjective patient reports feeling weak   Transfers   Sit to Stand 4  Minimal assistance   Additional items Assist x 1   Stand to Sit 4  Minimal assistance   Additional items Assist x 1   Ambulation/Elevation   Gait Assistance 4  Minimal assist   Additional items Assist x 1   Assistive Device Rolling walker   Distance 5-10 feet , limited distances with constraints of ICU and fatigue   Exercises   Quad Sets Sitting;20 reps;Bilateral   Heelslides Sitting;20 reps;Bilateral   Hip Flexion Sitting;20 reps;Bilateral   Hip Abduction Sitting;20 reps;Bilateral   Knee AROM Long Arc Quad Sitting;20 reps;Bilateral   Ankle Pumps Sitting;20 reps;Bilateral   Balance training  sitting and standing weight shifting activity completed for Balance and strengthening   Assessment   Assessment patient cooperative and demonstrating improving strength and endurance  and will benefit from continued PT with progression as tolerated  Plan   Treatment/Interventions ADL retraining;Functional transfer training;LE strengthening/ROM; Therapeutic exercise; Endurance training;Patient/family training;Equipment eval/education; Bed mobility;Gait training; Compensatory technique education   PT Frequency 5x/wk   Recommendation   Recommendation (str)   Licensure   [place] License Number  Darrick Rodriguez PT [id number]

## 2018-11-06 NOTE — CONSULTS
History & Physical - [facility]  Melva Sat [de-identified] y o  male MRN: [id number]  Unit/Bed#: ICU 09 Encounter: [id number]    ASSESSMENT/PLAN:    Urinary retention   Straight catherization 11/05/18 for 600mL   Straight catherization 11/02/18 for 800mL  16 fr luis placed 11/06/18 for 375mL  Urine culture 10/25/18 indicated mixed contaminants x 4  Prior to hospitalization had good urinary stream  Decreased mobility  Declines CARMINA at current time  · Keep luis until mobility increases   · CARMINA in future  Acute upper GI bleed  Followed by GI and being having diet progressed if stable    Septic shock  Staph Aureus bacteremia possibly secondary to chest wall abscess with pos blood culture 10/25/18  Wound culture 10/25/18 indicated Staph aureus   Hx of chest wall I&D with VAC dressing   IV cefazolin by ID    CHF, CAD, CABG, DM, Dylipidemia, Herpes Zoster,   ICU hospitalist following     HISTORY OF PRESENT ILLNESS:  [de-identified] y/o male being treated for acute GI bleed and septic shock previously was straight catherized twice prior to having a luis placed for acute urinary retention  Pt notes at home able to void without difficulty  If he stands to void has a strong urinary stream and able to reach the back of the toilet  Had one prior episode of urinary retention years ago after his CABG at [facility]  Currently lying in bed in NAD  Notes no luis cath pain or discomfort  Prefers to keep the luis until he is no longer bed bound  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       PAST MEDICAL HISTORY:  Past Medical History:   Diagnosis Date    [facility] [address] [facility]     Diabetes mellitus [facility] [address] [facility]     History of open heart surgery     Hyperlipidemia     Hypertension        PAST SURGICAL HISTORY:  Past Surgical History:   Procedure Laterality Date    CARDIAC SURGERY      ESOPHAGOGASTRODUODENOSCOPY N/A 10/30/2018    Procedure: ESOPHAGOGASTRODUODENOSCOPY (EGD); Surgeon: Richardson Meadows MD;  Location: [facility] GI LAB; Service: Gastroenterology    ESOPHAGOGASTRODUODENOSCOPY N/A 11/4/2018    Procedure: ESOPHAGOGASTRODUODENOSCOPY (EGD); Surgeon: Michelle Souza MD;  Location: [facility] GI LAB; Service: Gastroenterology    IR PICC LINE  10/31/2018    WOUND DEBRIDEMENT Left 10/26/2018    Procedure: Chest wall wound debridement (extensive) with pulse lavage and wound vac placement;  Surgeon: Duglas Humphries MD;  Location: [address] [place] [address];  Service: General    WOUND DEBRIDEMENT Left 10/29/2018    Procedure: DEBRIDEMENT WOUND AND DRESSING CHANGE ([address]); Surgeon: Duglas Humphries MD;  Location: [address] [place] [address];  Service: General       ALLERGIES:  No Known Allergies    SOCIAL HISTORY:  History   Alcohol Use No     History   Drug Use No     History   Smoking Status    Former Smoker    Types: Pipe   Smokeless Tobacco    Never Used       FAMILY HISTORY:  History reviewed  No pertinent family history      MEDICATIONS:    Current Facility-Administered Medications:     acetaminophen (TYLENOL) tablet 650 mg, 650 mg, Oral, Q6H PRN, [place] Staple, CRNP, 650 mg at 11/06/18 0830    atorvastatin (LIPITOR) tablet 10 mg, 10 mg, Oral, Daily, Reed Mauricio MD, 10 mg at 11/05/18 1724    ceFAZolin (ANCEF) IVPB (premix) 2,000 mg, 2,000 mg, Intravenous, Q8H, Reed Mauricio MD, Stopped at 11/06/18 0537    collagenase (SANTYL) ointment, , Topical, Daily, Duglas Humphries MD    insulin lispro (HumaLOG) 100 units/mL subcutaneous injection 2-12 Units, 2-12 Units, Subcutaneous, Q6H [facility], 4 Units at 11/04/18 1234 **AND** Fingerstick Glucose (POCT), , , Q6H, Belen Tee PA-C    melatonin tablet 6 mg, 6 mg, Oral, HS, Reed Mauricio MD, 6 mg at 11/05/18 2147    metoprolol tartrate (LOPRESSOR) partial tablet 12 5 mg, 12 5 mg, Oral, Q12H [facility], Belen Tee PA-C, 12 5 mg at 11/06/18 1039    pantoprazole (PROTONIX) 80 mg in sodium chloride 0 9 % 100 mL infusion, 8 mg/hr, Intravenous, Continuous, Colleen Martinez PA-C, Last Rate: 10 mL/hr at 11/06/18 0507, 8 mg/hr at 11/06/18 0507    senna (SENOKOT) tablet 8 6 mg, 1 tablet, Oral, HS, Mattie Dumont MD, 8 6 mg at 11/05/18 2147    tamsulosin (FLOMAX) capsule 0 4 mg, 0 4 mg, Oral, Daily With Gabe Sacks Susavage, PA-C, 0 4 mg at 11/05/18 1724    Review of Systems   Constitutional: Positive for fatigue  Gastrointestinal: Negative for constipation  Genitourinary: Positive for difficulty urinating  No luis cath discomfort    Neurological: Negative for dizziness and light-headedness  PHYSICAL EXAM:  Physical Exam   Constitutional: He appears well-developed  HENT:   Right Ear: External ear normal    Abdominal: Soft  Genitourinary:   Genitourinary Comments: Dependent penile edema   Neurological: He is alert  Skin: Skin is warm  Psychiatric: He has a normal mood and affect  Vitals reviewed  Luis- draining yellow urine  No gross hematuria or clots  LAB RESULTS:  Lab Results   Component Value Date    WBC 8 74 11/06/2018    HGB 9 5 (L) 11/06/2018    HCT 29 2 (L) 11/06/2018    MCV 95 11/06/2018     11/06/2018     Lab Results   Component Value Date    GLUCOSE 115 10/26/2018    CALCIUM 7 2 (L) 11/06/2018    K 4 2 11/06/2018    CO2 24 11/06/2018     11/06/2018    BUN 25 11/06/2018    CREATININE 0 94 11/06/2018     Lab Results   Component Value Date    CALCIUM 7 2 (L) 11/06/2018    PHOS 2 4 11/06/2018       OTHER STUDIES:  CT chest/abd/pelvis without contrast 10/26/18  Patchy airspace opacities are seen in the right lower lobe superior segment    There is a large dense consolidation in the right middle lobe, lateral segment, nonspecific but suspicious for infection/pneumonia in the appropriate clinical setting      Cardiomegaly, bilateral pleural effusions, and body wall edema suggests a superimposed component of fluid overload/CHF      Multiple areas of skin thickening with associated subjacent complex fluid are seen in the superficial soft tissues of the left pectoralis region (for example axial image 30), as well as in the posterior and superior left thorax (axial image 12), as   described  Consider cellulitis with developing abscesses  In addition, a few bubbles of air are seen in the anterior left chest subcutaneous tissues (axial image 8) which could be related to recent intervention but also raises suspicion for possible   gas-forming infection      Partially distended bladder with catheter seen within the bladder lumen  Mild circumferential bladder wall thickening noted, probably exaggerated by underdistention  A cystitis could be considered in the appropriate clinical setting      Renal cysts, degenerative changes of the shoulder, spine, and hips, and other findings as above  Shakeel Carlos PA-C  11/06/18    Portions of the record may have been created with voice recognition software   Occasional wrong word or "sound alike" substitutions may have occurred due to the inherent limitations of voice recognition software   Read the chart carefully and recognize, using context, where substitutions have occurred

## 2018-11-06 NOTE — UTILIZATION REVIEW
Continued Stay Review    Date: 11/6/18  Inpatient  ICU    Age/Sex: [de-identified] y o  male initially admitted to ICU 10/25/18 due to septic shock, shingles, lactic acidosis, CHRIS, DKA, UTI, hyponatremia  Found to have acute upper GI bleed with large duodenal ulcer  Assessment/Plan: septic shock appears to be 2nd to MSSA bacteremia in setting of chest wall abscess  Recheck cbc/creat  Cont ICU care  High dose IV ancef  Follow cultures  Needs total 4 weeks IV antbx through 11/25  Black stools overnight with 1 episode brb, rpt EGD on 11/4 showed 2 duodenal ulcers, 1 actively bleeding & treated with epi and clips x 3  Continue PPI gtt, monitor stools, keep NPO  Advance to clear liquids this afternoon if stable         Vital Signs: BP 91/56   Pulse 81   Temp (!) 96 6 °F (35 9 °C) (Temporal)   Resp 20   Ht 5' 8 5" (1 74 m)   Wt 73 4 kg (161 lb 13 1 oz)   SpO2 98%   BMI 24 25 kg/m²    85/45    87/60     89/52  86/50   86/54   89/57    Medications:   Scheduled Meds:   Current Facility-Administered Medications:  acetaminophen 650 mg Oral Q6H [address]   atorvastatin 10 mg Oral Daily   cefazolin 2,000 mg Intravenous Q8H   collagenase  Topical Daily   insulin lispro 2-12 Units Subcutaneous Q6H [address]   melatonin 6 mg Oral HS   metoprolol tartrate 12 5 mg Oral Q12H [address]   oxyCODONE 2 5 mg Oral Q4H PRN   pantoprozole (PROTONIX) infusion (Continuous) 8 mg/hr Intravenous Continuous   senna 1 tablet Oral HS   tamsulosin 0 4 mg Oral Daily With Dinner     Abnormal Labs/Diagnostic Results:   11/6: ca 7 2   hgb 9 5  h ct 29 2     EKG: Sinus rhythm with Premature atrial complexes  Low voltage QRS  Septal infarct (cited on or before 10-MAY-2018)  Possible Lateral infarct (cited on or before 10-MAY-2018)    Discharge Plan: TBD, will need STR once stable for DC

## 2018-11-06 NOTE — PROGRESS NOTES
Daily Progress Note - Critical Care/ Stepdown   Clay Garner [de-identified] y o  male MRN: [id number]  Unit/Bed#: ICU 09 Encounter: [id number]    ______________________________________________________________________  Assessment:   Principal Problem:    Abscess of chest wall  Active Problems:    Bacteremia due to Staphylococcus aureus    DM2 (diabetes mellitus, type 2) ([facility])    CAD (coronary artery disease)    CKD (chronic kidney disease) stage 3, GFR 30-59 ml/min ([facility])    Cardiomyopathy, ischemic    Benign essential hypertension    Atrial fibrillation ([facility])    Severe protein-calorie malnutrition ([facility])    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[facility])    Acute blood loss anemia    Urinary retention    Gastrointestinal hemorrhage associated with duodenal ulcer  Resolved Problems:    Septic shock ([facility])    Acute respiratory failure secondary to septic shock    Urinary tract infection    Respiratory infection    Hyponatremia    CHRIS (acute kidney injury) ([facility])    Shingles    Lactic acid acidosis    DKA (diabetic ketoacidoses) ([facility])    Lethargy    Sepsis ([facility])    Cellulitis of left axilla    Acute cystitis with hematuria    Esophagitis    Delirium    Candida esophagitis ([facility])      Septic shock ([facility])resolved as of 11/2/2018   Assessment & Plan    · Resolved at this time  Vasopressor therapy discontinued October 30th  ·   Source likely presumed cellulitis/absess of chest wall   · Patient was initially Fluid resuscitation with 30ml/kg   · Levophed and vasopressor d/zaida 10/30  · Poor EF 20% on Echo  · [place] protocol - Stress Dose Steroids, Vit C, thiamine- now discontinued 11 1  · ID consult  ·  debridement in OR 10/26 of chest wall cellulitis, wound vac in place   Scant serosanguineous output  · Return to OR for debridement 10/29, wound vac in place  · Wound culture: 3+ staph aureus   · Urine cultures > 100,000 mixed containments   · ABX: Ancef day #6  Fluconazole day #5  Total day 10 of antibiotics    Day   · Valtrex d/zaida  · WBCs normalized  · Repeat blood cultures no growth since 10/29       Bacteremia due to Staphylococcus aureus   Assessment & Plan    · Admission Blood culture x 1 MSSA  · Repeat blood cultures no growth to date  · Per ID will need 4 wks antibiotics from negative blood cultures through 11/25  · PICC line in place       Acute respiratory failure secondary to septic shockresolved as of 10/28/2018   Assessment & Plan    · Resolved  ·  Patient extubated on 10/26  · Now on room air     * Abscess of chest wall   Assessment & Plan    · Aggressive  debridement in OR on 10/26 by Dr Isabelle Couch, wound VAC discontinued this a m  · Antibiotics deescalated to Ancef as per ID recs, patient will require IV antibiotics until November 25th  Patient has PICC line for outpatient antibiotic therapy  · ID following  · Blood culture staph aureus x 1 - will treat as bacteremia  · Wound culture: 3+ staph aureus   · Urine cultures > 100,000 mixed containments   · ABX: Ancef day #9  Antibiotic therapy day 12  · Repeat blood cultures from 10/29 no growth to date  · Wound VAC removed 11/3  Continue local wound care       Urinary tract infectionresolved as of 10/29/2018   Assessment & Plan    · Resolved  · Urine culture with greater than 100,000 CFU of mixed contaminants  Respiratory infectionresolved as of 11/3/2018   Assessment & Plan    · Resolved  ·  Present on admission as evidence by CT scan suggestive of infectious process  · No sputum culture to confirm pneumonia    · Respiratory status improved  Extubated on the 26th  Tolerating room air  Gastrointestinal hemorrhage associated with duodenal ulcer   Assessment & Plan    · Patient received 9 units packed red blood cells total as admission  · EGD on 11/04 revealed duodenal bleeding ulcer  · Hemoglobin 9 5 today  · Continue to follow H&H  · protonix gtt       Urinary retention   Assessment & Plan    · Pt failed voiding trial 11/3    · Straight cath x 2 for > 700 ml  · Gordon catheter replaced 11/5  · Likely secondary to BPH  Will start Flomax and repeat voiding trial today  · Urology consulted        Acute blood loss anemia   Assessment & Plan    · Secondary to bleeding gastric ulcer  · Patient continued with tachycardia and drifting hemoglobin and later developed guanakito melena  · EGD with duodenal bulb ulcer with intervention 11/4  · Pt has been Transfused 9 units of packed red blood cells total this admission  1 unit 10/28, 3 units 10/30, 1 unit 11/2  4 unit 11/4  ·  Protonix strip     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HCC)   Assessment & Plan    · Known EF of 20%, no evidence of fluid overload at this time  May require lasix s/p blood transfusion  · Continue to monitor I/Os  · Repeat echocardiogram on Monday  Patient may need LifeVest prior to discharge home if EF is still below 30%  · Continues with episodes of PVCs  ·  Some tachycardia is likely related to anemia, able to tolerate beta-blocker at this time     Severe protein-calorie malnutrition [facility])   Assessment & Plan    Malnutrition Findings:     patient with temporal wasting and muscle wasting  Appreciate nutrition input       BMI Findings: Body mass index is 26 26 kg/m²  Atrial fibrillation ([address] )   Assessment & Plan    · History of AFib in the setting of electrolyte abnormalities in the past   Monitor on telemetry  · Current NSR to Sinus Tach  · Continue to hold on anticoagulation at this time  Particularly in light of recent episode of GI bleeding  · Frequent PVCs on telemetry, cardiology consulted, beta-blocker resumed     Cardiomyopathy, ischemic   Assessment & Plan    · EF prior was 35 to 40%  Recent stress test in May of 2018 showed reversible ischemic changes  Cardiology following as an outpatient with medical management  · ECHO repeat with EF 20% possibly related to sepsis    Repeat echo cardiogram on 11/5  · Cardiac output and indexes while patient was intubated were acceptable  · Frequent PVCs on telemetry  · Cardiology following, consider AICD and life vest  · Start metoprolol 12 5 mg BID when BP improves and Sys > 90  Patient able to tolerate beta-blocker  · Episode of nonsustained V-tach overnight, asymptomatic     CKD (chronic kidney disease) stage 3, GFR 30-59 ml/min ([facility])   Assessment & Plan    · Stable, Baseline 0 9-1 3  · Adequate urine output at this time  · Patient is diuretic dependent at home with torsemide 10 mg daily and Aldactone 12 5 mg daily     CAD (coronary artery disease)   Assessment & Plan    · Troponin negative  · Continue statin  Hold aspirin in light of GI bleeding  · Echo obtained,  EF 20%     DM2 (diabetes mellitus, type 2) [facility])   Assessment & Plan    Lab Results   Component Value Date    HGBA1C 12 0 (H) 10/26/2018       Recent Labs      11/03/18   1101  11/03/18   1624  11/03/18   2116  11/04/18   0714   POCGLU  156*  257*  241*  230*     · Blood sugar poorly controlled  NPO at this time, held bedtime Lantus at this time  · Continue SSI algorithm 4  · Goal blood sugar under 180           Candida esophagitis ([facility])resolved as of 11/5/2018   Assessment & Plan    · Negative pathology - discontinue fluconizole     Deliriumresolved as of 11/4/2018   Assessment & Plan    · Resolved  · Patient AAOX3  Sleeping well at night  · Cont Seroquel 50mg at bedtime, patient did sleep well again overnight       Esophagitisresolved as of 11/4/2018   Assessment & Plan    · Resolved  · EGD on 10/31: duodenal bulb ulcer with possible candida esophagitis  · Biopsies negative for candida - d/c fluconizole 11/4       Acute cystitis with hematuriaresolved as of 10/29/2018   Assessment & Plan    · Resolved  · Urine culture with mixed roberto  Lethargyresolved as of 10/27/2018   Assessment & Plan    Likely related hypoactive delirium  Continue environmental controls  Seroquel HS          DKA (diabetic ketoacidoses) ([facility])resolved as of 10/26/2018 Assessment & Plan    Lab Results   Component Value Date    HGBA1C 12 0 (H) 10/26/2018       Recent Labs      11/03/18   1101  11/03/18   1624  11/03/18   2116  11/04/18   0714   POCGLU  156*  257*  241*  230*       Blood Sugar Average: Last 72 hrs:  (P) 347 [id number]     · DKA resolved  · Goal blood sugar under 180     Lactic acid acidosisresolved as of 10/26/2018   Assessment & Plan    · Resolved       Shinglesresolved as of 11/1/2018   Assessment & Plan    · Now resolved  · Antiviral therapy discontinued on October 31st     CHRIS (acute kidney injury) (HCC)resolved as of 10/27/2018   Assessment & Plan    · Resolved  · Likely Pre renal   Likely secondary to septic shock and DKA    · Improving with fluids  · FENa consistent with Pre-renal cause  · Gordon catheter in place     Hyponatremiaresolved as of 10/27/2018   Assessment & Plan    · Resolved  · Likely hypovolemic hyponatremia in the setting of dehydration, sepsis, and DKA  · Possibly a component of pseudo hyponatremia         Plan:    Neuro:   · Delirium: CAM ICU positive no   · If yes, intervention:  Regulate sleep-wake cycles, melatonin at night for insomnia  · Pain controlled with:  Tylenol p o   · Pain score: mild      CV:   · Chronic combined systolic diastolic heart failure:  EF 20%, on metoprolol 12 5 b i d , Cardiology following, consider AICD, life vest prior to discharge  · Hypotension overnight, resolved with albumin  · Episode of nonsustained V-tach overnight:  Continue with metoprolol, electrolytes repleted  · Dyslipidemia:  Continue on statin  · History of bypass grafting in 2017 followed by [facility]  · Rhythm: Sinus Tachycardia  · Follow rhythm on telemetry    Pulm:   · Stable bibasilar parenchymal densities evident on x-ray  · Continue nasal cannula oxygen as needed,      GI:   · Multiple bleeding duodenal ulcers, repeat EGD on 11/4 vessel clipped, transfused total of 9 units packed red blood cells during admission, multiple episodes of melena overnight, hemoglobin 9 5  · Nutrition/diet plan:  NPO  · Protonix strip  · GI consulted    FEN:   · CKD stage 3, stable, creatinine 0 94 today  · Fluid/Diuretic plan: No intervention  · Goal 24 hour fluid balance: net even   · Electrolytes repleted: yes  · Goal: K >4 0, Mag >2 0, and Phos >3 0    :   · Indwelling Gordon present: yes   · Urinary catheter still needed for Strict I and O in a critically ill patient and Failed voiding trial     ID:   · Staph aureus bacteremia likely secondary to chest wall abscess, septic shock has since resolved, wound VAC removed 11/3, wound stable  · Abx ordered: Ancef  · Day # 9, continue antibiotics until 11/25th as per ID recommendation  · Trend temps and WBC count    Heme:   · Acute blood loss anemia secondary to bleeding duodenal ulcer, EGD on 11/04 with clipping  · Transfused total of 9 units packed red blood cells during admission  · Hemoglobin stable, 9 5 today from 10 1 last night  · Trend hgb and plts    Endo:   · Uncontrolled type 2 diabetes mellitus, recent A1c 12  · Glycemic control plan: Blood glucose controlled on current regimen, held Lantus at this time while patient is NPO    Msk/Skin:  · Mobility goal:  Up and out of bed as tolerated  · PT consult: yes  · OT consult: yes  · Frequent turning and off-loading    Family:  · Family updated within 24 hours: yes   · Family meeting planned today: no     Lines:  · PICC line with Gordon catheter    VTE Prophylaxis:  · Pharmacologic Prophylaxis: Reason for no pharmacologic prophylaxis GI bleed  · Mechanical Prophylaxis: sequential compression device    Disposition: Continue Stepdown    Code Status: Level 2 - DNAR: but accepts endotracheal intubation    Counseling / Coordination of Care  Total Critical Care time spent 25 minutes excluding procedures, teaching and family updates      ______________________________________________________________________    HPI/24hr events:  Episode of hypotension overnight with systolic blood pressure in 80s  Given albumin 5% 12 5 grams x1, and hypotension resolved  Couple second episode of nonsustained V-tach, asymptomatic  40 mEq of potassium and 2 grams mg given  Patient remained asymptomatic  Five episodes of black stool overnight  Patient remained NPO on Protonix drip     ______________________________________________________________________    Physical Exam:   Physical Exam   Constitutional: He is oriented to person, place, and time  He appears well-developed and well-nourished  No distress  HENT:   Head: Normocephalic and atraumatic  Neck: No JVD present  Cardiovascular: Normal rate, regular rhythm, normal heart sounds and intact distal pulses  Exam reveals no gallop and no friction rub  No murmur heard  Pulmonary/Chest: Effort normal and breath sounds normal  No respiratory distress  He has no wheezes  He has no rales  Abdominal:   Gordon catheter in place draining clear yellow urine   Musculoskeletal: He exhibits edema  Neurological: He is alert and oriented to person, place, and time  Skin: Skin is warm and dry  Capillary refill takes less than 2 seconds  He is not diaphoretic  Anterior chest dressing clean dry and intact   Psychiatric: He has a normal mood and affect  His behavior is normal        ______________________________________________________________________  Vitals:    18 1039 18 1100 18 1200 18 1300   BP: 96/61 104/60 97/53 91/56   BP Location:       Pulse: 79 86 77 81   Resp:  (!) 23 (!) 28 20   Temp:  (!) 96 6 °F (35 9 °C)     TempSrc:  Temporal     SpO2:  98% 98% 98%   Weight:       Height:         Arterial Line BP: 77/68  Arterial Line MAP (mmHg): 72 mmHg     Temperature:   Temp (24hrs), Av 7 °F (35 9 °C), Min:96 5 °F (35 8 °C), Max:97 3 °F (36 3 °C)    Current Temperature: (!) 96 6 °F (35 9 °C)    Weights:   IBW: 69 55 kg    Body mass index is 24 25 kg/m²    Weight (last 2 days) Date/Time   Weight    11/06/18 0558  73 4 (161 82)    11/05/18 0600  72 8 (160 5)              Hemodynamic Monitoring:  Frequent vital sign       Non-Invasive/Invasive Ventilation Settings:  Respiratory    Lab Data (Last 4 hours)    None         O2/Vent Data (Last 4 hours)    None              No results found for: PHART, LPP9RXZ, PO2ART, ZHD8FJF, X9VCCZUX, BEART, SOURCE  SpO2: SpO2: 98 %    Intake and Outputs:  I/O       11/04 0701 - 11/05 0700 11/05 0701 - 11/06 0700    P  O   120    I V  (mL/kg) 87 8 (1 2) 340 (4 6)    Blood 1050     IV Piggyback 250 650    Total Intake(mL/kg) 1387 8 (19 1) 1110 (15 1)    Urine (mL/kg/hr) 3300 (1 9) 1210 (0 7)    Total Output 3300 1210    Net -1912 2 -100          Unmeasured Stool Occurrence 3 x             Nutrition:        Diet Orders            Start     Ordered    11/06/18 0123  Diet NPO  Diet effective now     Question Answer Comment   Diet Type NPO    RD to adjust diet per protocol? Yes        11/06/18 0124    10/29/18 1450  Room Service  Once     Question:  Type of Service  Answer:  Room Service - Appropriate with Assistance    10/29/18 1452            Labs:     Results from last 7 days  Lab Units 11/06/18  0545 11/06/18  0056 11/05/18  1719 11/05/18  0554  11/04/18  0632   WBC Thousand/uL 8 74 10 57*  --  12 25*  < > 13 11*   HEMOGLOBIN g/dL 9 5* 10 1* 11 0* 10 3*  < > 6 9*   HEMATOCRIT % 29 2* 30 5* 34 2* 31 8*  < > 21 9*   PLATELETS Thousands/uL 150 147*  --  121*  < > 164   NEUTROS PCT %  --   --   --   --   --  85*   MONOS PCT %  --   --   --   --   --  4   MONO PCT % 3* 1*  --   --   --   --    < > = values in this interval not displayed      Results from last 7 days  Lab Units 11/06/18  0545 11/05/18  0544 11/04/18  1629 11/04/18  0632   POTASSIUM mmol/L 4 2 3 6 3 5 4 5   CHLORIDE mmol/L 108 108 107 108   CO2 mmol/L 24 25 24 24   BUN mg/dL 25 30* 33* 31*   CREATININE mg/dL 0 94 1 03 1 00 0 94   CALCIUM mg/dL 7 2* 7 2* 7 5* 7 0*   ALK PHOS U/L  --  60 64 58   ALT U/L --  6* 6* 6*   AST U/L  --  15 15 11       Results from last 7 days  Lab Units 11/06/18  0545 11/05/18  0544 11/04/18  0632   MAGNESIUM mg/dL 2 3 1 8 2 1     Lab Results   Component Value Date    PHOS 2 4 11/06/2018    PHOS 2 6 11/05/2018    PHOS 2 0 (L) 11/04/2018            0  Lab Value Date/Time   TROPONINI <0 02 10/25/2018 1534   TROPONINI <0 02 05/09/2018 1927         ABG:  Lab Results   Component Value Date    PHART 7 539 (H) 10/30/2018    LSS2UYZ 25 6 (LL) 10/30/2018    PO2ART 70 7 (L) 10/30/2018    DKP2HPZ 21 3 (L) 10/30/2018    BEART -0 9 10/30/2018    SOURCE Radial, Left 10/30/2018       Imaging:    Micro:  Lab Results   Component Value Date    BLOODCX No Growth After 5 Days  10/29/2018    BLOODCX No Growth After 5 Days   10/29/2018    BLOODCX Staphylococcus aureus (A) 10/25/2018    URINECX >100,000 cfu/ml  10/25/2018    URINECX >100,000 cfu/ml Klebsiella oxytoca (A) 05/09/2018    WOUNDCULT 4+ Growth of Staphylococcus aureus (A) 10/26/2018    WOUNDCULT 4+ Growth of Staphylococcus aureus (A) 10/25/2018       Allergies: No Known Allergies  Medications:   Scheduled Meds:    Current Facility-Administered Medications:  acetaminophen 650 mg Oral Q6H [address] Rhea John PA-C    atorvastatin 10 mg Oral Daily Amber Lentz MD    cefazolin 2,000 mg Intravenous Q8H Amber Lentz MD Last Rate: 2,000 mg (11/06/18 1331)   collagenase  Topical Daily Yvette Mae MD    insulin lispro 2-12 Units Subcutaneous Q6H [address] Belen Tee PA-C    melatonin 6 mg Oral HS Amber Lentz MD    metoprolol tartrate 12 5 mg Oral Q12H [address] Juan C Garcia PA-C    oxyCODONE 2 5 mg Oral Q4H PRN Rhea John PA-C    pantoprozole (PROTONIX) infusion (Continuous) 8 mg/hr Intravenous Continuous Colleen Martinez PA-C Last Rate: 8 mg/hr (11/06/18 1405)   senna 1 tablet Oral HS Amber Lentz MD    tamsulosin 0 4 mg Oral Daily With [employer] EVY Tee      Continuous Infusions:    pantoprozole (PROTONIX) infusion (Continuous) 8 mg/hr Last Rate: 8 mg/hr (11/06/18 1405)     PRN Meds:    oxyCODONE 2 5 mg Q4H PRN       Invasive lines and devices:   Invasive Devices     Peripherally Inserted Central Catheter Line            PICC Line 48/46/83 Right Basilic 6 days          Drain            Urethral Catheter 16 Fr  less than 1 day                   SIGNATURE: Mukesh Mack MD  DATE: November 6, 2018

## 2018-11-06 NOTE — PROGRESS NOTES
Progress Note- Raheem Sat [de-identified] y o  male MRN: [id number]    Unit/Bed#: ICU 09 Encounter: [id number]      Assessment and Plan:    Acute upper GI bleed:  -repeat EGD 11/4 with 2 duodenal ulcers, 1 actively bleeding treated with epi, clips x 3  -small black stools overnight, one instance of bright red blood  -hemoglobin 10 3 yesterday to 9 5-continue to monitor hemoglobin closely, if significant drop would plan for repeat endoscopy  -continue PPI drip  -continue to monitor stools  -keep NPO for now, if stable can advance to clear liquids this afternoon    ______________________________________________________________________    Subjective:     Mr Lala Thomas denies complaints   Per his nurse he had black stools overnight, last at 4am  He denies nausea, vomiting, abdominal pain    Medication Administration - last 24 hours from 11/05/2018 1016 to 11/06/2018 1016       Date/Time Order Dose Route Action Action by     11/05/2018 1724 atorvastatin (LIPITOR) tablet 10 mg 10 mg Oral Given [facility] V, RN     11/05/2018 2147 senna (SENOKOT) tablet 8 6 mg 8 6 mg Oral Given Chayito Romero RN     11/06/2018 0537 ceFAZolin (ANCEF) IVPB (premix) 2,000 mg 0 mg Intravenous Stopped Chayito Romero RN     11/06/2018 0507 ceFAZolin (ANCEF) IVPB (premix) 2,000 mg 2,000 mg Intravenous New Bag Chayito Romero RN     11/05/2018 2217 ceFAZolin (ANCEF) IVPB (premix) 2,000 mg 0 mg Intravenous Stopped Chayito Romero RN     11/05/2018 2147 ceFAZolin (ANCEF) IVPB (premix) 2,000 mg 2,000 mg Intravenous New Bag Chayito Romero RN     11/05/2018 1506 ceFAZolin (ANCEF) IVPB (premix) 2,000 mg 0 mg Intravenous Stopped [facility] V, RN     11/05/2018 1432 ceFAZolin (ANCEF) IVPB (premix) 2,000 mg 2,000 mg Intravenous [address] V, RN     11/06/2018 0831 collagenase (SANTYL) ointment   Topical Given [facility] V, RN     11/06/2018 0830 acetaminophen (TYLENOL) tablet 650 mg 650 mg Oral Given [facility] V, RN     11/05/2018 2045 acetaminophen (TYLENOL) tablet 650 mg 650 mg Oral Given Maria Elena Laurel, RN     11/05/2018 1724 tamsulosin (FLOMAX) capsule 0 4 mg 0 4 mg Oral Given [place] V, RN     11/05/2018 2040 metoprolol tartrate (LOPRESSOR) partial tablet 12 5 mg 12 5 mg Oral Given Maria Elenajana Barragan, RN     11/06/2018 0507 pantoprazole (PROTONIX) 80 mg in sodium chloride 0 9 % 100 mL infusion 8 mg/hr Intravenous [address] Maria Elena Laurel, RN     11/05/2018 1824 pantoprazole (PROTONIX) 80 mg in sodium chloride 0 9 % 100 mL infusion 8 mg/hr Intravenous [address] V, RN     11/06/2018 3564 insulin lispro (HumaLOG) 100 units/mL subcutaneous injection 2-12 Units 2 Units Subcutaneous Not Given Maria Elena Laurel, RN     11/06/2018 0021 insulin lispro (HumaLOG) 100 units/mL subcutaneous injection 2-12 Units 2 Units Subcutaneous Not Given Maria Elenajana Barragan, RN     11/05/2018 1824 insulin lispro (HumaLOG) 100 units/mL subcutaneous injection 2-12 Units 2 Units Subcutaneous Not Given Diana [place], RN     11/05/2018 1244 insulin lispro (HumaLOG) 100 units/mL subcutaneous injection 2-12 Units 2 Units Subcutaneous Not Given Diana [place], RN     11/05/2018 1244 magnesium sulfate 2 g/50 mL IVPB (premix) 2 g 0 g Intravenous Stopped [place] V, RN     11/05/2018 1112 potassium chloride 20 mEq IVPB (premix) 0 mEq Intravenous Stopped Emily Lizbet V, RN     11/05/2018 1300 potassium chloride 20 mEq IVPB (premix) 0 mEq Intravenous Stopped Emily Lizbet V, RN     11/05/2018 1112 potassium chloride 20 mEq IVPB (premix) 20 mEq Intravenous [address] [place] V, RN     11/05/2018 2147 melatonin tablet 6 mg 6 mg Oral Given Maria Elena Laurel, RN     11/06/2018 0119 albumin human (FLEXBUMIN) 5 % injection 12 5 g 0 g Intravenous Stopped Maria Elena Laurel, RN     11/06/2018 0049 albumin human (FLEXBUMIN) 5 % injection 12 5 g 12 5 g Intravenous [address] Maria Elena Barragan RN     11/06/2018 0351 magnesium sulfate 2 g/50 mL IVPB (premix) 2 g 0 g Intravenous Stopped Sebastien Pichardo RN     11/06/2018 0151 magnesium sulfate 2 g/50 mL IVPB (premix) 2 g 2 g Intravenous [address] Sebastien Pichardo RN     11/06/2018 0551 potassium chloride 40 mEq IVPB (premix) 0 mEq Intravenous Stopped Sebastien Pichardo RN     11/06/2018 0151 potassium chloride 40 mEq IVPB (premix) 40 mEq Intravenous New Bag Sebastien Pichardo RN          Objective:     Vitals: Blood pressure 94/53, pulse 80, temperature (!) 96 6 °F (35 9 °C), temperature source Temporal, resp  rate 22, height 5' 8 5" (1 74 m), weight 73 4 kg (161 lb 13 1 oz), SpO2 97 %  ,Body mass index is 24 25 kg/m²  Intake/Output Summary (Last 24 hours) at 11/06/18 1016  Last data filed at 11/06/18 2309   Gross per 24 hour   Intake             1110 ml   Output             1270 ml   Net             -160 ml       Physical Exam:   General Appearance: Awake and alert, in no acute distress, chronically ill appearing  Abdomen: Soft, non-tender, non-distended; bowel sounds normal    Invasive Devices     Peripherally Inserted Central Catheter Line            PICC Line 57/05/28 Right Basilic 5 days          Drain            Urethral Catheter 16 Fr  less than 1 day                Lab Results:  Admission on 10/25/2018   No results displayed because visit has over 200 results  Imaging Studies: I have personally reviewed pertinent imaging studies

## 2018-11-06 NOTE — SOCIAL WORK
MANNIE met with pt and daughter-in-law to assist with completing advanced directives  Pt completed both living will and DPOA for healthcare  Copies placed in bin to be scanned to chart  Pt's daughter-in-law is now his POA  MANNIE also reviewed plan for STR placement once stable  SW will continue to follow to assist with planning as needed

## 2018-11-06 NOTE — PROGRESS NOTES
Progress Note - General Surgery   Ulus Cea [de-identified] y o  male MRN: [id number]  Unit/Bed#: ICU 09 Encounter: [id number]    Assessment:  1) S/P extensive incision, drainage, and debridement of left axilla, chest, and back - patient's leukocytosis has resolved today, currently was AVSS during encounter, has had some minor points at which was hypotensive, wound appears very clean, no erythema, no drainage, it sutures intact, seizures of base for approximately 8 days or so, wound edges in tact, patient is a bit abrasive    Plan:  1) S/P extensive incision, drainage, and debridement of left axilla, chest, and back -   - patient appears to be improving from sepsis clinical picture  - from surgical perspective only requires 10 day course for treatment of SSTI after achieving source control  - patient appears to be doing very well currently with the fall to Infectious Disease in critical care team for management of antibiotics  - can change with ABD and silk tape once daily  - p r n  Analgesic medications  - no further surgical intervention indicted  - will follow peripherally  - continue suture for 3 weeks  - if still in hospital will f/u to remove sutures  - f/u as an outpatient 1 week upon discharge    Subjective/Objective   Chief Complaint: I don't know you tell me    Subjective:  Patient was seen examined at bedside  Patient denies any acute events overnight  It is of note that patient is quite irritable and is not exactly specific about his history  When asked how he is doing patient continues to say I do not know you tell me  Patient does not really answer questions sufficiently until calmed down  Patient appears somewhat agitated  Patient after agitation resolved somewhat was able to tell me that he did not have any neurovascular changes such as numbness, tingling, lack of motor movement, lack of sensation in the distal upper left extremity    Patient does have pain in the left axilla and chest wall but only with movement and changing of dressings  At rest pain is resolved for the most part  Patient denies any sort of significant drainage or redness  Patient does seem somewhat confused but is able to answer where he is and what time it is  Objective:     Blood pressure 91/56, pulse 81, temperature (!) 96 6 °F (35 9 °C), temperature source Temporal, resp  rate 20, height 5' 8 5" (1 74 m), weight 73 4 kg (161 lb 13 1 oz), SpO2 98 %  ,Body mass index is 24 25 kg/m²  Intake/Output Summary (Last 24 hours) at 11/06/18 1524  Last data filed at 11/06/18 1200   Gross per 24 hour   Intake              960 ml   Output             1545 ml   Net             -585 ml       Invasive Devices     Peripherally Inserted Central Catheter Line            PICC Line 50/32/02 Right Basilic 6 days          Drain            Urethral Catheter 16 Fr  less than 1 day                Physical Exam: BP 91/56   Pulse 81   Temp (!) 96 6 °F (35 9 °C) (Temporal)   Resp 20   Ht 5' 8 5" (1 74 m)   Wt 73 4 kg (161 lb 13 1 oz)   SpO2 98%   BMI 24 25 kg/m²   General appearance: alert and oriented, in no acute distress  Head: Normocephalic, without obvious abnormality, atraumatic  Lungs: clear to auscultation bilaterally  Heart: regular rate and rhythm, S1, S2 normal, no murmur, click, rub or gallop  Extremities: 2+ edema Left upper extremity, closure with nylon, skin in tact, no erythema, moderate tendereness with movement and palpable, AROM and PROM in tact of left shoulder/elbow/hand, sensation and motor in tact, radial pulse 2+ b/l  Skin: Skin color, texture, turgor normal  No rashes or lesions or incision clean, dry, in tact, no erythema, no purulence, minimal tenderness at rest    Lab, Imaging and other studies:  I have personally reviewed pertinent lab results    , CBC:   Lab Results   Component Value Date    WBC 8 74 11/06/2018    HGB 9 5 (L) 11/06/2018    HCT 29 2 (L) 11/06/2018    MCV 95 11/06/2018     11/06/2018    MCH 30 9 11/06/2018    MCHC 32 5 11/06/2018    RDW 17 7 (H) 11/06/2018    MPV 10 8 11/06/2018    NRBC 0 11/06/2018   , CMP:   Lab Results   Component Value Date    K 4 2 11/06/2018     11/06/2018    CO2 24 11/06/2018    BUN 25 11/06/2018    CREATININE 0 94 11/06/2018    CALCIUM 7 2 (L) 11/06/2018    EGFR 76 11/06/2018     VTE Pharmacologic Prophylaxis: per CC team  VTE Mechanical Prophylaxis: sequential compression device

## 2018-11-06 NOTE — PROGRESS NOTES
Progress Note - Cardiology   [facility] Duty [de-identified] y o  male MRN: [id number]  Unit/Bed#: ICU 09 Encounter: [id number]    Assessment/Plan:  MSSA Bacteremia  Septic Shock-POA  Upper GI bleed bleed found with large duodenal ulcer status post endoscopic therapy-currently on PPI drip  Chronic combined systolic diastolic heart failure EF 20%- rechallenge with heart failure meds when hemodynamically stable  Consideration for AICD? Life vest prior to discharge if repeat limited echo 2d with reduced EF  Previous EF40%  History of a bypass grafting in 2017 followed by [facility]  Dyslipidemia on statin- atorvastatin 10mg  Type 2 diabetes mellitus       Subjective/Objective   Denies having chest discomfort  When in since of bright red blood in his stools    Objective:     Vitals: BP 91/56   Pulse 81   Temp (!) 97 2 °F (36 2 °C) (Temporal)   Resp 20   Ht 5' 8 5" (1 74 m)   Wt 73 4 kg (161 lb 13 1 oz)   SpO2 98%   BMI 24 25 kg/m²   Vitals:    11/05/18 0600 11/06/18 0558   Weight: 72 8 kg (160 lb 7 9 oz) 73 4 kg (161 lb 13 1 oz)     Orthostatic Blood Pressures      Most Recent Value   Blood Pressure  91/56 filed at 11/06/2018 1300   Patient Position - Orthostatic VS  Lying filed at 11/06/2018 0700            Intake/Output Summary (Last 24 hours) at 11/06/18 1729  Last data filed at 11/06/18 1728   Gross per 24 hour   Intake              960 ml   Output             1145 ml   Net             -185 ml       Invasive Devices     Peripherally Inserted Central Catheter Line            PICC Line 26/03/33 Right Basilic 6 days          Drain            Urethral Catheter 16 Fr  less than 1 day                Review of Systems: reviewed    Physical Exam   Constitutional: He is oriented to person, place, and time  He has a sickly appearance  No distress  HENT:   Head: Normocephalic and atraumatic     Right Ear: External ear normal    Left Ear: External ear normal    Eyes: Pupils are equal, round, and reactive to light  Conjunctivae are normal  Right eye exhibits no discharge  Left eye exhibits no discharge  No scleral icterus  Neck: Normal range of motion  Neck supple  No JVD present  No tracheal deviation present  No thyromegaly present  Cardiovascular: Normal rate and regular rhythm  Exam reveals gallop  Exam reveals no friction rub  Murmur heard  Pulmonary/Chest: Effort normal and breath sounds normal  No stridor  No respiratory distress  He has no wheezes  He has no rales  He exhibits no tenderness  Abdominal: Soft  Bowel sounds are normal  He exhibits distension  He exhibits no mass  There is no tenderness  There is no rebound and no guarding  Musculoskeletal: Normal range of motion  He exhibits no edema, tenderness or deformity  Neurological: He is alert and oriented to person, place, and time  He has normal reflexes  No cranial nerve deficit  He exhibits normal muscle tone  Coordination normal    Skin: Skin is warm and dry  No rash noted  He is not diaphoretic  No erythema  No pallor  Psychiatric: He has a normal mood and affect  His behavior is normal  Judgment and thought content normal    Nursing note and vitals reviewed  Lab Results: I have personally reviewed pertinent lab results  Imaging: I have personally reviewed pertinent reports  EKG: reviewed  VTE Pharmacologic Prophylaxis: Sequential compression device (Venodyne)   VTE Mechanical Prophylaxis: sequential compression device    Counseling / Coordination of Care  Total time spent today 40 minutes  Greater than 50% of total time was spent with the patient and / or family counseling and / or coordination of care   A description of the counseling / coordination of care: heart failure

## 2018-11-07 ENCOUNTER — APPOINTMENT (INPATIENT)
Dept: NON INVASIVE DIAGNOSTICS | Facility: HOSPITAL | Age: 78
DRG: 853 | End: 2018-11-07
Payer: MEDICARE

## 2018-11-07 PROBLEM — B02.29 HZV (HERPES ZOSTER VIRUS) POST HERPETIC NEURALGIA: Status: ACTIVE | Noted: 2018-11-07

## 2018-11-07 LAB
ANION GAP SERPL CALCULATED.3IONS-SCNC: 9 MMOL/L (ref 4–13)
BASOPHILS # BLD AUTO: 0.01 THOUSANDS/ΜL (ref 0–0.1)
BASOPHILS NFR BLD AUTO: 0 % (ref 0–1)
BUN SERPL-MCNC: 22 MG/DL (ref 5–25)
CALCIUM SERPL-MCNC: 7 MG/DL (ref 8.3–10.1)
CHLORIDE SERPL-SCNC: 107 MMOL/L (ref 100–108)
CO2 SERPL-SCNC: 23 MMOL/L (ref 21–32)
CREAT SERPL-MCNC: 0.95 MG/DL (ref 0.6–1.3)
EOSINOPHIL # BLD AUTO: 0.07 THOUSAND/ΜL (ref 0–0.61)
EOSINOPHIL NFR BLD AUTO: 1 % (ref 0–6)
ERYTHROCYTE [DISTWIDTH] IN BLOOD BY AUTOMATED COUNT: 17.9 % (ref 11.6–15.1)
GFR SERPL CREATININE-BSD FRML MDRD: 75 ML/MIN/1.73SQ M
GLUCOSE SERPL-MCNC: 121 MG/DL (ref 65–140)
GLUCOSE SERPL-MCNC: 150 MG/DL (ref 65–140)
GLUCOSE SERPL-MCNC: 69 MG/DL (ref 65–140)
GLUCOSE SERPL-MCNC: 75 MG/DL (ref 65–140)
GLUCOSE SERPL-MCNC: 96 MG/DL (ref 65–140)
HCT VFR BLD AUTO: 29 % (ref 36.5–49.3)
HGB BLD-MCNC: 9.1 G/DL (ref 12–17)
IMM GRANULOCYTES # BLD AUTO: 0.04 THOUSAND/UL (ref 0–0.2)
IMM GRANULOCYTES NFR BLD AUTO: 1 % (ref 0–2)
LYMPHOCYTES # BLD AUTO: 0.84 THOUSANDS/ΜL (ref 0.6–4.47)
LYMPHOCYTES NFR BLD AUTO: 12 % (ref 14–44)
MAGNESIUM SERPL-MCNC: 2.1 MG/DL (ref 1.6–2.6)
MCH RBC QN AUTO: 30.7 PG (ref 26.8–34.3)
MCHC RBC AUTO-ENTMCNC: 31.4 G/DL (ref 31.4–37.4)
MCV RBC AUTO: 98 FL (ref 82–98)
MONOCYTES # BLD AUTO: 0.44 THOUSAND/ΜL (ref 0.17–1.22)
MONOCYTES NFR BLD AUTO: 6 % (ref 4–12)
NEUTROPHILS # BLD AUTO: 5.9 THOUSANDS/ΜL (ref 1.85–7.62)
NEUTS SEG NFR BLD AUTO: 80 % (ref 43–75)
NRBC BLD AUTO-RTO: 0 /100 WBCS
PHOSPHATE SERPL-MCNC: 2.6 MG/DL (ref 2.3–4.1)
PLATELET # BLD AUTO: 146 THOUSANDS/UL (ref 149–390)
PMV BLD AUTO: 10.9 FL (ref 8.9–12.7)
POTASSIUM SERPL-SCNC: 3.8 MMOL/L (ref 3.5–5.3)
RBC # BLD AUTO: 2.96 MILLION/UL (ref 3.88–5.62)
SODIUM SERPL-SCNC: 139 MMOL/L (ref 136–145)
WBC # BLD AUTO: 7.3 THOUSAND/UL (ref 4.31–10.16)

## 2018-11-07 PROCEDURE — 99232 SBSQ HOSP IP/OBS MODERATE 35: CPT | Performed by: INTERNAL MEDICINE

## 2018-11-07 PROCEDURE — 80048 BASIC METABOLIC PNL TOTAL CA: CPT | Performed by: ANESTHESIOLOGY

## 2018-11-07 PROCEDURE — 82948 REAGENT STRIP/BLOOD GLUCOSE: CPT

## 2018-11-07 PROCEDURE — 99233 SBSQ HOSP IP/OBS HIGH 50: CPT | Performed by: ANESTHESIOLOGY

## 2018-11-07 PROCEDURE — 85025 COMPLETE CBC W/AUTO DIFF WBC: CPT | Performed by: ANESTHESIOLOGY

## 2018-11-07 PROCEDURE — 99232 SBSQ HOSP IP/OBS MODERATE 35: CPT | Performed by: PHYSICIAN ASSISTANT

## 2018-11-07 PROCEDURE — C9113 INJ PANTOPRAZOLE SODIUM, VIA: HCPCS | Performed by: PHYSICIAN ASSISTANT

## 2018-11-07 PROCEDURE — 84100 ASSAY OF PHOSPHORUS: CPT | Performed by: ANESTHESIOLOGY

## 2018-11-07 PROCEDURE — 83735 ASSAY OF MAGNESIUM: CPT | Performed by: ANESTHESIOLOGY

## 2018-11-07 RX ORDER — PREGABALIN 50 MG/1
50 CAPSULE ORAL 3 TIMES DAILY
Status: DISCONTINUED | OUTPATIENT
Start: 2018-11-07 | End: 2018-11-18 | Stop reason: HOSPADM

## 2018-11-07 RX ORDER — PANTOPRAZOLE SODIUM 40 MG/1
40 TABLET, DELAYED RELEASE ORAL
Status: DISCONTINUED | OUTPATIENT
Start: 2018-11-07 | End: 2018-11-08

## 2018-11-07 RX ORDER — PANTOPRAZOLE SODIUM 40 MG/1
40 TABLET, DELAYED RELEASE ORAL
Status: DISCONTINUED | OUTPATIENT
Start: 2018-11-07 | End: 2018-11-07

## 2018-11-07 RX ADMIN — SENNOSIDES 8.6 MG: 8.6 TABLET, FILM COATED ORAL at 21:15

## 2018-11-07 RX ADMIN — CEFAZOLIN SODIUM 2000 MG: 2 SOLUTION INTRAVENOUS at 13:48

## 2018-11-07 RX ADMIN — ACETAMINOPHEN 650 MG: 325 TABLET, FILM COATED ORAL at 11:52

## 2018-11-07 RX ADMIN — ACETAMINOPHEN 650 MG: 325 TABLET, FILM COATED ORAL at 17:56

## 2018-11-07 RX ADMIN — ACETAMINOPHEN 650 MG: 325 TABLET, FILM COATED ORAL at 23:36

## 2018-11-07 RX ADMIN — COLLAGENASE SANTYL 1 APPLICATION: 250 OINTMENT TOPICAL at 08:36

## 2018-11-07 RX ADMIN — TAMSULOSIN HYDROCHLORIDE 0.4 MG: 0.4 CAPSULE ORAL at 15:40

## 2018-11-07 RX ADMIN — MELATONIN TAB 3 MG 6 MG: 3 TAB at 21:15

## 2018-11-07 RX ADMIN — SODIUM CHLORIDE 8 MG/HR: 9 INJECTION, SOLUTION INTRAVENOUS at 05:11

## 2018-11-07 RX ADMIN — PREGABALIN 50 MG: 50 CAPSULE ORAL at 10:07

## 2018-11-07 RX ADMIN — CEFAZOLIN SODIUM 2000 MG: 2 SOLUTION INTRAVENOUS at 05:19

## 2018-11-07 RX ADMIN — METOPROLOL TARTRATE 12.5 MG: 25 TABLET ORAL at 20:25

## 2018-11-07 RX ADMIN — METOPROLOL TARTRATE 12.5 MG: 25 TABLET ORAL at 08:33

## 2018-11-07 RX ADMIN — PREGABALIN 50 MG: 50 CAPSULE ORAL at 15:40

## 2018-11-07 RX ADMIN — PANTOPRAZOLE SODIUM 40 MG: 40 TABLET, DELAYED RELEASE ORAL at 15:40

## 2018-11-07 RX ADMIN — OXYCODONE HYDROCHLORIDE 2.5 MG: 5 TABLET ORAL at 08:33

## 2018-11-07 RX ADMIN — CEFAZOLIN SODIUM 2000 MG: 2 SOLUTION INTRAVENOUS at 21:15

## 2018-11-07 RX ADMIN — PREGABALIN 50 MG: 50 CAPSULE ORAL at 20:25

## 2018-11-07 RX ADMIN — ACETAMINOPHEN 650 MG: 325 TABLET, FILM COATED ORAL at 05:18

## 2018-11-07 RX ADMIN — ATORVASTATIN CALCIUM 10 MG: 10 TABLET, FILM COATED ORAL at 17:57

## 2018-11-07 RX ADMIN — INSULIN LISPRO 2 UNITS: 100 INJECTION, SOLUTION INTRAVENOUS; SUBCUTANEOUS at 15:44

## 2018-11-07 NOTE — PROGRESS NOTES
Progress Note - Cardiology   Yrn Stephens [de-identified] y o  male MRN: [id number]  Unit/Bed#: ICU 09 Encounter: [id number]    Assessment/Plan:  Chronic combined systolic diastolic heart failure EF 20%- limited echo 2d tomorrow  rechallenge with heart failure meds when hemodynamically stable  Consideration for AICD? MSSA Bacteremia  Septic Shock-POA  Upper GI bleed bleed found with large duodenal ulcer status post endoscopic therapy-currently on PPI drip  History of a bypass grafting in 2017 followed by [facility]  Dyslipidemia on statin- atorvastatin 10mg  Type 2 diabetes mellitus     Subjective/Objective   Denies having chest discomfort  No bleeding overnight    Objective:     Vitals: /57   Pulse 87   Temp (!) 97 1 °F (36 2 °C) (Temporal)   Resp 21   Ht 5' 8 5" (1 74 m)   Wt 73 4 kg (161 lb 13 1 oz)   SpO2 98%   BMI 24 25 kg/m²   Vitals:    11/05/18 0600 11/06/18 0558   Weight: 72 8 kg (160 lb 7 9 oz) 73 4 kg (161 lb 13 1 oz)     Orthostatic Blood Pressures      Most Recent Value   Blood Pressure  103/57 filed at 11/07/2018 1000   Patient Position - Orthostatic VS  Sitting filed at 11/07/2018 0800            Intake/Output Summary (Last 24 hours) at 11/07/18 1019  Last data filed at 11/07/18 1004   Gross per 24 hour   Intake            550 5 ml   Output              920 ml   Net           -369 5 ml       Invasive Devices     Peripherally Inserted Central Catheter Line            PICC Line 06/91/19 Right Basilic 6 days          Drain            Urethral Catheter 16 Fr  1 day                Review of Systems: reviewed    Physical Exam   Constitutional: He is oriented to person, place, and time  He has a sickly appearance  He appears ill  No distress  HENT:   Head: Normocephalic and atraumatic  Right Ear: External ear normal    Left Ear: External ear normal    Eyes: Pupils are equal, round, and reactive to light  Conjunctivae are normal  Right eye exhibits no discharge   Left eye exhibits no discharge  No scleral icterus  Neck: Normal range of motion  Neck supple  No JVD present  No tracheal deviation present  No thyromegaly present  Cardiovascular: Normal rate and regular rhythm  Exam reveals gallop  Exam reveals no friction rub  Murmur heard  Pulmonary/Chest: Effort normal and breath sounds normal  No stridor  No respiratory distress  He has no wheezes  He has no rales  He exhibits no tenderness  Abdominal: Soft  Bowel sounds are normal  He exhibits distension  He exhibits no mass  There is no tenderness  There is no rebound and no guarding  Musculoskeletal: Normal range of motion  He exhibits edema  He exhibits no tenderness or deformity  Neurological: He is alert and oriented to person, place, and time  He has normal reflexes  No cranial nerve deficit  He exhibits normal muscle tone  Coordination normal    Skin: Skin is warm and dry  No rash noted  He is not diaphoretic  No erythema  No pallor  Psychiatric: He has a normal mood and affect  His behavior is normal  Judgment and thought content normal    Nursing note and vitals reviewed  Lab Results: I have personally reviewed pertinent lab results  Imaging: I have personally reviewed pertinent reports  EKG: reviewed  VTE Pharmacologic Prophylaxis: Sequential compression device (Venodyne)   VTE Mechanical Prophylaxis: sequential compression device    Counseling / Coordination of Care  Total time spent today 40 minutes  Greater than 50% of total time was spent with the patient and / or family counseling and / or coordination of care   A description of the counseling / coordination of care: heart failure

## 2018-11-07 NOTE — PROGRESS NOTES
Progress Note - Infectious Disease   Robby Gambino [de-identified] y o  male MRN: [id number]  Unit/Bed#: ICU 09 Encounter: [id number]      Impression/Plan:  1  MSSA bacteremia-appears to be secondary to chest wall abscesses   No other clear source appreciated  Consideration for the possibility of endocarditis but this is less likely with only 1 of 2 blood cultures positive and the transthoracic echocardiogram without valvular vegetations   Repeat blood cultures remain negative   -continue high-dose IV cefazolin  -with severity and complexity of infection, plan 4 weeks of intravenous antibiotics from the date of 1st negative blood culture, through 11/25   -PICC line in place  This will need to be removed after last dose of IV antibiotic      2  MSSA chest wall abscesses-status post extensive I and D x2 with a VAC dressing in place  [facility] seems to have improved clinically   His pain seems reasonably well controlled   This all possibly as a complication of zoster   -antibiotics as above  -vac dressing and local care  -close surgical follow-up    3  Septic shock-POA   Leukocytosis and tachycardia and hypotension   Appears to be all secondary to MSSA bacteremia in the setting of chest wall abscess formation   No other clear sources appreciated   Patient is clinically improved overall   The patient's heart rate has come down, and the white cell count has come down   He seems to be tolerating the antibiotics without difficulty   Patient remains off vasopressors   -antibiotic plan as below  -recheck CBC with diff and creatinine tomorrow  -supportive care in the intensive care unit        4  Herpes zoster-hard to make a definitive diagnosis at this point as the rash is relatively advanced in its presentation   Patient now status post 7 day course of Valtrex      5  Acute kidney injury-likely a pre renal issue   Had initially improved but now the renal function is worse today   Possibly medication effect   Renal function has improved and remains stable   -volume management  -no additional ID workup for now       6  Esophageal candidiasis-started on fluconazole   -continue with oral fluconazole   Plan for 14 day course, through       Antibiotics:  Cefazolin 10  Antibiotics 14  Postop day   Fluconazole 9    Will plan to see patient again on   Please call with any new questions in the interim  Subjective:  Patient states he is feeling much better  He got a good night sleep  No fevers, chills  No shortness of breath, cough  Objective:  Vitals:  Temp:  [97 1 °F (36 2 °C)-97 4 °F (36 3 °C)] 97 4 °F (36 3 °C)  HR:  [73-97] 97  Resp:  [12-27] 22  BP: ()/(54-64) 105/58  SpO2:  [79 %-99 %] 99 %  Temp (24hrs), Av 2 °F (36 2 °C), Min:97 1 °F (36 2 °C), Max:97 4 °F (36 3 °C)  Current: Temperature: (!) 97 4 °F (36 3 °C)    Physical Exam:   General:  No acute distress  Psychiatric:  Awake and alert  Pulmonary:  Normal respiratory excursion without accessory muscle use  Chest wall dressing intact  Abdomen:  Soft, nontender  Extremities:  No edema  Skin:  No rashes    Lab Results:  I have personally reviewed pertinent labs  Results from last 7 days  Lab Units 18  0545 18  0544 18  1629 18  0632   POTASSIUM mmol/L 3 8 4 2 3 6 3 5 4 5   CHLORIDE mmol/L 107 108 108 107 108   CO2 mmol/L 23 24 25 24 24   BUN mg/dL 22 25 30* 33* 31*   CREATININE mg/dL 0 95 0 94 1 03 1 00 0 94   EGFR ml/min/1 73sq m 75 76 68 71 76   CALCIUM mg/dL 7 0* 7 2* 7 2* 7 5* 7 0*   AST U/L  --   --  15 15 11   ALT U/L  --   --  6* 6* 6*   ALK PHOS U/L  --   --  60 64 58       Results from last 7 days  Lab Units 18  0526 18  0545 18  0056   WBC Thousand/uL 7 30 8 74 10 57*   HEMOGLOBIN g/dL 9 1* 9 5* 10 1*   PLATELETS Thousands/uL 146* 150 147*           Imaging Studies:   I have personally reviewed pertinent imaging study reports and images in PACS      EKG, Pathology, and Other Studies:   I have personally reviewed pertinent reports

## 2018-11-07 NOTE — PHYSICIAN ADVISOR
Current patient class: Inpatient  The patient is currently on Hospital Day: 13 at [address] [place] [address]      The patient was admitted to the hospital at [phone] on 10/25/18 for the following diagnosis:  Shingles [B02 9]  Cellulitis [L03 90]  DKA (diabetic ketoacidoses) ([facility] [address] ) [E13 10]  Flank pain [R10 9]  [facility]eps[facility] [A41 9]       There is documentation in the medical record of an expected length of stay of at least 2 midnights  The patient is therefore expected to satisfy the 2 midnight benchmark and given the 2 midnight presumption is appropriate for INPATIENT ADMISSION  Given this expectation of a satisfying stay, CMS instructs us that the patient is most often appropriate for inpatient admission under part A provided medical necessity is documented in the chart  After review of the relevant documentation, labs, vital signs and test results, the patient is appropriate for INPATIENT ADMISSION  Admission to the hospital as an inpatient is a complex decision making process which requires the practitioner to consider the patients presenting complaint, history and physical examination and all relevant testing  With this in mind, in this case, the patient was deemed appropriate for INPATIENT ADMISSION  After review of the documentation and testing available at the time of the admission I concur with this clinical determination of medical necessity  Rationale is as follows: The patient is a [de-identified] yrs old Male who presented to the ED at 10/25/2018  3:02 PM with a chief complaint of Back Pain (Pt sts he has shingles and has back pain for 4 weeks  Shiloh Samir last night because he lost his balance  Area left upper back scabbed, red swollen with greenis drainage  Scabbed area over scapula and under left arm )     Given the need for further hospitalization, and along with the documentation of medical necessity present in the chart, the patient is appropriate for inpatient admission    The patient is expected to satisfy the 2 midnight benchmark, and will require further acute medical care  The patient does have comorbid conditions which increases the risk for significant adverse outcome  Given this the patient is appropriate for inpatient admission  The patients vitals on arrival were ED Triage Vitals   Temperature Pulse Respirations Blood Pressure SpO2   10/25/18 1507 10/25/18 1507 10/25/18 1507 10/25/18 1509 10/25/18 1507   (!) 96 4 °F (35 8 °C) (!) 106 20 (!) 89/51 98 %      Temp Source Heart Rate Source Patient Position - Orthostatic VS BP Location FiO2 (%)   10/26/18 0000 10/26/18 0000 10/26/18 2000 10/26/18 0600 10/26/18 1000   Temporal Monitor Lying Right arm 100      Pain Score       10/25/18 1507       6           Past Medical History:   Diagnosis Date    [facility]est[facility])     Diabetes mellitus ([facility] [address] )     History of open heart surgery     Hyperlipidemia     Hypertension      Past Surgical History:   Procedure Laterality Date    CARDIAC SURGERY      ESOPHAGOGASTRODUODENOSCOPY N/A 10/30/2018    Procedure: ESOPHAGOGASTRODUODENOSCOPY (EGD); Surgeon: Lucio Funes MD;  Location: [facility] GI LAB; Service: Gastroenterology    ESOPHAGOGASTRODUODENOSCOPY N/A 11/4/2018    Procedure: ESOPHAGOGASTRODUODENOSCOPY (EGD); Surgeon: Brooklynn Robles MD;  Location: [facility] GI LAB; Service: Gastroenterology    IR PICC LINE  10/31/2018    WOUND DEBRIDEMENT Left 10/26/2018    Procedure: Chest wall wound debridement (extensive) with pulse lavage and wound vac placement;  Surgeon: Tori Panad MD;  Location: [address] [place] [address];  Service: General    WOUND DEBRIDEMENT Left 10/29/2018    Procedure: DEBRIDEMENT WOUND AND DRESSING CHANGE ([address] OUT);   Surgeon: Tori Panda MD;  Location: [address] [place] [address];  Service: General           Consults have been placed to:   IP CONSULT TO CASE MANAGEMENT  IP CONSULT TO NUTRITION SERVICES  IP CONSULT TO CASE MANAGEMENT  IP CONSULT TO ACUTE CARE SURGERY  IP CONSULT TO INFECTIOUS DISEASES  IP CONSULT TO GASTROENTEROLOGY  IP CONSULT TO CARDIOLOGY  IP CONSULT TO UROLOGY    Vitals:    11/06/18 1600 11/06/18 1700 11/06/18 1800 11/06/18 2000   BP:   115/64 111/61   BP Location:    Left arm   Pulse: 79 91 73 88   Resp: (!) 27 13 (!) 27 14   Temp: (!) 97 2 °F (36 2 °C)   (!) 97 1 °F (36 2 °C)   TempSrc: Temporal   Temporal   SpO2: 99% (!) 79% 98% 92%   Weight:       Height:           Most recent labs:    Recent Labs      11/05/18   0544   11/06/18   0545   WBC   --    < >  8 74   HGB   --    < >  9 5*   HCT   --    < >  29 2*   PLT   --    < >  150   K  3 6   --   4 2   CALCIUM  7 2*   --   7 2*   BUN  30*   --   25   CREATININE  1 03   --   0 94   AST  15   --    --    ALT  6*   --    --    ALKPHOS  60   --    --     < > = values in this interval not displayed         Scheduled Meds:  Current Facility-Administered Medications:  acetaminophen 650 mg Oral Q6H [facility] Rhea John PA-C    atorvastatin 10 mg Oral Daily Александр Moeller MD    cefazolin 2,000 mg Intravenous Q8H Александр Moeller MD Last Rate: Stopped (11/06/18 1402)   collagenase  Topical Daily Tori Panda MD    insulin lispro 2-12 Units Subcutaneous Q6H [facility] Belen Tee PA-C    melatonin 6 mg Oral HS Александр Moeller MD    metoprolol tartrate 12 5 mg Oral Q12H [facility] Jazmín Lagunas PA-C    oxyCODONE 2 5 mg Oral Q4H PRN Rhea John PA-C    pantoprozole (PROTONIX) infusion (Continuous) 8 mg/hr Intravenous Continuous Colleen Martinez PA-C Last Rate: 8 mg/hr (11/06/18 1728)   senna 1 tablet Oral HS Александр Moeller MD    tamsulosin 0 4 mg Oral Daily With [employer] EVY Tee      Continuous Infusions:  pantoprozole (PROTONIX) infusion (Continuous) 8 mg/hr Last Rate: 8 mg/hr (11/06/18 0740)     PRN Meds: oxyCODONE    Surgical procedures (if appropriate):  Procedure(s):  ESOPHAGOGASTRODUODENOSCOPY (EGD)

## 2018-11-07 NOTE — PROGRESS NOTES
Progress Note- Pavan Hallman [de-identified] y o  male MRN: [id number]    Unit/Bed#: ICU 09 Encounter: [id number]      Assessment and Plan:    Acute upper GI bleed:  -repeat EGD 11/4 with 2 duodenal ulcers, 1 actively bleeding treated with epi, clips x 3  -small black stools overnight, one instance of bright red blood  -hemoglobin stable, no bm since yesterday  -monitor hemoglobin  -advance to clear liquids  -On PPI drip x 72 hours he will be transitioned to BID PPI  -continue to monitor stools  ______________________________________________________________________    Subjective:     Mr Doni Sanchez denies complaints  Per nursing no bms since yesterday   He denies nausea vomiting abdominal pain     Medication Administration - last 24 hours from 11/06/2018 0948 to 11/07/2018 0948       Date/Time Order Dose Route Action Action by     11/06/2018 1718 atorvastatin (LIPITOR) tablet 10 mg 10 mg Oral Given Kristal Lou RN     11/06/2018 2144 senna (SENOKOT) tablet 8 6 mg 8 6 mg Oral Given Josselyn Espinoza RN     11/07/2018 0519 ceFAZolin (ANCEF) IVPB (premix) 2,000 mg 2,000 mg Intravenous [address] Josselyn Espinoza RN     11/06/2018 2144 ceFAZolin (ANCEF) IVPB (premix) 2,000 mg 2,000 mg Intravenous [address] Josselyn Espinoza RN     11/06/2018 1402 ceFAZolin (ANCEF) IVPB (premix) 2,000 mg 0 mg Intravenous Stopped Emiliana ASIF RN     11/06/2018 1331 ceFAZolin (ANCEF) IVPB (premix) 2,000 mg 2,000 mg Intravenous 7727 Daimen ASIF RN     11/07/2018 0836 collagenase (SANTYL) ointment 2 application Topical Given Litzy Concepcion RN     11/06/2018 1627 tamsulosin (FLOMAX) capsule 0 4 mg 0 4 mg Oral Given Kristal Lou RN     11/07/2018 1529 metoprolol tartrate (LOPRESSOR) partial tablet 12 5 mg 12 5 mg Oral Given Litzy Concepcion RN     11/06/2018 2144 metoprolol tartrate (LOPRESSOR) partial tablet 12 5 mg 12 5 mg Oral Given Josselyn Espinoza RN     11/06/2018 1039 metoprolol tartrate (LOPRESSOR) partial tablet 12 5 mg 12 5 mg Oral Given Shaun Diana RN     11/07/2018 0701 pantoprazole (PROTONIX) 80 mg in sodium chloride 0 9 % 100 mL infusion 8 mg/hr Intravenous Rate/Dose Verify Fiordaliza Clarke RN     11/07/2018 0511 pantoprazole (PROTONIX) 80 mg in sodium chloride 0 9 % 100 mL infusion 8 mg/hr Intravenous [address] Adelita Taylor RN     11/06/2018 1728 pantoprazole (PROTONIX) 80 mg in sodium chloride 0 9 % 100 mL infusion 8 mg/hr Intravenous [address] Lizeth Ramirez, [address] [place] [address]     11/06/2018 1405 pantoprazole (PROTONIX) 80 mg in sodium chloride 0 9 % 100 mL infusion 8 mg/hr Intravenous [address] V, RN     11/07/2018 0838 insulin lispro (HumaLOG) 100 units/mL subcutaneous injection 2-12 Units 2 Units Subcutaneous Not Given Adelita Taylor RN     11/06/2018 2324 insulin lispro (HumaLOG) 100 units/mL subcutaneous injection 2-12 Units 2 Units Subcutaneous Not Given Adelita Taylor RN     11/06/2018 1751 insulin lispro (HumaLOG) 100 units/mL subcutaneous injection 2-12 Units 2 Units Subcutaneous Not Given Lizeth Ramirez RN     11/06/2018 1210 insulin lispro (HumaLOG) 100 units/mL subcutaneous injection 2-12 Units 2 Units Subcutaneous Not Given Audrea Felty, RN     11/06/2018 2145 melatonin tablet 6 mg 6 mg Oral Given Adelita Taylor RN     11/07/2018 0518 acetaminophen (TYLENOL) tablet 650 mg 650 mg Oral Given Adelita Taylor RN     11/06/2018 2324 acetaminophen (TYLENOL) tablet 650 mg 650 mg Oral Given Adelita Taylor RN     11/06/2018 1720 acetaminophen (TYLENOL) tablet 650 mg 650 mg Oral Given Lizeth Ramirez RN     11/07/2018 5846 oxyCODONE (ROXICODONE) IR tablet 2 5 mg 2 5 mg Oral Given Fiordaliza Clarke RN     11/06/2018 1402 oxyCODONE (ROXICODONE) IR tablet 2 5 mg 2 5 mg Oral Given Emily ASIF RN          Objective:     Vitals: Blood pressure 113/64, pulse 86, temperature (!) 97 1 °F (36 2 °C), temperature source Temporal, resp  rate 20, height 5' 8 5" (1 74 m), weight 73 4 kg (161 lb 13 1 oz), SpO2 98 %  ,Body mass index is 24 25 kg/m²        Intake/Output Summary (Last 24 hours) at 11/07/18 0948  Last data filed at 11/07/18 0845   Gross per 24 hour   Intake              510 ml   Output              880 ml   Net             -370 ml       Physical Exam:   General Appearance: Awake and alert, in no acute distress, chronically ill appearing  Abdomen: Soft, non-tender, non-distended; bowel sounds normal    Invasive Devices     Peripherally Inserted Central Catheter Line            PICC Line 34/19/61 Right Basilic 6 days          Drain            Urethral Catheter 16 Fr  1 day                Lab Results:  Admission on 10/25/2018   No results displayed because visit has over 200 results  Imaging Studies: I have personally reviewed pertinent imaging studies

## 2018-11-07 NOTE — PROGRESS NOTES
Transfer Note - ICU/Stepdown Transfer to [facility]   Raheem Sat [de-identified] y o  male MRN: [id number]  [facility]   Unit[facility]Bed#: ICU 09 Encounter: [id number]    Code Status: Level 2 - DNAR: but accepts endotracheal intubation    Reason for ICU/Stepdown admission: Septic shock, DKA, GI bleed    Active problems: Principal Problem:    Abscess of chest wall  Active Problems:    Bacteremia due to Staphylococcus aureus    DM2 (diabetes mellitus, type 2) ([facility])    CAD (coronary artery disease)    CKD (chronic kidney disease) stage 3, GFR 30-59 ml/min ([facility])    Cardiomyopathy, ischemic    Benign essential hypertension    Atrial fibrillation ([facility])    Severe protein-calorie malnutrition ([facility])    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[facility])    Acute blood loss anemia    Urinary retention    Gastrointestinal hemorrhage associated with duodenal ulcer    Neuralgia, post-herpetic  Resolved Problems:    Septic shock ([facility])    Acute respiratory failure secondary to septic shock    Urinary tract infection    Respiratory infection    Hyponatremia    CHRIS (acute kidney injury) ([facility] [address] )    Shingles    Lactic acid acidosis    DKA (diabetic ketoacidoses) ([facility])    Lethargy    Sepsis ([facility] [address] )    Cellulitis of left axilla    Acute cystitis with hematuria    Esophagitis    Delirium    Candida esophagitis ([facility] [address] )      Septic shock ([facility])resolved as of 11/2/2018   Assessment & Plan    · Resolved at this time  Vasopressor therapy discontinued October 30th  ·   Source likely presumed cellulitis/absess of chest wall   · Patient was initially Fluid resuscitation with 30ml/kg   · Levophed and vasopressor d/zaida 10/30  · Poor EF 20% on Echo  · [place] protocol - Stress Dose Steroids, Vit C, thiamine- now discontinued 11 1  · ID consult  ·  debridement in OR 10/26 of chest wall cellulitis, wound vac in place   Scant serosanguineous output    · Return to OR for debridement 10/29, wound vac in place  · Wound culture: 3+ staph aureus · Urine cultures > 100,000 mixed containments   · ABX: Ancef day #6  Fluconazole day #5  Total day 10 of antibiotics  Day   · Valtrex d/zaida  · WBCs normalized  · Repeat blood cultures no growth since 10/29       Bacteremia due to Staphylococcus aureus   Assessment & Plan    · Admission Blood culture x 1 MSSA  · Repeat blood cultures no growth to date  · Per ID will need 4 wks antibiotics from negative blood cultures through 11/25  · PICC line in place       Acute respiratory failure secondary to septic shockresolved as of 10/28/2018   Assessment & Plan    · Resolved  ·  Patient extubated on 10/26  · Now on room air     * Abscess of chest wall   Assessment & Plan    · Aggressive  debridement in OR on 10/26 by Dr Greyson Bhatti, wound VAC discontinued this a m  · Antibiotics deescalated to Ancef as per ID recs, patient will require IV antibiotics until November 25th  Patient has PICC line for outpatient antibiotic therapy  · ID following  · Blood culture staph aureus x 1 - will treat as bacteremia  · Wound culture: 3+ staph aureus   · Urine cultures > 100,000 mixed containments   · ABX: Ancef day #10  Total Antibiotic therapy day 13  · Repeat blood cultures from 10/29 no growth to date  · Wound VAC removed 11/3  Continue local wound care  · Started on Lyrica 50 mg TID for possible post-hepatic neuralgia, scheduled tylenol, d/c oxycodone       Urinary tract infectionresolved as of 10/29/2018   Assessment & Plan    · Resolved  · Urine culture with greater than 100,000 CFU of mixed contaminants  Respiratory infectionresolved as of 11/3/2018   Assessment & Plan    · Resolved  ·  Present on admission as evidence by CT scan suggestive of infectious process  · No sputum culture to confirm pneumonia    · Respiratory status improved  Extubated on the 26th  Tolerating room air        Neuralgia, post-herpetic   Assessment & Plan    · Increased pain along left axilla, not in location of incisions  · Discontinued oxycodone, started lyrica 50 mg TID with scheduled tylenol     Gastrointestinal hemorrhage associated with duodenal ulcer   Assessment & Plan    · Patient received 9 units packed red blood cells total as admission  · EGD on 11/04 revealed duodenal bleeding ulcer  · Hemoglobin stable 9 1 today from 9 5 yesterday  · Continue to follow H&H  · protonix PO  · Clear liquid diet today  · GI following       Urinary retention   Assessment & Plan    · Pt failed voiding trial 11/3  · Straight cath x 2 for > 700 ml  · Luis catheter replaced 11/5  · Likely secondary to BPH, on flomax  · Urology consulted, continue with luis until patient is more mobile       Acute blood loss anemia   Assessment & Plan    · Secondary to bleeding gastric ulcer  · Patient continued with tachycardia and drifting hemoglobin and later developed guanakito melena  · EGD with duodenal bulb ulcer with intervention 11/4  · Pt has been Transfused 9 units of packed red blood cells total this admission  1 unit 10/28, 3 units 10/30, 1 unit 11/2  4 unit 11/4  · Hgb stable, 9 1 today from 9 3 yesterday  ·  Protonix PO, advanced to clear liquid diet     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HCC)   Assessment & Plan    · Known EF of 20%, no evidence of fluid overload at this time  May require lasix s/p blood transfusion  · Continue to monitor I/Os  · Repeat echocardiogram on Monday  Patient may need LifeVest prior to discharge home if EF is still below 30%  · Continues with episodes of PVCs  ·  Some tachycardia is likely related to anemia, able to tolerate beta-blocker at this time     Severe protein-calorie malnutrition [facility])   Assessment & Plan    Malnutrition Findings:     patient with temporal wasting and muscle wasting  Appreciate nutrition input       BMI Findings: Body mass index is 26 26 kg/m²          Atrial fibrillation ([facility] [address] )   Assessment & Plan    · History of AFib in the setting of electrolyte abnormalities in the past   Monitor on telemetry  · Current NSR to Sinus Tach  · Continue to hold on anticoagulation at this time  Particularly in light of recent episode of GI bleeding  · Frequent PVCs on telemetry, cardiology consulted, beta-blocker resumed     Cardiomyopathy, ischemic   Assessment & Plan    · EF prior was 35 to 40%  Recent stress test in May of 2018 showed reversible ischemic changes  Cardiology following as an outpatient with medical management  · ECHO repeat with EF 20% possibly related to sepsis, will need repeat echo when GI bleed is stable  · Cardiac output and indexes while patient was intubated were acceptable  · Frequent PVCs on telemetry, Episode of nonsustained V-tach overnight 11/6, asymptomatic  · Cardiology following, consider AICD and life vest before discharge  · Tolerating metoprolol 12 5 mg BID PO     CKD (chronic kidney disease) stage 3, GFR 30-59 ml/min ([facility])   Assessment & Plan    · Stable, Baseline 0 9-1 3  · Adequate urine output at this time  · Patient is diuretic dependent at home with torsemide 10 mg daily and Aldactone 12 5 mg daily, continue to hold for low blood pressure     CAD (coronary artery disease)   Assessment & Plan    · Troponin negative  · Continue statin  Hold aspirin in light of GI bleeding  · Echo obtained,  EF 20%     DM2 (diabetes mellitus, type 2) [facility])   Assessment & Plan    Lab Results   Component Value Date    HGBA1C 12 0 (H) 10/26/2018       Recent Labs      11/03/18   1101  11/03/18   1624  11/03/18   2116  11/04/18   0714   POCGLU  156*  257*  241*  230*     · Blood sugar poorly controlled  NPO at this time, held bedtime Lantus at this time  · Continue SSI algorithm 4  · Goal blood sugar under 180           Candida esophagitis (HCC)resolved as of 11/5/2018   Assessment & Plan    · Negative pathology - discontinue fluconizole     Deliriumresolved as of 11/4/2018   Assessment & Plan    · Resolved  · Patient AAOX3    Sleeping well at night   · Cont Seroquel 50mg at bedtime, patient did sleep well again overnight       Esophagitisresolved as of 11/4/2018   Assessment & Plan    · Resolved  · EGD on 10/31: duodenal bulb ulcer with possible candida esophagitis  · Biopsies negative for candida - d/c fluconizole 11/4       Acute cystitis with hematuriaresolved as of 10/29/2018   Assessment & Plan    · Resolved  · Urine culture with mixed roberto  Lethargyresolved as of 10/27/2018   Assessment & Plan    Likely related hypoactive delirium  Continue environmental controls  Seroquel HS  DKA (diabetic ketoacidoses) (HCC)resolved as of 10/26/2018   Assessment & Plan    Lab Results   Component Value Date    HGBA1C 12 0 (H) 10/26/2018       Recent Labs      11/03/18   1101  11/03/18   1624  11/03/18   2116  11/04/18   0714   POCGLU  156*  257*  241*  230*       Blood Sugar Average: Last 72 hrs:  (P) 726 [id number]     · DKA resolved  · Goal blood sugar under 180     Lactic acid acidosisresolved as of 10/26/2018   Assessment & Plan    · Resolved       Shinglesresolved as of 11/1/2018   Assessment & Plan    · Now resolved  · Antiviral therapy discontinued on October 31st     CHRIS (acute kidney injury) (HCC)resolved as of 10/27/2018   Assessment & Plan    · Resolved  · Likely Pre renal   Likely secondary to septic shock and DKA  · Improving with fluids  · FENa consistent with Pre-renal cause  · Gordon catheter in place     Hyponatremiaresolved as of 10/27/2018   Assessment & Plan    · Resolved  · Likely hypovolemic hyponatremia in the setting of dehydration, sepsis, and DKA  · Possibly a component of pseudo hyponatremia         Consultants:   · Surgery, GI, cardio, ID    History of Present Illness/Summary of clinical course: Patient presented in septic shock with MSSA bacteremia secondary to chest wall abscess  Went to OR for debridement x 2, required pressors and short course of mechanical intubation  Treated with Ancef until 11/25  Repeat blood cultures negative to date  Developed GI bleed secondary to multiple duodenal ulcers, EGD x 2 with clipping performed along with protonix drip  Required total of  9 units PRBCs tranfusion during admission  Echo revealed EF 20%, decreased from 40% prior  Started on metoprolol 12 5 mg BID PO, and remained hemodynamically stable  Tolerated clear liquid diet, Hgb stable at 9 1  Transferred to the general medicine floor in stable condition  Please refer to today's progress note for further clinical details  Spoke with Dr Janet Crenshaw regarding transfer @ 2:10PM  Patient accepted to their service      Doug Guzman MD

## 2018-11-07 NOTE — PLAN OF CARE
Problem: Potential for Falls  Goal: Patient will remain free of falls  INTERVENTIONS:  - Assess patient frequently for physical needs  -  Identify cognitive and physical deficits and behaviors that affect risk of falls    -  [place] fall precautions as indicated by assessment   - Educate patient/family on patient safety including physical limitations  - Instruct patient to call for assistance with activity based on assessment  - Modify environment to reduce risk of injury  - Consider OT/PT consult to assist with strengthening/mobility   Outcome: Progressing      Problem: Prexisting or High Potential for Compromised Skin Integrity  Goal: Skin integrity is maintained or improved  INTERVENTIONS:  - Identify patients at risk for skin breakdown  - Assess and monitor skin integrity  - Assess and monitor nutrition and hydration status  - Monitor labs (i e  albumin)  - Assess for incontinence   - Turn and reposition patient  - Assist with mobility/ambulation  - Relieve pressure over bony prominences  - Avoid friction and shearing  - Provide appropriate hygiene as needed including keeping skin clean and dry  - Evaluate need for skin moisturizer/barrier cream  - Collaborate with interdisciplinary team (i e  Nutrition, Rehabilitation, etc )   - Patient/family teaching   Outcome: Progressing      Problem: PAIN - ADULT  Goal: Verbalizes/displays adequate comfort level or baseline comfort level  Interventions:  - Encourage patient to monitor pain and request assistance  - Assess pain using appropriate pain scale  - Administer analgesics based on type and severity of pain and evaluate response  - Implement non-pharmacological measures as appropriate and evaluate response  - Consider cultural and social influences on pain and pain management  - Notify physician/advanced practitioner if interventions unsuccessful or patient reports new pain   Outcome: Progressing      Problem: INFECTION - ADULT  Goal: Absence or prevention of progression during hospitalization  INTERVENTIONS:  - Assess and monitor for signs and symptoms of infection  - Monitor lab/diagnostic results  - Monitor all insertion sites, i e  indwelling lines, tubes, and drains  - Monitor endotracheal (as able) and nasal secretions for changes in amount and color  - [place] appropriate cooling/warming therapies per order  - Administer medications as ordered  - Instruct and encourage patient and family to use good hand hygiene technique  - Identify and instruct in appropriate isolation precautions for identified infection/condition   Outcome: Progressing      Problem: SAFETY ADULT  Goal: Maintain or return to baseline ADL function  INTERVENTIONS:  -  Assess patient's ability to carry out ADLs; assess patient's baseline for ADL function and identify physical deficits which impact ability to perform ADLs (bathing, care of mouth/teeth, toileting, grooming, dressing, etc )  - Assess/evaluate cause of self-care deficits   - Assess range of motion  - Assess patient's mobility; develop plan if impaired  - Assess patient's need for assistive devices and provide as appropriate  - Encourage maximum independence but intervene and supervise when necessary  ¯ Involve family in performance of ADLs  ¯ Assess for home care needs following discharge   ¯ Request OT consult to assist with ADL evaluation and planning for discharge  ¯ Provide patient education as appropriate   Outcome: Progressing    Goal: Maintain or return mobility status to optimal level  INTERVENTIONS:  - Assess patient's baseline mobility status (ambulation, transfers, stairs, etc )    - Identify cognitive and physical deficits and behaviors that affect mobility  - Identify mobility aids required to assist with transfers and/or ambulation (gait belt, sit-to-stand, lift, walker, cane, etc )  - [place] fall precautions as indicated by assessment  - Record patient progress and toleration of activity level on Mobility SBAR; progress patient to next Phase/Stage  - Instruct patient to call for assistance with activity based on assessment  - Request Rehabilitation consult to assist with strengthening/weightbearing, etc    Outcome: Progressing      Problem: DISCHARGE PLANNING  Goal: Discharge to home or other facility with appropriate resources  INTERVENTIONS:  - Identify barriers to discharge w/patient and caregiver  - Arrange for needed discharge resources and transportation as appropriate  - Identify discharge learning needs (meds, wound care, etc )  - Arrange for interpretive services to assist at discharge as needed  - Refer to Case Management Department for coordinating discharge planning if the patient needs post-hospital services based on physician/advanced practitioner order or complex needs related to functional status, cognitive ability, or social support system   Outcome: Progressing      Problem: Knowledge Deficit  Goal: Patient/family/caregiver demonstrates understanding of disease process, treatment plan, medications, and discharge instructions  Complete learning assessment and assess knowledge base  Interventions:  - Provide teaching at level of understanding  - Provide teaching via preferred learning methods   Outcome: Progressing      Problem: CARDIOVASCULAR - ADULT  Goal: Maintains optimal cardiac output and hemodynamic stability  INTERVENTIONS:  - Monitor I/O, vital signs and rhythm  - Monitor for S/S and trends of decreased cardiac output i e  bleeding, hypotension  - Administer and titrate ordered vasoactive medications to optimize hemodynamic stability  - Assess quality of pulses, skin color and temperature  - Assess for signs of decreased coronary artery perfusion - ex   Angina  - Instruct patient to report change in severity of symptoms   Outcome: Completed Date Met: 11/07/18    Goal: Absence of cardiac dysrhythmias or at baseline rhythm  INTERVENTIONS:  - Continuous cardiac monitoring, monitor vital signs, obtain 12 lead EKG if indicated  - Administer antiarrhythmic and heart rate control medications as ordered  - Monitor electrolytes and administer replacement therapy as ordered   Outcome: Progressing      Problem: RESPIRATORY - ADULT  Goal: Achieves optimal ventilation and oxygenation  INTERVENTIONS:  - Assess for changes in respiratory status  - Assess for changes in mentation and behavior  - Position to facilitate oxygenation and minimize respiratory effort  - Oxygen administration by appropriate delivery method based on oxygen saturation (per order) or ABGs  - Initiate smoking cessation education as indicated  - Encourage broncho-pulmonary hygiene including cough, deep breathe, Incentive Spirometry  - Assess the need for suctioning and aspirate as needed  - Assess and instruct to report SOB or any respiratory difficulty  - Respiratory Therapy support as indicated    Outcome: Completed Date Met: 11/07/18      Problem: GENITOURINARY - ADULT  Goal: Maintains or returns to baseline urinary function  INTERVENTIONS:  - Assess urinary function  - Encourage oral fluids to ensure adequate hydration  - Administer IV fluids as ordered to ensure adequate hydration  - Administer ordered medications as needed  - Offer frequent toileting  - Follow urinary retention protocol if ordered   Outcome: Progressing    Goal: Urinary catheter remains patent  INTERVENTIONS:  - Assess patency of urinary catheter  - If patient has a chronic luis, consider changing catheter if non-functioning  - Follow guidelines for intermittent irrigation of non-functioning urinary catheter   Outcome: Progressing      Problem: METABOLIC, FLUID AND ELECTROLYTES - ADULT  Goal: Electrolytes maintained within normal limits  INTERVENTIONS:  - Monitor labs and assess patient for signs and symptoms of electrolyte imbalances  - Administer electrolyte replacement as ordered  - Monitor response to electrolyte replacements, including repeat lab results as appropriate  - Instruct patient on fluid and nutrition as appropriate   Outcome: Progressing    Goal: Fluid balance maintained  INTERVENTIONS:  - Monitor labs and assess for signs and symptoms of volume excess or deficit  - Monitor I/O and WT  - Instruct patient on fluid and nutrition as appropriate   Outcome: Progressing    Goal: Glucose maintained within target range  INTERVENTIONS:  - Monitor Blood Glucose as ordered  - Assess for signs and symptoms of hyperglycemia and hypoglycemia  - Administer ordered medications to maintain glucose within target range  - Assess nutritional intake and initiate nutrition service referral as needed   Outcome: Progressing      Problem: SKIN/TISSUE INTEGRITY - ADULT  Goal: Incision(s), wounds(s) or drain site(s) healing without S/S of infection  INTERVENTIONS  - Assess and document risk factors for skin impairment   - Assess and document dressing, incision, wound bed, drain sites and surrounding tissue  - Initiate Nutrition services consult and/or wound management as needed   Outcome: Progressing      Problem: Nutrition/Hydration-ADULT  Goal: Nutrient/Hydration intake appropriate for improving, restoring or maintaining nutritional needs  Monitor and assess patient's nutrition/hydration status for malnutrition (ex- brittle hair, bruises, dry skin, pale skin and conjunctiva, muscle wasting, smooth red tongue, and disorientation)  Collaborate with interdisciplinary team and initiate plan and interventions as ordered  Monitor patient's weight and dietary intake as ordered or per policy  Utilize nutrition screening tool and intervene per policy  Determine patient's food preferences and provide high-protein, high-caloric foods as appropriate       INTERVENTIONS:  - Monitor oral intake, urinary output, labs, and treatment plans  - Assess nutrition and hydration status and recommend course of action  - Evaluate amount of meals eaten  - Assist patient with eating if necessary   - Allow adequate time for meals  - Recommend/ encourage appropriate diets, oral nutritional supplements, and vitamin/mineral supplements  - Order, calculate, and assess calorie counts as needed  - Recommend, monitor, and adjust tube feedings and TPN/PPN based on assessed needs  - Assess need for intravenous fluids  - Provide specific nutrition/hydration education as appropriate  - Include patient/family/caregiver in decisions related to nutrition   Outcome: Progressing      Problem: DISCHARGE PLANNING - CARE MANAGEMENT  Goal: Discharge to post-acute care or home with appropriate resources  INTERVENTIONS:  - Conduct assessment to determine patient/family and health care team treatment goals, and need for post-acute services based on payer coverage, community resources, and patient preferences, and barriers to discharge  - Address psychosocial, clinical, and financial barriers to discharge as identified in assessment in conjunction with the patient/family and health care team  - Arrange appropriate level of post-acute services according to patients   needs and preference and payer coverage in collaboration with the physician and health care team  - Communicate with and update the patient/family, physician, and health care team regarding progress on the discharge plan  - Arrange appropriate transportation to post-acute venues   Outcome: Progressing      Problem: GASTROINTESTINAL - ADULT  Goal: Maintains or returns to baseline bowel function  INTERVENTIONS:  - Assess bowel function  - Encourage oral fluids to ensure adequate hydration  - Administer IV fluids as ordered to ensure adequate hydration  - Administer ordered medications as needed  - Encourage mobilization and activity  - Nutrition services referral to assist patient with appropriate food choices   Outcome: Progressing    Goal: Maintains adequate nutritional intake  INTERVENTIONS:  - Monitor percentage of each meal consumed  - Identify factors contributing to decreased intake, treat as appropriate  - Assist with meals as needed  - Monitor I&O, WT and lab values  - Obtain nutrition services referral as needed   Outcome: Progressing      Problem: HEMATOLOGIC - ADULT  Goal: Maintains hematologic stability  INTERVENTIONS  - Assess for signs and symptoms of bleeding or hemorrhage  - Monitor labs  - Administer supportive blood products/factors as ordered and appropriate   Outcome: Progressing

## 2018-11-07 NOTE — PROGRESS NOTES
Daily Progress Note - Critical Care/ Stepdown   Dorice Mask [de-identified] y o  male MRN: [id number]  Unit/Bed#: ICU 09 Encounter: [id number]    ______________________________________________________________________  Assessment:   Principal Problem:    Abscess of chest wall  Active Problems:    Bacteremia due to Staphylococcus aureus    DM2 (diabetes mellitus, type 2) ([facility])    CAD (coronary artery disease)    CKD (chronic kidney disease) stage 3, GFR 30-59 ml/min ([facility])    Cardiomyopathy, ischemic    Benign essential hypertension    Atrial fibrillation ([facility])    Severe protein-calorie malnutrition ([facility])    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[facility])    Acute blood loss anemia    Urinary retention    Gastrointestinal hemorrhage associated with duodenal ulcer    Neuralgia, post-herpetic  Resolved Problems:    Septic shock ([facility])    Acute respiratory failure secondary to septic shock    Urinary tract infection    Respiratory infection    Hyponatremia    CHRIS (acute kidney injury) ([facility])    Shingles    Lactic acid acidosis    DKA (diabetic ketoacidoses) ([facility])    Lethargy    Sepsis ([facility])    Cellulitis of left axilla    Acute cystitis with hematuria    Esophagitis    Delirium    Candida esophagitis ([facility])      Septic shock ([facility])resolved as of 11/2/2018   Assessment & Plan    · Resolved at this time  Vasopressor therapy discontinued October 30th  ·   Source likely presumed cellulitis/absess of chest wall   · Patient was initially Fluid resuscitation with 30ml/kg   · Levophed and vasopressor d/zaida 10/30  · Poor EF 20% on Echo  · [place] protocol - Stress Dose Steroids, Vit C, thiamine- now discontinued 11 1  · ID consult  ·  debridement in OR 10/26 of chest wall cellulitis, wound vac in place   Scant serosanguineous output  · Return to OR for debridement 10/29, wound vac in place  · Wound culture: 3+ staph aureus   · Urine cultures > 100,000 mixed containments   · ABX: Ancef day #6  Fluconazole day #5    Total day 10 of antibiotics  Day   · Valtrex d/zaida  · WBCs normalized  · Repeat blood cultures no growth since 10/29       Bacteremia due to Staphylococcus aureus   Assessment & Plan    · Admission Blood culture x 1 MSSA  · Repeat blood cultures no growth to date  · Per ID will need 4 wks antibiotics from negative blood cultures through 11/25  · PICC line in place       Acute respiratory failure secondary to septic shockresolved as of 10/28/2018   Assessment & Plan    · Resolved  ·  Patient extubated on 10/26  · Now on room air     * Abscess of chest wall   Assessment & Plan    · Aggressive  debridement in OR on 10/26 by Dr Kike Rodriguez, wound VAC discontinued this a m  · Antibiotics deescalated to Ancef as per ID recs, patient will require IV antibiotics until November 25th  Patient has PICC line for outpatient antibiotic therapy  · ID following  · Blood culture staph aureus x 1 - will treat as bacteremia  · Wound culture: 3+ staph aureus   · Urine cultures > 100,000 mixed containments   · ABX: Ancef day #10  Total Antibiotic therapy day 13  · Repeat blood cultures from 10/29 no growth to date  · Wound VAC removed 11/3  Continue local wound care  · Started on Lyrica 50 mg TID for possible post-hepatic neuralgia, scheduled tylenol, d/c oxycodone       Urinary tract infectionresolved as of 10/29/2018   Assessment & Plan    · Resolved  · Urine culture with greater than 100,000 CFU of mixed contaminants  Respiratory infectionresolved as of 11/3/2018   Assessment & Plan    · Resolved  ·  Present on admission as evidence by CT scan suggestive of infectious process  · No sputum culture to confirm pneumonia    · Respiratory status improved  Extubated on the 26th  Tolerating room air        Neuralgia, post-herpetic   Assessment & Plan    · Increased pain along left axilla, not in location of incisions  · Discontinued oxycodone, started lyrica 50 mg TID with scheduled tylenol     Gastrointestinal hemorrhage associated with duodenal ulcer   Assessment & Plan    · Patient received 9 units packed red blood cells total as admission  · EGD on 11/04 revealed duodenal bleeding ulcer  · Hemoglobin stable 9 1 today from 9 5 yesterday  · Continue to follow H&H  · protonix PO  · Clear liquid diet today  · GI following       Urinary retention   Assessment & Plan    · Pt failed voiding trial 11/3  · Straight cath x 2 for > 700 ml  · Luis catheter replaced 11/5  · Likely secondary to BPH, on flomax  · Urology consulted, continue with luis until patient is more mobile       Acute blood loss anemia   Assessment & Plan    · Secondary to bleeding gastric ulcer  · Patient continued with tachycardia and drifting hemoglobin and later developed guanakito melena  · EGD with duodenal bulb ulcer with intervention 11/4  · Pt has been Transfused 9 units of packed red blood cells total this admission  1 unit 10/28, 3 units 10/30, 1 unit 11/2  4 unit 11/4  · Hgb stable, 9 1 today from 9 3 yesterday  ·  Protonix PO, advanced to clear liquid diet     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HCC)   Assessment & Plan    · Known EF of 20%, no evidence of fluid overload at this time  May require lasix s/p blood transfusion  · Continue to monitor I/Os  · Repeat echocardiogram on Monday  Patient may need LifeVest prior to discharge home if EF is still below 30%  · Continues with episodes of PVCs  ·  Some tachycardia is likely related to anemia, able to tolerate beta-blocker at this time     Severe protein-calorie malnutrition [facility])   Assessment & Plan    Malnutrition Findings:     patient with temporal wasting and muscle wasting  Appreciate nutrition input       BMI Findings: Body mass index is 26 26 kg/m²  Atrial fibrillation ([facility] [address] )   Assessment & Plan    · History of AFib in the setting of electrolyte abnormalities in the past   Monitor on telemetry  · Current NSR to Sinus Tach  · Continue to hold on anticoagulation at this time  Particularly in light of recent episode of GI bleeding  · Frequent PVCs on telemetry, cardiology consulted, beta-blocker resumed     Cardiomyopathy, ischemic   Assessment & Plan    · EF prior was 35 to 40%  Recent stress test in May of 2018 showed reversible ischemic changes  Cardiology following as an outpatient with medical management  · ECHO repeat with EF 20% possibly related to sepsis, will need repeat echo when GI bleed is stable  · Cardiac output and indexes while patient was intubated were acceptable  · Frequent PVCs on telemetry, Episode of nonsustained V-tach overnight 11/6, asymptomatic  · Cardiology following, consider AICD and life vest before discharge  · Tolerating metoprolol 12 5 mg BID PO     CKD (chronic kidney disease) stage 3, GFR 30-59 ml/min ([facility])   Assessment & Plan    · Stable, Baseline 0 9-1 3  · Adequate urine output at this time  · Patient is diuretic dependent at home with torsemide 10 mg daily and Aldactone 12 5 mg daily, continue to hold for low blood pressure     CAD (coronary artery disease)   Assessment & Plan    · Troponin negative  · Continue statin  Hold aspirin in light of GI bleeding  · Echo obtained,  EF 20%     DM2 (diabetes mellitus, type 2) [facility])   Assessment & Plan    Lab Results   Component Value Date    HGBA1C 12 0 (H) 10/26/2018       Recent Labs      11/03/18   1101  11/03/18   1624  11/03/18   2116  11/04/18   0714   POCGLU  156*  257*  241*  230*     · Blood sugar poorly controlled  NPO at this time, held bedtime Lantus at this time  · Continue SSI algorithm 4  · Goal blood sugar under 180           Candida esophagitis (HCC)resolved as of 11/5/2018   Assessment & Plan    · Negative pathology - discontinue fluconizole     Deliriumresolved as of 11/4/2018   Assessment & Plan    · Resolved  · Patient AAOX3  Sleeping well at night    · Cont Seroquel 50mg at bedtime, patient did sleep well again overnight       Esophagitisresolved as of 11/4/2018   Assessment & Plan    · Resolved  · EGD on 10/31: duodenal bulb ulcer with possible candida esophagitis  · Biopsies negative for candida - d/c fluconizole 11/4       Acute cystitis with hematuriaresolved as of 10/29/2018   Assessment & Plan    · Resolved  · Urine culture with mixed roberto  Lethargyresolved as of 10/27/2018   Assessment & Plan    Likely related hypoactive delirium  Continue environmental controls  Seroquel HS  DKA (diabetic ketoacidoses) ([facility])resolved as of 10/26/2018   Assessment & Plan    Lab Results   Component Value Date    HGBA1C 12 0 (H) 10/26/2018       Recent Labs      11/03/18   1101  11/03/18   1624  11/03/18   2116  11/04/18   0714   POCGLU  156*  257*  241*  230*       Blood Sugar Average: Last 72 hrs:  (P) 520 [id number]     · DKA resolved  · Goal blood sugar under 180     Lactic acid acidosisresolved as of 10/26/2018   Assessment & Plan    · Resolved       Shinglesresolved as of 11/1/2018   Assessment & Plan    · Now resolved  · Antiviral therapy discontinued on October 31st     CHRIS (acute kidney injury) ([facility])resolved as of 10/27/2018   Assessment & Plan    · Resolved  · Likely Pre renal   Likely secondary to septic shock and DKA    · Improving with fluids  · FENa consistent with Pre-renal cause  · Gordon catheter in place     Hyponatremiaresolved as of 10/27/2018   Assessment & Plan    · Resolved  · Likely hypovolemic hyponatremia in the setting of dehydration, sepsis, and DKA  · Possibly a component of pseudo hyponatremia         Plan:    Neuro:   · Delirium: CAM ICU positive no   · Regulate  Sleep/wake cycle  · Pain controlled with: Tylenol and Oxycodone  · Pain score: mild  · Regulate sleep/wake cycle    CV:   · Chronic Combined systolic diastolic heart failure: EF 20%, tolerating metoprolol 12 5 mg BID, cardiology following, Consider AICD and life vest prior to discharge  · Dyslipidemia: continue with statin  · Hx of bypass grafting in 2017, followed by [facility]  · Rhythm: NSR    Pulm:   · Stable bibasilar parenchymal densities evident on CXR    GI:   · Multiple bleeding duodenal ulcers, repeat EGD on 11/4 vessel clipped, transfused 9 units total PRBCs during hospitalization, no episodes of melena overnight, hgb stable 9 1 from 9 5 yesterday, GI following  · Diet: advanced to clear liquid diet  · Stress ulcer prophylaxis: Protonix infusion  · Bowel regimen: Colace  · Last BM: yesterday    FEN:   · CKD stage 3, stable Cr 0 95, baseline   8-1  · Fluid/Diuretic plan: No intervention  · Goal 24 hour fluid balance: net even  · Electrolytes repleted: yes  · Goal: K >4 0, Mag >2 0, and Phos >3 0    :   · Urinary retention likely secondary to BPH and critical illness, multiple failed voiding trials, urology consulted, luis in place, will consider voiding trial when patient is more ambulatory  · C/w flomax      ID:   · Staph aureus bacteremia likely secondary to chest wall abscess 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, septic shock resolved  · Repeat blood cultures negative x 2 for 5 days  · Abx ordered:  Ancef  · Day # 10, continue until 11/25 as per ID recommendations  · Trend temps and WBC count    Heme:   · Acute Blood loss anemia secondary to 3  bleeding duodenal ulcers, s/p EGD x 2 with clipping on 11/4  · Transfused 9 total units PRBCs during this hospitalization  · hgb stable, 9 1today from 9 5 yesterday    Endo:   · Uncontrolled DM2, recent hgb A1c 12  · Glycemic control plan: Blood glucose controlled on current regimen, continue with ISS Alg 4, bedtime Lantus held at this time while NPO    Msk/Skin:  · Mobility goal: up and out of bed as toerated  · PT consult: yes  · OT consult: yes  · Frequent turning and off-loading    Family:  · Family updated within 24 hours: yes   · Family meeting planned today: no     Lines:  · PICC line and luis catheter    VTE Prophylaxis:  · Pharmacologic Prophylaxis: Reason for no pharmacologic prophylaxis GI bleed  · Mechanical Prophylaxis: sequential compression device    Disposition: Continue Stepdown    Code Status: Level 2 - DNAR: but accepts endotracheal intubation    Counseling / Coordination of Care  Total Critical Care time spent 25 minutes excluding procedures, teaching and family updates  ______________________________________________________________________    HPI/24hr events: No further episodes of melena overnight  Remained hemodynamically stable, no further episodes of hypotension  Discussed with patient and daughter in law code status, patient will remain Level 2 DNR but accepts intubation  Failed multiple voiding trials, luis remains in place  Urology consulted, reccommended replacing luis until patient is more mobile  Patient complaining for dull intermittently sharp pain along ;eft lower axilla, not in location of incisions  Reports pain is similar to before he came into the hospital  Started on Lyrica 50 mg TID for post-herpectic neuralgia       ______________________________________________________________________    Physical Exam:   Physical Exam   Constitutional: He is oriented to person, place, and time  He appears well-developed and well-nourished  No distress  HENT:   Head: Normocephalic and atraumatic  Cardiovascular: Normal rate, regular rhythm, normal heart sounds and intact distal pulses  Exam reveals no friction rub  No murmur heard  Pulmonary/Chest: Effort normal and breath sounds normal  No respiratory distress  He has no wheezes  He has no rales  Abdominal: Soft  Bowel sounds are normal  He exhibits no distension  There is no tenderness  There is no guarding  Musculoskeletal: He exhibits edema  Neurological: He is alert and oriented to person, place, and time  No cranial nerve deficit  Skin: Skin is warm and dry  Capillary refill takes less than 2 seconds  He is not diaphoretic  No pallor     Left chest axilla incision, sutures in place, appropriate healing, no pustulance or erythema Multiple excoriations along left scapula       ______________________________________________________________________  Vitals:    18 0800 18 0833 18 1000 18 1130   BP: 113/64  103/57    BP Location: Left arm      Pulse:  86 87    Resp: 20  21    Temp:    (!) 97 4 °F (36 3 °C)   TempSrc:    Temporal   SpO2: 98%  98%    Weight:       Height:         Arterial Line BP: 77/68  Arterial Line MAP (mmHg): 72 mmHg     Temperature:   Temp (24hrs), Av 2 °F (36 2 °C), Min:97 1 °F (36 2 °C), Max:97 4 °F (36 3 °C)    Current Temperature: (!) 97 4 °F (36 3 °C)    Weights:   IBW: 69 55 kg    Body mass index is 24 25 kg/m²  Weight (last 2 days)     Date/Time   Weight    18 0558  73 4 (161 82)    18 0600  72 8 (160 5)              Hemodynamic Monitoring:  Frequent vital signs       Non-Invasive/Invasive Ventilation Settings:  Respiratory    Lab Data (Last 4 hours)    None         O2/Vent Data (Last 4 hours)    None              No results found for: PHART, KER7TJP, PO2ART, AWR1PDV, E6QLDLJM, BEART, SOURCE  SpO2: SpO2: 98 %    Intake and Outputs:  I/O       701 -  - 700    P  O  120     I V  (mL/kg) 340 (4 6) 240 (3 3)    IV Piggyback 650 150    Total Intake(mL/kg) 1110 (15 1) 390 (5 3)    Urine (mL/kg/hr) 1210 (0 7) 920 (0 5)    Total Output 1210 920    Net -100 -530                  Nutrition:        Diet Orders            Start     Ordered    18 0918  Diet Omid/CHO Controlled; Diabetic CL Liquids  Diet effective now     Question Answer Comment   Diet Type Omid/CHO Controlled    Omid/CHO Controlled Diabetic CL Liquids    RD to adjust diet per protocol?  Yes        18 0918    10/29/18 1450  Room Service  Once     Question:  Type of Service  Answer:  Room Service - Appropriate with Assistance    10/29/18 1452            Labs:     Results from last 7 days  Lab Units 18  0526 18  0545 18  0056  18  0632   WBC Thousand/uL 7 30 8 74 10 57*  < > 13 11*   HEMOGLOBIN g/dL 9 1* 9 5* 10 1*  < > 6 9*   HEMATOCRIT % 29 0* 29 2* 30 5*  < > 21 9*   PLATELETS Thousands/uL 146* 150 147*  < > 164   NEUTROS PCT % 80*  --   --   --  85*   MONOS PCT % 6  --   --   --  4   MONO PCT %  --  3* 1*  --   --    < > = values in this interval not displayed  Results from last 7 days  Lab Units 11/07/18  0526 11/06/18  0545 11/05/18  0544 11/04/18  1629 11/04/18  0632   POTASSIUM mmol/L 3 8 4 2 3 6 3 5 4 5   CHLORIDE mmol/L 107 108 108 107 108   CO2 mmol/L 23 24 25 24 24   BUN mg/dL 22 25 30* 33* 31*   CREATININE mg/dL 0 95 0 94 1 03 1 00 0 94   CALCIUM mg/dL 7 0* 7 2* 7 2* 7 5* 7 0*   ALK PHOS U/L  --   --  60 64 58   ALT U/L  --   --  6* 6* 6*   AST U/L  --   --  15 15 11       Results from last 7 days  Lab Units 11/07/18  0526 11/06/18  0545 11/05/18  0544   MAGNESIUM mg/dL 2 1 2 3 1 8     Lab Results   Component Value Date    PHOS 2 6 11/07/2018    PHOS 2 4 11/06/2018    PHOS 2 6 11/05/2018            0  Lab Value Date/Time   TROPONINI <0 02 10/25/2018 1534   TROPONINI <0 02 05/09/2018 1927         ABG:  Lab Results   Component Value Date    PHART 7 539 (H) 10/30/2018    ZNO1CYC 25 6 (LL) 10/30/2018    PO2ART 70 7 (L) 10/30/2018    POZ3NKK 21 3 (L) 10/30/2018    BEART -0 9 10/30/2018    SOURCE Radial, Left 10/30/2018         Micro:  Lab Results   Component Value Date    BLOODCX No Growth After 5 Days  10/29/2018    BLOODCX No Growth After 5 Days   10/29/2018    BLOODCX Staphylococcus aureus (A) 10/25/2018    URINECX >100,000 cfu/ml  10/25/2018    URINECX >100,000 cfu/ml Klebsiella oxytoca (A) 05/09/2018    WOUNDCULT 4+ Growth of Staphylococcus aureus (A) 10/26/2018    WOUNDCULT 4+ Growth of Staphylococcus aureus (A) 10/25/2018       Allergies: No Known Allergies  Medications:   Scheduled Meds:    Current Facility-Administered Medications:  acetaminophen 650 mg Oral Q6H [address] Rhea John PA-C    atorvastatin 10 mg Oral Daily Doug Guzman MD    cefazolin 2,000 mg Intravenous Q8H Mueksh Mack MD Last Rate: 2,000 mg (11/07/18 0519)   collagenase  Topical Daily Claudia Farmer MD    insulin lispro 2-12 Units Subcutaneous Q6H [address] Belen Tee PA-C    melatonin 6 mg Oral HS Mukesh Mack MD    metoprolol tartrate 12 5 mg Oral Q12H [address] Belen Tee PA-C    pantoprazole 40 mg Oral BID AC Rhea John PA-C    pregabalin 50 mg Oral TID Mukesh Mack MD    senna 1 tablet Oral HS Mukesh Mack MD    tamsulosin 0 4 mg Oral Daily With [employer] EVY Tee      Continuous Infusions:     PRN Meds:       Invasive lines and devices:   Invasive Devices     Peripherally Inserted Central Catheter Line            PICC Line 78/68/89 Right Basilic 6 days          Drain            Urethral Catheter 16 Fr  1 day                   SIGNATURE: Mukesh Mack MD  DATE: November 7, 2018

## 2018-11-08 ENCOUNTER — APPOINTMENT (INPATIENT)
Dept: NON INVASIVE DIAGNOSTICS | Facility: HOSPITAL | Age: 78
DRG: 853 | End: 2018-11-08
Payer: MEDICARE

## 2018-11-08 LAB
ALBUMIN SERPL BCP-MCNC: 1.5 G/DL (ref 3.5–5)
ALP SERPL-CCNC: 67 U/L (ref 46–116)
ALT SERPL W P-5'-P-CCNC: <6 U/L (ref 12–78)
ANION GAP SERPL CALCULATED.3IONS-SCNC: 6 MMOL/L (ref 4–13)
AST SERPL W P-5'-P-CCNC: 12 U/L (ref 5–45)
BASOPHILS # BLD AUTO: 0.02 THOUSANDS/ΜL (ref 0–0.1)
BASOPHILS NFR BLD AUTO: 0 % (ref 0–1)
BILIRUB DIRECT SERPL-MCNC: 0.2 MG/DL (ref 0–0.2)
BILIRUB SERPL-MCNC: 0.4 MG/DL (ref 0.2–1)
BUN SERPL-MCNC: 18 MG/DL (ref 5–25)
CALCIUM SERPL-MCNC: 6.8 MG/DL (ref 8.3–10.1)
CHLORIDE SERPL-SCNC: 103 MMOL/L (ref 100–108)
CO2 SERPL-SCNC: 24 MMOL/L (ref 21–32)
CREAT SERPL-MCNC: 0.84 MG/DL (ref 0.6–1.3)
EOSINOPHIL # BLD AUTO: 0.05 THOUSAND/ΜL (ref 0–0.61)
EOSINOPHIL NFR BLD AUTO: 1 % (ref 0–6)
ERYTHROCYTE [DISTWIDTH] IN BLOOD BY AUTOMATED COUNT: 17.9 % (ref 11.6–15.1)
GFR SERPL CREATININE-BSD FRML MDRD: 83 ML/MIN/1.73SQ M
GLUCOSE SERPL-MCNC: 103 MG/DL (ref 65–140)
GLUCOSE SERPL-MCNC: 109 MG/DL (ref 65–140)
GLUCOSE SERPL-MCNC: 142 MG/DL (ref 65–140)
GLUCOSE SERPL-MCNC: 157 MG/DL (ref 65–140)
HCT VFR BLD AUTO: 27.3 % (ref 36.5–49.3)
HGB BLD-MCNC: 8.7 G/DL (ref 12–17)
IMM GRANULOCYTES # BLD AUTO: 0.04 THOUSAND/UL (ref 0–0.2)
IMM GRANULOCYTES NFR BLD AUTO: 1 % (ref 0–2)
LYMPHOCYTES # BLD AUTO: 0.68 THOUSANDS/ΜL (ref 0.6–4.47)
LYMPHOCYTES NFR BLD AUTO: 9 % (ref 14–44)
MAGNESIUM SERPL-MCNC: 1.9 MG/DL (ref 1.6–2.6)
MCH RBC QN AUTO: 31.3 PG (ref 26.8–34.3)
MCHC RBC AUTO-ENTMCNC: 31.9 G/DL (ref 31.4–37.4)
MCV RBC AUTO: 98 FL (ref 82–98)
MONOCYTES # BLD AUTO: 0.38 THOUSAND/ΜL (ref 0.17–1.22)
MONOCYTES NFR BLD AUTO: 5 % (ref 4–12)
NEUTROPHILS # BLD AUTO: 6.87 THOUSANDS/ΜL (ref 1.85–7.62)
NEUTS SEG NFR BLD AUTO: 84 % (ref 43–75)
NRBC BLD AUTO-RTO: 0 /100 WBCS
PHOSPHATE SERPL-MCNC: 2.2 MG/DL (ref 2.3–4.1)
PLATELET # BLD AUTO: 139 THOUSANDS/UL (ref 149–390)
PMV BLD AUTO: 10.7 FL (ref 8.9–12.7)
POTASSIUM SERPL-SCNC: 3.6 MMOL/L (ref 3.5–5.3)
PROT SERPL-MCNC: 4.1 G/DL (ref 6.4–8.2)
RBC # BLD AUTO: 2.78 MILLION/UL (ref 3.88–5.62)
SODIUM SERPL-SCNC: 133 MMOL/L (ref 136–145)
WBC # BLD AUTO: 8.04 THOUSAND/UL (ref 4.31–10.16)

## 2018-11-08 PROCEDURE — 99232 SBSQ HOSP IP/OBS MODERATE 35: CPT | Performed by: INTERNAL MEDICINE

## 2018-11-08 PROCEDURE — 83735 ASSAY OF MAGNESIUM: CPT | Performed by: INTERNAL MEDICINE

## 2018-11-08 PROCEDURE — 85025 COMPLETE CBC W/AUTO DIFF WBC: CPT | Performed by: INTERNAL MEDICINE

## 2018-11-08 PROCEDURE — 80048 BASIC METABOLIC PNL TOTAL CA: CPT | Performed by: INTERNAL MEDICINE

## 2018-11-08 PROCEDURE — 80076 HEPATIC FUNCTION PANEL: CPT | Performed by: INTERNAL MEDICINE

## 2018-11-08 PROCEDURE — 84100 ASSAY OF PHOSPHORUS: CPT | Performed by: INTERNAL MEDICINE

## 2018-11-08 PROCEDURE — C9113 INJ PANTOPRAZOLE SODIUM, VIA: HCPCS | Performed by: PHYSICIAN ASSISTANT

## 2018-11-08 PROCEDURE — 93308 TTE F-UP OR LMTD: CPT

## 2018-11-08 PROCEDURE — 82948 REAGENT STRIP/BLOOD GLUCOSE: CPT

## 2018-11-08 RX ORDER — PANTOPRAZOLE SODIUM 40 MG/1
40 INJECTION, POWDER, FOR SOLUTION INTRAVENOUS EVERY 12 HOURS
Status: DISCONTINUED | OUTPATIENT
Start: 2018-11-08 | End: 2018-11-18 | Stop reason: HOSPADM

## 2018-11-08 RX ADMIN — POTASSIUM PHOSPHATE, MONOBASIC AND POTASSIUM PHOSPHATE, DIBASIC 9 MMOL: 224; 236 INJECTION, SOLUTION, CONCENTRATE INTRAVENOUS at 10:12

## 2018-11-08 RX ADMIN — ACETAMINOPHEN 650 MG: 325 TABLET, FILM COATED ORAL at 23:40

## 2018-11-08 RX ADMIN — CEFAZOLIN SODIUM 2000 MG: 2 SOLUTION INTRAVENOUS at 05:19

## 2018-11-08 RX ADMIN — INSULIN LISPRO 2 UNITS: 100 INJECTION, SOLUTION INTRAVENOUS; SUBCUTANEOUS at 16:22

## 2018-11-08 RX ADMIN — ALTEPLASE 2 MG: 2.2 INJECTION, POWDER, LYOPHILIZED, FOR SOLUTION INTRAVENOUS at 23:40

## 2018-11-08 RX ADMIN — PREGABALIN 50 MG: 50 CAPSULE ORAL at 09:44

## 2018-11-08 RX ADMIN — ACETAMINOPHEN 650 MG: 325 TABLET, FILM COATED ORAL at 12:12

## 2018-11-08 RX ADMIN — CEFAZOLIN SODIUM 2000 MG: 2 SOLUTION INTRAVENOUS at 15:50

## 2018-11-08 RX ADMIN — CEFAZOLIN SODIUM 2000 MG: 2 SOLUTION INTRAVENOUS at 21:53

## 2018-11-08 RX ADMIN — ATORVASTATIN CALCIUM 10 MG: 10 TABLET, FILM COATED ORAL at 17:27

## 2018-11-08 RX ADMIN — SENNOSIDES 8.6 MG: 8.6 TABLET, FILM COATED ORAL at 21:52

## 2018-11-08 RX ADMIN — PANTOPRAZOLE SODIUM 40 MG: 40 INJECTION, POWDER, FOR SOLUTION INTRAVENOUS at 20:29

## 2018-11-08 RX ADMIN — PANTOPRAZOLE SODIUM 40 MG: 40 TABLET, DELAYED RELEASE ORAL at 06:18

## 2018-11-08 RX ADMIN — MELATONIN TAB 3 MG 6 MG: 3 TAB at 21:52

## 2018-11-08 RX ADMIN — COLLAGENASE SANTYL 1 APPLICATION: 250 OINTMENT TOPICAL at 09:00

## 2018-11-08 RX ADMIN — METOPROLOL TARTRATE 12.5 MG: 25 TABLET ORAL at 21:52

## 2018-11-08 RX ADMIN — PREGABALIN 50 MG: 50 CAPSULE ORAL at 15:55

## 2018-11-08 RX ADMIN — PREGABALIN 50 MG: 50 CAPSULE ORAL at 20:31

## 2018-11-08 RX ADMIN — ACETAMINOPHEN 650 MG: 325 TABLET, FILM COATED ORAL at 05:19

## 2018-11-08 RX ADMIN — TAMSULOSIN HYDROCHLORIDE 0.4 MG: 0.4 CAPSULE ORAL at 15:55

## 2018-11-08 RX ADMIN — ACETAMINOPHEN 650 MG: 325 TABLET, FILM COATED ORAL at 17:25

## 2018-11-08 NOTE — PROGRESS NOTES
Progress Note - Infectious Disease   Negrito Bello [de-identified] y o  male MRN: [id number]  Unit/Bed#: 2 [place] [address] Encounter: [id number]    Impression/Plan:  1  MSSA bacteremia-appears to be secondary to chest wall abscesses   No other clear source appreciated  Consideration for the possibility of endocarditis but this is less likely with only 1 of 2 blood cultures positive and the transthoracic echocardiogram without valvular vegetations   Repeat blood cultures remain negative   -continue high-dose IV cefazolin  -with severity and complexity of infection, plan 4 weeks of intravenous antibiotics from the date of 1st negative blood culture, through 11/25   -PICC line in place  This will need to be removed after last dose of IV antibiotic      2  MSSA chest wall abscesses-status post extensive I and D x2 with a VAC dressing in place  [facility] seems to have improved clinically   His pain seems reasonably well controlled   This all possibly as a complication of zoster   -antibiotics as above  -vac dressing and local care  -close surgical follow-up     3  Septic shock-POA   Leukocytosis and tachycardia and hypotension   Appears to be all secondary to MSSA bacteremia in the setting of chest wall abscess formation   No other clear sources appreciated   Patient is clinically improved overall   The patient's heart rate has come down, and the white cell count has come down   He seems to be tolerating the antibiotics without difficulty   Patient remains off vasopressors   -antibiotic plan as below  -recheck CBC with diff and creatinine tomorrow  -supportive care in the intensive care unit        4  Herpes zoster-hard to make a definitive diagnosis at this point as the rash is relatively advanced in its presentation   Patient now status post 7 day course of Valtrex      5  Acute kidney injury-likely a pre renal issue   Had initially improved but now the renal function is worse today   Possibly medication effect   Renal function has improved and remains stable   -volume management  -no additional ID workup for now       6  Esophageal candidiasis- now off fluconazole       Antibiotics:  Cefazolin 11  Antibiotics 15  Postop day 13/10      Subjective:  Patient seen on AM rounds  Denies fevers, chills, or sweats  Denies nausea, vomiting, or diarrhea  No new complaints today  Objective:  Vitals:  Temp:  [96 8 °F (36 °C)-98 1 °F (36 7 °C)] 97 8 °F (36 6 °C)  HR:  [83-97] 86  Resp:  [18-22] 18  BP: ()/(54-77) 96/54  SpO2:  [96 %-100 %] 96 %  Temp (24hrs), Av 6 °F (36 4 °C), Min:96 8 °F (36 °C), Max:98 1 °F (36 7 °C)  Current: Temperature: 97 8 °F (36 6 °C)    Physical Exam:   General:  No acute distress  Psychiatric:  Awake and alert  Pulmonary:  Normal respiratory excursion without accessory muscle use  Abdomen:  Soft, nontender  Extremities:  Bilateral lower extremity edema present  Skin:  No rashes, dressing overlying left chest wall intact, PICC insertion site without erythema/discharge/tenderness  Lab Results:  I have personally reviewed pertinent labs  Results from last 7 days  Lab Units 18  0545 18  0544 18  1629   POTASSIUM mmol/L 3 6 3 8 4 2 3 6 3 5   CHLORIDE mmol/L 103 107 108 108 107   CO2 mmol/L 24 23 24 25 24   BUN mg/dL 18 22 25 30* 33*   CREATININE mg/dL 0 84 0 95 0 94 1 03 1 00   EGFR ml/min/1 73sq m 83 75 76 68 71   CALCIUM mg/dL 6 8* 7 0* 7 2* 7 2* 7 5*   AST U/L 12  --   --  15 15   ALT U/L <6*  --   --  6* 6*   ALK PHOS U/L 67  --   --  60 64       Results from last 7 days  Lab Units 18  0526 18  0545   WBC Thousand/uL 8 04 7 30 8 74   HEMOGLOBIN g/dL 8 7* 9 1* 9 5*   PLATELETS Thousands/uL 139* 146* 150           Imaging Studies:   I have personally reviewed pertinent imaging study reports and images in PACS  EKG, Pathology, and Other Studies:   I have personally reviewed pertinent reports

## 2018-11-08 NOTE — PHYSICAL THERAPY NOTE
Attempted PT, pt declined stating, "I'd like to get OOB tomorrow "  Will follow    Hugh Choi PT  [phone]

## 2018-11-08 NOTE — PROGRESS NOTES
[facility] Internal Medicine Progress Note  Patient: Negrito Bello [de-identified] y o  male   MRN: [id number]  PCP: Percy Holliday DO  Unit/Bed#: [address] [place] [address] Encounter: [id number]  Date Of Visit: 11/08/18    Problem List:    Principal Problem:    Abscess of chest wall  Active Problems:    Cardiomyopathy, ischemic    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HCC)    Acute blood loss anemia    Bacteremia due to Staphylococcus aureus    DM2 (diabetes mellitus, type 2) (HCC)    CAD (coronary artery disease)    CKD (chronic kidney disease) stage 3, GFR 30-59 ml/min (HCC)    Benign essential hypertension    Atrial fibrillation (HCC)    Urinary retention    Gastrointestinal hemorrhage associated with duodenal ulcer    Neuralgia, post-herpetic    Severe protein-calorie malnutrition (HCC)      Assessment & Plan:    * Abscess of chest wall   Assessment & Plan    · Aggressive  debridement in OR on 10/26 by Dr Johann Lloyd  · Antibiotics deescalated to Ancef as per ID recs, patient will require IV antibiotics until November 25th  Patient has PICC line for outpatient antibiotic therapy  · ID following  · Blood culture staph aureus x 1  · Wound culture: 3+ staph aureus   · Urine cultures > 100,000 mixed containments   · Repeat blood cultures from 10/29 no growth to date  · Wound VAC removed 11/3  Continue local wound care  · Started on Lyrica 50 mg TID for possible post-hepatic neuralgia, scheduled tylenol, d/c oxycodone     Bacteremia due to Staphylococcus aureus   Assessment & Plan    · Admission Blood culture x 1 MSSA  · Repeat blood cultures no growth to date  · Per ID will need 4 wks antibiotics from negative blood cultures through 11/25  · PICC line in place     Acute blood loss anemia   Assessment & Plan    · Secondary to bleeding gastric ulcer    · Patient continued with tachycardia and drifting hemoglobin and later developed guanakito melena  · EGD with duodenal bulb ulcer with intervention 11/4  · Pt has been Transfused 9 units of packed red blood cells total this admission  1 unit 10/28, 3 units 10/30, 1 unit 11/2  4 unit 11/4  · Hgb slowly downtrending without any evidence of overt bleeding  · Continue IV PPI as per GI  · Continue trend hemoglobin     Cardiomyopathy, ischemic   Assessment & Plan    · EF prior was 35 to 40%  Recent stress test in May of 2018 showed reversible ischemic changes  Cardiology following as an outpatient with medical management  · ECHO repeat with EF 20% possibly related to sepsis  · Cardiac output and indexes while patient was intubated were acceptable  · Frequent PVCs on telemetry, Episode of nonsustained V-tach overnight 11/6, asymptomatic  · Cardiology following, consider AICD and life vest before discharge  · Tolerating metoprolol 12 5 mg BID PO  · Follow up repeat limited echo today  · Continue telemetry  · Further optimization as tolerated based on blood pressure  · Follow up with Cardiology     Neuralgia, post-herpetic   Assessment & Plan    On Lyrica     Gastrointestinal hemorrhage associated with duodenal ulcer   Assessment & Plan    Status post EGD 11/4 revealed duodenal bleeding ulcer  Continue IV PPI for now  Clear liquid diet  Monitor H&H     Urinary retention   Assessment & Plan    Status post failing voiding trial  Likely secondary to BPH , on Flomax  Follow up Urology recommendation  Maintain Gordon     Atrial fibrillation [facility])   Assessment & Plan    · History of AFib in the setting of electrolyte abnormalities in the past   Monitor on telemetry  · Current NSR to Sinus Tach  · Continue to hold on anticoagulation at this time  Particularly in light of recent episode of GI bleeding    · Frequent PVCs on telemetry, cardiology consulted, beta-blocker resumed     Benign essential hypertension   Assessment & Plan    Remains low normal  Asymptomatic  Continue metoprolol     CKD (chronic kidney disease) stage 3, GFR 30-59 ml/min ([facility])   Assessment & Plan    Stable, baseline creatinine 0 9-1 3  Monitor input output       CAD (coronary artery disease)   Assessment & Plan    Troponin negative  Continue statin, continue to hold aspirin secondary to GI bleed  Follow up with Cardiology     DM2 (diabetes mellitus, type 2) [facility])   Assessment & Plan    Lab Results   Component Value Date    HGBA1C 12 0 (H) 10/26/2018       Recent Labs      11/07/18   2121  11/08/18   1058  11/08/18   1609  11/08/18   2205   POCGLU  121  142*  157*  103       Blood Sugar Average: Last 72 hrs:  (P) 105 75     Acceptable monitor     Esophagitisresolved as of 11/4/2018   Assessment & Plan    · Resolved  · EGD on 10/31: duodenal bulb ulcer with possible candida esophagitis  · Biopsies negative for candida - d/c fluconizole 11/4     Severe protein-calorie malnutrition (HCC)   Assessment & Plan    Malnutrition Findings:           BMI Findings: Body mass index is 24 02 kg/m²          Acute respiratory failure secondary to septic shockresolved as of 10/28/2018   Assessment & Plan    Status post extubation 10/26       DKA (diabetic ketoacidoses) (HCC)resolved as of 10/26/2018   Assessment & Plan    Lab Results   Component Value Date    HGBA1C 12 0 (H) 10/26/2018       Recent Labs      11/07/18   2121  11/08/18   1058  11/08/18   1609  11/08/18   2205   POCGLU  121  142*  157*  103       Blood Sugar Average: Last 72 hrs:  (P) 105 75          Shinglesresolved as of 11/1/2018   Assessment & Plan    Status post treatment with Valtrex 10/31     Urinary tract infectionresolved as of 10/29/2018   Assessment & Plan    Suspected possible source on admission  Urine culture without specific pathogen       CHRIS (acute kidney injury) (HCC)resolved as of 10/27/2018   Assessment & Plan    Renal secondary to septic shock in DKA  Improved  Monitor     Hyponatremiaresolved as of 10/27/2018   Assessment & Plan    Noted to be 111 on presentation likely secondary to combination of hypovolemic hyponatremia secondary to dehydration, sepsis and DKA plus pseudo hyponatremia secondary to hyperglycemia           VTE Pharmacologic Prophylaxis:   Pharmacologic: Pharmacologic VTE Prophylaxis contraindicated due to GI bleed  Mechanical VTE Prophylaxis in Place: No    Patient Centered Rounds: I have performed bedside rounds with nursing staff today  Discussions with Specialists or Other Care Team Provider: Yes    Education and Discussions with Family / Patient:Yes    Time Spent for Care: 45 minutes  More than 50% of total time spent on counseling and coordination of care as described above  Current Length of Stay: 14 day(s)    Current Patient Status: Inpatient     Discharge Plan:  Anticipate rehab    Code Status: Level 2 - DNAR: but accepts endotracheal intubation    Certification Statement: The patient will continue to require additional inpatient hospital stay due to GI bleed, sepsis, cardiomyopathy      Subjective:   Comfortably lying in bed  Denies any shortness of breath  Reports some pain associated with left chest wall  Hemoglobin appears to be slowly declining but no evidence of overt bleeding    Objective:   Not in distress    All other 10 review of systems negative and without drastic interval changes from yesterday  Vitals:   Temp (24hrs), Av 7 °F (36 5 °C), Min:96 8 °F (36 °C), Max:98 1 °F (36 7 °C)    Temp:  [96 8 °F (36 °C)-98 1 °F (36 7 °C)] 97 7 °F (36 5 °C)  HR:  [83-95] 90  Resp:  [18-20] 18  BP: ()/(54-77) 91/57  SpO2:  [95 %-99 %] 95 %  Body mass index is 24 02 kg/m²  Input and Output Summary (last 24 hours): Intake/Output Summary (Last 24 hours) at 18 1414  Last data filed at 18 0601   Gross per 24 hour   Intake              770 ml   Output              690 ml   Net               80 ml       Physical Exam:     Physical Exam   Constitutional: He is oriented to person, place, and time  He appears well-developed  No distress  HENT:   Head: Normocephalic and atraumatic     Eyes: Pupils are equal, round, and reactive to light  Conjunctivae are normal    Neck: Normal range of motion  Neck supple  Cardiovascular: Normal rate and regular rhythm  Murmur heard  Pulmonary/Chest: Effort normal  No respiratory distress  He has decreased breath sounds  He has no wheezes  He has no rhonchi  He has no rales  He exhibits no tenderness  Left chest wall dressing in place   Abdominal: Soft  Bowel sounds are normal  He exhibits no distension  There is no tenderness  There is no rebound and no guarding  Genitourinary:   Genitourinary Comments: Gordon catheter in place   Musculoskeletal: He exhibits edema (Trace)  Neurological: He is alert and oriented to person, place, and time  No cranial nerve deficit  Skin: Skin is warm and dry  No rash noted  Additional Data:     Labs:      Results from last 7 days  Lab Units 11/08/18  0521   WBC Thousand/uL 8 04   HEMOGLOBIN g/dL 8 7*   HEMATOCRIT % 27 3*   PLATELETS Thousands/uL 139*   NEUTROS PCT % 84*   LYMPHS PCT % 9*   MONOS PCT % 5   EOS PCT % 1       Results from last 7 days  Lab Units 11/08/18  0521   POTASSIUM mmol/L 3 6   CHLORIDE mmol/L 103   CO2 mmol/L 24   BUN mg/dL 18   CREATININE mg/dL 0 84   CALCIUM mg/dL 6 8*   ALK PHOS U/L 67   ALT U/L <6*   AST U/L 12           * I Have Reviewed All Lab Data Listed Above  * Additional Pertinent Lab Tests Reviewed: All Labs For Current Hospital Admission Reviewed    Imaging:  Ct Chest Abdomen Pelvis Wo Contrast    Result Date: 10/26/2018  Narrative: CT CHEST, ABDOMEN AND PELVIS WITHOUT IV CONTRAST INDICATION:   Sepsis  COMPARISON:  CT cervical spine dated 10/25/2018, radiographs of the left shoulder dated 10/25/2018 TECHNIQUE: CT examination of the chest, abdomen and pelvis was performed without intravenous contrast   Axial, sagittal, and coronal 2D reformatted images were created from the source data and submitted for interpretation  Radiation dose length product (DLP) for this visit:  652 78 mGy-cm     This examination, like all CT scans performed in the [facility], was performed utilizing techniques to minimize radiation dose exposure, including the use of iterative  reconstruction and automated exposure control  Enteric contrast was not administered  FINDINGS: CHEST LUNGS:  Some patchy airspace opacities are seen in the right lower lobe superior segment  There is a large dense consolidation in the right middle lobe, lateral segment  Some mucous plugging is suspected in a left upper lobe bronchus  There is mild dependent and bibasilar atelectasis  The lungs otherwise appear grossly clear  PLEURA:  Small bilateral pleural effusions are noted dependently  HEART/GREAT VESSELS:  There is left ventricular enlargement  At least moderate coronary atherosclerosis is noted  Status post CABG with a LIMA graft  Mild aortic atherosclerosis  No aortic aneurysm  MEDIASTINUM AND ALIZE:  Unremarkable  CHEST WALL AND LOWER NECK: Median sternotomy wires are noted  A few bubbles of air are seen in the anterior left chest subcutaneous tissues (axial image 8)  Multiple areas of skin thickening with associated subjacent complex fluid are seen in the superficial soft tissues of the left pectoralis region (for example axial image 30); this area measures approximately 5 5 x 1 1 x 4 0 cm in transverse, AP, and CC dimensions  There is body wall edema noted with some shotty left axillary lymph nodes  In  addition there is posterior skin thickening with some irregularity of the soft tissues of the posterior and superior left thorax (axial image 12)  Subjacent to this there is an apparent fluid collection measuring approximately 4 7 x 1 8 x 4 6 cm in size  ABDOMEN LIVER/BILIARY TREE:  Unremarkable  GALLBLADDER:  No calcified gallstones  No pericholecystic inflammatory change  SPLEEN:  Unremarkable  PANCREAS:  Mild atrophy    Otherwise grossly unremarkable ADRENAL GLANDS:  Grossly unremarkable KIDNEYS/URETERS:  Partially exophytic cyst in the posterior midportion of the right kidney measures approximately 1 5 cm in size  Exophytic cyst in the lower pole of the left kidney measures approximately 3 7 cm in size  Bilateral kidneys otherwise appear grossly unremarkable; no hydronephrosis  STOMACH AND BOWEL:  Grossly unremarkable APPENDIX:  A normal appendix was visualized  ABDOMINOPELVIC CAVITY:  No ascites or free intraperitoneal air  No lymphadenopathy  VESSELS:  Mild atherosclerosis; no aortic aneurysm PELVIS REPRODUCTIVE ORGANS:  Unremarkable for patient's age  URINARY BLADDER:  Bladder is partially distended  A catheter is seen within the bladder lumen  Some air is seen within the nondependent portion of the bladder  Mild circumferential bladder wall thickening is noted, probably exaggerated by underdistention  ABDOMINAL WALL/INGUINAL REGIONS:  A right-sided femoral venous catheter is noted  Some subcutaneous emphysema is seen in this region as well, probably related to recent intervention/placement  Diffuse body wall edema is noted  OSSEOUS STRUCTURES:  Mild curvature of the lumbar spine, apex to the left  Moderate degenerative changes of the bilateral hips, worse on the right  No acute fracture or destructive osseous lesion is identified  Prominent degenerative changes of the right shoulder  Multilevel degenerative changes of the spine with vacuum disc phenomenon at L4/L5  There is intervertebral disc space narrowing and facet joint arthropathy at L2/L3, L3/L4, and L4/L5  Impression: Patchy airspace opacities are seen in the right lower lobe superior segment  There is a large dense consolidation in the right middle lobe, lateral segment, nonspecific but suspicious for infection/pneumonia in the appropriate clinical setting  Cardiomegaly, bilateral pleural effusions, and body wall edema suggests a superimposed component of fluid overload/CHF   Multiple areas of skin thickening with associated subjacent complex fluid are seen in the superficial soft tissues of the left pectoralis region (for example axial image 30), as well as in the posterior and superior left thorax (axial image 12), as described  Consider cellulitis with developing abscesses  In addition, a few bubbles of air are seen in the anterior left chest subcutaneous tissues (axial image 8) which could be related to recent intervention but also raises suspicion for possible gas-forming infection  Partially distended bladder with catheter seen within the bladder lumen  Mild circumferential bladder wall thickening noted, probably exaggerated by underdistention  A cystitis could be considered in the appropriate clinical setting  Renal cysts, degenerative changes of the shoulder, spine, and hips, and other findings as above  Findings discussed with FRITZ Alexis by Dr Sunshine Napoles at 4:18 AM on 10/26/2018  Workstation performed: [id number]     Xr Chest Portable    Result Date: 10/29/2018  Narrative: CHEST INDICATION:   pna  COMPARISON:  10/26/2018 EXAM PERFORMED/VIEWS:  XR CHEST PORTABLE FINDINGS:  Endotracheal and nasogastric tubes have been removed during the interim  Cardiomediastinal silhouette appears unremarkable  Right greater than left basilar pleural effusions with associated right basilar airspace opacity, likely atelectasis  Osseous structures appear within normal limits for patient age  Impression: Persistent right basilar pleural effusion and associated probable atelectasis  Workstation performed: [id number]     Xr Spine Thoracic 3 Views    Result Date: 10/25/2018  Narrative: THORACIC SPINE INDICATION:   Pain  COMPARISON:  Chest x-ray from 3/13/2013 VIEWS:  XR SPINE THORACIC 3 VW FINDINGS: There is no acute fracture or pathologic bone lesion  Minimal vertebral body height loss scattered in the thoracic spine similar to the prior exam probably degenerative  Thoracic vertebral alignment is within normal limits   Multilevel degenerative changes with flowing marginal osteophyte formation  There is no displacement of the paraspinal line  The pedicles appear intact  Impression: No acute osseous abnormality  Workstation performed: [id number]     Xr Spine Lumbar 2 Or 3 Views Injury    Result Date: 10/25/2018  Narrative: LUMBAR SPINE INDICATION:   pain  COMPARISON:  None VIEWS:  XR SPINE LUMBAR 2 OR 3 VIEWS INJURY FINDINGS: There is no evidence of acute fracture or destructive osseous lesion  Levoscoliosis is present about L3  Moderate degenerative changes present throughout the lumbar spine  The pedicles appear intact  Soft tissues are unremarkable  Impression: Levoscoliosis and moderate multilevel degenerative change  Workstation performed: [id number]     Xr Shoulder 2+ Views Left    Result Date: 10/25/2018  Narrative: LEFT SHOULDER INDICATION:   Pain  COMPARISON:  None VIEWS:  XR SHOULDER 2+ VW LEFT FINDINGS: There is no acute fracture or dislocation  Narrowing of the glenohumeral joint with mild spurring  No lytic or blastic lesions are seen  Soft tissues are unremarkable  Impression: No acute osseous abnormality  Workstation performed: [id number]     Ct Head Wo Contrast    Result Date: 10/26/2018  Narrative: CT BRAIN - WITHOUT CONTRAST INDICATION:   Confusion/delirium, altered LOC, unexplained Lethargy  COMPARISON:  CT head dated 10/25/2018 TECHNIQUE:  CT examination of the brain was performed  In addition to axial images, coronal 2D reformatted images were created and submitted for interpretation  Radiation dose length product (DLP) for this visit:  1090 97 mGy-cm   This examination, like all CT scans performed in the [facility], was performed utilizing techniques to minimize radiation dose exposure, including the use of iterative reconstruction and automated exposure control  IMAGE QUALITY:  Diagnostic   FINDINGS: PARENCHYMA: Decreased attenuation is noted in periventricular and subcortical white matter demonstrating an appearance that is statistically most likely to represent mild microangiopathic change  Gray-white differentiation appears maintained No CT signs of acute territorial infarction  No intracranial mass, mass effect or midline shift  No acute parenchymal hemorrhage  Mild generalized parenchymal atrophy redemonstrated  VENTRICLES AND EXTRA-AXIAL SPACES:  Ventricles and extra-axial CSF spaces are prominent commensurate with the degree of volume loss  No hydrocephalus  No acute extra-axial hemorrhage  VISUALIZED ORBITS AND PARANASAL SINUSES:  Unremarkable  CALVARIUM AND EXTRACRANIAL SOFT TISSUES:  The calvarium appears intact  Otherwise grossly unremarkable  Impression: No acute intracranial abnormality  Other nonacute findings as above  Workstation performed: [id number]     Ct Head Without Contrast    Result Date: 10/25/2018  Narrative: CT BRAIN - WITHOUT CONTRAST INDICATION:   fall  COMPARISON:  5/9/2018  TECHNIQUE:  CT examination of the brain was performed  In addition to axial images, coronal 2D reformatted images were created and submitted for interpretation  Radiation dose length product (DLP) for this visit:  1220 31 mGy-cm   This examination, like all CT scans performed in the [facility], was performed utilizing techniques to minimize radiation dose exposure, including the use of iterative reconstruction and automated exposure control  IMAGE QUALITY:  Diagnostic  FINDINGS: PARENCHYMA: Decreased attenuation is noted in periventricular and subcortical white matter demonstrating an appearance that is statistically most likely to represent moderate microangiopathic change  No CT signs of acute infarction  No intracranial mass, mass effect or midline shift  No acute parenchymal hemorrhage  VENTRICLES AND EXTRA-AXIAL SPACES:  Ventricles and extra-axial CSF spaces are prominent commensurate with the degree of volume loss  No hydrocephalus  No acute extra-axial hemorrhage   VISUALIZED ORBITS AND PARANASAL SINUSES:  Unremarkable  CALVARIUM AND EXTRACRANIAL SOFT TISSUES:  Normal      Impression: No acute intracranial abnormality  Workstation performed: [id number]     Ct Spine Cervical Without Contrast    Result Date: 10/25/2018  Narrative: CT CERVICAL SPINE - WITHOUT CONTRAST INDICATION:   fall  COMPARISON:  None  TECHNIQUE:  CT examination of the cervical spine was performed without intravenous contrast   Contiguous axial images were obtained  Sagittal and coronal reconstructions were performed  Radiation dose length product (DLP) for this visit:  415 18 mGy-cm   This examination, like all CT scans performed in the [facility], was performed utilizing techniques to minimize radiation dose exposure, including the use of iterative  reconstruction and automated exposure control  IMAGE QUALITY:  Diagnostic  FINDINGS: ALIGNMENT:  Normal alignment of the cervical spine  No subluxation  VERTEBRAL BODIES:  No fracture  DEGENERATIVE CHANGES:  Moderate multilevel cervical degenerative changes are noted  No critical central canal stenosis  PREVERTEBRAL AND PARASPINAL SOFT TISSUES:  Unremarkable  THORACIC INLET:  Normal      Impression: No cervical spine fracture or traumatic malalignment  Workstation performed: [id number]     Xr Chest 1 View    Result Date: 10/25/2018  Narrative: CHEST INDICATION:   Pain  Fall  COMPARISON:  Chest x-ray from 3/13/2013 EXAM PERFORMED/VIEWS:  XR CHEST 1 VIEW FINDINGS:  There has been interval sternotomy  Cardiac silhouette is moderately enlarged  Focal consolidation in the right lower lobe laterally  Left lung is clear  No pneumothorax or pleural effusion  Osseous structures appear within normal limits for patient age  Impression: Focal consolidation in the right lower lobe  This may represent pneumonia  If there is any trauma to the right chest, pulmonary contusion is a consideration  Recommend follow up to resolution   The study was marked in [facility] for significant notification  Workstation performed: [id number]     Xr Chest Portable Icu    Result Date: 11/1/2018  Narrative: CHEST INDICATION:   PNA  COMPARISON:  10/29/2018 EXAM PERFORMED/VIEWS:  XR CHEST PORTABLE ICU 1 image FINDINGS: Cardiomediastinal silhouette appears enlarged  A median sternotomy has been performed  No evidence of heart failure  A PICC line enters on the right, the tip extends to the SVC  Bibasilar densities may represent infiltrate, atelectasis or fluid  Osseous structures appear within normal limits for patient age  Impression: Cardiomegaly  Bibasilar parenchymal densities unchanged  Workstation performed: [id number]     Xr Chest Portable Icu    Result Date: 10/29/2018  Narrative: CHEST INDICATION:   Evaluate for aspiration pneumonia    COMPARISON:  10/29/2018 EXAM PERFORMED/VIEWS:  XR CHEST PORTABLE ICU FINDINGS:  There are median sternotomy wires indicating prior cardiac surgery  Heart shadow is enlarged but unchanged from prior exam  Bilateral pleural effusions appear similar size to the previous examination  In conjunction with prior chest CT 10/26/2018, right base airspace disease is again identified  Questionable left retrocardiac airspace disease as well, similar to prior  No pneumothorax  No pulmonary edema  Osseous structures appear within normal limits for patient age  Impression: No significant interval change since 10/29/2018 Workstation performed: [id number]     Xr Chest Portable Icu    Result Date: 10/26/2018  Narrative: CHEST INDICATION:   Endotracheal tube positioning  COMPARISON:  10/25/2018 EXAM PERFORMED/VIEWS:  XR CHEST PORTABLE ICU Images: 2 FINDINGS: Cardiomediastinal silhouette appears unremarkable  A median sternotomy has been performed  Endotracheal tube ends approximately 3 3 cm above the yohana  A nasogastric tube is coiled in the stomach  Pulmonary vessels are normal  Consolidation at the right lung base    Minimal atelectasis at the left lung base   No evidence of heart failure  Osseous structures appear within normal limits for patient age  Impression: Endotracheal tube ending 3 3 cm above the yohana  Consolidation at the right lung base  Workstation performed: [id number]     Ir Picc Line    Result Date: 10/31/2018  Narrative: Examination: Right upper extremity double lumen Power PICC 10/31/2018 History: Needs access Contrast: None Fluoroscopy time:  0 9 minutes Procedure: The patient was identified verbally and by wristband  Timeout was performed  Informed consent was obtained  All elements of maximal sterile barrier technique, cap and mask and sterile gown and sterile gloves and sterile full-body drape and hand hygiene and 2% chlorhexidine for cutaneous antisepsis  Sterile ultrasound technique with sterile gel and sterile probe covers was also utilized  Following obtaining informed consent, the patient was prepped and draped in the usual sterile fashion  Using ultrasound guidance, access was gained to the patient's right antecubital vein using a micropuncture system  The micropuncture dilator was exchanged for a peel away sheath  A mandril wire was advanced to the level of the RA/SVC junction using fluoroscopic guidance to measure the catheter length  The catheter was cut to size and placed through the peel away sheath  The lumens were flushed with ease  The catheter was secured in place  The patient tolerated the procedure well without apparent immediate complication  The patient left the IR department in unchanged condition  Dr Dong Le performed the procedure Findings: Using ultrasound guidance, the right antecubital vein was cannulated using Seldinger technique  The right antecubital vein was evaluated as a potential access site  The right antecubital vein was patent, and free of thrombus  Static images of the vessel  was obtained  Visualization of real time needle entry into the vessel was obtained   A fluoroscopic spot image demonstrated a newly placed PICC with the central aspect at the RA/SVC junction  The catheter tubing is smooth in contour  Impression: Impression: Successful placement of a 49cm right upper extremity double lumen Power PICC  Workstation performed: [id number]     Imaging Reports Reviewed by myself    Cultures:   Blood Culture:   Lab Results   Component Value Date    BLOODCX No Growth After 5 Days  10/29/2018    BLOODCX No Growth After 5 Days  10/29/2018    BLOODCX Staphylococcus aureus (A) 10/25/2018    BLOODCX No Growth After 5 Days  10/25/2018     Urine Culture:   Lab Results   Component Value Date    URINECX >100,000 cfu/ml  10/25/2018    URINECX >100,000 cfu/ml Klebsiella oxytoca (A) 05/09/2018     Sputum Culture: No components found for: SPUTUMCX  Wound Culture:   Lab Results   Component Value Date    WOUNDCULT 4+ Growth of Staphylococcus aureus (A) 10/26/2018    WOUNDCULT 4+ Growth of Staphylococcus aureus (A) 10/25/2018       Last 24 Hours Medication List:     Current Facility-Administered Medications:  acetaminophen 650 mg Oral Q6H [facility] Rhea John PA-C    atorvastatin 10 mg Oral Daily Hemant Handley MD    cefazolin 2,000 mg Intravenous Q8H Hemant Handley MD Last Rate: Stopped (11/08/18 0549)   collagenase  Topical Daily Mandy Simental MD    insulin lispro 2-12 Units Subcutaneous 4x Daily (AC & HS) Rhea John PA-C    melatonin 6 mg Oral HS Hemant Handley MD    metoprolol tartrate 12 5 mg Oral Q12H [facility] Belen Tee PA-C    pantoprazole 40 mg Intravenous Q12H Jeff Torres PA-C    pregabalin 50 mg Oral TID Hemant Handley MD    senna 1 tablet Oral HS Hemant Handley MD    tamsulosin 0 4 mg Oral Daily With Dinner Carlos Todd PA-C         Today, Patient Was Seen By: Frida Brody MD    ** Please Note: "This note has been constructed using a voice recognition system  Therefore there may be syntax, spelling, and/or grammatical errors   Please call if you have any questions  "**

## 2018-11-08 NOTE — PROGRESS NOTES
Pt was transferred to [place] [address] via wheelchair in no apparent distress  Bernie Molina the daughter-in-law was notified about transfer

## 2018-11-08 NOTE — OCCUPATIONAL THERAPY NOTE
Attempted OT treatment, however pt refused  Will continue to follow      Lilliana Calvin MS OTR/L [id number]

## 2018-11-08 NOTE — SOCIAL WORK
SW following to assist with DCP  STR placement is planned  Pt has been accepted by [facility] and bed is available at [facility]  Pt can be transferred whenever ready  SW will continue to follow to monitor progress and assist with transfer when ordered

## 2018-11-08 NOTE — PLAN OF CARE
Problem: DISCHARGE PLANNING - CARE MANAGEMENT  Goal: Discharge to post-acute care or home with appropriate resources  INTERVENTIONS:  - Conduct assessment to determine patient/family and health care team treatment goals, and need for post-acute services based on payer coverage, community resources, and patient preferences, and barriers to discharge  - Address psychosocial, clinical, and financial barriers to discharge as identified in assessment in conjunction with the patient/family and health care team  - Arrange appropriate level of post-acute services according to patient's   needs and preference and payer coverage in collaboration with the physician and health care team  - Communicate with and update the patient/family, physician, and health care team regarding progress on the discharge plan  - Arrange appropriate transportation to post-acute venues     TRANSFER TO STR WHEN STABLE  Outcome: Adequate for Discharge  STR placement is planned  Pt has been accepted by [facility] and bed is available at [facility]  Pt can be transferred whenever ready

## 2018-11-09 LAB
ANION GAP SERPL CALCULATED.3IONS-SCNC: 7 MMOL/L (ref 4–13)
BASOPHILS # BLD AUTO: 0.02 THOUSANDS/ΜL (ref 0–0.1)
BASOPHILS NFR BLD AUTO: 0 % (ref 0–1)
BUN SERPL-MCNC: 15 MG/DL (ref 5–25)
CALCIUM SERPL-MCNC: 7 MG/DL (ref 8.3–10.1)
CHLORIDE SERPL-SCNC: 101 MMOL/L (ref 100–108)
CO2 SERPL-SCNC: 24 MMOL/L (ref 21–32)
CREAT SERPL-MCNC: 0.82 MG/DL (ref 0.6–1.3)
EOSINOPHIL # BLD AUTO: 0.04 THOUSAND/ΜL (ref 0–0.61)
EOSINOPHIL NFR BLD AUTO: 1 % (ref 0–6)
ERYTHROCYTE [DISTWIDTH] IN BLOOD BY AUTOMATED COUNT: 17.7 % (ref 11.6–15.1)
GFR SERPL CREATININE-BSD FRML MDRD: 84 ML/MIN/1.73SQ M
GLUCOSE SERPL-MCNC: 100 MG/DL (ref 65–140)
GLUCOSE SERPL-MCNC: 131 MG/DL (ref 65–140)
GLUCOSE SERPL-MCNC: 195 MG/DL (ref 65–140)
GLUCOSE SERPL-MCNC: 94 MG/DL (ref 65–140)
GLUCOSE SERPL-MCNC: 95 MG/DL (ref 65–140)
HCT VFR BLD AUTO: 28 % (ref 36.5–49.3)
HGB BLD-MCNC: 8.8 G/DL (ref 12–17)
IMM GRANULOCYTES # BLD AUTO: 0.04 THOUSAND/UL (ref 0–0.2)
IMM GRANULOCYTES NFR BLD AUTO: 1 % (ref 0–2)
LYMPHOCYTES # BLD AUTO: 0.76 THOUSANDS/ΜL (ref 0.6–4.47)
LYMPHOCYTES NFR BLD AUTO: 11 % (ref 14–44)
MAGNESIUM SERPL-MCNC: 1.9 MG/DL (ref 1.6–2.6)
MCH RBC QN AUTO: 31 PG (ref 26.8–34.3)
MCHC RBC AUTO-ENTMCNC: 31.4 G/DL (ref 31.4–37.4)
MCV RBC AUTO: 99 FL (ref 82–98)
MONOCYTES # BLD AUTO: 0.41 THOUSAND/ΜL (ref 0.17–1.22)
MONOCYTES NFR BLD AUTO: 6 % (ref 4–12)
NEUTROPHILS # BLD AUTO: 5.49 THOUSANDS/ΜL (ref 1.85–7.62)
NEUTS SEG NFR BLD AUTO: 81 % (ref 43–75)
NRBC BLD AUTO-RTO: 0 /100 WBCS
PHOSPHATE SERPL-MCNC: 2.2 MG/DL (ref 2.3–4.1)
PLATELET # BLD AUTO: 137 THOUSANDS/UL (ref 149–390)
PMV BLD AUTO: 10.5 FL (ref 8.9–12.7)
POTASSIUM SERPL-SCNC: 3.5 MMOL/L (ref 3.5–5.3)
RBC # BLD AUTO: 2.84 MILLION/UL (ref 3.88–5.62)
SODIUM SERPL-SCNC: 132 MMOL/L (ref 136–145)
WBC # BLD AUTO: 6.76 THOUSAND/UL (ref 4.31–10.16)

## 2018-11-09 PROCEDURE — 82948 REAGENT STRIP/BLOOD GLUCOSE: CPT

## 2018-11-09 PROCEDURE — 93308 TTE F-UP OR LMTD: CPT | Performed by: INTERNAL MEDICINE

## 2018-11-09 PROCEDURE — 93325 DOPPLER ECHO COLOR FLOW MAPG: CPT | Performed by: INTERNAL MEDICINE

## 2018-11-09 PROCEDURE — 84100 ASSAY OF PHOSPHORUS: CPT | Performed by: INTERNAL MEDICINE

## 2018-11-09 PROCEDURE — 85025 COMPLETE CBC W/AUTO DIFF WBC: CPT | Performed by: INTERNAL MEDICINE

## 2018-11-09 PROCEDURE — 80048 BASIC METABOLIC PNL TOTAL CA: CPT | Performed by: INTERNAL MEDICINE

## 2018-11-09 PROCEDURE — 93321 DOPPLER ECHO F-UP/LMTD STD: CPT | Performed by: INTERNAL MEDICINE

## 2018-11-09 PROCEDURE — C9113 INJ PANTOPRAZOLE SODIUM, VIA: HCPCS | Performed by: PHYSICIAN ASSISTANT

## 2018-11-09 PROCEDURE — 97110 THERAPEUTIC EXERCISES: CPT

## 2018-11-09 PROCEDURE — 99232 SBSQ HOSP IP/OBS MODERATE 35: CPT | Performed by: INTERNAL MEDICINE

## 2018-11-09 PROCEDURE — 83735 ASSAY OF MAGNESIUM: CPT | Performed by: INTERNAL MEDICINE

## 2018-11-09 RX ORDER — FUROSEMIDE 10 MG/ML
20 INJECTION INTRAMUSCULAR; INTRAVENOUS DAILY
Status: DISCONTINUED | OUTPATIENT
Start: 2018-11-09 | End: 2018-11-10

## 2018-11-09 RX ORDER — POTASSIUM CHLORIDE 20 MEQ/1
20 TABLET, EXTENDED RELEASE ORAL 2 TIMES DAILY
Status: DISCONTINUED | OUTPATIENT
Start: 2018-11-10 | End: 2018-11-12

## 2018-11-09 RX ADMIN — MELATONIN TAB 3 MG 6 MG: 3 TAB at 22:03

## 2018-11-09 RX ADMIN — CEFAZOLIN SODIUM 2000 MG: 2 SOLUTION INTRAVENOUS at 22:04

## 2018-11-09 RX ADMIN — PREGABALIN 50 MG: 50 CAPSULE ORAL at 09:07

## 2018-11-09 RX ADMIN — SENNOSIDES 8.6 MG: 8.6 TABLET, FILM COATED ORAL at 22:04

## 2018-11-09 RX ADMIN — PREGABALIN 50 MG: 50 CAPSULE ORAL at 22:04

## 2018-11-09 RX ADMIN — METOPROLOL TARTRATE 12.5 MG: 25 TABLET ORAL at 22:03

## 2018-11-09 RX ADMIN — ACETAMINOPHEN 650 MG: 325 TABLET, FILM COATED ORAL at 12:07

## 2018-11-09 RX ADMIN — PANTOPRAZOLE SODIUM 40 MG: 40 INJECTION, POWDER, FOR SOLUTION INTRAVENOUS at 20:28

## 2018-11-09 RX ADMIN — CEFAZOLIN SODIUM 2000 MG: 2 SOLUTION INTRAVENOUS at 05:32

## 2018-11-09 RX ADMIN — ACETAMINOPHEN 650 MG: 325 TABLET, FILM COATED ORAL at 18:01

## 2018-11-09 RX ADMIN — METOPROLOL TARTRATE 12.5 MG: 25 TABLET ORAL at 10:59

## 2018-11-09 RX ADMIN — CEFAZOLIN SODIUM 2000 MG: 2 SOLUTION INTRAVENOUS at 14:25

## 2018-11-09 RX ADMIN — PREGABALIN 50 MG: 50 CAPSULE ORAL at 18:01

## 2018-11-09 RX ADMIN — PANTOPRAZOLE SODIUM 40 MG: 40 INJECTION, POWDER, FOR SOLUTION INTRAVENOUS at 09:06

## 2018-11-09 RX ADMIN — ATORVASTATIN CALCIUM 10 MG: 10 TABLET, FILM COATED ORAL at 18:01

## 2018-11-09 RX ADMIN — COLLAGENASE SANTYL: 250 OINTMENT TOPICAL at 09:13

## 2018-11-09 RX ADMIN — FUROSEMIDE 20 MG: 10 INJECTION, SOLUTION INTRAMUSCULAR; INTRAVENOUS at 19:25

## 2018-11-09 RX ADMIN — ACETAMINOPHEN 650 MG: 325 TABLET, FILM COATED ORAL at 05:32

## 2018-11-09 RX ADMIN — TAMSULOSIN HYDROCHLORIDE 0.4 MG: 0.4 CAPSULE ORAL at 18:01

## 2018-11-09 RX ADMIN — INSULIN LISPRO 2 UNITS: 100 INJECTION, SOLUTION INTRAVENOUS; SUBCUTANEOUS at 22:04

## 2018-11-09 NOTE — PLAN OF CARE
Problem: PHYSICAL THERAPY ADULT  Goal: Performs mobility at highest level of function for planned discharge setting  See evaluation for individualized goals  Outcome: Progressing  Prognosis: Good  Problem List: Decreased strength, Decreased endurance, Impaired balance, Decreased mobility  Assessment: Pt demonstrates improving strength and functional mobility  Pt appears to be capable of more activity however pt declined  Plan to encourage pt to ambulate a further distance next session  Recommendation:  (STR)          See flowsheet documentation for full assessment

## 2018-11-09 NOTE — PLAN OF CARE
CARDIOVASCULAR - ADULT     Absence of cardiac dysrhythmias or at baseline rhythm Progressing        DISCHARGE PLANNING     Discharge to home or other facility with appropriate resources Progressing        DISCHARGE PLANNING - CARE MANAGEMENT     Discharge to post-acute care or home with appropriate resources Progressing        GASTROINTESTINAL - ADULT     Maintains or returns to baseline bowel function Progressing     Maintains adequate nutritional intake Progressing        GENITOURINARY - ADULT     Maintains or returns to baseline urinary function Progressing     Urinary catheter remains patent Progressing        HEMATOLOGIC - ADULT     Maintains hematologic stability Progressing        INFECTION - ADULT     Absence or prevention of progression during hospitalization Progressing        Knowledge Deficit     Patient/family/caregiver demonstrates understanding of disease process, treatment plan, medications, and discharge instructions Progressing        METABOLIC, FLUID AND ELECTROLYTES - ADULT     Electrolytes maintained within normal limits Progressing     Fluid balance maintained Progressing     Glucose maintained within target range Progressing        Nutrition/Hydration-ADULT     Nutrient/Hydration intake appropriate for improving, restoring or maintaining nutritional needs Progressing        PAIN - ADULT     Verbalizes/displays adequate comfort level or baseline comfort level Progressing        Potential for Falls     Patient will remain free of falls Progressing        Prexisting or High Potential for Compromised Skin Integrity     Skin integrity is maintained or improved Progressing        SAFETY ADULT     Maintain or return to baseline ADL function Progressing     Maintain or return mobility status to optimal level Progressing        SKIN/TISSUE INTEGRITY - ADULT     Incision(s), wounds(s) or drain site(s) healing without S/S of infection Progressing

## 2018-11-09 NOTE — PROGRESS NOTES
Progress Note - Urology   Angélica Aguero [de-identified] y o  male MRN: [id number]  Unit/Bed#: 2 [place] [address] Encounter: [id number]    Assessment/Plan:  Urinary retention   Previously failed urinary retention protocol  Currently on tamsulosin 0 4mg since 11/03/18  Luis placed 11/06/18 for 375mL after straight cath x2  Continues with diminished mobility and deconditioning   · D/C luis once more mobile  High risk of recurrent retention while bed bound  Subjective:  Seen on rounds and notes the following: Pt lying in bed in NAD  Notes still not up and moving around  Denies any luis cath pain or discomfort  Objective:  Vitals Last 24 hours:   Temp:  [97 5 °F (36 4 °C)-97 9 °F (36 6 °C)] 97 5 °F (36 4 °C)  HR:  [83-95] 95  Resp:  [18] 18  BP: (91-98)/(54-57) 98/56  BMI: Body mass index is 23 95 kg/m²  Physical Exam   Constitutional: He appears well-developed  HENT:   Head: Normocephalic  Pulmonary/Chest: Effort normal    Abdominal: Soft  Neurological: He is alert  Skin: Skin is warm  Vitals reviewed  Drains:   Luis draining yellow urine  No gross hematuria or clots       Lab Results and Cultures:     Results from last 7 days  Lab Units 11/09/18  0538 11/08/18  0521 11/07/18  0526  11/05/18  0544  11/04/18  1629   WBC Thousand/uL 6 76 8 04 7 30  < >  --   < > 18 09*   RBC Million/uL 2 84* 2 78* 2 96*  < >  --   < > 3 81*   HEMOGLOBIN g/dL 8 8* 8 7* 9 1*  < >  --   < > 11 9*   HEMATOCRIT % 28 0* 27 3* 29 0*  < >  --   < > 35 4*   PLATELETS Thousands/uL 137* 139* 146*  < >  --   < > 158   POTASSIUM mmol/L 3 5 3 6 3 8  < > 3 6  --  3 5   CHLORIDE mmol/L 101 103 107  < > 108  --  107   CO2 mmol/L 24 24 23  < > 25  --  24   BUN mg/dL 15 18 22  < > 30*  --  33*   CREATININE mg/dL 0 82 0 84 0 95  < > 1 03  --  1 00   PHOSPHORUS mg/dL 2 2* 2 2* 2 6  < > 2 6  --   --    MAGNESIUM mg/dL 1 9 1 9 2 1  < > 1 8  --   --    CALCIUM mg/dL 7 0* 6 8* 7 0*  < > 7 2*  --  7 5*   AST U/L  --  12  --   --  15  --  15   ALT U/L --  <6*  --   --  6*  --  6*   ALK PHOS U/L  --  67  --   --  60  --  64   EGFR ml/min/1 73sq m 84 83 75  < > 68  --  71   < > = values in this interval not displayed  Lab Results   Component Value Date    URINECX >100,000 cfu/ml  10/25/2018         Intake/Output Summary (Last 24 hours) at 11/09/18 1021  Last data filed at 11/09/18 0333   Gross per 24 hour   Intake               50 ml   Output             1325 ml   Net            -1275 ml         Yoan Winchester PA-C  11/9/2018    Portions of the record may have been created with voice recognition software   Occasional wrong word or "sound alike" substitutions may have occurred due to the inherent limitations of voice recognition software   Read the chart carefully and recognize, using context, where substitutions have occurred

## 2018-11-09 NOTE — PROGRESS NOTES
Progress Note - Cardiology   Verdia Dance [de-identified] y o  male MRN: [id number]  Unit/Bed#: 2 [place] [address] Encounter: [id number]    Assessment/Plan:  Chronic combined systolic diastolic heart failure EF 20%- repeat echo 2D without significant change in LV systolic function- remains less than 30%  Recommend life vest prior to discharge  Remains volume overloaded  Agree with continued diuresis  Recommend rechallenge with low dose ace-inhibitor- lisinopril   MSSA Bacteremia  Septic Shock-POA  Upper GI bleed bleed found with large duodenal ulcer status post endoscopic therapy-currently on PPI drip  History of a bypass grafting in 2017 followed by [facility]  Dyslipidemia on statin- atorvastatin 10mg  Type 2 diabetes mellitus     Subjective/Objective   Denies chest pain  Remains with lower ext edema  Continues to have dyspnea on exertion    Objective:     Vitals: BP 96/55 (BP Location: Left arm)   Pulse 94   Temp 97 5 °F (36 4 °C) (Tympanic)   Resp 18   Ht 5' 8 5" (1 74 m)   Wt 72 5 kg (159 lb 13 3 oz)   SpO2 98%   BMI 23 95 kg/m²   Vitals:    11/08/18 0600 11/09/18 0600   Weight: 72 7 kg (160 lb 4 4 oz) 72 5 kg (159 lb 13 3 oz)     Orthostatic Blood Pressures      Most Recent Value   Blood Pressure  96/55 filed at 11/09/2018 1210   Patient Position - Orthostatic VS  Sitting filed at 11/09/2018 1210            Intake/Output Summary (Last 24 hours) at 11/09/18 1636  Last data filed at 11/09/18 1601   Gross per 24 hour   Intake               50 ml   Output             1925 ml   Net            -1875 ml       Invasive Devices     Peripherally Inserted Central Catheter Line            PICC Line 21/66/76 Right Basilic 9 days          Drain            Urethral Catheter 16 Fr  3 days                Review of Systems: reviewed    Physical Exam   Constitutional: He is oriented to person, place, and time  He appears ill  No distress  HENT:   Head: Normocephalic and atraumatic     Right Ear: External ear normal    Left Ear: External ear normal    Eyes: Pupils are equal, round, and reactive to light  Conjunctivae are normal  Right eye exhibits no discharge  Left eye exhibits no discharge  No scleral icterus  Neck: Normal range of motion  Neck supple  JVD present  No tracheal deviation present  No thyromegaly present  Cardiovascular: Normal rate and regular rhythm  Exam reveals gallop  Exam reveals no friction rub  Murmur heard  Pulmonary/Chest: Effort normal and breath sounds normal  No stridor  No respiratory distress  He has no wheezes  He has no rales  He exhibits no tenderness  Abdominal: Soft  Bowel sounds are normal  He exhibits distension  He exhibits no mass  There is no tenderness  There is no rebound and no guarding  Musculoskeletal: Normal range of motion  He exhibits edema  He exhibits no tenderness or deformity  Neurological: He is alert and oriented to person, place, and time  He has normal reflexes  No cranial nerve deficit  He exhibits normal muscle tone  Coordination normal    Skin: Skin is warm and dry  No rash noted  He is not diaphoretic  No erythema  No pallor  Psychiatric: He has a normal mood and affect  His behavior is normal  Judgment and thought content normal    Nursing note and vitals reviewed  Lab Results: I have personally reviewed pertinent lab results  Imaging: I have personally reviewed pertinent reports  EKG: reviewed  VTE Pharmacologic Prophylaxis: Sequential compression device (Venodyne)   VTE Mechanical Prophylaxis: sequential compression device    Counseling / Coordination of Care  Total time spent today 40 minutes  Greater than 50% of total time was spent with the patient and / or family counseling and / or coordination of care   A description of the counseling / coordination of care: heart failure advanced

## 2018-11-09 NOTE — PROGRESS NOTES
[facility] Progress Note  Patient: Taty Finnegan [de-identified] y o  male   MRN: [id number]  PCP: Halley Tadeo DO  Unit/Bed#: [address] [place] [address] Encounter: [id number]  Date Of Visit: 11/09/18    Problem List:    Principal Problem:    Abscess of chest wall  Active Problems:    Cardiomyopathy, ischemic    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HCC)    Acute blood loss anemia    Bacteremia due to Staphylococcus aureus    DM2 (diabetes mellitus, type 2) (HCC)    CAD (coronary artery disease)    CKD (chronic kidney disease) stage 3, GFR 30-59 ml/min (HCC)    Benign essential hypertension    Atrial fibrillation (HCC)    Urinary retention    Gastrointestinal hemorrhage associated with duodenal ulcer    Neuralgia, post-herpetic    Severe protein-calorie malnutrition (HCC)      Assessment & Plan:    * Abscess of chest wall   Assessment & Plan    · Aggressive  debridement in OR on 10/26 by Dr Nicanor Grande  · Antibiotics deescalated to Ancef as per ID recs, patient will require IV antibiotics until November 25th  Patient has PICC line for outpatient antibiotic therapy  · ID following  · Blood culture staph aureus x 1  · Wound culture: 3+ staph aureus   · Urine cultures > 100,000 mixed containments   · Repeat blood cultures from 10/29 no growth to date  · Wound VAC removed 11/3  Continue local wound care  · Started on Lyrica 50 mg TID for possible post-hepatic neuralgia, scheduled tylenol, d/c oxycodone     Bacteremia due to Staphylococcus aureus   Assessment & Plan    · Admission Blood culture x 1 MSSA  · Repeat blood cultures no growth to date  · Per ID will need 4 wks antibiotics from negative blood cultures through 11/25  · PICC line in place     Acute blood loss anemia   Assessment & Plan    · Secondary to bleeding gastric ulcer    · Patient continued with tachycardia and drifting hemoglobin and later developed guanakito melena  · EGD with duodenal bulb ulcer with intervention 11/4  · Pt has been Transfused 9 units of packed red blood cells total this admission  1 unit 10/28, 3 units 10/30, 1 unit 11/2  4 unit 11/4  · Hgb remains stable without any evidence of overt bleeding  · Continue IV PPI as per GI  · Continue trend hemoglobin     Cardiomyopathy, ischemic   Assessment & Plan    · EF prior was 35 to 40%  Recent stress test in May of 2018 showed reversible ischemic changes  Cardiology following as an outpatient with medical management  · ECHO repeat with EF 20% possibly related to sepsis  · Cardiac output and indexes while patient was intubated were acceptable  · Frequent PVCs on telemetry, Episode of nonsustained V-tach overnight 11/6, asymptomatic  · Cardiology following, consider AICD and life vest before discharge  · Tolerating metoprolol 12 5 mg BID PO  · Repeat echo with EF 20%  · Will start IV Lasix as hemoglobin is stabilizing and sepsis has resolved  · Continue telemetry  · Follow up with Cardiology     Neuralgia, post-herpetic   Assessment & Plan    On Lyrica     Gastrointestinal hemorrhage associated with duodenal ulcer   Assessment & Plan    Status post EGD 11/4 revealed duodenal bleeding ulcer  Continue IV PPI for now  Advance to full liquid diet  Monitor H&H     Urinary retention   Assessment & Plan    Status post failing voiding trial  Likely secondary to BPH , on Flomax  Follow up Urology recommendation  Maintain Gordon     Atrial fibrillation [facility])   Assessment & Plan    · History of AFib in the setting of electrolyte abnormalities in the past   Monitor on telemetry  · Current NSR to Sinus Tach  · Continue to hold on anticoagulation at this time  Particularly in light of recent episode of GI bleeding    · Frequent PVCs on telemetry, cardiology consulted, beta-blocker resumed     Benign essential hypertension   Assessment & Plan    Stable  Asymptomatic  Continue metoprolol     CKD (chronic kidney disease) stage 3, GFR 30-59 ml/min ([facility])   Assessment & Plan    Stable, baseline creatinine 0 9-1 3  Monitor input output       CAD (coronary artery disease)   Assessment & Plan    Troponin negative  Continue statin, continue to hold aspirin secondary to GI bleed  Follow up with Cardiology     DM2 (diabetes mellitus, type 2) [facility])   Assessment & Plan    Lab Results   Component Value Date    HGBA1C 12 0 (H) 10/26/2018       Recent Labs      11/09/18   0703  11/09/18   1115  11/09/18   1612  11/09/18   2110   POCGLU  94  100  131  195*       Blood Sugar Average: Last 72 hrs:  (P) 111 8125     Acceptable monitor     Esophagitisresolved as of 11/4/2018   Assessment & Plan    · Resolved  · EGD on 10/31: duodenal bulb ulcer with possible candida esophagitis  · Biopsies negative for candida - d/c fluconizole 11/4     Severe protein-calorie malnutrition ([address] )   Assessment & Plan    Malnutrition Findings:           BMI Findings: Body mass index is 24 02 kg/m²          Acute respiratory failure secondary to septic shockresolved as of 10/28/2018   Assessment & Plan    Status post extubation 10/26       DKA (diabetic ketoacidoses) (HCC)resolved as of 10/26/2018   Assessment & Plan    Lab Results   Component Value Date    HGBA1C 12 0 (H) 10/26/2018       Recent Labs      11/07/18   2121  11/08/18   1058  11/08/18   1609  11/08/18   2205   POCGLU  121  142*  157*  103       Blood Sugar Average: Last 72 hrs:  (P) 105 75          Shinglesresolved as of 11/1/2018   Assessment & Plan    Status post treatment with Valtrex 10/31     Urinary tract infectionresolved as of 10/29/2018   Assessment & Plan    Suspected possible source on admission  Urine culture without specific pathogen       CHRIS (acute kidney injury) (HCC)resolved as of 10/27/2018   Assessment & Plan    Renal secondary to septic shock in DKA  Improved  Monitor     Hyponatremiaresolved as of 10/27/2018   Assessment & Plan    Noted to be 111 on presentation likely secondary to combination of hypovolemic hyponatremia secondary to dehydration, sepsis and DKA plus pseudo hyponatremia secondary to hyperglycemia           VTE Pharmacologic Prophylaxis:   Pharmacologic: Pharmacologic VTE Prophylaxis contraindicated due to GI bleed  Mechanical VTE Prophylaxis in Place: No    Patient Centered Rounds: I have performed bedside rounds with nursing staff today  Discussions with Specialists or Other Care Team Provider: Yes    Education and Discussions with Family / Patient:Yes    Time Spent for Care: 45 minutes  More than 50% of total time spent on counseling and coordination of care as described above  Current Length of Stay: 15 day(s)    Current Patient Status: Inpatient     Discharge Plan:  Anticipate rehab    Code Status: Level 2 - DNAR: but accepts endotracheal intubation    Certification Statement: The patient will continue to require additional inpatient hospital stay due to GI bleed, sepsis, cardiomyopathy      Subjective:   Sitting up in chair  Reports pain around left-sided surgical sutures  Denies shortness of breath  No abdominal pain nausea vomiting    Objective:   Not in distress    All other 10 review of systems negative and without drastic interval changes from yesterday  Vitals:   Temp (24hrs), Av 6 °F (36 4 °C), Min:97 5 °F (36 4 °C), Max:97 9 °F (36 6 °C)    Temp:  [97 5 °F (36 4 °C)-97 9 °F (36 6 °C)] 97 5 °F (36 4 °C)  HR:  [83-95] 94  Resp:  [18] 18  BP: (94-98)/(54-57) 96/55  SpO2:  [97 %-98 %] 98 %  Body mass index is 23 95 kg/m²  Input and Output Summary (last 24 hours): Intake/Output Summary (Last 24 hours) at 18 1356  Last data filed at 18 1101   Gross per 24 hour   Intake               50 ml   Output             1775 ml   Net            -1725 ml       Physical Exam:     Physical Exam   Constitutional: He is oriented to person, place, and time  He appears well-developed  No distress  HENT:   Head: Normocephalic and atraumatic  Eyes: Pupils are equal, round, and reactive to light   Conjunctivae are normal    Neck: Normal range of motion  Neck supple  Cardiovascular: Normal rate and regular rhythm  Murmur heard  Pulmonary/Chest: Effort normal  No respiratory distress  He has no wheezes  He has no rhonchi  He has no rales  He exhibits no tenderness  Left chest wall and back dressing in place  Incision C/D/I, stitches in place     Abdominal: Soft  Bowel sounds are normal  He exhibits no distension  There is no tenderness  There is no rebound and no guarding  Genitourinary:   Genitourinary Comments: Gordon in place   Musculoskeletal: Normal range of motion  He exhibits edema (Bilateral thigh)  Neurological: He is alert and oriented to person, place, and time  No cranial nerve deficit  Skin: Skin is warm and dry  No rash noted  Additional Data:     Labs:      Results from last 7 days  Lab Units 11/09/18  0538   WBC Thousand/uL 6 76   HEMOGLOBIN g/dL 8 8*   HEMATOCRIT % 28 0*   PLATELETS Thousands/uL 137*   NEUTROS PCT % 81*   LYMPHS PCT % 11*   MONOS PCT % 6   EOS PCT % 1       Results from last 7 days  Lab Units 11/09/18  0538 11/08/18  0521   POTASSIUM mmol/L 3 5 3 6   CHLORIDE mmol/L 101 103   CO2 mmol/L 24 24   BUN mg/dL 15 18   CREATININE mg/dL 0 82 0 84   CALCIUM mg/dL 7 0* 6 8*   ALK PHOS U/L  --  67   ALT U/L  --  <6*   AST U/L  --  12           * I Have Reviewed All Lab Data Listed Above  * Additional Pertinent Lab Tests Reviewed: All Labs For Current Hospital Admission Reviewed    Imaging:  Ct Chest Abdomen Pelvis Wo Contrast    Result Date: 10/26/2018  Narrative: CT CHEST, ABDOMEN AND PELVIS WITHOUT IV CONTRAST INDICATION:   Sepsis  COMPARISON:  CT cervical spine dated 10/25/2018, radiographs of the left shoulder dated 10/25/2018 TECHNIQUE: CT examination of the chest, abdomen and pelvis was performed without intravenous contrast   Axial, sagittal, and coronal 2D reformatted images were created from the source data and submitted for interpretation   Radiation dose length product (DLP) for this visit:  652 78 mGy-cm   This examination, like all CT scans performed in the [facility], was performed utilizing techniques to minimize radiation dose exposure, including the use of iterative  reconstruction and automated exposure control  Enteric contrast was not administered  FINDINGS: CHEST LUNGS:  Some patchy airspace opacities are seen in the right lower lobe superior segment  There is a large dense consolidation in the right middle lobe, lateral segment  Some mucous plugging is suspected in a left upper lobe bronchus  There is mild dependent and bibasilar atelectasis  The lungs otherwise appear grossly clear  PLEURA:  Small bilateral pleural effusions are noted dependently  HEART/GREAT VESSELS:  There is left ventricular enlargement  At least moderate coronary atherosclerosis is noted  Status post CABG with a LIMA graft  Mild aortic atherosclerosis  No aortic aneurysm  MEDIASTINUM AND ALIZE:  Unremarkable  CHEST WALL AND LOWER NECK: Median sternotomy wires are noted  A few bubbles of air are seen in the anterior left chest subcutaneous tissues (axial image 8)  Multiple areas of skin thickening with associated subjacent complex fluid are seen in the superficial soft tissues of the left pectoralis region (for example axial image 30); this area measures approximately 5 5 x 1 1 x 4 0 cm in transverse, AP, and CC dimensions  There is body wall edema noted with some shotty left axillary lymph nodes  In  addition there is posterior skin thickening with some irregularity of the soft tissues of the posterior and superior left thorax (axial image 12)  Subjacent to this there is an apparent fluid collection measuring approximately 4 7 x 1 8 x 4 6 cm in size  ABDOMEN LIVER/BILIARY TREE:  Unremarkable  GALLBLADDER:  No calcified gallstones  No pericholecystic inflammatory change  SPLEEN:  Unremarkable  PANCREAS:  Mild atrophy    Otherwise grossly unremarkable ADRENAL GLANDS:  Grossly unremarkable KIDNEYS/URETERS:  Partially exophytic cyst in the posterior midportion of the right kidney measures approximately 1 5 cm in size  Exophytic cyst in the lower pole of the left kidney measures approximately 3 7 cm in size  Bilateral kidneys otherwise appear grossly unremarkable; no hydronephrosis  STOMACH AND BOWEL:  Grossly unremarkable APPENDIX:  A normal appendix was visualized  ABDOMINOPELVIC CAVITY:  No ascites or free intraperitoneal air  No lymphadenopathy  VESSELS:  Mild atherosclerosis; no aortic aneurysm PELVIS REPRODUCTIVE ORGANS:  Unremarkable for patient's age  URINARY BLADDER:  Bladder is partially distended  A catheter is seen within the bladder lumen  Some air is seen within the nondependent portion of the bladder  Mild circumferential bladder wall thickening is noted, probably exaggerated by underdistention  ABDOMINAL WALL/INGUINAL REGIONS:  A right-sided femoral venous catheter is noted  Some subcutaneous emphysema is seen in this region as well, probably related to recent intervention/placement  Diffuse body wall edema is noted  OSSEOUS STRUCTURES:  Mild curvature of the lumbar spine, apex to the left  Moderate degenerative changes of the bilateral hips, worse on the right  No acute fracture or destructive osseous lesion is identified  Prominent degenerative changes of the right shoulder  Multilevel degenerative changes of the spine with vacuum disc phenomenon at L4/L5  There is intervertebral disc space narrowing and facet joint arthropathy at L2/L3, L3/L4, and L4/L5  Impression: Patchy airspace opacities are seen in the right lower lobe superior segment  There is a large dense consolidation in the right middle lobe, lateral segment, nonspecific but suspicious for infection/pneumonia in the appropriate clinical setting  Cardiomegaly, bilateral pleural effusions, and body wall edema suggests a superimposed component of fluid overload/CHF   Multiple areas of skin thickening with associated subjacent complex fluid are seen in the superficial soft tissues of the left pectoralis region (for example axial image 30), as well as in the posterior and superior left thorax (axial image 12), as described  Consider cellulitis with developing abscesses  In addition, a few bubbles of air are seen in the anterior left chest subcutaneous tissues (axial image 8) which could be related to recent intervention but also raises suspicion for possible gas-forming infection  Partially distended bladder with catheter seen within the bladder lumen  Mild circumferential bladder wall thickening noted, probably exaggerated by underdistention  A cystitis could be considered in the appropriate clinical setting  Renal cysts, degenerative changes of the shoulder, spine, and hips, and other findings as above  Findings discussed with FRITZ Alexis by Dr Sunshine Napoles at 4:18 AM on 10/26/2018  Workstation performed: [id number]     Xr Chest Portable    Result Date: 10/29/2018  Narrative: CHEST INDICATION:   pna  COMPARISON:  10/26/2018 EXAM PERFORMED/VIEWS:  XR CHEST PORTABLE FINDINGS:  Endotracheal and nasogastric tubes have been removed during the interim  Cardiomediastinal silhouette appears unremarkable  Right greater than left basilar pleural effusions with associated right basilar airspace opacity, likely atelectasis  Osseous structures appear within normal limits for patient age  Impression: Persistent right basilar pleural effusion and associated probable atelectasis  Workstation performed: [id number]     Xr Spine Thoracic 3 Views    Result Date: 10/25/2018  Narrative: THORACIC SPINE INDICATION:   Pain  COMPARISON:  Chest x-ray from 3/13/2013 VIEWS:  XR SPINE THORACIC 3 VW FINDINGS: There is no acute fracture or pathologic bone lesion  Minimal vertebral body height loss scattered in the thoracic spine similar to the prior exam probably degenerative  Thoracic vertebral alignment is within normal limits   Multilevel degenerative changes with flowing marginal osteophyte formation  There is no displacement of the paraspinal line  The pedicles appear intact  Impression: No acute osseous abnormality  Workstation performed: [id number]     Xr Spine Lumbar 2 Or 3 Views Injury    Result Date: 10/25/2018  Narrative: LUMBAR SPINE INDICATION:   pain  COMPARISON:  None VIEWS:  XR SPINE LUMBAR 2 OR 3 VIEWS INJURY FINDINGS: There is no evidence of acute fracture or destructive osseous lesion  Levoscoliosis is present about L3  Moderate degenerative changes present throughout the lumbar spine  The pedicles appear intact  Soft tissues are unremarkable  Impression: Levoscoliosis and moderate multilevel degenerative change  Workstation performed: [id number]     Xr Shoulder 2+ Views Left    Result Date: 10/25/2018  Narrative: LEFT SHOULDER INDICATION:   Pain  COMPARISON:  None VIEWS:  XR SHOULDER 2+ VW LEFT FINDINGS: There is no acute fracture or dislocation  Narrowing of the glenohumeral joint with mild spurring  No lytic or blastic lesions are seen  Soft tissues are unremarkable  Impression: No acute osseous abnormality  Workstation performed: [id number]     Ct Head Wo Contrast    Result Date: 10/26/2018  Narrative: CT BRAIN - WITHOUT CONTRAST INDICATION:   Confusion/delirium, altered LOC, unexplained Lethargy  COMPARISON:  CT head dated 10/25/2018 TECHNIQUE:  CT examination of the brain was performed  In addition to axial images, coronal 2D reformatted images were created and submitted for interpretation  Radiation dose length product (DLP) for this visit:  1090 97 mGy-cm   This examination, like all CT scans performed in the [facility], was performed utilizing techniques to minimize radiation dose exposure, including the use of iterative reconstruction and automated exposure control  IMAGE QUALITY:  Diagnostic   FINDINGS: PARENCHYMA: Decreased attenuation is noted in periventricular and subcortical white matter demonstrating an appearance that is statistically most likely to represent mild microangiopathic change  Gray-white differentiation appears maintained No CT signs of acute territorial infarction  No intracranial mass, mass effect or midline shift  No acute parenchymal hemorrhage  Mild generalized parenchymal atrophy redemonstrated  VENTRICLES AND EXTRA-AXIAL SPACES:  Ventricles and extra-axial CSF spaces are prominent commensurate with the degree of volume loss  No hydrocephalus  No acute extra-axial hemorrhage  VISUALIZED ORBITS AND PARANASAL SINUSES:  Unremarkable  CALVARIUM AND EXTRACRANIAL SOFT TISSUES:  The calvarium appears intact  Otherwise grossly unremarkable  Impression: No acute intracranial abnormality  Other nonacute findings as above  Workstation performed: [id number]     Ct Head Without Contrast    Result Date: 10/25/2018  Narrative: CT BRAIN - WITHOUT CONTRAST INDICATION:   fall  COMPARISON:  5/9/2018  TECHNIQUE:  CT examination of the brain was performed  In addition to axial images, coronal 2D reformatted images were created and submitted for interpretation  Radiation dose length product (DLP) for this visit:  1220 31 mGy-cm   This examination, like all CT scans performed in the [facility], was performed utilizing techniques to minimize radiation dose exposure, including the use of iterative reconstruction and automated exposure control  IMAGE QUALITY:  Diagnostic  FINDINGS: PARENCHYMA: Decreased attenuation is noted in periventricular and subcortical white matter demonstrating an appearance that is statistically most likely to represent moderate microangiopathic change  No CT signs of acute infarction  No intracranial mass, mass effect or midline shift  No acute parenchymal hemorrhage  VENTRICLES AND EXTRA-AXIAL SPACES:  Ventricles and extra-axial CSF spaces are prominent commensurate with the degree of volume loss  No hydrocephalus    No acute extra-axial hemorrhage  VISUALIZED ORBITS AND PARANASAL SINUSES:  Unremarkable  CALVARIUM AND EXTRACRANIAL SOFT TISSUES:  Normal      Impression: No acute intracranial abnormality  Workstation performed: [id number]     Ct Spine Cervical Without Contrast    Result Date: 10/25/2018  Narrative: CT CERVICAL SPINE - WITHOUT CONTRAST INDICATION:   fall  COMPARISON:  None  TECHNIQUE:  CT examination of the cervical spine was performed without intravenous contrast   Contiguous axial images were obtained  Sagittal and coronal reconstructions were performed  Radiation dose length product (DLP) for this visit:  415 18 mGy-cm   This examination, like all CT scans performed in the [facility], was performed utilizing techniques to minimize radiation dose exposure, including the use of iterative  reconstruction and automated exposure control  IMAGE QUALITY:  Diagnostic  FINDINGS: ALIGNMENT:  Normal alignment of the cervical spine  No subluxation  VERTEBRAL BODIES:  No fracture  DEGENERATIVE CHANGES:  Moderate multilevel cervical degenerative changes are noted  No critical central canal stenosis  PREVERTEBRAL AND PARASPINAL SOFT TISSUES:  Unremarkable  THORACIC INLET:  Normal      Impression: No cervical spine fracture or traumatic malalignment  Workstation performed: [id number]     Xr Chest 1 View    Result Date: 10/25/2018  Narrative: CHEST INDICATION:   Pain  Fall  COMPARISON:  Chest x-ray from 3/13/2013 EXAM PERFORMED/VIEWS:  XR CHEST 1 VIEW FINDINGS:  There has been interval sternotomy  Cardiac silhouette is moderately enlarged  Focal consolidation in the right lower lobe laterally  Left lung is clear  No pneumothorax or pleural effusion  Osseous structures appear within normal limits for patient age  Impression: Focal consolidation in the right lower lobe  This may represent pneumonia  If there is any trauma to the right chest, pulmonary contusion is a consideration    Recommend follow up to resolution  The study was marked in EPIC for significant notification  Workstation performed: [id number]     Xr Chest Portable Icu    Result Date: 11/1/2018  Narrative: CHEST INDICATION:   PNA  COMPARISON:  10/29/2018 EXAM PERFORMED/VIEWS:  XR CHEST PORTABLE ICU 1 image FINDINGS: Cardiomediastinal silhouette appears enlarged  A median sternotomy has been performed  No evidence of heart failure  A PICC line enters on the right, the tip extends to the SVC  Bibasilar densities may represent infiltrate, atelectasis or fluid  Osseous structures appear within normal limits for patient age  Impression: Cardiomegaly  Bibasilar parenchymal densities unchanged  Workstation performed: [id number]     Xr Chest Portable Icu    Result Date: 10/29/2018  Narrative: CHEST INDICATION:   Evaluate for aspiration pneumonia    COMPARISON:  10/29/2018 EXAM PERFORMED/VIEWS:  XR CHEST PORTABLE ICU FINDINGS:  There are median sternotomy wires indicating prior cardiac surgery  Heart shadow is enlarged but unchanged from prior exam  Bilateral pleural effusions appear similar size to the previous examination  In conjunction with prior chest CT 10/26/2018, right base airspace disease is again identified  Questionable left retrocardiac airspace disease as well, similar to prior  No pneumothorax  No pulmonary edema  Osseous structures appear within normal limits for patient age  Impression: No significant interval change since 10/29/2018 Workstation performed: [id number]     Xr Chest Portable Icu    Result Date: 10/26/2018  Narrative: CHEST INDICATION:   Endotracheal tube positioning  COMPARISON:  10/25/2018 EXAM PERFORMED/VIEWS:  XR CHEST PORTABLE ICU Images: 2 FINDINGS: Cardiomediastinal silhouette appears unremarkable  A median sternotomy has been performed  Endotracheal tube ends approximately 3 3 cm above the yohana  A nasogastric tube is coiled in the stomach    Pulmonary vessels are normal  Consolidation at the right lung base   Minimal atelectasis at the left lung base  No evidence of heart failure  Osseous structures appear within normal limits for patient age  Impression: Endotracheal tube ending 3 3 cm above the yohana  Consolidation at the right lung base  Workstation performed: [id number]     Ir Picc Line    Result Date: 10/31/2018  Narrative: Examination: Right upper extremity double lumen Power PICC 10/31/2018 History: Needs access Contrast: None Fluoroscopy time:  0 9 minutes Procedure: The patient was identified verbally and by wristband  Timeout was performed  Informed consent was obtained  All elements of maximal sterile barrier technique, cap and mask and sterile gown and sterile gloves and sterile full-body drape and hand hygiene and 2% chlorhexidine for cutaneous antisepsis  Sterile ultrasound technique with sterile gel and sterile probe covers was also utilized  Following obtaining informed consent, the patient was prepped and draped in the usual sterile fashion  Using ultrasound guidance, access was gained to the patient's right antecubital vein using a micropuncture system  The micropuncture dilator was exchanged for a peel away sheath  A mandril wire was advanced to the level of the RA/SVC junction using fluoroscopic guidance to measure the catheter length  The catheter was cut to size and placed through the peel away sheath  The lumens were flushed with ease  The catheter was secured in place  The patient tolerated the procedure well without apparent immediate complication  The patient left the IR department in unchanged condition  Dr Leslie Kern performed the procedure Findings: Using ultrasound guidance, the right antecubital vein was cannulated using Seldinger technique  The right antecubital vein was evaluated as a potential access site  The right antecubital vein was patent, and free of thrombus  Static images of the vessel  was obtained   Visualization of real time needle entry into the vessel was obtained  A fluoroscopic spot image demonstrated a newly placed PICC with the central aspect at the RA/SVC junction  The catheter tubing is smooth in contour  Impression: Impression: Successful placement of a 49cm right upper extremity double lumen Power PICC  Workstation performed: [id number]     Imaging Reports Reviewed by myself    Cultures:   Blood Culture:   Lab Results   Component Value Date    BLOODCX No Growth After 5 Days  10/29/2018    BLOODCX No Growth After 5 Days  10/29/2018    BLOODCX Staphylococcus aureus (A) 10/25/2018    BLOODCX No Growth After 5 Days  10/25/2018     Urine Culture:   Lab Results   Component Value Date    URINECX >100,000 cfu/ml  10/25/2018    URINECX >100,000 cfu/ml Klebsiella oxytoca (A) 2018     Sputum Culture: No components found for: SPUTUMCX  Wound Culture:   Lab Results   Component Value Date    WOUNDCULT 4+ Growth of Staphylococcus aureus (A) 10/26/2018    WOUNDCULT 4+ Growth of Staphylococcus aureus (A) 10/25/2018       Last 24 Hours Medication List:     Current Facility-Administered Medications:  acetaminophen 650 mg Oral Q6H [facility] Rhea John PA-C    atorvastatin 10 mg Oral Daily Justine Carrasco MD    cefazolin 2,000 mg Intravenous Q8H Justine Carrasco MD Last Rate: 2,000 mg (18 0532)   collagenase  Topical Daily Andria Rico MD    insulin lispro 2-12 Units Subcutaneous 4x Daily (AC & HS) Rhea John PA-C    melatonin 6 mg Oral HS Justine Carrasco MD    metoprolol tartrate 12 5 mg Oral Q12H [facility] Belen Tee PA-C    pantoprazole 40 mg Intravenous Q12H Tai Nam PA-C    pregabalin 50 mg Oral TID Justine Carrasco MD    senna 1 tablet Oral HS Justine Carrasco MD    tamsulosin 0 4 mg Oral Daily With Dinner An Titus PA-C         Today, Patient Was Seen By: Vanna Shah MD    ** Please Note: "This note has been constructed using a voice recognition system  Therefore there may be syntax, spelling, and/or grammatical errors   Please call if you have any questions  "**

## 2018-11-09 NOTE — PHYSICAL THERAPY NOTE
PT TREATMENT     11/09/18 1440   Pain Assessment   Pain Assessment No/denies pain   Restrictions/Precautions   Other Precautions Fall Risk; Chair Alarm   General   Chart Reviewed Yes   Cognition   Arousal/Participation Cooperative   Subjective   Subjective agreeable to activity   Transfers   Sit to Stand 3  Moderate assistance   Additional items Verbal cues   Stand to Sit 4  Minimal assistance   Ambulation/Elevation   Gait Assistance 4  Minimal assist   Additional items (chair follow)   Assistive Device Rolling walker   Distance 2x10 feet   Balance   Static Sitting Good   Dynamic Sitting Fair   Static Standing Fair   Dynamic Standing Fair   Activity Tolerance   Activity Tolerance Patient tolerated treatment well;Patient limited by fatigue   Exercises   Hip Flexion 10 reps; Sitting;Bilateral;AROM   Knee AROM Long Arc Quad AROM; Bilateral;10 reps; Sitting   Ankle Pumps 20 reps; Sitting;Bilateral;AROM   Marching 10 reps;Standing;Bilateral;AROM   Assessment   Prognosis Good   Problem List Decreased strength;Decreased endurance; Impaired balance;Decreased mobility   Assessment Pt demonstrates improving strength and functional mobility  Pt appears to be capable of more activity however pt declined  Plan to encourage pt to ambulate a further distance next session  Goals   Treatment Day 3   Plan   Treatment/Interventions Functional transfer training;ADL retraining;LE strengthening/ROM; Therapeutic exercise; Endurance training;Gait training;Bed mobility; Equipment eval/education;Patient/family training   Progress Progressing toward goals   PT Frequency 5x/wk   Recommendation   Recommendation [facility])   Licensure   [place] License Number  Rene [id number] [id number]

## 2018-11-09 NOTE — PROGRESS NOTES
Progress Note - Cardiology   Yrn Stephens [de-identified] y o  male MRN: [id number]  Unit/Bed#: [address] [place] [address] Encounter: [id number]    Assessment/Plan:  Chronic combined systolic diastolic heart failure EF 20%- repeat echo 2D without significant change in LV systolic function- remains less than 30%  Recommend life vest prior to discharge  Holding diuresis secondary to low blood pressure/resolving infection  Recommend diuresis + afterload medication prior to discharge  Septic Shock-POA  Upper GI bleed bleed found with large duodenal ulcer status post endoscopic therapy-currently on PPI drip  History of a bypass grafting in 2017 followed by [facility]  Dyslipidemia on statin- atorvastatin 10mg  Type 2 diabetes mellitus    Subjective/Objective   Some conversational dyspnea  No chest pain    Objective:     Vitals: BP 96/55 (BP Location: Left arm)   Pulse 94   Temp 97 5 °F (36 4 °C) (Tympanic)   Resp 18   Ht 5' 8 5" (1 74 m)   Wt 72 5 kg (159 lb 13 3 oz)   SpO2 98%   BMI 23 95 kg/m²   Vitals:    11/08/18 0600 11/09/18 0600   Weight: 72 7 kg (160 lb 4 4 oz) 72 5 kg (159 lb 13 3 oz)     Orthostatic Blood Pressures      Most Recent Value   Blood Pressure  96/55 filed at 11/09/2018 1210   Patient Position - Orthostatic VS  Sitting filed at 11/09/2018 1210            Intake/Output Summary (Last 24 hours) at 11/09/18 1828  Last data filed at 11/09/18 1601   Gross per 24 hour   Intake               50 ml   Output             1350 ml   Net            -1300 ml       Invasive Devices     Peripherally Inserted Central Catheter Line            PICC Line 58/76/84 Right Basilic 9 days          Drain            Urethral Catheter 16 Fr  3 days                Review of Systems: reviewed    Physical Exam   Constitutional: He is oriented to person, place, and time  No distress  HENT:   Head: Normocephalic and atraumatic     Right Ear: External ear normal    Left Ear: External ear normal    Eyes: Pupils are equal, round, and reactive to light  Conjunctivae are normal  Right eye exhibits no discharge  Left eye exhibits no discharge  No scleral icterus  Neck: Normal range of motion  Neck supple  JVD present  No tracheal deviation present  No thyromegaly present  Cardiovascular: Normal rate and regular rhythm  Exam reveals gallop  Exam reveals no friction rub  Murmur heard  Pulmonary/Chest: Effort normal and breath sounds normal  No stridor  No respiratory distress  He has no wheezes  He has no rales  He exhibits no tenderness  Abdominal: Soft  Bowel sounds are normal  He exhibits distension  He exhibits no mass  There is no tenderness  There is no rebound and no guarding  Musculoskeletal: Normal range of motion  He exhibits edema  He exhibits no tenderness or deformity  Neurological: He is alert and oriented to person, place, and time  He has normal reflexes  No cranial nerve deficit  He exhibits normal muscle tone  Coordination normal    Skin: Skin is warm and dry  No rash noted  He is not diaphoretic  No erythema  No pallor  Psychiatric: He has a normal mood and affect  His behavior is normal  Judgment and thought content normal    Nursing note and vitals reviewed  Lab Results: I have personally reviewed pertinent lab results  Imaging: I have personally reviewed pertinent reports  EKG: reviewed  VTE Pharmacologic Prophylaxis: Sequential compression device (Venodyne)   VTE Mechanical Prophylaxis: sequential compression device    Counseling / Coordination of Care  Total time spent today 40 minutes  Greater than 50% of total time was spent with the patient and / or family counseling and / or coordination of care   A description of the counseling / coordination of care: heart failure

## 2018-11-10 LAB
ANION GAP SERPL CALCULATED.3IONS-SCNC: 8 MMOL/L (ref 4–13)
BASOPHILS # BLD AUTO: 0.02 THOUSANDS/ΜL (ref 0–0.1)
BASOPHILS NFR BLD AUTO: 0 % (ref 0–1)
BUN SERPL-MCNC: 14 MG/DL (ref 5–25)
CALCIUM SERPL-MCNC: 7 MG/DL (ref 8.3–10.1)
CHLORIDE SERPL-SCNC: 102 MMOL/L (ref 100–108)
CO2 SERPL-SCNC: 25 MMOL/L (ref 21–32)
CREAT SERPL-MCNC: 0.82 MG/DL (ref 0.6–1.3)
EOSINOPHIL # BLD AUTO: 0.06 THOUSAND/ΜL (ref 0–0.61)
EOSINOPHIL NFR BLD AUTO: 1 % (ref 0–6)
ERYTHROCYTE [DISTWIDTH] IN BLOOD BY AUTOMATED COUNT: 18 % (ref 11.6–15.1)
GFR SERPL CREATININE-BSD FRML MDRD: 84 ML/MIN/1.73SQ M
GLUCOSE SERPL-MCNC: 105 MG/DL (ref 65–140)
GLUCOSE SERPL-MCNC: 110 MG/DL (ref 65–140)
GLUCOSE SERPL-MCNC: 153 MG/DL (ref 65–140)
GLUCOSE SERPL-MCNC: 171 MG/DL (ref 65–140)
GLUCOSE SERPL-MCNC: 182 MG/DL (ref 65–140)
HCT VFR BLD AUTO: 29.3 % (ref 36.5–49.3)
HGB BLD-MCNC: 9.3 G/DL (ref 12–17)
IMM GRANULOCYTES # BLD AUTO: 0.05 THOUSAND/UL (ref 0–0.2)
IMM GRANULOCYTES NFR BLD AUTO: 1 % (ref 0–2)
LYMPHOCYTES # BLD AUTO: 0.79 THOUSANDS/ΜL (ref 0.6–4.47)
LYMPHOCYTES NFR BLD AUTO: 10 % (ref 14–44)
MAGNESIUM SERPL-MCNC: 1.8 MG/DL (ref 1.6–2.6)
MCH RBC QN AUTO: 31.1 PG (ref 26.8–34.3)
MCHC RBC AUTO-ENTMCNC: 31.7 G/DL (ref 31.4–37.4)
MCV RBC AUTO: 98 FL (ref 82–98)
MONOCYTES # BLD AUTO: 0.44 THOUSAND/ΜL (ref 0.17–1.22)
MONOCYTES NFR BLD AUTO: 6 % (ref 4–12)
NEUTROPHILS # BLD AUTO: 6.42 THOUSANDS/ΜL (ref 1.85–7.62)
NEUTS SEG NFR BLD AUTO: 82 % (ref 43–75)
NRBC BLD AUTO-RTO: 0 /100 WBCS
PLATELET # BLD AUTO: 157 THOUSANDS/UL (ref 149–390)
PMV BLD AUTO: 10.8 FL (ref 8.9–12.7)
POTASSIUM SERPL-SCNC: 3.4 MMOL/L (ref 3.5–5.3)
RBC # BLD AUTO: 2.99 MILLION/UL (ref 3.88–5.62)
SODIUM SERPL-SCNC: 135 MMOL/L (ref 136–145)
WBC # BLD AUTO: 7.78 THOUSAND/UL (ref 4.31–10.16)

## 2018-11-10 PROCEDURE — 85025 COMPLETE CBC W/AUTO DIFF WBC: CPT | Performed by: INTERNAL MEDICINE

## 2018-11-10 PROCEDURE — 99232 SBSQ HOSP IP/OBS MODERATE 35: CPT | Performed by: INTERNAL MEDICINE

## 2018-11-10 PROCEDURE — 82948 REAGENT STRIP/BLOOD GLUCOSE: CPT

## 2018-11-10 PROCEDURE — 97110 THERAPEUTIC EXERCISES: CPT

## 2018-11-10 PROCEDURE — 97530 THERAPEUTIC ACTIVITIES: CPT

## 2018-11-10 PROCEDURE — 80048 BASIC METABOLIC PNL TOTAL CA: CPT | Performed by: INTERNAL MEDICINE

## 2018-11-10 PROCEDURE — C9113 INJ PANTOPRAZOLE SODIUM, VIA: HCPCS | Performed by: PHYSICIAN ASSISTANT

## 2018-11-10 PROCEDURE — 83735 ASSAY OF MAGNESIUM: CPT | Performed by: INTERNAL MEDICINE

## 2018-11-10 RX ORDER — FUROSEMIDE 10 MG/ML
20 INJECTION INTRAMUSCULAR; INTRAVENOUS DAILY
Status: DISCONTINUED | OUTPATIENT
Start: 2018-11-11 | End: 2018-11-11

## 2018-11-10 RX ORDER — LISINOPRIL 2.5 MG/1
2.5 TABLET ORAL DAILY
Status: DISCONTINUED | OUTPATIENT
Start: 2018-11-10 | End: 2018-11-18 | Stop reason: HOSPADM

## 2018-11-10 RX ORDER — LISINOPRIL 2.5 MG/1
2.5 TABLET ORAL DAILY
Status: DISCONTINUED | OUTPATIENT
Start: 2018-11-10 | End: 2018-11-10

## 2018-11-10 RX ADMIN — CEFAZOLIN SODIUM 2000 MG: 2 SOLUTION INTRAVENOUS at 06:23

## 2018-11-10 RX ADMIN — CEFAZOLIN SODIUM 2000 MG: 2 SOLUTION INTRAVENOUS at 22:02

## 2018-11-10 RX ADMIN — PREGABALIN 50 MG: 50 CAPSULE ORAL at 08:59

## 2018-11-10 RX ADMIN — SENNOSIDES 8.6 MG: 8.6 TABLET, FILM COATED ORAL at 22:04

## 2018-11-10 RX ADMIN — FUROSEMIDE 20 MG: 10 INJECTION, SOLUTION INTRAMUSCULAR; INTRAVENOUS at 12:02

## 2018-11-10 RX ADMIN — INSULIN LISPRO 2 UNITS: 100 INJECTION, SOLUTION INTRAVENOUS; SUBCUTANEOUS at 22:12

## 2018-11-10 RX ADMIN — INSULIN LISPRO 2 UNITS: 100 INJECTION, SOLUTION INTRAVENOUS; SUBCUTANEOUS at 17:27

## 2018-11-10 RX ADMIN — ATORVASTATIN CALCIUM 10 MG: 10 TABLET, FILM COATED ORAL at 17:26

## 2018-11-10 RX ADMIN — METOPROLOL TARTRATE 12.5 MG: 25 TABLET ORAL at 08:59

## 2018-11-10 RX ADMIN — PREGABALIN 50 MG: 50 CAPSULE ORAL at 17:28

## 2018-11-10 RX ADMIN — POTASSIUM CHLORIDE 20 MEQ: 1500 TABLET, EXTENDED RELEASE ORAL at 17:30

## 2018-11-10 RX ADMIN — INSULIN LISPRO 2 UNITS: 100 INJECTION, SOLUTION INTRAVENOUS; SUBCUTANEOUS at 12:01

## 2018-11-10 RX ADMIN — PANTOPRAZOLE SODIUM 40 MG: 40 INJECTION, POWDER, FOR SOLUTION INTRAVENOUS at 22:04

## 2018-11-10 RX ADMIN — POTASSIUM CHLORIDE 20 MEQ: 1500 TABLET, EXTENDED RELEASE ORAL at 08:59

## 2018-11-10 RX ADMIN — INSULIN DETEMIR 10 UNITS: 100 INJECTION, SOLUTION SUBCUTANEOUS at 23:09

## 2018-11-10 RX ADMIN — ACETAMINOPHEN 650 MG: 325 TABLET, FILM COATED ORAL at 17:26

## 2018-11-10 RX ADMIN — CEFAZOLIN SODIUM 2000 MG: 2 SOLUTION INTRAVENOUS at 14:05

## 2018-11-10 RX ADMIN — PREGABALIN 50 MG: 50 CAPSULE ORAL at 22:04

## 2018-11-10 RX ADMIN — MELATONIN TAB 3 MG 6 MG: 3 TAB at 22:03

## 2018-11-10 RX ADMIN — COLLAGENASE SANTYL: 250 OINTMENT TOPICAL at 09:00

## 2018-11-10 RX ADMIN — TAMSULOSIN HYDROCHLORIDE 0.4 MG: 0.4 CAPSULE ORAL at 17:28

## 2018-11-10 RX ADMIN — ACETAMINOPHEN 650 MG: 325 TABLET, FILM COATED ORAL at 06:23

## 2018-11-10 RX ADMIN — ACETAMINOPHEN 650 MG: 325 TABLET, FILM COATED ORAL at 12:02

## 2018-11-10 RX ADMIN — PANTOPRAZOLE SODIUM 40 MG: 40 INJECTION, POWDER, FOR SOLUTION INTRAVENOUS at 08:59

## 2018-11-10 NOTE — PLAN OF CARE
METABOLIC, FLUID AND ELECTROLYTES - ADULT     Fluid balance maintained Completed          CARDIOVASCULAR - ADULT     Absence of cardiac dysrhythmias or at baseline rhythm Progressing        DISCHARGE PLANNING     Discharge to home or other facility with appropriate resources Progressing        DISCHARGE PLANNING - CARE MANAGEMENT     Discharge to post-acute care or home with appropriate resources Progressing        GASTROINTESTINAL - ADULT     Maintains or returns to baseline bowel function Progressing     Maintains adequate nutritional intake Progressing        GENITOURINARY - ADULT     Maintains or returns to baseline urinary function Progressing     Urinary catheter remains patent Progressing        HEMATOLOGIC - ADULT     Maintains hematologic stability Progressing        INFECTION - ADULT     Absence or prevention of progression during hospitalization Progressing        Knowledge Deficit     Patient/family/caregiver demonstrates understanding of disease process, treatment plan, medications, and discharge instructions Progressing        METABOLIC, FLUID AND ELECTROLYTES - ADULT     Electrolytes maintained within normal limits Progressing     Glucose maintained within target range Progressing        Nutrition/Hydration-ADULT     Nutrient/Hydration intake appropriate for improving, restoring or maintaining nutritional needs Progressing        PAIN - ADULT     Verbalizes/displays adequate comfort level or baseline comfort level Progressing        Potential for Falls     Patient will remain free of falls Progressing        Prexisting or High Potential for Compromised Skin Integrity     Skin integrity is maintained or improved Progressing        SAFETY ADULT     Maintain or return to baseline ADL function Progressing     Maintain or return mobility status to optimal level Progressing        SKIN/TISSUE INTEGRITY - ADULT     Incision(s), wounds(s) or drain site(s) healing without S/S of infection Progressing

## 2018-11-10 NOTE — OCCUPATIONAL THERAPY NOTE
OT TREATMENT     11/10/18 1057   Pain Assessment   Pain Assessment No/denies pain   Pain Score No Pain   Pain Location Chest   Pain Orientation Left   Diversional Activities Television   ADL   Where Assessed Edge of bed   UB Dressing Assistance 4  Minimal Assistance   LB Dressing Assistance 2  Maximal Assistance   Functional Standing Tolerance   Time 2 min   Activity static stance   Bed Mobility   Rolling L 3  Moderate assistance   Supine to Sit 2  Maximal assistance   Sit to Supine 3  Moderate assistance   Transfers   Sit to Stand 3  Moderate assistance   Stand to Sit 3  Moderate assistance   Functional Mobility   Functional Mobility 4  Minimal assistance   Additional Comments to room door and back   Additional items Rolling walker   ROM- Right Upper Extremities   R Shoulder AROM; Flexion;ABduction;Horizontal ABduction   R Elbow AROM;Elbow flexion;Elbow extension   R Wrist AROM; Wrist flexion;Wrist extension   R Hand AROM; Thumb; Long finger; Index finger;Ring finger;Little finger   R Position Seated   R Weight/Reps/Sets 5 reps 1 set with frequent rest breaks   ROM - Left Upper Extremities    L Shoulder AROM; Flexion;ABduction;Horizontal ABduction   L Elbow AROM;Elbow flexion;Elbow extension   L Wrist AROM; Wrist flexion;Wrist extension   L Hand AROM; Thumb; Index finger; Long finger;Ring finger;Little finger   L Position Seated   L Weight/Reps/Sets 5 reps 1 set frequent rest breaks   Cognition   Overall Cognitive Status WFL   Arousal/Participation Alert; Cooperative   Attention Within functional limits   Orientation Level Oriented X4   Following Commands Follows one step commands without difficulty   Activity Tolerance   Activity Tolerance Patient limited by fatigue   Assessment   Assessment Pt seen at bedside for skilled treatment  pt willing and cooperative to participate  pt completes bed mobility, transfers, functional mobility and ADLS with assist  pt completes B UE ROM/Therex with frequent rest breaks   pt with limited activity tolerance  pt requires cues for safety with transfers and functional mobility  pt will continue to benefit from skilled tx, will follow, recommend D/C to STR  Thank you  Plan   Treatment Interventions ADL retraining;Functional transfer training;UE strengthening/ROM; Endurance training;Patient/family training;Equipment evaluation/education; Activityengagement; Energy conservation; Compensatory technique education   Goal Expiration Date 11/15/18   Treatment Day 3   OT Frequency 5x/wk   Recommendation   OT Discharge Recommendation Short Term Rehab   Licensure   [place] License Number  [id number] [place] [id number] # [id number]

## 2018-11-10 NOTE — PROGRESS NOTES
Will re-examine patient on 11/14/2018 for possible suture removal  Given his poor healing potential, I am debating leaving them in a bit longer than 14 days, but will make the decision at the bedside depending on the wound appearance  Please call with any interim queries

## 2018-11-11 LAB
ALBUMIN SERPL BCP-MCNC: 1.5 G/DL (ref 3.5–5)
ANION GAP SERPL CALCULATED.3IONS-SCNC: 6 MMOL/L (ref 4–13)
BASOPHILS # BLD AUTO: 0.03 THOUSANDS/ΜL (ref 0–0.1)
BASOPHILS NFR BLD AUTO: 1 % (ref 0–1)
BUN SERPL-MCNC: 13 MG/DL (ref 5–25)
CALCIUM SERPL-MCNC: 7.4 MG/DL (ref 8.3–10.1)
CHLORIDE SERPL-SCNC: 103 MMOL/L (ref 100–108)
CO2 SERPL-SCNC: 27 MMOL/L (ref 21–32)
CREAT SERPL-MCNC: 0.86 MG/DL (ref 0.6–1.3)
EOSINOPHIL # BLD AUTO: 0.04 THOUSAND/ΜL (ref 0–0.61)
EOSINOPHIL NFR BLD AUTO: 1 % (ref 0–6)
ERYTHROCYTE [DISTWIDTH] IN BLOOD BY AUTOMATED COUNT: 18.3 % (ref 11.6–15.1)
GFR SERPL CREATININE-BSD FRML MDRD: 82 ML/MIN/1.73SQ M
GLUCOSE SERPL-MCNC: 127 MG/DL (ref 65–140)
GLUCOSE SERPL-MCNC: 128 MG/DL (ref 65–140)
GLUCOSE SERPL-MCNC: 174 MG/DL (ref 65–140)
GLUCOSE SERPL-MCNC: 53 MG/DL (ref 65–140)
GLUCOSE SERPL-MCNC: 56 MG/DL (ref 65–140)
GLUCOSE SERPL-MCNC: 64 MG/DL (ref 65–140)
GLUCOSE SERPL-MCNC: 67 MG/DL (ref 65–140)
GLUCOSE SERPL-MCNC: 80 MG/DL (ref 65–140)
HCT VFR BLD AUTO: 29.7 % (ref 36.5–49.3)
HGB BLD-MCNC: 9.5 G/DL (ref 12–17)
IMM GRANULOCYTES # BLD AUTO: 0.03 THOUSAND/UL (ref 0–0.2)
IMM GRANULOCYTES NFR BLD AUTO: 1 % (ref 0–2)
LYMPHOCYTES # BLD AUTO: 1.08 THOUSANDS/ΜL (ref 0.6–4.47)
LYMPHOCYTES NFR BLD AUTO: 17 % (ref 14–44)
MAGNESIUM SERPL-MCNC: 1.8 MG/DL (ref 1.6–2.6)
MCH RBC QN AUTO: 31.6 PG (ref 26.8–34.3)
MCHC RBC AUTO-ENTMCNC: 32 G/DL (ref 31.4–37.4)
MCV RBC AUTO: 99 FL (ref 82–98)
MONOCYTES # BLD AUTO: 0.52 THOUSAND/ΜL (ref 0.17–1.22)
MONOCYTES NFR BLD AUTO: 8 % (ref 4–12)
NEUTROPHILS # BLD AUTO: 4.75 THOUSANDS/ΜL (ref 1.85–7.62)
NEUTS SEG NFR BLD AUTO: 72 % (ref 43–75)
NRBC BLD AUTO-RTO: 0 /100 WBCS
PLATELET # BLD AUTO: 172 THOUSANDS/UL (ref 149–390)
PMV BLD AUTO: 10.9 FL (ref 8.9–12.7)
POTASSIUM SERPL-SCNC: 3.5 MMOL/L (ref 3.5–5.3)
RBC # BLD AUTO: 3.01 MILLION/UL (ref 3.88–5.62)
SODIUM SERPL-SCNC: 136 MMOL/L (ref 136–145)
WBC # BLD AUTO: 6.45 THOUSAND/UL (ref 4.31–10.16)

## 2018-11-11 PROCEDURE — C9113 INJ PANTOPRAZOLE SODIUM, VIA: HCPCS | Performed by: PHYSICIAN ASSISTANT

## 2018-11-11 PROCEDURE — 99232 SBSQ HOSP IP/OBS MODERATE 35: CPT | Performed by: INTERNAL MEDICINE

## 2018-11-11 PROCEDURE — 82040 ASSAY OF SERUM ALBUMIN: CPT | Performed by: INTERNAL MEDICINE

## 2018-11-11 PROCEDURE — 82948 REAGENT STRIP/BLOOD GLUCOSE: CPT

## 2018-11-11 PROCEDURE — 85025 COMPLETE CBC W/AUTO DIFF WBC: CPT | Performed by: INTERNAL MEDICINE

## 2018-11-11 PROCEDURE — 80048 BASIC METABOLIC PNL TOTAL CA: CPT | Performed by: INTERNAL MEDICINE

## 2018-11-11 PROCEDURE — 83735 ASSAY OF MAGNESIUM: CPT | Performed by: INTERNAL MEDICINE

## 2018-11-11 RX ORDER — FUROSEMIDE 10 MG/ML
20 INJECTION INTRAMUSCULAR; INTRAVENOUS DAILY
Status: DISCONTINUED | OUTPATIENT
Start: 2018-11-11 | End: 2018-11-16

## 2018-11-11 RX ORDER — TRAMADOL HYDROCHLORIDE 50 MG/1
50 TABLET ORAL EVERY 6 HOURS PRN
Status: DISCONTINUED | OUTPATIENT
Start: 2018-11-11 | End: 2018-11-18 | Stop reason: HOSPADM

## 2018-11-11 RX ORDER — DEXTROSE MONOHYDRATE 25 G/50ML
INJECTION, SOLUTION INTRAVENOUS
Status: DISPENSED
Start: 2018-11-11 | End: 2018-11-11

## 2018-11-11 RX ORDER — MAGNESIUM SULFATE HEPTAHYDRATE 40 MG/ML
2 INJECTION, SOLUTION INTRAVENOUS ONCE
Status: COMPLETED | OUTPATIENT
Start: 2018-11-11 | End: 2018-11-11

## 2018-11-11 RX ORDER — ALBUMIN (HUMAN) 12.5 G/50ML
12.5 SOLUTION INTRAVENOUS DAILY
Status: DISCONTINUED | OUTPATIENT
Start: 2018-11-11 | End: 2018-11-16

## 2018-11-11 RX ADMIN — CEFAZOLIN SODIUM 2000 MG: 2 SOLUTION INTRAVENOUS at 14:05

## 2018-11-11 RX ADMIN — MELATONIN TAB 3 MG 6 MG: 3 TAB at 21:24

## 2018-11-11 RX ADMIN — ACETAMINOPHEN 650 MG: 325 TABLET, FILM COATED ORAL at 12:22

## 2018-11-11 RX ADMIN — TRAMADOL HYDROCHLORIDE 50 MG: 50 TABLET, COATED ORAL at 14:14

## 2018-11-11 RX ADMIN — FUROSEMIDE 20 MG: 10 INJECTION, SOLUTION INTRAMUSCULAR; INTRAVENOUS at 15:24

## 2018-11-11 RX ADMIN — ATORVASTATIN CALCIUM 10 MG: 10 TABLET, FILM COATED ORAL at 18:11

## 2018-11-11 RX ADMIN — COLLAGENASE SANTYL: 250 OINTMENT TOPICAL at 09:12

## 2018-11-11 RX ADMIN — PANTOPRAZOLE SODIUM 40 MG: 40 INJECTION, POWDER, FOR SOLUTION INTRAVENOUS at 21:02

## 2018-11-11 RX ADMIN — TAMSULOSIN HYDROCHLORIDE 0.4 MG: 0.4 CAPSULE ORAL at 15:40

## 2018-11-11 RX ADMIN — CEFAZOLIN SODIUM 2000 MG: 2 SOLUTION INTRAVENOUS at 05:39

## 2018-11-11 RX ADMIN — ALBUMIN HUMAN 12.5 G: 0.25 SOLUTION INTRAVENOUS at 14:57

## 2018-11-11 RX ADMIN — PREGABALIN 50 MG: 50 CAPSULE ORAL at 09:12

## 2018-11-11 RX ADMIN — SENNOSIDES 8.6 MG: 8.6 TABLET, FILM COATED ORAL at 21:24

## 2018-11-11 RX ADMIN — PREGABALIN 50 MG: 50 CAPSULE ORAL at 15:40

## 2018-11-11 RX ADMIN — POTASSIUM CHLORIDE 20 MEQ: 1500 TABLET, EXTENDED RELEASE ORAL at 09:12

## 2018-11-11 RX ADMIN — MAGNESIUM SULFATE IN WATER 2 G: 40 INJECTION, SOLUTION INTRAVENOUS at 10:04

## 2018-11-11 RX ADMIN — PREGABALIN 50 MG: 50 CAPSULE ORAL at 21:17

## 2018-11-11 RX ADMIN — POTASSIUM CHLORIDE 20 MEQ: 1500 TABLET, EXTENDED RELEASE ORAL at 18:11

## 2018-11-11 RX ADMIN — ACETAMINOPHEN 650 MG: 325 TABLET, FILM COATED ORAL at 18:11

## 2018-11-11 RX ADMIN — TRAMADOL HYDROCHLORIDE 50 MG: 50 TABLET, COATED ORAL at 21:17

## 2018-11-11 RX ADMIN — ACETAMINOPHEN 650 MG: 325 TABLET, FILM COATED ORAL at 05:39

## 2018-11-11 RX ADMIN — INSULIN DETEMIR 8 UNITS: 100 INJECTION, SOLUTION SUBCUTANEOUS at 21:24

## 2018-11-11 RX ADMIN — CEFAZOLIN SODIUM 2000 MG: 2 SOLUTION INTRAVENOUS at 21:24

## 2018-11-11 RX ADMIN — PANTOPRAZOLE SODIUM 40 MG: 40 INJECTION, POWDER, FOR SOLUTION INTRAVENOUS at 09:12

## 2018-11-11 NOTE — PROGRESS NOTES
[facility] Internal Medicine Progress Note  Patient: Dante Mask [de-identified] y o  male   MRN: [id number]  PCP: Arnulfo Montes De Oca DO  Unit/Bed#: [address] [place] [address] Encounter: [id number]  Date Of Visit: 11/11/18    Problem List:    Principal Problem:    Abscess of chest wall  Active Problems:    Cardiomyopathy, ischemic    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HCC)    Acute blood loss anemia    Bacteremia due to Staphylococcus aureus    DM2 (diabetes mellitus, type 2) (HCC)    CAD (coronary artery disease)    CKD (chronic kidney disease) stage 3, GFR 30-59 ml/min (HCC)    Benign essential hypertension    Atrial fibrillation (HCC)    Urinary retention    Gastrointestinal hemorrhage associated with duodenal ulcer    Neuralgia, post-herpetic    Severe protein-calorie malnutrition (HCC)      Assessment & Plan:    * Abscess of chest wall   Assessment & Plan    · Aggressive  debridement in OR on 10/26 by Dr Gupta Or  · Antibiotics deescalated to Ancef as per ID recs, patient will require IV antibiotics until November 25th  Patient has PICC line for outpatient antibiotic therapy  · ID following  · Blood culture staph aureus x 1  · Wound culture: 3+ staph aureus   · Urine cultures > 100,000 mixed containments   · Repeat blood cultures from 10/29 no growth to date  · Wound VAC removed 11/3  Continue local wound care  Surgical follow up noted, likely suture removal on 11/14  · Continue on Lyrica 50 mg TID for possible post-hepatic neuralgia, scheduled tylenol, p r n  Tramadol     Bacteremia due to Staphylococcus aureus   Assessment & Plan    · Admission Blood culture x 1 MSSA  · Repeat blood cultures no growth to date  · Per ID will need 4 wks antibiotics from negative blood cultures through 11/25  · PICC line in place     Acute blood loss anemia   Assessment & Plan    · Secondary to bleeding gastric ulcer    · Patient continued with tachycardia and drifting hemoglobin and later developed guanakito melena  · EGD with duodenal bulb ulcer with intervention 11/4  · Pt has been Transfused 9 units of packed red blood cells total this admission  1 unit 10/28, 3 units 10/30, 1 unit 11/2  4 unit 11/4  · Hgb remains stable without any evidence of overt bleeding  · Continue IV PPI as per GI  · Continue trend hemoglobin, stable  · Discussed with GI, will advance diet     Cardiomyopathy, ischemic   Assessment & Plan    · EF prior was 35 to 40%  Recent stress test in May of 2018 showed reversible ischemic changes  Cardiology following as an outpatient with medical management  · ECHO repeat with EF 20% possibly related to sepsis  · Cardiac output and indexes while patient was intubated were acceptable  · Frequent PVCs on telemetry, Episode of nonsustained V-tach overnight 11/6, asymptomatic  · Cardiology following, consider AICD and life vest before discharge  · Tolerating metoprolol 12 5 mg BID PO  · Repeat echo with EF 20%   · Continue IV Lasix as hemoglobin is stabilizing and sepsis has resolved  · Will add albumin and Brock stocking due to low blood pressure  · Rechallenge with lisinopril  · Continue telemetry, monitor electrolytes  · Follow up with Cardiology  · Nutrition evaluation     Neuralgia, post-herpetic   Assessment & Plan    On Lyrica     Gastrointestinal hemorrhage associated with duodenal ulcer   Assessment & Plan    Status post EGD 11/4 revealed duodenal bleeding ulcer  Continue IV PPI for now  Advance to full liquid diet  Monitor H&H     Urinary retention   Assessment & Plan    Status post failing voiding trial  Likely secondary to BPH , on Flomax  Follow up Urology recommendation  Maintain Gordon     Atrial fibrillation [facility])   Assessment & Plan    · History of AFib in the setting of electrolyte abnormalities in the past   Monitor on telemetry  · Current NSR  · Continue to hold on anticoagulation at this time  Particularly in light of recent episode of GI bleeding    · Frequent PVCs on telemetry, cardiology consulted, beta-blocker resumed     Benign essential hypertension   Assessment & Plan    Stable  Asymptomatic  Continue metoprolol     CKD (chronic kidney disease) stage 3, GFR 30-59 ml/min ([facility])   Assessment & Plan    Stable, baseline creatinine 0 9-1 3  Monitor input output       CAD (coronary artery disease)   Assessment & Plan    Troponin negative  Continue statin, continue to hold aspirin secondary to GI bleed  Follow up with Cardiology     DM2 (diabetes mellitus, type 2) [facility])   Assessment & Plan    Lab Results   Component Value Date    HGBA1C 12 0 (H) 10/26/2018       Recent Labs      11/11/18   1137  11/11/18   1628  11/11/18   1750  11/11/18   2112   POCGLU  128  67  127  174*       Blood Sugar Average: Last 72 hrs:  (P) 131 6     Will decrease Levemir at 8 units daily given hypoglycemia in a m  Esophagitisresolved as of 11/4/2018   Assessment & Plan    · Resolved  · EGD on 10/31: duodenal bulb ulcer with possible candida esophagitis  · Biopsies negative for candida - d/c fluconizole 11/4     Severe protein-calorie malnutrition ([address] )   Assessment & Plan    Malnutrition Findings:           BMI Findings: Body mass index is 24 02 kg/m²          Acute respiratory failure secondary to septic shockresolved as of 10/28/2018   Assessment & Plan    Status post extubation 10/26       DKA (diabetic ketoacidoses) ([facility])resolved as of 10/26/2018   Assessment & Plan    Lab Results   Component Value Date    HGBA1C 12 0 (H) 10/26/2018       Recent Labs      11/07/18   2121  11/08/18   1058  11/08/18   1609  11/08/18   2205   POCGLU  121  142*  157*  103       Blood Sugar Average: Last 72 hrs:  (P) 105 75          Shinglesresolved as of 11/1/2018   Assessment & Plan    Status post treatment with Valtrex 10/31     Urinary tract infectionresolved as of 10/29/2018   Assessment & Plan    Suspected possible source on admission  Urine culture without specific pathogen       CHRIS (acute kidney injury) (HCC)resolved as of 10/27/2018 Assessment & Plan    Renal secondary to septic shock in DKA  Improved  Monitor     Hyponatremiaresolved as of 10/27/2018   Assessment & Plan    Noted to be 111 on presentation likely secondary to combination of hypovolemic hyponatremia secondary to dehydration, sepsis and DKA plus pseudo hyponatremia secondary to hyperglycemia           VTE Pharmacologic Prophylaxis:   Pharmacologic: Pharmacologic VTE Prophylaxis contraindicated due to GI bleed  Mechanical VTE Prophylaxis in Place: No    Patient Centered Rounds: I have performed bedside rounds with nursing staff today  Discussions with Specialists or Other Care Team Provider: Yes, Dr Ric Couch    Education and Discussions with Family / Patient:Yes    Time Spent for Care: 45 minutes  More than 50% of total time spent on counseling and coordination of care as described above  Current Length of Stay: 17 day(s)    Current Patient Status: Inpatient     Discharge Plan:  Anticipate rehab    Code Status: Level 2 - DNAR: but accepts endotracheal intubation    Certification Statement: The patient will continue to require additional inpatient hospital stay due to GI bleed, sepsis, cardiomyopathy      Subjective:   Reports pain at surgical site on left chest  Denies chest pain shortness of breath or dizziness  Noted to have hypoglycemia earlier today  Low blood pressure asymptomatic     Objective:   Not in distress    All other 10 review of systems negative and without drastic interval changes from yesterday  Vitals:   Temp (24hrs), Av 7 °F (36 5 °C), Min:97 5 °F (36 4 °C), Max:97 9 °F (36 6 °C)    Temp:  [97 5 °F (36 4 °C)-97 9 °F (36 6 °C)] 97 8 °F (36 6 °C)  HR:  [85-94] 92  Resp:  [16-18] 18  BP: ()/(51-57) 88/53  SpO2:  [94 %-100 %] 94 %  Body mass index is 23 85 kg/m²  Input and Output Summary (last 24 hours):        Intake/Output Summary (Last 24 hours) at 18 1026  Last data filed at 18 0935   Gross per 24 hour   Intake 325 ml   Output             3050 ml   Net            -2725 ml       Physical Exam:     Physical Exam   Constitutional: He appears well-developed  No distress  HENT:   Head: Normocephalic and atraumatic  Eyes: Pupils are equal, round, and reactive to light  Conjunctivae are normal    Neck: Normal range of motion  Neck supple  Cardiovascular: Normal rate and regular rhythm  Murmur heard  Pulmonary/Chest: Effort normal  No respiratory distress  He has decreased breath sounds  He has no wheezes  He has no rhonchi  He has no rales  He exhibits no tenderness  Abdominal: Soft  Bowel sounds are normal  He exhibits no distension  There is no tenderness  There is no rebound and no guarding  Genitourinary:   Genitourinary Comments: Gordon catheter in place   Musculoskeletal: He exhibits edema (Bilateral lower extremity)  Neurological: He is alert  No cranial nerve deficit  Skin: Skin is warm and dry  No rash noted  Left chest wall and back dressing in place  Incision C/D/I, stitches in place       Additional Data:     Labs:      Results from last 7 days  Lab Units 11/11/18  0546   WBC Thousand/uL 6 45   HEMOGLOBIN g/dL 9 5*   HEMATOCRIT % 29 7*   PLATELETS Thousands/uL 172   NEUTROS PCT % 72   LYMPHS PCT % 17   MONOS PCT % 8   EOS PCT % 1       Results from last 7 days  Lab Units 11/11/18  0546  11/08/18  0521   POTASSIUM mmol/L 3 5  < > 3 6   CHLORIDE mmol/L 103  < > 103   CO2 mmol/L 27  < > 24   BUN mg/dL 13  < > 18   CREATININE mg/dL 0 86  < > 0 84   CALCIUM mg/dL 7 4*  < > 6 8*   ALK PHOS U/L  --   --  67   ALT U/L  --   --  <6*   AST U/L  --   --  12   < > = values in this interval not displayed  * I Have Reviewed All Lab Data Listed Above  * Additional Pertinent Lab Tests Reviewed:  All Labs For Current Hospital Admission Reviewed    Imaging:  Ct Chest Abdomen Pelvis Wo Contrast    Result Date: 10/26/2018  Narrative: CT CHEST, ABDOMEN AND PELVIS WITHOUT IV CONTRAST INDICATION: Sepsis  COMPARISON:  CT cervical spine dated 10/25/2018, radiographs of the left shoulder dated 10/25/2018 TECHNIQUE: CT examination of the chest, abdomen and pelvis was performed without intravenous contrast   Axial, sagittal, and coronal 2D reformatted images were created from the source data and submitted for interpretation  Radiation dose length product (DLP) for this visit:  652 78 mGy-cm   This examination, like all CT scans performed in the [facility], was performed utilizing techniques to minimize radiation dose exposure, including the use of iterative  reconstruction and automated exposure control  Enteric contrast was not administered  FINDINGS: CHEST LUNGS:  Some patchy airspace opacities are seen in the right lower lobe superior segment  There is a large dense consolidation in the right middle lobe, lateral segment  Some mucous plugging is suspected in a left upper lobe bronchus  There is mild dependent and bibasilar atelectasis  The lungs otherwise appear grossly clear  PLEURA:  Small bilateral pleural effusions are noted dependently  HEART/GREAT VESSELS:  There is left ventricular enlargement  At least moderate coronary atherosclerosis is noted  Status post CABG with a LIMA graft  Mild aortic atherosclerosis  No aortic aneurysm  MEDIASTINUM AND ALIZE:  Unremarkable  CHEST WALL AND LOWER NECK: Median sternotomy wires are noted  A few bubbles of air are seen in the anterior left chest subcutaneous tissues (axial image 8)  Multiple areas of skin thickening with associated subjacent complex fluid are seen in the superficial soft tissues of the left pectoralis region (for example axial image 30); this area measures approximately 5 5 x 1 1 x 4 0 cm in transverse, AP, and CC dimensions  There is body wall edema noted with some shotty left axillary lymph nodes    In  addition there is posterior skin thickening with some irregularity of the soft tissues of the posterior and superior left thorax (axial image 12)  Subjacent to this there is an apparent fluid collection measuring approximately 4 7 x 1 8 x 4 6 cm in size  ABDOMEN LIVER/BILIARY TREE:  Unremarkable  GALLBLADDER:  No calcified gallstones  No pericholecystic inflammatory change  SPLEEN:  Unremarkable  PANCREAS:  Mild atrophy  Otherwise grossly unremarkable ADRENAL GLANDS:  Grossly unremarkable KIDNEYS/URETERS:  Partially exophytic cyst in the posterior midportion of the right kidney measures approximately 1 5 cm in size  Exophytic cyst in the lower pole of the left kidney measures approximately 3 7 cm in size  Bilateral kidneys otherwise appear grossly unremarkable; no hydronephrosis  STOMACH AND BOWEL:  Grossly unremarkable APPENDIX:  A normal appendix was visualized  ABDOMINOPELVIC CAVITY:  No ascites or free intraperitoneal air  No lymphadenopathy  VESSELS:  Mild atherosclerosis; no aortic aneurysm PELVIS REPRODUCTIVE ORGANS:  Unremarkable for patient's age  URINARY BLADDER:  Bladder is partially distended  A catheter is seen within the bladder lumen  Some air is seen within the nondependent portion of the bladder  Mild circumferential bladder wall thickening is noted, probably exaggerated by underdistention  ABDOMINAL WALL/INGUINAL REGIONS:  A right-sided femoral venous catheter is noted  Some subcutaneous emphysema is seen in this region as well, probably related to recent intervention/placement  Diffuse body wall edema is noted  OSSEOUS STRUCTURES:  Mild curvature of the lumbar spine, apex to the left  Moderate degenerative changes of the bilateral hips, worse on the right  No acute fracture or destructive osseous lesion is identified  Prominent degenerative changes of the right shoulder  Multilevel degenerative changes of the spine with vacuum disc phenomenon at L4/L5  There is intervertebral disc space narrowing and facet joint arthropathy at L2/L3, L3/L4, and L4/L5       Impression: Patchy airspace opacities are seen in the right lower lobe superior segment  There is a large dense consolidation in the right middle lobe, lateral segment, nonspecific but suspicious for infection/pneumonia in the appropriate clinical setting  Cardiomegaly, bilateral pleural effusions, and body wall edema suggests a superimposed component of fluid overload/CHF  Multiple areas of skin thickening with associated subjacent complex fluid are seen in the superficial soft tissues of the left pectoralis region (for example axial image 30), as well as in the posterior and superior left thorax (axial image 12), as described  Consider cellulitis with developing abscesses  In addition, a few bubbles of air are seen in the anterior left chest subcutaneous tissues (axial image 8) which could be related to recent intervention but also raises suspicion for possible gas-forming infection  Partially distended bladder with catheter seen within the bladder lumen  Mild circumferential bladder wall thickening noted, probably exaggerated by underdistention  A cystitis could be considered in the appropriate clinical setting  Renal cysts, degenerative changes of the shoulder, spine, and hips, and other findings as above  Findings discussed with FRITZ Alexis by Dr Sunshine Napoles at 4:18 AM on 10/26/2018  Workstation performed: [id number]     Xr Chest Portable    Result Date: 10/29/2018  Narrative: CHEST INDICATION:   pna  COMPARISON:  10/26/2018 EXAM PERFORMED/VIEWS:  XR CHEST PORTABLE FINDINGS:  Endotracheal and nasogastric tubes have been removed during the interim  Cardiomediastinal silhouette appears unremarkable  Right greater than left basilar pleural effusions with associated right basilar airspace opacity, likely atelectasis  Osseous structures appear within normal limits for patient age  Impression: Persistent right basilar pleural effusion and associated probable atelectasis   Workstation performed: [id number]     Xr Spine Thoracic 3 Views    Result Date: 10/25/2018  Narrative: THORACIC SPINE INDICATION:   Pain  COMPARISON:  Chest x-ray from 3/13/2013 VIEWS:  XR SPINE THORACIC 3 VW FINDINGS: There is no acute fracture or pathologic bone lesion  Minimal vertebral body height loss scattered in the thoracic spine similar to the prior exam probably degenerative  Thoracic vertebral alignment is within normal limits  Multilevel degenerative changes with flowing marginal osteophyte formation  There is no displacement of the paraspinal line  The pedicles appear intact  Impression: No acute osseous abnormality  Workstation performed: [id number]     Xr Spine Lumbar 2 Or 3 Views Injury    Result Date: 10/25/2018  Narrative: LUMBAR SPINE INDICATION:   pain  COMPARISON:  None VIEWS:  XR SPINE LUMBAR 2 OR 3 VIEWS INJURY FINDINGS: There is no evidence of acute fracture or destructive osseous lesion  Levoscoliosis is present about L3  Moderate degenerative changes present throughout the lumbar spine  The pedicles appear intact  Soft tissues are unremarkable  Impression: Levoscoliosis and moderate multilevel degenerative change  Workstation performed: [id number]     Xr Shoulder 2+ Views Left    Result Date: 10/25/2018  Narrative: LEFT SHOULDER INDICATION:   Pain  COMPARISON:  None VIEWS:  XR SHOULDER 2+ VW LEFT FINDINGS: There is no acute fracture or dislocation  Narrowing of the glenohumeral joint with mild spurring  No lytic or blastic lesions are seen  Soft tissues are unremarkable  Impression: No acute osseous abnormality  Workstation performed: [id number]     Ct Head Wo Contrast    Result Date: 10/26/2018  Narrative: CT BRAIN - WITHOUT CONTRAST INDICATION:   Confusion/delirium, altered LOC, unexplained Lethargy  COMPARISON:  CT head dated 10/25/2018 TECHNIQUE:  CT examination of the brain was performed  In addition to axial images, coronal 2D reformatted images were created and submitted for interpretation    Radiation dose length product (DLP) for this visit: 1090 97 mGy-cm   This examination, like all CT scans performed in the [facility], was performed utilizing techniques to minimize radiation dose exposure, including the use of iterative reconstruction and automated exposure control  IMAGE QUALITY:  Diagnostic  FINDINGS: PARENCHYMA: Decreased attenuation is noted in periventricular and subcortical white matter demonstrating an appearance that is statistically most likely to represent mild microangiopathic change  Gray-white differentiation appears maintained No CT signs of acute territorial infarction  No intracranial mass, mass effect or midline shift  No acute parenchymal hemorrhage  Mild generalized parenchymal atrophy redemonstrated  VENTRICLES AND EXTRA-AXIAL SPACES:  Ventricles and extra-axial CSF spaces are prominent commensurate with the degree of volume loss  No hydrocephalus  No acute extra-axial hemorrhage  VISUALIZED ORBITS AND PARANASAL SINUSES:  Unremarkable  CALVARIUM AND EXTRACRANIAL SOFT TISSUES:  The calvarium appears intact  Otherwise grossly unremarkable  Impression: No acute intracranial abnormality  Other nonacute findings as above  Workstation performed: [id number]     Ct Head Without Contrast    Result Date: 10/25/2018  Narrative: CT BRAIN - WITHOUT CONTRAST INDICATION:   fall  COMPARISON:  5/9/2018  TECHNIQUE:  CT examination of the brain was performed  In addition to axial images, coronal 2D reformatted images were created and submitted for interpretation  Radiation dose length product (DLP) for this visit:  1220 31 mGy-cm   This examination, like all CT scans performed in the [facility], was performed utilizing techniques to minimize radiation dose exposure, including the use of iterative reconstruction and automated exposure control  IMAGE QUALITY:  Diagnostic   FINDINGS: PARENCHYMA: Decreased attenuation is noted in periventricular and subcortical white matter demonstrating an appearance that is statistically most likely to represent moderate microangiopathic change  No CT signs of acute infarction  No intracranial mass, mass effect or midline shift  No acute parenchymal hemorrhage  VENTRICLES AND EXTRA-AXIAL SPACES:  Ventricles and extra-axial CSF spaces are prominent commensurate with the degree of volume loss  No hydrocephalus  No acute extra-axial hemorrhage  VISUALIZED ORBITS AND PARANASAL SINUSES:  Unremarkable  CALVARIUM AND EXTRACRANIAL SOFT TISSUES:  Normal      Impression: No acute intracranial abnormality  Workstation performed: [id number]     Ct Spine Cervical Without Contrast    Result Date: 10/25/2018  Narrative: CT CERVICAL SPINE - WITHOUT CONTRAST INDICATION:   fall  COMPARISON:  None  TECHNIQUE:  CT examination of the cervical spine was performed without intravenous contrast   Contiguous axial images were obtained  Sagittal and coronal reconstructions were performed  Radiation dose length product (DLP) for this visit:  415 18 mGy-cm   This examination, like all CT scans performed in the [facility], was performed utilizing techniques to minimize radiation dose exposure, including the use of iterative  reconstruction and automated exposure control  IMAGE QUALITY:  Diagnostic  FINDINGS: ALIGNMENT:  Normal alignment of the cervical spine  No subluxation  VERTEBRAL BODIES:  No fracture  DEGENERATIVE CHANGES:  Moderate multilevel cervical degenerative changes are noted  No critical central canal stenosis  PREVERTEBRAL AND PARASPINAL SOFT TISSUES:  Unremarkable  THORACIC INLET:  Normal      Impression: No cervical spine fracture or traumatic malalignment  Workstation performed: [id number]     Xr Chest 1 View    Result Date: 10/25/2018  Narrative: CHEST INDICATION:   Pain  Fall  COMPARISON:  Chest x-ray from 3/13/2013 EXAM PERFORMED/VIEWS:  XR CHEST 1 VIEW FINDINGS:  There has been interval sternotomy  Cardiac silhouette is moderately enlarged   Focal consolidation in the right lower lobe laterally  Left lung is clear  No pneumothorax or pleural effusion  Osseous structures appear within normal limits for patient age  Impression: Focal consolidation in the right lower lobe  This may represent pneumonia  If there is any trauma to the right chest, pulmonary contusion is a consideration  Recommend follow up to resolution  The study was marked in EPIC for significant notification  Workstation performed: [id number]     Xr Chest Portable Icu    Result Date: 11/1/2018  Narrative: CHEST INDICATION:   PNA  COMPARISON:  10/29/2018 EXAM PERFORMED/VIEWS:  XR CHEST PORTABLE ICU 1 image FINDINGS: Cardiomediastinal silhouette appears enlarged  A median sternotomy has been performed  No evidence of heart failure  A PICC line enters on the right, the tip extends to the SVC  Bibasilar densities may represent infiltrate, atelectasis or fluid  Osseous structures appear within normal limits for patient age  Impression: Cardiomegaly  Bibasilar parenchymal densities unchanged  Workstation performed: [id number]     Xr Chest Portable Icu    Result Date: 10/29/2018  Narrative: CHEST INDICATION:   Evaluate for aspiration pneumonia    COMPARISON:  10/29/2018 EXAM PERFORMED/VIEWS:  XR CHEST PORTABLE ICU FINDINGS:  There are median sternotomy wires indicating prior cardiac surgery  Heart shadow is enlarged but unchanged from prior exam  Bilateral pleural effusions appear similar size to the previous examination  In conjunction with prior chest CT 10/26/2018, right base airspace disease is again identified  Questionable left retrocardiac airspace disease as well, similar to prior  No pneumothorax  No pulmonary edema  Osseous structures appear within normal limits for patient age       Impression: No significant interval change since 10/29/2018 Workstation performed: [id number]     Xr Chest Portable Icu    Result Date: 10/26/2018  Narrative: CHEST INDICATION:   Endotracheal tube positioning  COMPARISON:  10/25/2018 EXAM PERFORMED/VIEWS:  XR CHEST PORTABLE ICU Images: 2 FINDINGS: Cardiomediastinal silhouette appears unremarkable  A median sternotomy has been performed  Endotracheal tube ends approximately 3 3 cm above the yohana  A nasogastric tube is coiled in the stomach  Pulmonary vessels are normal  Consolidation at the right lung base  Minimal atelectasis at the left lung base  No evidence of heart failure  Osseous structures appear within normal limits for patient age  Impression: Endotracheal tube ending 3 3 cm above the yohana  Consolidation at the right lung base  Workstation performed: [id number]     Ir Picc Line    Result Date: 10/31/2018  Narrative: Examination: Right upper extremity double lumen Power PICC 10/31/2018 History: Needs access Contrast: None Fluoroscopy time:  0 9 minutes Procedure: The patient was identified verbally and by wristband  Timeout was performed  Informed consent was obtained  All elements of maximal sterile barrier technique, cap and mask and sterile gown and sterile gloves and sterile full-body drape and hand hygiene and 2% chlorhexidine for cutaneous antisepsis  Sterile ultrasound technique with sterile gel and sterile probe covers was also utilized  Following obtaining informed consent, the patient was prepped and draped in the usual sterile fashion  Using ultrasound guidance, access was gained to the patient's right antecubital vein using a micropuncture system  The micropuncture dilator was exchanged for a peel away sheath  A mandril wire was advanced to the level of the RA/SVC junction using fluoroscopic guidance to measure the catheter length  The catheter was cut to size and placed through the peel away sheath  The lumens were flushed with ease  The catheter was secured in place  The patient tolerated the procedure well without apparent immediate complication  The patient left the IR department in unchanged condition      Cricket Santo performed the procedure Findings: Using ultrasound guidance, the right antecubital vein was cannulated using Seldinger technique  The right antecubital vein was evaluated as a potential access site  The right antecubital vein was patent, and free of thrombus  Static images of the vessel  was obtained  Visualization of real time needle entry into the vessel was obtained  A fluoroscopic spot image demonstrated a newly placed PICC with the central aspect at the RA/SVC junction  The catheter tubing is smooth in contour  Impression: Impression: Successful placement of a 49cm right upper extremity double lumen Power PICC  Workstation performed: [id number]     Imaging Reports Reviewed by myself    Cultures:   Blood Culture:   Lab Results   Component Value Date    BLOODCX No Growth After 5 Days  10/29/2018    BLOODCX No Growth After 5 Days  10/29/2018    BLOODCX Staphylococcus aureus (A) 10/25/2018    BLOODCX No Growth After 5 Days   10/25/2018     Urine Culture:   Lab Results   Component Value Date    URINECX >100,000 cfu/ml  10/25/2018    URINECX >100,000 cfu/ml Klebsiella oxytoca (A) 05/09/2018     Sputum Culture: No components found for: SPUTUMCX  Wound Culture:   Lab Results   Component Value Date    WOUNDCULT 4+ Growth of Staphylococcus aureus (A) 10/26/2018    WOUNDCULT 4+ Growth of Staphylococcus aureus (A) 10/25/2018       Last 24 Hours Medication List:     Current Facility-Administered Medications:  acetaminophen 650 mg Oral Q6H [facility] Rhea John PA-C    atorvastatin 10 mg Oral Daily Amber Lentz MD    cefazolin 2,000 mg Intravenous Q8H Amber Lentz MD Last Rate: 2,000 mg (11/11/18 0539)   collagenase  Topical Daily Yvette Mae MD    dextrose        furosemide 20 mg Intravenous Daily Elias Day MD    insulin detemir 8 Units Subcutaneous HS Ingrid Hutton MD    insulin lispro 1-5 Units Subcutaneous TID AC Ingrid Hutton MD    lisinopril 2 5 mg Oral Daily Ingrid Hutton MD magnesium sulfate 2 g Intravenous Once Susanne Souza MD    melatonin 6 mg Oral HS Fiordaliza Pascal MD    metoprolol tartrate 12 5 mg Oral Q12H [address] Belen Tee PA-C    pantoprazole 40 mg Intravenous Q12H Kathleen Vaughn PA-C    potassium chloride 20 mEq Oral BID Susanne Souza MD    pregabalin 50 mg Oral TID Fiordaliza Pascal MD    senna 1 tablet Oral HS Fiordaliza Pascal MD    tamsulosin 0 4 mg Oral Daily With Odette Colon MD    traMADol 50 mg Oral Q6H PRN Susanne Souza MD         Today, Patient Was Seen By: Susanne Souza MD    ** Please Note: "This note has been constructed using a voice recognition system  Therefore there may be syntax, spelling, and/or grammatical errors   Please call if you have any questions  "**

## 2018-11-11 NOTE — PROGRESS NOTES
[facility] Internal Medicine Progress Note  Patient: Melva Sat [de-identified] y o  male   MRN: [id number]  PCP: Yg Almodovar DO  Unit/Bed#: [address] [place] [address] Encounter: [id number]  Date Of Visit: 11/10/18    Problem List:    Principal Problem:    Abscess of chest wall  Active Problems:    Cardiomyopathy, ischemic    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HCC)    Acute blood loss anemia    Bacteremia due to Staphylococcus aureus    DM2 (diabetes mellitus, type 2) (HCC)    CAD (coronary artery disease)    CKD (chronic kidney disease) stage 3, GFR 30-59 ml/min (HCC)    Benign essential hypertension    Atrial fibrillation (HCC)    Urinary retention    Gastrointestinal hemorrhage associated with duodenal ulcer    Neuralgia, post-herpetic    Severe protein-calorie malnutrition (HCC)      Assessment & Plan:    * Abscess of chest wall   Assessment & Plan    · Aggressive  debridement in OR on 10/26 by Dr Kaykay Gregory  · Antibiotics deescalated to Ancef as per ID recs, patient will require IV antibiotics until November 25th  Patient has PICC line for outpatient antibiotic therapy  · ID following  · Blood culture staph aureus x 1  · Wound culture: 3+ staph aureus   · Urine cultures > 100,000 mixed containments   · Repeat blood cultures from 10/29 no growth to date  · Wound VAC removed 11/3  Continue local wound care  Surgical follow up in a m  · Started on Lyrica 50 mg TID for possible post-hepatic neuralgia, scheduled tylenol, d/c oxycodone     Bacteremia due to Staphylococcus aureus   Assessment & Plan    · Admission Blood culture x 1 MSSA  · Repeat blood cultures no growth to date  · Per ID will need 4 wks antibiotics from negative blood cultures through 11/25  · PICC line in place     Acute blood loss anemia   Assessment & Plan    · Secondary to bleeding gastric ulcer    · Patient continued with tachycardia and drifting hemoglobin and later developed guanakito melena  · EGD with duodenal bulb ulcer with intervention 11/4  · Pt has been Transfused 9 units of packed red blood cells total this admission  1 unit 10/28, 3 units 10/30, 1 unit 11/2  4 unit 11/4  · Hgb remains stable without any evidence of overt bleeding  · Continue IV PPI as per GI  · Continue trend hemoglobin, stable     Cardiomyopathy, ischemic   Assessment & Plan    · EF prior was 35 to 40%  Recent stress test in May of 2018 showed reversible ischemic changes  Cardiology following as an outpatient with medical management  · ECHO repeat with EF 20% possibly related to sepsis  · Cardiac output and indexes while patient was intubated were acceptable  · Frequent PVCs on telemetry, Episode of nonsustained V-tach overnight 11/6, asymptomatic  · Cardiology following, consider AICD and life vest before discharge  · Tolerating metoprolol 12 5 mg BID PO  · Repeat echo with EF 20%  · Continue IV Lasix as hemoglobin is stabilizing and sepsis has resolved  · Rechallenge with lisinopril  · Continue telemetry, monitor electrolytes  · Follow up with Cardiology     Neuralgia, post-herpetic   Assessment & Plan    On Lyrica     Gastrointestinal hemorrhage associated with duodenal ulcer   Assessment & Plan    Status post EGD 11/4 revealed duodenal bleeding ulcer  Continue IV PPI for now  Advance to full liquid diet  Monitor H&H     Urinary retention   Assessment & Plan    Status post failing voiding trial  Likely secondary to BPH , on Flomax  Follow up Urology recommendation  Maintain Gordon     Atrial fibrillation [facility])   Assessment & Plan    · History of AFib in the setting of electrolyte abnormalities in the past   Monitor on telemetry  · Current NSR to Sinus Tach  · Continue to hold on anticoagulation at this time  Particularly in light of recent episode of GI bleeding    · Frequent PVCs on telemetry, cardiology consulted, beta-blocker resumed     Benign essential hypertension   Assessment & Plan    Stable  Asymptomatic  Continue metoprolol     CKD (chronic kidney disease) stage 3, GFR 30-59 ml/min (HCC)   Assessment & Plan    Stable, baseline creatinine 0 9-1 3  Monitor input output       CAD (coronary artery disease)   Assessment & Plan    Troponin negative  Continue statin, continue to hold aspirin secondary to GI bleed  Follow up with Cardiology     DM2 (diabetes mellitus, type 2) [facility])   Assessment & Plan    Lab Results   Component Value Date    HGBA1C 12 0 (H) 10/26/2018       Recent Labs      11/10/18   0707  11/10/18   1130  11/10/18   1624  11/10/18   2119   POCGLU  110  153*  182*  171*       Blood Sugar Average: Last 72 hrs:  (P) 131 6     Will resume Levemir at 10 units daily     Esophagitisresolved as of 11/4/2018   Assessment & Plan    · Resolved  · EGD on 10/31: duodenal bulb ulcer with possible candida esophagitis  · Biopsies negative for candida - d/c fluconizole 11/4     Severe protein-calorie malnutrition ([address] )   Assessment & Plan    Malnutrition Findings:           BMI Findings: Body mass index is 24 02 kg/m²          Acute respiratory failure secondary to septic shockresolved as of 10/28/2018   Assessment & Plan    Status post extubation 10/26       DKA (diabetic ketoacidoses) (HCC)resolved as of 10/26/2018   Assessment & Plan    Lab Results   Component Value Date    HGBA1C 12 0 (H) 10/26/2018       Recent Labs      11/07/18   2121  11/08/18   1058  11/08/18   1609  11/08/18   2205   POCGLU  121  142*  157*  103       Blood Sugar Average: Last 72 hrs:  (P) 105 75          Shinglesresolved as of 11/1/2018   Assessment & Plan    Status post treatment with Valtrex 10/31     Urinary tract infectionresolved as of 10/29/2018   Assessment & Plan    Suspected possible source on admission  Urine culture without specific pathogen       CHRIS (acute kidney injury) (HCC)resolved as of 10/27/2018   Assessment & Plan    Renal secondary to septic shock in DKA  Improved  Monitor     Hyponatremiaresolved as of 10/27/2018   Assessment & Plan    Noted to be 111 on presentation likely secondary to combination of hypovolemic hyponatremia secondary to dehydration, sepsis and DKA plus pseudo hyponatremia secondary to hyperglycemia           VTE Pharmacologic Prophylaxis:   Pharmacologic: Pharmacologic VTE Prophylaxis contraindicated due to GI bleed  Mechanical VTE Prophylaxis in Place: No    Patient Centered Rounds: I have performed bedside rounds with nursing staff today  Discussions with Specialists or Other Care Team Provider: Yes    Education and Discussions with Family / Patient:Yes    Time Spent for Care: 45 minutes  More than 50% of total time spent on counseling and coordination of care as described above  Current Length of Stay: 16 day(s)    Current Patient Status: Inpatient     Discharge Plan:  Anticipate rehab    Code Status: Level 2 - DNAR: but accepts endotracheal intubation    Certification Statement: The patient will continue to require additional inpatient hospital stay due to GI bleed, sepsis, cardiomyopathy      Subjective:   Minimal pain at surgical site  Denies shortness of breath   No dizziness    Objective:   Not in distress    All other 10 review of systems negative and without drastic interval changes from yesterday  Vitals:   Temp (24hrs), Av 6 °F (36 4 °C), Min:97 5 °F (36 4 °C), Max:97 7 °F (36 5 °C)    Temp:  [97 5 °F (36 4 °C)-97 7 °F (36 5 °C)] 97 7 °F (36 5 °C)  HR:  [80-98] 92  Resp:  [16-18] 18  BP: ()/(51-72) 92/57  SpO2:  [98 %-100 %] 98 %  Body mass index is 23 92 kg/m²  Input and Output Summary (last 24 hours): Intake/Output Summary (Last 24 hours) at 11/10/18 4153  Last data filed at 11/10/18 2132   Gross per 24 hour   Intake              565 ml   Output             5775 ml   Net            -5210 ml       Physical Exam:     Physical Exam   Constitutional: He appears well-developed  No distress  HENT:   Head: Normocephalic and atraumatic  Eyes: Pupils are equal, round, and reactive to light   Conjunctivae are normal    Neck: Normal range of motion  Neck supple  Cardiovascular: Normal rate and regular rhythm  Murmur heard  Pulmonary/Chest: Effort normal  No respiratory distress  He has no wheezes  He has no rhonchi  He has no rales  He exhibits no tenderness  Abdominal: Soft  Bowel sounds are normal  He exhibits no distension  There is no tenderness  There is no rebound and no guarding  Genitourinary:   Genitourinary Comments: Gordon catheter in place   Musculoskeletal: He exhibits edema (Bilateral thigh)  Neurological: He is alert  No cranial nerve deficit  Skin: Skin is warm and dry  No rash noted  Left chest wall and back dressing in place  Incision C/D/I, stitches in place         Additional Data:     Labs:      Results from last 7 days  Lab Units 11/10/18  0630   WBC Thousand/uL 7 78   HEMOGLOBIN g/dL 9 3*   HEMATOCRIT % 29 3*   PLATELETS Thousands/uL 157   NEUTROS PCT % 82*   LYMPHS PCT % 10*   MONOS PCT % 6   EOS PCT % 1       Results from last 7 days  Lab Units 11/10/18  0630  11/08/18  0521   POTASSIUM mmol/L 3 4*  < > 3 6   CHLORIDE mmol/L 102  < > 103   CO2 mmol/L 25  < > 24   BUN mg/dL 14  < > 18   CREATININE mg/dL 0 82  < > 0 84   CALCIUM mg/dL 7 0*  < > 6 8*   ALK PHOS U/L  --   --  67   ALT U/L  --   --  <6*   AST U/L  --   --  12   < > = values in this interval not displayed  * I Have Reviewed All Lab Data Listed Above  * Additional Pertinent Lab Tests Reviewed: All Labs For Current Hospital Admission Reviewed    Imaging:  Ct Chest Abdomen Pelvis Wo Contrast    Result Date: 10/26/2018  Narrative: CT CHEST, ABDOMEN AND PELVIS WITHOUT IV CONTRAST INDICATION:   Sepsis  COMPARISON:  CT cervical spine dated 10/25/2018, radiographs of the left shoulder dated 10/25/2018 TECHNIQUE: CT examination of the chest, abdomen and pelvis was performed without intravenous contrast   Axial, sagittal, and coronal 2D reformatted images were created from the source data and submitted for interpretation   Radiation dose length product (DLP) for this visit:  652 78 mGy-cm   This examination, like all CT scans performed in the [place], was performed utilizing techniques to minimize radiation dose exposure, including the use of iterative  reconstruction and automated exposure control  Enteric contrast was not administered  FINDINGS: CHEST LUNGS:  Some patchy airspace opacities are seen in the right lower lobe superior segment  There is a large dense consolidation in the right middle lobe, lateral segment  Some mucous plugging is suspected in a left upper lobe bronchus  There is mild dependent and bibasilar atelectasis  The lungs otherwise appear grossly clear  PLEURA:  Small bilateral pleural effusions are noted dependently  HEART/GREAT VESSELS:  There is left ventricular enlargement  At least moderate coronary atherosclerosis is noted  Status post CABG with a LIMA graft  Mild aortic atherosclerosis  No aortic aneurysm  MEDIASTINUM AND ALIZE:  Unremarkable  CHEST WALL AND LOWER NECK: Median sternotomy wires are noted  A few bubbles of air are seen in the anterior left chest subcutaneous tissues (axial image 8)  Multiple areas of skin thickening with associated subjacent complex fluid are seen in the superficial soft tissues of the left pectoralis region (for example axial image 30); this area measures approximately 5 5 x 1 1 x 4 0 cm in transverse, AP, and CC dimensions  There is body wall edema noted with some shotty left axillary lymph nodes  In  addition there is posterior skin thickening with some irregularity of the soft tissues of the posterior and superior left thorax (axial image 12)  Subjacent to this there is an apparent fluid collection measuring approximately 4 7 x 1 8 x 4 6 cm in size  ABDOMEN LIVER/BILIARY TREE:  Unremarkable  GALLBLADDER:  No calcified gallstones  No pericholecystic inflammatory change  SPLEEN:  Unremarkable  PANCREAS:  Mild atrophy    Otherwise grossly unremarkable ADRENAL GLANDS:  Grossly unremarkable KIDNEYS/URETERS:  Partially exophytic cyst in the posterior midportion of the right kidney measures approximately 1 5 cm in size  Exophytic cyst in the lower pole of the left kidney measures approximately 3 7 cm in size  Bilateral kidneys otherwise appear grossly unremarkable; no hydronephrosis  STOMACH AND BOWEL:  Grossly unremarkable APPENDIX:  A normal appendix was visualized  ABDOMINOPELVIC CAVITY:  No ascites or free intraperitoneal air  No lymphadenopathy  VESSELS:  Mild atherosclerosis; no aortic aneurysm PELVIS REPRODUCTIVE ORGANS:  Unremarkable for patient's age  URINARY BLADDER:  Bladder is partially distended  A catheter is seen within the bladder lumen  Some air is seen within the nondependent portion of the bladder  Mild circumferential bladder wall thickening is noted, probably exaggerated by underdistention  ABDOMINAL WALL/INGUINAL REGIONS:  A right-sided femoral venous catheter is noted  Some subcutaneous emphysema is seen in this region as well, probably related to recent intervention/placement  Diffuse body wall edema is noted  OSSEOUS STRUCTURES:  Mild curvature of the lumbar spine, apex to the left  Moderate degenerative changes of the bilateral hips, worse on the right  No acute fracture or destructive osseous lesion is identified  Prominent degenerative changes of the right shoulder  Multilevel degenerative changes of the spine with vacuum disc phenomenon at L4/L5  There is intervertebral disc space narrowing and facet joint arthropathy at L2/L3, L3/L4, and L4/L5  Impression: Patchy airspace opacities are seen in the right lower lobe superior segment  There is a large dense consolidation in the right middle lobe, lateral segment, nonspecific but suspicious for infection/pneumonia in the appropriate clinical setting  Cardiomegaly, bilateral pleural effusions, and body wall edema suggests a superimposed component of fluid overload/CHF  Multiple areas of skin thickening with associated subjacent complex fluid are seen in the superficial soft tissues of the left pectoralis region (for example axial image 30), as well as in the posterior and superior left thorax (axial image 12), as described  Consider cellulitis with developing abscesses  In addition, a few bubbles of air are seen in the anterior left chest subcutaneous tissues (axial image 8) which could be related to recent intervention but also raises suspicion for possible gas-forming infection  Partially distended bladder with catheter seen within the bladder lumen  Mild circumferential bladder wall thickening noted, probably exaggerated by underdistention  A cystitis could be considered in the appropriate clinical setting  Renal cysts, degenerative changes of the shoulder, spine, and hips, and other findings as above  Findings discussed with FRITZ Alexis by Dr Maurilio Lopez at 4:18 AM on 10/26/2018  Workstation performed: [id number]     Xr Chest Portable    Result Date: 10/29/2018  Narrative: CHEST INDICATION:   pna  COMPARISON:  10/26/2018 EXAM PERFORMED/VIEWS:  XR CHEST PORTABLE FINDINGS:  Endotracheal and nasogastric tubes have been removed during the interim  Cardiomediastinal silhouette appears unremarkable  Right greater than left basilar pleural effusions with associated right basilar airspace opacity, likely atelectasis  Osseous structures appear within normal limits for patient age  Impression: Persistent right basilar pleural effusion and associated probable atelectasis  Workstation performed: [id number]     Xr Spine Thoracic 3 Views    Result Date: 10/25/2018  Narrative: THORACIC SPINE INDICATION:   Pain  COMPARISON:  Chest x-ray from 3/13/2013 VIEWS:  XR SPINE THORACIC 3 VW FINDINGS: There is no acute fracture or pathologic bone lesion  Minimal vertebral body height loss scattered in the thoracic spine similar to the prior exam probably degenerative   Thoracic vertebral alignment is within normal limits  Multilevel degenerative changes with flowing marginal osteophyte formation  There is no displacement of the paraspinal line  The pedicles appear intact  Impression: No acute osseous abnormality  Workstation performed: [id number]     Xr Spine Lumbar 2 Or 3 Views Injury    Result Date: 10/25/2018  Narrative: LUMBAR SPINE INDICATION:   pain  COMPARISON:  None VIEWS:  XR SPINE LUMBAR 2 OR 3 VIEWS INJURY FINDINGS: There is no evidence of acute fracture or destructive osseous lesion  Levoscoliosis is present about L3  Moderate degenerative changes present throughout the lumbar spine  The pedicles appear intact  Soft tissues are unremarkable  Impression: Levoscoliosis and moderate multilevel degenerative change  Workstation performed: [id number]     Xr Shoulder 2+ Views Left    Result Date: 10/25/2018  Narrative: LEFT SHOULDER INDICATION:   Pain  COMPARISON:  None VIEWS:  XR SHOULDER 2+ VW LEFT FINDINGS: There is no acute fracture or dislocation  Narrowing of the glenohumeral joint with mild spurring  No lytic or blastic lesions are seen  Soft tissues are unremarkable  Impression: No acute osseous abnormality  Workstation performed: [id number]     Ct Head Wo Contrast    Result Date: 10/26/2018  Narrative: CT BRAIN - WITHOUT CONTRAST INDICATION:   Confusion/delirium, altered LOC, unexplained Lethargy  COMPARISON:  CT head dated 10/25/2018 TECHNIQUE:  CT examination of the brain was performed  In addition to axial images, coronal 2D reformatted images were created and submitted for interpretation  Radiation dose length product (DLP) for this visit:  1090 97 mGy-cm   This examination, like all CT scans performed in the [facility], was performed utilizing techniques to minimize radiation dose exposure, including the use of iterative reconstruction and automated exposure control  IMAGE QUALITY:  Diagnostic   FINDINGS: PARENCHYMA: Decreased attenuation is noted in periventricular and subcortical white matter demonstrating an appearance that is statistically most likely to represent mild microangiopathic change  Gray-white differentiation appears maintained No CT signs of acute territorial infarction  No intracranial mass, mass effect or midline shift  No acute parenchymal hemorrhage  Mild generalized parenchymal atrophy redemonstrated  VENTRICLES AND EXTRA-AXIAL SPACES:  Ventricles and extra-axial CSF spaces are prominent commensurate with the degree of volume loss  No hydrocephalus  No acute extra-axial hemorrhage  VISUALIZED ORBITS AND PARANASAL SINUSES:  Unremarkable  CALVARIUM AND EXTRACRANIAL SOFT TISSUES:  The calvarium appears intact  Otherwise grossly unremarkable  Impression: No acute intracranial abnormality  Other nonacute findings as above  Workstation performed: [id number]     Ct Head Without Contrast    Result Date: 10/25/2018  Narrative: CT BRAIN - WITHOUT CONTRAST INDICATION:   fall  COMPARISON:  5/9/2018  TECHNIQUE:  CT examination of the brain was performed  In addition to axial images, coronal 2D reformatted images were created and submitted for interpretation  Radiation dose length product (DLP) for this visit:  1220 31 mGy-cm   This examination, like all CT scans performed in the [facility], was performed utilizing techniques to minimize radiation dose exposure, including the use of iterative reconstruction and automated exposure control  IMAGE QUALITY:  Diagnostic  FINDINGS: PARENCHYMA: Decreased attenuation is noted in periventricular and subcortical white matter demonstrating an appearance that is statistically most likely to represent moderate microangiopathic change  No CT signs of acute infarction  No intracranial mass, mass effect or midline shift  No acute parenchymal hemorrhage  VENTRICLES AND EXTRA-AXIAL SPACES:  Ventricles and extra-axial CSF spaces are prominent commensurate with the degree of volume loss  No hydrocephalus  No acute extra-axial hemorrhage  VISUALIZED ORBITS AND PARANASAL SINUSES:  Unremarkable  CALVARIUM AND EXTRACRANIAL SOFT TISSUES:  Normal      Impression: No acute intracranial abnormality  Workstation performed: [id number]     Ct Spine Cervical Without Contrast    Result Date: 10/25/2018  Narrative: CT CERVICAL SPINE - WITHOUT CONTRAST INDICATION:   fall  COMPARISON:  None  TECHNIQUE:  CT examination of the cervical spine was performed without intravenous contrast   Contiguous axial images were obtained  Sagittal and coronal reconstructions were performed  Radiation dose length product (DLP) for this visit:  415 18 mGy-cm   This examination, like all CT scans performed in the [facility], was performed utilizing techniques to minimize radiation dose exposure, including the use of iterative  reconstruction and automated exposure control  IMAGE QUALITY:  Diagnostic  FINDINGS: ALIGNMENT:  Normal alignment of the cervical spine  No subluxation  VERTEBRAL BODIES:  No fracture  DEGENERATIVE CHANGES:  Moderate multilevel cervical degenerative changes are noted  No critical central canal stenosis  PREVERTEBRAL AND PARASPINAL SOFT TISSUES:  Unremarkable  THORACIC INLET:  Normal      Impression: No cervical spine fracture or traumatic malalignment  Workstation performed: [id number]     Xr Chest 1 View    Result Date: 10/25/2018  Narrative: CHEST INDICATION:   Pain  Fall  COMPARISON:  Chest x-ray from 3/13/2013 EXAM PERFORMED/VIEWS:  XR CHEST 1 VIEW FINDINGS:  There has been interval sternotomy  Cardiac silhouette is moderately enlarged  Focal consolidation in the right lower lobe laterally  Left lung is clear  No pneumothorax or pleural effusion  Osseous structures appear within normal limits for patient age  Impression: Focal consolidation in the right lower lobe  This may represent pneumonia    If there is any trauma to the right chest, pulmonary contusion is a consideration  Recommend follow up to resolution  The study was marked in EPIC for significant notification  Workstation performed: [id number]     Xr Chest Portable Icu    Result Date: 11/1/2018  Narrative: CHEST INDICATION:   PNA  COMPARISON:  10/29/2018 EXAM PERFORMED/VIEWS:  XR CHEST PORTABLE ICU 1 image FINDINGS: Cardiomediastinal silhouette appears enlarged  A median sternotomy has been performed  No evidence of heart failure  A PICC line enters on the right, the tip extends to the SVC  Bibasilar densities may represent infiltrate, atelectasis or fluid  Osseous structures appear within normal limits for patient age  Impression: Cardiomegaly  Bibasilar parenchymal densities unchanged  Workstation performed: [id number]     Xr Chest Portable Icu    Result Date: 10/29/2018  Narrative: CHEST INDICATION:   Evaluate for aspiration pneumonia    COMPARISON:  10/29/2018 EXAM PERFORMED/VIEWS:  XR CHEST PORTABLE ICU FINDINGS:  There are median sternotomy wires indicating prior cardiac surgery  Heart shadow is enlarged but unchanged from prior exam  Bilateral pleural effusions appear similar size to the previous examination  In conjunction with prior chest CT 10/26/2018, right base airspace disease is again identified  Questionable left retrocardiac airspace disease as well, similar to prior  No pneumothorax  No pulmonary edema  Osseous structures appear within normal limits for patient age  Impression: No significant interval change since 10/29/2018 Workstation performed: [id number]     Xr Chest Portable Icu    Result Date: 10/26/2018  Narrative: CHEST INDICATION:   Endotracheal tube positioning  COMPARISON:  10/25/2018 EXAM PERFORMED/VIEWS:  XR CHEST PORTABLE ICU Images: 2 FINDINGS: Cardiomediastinal silhouette appears unremarkable  A median sternotomy has been performed  Endotracheal tube ends approximately 3 3 cm above the yohana  A nasogastric tube is coiled in the stomach    Pulmonary vessels are normal  Consolidation at the right lung base  Minimal atelectasis at the left lung base  No evidence of heart failure  Osseous structures appear within normal limits for patient age  Impression: Endotracheal tube ending 3 3 cm above the yohana  Consolidation at the right lung base  Workstation performed: [id number]     Ir Picc Line    Result Date: 10/31/2018  Narrative: Examination: Right upper extremity double lumen Power PICC 10/31/2018 History: Needs access Contrast: None Fluoroscopy time:  0 9 minutes Procedure: The patient was identified verbally and by wristband  Timeout was performed  Informed consent was obtained  All elements of maximal sterile barrier technique, cap and mask and sterile gown and sterile gloves and sterile full-body drape and hand hygiene and 2% chlorhexidine for cutaneous antisepsis  Sterile ultrasound technique with sterile gel and sterile probe covers was also utilized  Following obtaining informed consent, the patient was prepped and draped in the usual sterile fashion  Using ultrasound guidance, access was gained to the patient's right antecubital vein using a micropuncture system  The micropuncture dilator was exchanged for a peel away sheath  A mandril wire was advanced to the level of the RA/SVC junction using fluoroscopic guidance to measure the catheter length  The catheter was cut to size and placed through the peel away sheath  The lumens were flushed with ease  The catheter was secured in place  The patient tolerated the procedure well without apparent immediate complication  The patient left the IR department in unchanged condition  Dr Enedelia Blanca performed the procedure Findings: Using ultrasound guidance, the right antecubital vein was cannulated using Seldinger technique  The right antecubital vein was evaluated as a potential access site  The right antecubital vein was patent, and free of thrombus  Static images of the vessel  was obtained   Visualization of real time needle entry into the vessel was obtained  A fluoroscopic spot image demonstrated a newly placed PICC with the central aspect at the RA/SVC junction  The catheter tubing is smooth in contour  Impression: Impression: Successful placement of a 49cm right upper extremity double lumen Power PICC  Workstation performed: [id number]     Imaging Reports Reviewed by myself    Cultures:   Blood Culture:   Lab Results   Component Value Date    BLOODCX No Growth After 5 Days  10/29/2018    BLOODCX No Growth After 5 Days  10/29/2018    BLOODCX Staphylococcus aureus (A) 10/25/2018    BLOODCX No Growth After 5 Days   10/25/2018     Urine Culture:   Lab Results   Component Value Date    URINECX >100,000 cfu/ml  10/25/2018    URINECX >100,000 cfu/ml Klebsiella oxytoca (A) 05/09/2018     Sputum Culture: No components found for: SPUTUMCX  Wound Culture:   Lab Results   Component Value Date    WOUNDCULT 4+ Growth of Staphylococcus aureus (A) 10/26/2018    WOUNDCULT 4+ Growth of Staphylococcus aureus (A) 10/25/2018       Last 24 Hours Medication List:     Current Facility-Administered Medications:  acetaminophen 650 mg Oral Q6H [facility] Rhea John PA-C    atorvastatin 10 mg Oral Daily Valentin Wang MD    cefazolin 2,000 mg Intravenous Q8H Valentin Wang MD Last Rate: 2,000 mg (11/10/18 2202)   collagenase  Topical Daily Lowella Boeck, MD    [START ON 11/11/2018] furosemide 20 mg Intravenous Daily Anne Montenegro MD    insulin lispro 2-12 Units Subcutaneous 4x Daily (AC & HS) Rhea John PA-C    lisinopril 2 5 mg Oral Daily Chester Goodpasture, MD    melatonin 6 mg Oral HS Valentin Wang MD    metoprolol tartrate 12 5 mg Oral Q12H [facility] Belen Tee PA-C    pantoprazole 40 mg Intravenous Q12H Markus Zamora PA-C    potassium chloride 20 mEq Oral BID Chester Goodpasture, MD    pregabalin 50 mg Oral TID Valentin Wang MD    senna 1 tablet Oral HS Valentin Wang MD    tamsulosin 0 4 mg Oral Daily With Bobby Cole Stephy Goodrich MD         Today, Patient Was Seen By: Frederick Huerta MD    ** Please Note: "This note has been constructed using a voice recognition system  Therefore there may be syntax, spelling, and/or grammatical errors   Please call if you have any questions  "**

## 2018-11-11 NOTE — PROGRESS NOTES
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 with debridement on 10/26/2018  7  Paroxysmal atrial fibrillation but currently in sinus rhythm with history of frequent PVCs  8  Mild hypotension, asymptomatic  9  Chronic kidney disease, stage III      PLAN:    1  Eventual rechallenge with lisinopril when blood pressure permits  2  See orders for blood pressure holds  3  Concur with current management      Current Facility-Administered Medications   Medication Dose Route Frequency Provider Last Rate Last Dose    acetaminophen (TYLENOL) tablet 650 mg  650 mg Oral Q6H [facility] Rhea John PA-C   650 mg at 11/10/18 1726    atorvastatin (LIPITOR) tablet 10 mg  10 mg Oral Daily Fiordaliza Pascal MD   10 mg at 11/10/18 1726    ceFAZolin (ANCEF) IVPB (premix) 2,000 mg  2,000 mg Intravenous Q8H Fiordaliza Pascal  mL/hr at 11/10/18 2202 2,000 mg at 11/10/18 2202    collagenase (SANTYL) ointment   Topical Daily Ervin Liriano MD        furosemide (LASIX) injection 20 mg  20 mg Intravenous Daily Kailyn Thomas MD   20 mg at 11/10/18 1202    insulin lispro (HumaLOG) 100 units/mL subcutaneous injection 2-12 Units  2-12 Units Subcutaneous 4x Daily (AC & HS) Rhea John PA-C   2 Units at 11/10/18 1727    lisinopril (ZESTRIL) tablet 2 5 mg  2 5 mg Oral Daily Susanne Souza MD        melatonin tablet 6 mg  6 mg Oral HS Fiordaliza Pascal MD   6 mg at 11/09/18 2203    metoprolol tartrate (LOPRESSOR) partial tablet 12 5 mg  12 5 mg Oral Q12H [facility] Belen Tee PA-C   12 5 mg at 11/10/18 0859    pantoprazole (PROTONIX) injection 40 mg  40 mg Intravenous Q12H Kathleen Vaughn PA-C   40 mg at 11/10/18 0859    potassium chloride (K-DUR,KLOR-CON) CR tablet 20 mEq  20 mEq Oral BID Susanne Souza MD   20 mEq at 11/10/18 1730    pregabalin (LYRICA) capsule 50 mg  50 mg Oral TID Fiordaliza Pascal MD   50 mg at 11/10/18 1728    senna (SENOKOT) tablet 8 6 mg  1 tablet Oral HS Fiordaliza Pascal MD   8 6 mg at 11/09/18 2204    tamsulosin (FLOMAX) capsule 0 4 mg  0 4 mg Oral Daily With [employer] EVY Tee   0 4 mg at 11/10/18 1728         HPI:    Mr Jm Wang Denies new cardiopulmonary or  medical symptoms  Objective   His systolic blood pressures are a bit on the low side cook, currently at 92 and were as low as 80 systolic earlier today  Weight has dropped 15 7 lbs from maximum and fluid balance is -7 3 liters  Physical Exam:   BP 92/57 (BP Location: Left arm)   Pulse 92   Temp 97 7 °F (36 5 °C) (Oral)   Resp 18   Ht 5' 8 5" (1 74 m)   Wt 72 4 kg (159 lb 9 8 oz)   SpO2 98%   BMI 23 92 kg/m²   Body mass index is 23 92 kg/m²      General Appearance:  Alert, cooperative, no distress, appears stated age   Head:  Normocephalic, without obvious abnormality, atraumatic   Eyes:  PERRL, conjunctiva/corneas clear, EOM's intact,   both eyes   Ears:  Normal TM's and external ear canals, both ears   Nose: Nares normal, septum midline, mucosa normal, no drainage or sinus tenderness   Throat: Lips, mucosa, and tongue normal; teeth and gums normal   Neck: Supple, symmetrical, trachea midline, no adenopathy, thyroid: not enlarged, symmetric, no tenderness/mass/nodules, no carotid bruit or JVD   Back:   Symmetric, no curvature, ROM normal, no CVA tenderness   Lungs:   Clear to auscultation bilaterally, respirations unlabored   Chest Wall:  No tenderness or deformity with large bandage over left anterolateral chest wall   Heart:  Regular rate and rhythm, S1, S2 normal, no murmur, rub or gallop   Abdomen:   Soft, non-tender, bowel sounds active all four quadrants,  no masses, no organomegaly with moderate obesity noted   Extremities: Extremities normal, atraumatic, no cyanosis with 2+ bilateral ankle edema   Pulses: 2+ and symmetric   Skin: Skin showed pale color, with normal texture, turgor and no rashes or lesions   Lymph nodes: Cervical, supraclavicular, and axillary nodes normal   Neurologic: Normal         Cardiographics              Telemetry-normal sinus rhythm at 92 bpm                        Lab Review   Recent Results (from the past 24 hour(s))   Basic metabolic panel    Collection Time: 11/10/18  6:30 AM   Result Value Ref Range    Sodium 135 (L) 136 - 145 mmol/L    Potassium 3 4 (L) 3 5 - 5 3 mmol/L    Chloride 102 100 - 108 mmol/L    CO2 25 21 - 32 mmol/L    ANION GAP 8 4 - 13 mmol/L    BUN 14 5 - 25 mg/dL    Creatinine 0 82 0 60 - 1 30 mg/dL    Glucose 105 65 - 140 mg/dL    Calcium 7 0 (L) 8 3 - 10 1 mg/dL    eGFR 84 ml/min/1 73sq m   CBC and differential    Collection Time: 11/10/18  6:30 AM   Result Value Ref Range    WBC 7 78 4 31 - 10 16 Thousand/uL    RBC 2 99 (L) 3 88 - 5 62 Million/uL    Hemoglobin 9 3 (L) 12 0 - 17 0 g/dL    Hematocrit 29 3 (L) 36 5 - 49 3 %    MCV 98 82 - 98 fL    MCH 31 1 26 8 - 34 3 pg    MCHC 31 7 31 4 - 37 4 g/dL    RDW 18 0 (H) 11 6 - 15 1 %    MPV 10 8 8 9 - 12 7 fL    Platelets 909 177 - 134 Thousands/uL    nRBC 0 /100 WBCs    Neutrophils Relative 82 (H) 43 - 75 %    Immat GRANS % 1 0 - 2 %    Lymphocytes Relative 10 (L) 14 - 44 %    Monocytes Relative 6 4 - 12 %    Eosinophils Relative 1 0 - 6 %    Basophils Relative 0 0 - 1 %    Neutrophils Absolute 6 42 1 85 - 7 62 Thousands/µL    Immature Grans Absolute 0 05 0 00 - 0 20 Thousand/uL    Lymphocytes Absolute 0 79 0 60 - 4 47 Thousands/µL    Monocytes Absolute 0 44 0 17 - 1 22 Thousand/µL    Eosinophils Absolute 0 06 0 00 - 0 61 Thousand/µL    Basophils Absolute 0 02 0 00 - 0 10 Thousands/µL   Magnesium    Collection Time: 11/10/18  6:30 AM   Result Value Ref Range Magnesium 1 8 1 6 - 2 6 mg/dL   Fingerstick Glucose (POCT)    Collection Time: 11/10/18  7:07 AM   Result Value Ref Range    POC Glucose 110 65 - 140 mg/dl   Fingerstick Glucose (POCT)    Collection Time: 11/10/18 11:30 AM   Result Value Ref Range    POC Glucose 153 (H) 65 - 140 mg/dl   Fingerstick Glucose (POCT)    Collection Time: 11/10/18  4:24 PM   Result Value Ref Range    POC Glucose 182 (H) 65 - 140 mg/dl   Fingerstick Glucose (POCT)    Collection Time: 11/10/18  9:19 PM   Result Value Ref Range    POC Glucose 171 (H) 65 - 140 mg/dl                 RENETTA oJnes

## 2018-11-11 NOTE — PROGRESS NOTES
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 with debridement on 10/26/2018  7  Paroxysmal atrial fibrillation but currently in sinus rhythm with history of frequent PVCs  8  Mild to moderate hypotension, asymptomatic  9  Normal renal function at present  10  Borderline hypokalemia      PLAN:    1  Discussed with Dr Andressa Coley  2  For now, will continue current management        Current Facility-Administered Medications   Medication Dose Route Frequency Provider Last Rate Last Dose    acetaminophen (TYLENOL) tablet 650 mg  650 mg Oral Q6H [address] Rhea John PA-C   650 mg at 11/11/18 1811    albumin human (FLEXBUMIN) 25 % injection 12 5 g  12 5 g Intravenous Daily Charo Laboy MD   12 5 g at 11/11/18 1457    And    furosemide (LASIX) injection 20 mg  20 mg Intravenous Daily Charo Laboy MD   20 mg at 11/11/18 1524    atorvastatin (LIPITOR) tablet 10 mg  10 mg Oral Daily Lexus Alcala MD   10 mg at 11/11/18 1811    ceFAZolin (ANCEF) IVPB (premix) 2,000 mg  2,000 mg Intravenous Q8H Lxeus Alcala  mL/hr at 11/11/18 1405 2,000 mg at 11/11/18 1405    collagenase (SANTYL) ointment   Topical Daily Amari Vasquez MD        dextrose 50 % IV solution **ADS Override Pull**             insulin detemir (LEVEMIR) subcutaneous injection 8 Units  8 Units Subcutaneous HS Charo Laboy MD        insulin lispro (HumaLOG) 100 units/mL subcutaneous injection 1-5 Units  1-5 Units Subcutaneous TID AC Charo Laboy MD        lisinopril (ZESTRIL) tablet 2 5 mg  2 5 mg Oral Daily [facility] Emeka Jain MD        melatonin tablet 6 mg  6 mg Oral HS Jojo Strauss MD   6 mg at 11/10/18 2203    metoprolol tartrate (LOPRESSOR) partial tablet 12 5 mg  12 5 mg Oral Q12H [address] Orly Landrum PA-C   Stopped at 11/10/18 2209    pantoprazole (PROTONIX) injection 40 mg  40 mg Intravenous Q12H Connor Schwartz PA-C   40 mg at 11/11/18 0912    potassium chloride (K-DUR,KLOR-CON) CR tablet 20 mEq  20 mEq Oral BID Newton Lozada MD   20 mEq at 11/11/18 1811    pregabalin (LYRICA) capsule 50 mg  50 mg Oral TID Jojo Strauss MD   50 mg at 11/11/18 1540    senna (SENOKOT) tablet 8 6 mg  1 tablet Oral HS Jojo Strauss MD   8 6 mg at 11/10/18 2204    tamsulosin (FLOMAX) capsule 0 4 mg  0 4 mg Oral Daily With Fidencio Dutton MD   0 4 mg at 11/11/18 1540    traMADol (ULTRAM) tablet 50 mg  50 mg Oral Q6H PRN Newton Lozada MD   50 mg at 11/11/18 1414         HPI:    Mr Merrill Thompson Denies new cardiopulmonary or  medical symptoms  Objective     Negative fluid balance of 9 80 liters since admission  Weight loss of 16 lbs since 25/42/8258  Systolic blood pressures ranged from 87-94 today  Physical Exam:   BP 94/57 (BP Location: Left arm)   Pulse 91   Temp (!) 97 1 °F (36 2 °C) (Axillary)   Resp 18   Ht 5' 8 5" (1 74 m)   Wt 72 2 kg (159 lb 2 8 oz)   SpO2 99%   BMI 23 85 kg/m²   Body mass index is 23 85 kg/m²      General Appearance:  Alert, cooperative, no distress, appears stated age   Head:  Normocephalic, without obvious abnormality, atraumatic   Eyes:  PERRL, conjunctiva/corneas clear, EOM's intact,   both eyes   Ears:  Normal TM's and external ear canals, both ears   Nose: Nares normal, septum midline, mucosa normal, no drainage or sinus tenderness   Throat: Lips, mucosa, and tongue normal; teeth and gums normal   Neck: Supple, symmetrical, trachea midline, no adenopathy, thyroid: not enlarged, symmetric, no tenderness/mass/nodules, no carotid bruit or JVD   Back:   Symmetric, no curvature, ROM normal, no CVA tenderness   Lungs:   Clear to auscultation bilaterally, respirations unlabored   Chest Wall:  No tenderness or deformity   Heart:  Regular rate and rhythm, S1, S2 normal, no murmur, rub or gallop   Abdomen:   Soft, non-tender, bowel sounds active all four quadrants,  no masses, no organomegaly   Extremities: Extremities normal, atraumatic, no cyanosis or edema   Pulses: 2+ and symmetric   Skin: Skin showed pale color, with normal texture, turgor and no rashes or lesions   Lymph nodes: Cervical, supraclavicular, and axillary nodes normal   Neurologic: Normal         Cardiographics                Telemetry-normal sinus rhythm                      Lab Review   Recent Results (from the past 24 hour(s))   Fingerstick Glucose (POCT)    Collection Time: 11/10/18  9:19 PM   Result Value Ref Range    POC Glucose 171 (H) 65 - 140 mg/dl   CBC and differential    Collection Time: 11/11/18  5:46 AM   Result Value Ref Range    WBC 6 45 4 31 - 10 16 Thousand/uL    RBC 3 01 (L) 3 88 - 5 62 Million/uL    Hemoglobin 9 5 (L) 12 0 - 17 0 g/dL    Hematocrit 29 7 (L) 36 5 - 49 3 %    MCV 99 (H) 82 - 98 fL    MCH 31 6 26 8 - 34 3 pg    MCHC 32 0 31 4 - 37 4 g/dL    RDW 18 3 (H) 11 6 - 15 1 %    MPV 10 9 8 9 - 12 7 fL    Platelets 991 567 - 980 Thousands/uL    nRBC 0 /100 WBCs    Neutrophils Relative 72 43 - 75 %    Immat GRANS % 1 0 - 2 %    Lymphocytes Relative 17 14 - 44 %    Monocytes Relative 8 4 - 12 %    Eosinophils Relative 1 0 - 6 %    Basophils Relative 1 0 - 1 %    Neutrophils Absolute 4 75 1 85 - 7 62 Thousands/µL    Immature Grans Absolute 0 03 0 00 - 0 20 Thousand/uL    Lymphocytes Absolute 1 08 0 60 - 4 47 Thousands/µL    Monocytes Absolute 0 52 0 17 - 1 22 Thousand/µL    Eosinophils Absolute 0 04 0 00 - 0 61 Thousand/µL    Basophils Absolute 0 03 0 00 - 0 10 Thousands/µL   Basic metabolic panel    Collection Time: 11/11/18  5:46 AM   Result Value Ref Range    Sodium 136 136 - 145 mmol/L    Potassium 3 5 3 5 - 5 3 mmol/L    Chloride 103 100 - 108 mmol/L    CO2 27 21 - 32 mmol/L    ANION GAP 6 4 - 13 mmol/L    BUN 13 5 - 25 mg/dL    Creatinine 0 86 0 60 - 1 30 mg/dL    Glucose 56 (L) 65 - 140 mg/dL    Calcium 7 4 (L) 8 3 - 10 1 mg/dL    eGFR 82 ml/min/1 73sq m   Magnesium    Collection Time: 11/11/18  5:46 AM   Result Value Ref Range    Magnesium 1 8 1 6 - 2 6 mg/dL   Albumin    Collection Time: 11/11/18  5:46 AM   Result Value Ref Range    Albumin 1 5 (L) 3 5 - 5 0 g/dL   Fingerstick Glucose (POCT)    Collection Time: 11/11/18  7:05 AM   Result Value Ref Range    POC Glucose 53 (L) 65 - 140 mg/dl   Fingerstick Glucose (POCT)    Collection Time: 11/11/18  7:36 AM   Result Value Ref Range    POC Glucose 64 (L) 65 - 140 mg/dl   Fingerstick Glucose (POCT)    Collection Time: 11/11/18  7:58 AM   Result Value Ref Range    POC Glucose 80 65 - 140 mg/dl   Fingerstick Glucose (POCT)    Collection Time: 11/11/18 11:37 AM   Result Value Ref Range    POC Glucose 128 65 - 140 mg/dl   Fingerstick Glucose (POCT)    Collection Time: 11/11/18  4:28 PM   Result Value Ref Range    POC Glucose 67 65 - 140 mg/dl   Fingerstick Glucose (POCT)    Collection Time: 11/11/18  5:50 PM   Result Value Ref Range    POC Glucose 127 65 - 140 mg/dl                 RENETTA Lim

## 2018-11-11 NOTE — PLAN OF CARE
CARDIOVASCULAR - ADULT     Absence of cardiac dysrhythmias or at baseline rhythm Progressing        DISCHARGE PLANNING     Discharge to home or other facility with appropriate resources Progressing        DISCHARGE PLANNING - CARE MANAGEMENT     Discharge to post-acute care or home with appropriate resources Progressing        GASTROINTESTINAL - ADULT     Maintains or returns to baseline bowel function Progressing     Maintains adequate nutritional intake Progressing        GENITOURINARY - ADULT     Maintains or returns to baseline urinary function Progressing     Urinary catheter remains patent Progressing        HEMATOLOGIC - ADULT     Maintains hematologic stability Progressing        INFECTION - ADULT     Absence or prevention of progression during hospitalization Progressing        Knowledge Deficit     Patient/family/caregiver demonstrates understanding of disease process, treatment plan, medications, and discharge instructions Progressing        METABOLIC, FLUID AND ELECTROLYTES - ADULT     Electrolytes maintained within normal limits Progressing     Glucose maintained within target range Progressing        Nutrition/Hydration-ADULT     Nutrient/Hydration intake appropriate for improving, restoring or maintaining nutritional needs Progressing        PAIN - ADULT     Verbalizes/displays adequate comfort level or baseline comfort level Progressing        Potential for Falls     Patient will remain free of falls Progressing        Prexisting or High Potential for Compromised Skin Integrity     Skin integrity is maintained or improved Progressing        SAFETY ADULT     Maintain or return to baseline ADL function Progressing     Maintain or return mobility status to optimal level Progressing        SKIN/TISSUE INTEGRITY - ADULT     Incision(s), wounds(s) or drain site(s) healing without S/S of infection Progressing

## 2018-11-12 LAB
ANION GAP SERPL CALCULATED.3IONS-SCNC: 6 MMOL/L (ref 4–13)
BASOPHILS # BLD AUTO: 0.02 THOUSANDS/ΜL (ref 0–0.1)
BASOPHILS NFR BLD AUTO: 0 % (ref 0–1)
BUN SERPL-MCNC: 16 MG/DL (ref 5–25)
CALCIUM SERPL-MCNC: 7 MG/DL (ref 8.3–10.1)
CHLORIDE SERPL-SCNC: 102 MMOL/L (ref 100–108)
CO2 SERPL-SCNC: 28 MMOL/L (ref 21–32)
CREAT SERPL-MCNC: 0.98 MG/DL (ref 0.6–1.3)
EOSINOPHIL # BLD AUTO: 0.07 THOUSAND/ΜL (ref 0–0.61)
EOSINOPHIL NFR BLD AUTO: 1 % (ref 0–6)
ERYTHROCYTE [DISTWIDTH] IN BLOOD BY AUTOMATED COUNT: 18.5 % (ref 11.6–15.1)
GFR SERPL CREATININE-BSD FRML MDRD: 73 ML/MIN/1.73SQ M
GLUCOSE SERPL-MCNC: 107 MG/DL (ref 65–140)
GLUCOSE SERPL-MCNC: 114 MG/DL (ref 65–140)
GLUCOSE SERPL-MCNC: 163 MG/DL (ref 65–140)
GLUCOSE SERPL-MCNC: 227 MG/DL (ref 65–140)
GLUCOSE SERPL-MCNC: 239 MG/DL (ref 65–140)
HCT VFR BLD AUTO: 25.8 % (ref 36.5–49.3)
HCT VFR BLD AUTO: 28.5 % (ref 36.5–49.3)
HGB BLD-MCNC: 8.1 G/DL (ref 12–17)
HGB BLD-MCNC: 8.8 G/DL (ref 12–17)
IMM GRANULOCYTES # BLD AUTO: 0.02 THOUSAND/UL (ref 0–0.2)
IMM GRANULOCYTES NFR BLD AUTO: 0 % (ref 0–2)
LYMPHOCYTES # BLD AUTO: 0.82 THOUSANDS/ΜL (ref 0.6–4.47)
LYMPHOCYTES NFR BLD AUTO: 16 % (ref 14–44)
MAGNESIUM SERPL-MCNC: 1.8 MG/DL (ref 1.6–2.6)
MCH RBC QN AUTO: 31.8 PG (ref 26.8–34.3)
MCHC RBC AUTO-ENTMCNC: 31.4 G/DL (ref 31.4–37.4)
MCV RBC AUTO: 101 FL (ref 82–98)
MONOCYTES # BLD AUTO: 0.39 THOUSAND/ΜL (ref 0.17–1.22)
MONOCYTES NFR BLD AUTO: 8 % (ref 4–12)
NEUTROPHILS # BLD AUTO: 3.77 THOUSANDS/ΜL (ref 1.85–7.62)
NEUTS SEG NFR BLD AUTO: 75 % (ref 43–75)
NRBC BLD AUTO-RTO: 0 /100 WBCS
PLATELET # BLD AUTO: 137 THOUSANDS/UL (ref 149–390)
PMV BLD AUTO: 10.7 FL (ref 8.9–12.7)
POTASSIUM SERPL-SCNC: 4.2 MMOL/L (ref 3.5–5.3)
RBC # BLD AUTO: 2.55 MILLION/UL (ref 3.88–5.62)
SODIUM SERPL-SCNC: 136 MMOL/L (ref 136–145)
VIT B12 SERPL-MCNC: 1212 PG/ML (ref 100–900)
WBC # BLD AUTO: 5.09 THOUSAND/UL (ref 4.31–10.16)

## 2018-11-12 PROCEDURE — 82607 VITAMIN B-12: CPT | Performed by: INTERNAL MEDICINE

## 2018-11-12 PROCEDURE — 80048 BASIC METABOLIC PNL TOTAL CA: CPT | Performed by: INTERNAL MEDICINE

## 2018-11-12 PROCEDURE — 85018 HEMOGLOBIN: CPT | Performed by: INTERNAL MEDICINE

## 2018-11-12 PROCEDURE — 99232 SBSQ HOSP IP/OBS MODERATE 35: CPT | Performed by: INTERNAL MEDICINE

## 2018-11-12 PROCEDURE — 99232 SBSQ HOSP IP/OBS MODERATE 35: CPT | Performed by: SURGERY

## 2018-11-12 PROCEDURE — 83735 ASSAY OF MAGNESIUM: CPT | Performed by: INTERNAL MEDICINE

## 2018-11-12 PROCEDURE — 82948 REAGENT STRIP/BLOOD GLUCOSE: CPT

## 2018-11-12 PROCEDURE — 85014 HEMATOCRIT: CPT | Performed by: INTERNAL MEDICINE

## 2018-11-12 PROCEDURE — C9113 INJ PANTOPRAZOLE SODIUM, VIA: HCPCS | Performed by: PHYSICIAN ASSISTANT

## 2018-11-12 PROCEDURE — 97535 SELF CARE MNGMENT TRAINING: CPT

## 2018-11-12 PROCEDURE — 85025 COMPLETE CBC W/AUTO DIFF WBC: CPT | Performed by: INTERNAL MEDICINE

## 2018-11-12 RX ORDER — ALBUMIN (HUMAN) 12.5 G/50ML
12.5 SOLUTION INTRAVENOUS ONCE
Status: COMPLETED | OUTPATIENT
Start: 2018-11-12 | End: 2018-11-12

## 2018-11-12 RX ORDER — POTASSIUM CHLORIDE 20 MEQ/1
20 TABLET, EXTENDED RELEASE ORAL DAILY
Status: DISCONTINUED | OUTPATIENT
Start: 2018-11-13 | End: 2018-11-18 | Stop reason: HOSPADM

## 2018-11-12 RX ADMIN — FUROSEMIDE 20 MG: 10 INJECTION, SOLUTION INTRAMUSCULAR; INTRAVENOUS at 10:07

## 2018-11-12 RX ADMIN — ACETAMINOPHEN 650 MG: 325 TABLET, FILM COATED ORAL at 00:22

## 2018-11-12 RX ADMIN — INSULIN DETEMIR 8 UNITS: 100 INJECTION, SOLUTION SUBCUTANEOUS at 22:01

## 2018-11-12 RX ADMIN — POTASSIUM CHLORIDE 20 MEQ: 1500 TABLET, EXTENDED RELEASE ORAL at 09:08

## 2018-11-12 RX ADMIN — MELATONIN TAB 3 MG 6 MG: 3 TAB at 22:01

## 2018-11-12 RX ADMIN — TAMSULOSIN HYDROCHLORIDE 0.4 MG: 0.4 CAPSULE ORAL at 15:38

## 2018-11-12 RX ADMIN — CEFAZOLIN SODIUM 2000 MG: 2 SOLUTION INTRAVENOUS at 14:49

## 2018-11-12 RX ADMIN — PREGABALIN 50 MG: 50 CAPSULE ORAL at 22:01

## 2018-11-12 RX ADMIN — ALBUMIN HUMAN 12.5 G: 0.25 SOLUTION INTRAVENOUS at 09:18

## 2018-11-12 RX ADMIN — TRAMADOL HYDROCHLORIDE 50 MG: 50 TABLET, COATED ORAL at 10:13

## 2018-11-12 RX ADMIN — ALBUMIN HUMAN 12.5 G: 0.25 SOLUTION INTRAVENOUS at 04:35

## 2018-11-12 RX ADMIN — INSULIN LISPRO 1 UNITS: 100 INJECTION, SOLUTION INTRAVENOUS; SUBCUTANEOUS at 12:34

## 2018-11-12 RX ADMIN — ACETAMINOPHEN 650 MG: 325 TABLET, FILM COATED ORAL at 12:35

## 2018-11-12 RX ADMIN — ACETAMINOPHEN 650 MG: 325 TABLET, FILM COATED ORAL at 17:40

## 2018-11-12 RX ADMIN — PANTOPRAZOLE SODIUM 40 MG: 40 INJECTION, POWDER, FOR SOLUTION INTRAVENOUS at 22:01

## 2018-11-12 RX ADMIN — COLLAGENASE SANTYL: 250 OINTMENT TOPICAL at 12:35

## 2018-11-12 RX ADMIN — POTASSIUM CHLORIDE 20 MEQ: 1500 TABLET, EXTENDED RELEASE ORAL at 17:40

## 2018-11-12 RX ADMIN — CEFAZOLIN SODIUM 2000 MG: 2 SOLUTION INTRAVENOUS at 06:23

## 2018-11-12 RX ADMIN — CEFAZOLIN SODIUM 2000 MG: 2 SOLUTION INTRAVENOUS at 22:00

## 2018-11-12 RX ADMIN — PANTOPRAZOLE SODIUM 40 MG: 40 INJECTION, POWDER, FOR SOLUTION INTRAVENOUS at 09:08

## 2018-11-12 RX ADMIN — ACETAMINOPHEN 650 MG: 325 TABLET, FILM COATED ORAL at 05:38

## 2018-11-12 RX ADMIN — PREGABALIN 50 MG: 50 CAPSULE ORAL at 15:38

## 2018-11-12 RX ADMIN — INSULIN LISPRO 3 UNITS: 100 INJECTION, SOLUTION INTRAVENOUS; SUBCUTANEOUS at 17:40

## 2018-11-12 RX ADMIN — ATORVASTATIN CALCIUM 10 MG: 10 TABLET, FILM COATED ORAL at 17:40

## 2018-11-12 RX ADMIN — SENNOSIDES 8.6 MG: 8.6 TABLET, FILM COATED ORAL at 22:00

## 2018-11-12 RX ADMIN — PREGABALIN 50 MG: 50 CAPSULE ORAL at 09:08

## 2018-11-12 NOTE — PROGRESS NOTES
Progress Note - Urology   Ez Lund [de-identified] y o  male MRN: [id number]  Unit/Bed#: [address] [place] [address] Encounter: [id number]    Assessment/Plan:  Urinary retention   Luis placed 11/06/18 for his inability to void  No significant change in his mobility making the chances of a successful voiding trial slim  Tolerates the luis  · Will discontinue the lusi cath once his mobility increases  Subjective:  Seen on rounds and notes the following: Pt is lying in bed in NAD  Notes no luis cath pain  Got out of bed yesterday, but overall still not mobile  Denies constipation  Objective:  Vitals Last 24 hours:   Temp:  [97 1 °F (36 2 °C)-97 9 °F (36 6 °C)] 97 7 °F (36 5 °C)  HR:  [] 110  Resp:  [18] 18  BP: ()/(50-61) 103/55  BMI: Body mass index is 23 88 kg/m²  Physical Exam   Constitutional: He appears well-developed  Neurological: He is alert  Skin: Skin is warm  Vitals reviewed  Drains:   Luis draining yellow urine       Lab Results and Cultures:     Results from last 7 days  Lab Units 11/12/18  0548 11/11/18  0546 11/10/18  0630 11/09/18  0538 11/08/18  0521 11/07/18  0526   WBC Thousand/uL 5 09 6 45 7 78 6 76 8 04 7 30   RBC Million/uL 2 55* 3 01* 2 99* 2 84* 2 78* 2 96*   HEMOGLOBIN g/dL 8 1* 9 5* 9 3* 8 8* 8 7* 9 1*   HEMATOCRIT % 25 8* 29 7* 29 3* 28 0* 27 3* 29 0*   PLATELETS Thousands/uL 137* 172 157 137* 139* 146*   POTASSIUM mmol/L 4 2 3 5 3 4* 3 5 3 6 3 8   CHLORIDE mmol/L 102 103 102 101 103 107   CO2 mmol/L 28 27 25 24 24 23   BUN mg/dL 16 13 14 15 18 22   CREATININE mg/dL 0 98 0 86 0 82 0 82 0 84 0 95   PHOSPHORUS mg/dL  --   --   --  2 2* 2 2* 2 6   MAGNESIUM mg/dL 1 8 1 8 1 8 1 9 1 9 2 1   CALCIUM mg/dL 7 0* 7 4* 7 0* 7 0* 6 8* 7 0*   AST U/L  --   --   --   --  12  --    ALT U/L  --   --   --   --  <6*  --    ALK PHOS U/L  --   --   --   --  67  --    EGFR ml/min/1 73sq m 73 82 84 84 83 75                   Lab Results   Component Value Date    URINECX >100,000 cfu/ml 10/25/2018         Intake/Output Summary (Last 24 hours) at 11/12/18 1305  Last data filed at 11/12/18 0501   Gross per 24 hour   Intake              300 ml   Output             3450 ml   Net            -3150 ml         Estela Swanson PA-C  11/12/2018    Portions of the record may have been created with voice recognition software   Occasional wrong word or "sound alike" substitutions may have occurred due to the inherent limitations of voice recognition software   Read the chart carefully and recognize, using context, where substitutions have occurred

## 2018-11-12 NOTE — PROGRESS NOTES
Progress Note - General Surgery   [facility] Trina [de-identified] y o  male MRN: [id number]  Unit/Bed#: [address] [place] [address] Encounter: [id number]    Assessment:  1) POD excisional debridement and incision and drainage 10/29/18 - no leukocytosis, some hypotension, otherwise AVSS, pain well controlled, wound looks appropriate, no erythema, no pus, mild tenderness, no significant swelling    Plan:  1) POD excisional debridement and incision and drainage 10/29/18 -  - will follow-up on 11/14/18  - routine wound check  - do not need to remove sutures until next week 11/21/18  - otherwise doing well  - routine vitals  - management per slim team  - will follow peripherally    Subjective/Objective   Chief Complaint:  I was really out of my head when I was in the ICU    Subjective:  Patient was seen examined at bedside  Patient denies any acute events overnight  Patient denies any fevers or chills  Patient reports he feels more oriented  Patient does have some tenderness with moving his arms but nothing that can be tolerated  Patient denies any increased redness, swelling, purulent discharge from his left axilla and/or incision    Objective:     Blood pressure (!) 89/55, pulse 93, temperature 97 7 °F (36 5 °C), temperature source Oral, resp  rate 18, height 5' 8 5" (1 74 m), weight 72 3 kg (159 lb 6 3 oz), SpO2 98 %  ,Body mass index is 23 88 kg/m²        Intake/Output Summary (Last 24 hours) at 11/12/18 0856  Last data filed at 11/12/18 0501   Gross per 24 hour   Intake              300 ml   Output             3750 ml   Net            -3450 ml       Invasive Devices     Peripherally Inserted Central Catheter Line            PICC Line 66/19/40 Right Basilic 11 days          Drain            Urethral Catheter 16 Fr  6 days                Physical Exam: BP (!) 89/55 (BP Location: Left arm)   Pulse 93   Temp 97 7 °F (36 5 °C) (Oral)   Resp 18   Ht 5' 8 5" (1 74 m)   Wt 72 3 kg (159 lb 6 3 oz)   SpO2 98%   BMI 23 88 kg/m²   General appearance: alert and oriented, in no acute distress  Head: Normocephalic, without obvious abnormality, atraumatic  Lungs: clear to auscultation bilaterally  Heart: regular rate and rhythm, S1, S2 normal, no murmur, click, rub or gallop and rate controlled a fib  Extremities: Residual edema left upper extremity, otherwise majority of swelling has completely decreased, no erythema along the incision, sensation and motor intact of upper left extremity, radial pulse 2 +bilaterally  Skin: Skin color, texture, turgor normal  No rashes or lesions or Excision still intact, superior and medial excision had tension and is left open with to approximation sutures, the remainder of the incision is almost completely closed with exception of where the triangle skin flap joints with the majority of the incision, there is a punctate opening at the juncture of the triangular skin flap, no erythema, no purulence, no swelling, mild to moderate tenderness with palpation    Lab, Imaging and other studies:  I have personally reviewed pertinent lab results    , CBC:   Lab Results   Component Value Date    WBC 5 09 11/12/2018    HGB 8 1 (L) 11/12/2018    HCT 25 8 (L) 11/12/2018     (H) 11/12/2018     (L) 11/12/2018    MCH 31 8 11/12/2018    MCHC 31 4 11/12/2018    RDW 18 5 (H) 11/12/2018    MPV 10 7 11/12/2018    NRBC 0 11/12/2018   , CMP:   Lab Results   Component Value Date    SODIUM 136 11/12/2018    K 4 2 11/12/2018     11/12/2018    CO2 28 11/12/2018    BUN 16 11/12/2018    CREATININE 0 98 11/12/2018    CALCIUM 7 0 (L) 11/12/2018    EGFR 73 11/12/2018     VTE Pharmacologic Prophylaxis: per SLIM  VTE Mechanical Prophylaxis: sequential compression device

## 2018-11-12 NOTE — UTILIZATION REVIEW
Continued Stay Review  Date: 11/10/18  Vital Signs: /55 (BP Location: Left arm)   Pulse (!) 110   Temp 97 7 °F (36 5 °C) (Oral)   Resp 18   Ht 5' 8 5" (1 74 m)   Wt 72 3 kg (159 lb 6 3 oz)   SpO2 98%   BMI 23 88 kg/m²     Medications:   Scheduled Meds:   Current Facility-Administered Medications:  acetaminophen 650 mg Oral Q6H [address] Rhea John PA-C    albumin human 12 5 g Intravenous Daily Melissa Gomez MD    And        furosemide 20 mg Intravenous Daily Melissa Gomez MD    atorvastatin 10 mg Oral Daily Rachel Concepcion MD    cefazolin 2,000 mg Intravenous Q8H Rachel Concepcion MD Last Rate: 2,000 mg (11/12/18 6352)   collagenase  Topical Daily Julius Bonilla MD    insulin detemir 8 Units Subcutaneous HS Melissa Gomez MD    insulin lispro 1-5 Units Subcutaneous TID AC Melissa Gomez MD    lisinopril 2 5 mg Oral Daily Melissa Gomez MD    melatonin 6 mg Oral HS Rachel Concepcion MD    metoprolol tartrate 12 5 mg Oral Q12H [address] Belen Tee PA-C    pantoprazole 40 mg Intravenous Q12H Bishop Sonam PA-C    potassium chloride 20 mEq Oral BID Melissa Gomez MD    pregabalin 50 mg Oral TID Rachel Concepcion MD    senna 1 tablet Oral HS Rachel Concepcion MD    tamsulosin 0 4 mg Oral Daily With Thomas Shipley MD    traMADol 50 mg Oral Q6H PRN Melissa Gomez MD      Continuous Infusions:    PRN Meds: traMADol  Abnormal Labs/Diagnostic Results:    K 3 4 CA 7 0 HGB  9 3   Age/Sex: [de-identified] y o  male   Assessment/Plan:   IVABT THERAPY IN PROGRESS S/P DEBRIDEMENT ABSCESS CHEST WALL, +BACTEREMIA  WOUND CARE PER SURGEON, WOUND VAC REMOVED       Discharge Plan: TBD

## 2018-11-12 NOTE — OCCUPATIONAL THERAPY NOTE
OT TREATMENT       11/12/18 0959   Restrictions/Precautions   Other Precautions Chair Alarm; Bed Alarm;Multiple lines;Telemetry; Fall Risk;Pain   Pain Assessment   Pain Assessment 0-10   Pain Score 7   Pain Type Acute pain   Pain Location (left flank)   ADL   Grooming Assistance 5  Supervision/Setup   Grooming Deficit Setup; Teeth care   Bed Mobility   Supine to Sit 4  Minimal assistance   Transfers   Sit to Stand 3  Moderate assistance   Stand to Sit 4  Minimal assistance   Stand pivot 4  Minimal assistance   Cognition   Overall Cognitive Status [facility]   Arousal/Participation Alert; Responsive; Cooperative   Attention Within functional limits   Orientation Level Oriented X4   Memory Within functional limits   Following Commands Follows all commands and directions without difficulty   Activity Tolerance   Activity Tolerance Patient limited by fatigue;Patient limited by pain   Assessment   Assessment Pt receive din bed, Agreeable to get up into chair and compelte oral care  Pt Reprots shingles pain on left flank  Assist for bed mobility and trasnfers, set up of oral care items and pt compeltes without physical assistance or verbal cues  Plan   Treatment Interventions ADL retraining;Functional transfer training;UE strengthening/ROM; Endurance training;Patient/family training;Equipment evaluation/education; Neuromuscular reeducation; Compensatory technique education; Energy conservation   Goal Expiration Date 11/15/18   Treatment Day 4   OT Frequency 5x/wk   Recommendation   OT Discharge Recommendation [id number] [employer] License Number  Saulo Wing OTR/L [id number]

## 2018-11-12 NOTE — PROGRESS NOTES
Progress Note - Infectious Disease   Mony Atwood [de-identified] y o  male MRN: [id number]  Unit/Bed#: [address] [place] [address] Encounter: [id number]      Impression/Plan:  1  MSSA bacteremia-appears to be secondary to chest wall abscesses   No other clear source appreciated  Consideration for the possibility of endocarditis but this is less likely with only 1 of 2 blood cultures positive and the transthoracic echocardiogram without valvular vegetations   Repeat blood cultures remain negative   -continue high-dose IV cefazolin  -with severity and complexity of infection, plan 4 weeks of intravenous antibiotics from the date of 1st negative blood culture, through 11/25   -PICC line in place  This will need to be removed after last dose of IV antibiotic      2  MSSA chest wall abscesses-status post extensive I and D x2 with a VAC dressing in place  Nataliya Olvera seems to have improved clinically   His pain seems reasonably well controlled   This all possibly as a complication of zoster   -antibiotics as above  -vac dressing and local care  -close surgical follow-up     3  Septic shock-POA   Leukocytosis and tachycardia and hypotension   Appears to be all secondary to MSSA bacteremia in the setting of chest wall abscess formation   No other clear sources appreciated   Patient is clinically improved overall   The patient's heart rate has come down, and the white cell count has come down   He seems to be tolerating the antibiotics without difficulty   Patient remains off vasopressors   -antibiotic plan as below  -supportive care in the intensive care unit     4  Herpes zoster-hard to make a definitive diagnosis at this point as the rash is relatively advanced in its presentation   Patient now status post 7 day course of Valtrex      5  Acute kidney injury-likely a pre renal issue   Renal function has improved and remains stable   -volume management    6  Esophageal candidiasis-started on fluconazole  Biopsy was negative for Candida    Fluconazole was discontinued      Antibiotics:  Cefazolin 10  Antibiotics 14  Fluconazole 12       Subjective:  No acute overnight events  Denies fevers, chills, or sweats  Denies nausea, vomiting, or diarrhea  Objective:  Vitals:  Temp:  [97 6 °F (36 4 °C)-97 9 °F (36 6 °C)] 97 6 °F (36 4 °C)  HR:  [] 105  Resp:  [18] 18  BP: ()/(50-61) 96/58  SpO2:  [97 %-98 %] 98 %  Temp (24hrs), Av 7 °F (36 5 °C), Min:97 6 °F (36 4 °C), Max:97 9 °F (36 6 °C)  Current: Temperature: 97 6 °F (36 4 °C)    Physical Exam:   General:  No acute distress  Psychiatric:  Awake and alert  Pulmonary:  Normal respiratory excursion without accessory muscle use  Wound VAC in place  Abdomen:  Soft, nontender  Extremities:  No edema  Skin:  No rashes    Lab Results:  I have personally reviewed pertinent labs  Results from last 7 days  Lab Units 18  0548 18  0546 11/10/18  0630  18  0521   POTASSIUM mmol/L 4 2 3 5 3 4*  < > 3 6   CHLORIDE mmol/L 102 103 102  < > 103   CO2 mmol/L 28 27 25  < > 24   BUN mg/dL 16 13 14  < > 18   CREATININE mg/dL 0 98 0 86 0 82  < > 0 84   EGFR ml/min/1 73sq m 73 82 84  < > 83   CALCIUM mg/dL 7 0* 7 4* 7 0*  < > 6 8*   AST U/L  --   --   --   --  12   ALT U/L  --   --   --   --  <6*   ALK PHOS U/L  --   --   --   --  67   < > = values in this interval not displayed  Results from last 7 days  Lab Units 18  1426 18  0548 18  0546 11/10/18  0630   WBC Thousand/uL  --  5 09 6 45 7 78   HEMOGLOBIN g/dL 8 8* 8 1* 9 5* 9 3*   PLATELETS Thousands/uL  --  137* 172 157           Imaging Studies:   I have personally reviewed pertinent imaging study reports and images in PACS  EKG, Pathology, and Other Studies:   I have personally reviewed pertinent reports

## 2018-11-12 NOTE — PROGRESS NOTES
[facility] Internal Medicine Progress Note  Patient: Ez Lund [age] [facility] y o  male   MRN: [id number]  PCP: Trevor Campos DO  Unit/Bed#: [address] [place] [address] Encounter: [id number]  Date Of Visit: 11/12/18    Problem List:    Principal Problem:    Abscess of chest wall  Active Problems:    Cardiomyopathy, ischemic    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HCC)    Acute blood loss anemia    Bacteremia due to Staphylococcus aureus    DM2 (diabetes mellitus, type 2) ([facility])    CAD (coronary artery disease)    CKD (chronic kidney disease) stage 3, GFR 30-59 ml/min ([facility])    Benign essential hypertension    Atrial fibrillation ([facility] )    Urinary retention    Gastrointestinal hemorrhage associated with duodenal ulcer    Neuralgia, post-herpetic    Severe protein-calorie malnutrition ([facility] )      Assessment & Plan:    Cardiomyopathy, ischemic   Assessment & Plan    · EF prior was 35 to 40%  Recent stress test in May of 2018 showed reversible ischemic changes  Cardiology following as an outpatient with medical management  · ECHO repeat with EF 20% possibly related to sepsis  · Cardiac output and indexes while patient was intubated were acceptable  · Frequent PVCs on telemetry, Episode of nonsustained V-tach overnight 11/6, asymptomatic  · Cardiology following, consider AICD and life vest before discharge  · Continue metoprolol 12 5 mg b i d  · Repeat echo with EF 20%   · Continue IV Lasix as hemoglobin is stabilizing and sepsis has resolved  · Added albumin and Brock stocking due to low blood pressure  · Rechallenge with lisinopril, but unable to receive due to low blood pressure  · Continue telemetry, monitor electrolytes  · Follow up with Cardiology  · Nutrition evaluation     * Abscess of chest wall   Assessment & Plan    · Aggressive  debridement in OR on 10/26 by Dr Bijan Abarca  · Antibiotics deescalated to Ancef as per ID recs, patient will require IV antibiotics until November 25th   Patient has PICC line for outpatient antibiotic therapy  · ID following  · Blood culture staph aureus x 1  · Wound culture: 3+ staph aureus   · Urine cultures > 100,000 mixed containments   · Repeat blood cultures from 10/29 no growth to date  · Wound VAC removed 11/3  Continue local wound care  Surgical follow up noted, likely suture removal on 11/21  · Continue on Lyrica 50 mg TID for possible post-hepatic neuralgia, scheduled tylenol, p r n  Tramadol     Bacteremia due to Staphylococcus aureus   Assessment & Plan    · Admission Blood culture x 1 MSSA  · Repeat blood cultures no growth to date  · Per ID will need 4 wks antibiotics from negative blood cultures through 11/25  · PICC line in place     Acute blood loss anemia   Assessment & Plan    · Secondary to bleeding gastric ulcer  · Patient continued with tachycardia and drifting hemoglobin and later developed guanakito melena  · EGD with duodenal bulb ulcer with intervention 11/4  · Pt has been Transfused 9 units of packed red blood cells total this admission  1 unit 10/28, 3 units 10/30, 1 unit 11/2  4 unit 11/4  · Hgb remains relatively stable without any evidence of overt bleeding  · Continue IV PPI as per GI for now but transition to p o  Soon  · Continue trend hemoglobin, stable  · Follow-up GI as outpatient for repeat EGD     Neuralgia, post-herpetic   Assessment & Plan    On Lyrica     Gastrointestinal hemorrhage associated with duodenal ulcer   Assessment & Plan    Status post EGD 11/4 revealed duodenal bleeding ulcer  Continue IV PPI for now  Advance to full liquid diet  Monitor H&H     Urinary retention   Assessment & Plan    Status post failing voiding trial  Likely secondary to BPH , on Flomax  Follow up Urology recommendation  Maintain Gordon     Atrial fibrillation [facility])   Assessment & Plan    · History of AFib in the setting of electrolyte abnormalities in the past   Monitor on telemetry  · Current NSR  · Continue to hold on anticoagulation at this time    Particularly in light of recent episode of GI bleeding  · Frequent PVCs on telemetry, cardiology consulted, beta-blocker resumed     Benign essential hypertension   Assessment & Plan    Stable  Asymptomatic  Continue metoprolol     CKD (chronic kidney disease) stage 3, GFR 30-59 ml/min ([facility])   Assessment & Plan    Stable, baseline creatinine 0 9-1 3  Monitor input output       CAD (coronary artery disease)   Assessment & Plan    Troponin negative  Continue statin, continue to hold aspirin secondary to GI bleed  Follow up with Cardiology     DM2 (diabetes mellitus, type 2) [facility])   Assessment & Plan    Lab Results   Component Value Date    HGBA1C 12 0 (H) 10/26/2018       Recent Labs      11/12/18   0710  11/12/18   1115  11/12/18   1617  11/12/18   2106   POCGLU  107  163*  239*  227*       Blood Sugar Average: Last 72 hrs:  (P) 135     Decreased Levemir at 8 units daily yesterday given hypoglycemia in a m , now acceptable  Will add pre meal insulin  Monitor     Esophagitisresolved as of 11/4/2018   Assessment & Plan    · Resolved  · EGD on 10/31: duodenal bulb ulcer with possible candida esophagitis  · Biopsies negative for candida - d/c fluconizole 11/4     Severe protein-calorie malnutrition (HCC)   Assessment & Plan    Malnutrition Findings:           BMI Findings: Body mass index is 24 02 kg/m²          Acute respiratory failure secondary to septic shockresolved as of 10/28/2018   Assessment & Plan    Status post extubation 10/26       DKA (diabetic ketoacidoses) ([facility])resolved as of 10/26/2018   Assessment & Plan    Lab Results   Component Value Date    HGBA1C 12 0 (H) 10/26/2018       Recent Labs      11/07/18   2121  11/08/18   1058  11/08/18   1609  11/08/18   2205   POCGLU  121  142*  157*  103       Blood Sugar Average: Last 72 hrs:  (P) 105 75          Shinglesresolved as of 11/1/2018   Assessment & Plan    Status post treatment with Valtrex 10/31     Urinary tract infectionresolved as of 10/29/2018   Assessment & Plan Suspected possible source on admission  Urine culture without specific pathogen       CHRIS (acute kidney injury) (HCC)resolved as of 10/27/2018   Assessment & Plan    Renal secondary to septic shock in DKA  Improved  Monitor     Hyponatremiaresolved as of 10/27/2018   Assessment & Plan    Noted to be 111 on presentation likely secondary to combination of hypovolemic hyponatremia secondary to dehydration, sepsis and DKA plus pseudo hyponatremia secondary to hyperglycemia           VTE Pharmacologic Prophylaxis:   Pharmacologic: Pharmacologic VTE Prophylaxis contraindicated due to GI bleed  Mechanical VTE Prophylaxis in Place: No    Patient Centered Rounds: I have performed bedside rounds with nursing staff today  Discussions with Specialists or Other Care Team Provider: Yes    Education and Discussions with Family / Patient:Yes, discussed with daughter in low at bedside at [facility]    Time Spent for Care: 45 minutes  More than 50% of total time spent on counseling and coordination of care as described above  Current Length of Stay: 18 day(s)    Current Patient Status: Inpatient     Discharge Plan:  Anticipate rehab in next 48-72 hours upon transition to oral diuresis    Code Status: Level 2 - DNAR: but accepts endotracheal intubation    Certification Statement: The patient will continue to require additional inpatient hospital stay due to GI bleed, sepsis, cardiomyopathy      Subjective:   Is sitting up in chair today  Denies any chest pain shortness of breath or dizziness  Still with evidence of significant volume overload    Objective:   Not in distress    All other 10 review of systems negative and without drastic interval changes from yesterday      Vitals:   Temp (24hrs), Av 5 °F (36 4 °C), Min:97 1 °F (36 2 °C), Max:97 9 °F (36 6 °C)    Temp:  [97 1 °F (36 2 °C)-97 9 °F (36 6 °C)] 97 7 °F (36 5 °C)  HR:  [] 97  Resp:  [18] 18  BP: ()/(50-61) 107/61  SpO2:  [96 %-99 %] 98 %  Body mass index is 23 88 kg/m²  Input and Output Summary (last 24 hours): Intake/Output Summary (Last 24 hours) at 11/12/18 1007  Last data filed at 11/12/18 0501   Gross per 24 hour   Intake              300 ml   Output             3450 ml   Net            -3150 ml       Physical Exam:     Physical Exam   Constitutional: He is oriented to person, place, and time  He appears well-developed  No distress  HENT:   Head: Normocephalic and atraumatic  Eyes: Pupils are equal, round, and reactive to light  Conjunctivae are normal    Neck: Normal range of motion  Neck supple  Cardiovascular: Normal rate  Murmur heard  Pulmonary/Chest: Effort normal  No respiratory distress  He has decreased breath sounds  He has no wheezes  He has no rhonchi  He has no rales  He exhibits no tenderness  Left chest wall and back dressing in place  Incision C/D/I, stitches in place   Abdominal: Soft  Bowel sounds are normal  He exhibits no distension  There is no tenderness  There is no rebound and no guarding  Genitourinary:   Genitourinary Comments: Gordon catheter in place   Musculoskeletal: Normal range of motion  He exhibits edema  Neurological: He is alert and oriented to person, place, and time  No cranial nerve deficit  Skin: Skin is warm and dry  No rash noted  Additional Data:     Labs:      Results from last 7 days  Lab Units 11/12/18  0548   WBC Thousand/uL 5 09   HEMOGLOBIN g/dL 8 1*   HEMATOCRIT % 25 8*   PLATELETS Thousands/uL 137*   NEUTROS PCT % 75   LYMPHS PCT % 16   MONOS PCT % 8   EOS PCT % 1       Results from last 7 days  Lab Units 11/12/18  0548  11/08/18  0521   POTASSIUM mmol/L 4 2  < > 3 6   CHLORIDE mmol/L 102  < > 103   CO2 mmol/L 28  < > 24   BUN mg/dL 16  < > 18   CREATININE mg/dL 0 98  < > 0 84   CALCIUM mg/dL 7 0*  < > 6 8*   ALK PHOS U/L  --   --  67   ALT U/L  --   --  <6*   AST U/L  --   --  12   < > = values in this interval not displayed          * I Have Reviewed All Lab Data Listed Above   * Additional Pertinent Lab Tests Reviewed: All Labs For Current Hospital Admission Reviewed    Imaging:  Ct Chest Abdomen Pelvis Wo Contrast    Result Date: 10/26/2018  Narrative: CT CHEST, ABDOMEN AND PELVIS WITHOUT IV CONTRAST INDICATION:   Sepsis  COMPARISON:  CT cervical spine dated 10/25/2018, radiographs of the left shoulder dated 10/25/2018 TECHNIQUE: CT examination of the chest, abdomen and pelvis was performed without intravenous contrast   Axial, sagittal, and coronal 2D reformatted images were created from the source data and submitted for interpretation  Radiation dose length product (DLP) for this visit:  652 78 mGy-cm   This examination, like all CT scans performed in the [place], was performed utilizing techniques to minimize radiation dose exposure, including the use of iterative  reconstruction and automated exposure control  Enteric contrast was not administered  FINDINGS: CHEST LUNGS:  Some patchy airspace opacities are seen in the right lower lobe superior segment  There is a large dense consolidation in the right middle lobe, lateral segment  Some mucous plugging is suspected in a left upper lobe bronchus  There is mild dependent and bibasilar atelectasis  The lungs otherwise appear grossly clear  PLEURA:  Small bilateral pleural effusions are noted dependently  HEART/GREAT VESSELS:  There is left ventricular enlargement  At least moderate coronary atherosclerosis is noted  Status post CABG with a LIMA graft  Mild aortic atherosclerosis  No aortic aneurysm  MEDIASTINUM AND ALIZE:  Unremarkable  CHEST WALL AND LOWER NECK: Median sternotomy wires are noted  A few bubbles of air are seen in the anterior left chest subcutaneous tissues (axial image 8)    Multiple areas of skin thickening with associated subjacent complex fluid are seen in the superficial soft tissues of the left pectoralis region (for example axial image 30); this area measures approximately 5 5 x 1 1 x 4 0 cm in transverse, AP, and CC dimensions  There is body wall edema noted with some shotty left axillary lymph nodes  In  addition there is posterior skin thickening with some irregularity of the soft tissues of the posterior and superior left thorax (axial image 12)  Subjacent to this there is an apparent fluid collection measuring approximately 4 7 x 1 8 x 4 6 cm in size  ABDOMEN LIVER/BILIARY TREE:  Unremarkable  GALLBLADDER:  No calcified gallstones  No pericholecystic inflammatory change  SPLEEN:  Unremarkable  PANCREAS:  Mild atrophy  Otherwise grossly unremarkable ADRENAL GLANDS:  Grossly unremarkable KIDNEYS/URETERS:  Partially exophytic cyst in the posterior midportion of the right kidney measures approximately 1 5 cm in size  Exophytic cyst in the lower pole of the left kidney measures approximately 3 7 cm in size  Bilateral kidneys otherwise appear grossly unremarkable; no hydronephrosis  STOMACH AND BOWEL:  Grossly unremarkable APPENDIX:  A normal appendix was visualized  ABDOMINOPELVIC CAVITY:  No ascites or free intraperitoneal air  No lymphadenopathy  VESSELS:  Mild atherosclerosis; no aortic aneurysm PELVIS REPRODUCTIVE ORGANS:  Unremarkable for patient's age  URINARY BLADDER:  Bladder is partially distended  A catheter is seen within the bladder lumen  Some air is seen within the nondependent portion of the bladder  Mild circumferential bladder wall thickening is noted, probably exaggerated by underdistention  ABDOMINAL WALL/INGUINAL REGIONS:  A right-sided femoral venous catheter is noted  Some subcutaneous emphysema is seen in this region as well, probably related to recent intervention/placement  Diffuse body wall edema is noted  OSSEOUS STRUCTURES:  Mild curvature of the lumbar spine, apex to the left  Moderate degenerative changes of the bilateral hips, worse on the right  No acute fracture or destructive osseous lesion is identified   Prominent degenerative changes of the right shoulder  Multilevel degenerative changes of the spine with vacuum disc phenomenon at L4/L5  There is intervertebral disc space narrowing and facet joint arthropathy at L2/L3, L3/L4, and L4/L5  Impression: Patchy airspace opacities are seen in the right lower lobe superior segment  There is a large dense consolidation in the right middle lobe, lateral segment, nonspecific but suspicious for infection/pneumonia in the appropriate clinical setting  Cardiomegaly, bilateral pleural effusions, and body wall edema suggests a superimposed component of fluid overload/CHF  Multiple areas of skin thickening with associated subjacent complex fluid are seen in the superficial soft tissues of the left pectoralis region (for example axial image 30), as well as in the posterior and superior left thorax (axial image 12), as described  Consider cellulitis with developing abscesses  In addition, a few bubbles of air are seen in the anterior left chest subcutaneous tissues (axial image 8) which could be related to recent intervention but also raises suspicion for possible gas-forming infection  Partially distended bladder with catheter seen within the bladder lumen  Mild circumferential bladder wall thickening noted, probably exaggerated by underdistention  A cystitis could be considered in the appropriate clinical setting  Renal cysts, degenerative changes of the shoulder, spine, and hips, and other findings as above  Findings discussed with FRITZ Alexis by Dr Benjamin Spear at 4:18 AM on 10/26/2018  Workstation performed: [id number]     Xr Chest Portable    Result Date: 10/29/2018  Narrative: CHEST INDICATION:   pna  COMPARISON:  10/26/2018 EXAM PERFORMED/VIEWS:  XR CHEST PORTABLE FINDINGS:  Endotracheal and nasogastric tubes have been removed during the interim  Cardiomediastinal silhouette appears unremarkable   Right greater than left basilar pleural effusions with associated right basilar airspace opacity, likely atelectasis  Osseous structures appear within normal limits for patient age  Impression: Persistent right basilar pleural effusion and associated probable atelectasis  Workstation performed: [id number]     Xr Spine Thoracic 3 Views    Result Date: 10/25/2018  Narrative: THORACIC SPINE INDICATION:   Pain  COMPARISON:  Chest x-ray from 3/13/2013 VIEWS:  XR SPINE THORACIC 3 VW FINDINGS: There is no acute fracture or pathologic bone lesion  Minimal vertebral body height loss scattered in the thoracic spine similar to the prior exam probably degenerative  Thoracic vertebral alignment is within normal limits  Multilevel degenerative changes with flowing marginal osteophyte formation  There is no displacement of the paraspinal line  The pedicles appear intact  Impression: No acute osseous abnormality  Workstation performed: [id number]     Xr Spine Lumbar 2 Or 3 Views Injury    Result Date: 10/25/2018  Narrative: LUMBAR SPINE INDICATION:   pain  COMPARISON:  None VIEWS:  XR SPINE LUMBAR 2 OR 3 VIEWS INJURY FINDINGS: There is no evidence of acute fracture or destructive osseous lesion  Levoscoliosis is present about L3  Moderate degenerative changes present throughout the lumbar spine  The pedicles appear intact  Soft tissues are unremarkable  Impression: Levoscoliosis and moderate multilevel degenerative change  Workstation performed: [id number]     Xr Shoulder 2+ Views Left    Result Date: 10/25/2018  Narrative: LEFT SHOULDER INDICATION:   Pain  COMPARISON:  None VIEWS:  XR SHOULDER 2+ VW LEFT FINDINGS: There is no acute fracture or dislocation  Narrowing of the glenohumeral joint with mild spurring  No lytic or blastic lesions are seen  Soft tissues are unremarkable  Impression: No acute osseous abnormality  Workstation performed: [id number]     Ct Head Wo Contrast    Result Date: 10/26/2018  Narrative: CT BRAIN - WITHOUT CONTRAST INDICATION:   Confusion/delirium, altered LOC, unexplained Lethargy  COMPARISON:  CT head dated 10/25/2018 TECHNIQUE:  CT examination of the brain was performed  In addition to axial images, coronal 2D reformatted images were created and submitted for interpretation  Radiation dose length product (DLP) for this visit:  1090 97 mGy-cm   This examination, like all CT scans performed in the [facility], was performed utilizing techniques to minimize radiation dose exposure, including the use of iterative reconstruction and automated exposure control  IMAGE QUALITY:  Diagnostic  FINDINGS: PARENCHYMA: Decreased attenuation is noted in periventricular and subcortical white matter demonstrating an appearance that is statistically most likely to represent mild microangiopathic change  Gray-white differentiation appears maintained No CT signs of acute territorial infarction  No intracranial mass, mass effect or midline shift  No acute parenchymal hemorrhage  Mild generalized parenchymal atrophy redemonstrated  VENTRICLES AND EXTRA-AXIAL SPACES:  Ventricles and extra-axial CSF spaces are prominent commensurate with the degree of volume loss  No hydrocephalus  No acute extra-axial hemorrhage  VISUALIZED ORBITS AND PARANASAL SINUSES:  Unremarkable  CALVARIUM AND EXTRACRANIAL SOFT TISSUES:  The calvarium appears intact  Otherwise grossly unremarkable  Impression: No acute intracranial abnormality  Other nonacute findings as above  Workstation performed: [id number]     Ct Head Without Contrast    Result Date: 10/25/2018  Narrative: CT BRAIN - WITHOUT CONTRAST INDICATION:   fall  COMPARISON:  5/9/2018  TECHNIQUE:  CT examination of the brain was performed  In addition to axial images, coronal 2D reformatted images were created and submitted for interpretation  Radiation dose length product (DLP) for this visit:  1220 31 mGy-cm     This examination, like all CT scans performed in the [facility], was performed utilizing techniques to minimize radiation dose exposure, including the use of iterative reconstruction and automated exposure control  IMAGE QUALITY:  Diagnostic  FINDINGS: PARENCHYMA: Decreased attenuation is noted in periventricular and subcortical white matter demonstrating an appearance that is statistically most likely to represent moderate microangiopathic change  No CT signs of acute infarction  No intracranial mass, mass effect or midline shift  No acute parenchymal hemorrhage  VENTRICLES AND EXTRA-AXIAL SPACES:  Ventricles and extra-axial CSF spaces are prominent commensurate with the degree of volume loss  No hydrocephalus  No acute extra-axial hemorrhage  VISUALIZED ORBITS AND PARANASAL SINUSES:  Unremarkable  CALVARIUM AND EXTRACRANIAL SOFT TISSUES:  Normal      Impression: No acute intracranial abnormality  Workstation performed: [id number]     Ct Spine Cervical Without Contrast    Result Date: 10/25/2018  Narrative: CT CERVICAL SPINE - WITHOUT CONTRAST INDICATION:   fall  COMPARISON:  None  TECHNIQUE:  CT examination of the cervical spine was performed without intravenous contrast   Contiguous axial images were obtained  Sagittal and coronal reconstructions were performed  Radiation dose length product (DLP) for this visit:  415 18 mGy-cm   This examination, like all CT scans performed in the [facility], was performed utilizing techniques to minimize radiation dose exposure, including the use of iterative  reconstruction and automated exposure control  IMAGE QUALITY:  Diagnostic  FINDINGS: ALIGNMENT:  Normal alignment of the cervical spine  No subluxation  VERTEBRAL BODIES:  No fracture  DEGENERATIVE CHANGES:  Moderate multilevel cervical degenerative changes are noted  No critical central canal stenosis  PREVERTEBRAL AND PARASPINAL SOFT TISSUES:  Unremarkable  THORACIC INLET:  Normal      Impression: No cervical spine fracture or traumatic malalignment    Workstation performed: [id number]     Xr Chest 1 View    Result Date: 10/25/2018  Narrative: CHEST INDICATION:   Pain  Fall  COMPARISON:  Chest x-ray from 3/13/2013 EXAM PERFORMED/VIEWS:  XR CHEST 1 VIEW FINDINGS:  There has been interval sternotomy  Cardiac silhouette is moderately enlarged  Focal consolidation in the right lower lobe laterally  Left lung is clear  No pneumothorax or pleural effusion  Osseous structures appear within normal limits for patient age  Impression: Focal consolidation in the right lower lobe  This may represent pneumonia  If there is any trauma to the right chest, pulmonary contusion is a consideration  Recommend follow up to resolution  The study was marked in EPIC for significant notification  Workstation performed: [id number]     Xr Chest Portable Icu    Result Date: 11/1/2018  Narrative: CHEST INDICATION:   PNA  COMPARISON:  10/29/2018 EXAM PERFORMED/VIEWS:  XR CHEST PORTABLE ICU 1 image FINDINGS: Cardiomediastinal silhouette appears enlarged  A median sternotomy has been performed  No evidence of heart failure  A PICC line enters on the right, the tip extends to the SVC  Bibasilar densities may represent infiltrate, atelectasis or fluid  Osseous structures appear within normal limits for patient age  Impression: Cardiomegaly  Bibasilar parenchymal densities unchanged  Workstation performed: [id number]     Xr Chest Portable Icu    Result Date: 10/29/2018  Narrative: CHEST INDICATION:   Evaluate for aspiration pneumonia    COMPARISON:  10/29/2018 EXAM PERFORMED/VIEWS:  XR CHEST PORTABLE ICU FINDINGS:  There are median sternotomy wires indicating prior cardiac surgery  Heart shadow is enlarged but unchanged from prior exam  Bilateral pleural effusions appear similar size to the previous examination  In conjunction with prior chest CT 10/26/2018, right base airspace disease is again identified  Questionable left retrocardiac airspace disease as well, similar to prior  No pneumothorax  No pulmonary edema   Osseous structures appear within normal limits for patient age  Impression: No significant interval change since 10/29/2018 Workstation performed: [id number]     Xr Chest Portable Icu    Result Date: 10/26/2018  Narrative: CHEST INDICATION:   Endotracheal tube positioning  COMPARISON:  10/25/2018 EXAM PERFORMED/VIEWS:  XR CHEST PORTABLE ICU Images: 2 FINDINGS: Cardiomediastinal silhouette appears unremarkable  A median sternotomy has been performed  Endotracheal tube ends approximately 3 3 cm above the yohana  A nasogastric tube is coiled in the stomach  Pulmonary vessels are normal  Consolidation at the right lung base  Minimal atelectasis at the left lung base  No evidence of heart failure  Osseous structures appear within normal limits for patient age  Impression: Endotracheal tube ending 3 3 cm above the yohana  Consolidation at the right lung base  Workstation performed: [id number]     Ir Picc Line    Result Date: 10/31/2018  Narrative: Examination: Right upper extremity double lumen Power PICC 10/31/2018 History: Needs access Contrast: None Fluoroscopy time:  0 9 minutes Procedure: The patient was identified verbally and by wristband  Timeout was performed  Informed consent was obtained  All elements of maximal sterile barrier technique, cap and mask and sterile gown and sterile gloves and sterile full-body drape and hand hygiene and 2% chlorhexidine for cutaneous antisepsis  Sterile ultrasound technique with sterile gel and sterile probe covers was also utilized  Following obtaining informed consent, the patient was prepped and draped in the usual sterile fashion  Using ultrasound guidance, access was gained to the patient's right antecubital vein using a micropuncture system  The micropuncture dilator was exchanged for a peel away sheath  A mandril wire was advanced to the level of the RA/SVC junction using fluoroscopic guidance to measure the catheter length    The catheter was cut to size and placed through the peel away sheath  The lumens were flushed with ease  The catheter was secured in place  The patient tolerated the procedure well without apparent immediate complication  The patient left the IR department in unchanged condition  Dr Ubaldo Mckinnon performed the procedure Findings: Using ultrasound guidance, the right antecubital vein was cannulated using Seldinger technique  The right antecubital vein was evaluated as a potential access site  The right antecubital vein was patent, and free of thrombus  Static images of the vessel  was obtained  Visualization of real time needle entry into the vessel was obtained  A fluoroscopic spot image demonstrated a newly placed PICC with the central aspect at the RA/SVC junction  The catheter tubing is smooth in contour  Impression: Impression: Successful placement of a 49cm right upper extremity double lumen Power PICC  Workstation performed: [id number]     Imaging Reports Reviewed by myself    Cultures:   Blood Culture:   Lab Results   Component Value Date    BLOODCX No Growth After 5 Days  10/29/2018    BLOODCX No Growth After 5 Days  10/29/2018    BLOODCX Staphylococcus aureus (A) 10/25/2018    BLOODCX No Growth After 5 Days   10/25/2018     Urine Culture:   Lab Results   Component Value Date    URINECX >100,000 cfu/ml  10/25/2018    URINECX >100,000 cfu/ml Klebsiella oxytoca (A) 05/09/2018     Sputum Culture: No components found for: SPUTUMCX  Wound Culture:   Lab Results   Component Value Date    WOUNDCULT 4+ Growth of Staphylococcus aureus (A) 10/26/2018    WOUNDCULT 4+ Growth of Staphylococcus aureus (A) 10/25/2018       Last 24 Hours Medication List:     Current Facility-Administered Medications:  acetaminophen 650 mg Oral Q6H [facility] Rhea John PA-C    albumin human 12 5 g Intravenous Daily Edilberto Arora MD    And        furosemide 20 mg Intravenous Daily Edilberto Arora MD    atorvastatin 10 mg Oral Daily Ryan Mcneil MD    cefazolin 2,000 mg Intravenous Q8H Jeff Giraldo MD Last Rate: 2,000 mg (11/12/18 9325)   collagenase  Topical Daily Mele Corley MD    insulin detemir 8 Units Subcutaneous HS Rhett Deal MD    insulin lispro 1-5 Units Subcutaneous TID AC Rhett Deal MD    lisinopril 2 5 mg Oral Daily Rhett Deal MD    melatonin 6 mg Oral HS Jeff Giraldo MD    metoprolol tartrate 12 5 mg Oral Q12H [address] Belen Tee PA-C    pantoprazole 40 mg Intravenous Q12H Irma Baez PA-C    potassium chloride 20 mEq Oral BID Rhett Deal MD    pregabalin 50 mg Oral TID Jeff Giraldo MD    senna 1 tablet Oral HS Jeff Giraldo MD    tamsulosin 0 4 mg Oral Daily With Cece Acosta MD    traMADol 50 mg Oral Q6H PRN Rhett Deal MD         Today, Patient Was Seen By: Rhett Deal MD    ** Please Note: "This note has been constructed using a voice recognition system  Therefore there may be syntax, spelling, and/or grammatical errors   Please call if you have any questions  "**

## 2018-11-13 PROBLEM — I50.43 ACUTE ON CHRONIC COMBINED SYSTOLIC AND DIASTOLIC HEART FAILURE (HCC): Status: ACTIVE | Noted: 2018-10-27

## 2018-11-13 LAB
ANION GAP SERPL CALCULATED.3IONS-SCNC: 5 MMOL/L (ref 4–13)
BASOPHILS # BLD AUTO: 0.02 THOUSANDS/ΜL (ref 0–0.1)
BASOPHILS NFR BLD AUTO: 0 % (ref 0–1)
BUN SERPL-MCNC: 20 MG/DL (ref 5–25)
CALCIUM SERPL-MCNC: 7.4 MG/DL (ref 8.3–10.1)
CHLORIDE SERPL-SCNC: 102 MMOL/L (ref 100–108)
CO2 SERPL-SCNC: 28 MMOL/L (ref 21–32)
CREAT SERPL-MCNC: 0.93 MG/DL (ref 0.6–1.3)
EOSINOPHIL # BLD AUTO: 0.11 THOUSAND/ΜL (ref 0–0.61)
EOSINOPHIL NFR BLD AUTO: 2 % (ref 0–6)
ERYTHROCYTE [DISTWIDTH] IN BLOOD BY AUTOMATED COUNT: 18.7 % (ref 11.6–15.1)
GFR SERPL CREATININE-BSD FRML MDRD: 77 ML/MIN/1.73SQ M
GLUCOSE SERPL-MCNC: 118 MG/DL (ref 65–140)
GLUCOSE SERPL-MCNC: 120 MG/DL (ref 65–140)
GLUCOSE SERPL-MCNC: 174 MG/DL (ref 65–140)
GLUCOSE SERPL-MCNC: 227 MG/DL (ref 65–140)
GLUCOSE SERPL-MCNC: 90 MG/DL (ref 65–140)
HCT VFR BLD AUTO: 25 % (ref 36.5–49.3)
HCT VFR BLD AUTO: 26.9 % (ref 36.5–49.3)
HGB BLD-MCNC: 7.9 G/DL (ref 12–17)
HGB BLD-MCNC: 8.4 G/DL (ref 12–17)
IMM GRANULOCYTES # BLD AUTO: 0.03 THOUSAND/UL (ref 0–0.2)
IMM GRANULOCYTES NFR BLD AUTO: 1 % (ref 0–2)
LYMPHOCYTES # BLD AUTO: 0.92 THOUSANDS/ΜL (ref 0.6–4.47)
LYMPHOCYTES NFR BLD AUTO: 18 % (ref 14–44)
MAGNESIUM SERPL-MCNC: 1.7 MG/DL (ref 1.6–2.6)
MCH RBC QN AUTO: 32 PG (ref 26.8–34.3)
MCHC RBC AUTO-ENTMCNC: 31.6 G/DL (ref 31.4–37.4)
MCV RBC AUTO: 101 FL (ref 82–98)
MONOCYTES # BLD AUTO: 0.37 THOUSAND/ΜL (ref 0.17–1.22)
MONOCYTES NFR BLD AUTO: 7 % (ref 4–12)
NEUTROPHILS # BLD AUTO: 3.58 THOUSANDS/ΜL (ref 1.85–7.62)
NEUTS SEG NFR BLD AUTO: 72 % (ref 43–75)
NRBC BLD AUTO-RTO: 0 /100 WBCS
PLATELET # BLD AUTO: 137 THOUSANDS/UL (ref 149–390)
PMV BLD AUTO: 11.1 FL (ref 8.9–12.7)
POTASSIUM SERPL-SCNC: 4.3 MMOL/L (ref 3.5–5.3)
RBC # BLD AUTO: 2.47 MILLION/UL (ref 3.88–5.62)
SODIUM SERPL-SCNC: 135 MMOL/L (ref 136–145)
WBC # BLD AUTO: 5.03 THOUSAND/UL (ref 4.31–10.16)

## 2018-11-13 PROCEDURE — 82948 REAGENT STRIP/BLOOD GLUCOSE: CPT

## 2018-11-13 PROCEDURE — 85018 HEMOGLOBIN: CPT | Performed by: PHYSICIAN ASSISTANT

## 2018-11-13 PROCEDURE — 85025 COMPLETE CBC W/AUTO DIFF WBC: CPT | Performed by: INTERNAL MEDICINE

## 2018-11-13 PROCEDURE — C9113 INJ PANTOPRAZOLE SODIUM, VIA: HCPCS | Performed by: PHYSICIAN ASSISTANT

## 2018-11-13 PROCEDURE — 99232 SBSQ HOSP IP/OBS MODERATE 35: CPT | Performed by: INTERNAL MEDICINE

## 2018-11-13 PROCEDURE — 97535 SELF CARE MNGMENT TRAINING: CPT

## 2018-11-13 PROCEDURE — 80048 BASIC METABOLIC PNL TOTAL CA: CPT | Performed by: INTERNAL MEDICINE

## 2018-11-13 PROCEDURE — 93005 ELECTROCARDIOGRAM TRACING: CPT

## 2018-11-13 PROCEDURE — 99232 SBSQ HOSP IP/OBS MODERATE 35: CPT | Performed by: STUDENT IN AN ORGANIZED HEALTH CARE EDUCATION/TRAINING PROGRAM

## 2018-11-13 PROCEDURE — 85014 HEMATOCRIT: CPT | Performed by: PHYSICIAN ASSISTANT

## 2018-11-13 PROCEDURE — 83735 ASSAY OF MAGNESIUM: CPT | Performed by: INTERNAL MEDICINE

## 2018-11-13 RX ORDER — POLYETHYLENE GLYCOL 3350 17 G/17G
17 POWDER, FOR SOLUTION ORAL ONCE
Status: COMPLETED | OUTPATIENT
Start: 2018-11-13 | End: 2018-11-13

## 2018-11-13 RX ORDER — MAGNESIUM SULFATE HEPTAHYDRATE 40 MG/ML
2 INJECTION, SOLUTION INTRAVENOUS ONCE
Status: COMPLETED | OUTPATIENT
Start: 2018-11-13 | End: 2018-11-14

## 2018-11-13 RX ADMIN — PREGABALIN 50 MG: 50 CAPSULE ORAL at 15:57

## 2018-11-13 RX ADMIN — ACETAMINOPHEN 650 MG: 325 TABLET, FILM COATED ORAL at 23:56

## 2018-11-13 RX ADMIN — LISINOPRIL 2.5 MG: 2.5 TABLET ORAL at 11:04

## 2018-11-13 RX ADMIN — METOPROLOL TARTRATE 12.5 MG: 25 TABLET ORAL at 21:05

## 2018-11-13 RX ADMIN — POLYETHYLENE GLYCOL 3350 17 G: 17 POWDER, FOR SOLUTION ORAL at 09:43

## 2018-11-13 RX ADMIN — MAGNESIUM SULFATE IN WATER 2 G: 40 INJECTION, SOLUTION INTRAVENOUS at 15:57

## 2018-11-13 RX ADMIN — SENNOSIDES 8.6 MG: 8.6 TABLET, FILM COATED ORAL at 21:05

## 2018-11-13 RX ADMIN — FUROSEMIDE 20 MG: 10 INJECTION, SOLUTION INTRAMUSCULAR; INTRAVENOUS at 11:06

## 2018-11-13 RX ADMIN — PANTOPRAZOLE SODIUM 40 MG: 40 INJECTION, POWDER, FOR SOLUTION INTRAVENOUS at 09:43

## 2018-11-13 RX ADMIN — PREGABALIN 50 MG: 50 CAPSULE ORAL at 20:40

## 2018-11-13 RX ADMIN — POTASSIUM CHLORIDE 20 MEQ: 1500 TABLET, EXTENDED RELEASE ORAL at 09:43

## 2018-11-13 RX ADMIN — CEFAZOLIN SODIUM 2000 MG: 2 SOLUTION INTRAVENOUS at 06:05

## 2018-11-13 RX ADMIN — ACETAMINOPHEN 650 MG: 325 TABLET, FILM COATED ORAL at 11:04

## 2018-11-13 RX ADMIN — MELATONIN TAB 3 MG 6 MG: 3 TAB at 21:05

## 2018-11-13 RX ADMIN — CEFAZOLIN SODIUM 2000 MG: 2 SOLUTION INTRAVENOUS at 21:05

## 2018-11-13 RX ADMIN — ATORVASTATIN CALCIUM 10 MG: 10 TABLET, FILM COATED ORAL at 17:40

## 2018-11-13 RX ADMIN — PREGABALIN 50 MG: 50 CAPSULE ORAL at 09:43

## 2018-11-13 RX ADMIN — ACETAMINOPHEN 650 MG: 325 TABLET, FILM COATED ORAL at 06:05

## 2018-11-13 RX ADMIN — COLLAGENASE SANTYL: 250 OINTMENT TOPICAL at 09:41

## 2018-11-13 RX ADMIN — INSULIN LISPRO 3 UNITS: 100 INJECTION, SOLUTION INTRAVENOUS; SUBCUTANEOUS at 11:50

## 2018-11-13 RX ADMIN — ACETAMINOPHEN 650 MG: 325 TABLET, FILM COATED ORAL at 17:41

## 2018-11-13 RX ADMIN — TAMSULOSIN HYDROCHLORIDE 0.4 MG: 0.4 CAPSULE ORAL at 15:57

## 2018-11-13 RX ADMIN — ACETAMINOPHEN 650 MG: 325 TABLET, FILM COATED ORAL at 00:23

## 2018-11-13 RX ADMIN — PANTOPRAZOLE SODIUM 40 MG: 40 INJECTION, POWDER, FOR SOLUTION INTRAVENOUS at 20:40

## 2018-11-13 RX ADMIN — ALBUMIN HUMAN 12.5 G: 0.25 SOLUTION INTRAVENOUS at 09:41

## 2018-11-13 RX ADMIN — METOPROLOL TARTRATE 12.5 MG: 25 TABLET ORAL at 11:03

## 2018-11-13 RX ADMIN — INSULIN DETEMIR 8 UNITS: 100 INJECTION, SOLUTION SUBCUTANEOUS at 21:06

## 2018-11-13 RX ADMIN — CEFAZOLIN SODIUM 2000 MG: 2 SOLUTION INTRAVENOUS at 14:22

## 2018-11-13 RX ADMIN — INSULIN LISPRO 3 UNITS: 100 INJECTION, SOLUTION INTRAVENOUS; SUBCUTANEOUS at 09:42

## 2018-11-13 RX ADMIN — INSULIN LISPRO 2 UNITS: 100 INJECTION, SOLUTION INTRAVENOUS; SUBCUTANEOUS at 11:50

## 2018-11-13 NOTE — PHYSICAL THERAPY NOTE
Attempted PT this am, pt with nursing  Pt then declined PT this afternoon stating that he needed to "rest"  Will follow    Dallas Sandoval PT  [id number]

## 2018-11-13 NOTE — PROGRESS NOTES
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  Left ventricular ejection fraction of 20%  Repeat echocardiogram showed no change in ejection fraction  - patient on minimal doses of metoprolol and lisinopril due to hypotension  - he has had multiple episodes of PVCs and short runs of VT  Will need life vest prior to discharge  Attempt to increase metoprolol if blood pressure allows  - continue IV Lasix  He has had a large amount of urine output but weight remains increased  - continue IV albumin to assist with diuresis as albumin is 1 5 (cause of hypoalbuminemia may be congestive heart failure which is a poor prognostic indicator or from large surgical procedure)  Although UA done earlier this admission did not show proteinuria, will obtain 24 hour urine protein level  May   - arrange for life vest  2  Hypoalbuminemia - as above  Improve nutrition  3  CAD with CABG in 2017 - EF in May 2018 was 40%  Not on ASA due to duodenal ulcer bleeding  4  Anemia - Hg today is 8 4  5  Dyslipidemia  6  DM - poorly controlled  7  Paroxysmal atrial fibrillation - currently in sinus rhythm  8  Hypotension      Plan:  As above      Subjective: Clay Garner denies any chest pain or shortness of breath  No overnight events  STill with edema        Meds/Allergies   current meds:   Current Facility-Administered Medications   Medication Dose Route Frequency    acetaminophen (TYLENOL) tablet 650 mg  650 mg Oral Q6H [address]    albumin human (FLEXBUMIN) 25 % injection 12 5 g  12 5 g Intravenous Daily    And    furosemide (LASIX) injection 20 mg  20 mg Intravenous Daily    atorvastatin (LIPITOR) tablet 10 mg  10 mg Oral Daily    ceFAZolin (ANCEF) IVPB (premix) 2,000 mg  2,000 mg Intravenous Q8H    collagenase (SANTYL) ointment   Topical Daily    insulin detemir (LEVEMIR) subcutaneous injection 8 Units  8 Units Subcutaneous HS    insulin lispro (HumaLOG) 100 units/mL subcutaneous injection 1-5 Units  1-5 Units Subcutaneous TID AC    insulin lispro (HumaLOG) 100 units/mL subcutaneous injection 3 Units  3 Units Subcutaneous TID With Meals    lisinopril (ZESTRIL) tablet 2 5 mg  2 5 mg Oral Daily    magnesium sulfate 2 g/50 mL IVPB (premix) 2 g  2 g Intravenous Once    melatonin tablet 6 mg  6 mg Oral HS    metoprolol tartrate (LOPRESSOR) partial tablet 12 5 mg  12 5 mg Oral Q12H [facility]    pantoprazole (PROTONIX) injection 40 mg  40 mg Intravenous Q12H    potassium chloride (K-DUR,KLOR-CON) CR tablet 20 mEq  20 mEq Oral Daily    pregabalin (LYRICA) capsule 50 mg  50 mg Oral TID    senna (SENOKOT) tablet 8 6 mg  1 tablet Oral HS    tamsulosin (FLOMAX) capsule 0 4 mg  0 4 mg Oral Daily With Dinner    traMADol (ULTRAM) tablet 50 mg  50 mg Oral Q6H PRN     No Known Allergies    Objective   Vitals: Blood pressure 91/51, pulse 101, temperature 98 °F (36 7 °C), temperature source Oral, resp  rate 18, height 5' 8 5" (1 74 m), weight 80 kg (176 lb 5 9 oz), SpO2 99 %  , Body mass index is 26 43 kg/m²  Intake/Output Summary (Last 24 hours) at 11/13/18 1659  Last data filed at 11/13/18 1330   Gross per 24 hour   Intake              100 ml   Output             1700 ml   Net            -1600 ml       Physical Exam   Constitutional: He appears healthy  No distress  HENT:   Nose: Nose normal    Mouth/Throat: Oropharynx is clear  Eyes: Pupils are equal, round, and reactive to light  Conjunctivae are normal    Neck: Neck supple  No JVD present  Cardiovascular: Normal rate and regular rhythm  Exam reveals no distant heart sounds and no friction rub  No murmur heard  Pulmonary/Chest: Effort normal and breath sounds normal  He has no wheezes  He has no rales  He exhibits no tenderness  Abdominal: Soft   He exhibits no distension  There is no tenderness  Musculoskeletal: He exhibits edema  Neurological: He is alert and oriented to person, place, and time  Skin: Skin is warm and dry  No rash noted         Lab Results:     Troponins:       Recent Results (from the past 24 hour(s))   Fingerstick Glucose (POCT)    Collection Time: 11/12/18  9:06 PM   Result Value Ref Range    POC Glucose 227 (H) 65 - 140 mg/dl   CBC and differential    Collection Time: 11/13/18  5:58 AM   Result Value Ref Range    WBC 5 03 4 31 - 10 16 Thousand/uL    RBC 2 47 (L) 3 88 - 5 62 Million/uL    Hemoglobin 7 9 (L) 12 0 - 17 0 g/dL    Hematocrit 25 0 (L) 36 5 - 49 3 %     (H) 82 - 98 fL    MCH 32 0 26 8 - 34 3 pg    MCHC 31 6 31 4 - 37 4 g/dL    RDW 18 7 (H) 11 6 - 15 1 %    MPV 11 1 8 9 - 12 7 fL    Platelets 112 (L) 273 - 390 Thousands/uL    nRBC 0 /100 WBCs    Neutrophils Relative 72 43 - 75 %    Immat GRANS % 1 0 - 2 %    Lymphocytes Relative 18 14 - 44 %    Monocytes Relative 7 4 - 12 %    Eosinophils Relative 2 0 - 6 %    Basophils Relative 0 0 - 1 %    Neutrophils Absolute 3 58 1 85 - 7 62 Thousands/µL    Immature Grans Absolute 0 03 0 00 - 0 20 Thousand/uL    Lymphocytes Absolute 0 92 0 60 - 4 47 Thousands/µL    Monocytes Absolute 0 37 0 17 - 1 22 Thousand/µL    Eosinophils Absolute 0 11 0 00 - 0 61 Thousand/µL    Basophils Absolute 0 02 0 00 - 0 10 Thousands/µL   Basic metabolic panel    Collection Time: 11/13/18  5:58 AM   Result Value Ref Range    Sodium 135 (L) 136 - 145 mmol/L    Potassium 4 3 3 5 - 5 3 mmol/L    Chloride 102 100 - 108 mmol/L    CO2 28 21 - 32 mmol/L    ANION GAP 5 4 - 13 mmol/L    BUN 20 5 - 25 mg/dL    Creatinine 0 93 0 60 - 1 30 mg/dL    Glucose 120 65 - 140 mg/dL    Calcium 7 4 (L) 8 3 - 10 1 mg/dL    eGFR 77 ml/min/1 73sq m   Magnesium    Collection Time: 11/13/18  5:58 AM   Result Value Ref Range    Magnesium 1 7 1 6 - 2 6 mg/dL   Fingerstick Glucose (POCT)    Collection Time: 11/13/18  7:19 AM   Result Value Ref Range    POC Glucose 118 65 - 140 mg/dl   Fingerstick Glucose (POCT)    Collection Time: 18 11:11 AM   Result Value Ref Range    POC Glucose 227 (H) 65 - 140 mg/dl   Hemoglobin and hematocrit, blood    Collection Time: 18  2:18 PM   Result Value Ref Range    Hemoglobin 8 4 (L) 12 0 - 17 0 g/dL    Hematocrit 26 9 (L) 36 5 - 49 3 %   Fingerstick Glucose (POCT)    Collection Time: 18  4:18 PM   Result Value Ref Range    POC Glucose 90 65 - 140 mg/dl          Cardiac testing:   Results for orders placed during the hospital encounter of 10/25/18   Echo complete with contrast if indicated    Narrative [address]  [address] [facility] [address]   [phone]    Transthoracic Echocardiogram  2D, M-mode, Doppler, and Color Doppler    Study date:  26-Oct-2018    Patient: Smiley Cash  MR number: [id number]  Account number: [de-identified]  : 1940  Age: [de-identified] years  Gender: Male  Status: Inpatient  Location: Bedside  Height: 68 in  Weight: 155 8 lb  BP: 83/ 53 mmHg    Indications: Heart Failure    Diagnoses: I50 9 - Heart failure, unspecified    Sonographer:  ADRIEL Moreno  Primary Physician:  Janny Diaz DO  Referring Physician:  Bari Gr PA-C  Group:  [facility]  Interpreting Physician:  Ying Rocha DO    IMPRESSIONS:  These features are consistent with dilated cardiomyopathy  SUMMARY    LEFT VENTRICLE:  The ventricle was moderately dilated  Systolic function was severely reduced by visual assessment  Ejection fraction was estimated to be 20 %  There were no regional wall motion abnormalities  There was mild concentric hypertrophy  Doppler parameters were consistent with abnormal left ventricular relaxation (grade 1 diastolic dysfunction)  LEFT ATRIUM:  The atrium was moderately dilated  MITRAL VALVE:  There was moderate regurgitation  AORTIC VALVE:  There was no evidence for stenosis    There was no regurgitation  TRICUSPID VALVE:  There was mild regurgitation  Pulmonary artery systolic pressure was mildly increased  HISTORY: PRIOR HISTORY: HTN, Hyperlipidemia, DM, CHF, CABG    PROCEDURE: The procedure was performed at the bedside  This was a routine study  The transthoracic approach was used  The study included complete 2D imaging, M-mode, complete spectral Doppler, and color Doppler  The heart rate was 115 bpm,  at the start of the study  Images were not obtained from the subcostal or suprasternal notch acoustic windows  Intravenous contrast (Definity solution [1 3 ml Definity/8 7ml normal saline solution], 1 ml) was administered to opacify the  left ventricle  Echocardiographic views were limited due to restricted patient mobility, surgical dressings, poor acoustic window availability, lung interference, and patient on mechanical ventilator  This was a technically difficult study  LEFT VENTRICLE: The ventricle was moderately dilated  Systolic function was severely reduced by visual assessment  Ejection fraction was estimated to be 20 %  There were no regional wall motion abnormalities  There was mild concentric  hypertrophy  DOPPLER: Doppler parameters were consistent with abnormal left ventricular relaxation (grade 1 diastolic dysfunction)  RIGHT VENTRICLE: The size was normal  Systolic function was normal  DOPPLER: Systolic pressure was within the normal range  LEFT ATRIUM: The atrium was moderately dilated  RIGHT ATRIUM: Size was normal     MITRAL VALVE: There was annular calcification  Valve structure was normal  There was normal leaflet separation  No echocardiographic evidence for prolapse  DOPPLER: The transmitral velocity was within the normal range  There was no  evidence for stenosis  There was moderate regurgitation  AORTIC VALVE: The valve was trileaflet  Leaflets exhibited normal thickness, mild calcification, normal cuspal separation, and sclerosis   DOPPLER: Transaortic velocity was within the normal range  There was no evidence for stenosis  There  was no regurgitation  TRICUSPID VALVE: The valve structure was normal  There was normal leaflet separation  DOPPLER: The transtricuspid velocity was within the normal range  There was no evidence for stenosis  There was mild regurgitation  Pulmonary artery  systolic pressure was mildly increased  Estimated peak PA pressure was 43 mmHg  PULMONIC VALVE: Leaflets exhibited normal thickness, no calcification, and normal cuspal separation  DOPPLER: The transpulmonic velocity was within the normal range  There was no regurgitation  PERICARDIUM: There was no thickening  There was no pericardial effusion  AORTA: The root exhibited normal size  PULMONARY ARTERY: The size was normal  The morphology appeared normal     SYSTEM MEASUREMENT TABLES    2D mode  AoR Diam 2D: 3 7 cm  LA Diam (2D): 4 6 cm  LA/Ao (2D): 1 24  FS (2D Teich): 13 7 %  IVSd (2D): 1 17 cm  LVDEV: 189 cm³  LVEDV MOD BP: 180 cm³  LVESV: 135 cm³  LVIDd(2D): 6 13 cm  LVISd (2D): 5 29 cm  LVPWd (2D): 1 09 cm  SV (Teich): 54 cm³    Apical four chamber  LVEF A4C: 24 %    Apical two chamber  LVEF A2C: 13 %    Unspecified Scan Mode  MV Peak E Travon  Mean: 1120 mm/s  MVA (PHT): 7 33 cm squared  PHT: 30 ms  Max P mm[Hg]  V Max: 2970 mm/s  Vmax: 2770 mm/s  RA Area: 19 cm squared  RA Volume: 48 4 cm³  TAPSE: 1 2 cm    [facility]    Prepared and electronically signed by    Altagracia Fallon DO  Signed 26-Oct-2018 16:55:41       No results found for this or any previous visit  No results found for this or any previous visit  No results found for this or any previous visit  No results found for this or any previous visit    Results for orders placed during the hospital encounter of 18   NM myocardial perfusion spect (rx stress and/or rest)    [address]  [address] [facility] [place] [address]  [phone]    Rest/Stress Gated SPECT Myocardial Perfusion Imaging After Regadenoson    Patient: Keyshawn Gross  MR number: [id number]  Account number: [de-identified]  : 1940  Age: 76 years  Gender: Male  Status: Inpatient  Location: Stress lab  Height: 68 in  Weight: 147 lb  BP: 122/ 60 mmHg    Allergies: NO KNOWN ALLERGIES    Diagnosis: I25 5 - Ischemic cardiomyopathy    Primary Physician:  Jonathan Peralta DO  RN:  NATALY Carreon  Group:  [facility]  Report Prepared By[de-identified]  NATALY Carreon  Interpreting Physician:  Claire Cheung MD    INDICATIONS: Evaluation of known coronary artery disease  HISTORY: The patient is a 76year old  male  Chest pain status: no chest pain  Other symptoms: decreased effort tolerance  Coronary artery disease risk factors: dyslipidemia, hypertension, and diabetes mellitus  Cardiovascular  history:Ischemic cardiomyopathy, coronary artery disease and congestive heart failure  Prior cardiovascular procedures: coronary artery bypass grafting  Co-morbidity: age Medications: a beta blocker, aspirin, a lipid lowering agent, and  diabetic medications  PHYSICAL EXAM: Baseline physical exam screening:Weakness of extremities noted, no wheezes audible  REST ECG: Normal sinus rhythm  The ECG showed ventricular couplets  PROCEDURE: The study was performed in the stress lab  A regadenoson infusion pharmacologic stress test was performed  Gated SPECT myocardial perfusion imaging was performed after stress and at rest  Systolic blood pressure was 122 mmHg, at  the start of the study  Diastolic blood pressure was 60 mmHg, at the start of the study  The heart rate was 74 bpm, at the start of the study  IV double checked    Regadenoson protocol:  Time HR bpm SBP mmHg DBP mmHg Symptoms Rhythm/conduct  Baseline 09:54 74 122 60 none NSR, ventricular couplets  Immediate 09:56 102 97 54 none occasional PVC's  1 min 09:57 98 101 56 none --  2 min 09:58 98 102 58 none --  3 min 09:59 99 106 55 none --  4 min 10:00 97 105 56 none --  No medications or fluids given  STRESS SUMMARY: Duration of pharmacologic stress was 3 min  Maximal heart rate during stress was 102 bpm  The heart rate response to stress was normal  There was normal resting blood pressure with an appropriate response to stress  The  rate-pressure product for the peak heart rate and blood pressure was 71519  There was no chest pain during stress  The stress test was terminated due to protocol completion  Pre oxygen saturation: 99 %  Peak oxygen saturation: 100 %  The  stress ECG was equivocal for ischemia  Arrhythmia during stress: isolated premature ventricular beats  ISOTOPE ADMINISTRATION:  Resting isotope administration Stress isotope administration  Agent Tetrofosmin Tetrofosmin  Dose 10 6 mCi 32 2 mCi  Date 05/15/2018 05/15/2018    The radiopharmaceutical was injected at the peak effect of pharmacologic stress  MYOCARDIAL PERFUSION IMAGING:  The left ventricle was dilated  The TID ratio was 1 12  PERFUSION DEFECTS:  -  There was a moderate-sized, moderately severe, partially reversible myocardial perfusion defect of the entire inferior and apical wall  GATED SPECT:  The calculated left ventricular ejection fraction was 35 %  Left ventricular ejection fraction was moderately decreased by visual estimate  There was moderate global left ventricular hypokinesis with paradoxical septal motion  There was  moderately reduced myocardial thickening and motion of the inferior wall of the left ventricle  SUMMARY:  -  Stress results: There was no chest pain during stress  -  ECG conclusions: The stress ECG was equivocal for ischemia  -  Perfusion imaging: There was a moderate-sized, moderately severe, partially reversible myocardial perfusion defect of the entire inferior and apical wall  -  Gated SPECT: The calculated left ventricular ejection fraction was 35 %   Left ventricular ejection fraction was moderately decreased by visual estimate  There was moderate global left ventricular hypokinesis with paradoxical septal  motion  There was moderately reduced myocardial thickening and motion of the inferior wall of the left ventricle  IMPRESSIONS: Abnormal study after pharmacologic vasodilation  There was a moderate-sized infarct and a small amount of ischemia  Left ventricular systolic function was reduced, with regional wall motion abnormalities      Prepared and signed by    Leopoldo Boas, MD  Signed 05/16/2018 07:35:18           EKG/TELE: Personally reviewed      Imaging: I have personally reviewed pertinent films in PACS

## 2018-11-13 NOTE — PLAN OF CARE
Problem: OCCUPATIONAL THERAPY ADULT  Goal: Performs self-care activities at highest level of function for planned discharge setting  See evaluation for individualized goals  Outcome: Progressing  Limitation: Decreased ADL status, Decreased UE ROM, Decreased UE strength, Decreased Safe judgement during ADL, Decreased cognition, Decreased endurance, Decreased fine motor control, Decreased self-care trans, Decreased high-level ADLs, Mood limitation (decreased balance)  Prognosis: Good  Assessment: Pt demonstrating improved strength, balance and functional activity tolerance allowing for increased active participation with ADL and mobility tasks  Good progress towards goals  Pt left sitting in chair with all needs within reach and tab alarm in place  Cont OT per POC       OT Discharge Recommendation: Short Term Rehab

## 2018-11-13 NOTE — PROGRESS NOTES
Progress Note - Infectious Disease   Rashaad Nuñez [de-identified] y o  male MRN: [id number]  Unit/Bed#: 2 [place] [address] Encounter: [id number]       Impression/Plan:  1  MSSA bacteremia-appears to be secondary to chest wall abscesses   No other clear source appreciated  Consideration for the possibility of endocarditis but this is less likely with only 1 of 2 blood cultures positive and the transthoracic echocardiogram without valvular vegetations   Repeat blood cultures were negative   -continue high-dose IV cefazolin with plan for 4 week course from the date of 1st negative blood culture, through 11/25   -monitor weekly CBC with diff, BMP while on IV antibiotic  -PICC line in place   This will need to be removed after last dose of IV antibiotic   -outpatient follow-up with infectious diseases     2  MSSA chest wall abscesses-status post extensive I and D x2 with a VAC dressing in place  Nasra Sidhu seems to have improved clinically   His pain seems reasonably well controlled   This all possibly as a complication of zoster   -antibiotics as above  -vac dressing and local care  -close surgical follow-up     3  Septic shock-POA   Leukocytosis and tachycardia and hypotension   Appears to be all secondary to MSSA bacteremia in the setting of chest wall abscess formation   No other clear sources appreciated   Patient is clinically improved overall   The patient's heart rate has come down, and the white cell count has come down   He seems to be tolerating the antibiotics without difficulty   Patient remains clinically stable from an infectious standpoint   -antibiotic plan as below     4  Herpes zoster-hard to make a definitive diagnosis at this point as the rash is relatively advanced in its presentation   Patient now status post 7 day course of Valtrex      5  Acute kidney injury on CKD-likely a pre renal issue   Renal function has improved and remains stable   -volume management     6  Esophageal candidiasis-started on fluconazole    Biopsy was negative for Candida  Fluconazole was discontinued        Subjective:  No pain  Denies fevers, chills, or sweats  Denies nausea, vomiting, or diarrhea  Objective:  Vitals:  Temp:  [97 6 °F (36 4 °C)-98 6 °F (37 °C)] 98 6 °F (37 °C)  HR:  [100-115] 115  Resp:  [18] 18  BP: ()/(50-59) 114/59  SpO2:  [95 %-99 %] 95 %  Temp (24hrs), Av 1 °F (36 7 °C), Min:97 6 °F (36 4 °C), Max:98 6 °F (37 °C)  Current: Temperature: 98 6 °F (37 °C)    Physical Exam:   General:  No acute distress  Psychiatric:  Awake and alert  Pulmonary:  Normal respiratory excursion without accessory muscle use  Abdomen:  Soft, nontender  Extremities:  No edema  Skin:  No rashes  PICC line in place    Lab Results:  I have personally reviewed pertinent labs  Results from last 7 days  Lab Units 18  0558 18  0548 18  0546  18  0521   POTASSIUM mmol/L 4 3 4 2 3 5  < > 3 6   CHLORIDE mmol/L 102 102 103  < > 103   CO2 mmol/L 28 28 27  < > 24   BUN mg/dL 20 16 13  < > 18   CREATININE mg/dL 0 93 0 98 0 86  < > 0 84   EGFR ml/min/1 73sq m 77 73 82  < > 83   CALCIUM mg/dL 7 4* 7 0* 7 4*  < > 6 8*   AST U/L  --   --   --   --  12   ALT U/L  --   --   --   --  <6*   ALK PHOS U/L  --   --   --   --  67   < > = values in this interval not displayed  Results from last 7 days  Lab Units 18  0558 18  1426 18  0548 18  0546   WBC Thousand/uL 5 03  --  5 09 6 45   HEMOGLOBIN g/dL 7 9* 8 8* 8 1* 9 5*   PLATELETS Thousands/uL 137*  --  137* 172           Imaging Studies:   I have personally reviewed pertinent imaging study reports and images in PACS  EKG, Pathology, and Other Studies:   I have personally reviewed pertinent reports

## 2018-11-13 NOTE — OCCUPATIONAL THERAPY NOTE
OT TREATMENT       11/13/18 0900   Restrictions/Precautions   Other Precautions Chair Alarm; Bed Alarm; Fall Risk;Multiple lines;Pain   Pain Assessment   Pain Assessment 0-10   Pain Score 2   Pain Type Acute pain   Pain Location Arm   Pain Orientation Left;[facility] Pain Intervention(s) Medication (See MAR); Repositioned; Ambulation/increased activity; Distraction   Response to Interventions some relief   ADL   Where Assessed Chair   Grooming Assistance 5  Supervision/Setup   UB Bathing Assistance 4  Minimal Assistance   UB Bathing Deficit Increased time to complete   UB Dressing Assistance 4  Minimal Assistance   UB Dressing Deficit Increased time to complete   Bed Mobility   Supine to Sit 5  Supervision   Additional items [facility] elevated;Verbal cues; Increased time required   Transfers   Sit to Stand 4  Minimal assistance   Additional items Assist x 1; Increased time required;Verbal cues   Stand to Sit 4  Minimal assistance   Additional items Assist x 1;Verbal cues   Stand pivot 4  Minimal assistance   Additional items Assist x 1;Verbal cues   Neuromuscular Education   Trunk Control static/dynamic sittin balance fair+   Cognition   Arousal/Participation Alert; Cooperative   Attention Attends with cues to redirect   Following Commands Follows multistep commands with increased time or repetition   Assessment   Assessment Pt demonstrating improved strength, balance and functional activity tolerance allowing for increased active participation with ADL and mobility tasks  Good progress towards goals  Pt left sitting in chair with all needs within reach and tab alarm in place  Cont OT per POC  Plan   Treatment Interventions ADL retraining;Functional transfer training;UE strengthening/ROM; Endurance training;Patient/family training;Equipment evaluation/education; Neuromuscular reeducation; Compensatory technique education; Energy conservation   Treatment Day 5   OT Frequency 5x/wk   Recommendation   OT Discharge Recommendation [id number] [facility] License Number  Jayisabel Devan Pérezjj [address], OTR/L, [place] Lic# [id number]

## 2018-11-13 NOTE — PROGRESS NOTES
Progress Note - Ez Lund [de-identified] y o  male MRN: [id number]    Unit/Bed#: 2 [place] [address] Encounter: [id number]    Assessment and Plan:   Principal Problem:    Abscess of chest wall  Active Problems:    DM2 (diabetes mellitus, type 2) ([facility])    CAD (coronary artery disease)    CKD (chronic kidney disease) stage 3, GFR 30-59 ml/min ([facility])    Cardiomyopathy, ischemic    Benign essential hypertension    Atrial fibrillation (HCC)    Severe protein-calorie malnutrition (HCC)    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[facility])    Acute blood loss anemia    Bacteremia due to Staphylococcus aureus    Urinary retention    Gastrointestinal hemorrhage associated with duodenal ulcer    Neuralgia, post-herpetic    #1  Severe upper GI bleeding secondary to duodenal ulcer bleeding: s/p EGD with epi and clips  Hgb 9 5>8 1>8 8>7 9 without any blood transfusions  No bowel movements for a few days  Esophageal biopsies negative for candida, viral infection, or malignancy  -Continue to monitor hgb closely  -Miralax for constipation  Continue senokot which he has been getting  -Continue PPI  -No need for repeat scope at this time unless hgb drops or he has active signs of GI bleeding  -Continue diet as tolerated      ----------------------------------------------------------------------------------------------------------------    Subjective:     Patient reports last BM was a few days ago  Eating well  No abdominal pain, nausea, or vomiting  Objective:     Vitals: Blood pressure 114/59, pulse (!) 115, temperature 98 6 °F (37 °C), resp  rate 18, height 5' 8 5" (1 74 m), weight 80 kg (176 lb 5 9 oz), SpO2 95 %  ,Body mass index is 26 43 kg/m²        Intake/Output Summary (Last 24 hours) at 11/13/18 1066  Last data filed at 11/13/18 5550   Gross per 24 hour   Intake              100 ml   Output             2100 ml   Net            -2000 ml       Physical Exam:     General Appearance: Alert, appears stated age and cooperative  Lungs: Clear to auscultation bilaterally, no rales or rhonchi, no labored breathing/accessory muscle use  Heart: Regular rate and rhythm, S1, S2 normal, no click, rub or gallop; murmur present  Abdomen: Soft, non-tender, non-distended; bowel sounds normal; no masses or no organomegaly  Extremities: No cyanosis, clubbing; b/l LE edema    Invasive Devices     Peripherally Inserted Central Catheter Line            PICC Line 11/15/38 Right Basilic 12 days          Drain            Urethral Catheter 16 Fr  7 days                Lab Results:    Results from last 7 days  Lab Units 11/13/18  0558   WBC Thousand/uL 5 03   HEMOGLOBIN g/dL 7 9*   HEMATOCRIT % 25 0*   PLATELETS Thousands/uL 137*   NEUTROS PCT % 72   LYMPHS PCT % 18   MONOS PCT % 7   EOS PCT % 2       Results from last 7 days  Lab Units 11/13/18  0558  11/08/18  0521   POTASSIUM mmol/L 4 3  < > 3 6   CHLORIDE mmol/L 102  < > 103   CO2 mmol/L 28  < > 24   BUN mg/dL 20  < > 18   CREATININE mg/dL 0 93  < > 0 84   CALCIUM mg/dL 7 4*  < > 6 8*   ALK PHOS U/L  --   --  67   ALT U/L  --   --  <6*   AST U/L  --   --  12   < > = values in this interval not displayed  Imaging Studies: I have personally reviewed pertinent imaging studies  Ct Chest Abdomen Pelvis Wo Contrast    Result Date: 10/26/2018  Impression: Patchy airspace opacities are seen in the right lower lobe superior segment  There is a large dense consolidation in the right middle lobe, lateral segment, nonspecific but suspicious for infection/pneumonia in the appropriate clinical setting  Cardiomegaly, bilateral pleural effusions, and body wall edema suggests a superimposed component of fluid overload/CHF  Multiple areas of skin thickening with associated subjacent complex fluid are seen in the superficial soft tissues of the left pectoralis region (for example axial image 30), as well as in the posterior and superior left thorax (axial image 12), as described    Consider cellulitis with developing abscesses  In addition, a few bubbles of air are seen in the anterior left chest subcutaneous tissues (axial image 8) which could be related to recent intervention but also raises suspicion for possible gas-forming infection  Partially distended bladder with catheter seen within the bladder lumen  Mild circumferential bladder wall thickening noted, probably exaggerated by underdistention  A cystitis could be considered in the appropriate clinical setting  Renal cysts, degenerative changes of the shoulder, spine, and hips, and other findings as above  Findings discussed with FRITZ Alexis by Dr Anjelica Goldberg at 4:18 AM on 10/26/2018  Workstation performed: [id number]     Xr Chest Portable    Result Date: 10/29/2018  Impression: Persistent right basilar pleural effusion and associated probable atelectasis  Workstation performed: [id number]     Xr Spine Thoracic 3 Views    Result Date: 10/25/2018  Impression: No acute osseous abnormality  Workstation performed: [id number]     Xr Spine Lumbar 2 Or 3 Views Injury    Result Date: 10/25/2018  Impression: Levoscoliosis and moderate multilevel degenerative change  Workstation performed: [id number]     Xr Shoulder 2+ Views Left    Result Date: 10/25/2018  Impression: No acute osseous abnormality  Workstation performed: [id number]     Ct Head Wo Contrast    Result Date: 10/26/2018  Impression: No acute intracranial abnormality  Other nonacute findings as above  Workstation performed: [id number]     Ct Head Without Contrast    Result Date: 10/25/2018  Impression: No acute intracranial abnormality  Workstation performed: [id number]     Ct Spine Cervical Without Contrast    Result Date: 10/25/2018  Impression: No cervical spine fracture or traumatic malalignment  Workstation performed: [id number]     Xr Chest 1 View    Result Date: 10/25/2018  Impression: Focal consolidation in the right lower lobe  This may represent pneumonia    If there is any trauma to the right chest, pulmonary contusion is a consideration  Recommend follow up to resolution  The study was marked in EPIC for significant notification  Workstation performed: [id number]     Xr Chest Portable Icu    Result Date: 10/29/2018  Impression: No significant interval change since 10/29/2018 Workstation performed: [id number]     Xr Chest Portable Icu    Result Date: 10/26/2018  Impression: Endotracheal tube ending 3 3 cm above the yohana  Consolidation at the right lung base   Workstation performed: [id number]

## 2018-11-13 NOTE — PROGRESS NOTES
Progress Note - Mony Atwood 1940, [de-identified] y o  male MRN: [id number]    Unit/Bed#: [address] [place] [address] Encounter: [id number]    Primary Care Provider: Sravani Martinez DO   Date and time admitted to hospital: 10/25/2018  3:02 PM        * Abscess of chest wall   Assessment & Plan    · With Cellulitis superimposed on shingles  S/p debridement in OR on 10/26 by Dr Gómez Mancini with wound vac placed  · Antibiotics deescalated to Ancef as per ID recs, end date November 25th  Patient has PICC line for outpatient antibiotic therapy  · ID following  · Blood culture staph aureus x 1  · Wound culture: 3+ staph aureus   · Repeat blood cultures from 10/29 no growth to date  · Wound VAC removed 11/3  Continue local wound care  Surgical follow up noted, likely suture removal on 11/21  · Continue on Lyrica 50 mg TID for possible post-hepatic neuralgia, scheduled tylenol, p r n  Tramadol     Acute on chronic combined systolic and diastolic heart failure (HCC)   Assessment & Plan    · Known EF of 20% in the past  · Repeat echo showing EF remains 25%  Patient may need LifeVest prior to discharge  · Continues with episodes of PVCs  · Some tachycardia is likely related to anemia, able to tolerate beta-blocker at this time  · Cont with IV lasix  · Monitor I/O; net -13 9L negative  ·      Cardiomyopathy, ischemic   Assessment & Plan    · EF prior was 35 to 40%  Recent stress test in May of 2018 showed reversible ischemic changes  Cardiology following as an outpatient with medical management    · ECHO repeat with EF 20% possibly related to sepsis  · Cardiac output and indexes while patient was intubated were acceptable  · Frequent PVCs on telemetry, Episode of nonsustained V-tach overnight 11/6, asymptomatic  · Cardiology following, consider AICD and life vest before discharge  · Continue metoprolol, started lisinopril  · Repeat echo with EF 20%   · Continue IV Lasix  · Added albumin and Brock stocking due to low blood pressure  · Continue to monitor electrolytes  · Follow up with Cardiology     Neuralgia, post-herpetic   Assessment & Plan    On Lyrica     Gastrointestinal hemorrhage associated with duodenal ulcer   Assessment & Plan    Status post EGD 11/4 revealed duodenal bleeding ulcer  Continue PPI  Monitor H&H, drifting but no evidence of ongoing bleeding  Advanced diet     Urinary retention   Assessment & Plan    Status post failing voiding trial  Likely secondary to BPH , on Flomax  Follow up Urology recommendation  Maintain Gordon for now, DC once mobility improves     Bacteremia due to Staphylococcus aureus   Assessment & Plan    · Admission Blood culture x 1 MSSA  · Repeat blood cultures no growth  · Per ID will need 4 wks antibiotics from negative blood cultures through 11/25  · PICC line in place     Acute blood loss anemia   Assessment & Plan    · Secondary to bleeding gastric ulcer  · Patient continued with tachycardia and drifting hemoglobin and later developed guanakito melena  · EGD with duodenal bulb ulcer with intervention 11/4  · S/p total 9 units of PRBCs this admission  1 unit 10/28, 3 units 10/30, 1 unit 11/2  4 unit 11/4  · Hgb remains relatively stable without any evidence of overt bleeding  · Continue IV PPI, transition to PO per GI  · Continue trend hemoglobin, stable  · Follow-up GI as outpatient for repeat EGD     Severe protein-calorie malnutrition ([facility] [address] )   Assessment & Plan    Malnutrition Findings:           BMI Findings: Body mass index is 24 02 kg/m²  Atrial fibrillation ([address] )   Assessment & Plan    · Hx of AFib in the setting of electrolyte abnormalities in the past   Monitored on telemetry  · Currently NSR  · Continue to hold on anticoagulation at this time  Particularly in light of recent episode of GI bleeding    · Frequent PVCs on telemetry, beta-blocker resumed  · Monitor and replete lytes     Benign essential hypertension   Assessment & Plan    Stable  Asymptomatic  Continue metoprolol, lisinoprol CKD (chronic kidney disease) stage 3, GFR 30-59 ml/min ([facility])   Assessment & Plan    Stable, baseline creatinine 0 9-1 3  Monitor input output       CAD (coronary artery disease)   Assessment & Plan    Troponin negative  Continue statin, continue to hold aspirin secondary to GI bleed  Follow up with Cardiology     DM2 (diabetes mellitus, type 2) [facility])   Assessment & Plan    Lab Results   Component Value Date    HGBA1C 12 0 (H) 10/26/2018       Recent Labs      11/12/18   0710  11/12/18   1115  11/12/18   1617  11/12/18   2106   POCGLU  107  163*  239*  227*       Blood Sugar Average: Last 72 hrs:  (P) 135     Decreased Levemir at 8 units daily given hypoglycemia in a m  added pre meal insulin  Monitor     Esophagitisresolved as of 11/4/2018   Assessment & Plan    · Resolved  · EGD on 10/31: duodenal bulb ulcer with possible candida esophagitis  · Biopsies negative for candida - d/c fluconizole 11/4     Acute respiratory failure secondary to septic shockresolved as of 10/28/2018   Assessment & Plan    Status post extubation 10/26       DKA (diabetic ketoacidoses) (HCC)resolved as of 10/26/2018   Assessment & Plan    Lab Results   Component Value Date    HGBA1C 12 0 (H) 10/26/2018       Recent Labs      11/07/18   2121  11/08/18   1058  11/08/18   1609  11/08/18   2205   POCGLU  121  142*  157*  103       Blood Sugar Average: Last 72 hrs:  (P) 105 75          Shinglesresolved as of 11/1/2018   Assessment & Plan    Status post treatment with Valtrex 10/31     Urinary tract infectionresolved as of 10/29/2018   Assessment & Plan    Suspected possible source on admission  Urine culture without specific pathogen       CHRIS (acute kidney injury) (HCC)resolved as of 10/27/2018   Assessment & Plan    Renal secondary to septic shock in DKA  Improved  Monitor     Hyponatremiaresolved as of 10/27/2018   Assessment & Plan    Noted to be 111 on presentation likely secondary to combination of hypovolemic hyponatremia secondary to dehydration, sepsis and DKA plus pseudo hyponatremia secondary to hyperglycemia           VTE Pharmacologic Prophylaxis:   Pharmacologic: none 2/2 GIB  Mechanical VTE Prophylaxis in Place: Yes    Patient Centered Rounds: I have performed bedside rounds with nursing staff today  Discussions with Specialists or Other Care Team Provider: Yes  Education and Discussions with Family / Patient:Yes  Time Spent for Care: 30 minutes  More than 50% of total time spent on counseling and coordination of care as described above  Current Length of Stay: 19 day(s)  Current Patient Status: Inpatient     Discharge Plan: STR once medically stable    Code Status: Level 2 - DNAR: but accepts endotracheal intubation      Subjective:   Feels fine  Denies fevers, chills, sweats  No abd pain, N/V/D  Has not had a BM in a few days  Given miralax this morning by GI      Objective:     Vitals:   Temp (24hrs), Av 2 °F (36 8 °C), Min:97 6 °F (36 4 °C), Max:98 6 °F (37 °C)    Temp:  [97 6 °F (36 4 °C)-98 6 °F (37 °C)] 98 °F (36 7 °C)  HR:  [100-115] 101  Resp:  [18] 18  BP: ()/(50-59) 91/51  SpO2:  [95 %-99 %] 99 %  Body mass index is 26 43 kg/m²  Input and Output Summary (last 24 hours): Intake/Output Summary (Last 24 hours) at 18 1644  Last data filed at 18 1330   Gross per 24 hour   Intake              100 ml   Output             1700 ml   Net            -1600 ml        Physical Exam:     Physical Exam   Constitutional: He is oriented to person, place, and time  He appears well-developed  No distress  HENT:   Head: Normocephalic and atraumatic  Cardiovascular: Normal rate and regular rhythm  Murmur heard  Pulmonary/Chest: Effort normal and breath sounds normal  No respiratory distress  He has no wheezes  He has no rales  Abdominal: Soft  Bowel sounds are normal  He exhibits no distension  There is no tenderness  There is no rebound and no guarding     Genitourinary:   Genitourinary Comments: Gordon in place with clear yellow urine   Musculoskeletal: He exhibits edema  He exhibits no tenderness or deformity  Neurological: He is alert and oriented to person, place, and time  Skin: Skin is warm and dry  Left chest wall and left back dressing in place  Incision C/D/I, stitches in place   Psychiatric: He has a normal mood and affect  His behavior is normal    Nursing note and vitals reviewed  Additional Data:     Labs:      Results from last 7 days  Lab Units 11/13/18  1418 11/13/18  0558 11/12/18  1426 11/12/18  0548 11/11/18  0546   WBC Thousand/uL  --  5 03  --  5 09 6 45   HEMOGLOBIN g/dL 8 4* 7 9* 8 8* 8 1* 9 5*   HEMATOCRIT % 26 9* 25 0* 28 5* 25 8* 29 7*   PLATELETS Thousands/uL  --  137*  --  137* 172   NEUTROS PCT %  --  72  --  75 72       Results from last 7 days  Lab Units 11/13/18  0558 11/12/18  0548 11/11/18  0546  11/08/18  0521   SODIUM mmol/L 135* 136 136  < > 133*   POTASSIUM mmol/L 4 3 4 2 3 5  < > 3 6   CHLORIDE mmol/L 102 102 103  < > 103   CO2 mmol/L 28 28 27  < > 24   BUN mg/dL 20 16 13  < > 18   CREATININE mg/dL 0 93 0 98 0 86  < > 0 84   CALCIUM mg/dL 7 4* 7 0* 7 4*  < > 6 8*   TOTAL BILIRUBIN mg/dL  --   --   --   --  0 40   ALK PHOS U/L  --   --   --   --  67   ALT U/L  --   --   --   --  <6*   AST U/L  --   --   --   --  12   < > = values in this interval not displayed  Lab Results   Component Value Date/Time    HGBA1C 12 0 (H) 10/26/2018 05:24 AM       Results from last 7 days  Lab Units 11/13/18  1618 11/13/18  1111 11/13/18  0719 11/12/18  2106 11/12/18  1617 11/12/18  1115 11/12/18  0710 11/11/18  2112 11/11/18  1750 11/11/18  1628 11/11/18  1137 11/11/18  0758   POC GLUCOSE mg/dl 90 227* 118 227* 239* 163* 107 174* 127 67 128 80           * I Have Reviewed All Lab Data Listed Above  * Additional Pertinent Lab Tests Reviewed:  All Labs Within Last 24 Hours Reviewed    Imaging:  Ct Chest Abdomen Pelvis Wo Contrast    Result Date: 10/26/2018  Narrative: CT CHEST, ABDOMEN AND PELVIS WITHOUT IV CONTRAST INDICATION:   Sepsis  COMPARISON:  CT cervical spine dated 10/25/2018, radiographs of the left shoulder dated 10/25/2018 TECHNIQUE: CT examination of the chest, abdomen and pelvis was performed without intravenous contrast   Axial, sagittal, and coronal 2D reformatted images were created from the source data and submitted for interpretation  Radiation dose length product (DLP) for this visit:  652 78 mGy-cm   This examination, like all CT scans performed in the [facility], was performed utilizing techniques to minimize radiation dose exposure, including the use of iterative  reconstruction and automated exposure control  Enteric contrast was not administered  FINDINGS: CHEST LUNGS:  Some patchy airspace opacities are seen in the right lower lobe superior segment  There is a large dense consolidation in the right middle lobe, lateral segment  Some mucous plugging is suspected in a left upper lobe bronchus  There is mild dependent and bibasilar atelectasis  The lungs otherwise appear grossly clear  PLEURA:  Small bilateral pleural effusions are noted dependently  HEART/GREAT VESSELS:  There is left ventricular enlargement  At least moderate coronary atherosclerosis is noted  Status post CABG with a LIMA graft  Mild aortic atherosclerosis  No aortic aneurysm  MEDIASTINUM AND ALIZE:  Unremarkable  CHEST WALL AND LOWER NECK: Median sternotomy wires are noted  A few bubbles of air are seen in the anterior left chest subcutaneous tissues (axial image 8)  Multiple areas of skin thickening with associated subjacent complex fluid are seen in the superficial soft tissues of the left pectoralis region (for example axial image 30); this area measures approximately 5 5 x 1 1 x 4 0 cm in transverse, AP, and CC dimensions  There is body wall edema noted with some shotty left axillary lymph nodes    In  addition there is posterior skin thickening with some irregularity of the soft tissues of the posterior and superior left thorax (axial image 12)  Subjacent to this there is an apparent fluid collection measuring approximately 4 7 x 1 8 x 4 6 cm in size  ABDOMEN LIVER/BILIARY TREE:  Unremarkable  GALLBLADDER:  No calcified gallstones  No pericholecystic inflammatory change  SPLEEN:  Unremarkable  PANCREAS:  Mild atrophy  Otherwise grossly unremarkable ADRENAL GLANDS:  Grossly unremarkable KIDNEYS/URETERS:  Partially exophytic cyst in the posterior midportion of the right kidney measures approximately 1 5 cm in size  Exophytic cyst in the lower pole of the left kidney measures approximately 3 7 cm in size  Bilateral kidneys otherwise appear grossly unremarkable; no hydronephrosis  STOMACH AND BOWEL:  Grossly unremarkable APPENDIX:  A normal appendix was visualized  ABDOMINOPELVIC CAVITY:  No ascites or free intraperitoneal air  No lymphadenopathy  VESSELS:  Mild atherosclerosis; no aortic aneurysm PELVIS REPRODUCTIVE ORGANS:  Unremarkable for patient's age  URINARY BLADDER:  Bladder is partially distended  A catheter is seen within the bladder lumen  Some air is seen within the nondependent portion of the bladder  Mild circumferential bladder wall thickening is noted, probably exaggerated by underdistention  ABDOMINAL WALL/INGUINAL REGIONS:  A right-sided femoral venous catheter is noted  Some subcutaneous emphysema is seen in this region as well, probably related to recent intervention/placement  Diffuse body wall edema is noted  OSSEOUS STRUCTURES:  Mild curvature of the lumbar spine, apex to the left  Moderate degenerative changes of the bilateral hips, worse on the right  No acute fracture or destructive osseous lesion is identified  Prominent degenerative changes of the right shoulder  Multilevel degenerative changes of the spine with vacuum disc phenomenon at L4/L5    There is intervertebral disc space narrowing and facet joint arthropathy at L2/L3, L3/L4, and L4/L5  Impression: Patchy airspace opacities are seen in the right lower lobe superior segment  There is a large dense consolidation in the right middle lobe, lateral segment, nonspecific but suspicious for infection/pneumonia in the appropriate clinical setting  Cardiomegaly, bilateral pleural effusions, and body wall edema suggests a superimposed component of fluid overload/CHF  Multiple areas of skin thickening with associated subjacent complex fluid are seen in the superficial soft tissues of the left pectoralis region (for example axial image 30), as well as in the posterior and superior left thorax (axial image 12), as described  Consider cellulitis with developing abscesses  In addition, a few bubbles of air are seen in the anterior left chest subcutaneous tissues (axial image 8) which could be related to recent intervention but also raises suspicion for possible gas-forming infection  Partially distended bladder with catheter seen within the bladder lumen  Mild circumferential bladder wall thickening noted, probably exaggerated by underdistention  A cystitis could be considered in the appropriate clinical setting  Renal cysts, degenerative changes of the shoulder, spine, and hips, and other findings as above  Findings discussed with FRITZ Alexis by Dr Trinh Lopez at 4:18 AM on 10/26/2018  Workstation performed: [id number]     Xr Chest Portable    Result Date: 10/29/2018  Narrative: CHEST INDICATION:   pna  COMPARISON:  10/26/2018 EXAM PERFORMED/VIEWS:  XR CHEST PORTABLE FINDINGS:  Endotracheal and nasogastric tubes have been removed during the interim  Cardiomediastinal silhouette appears unremarkable  Right greater than left basilar pleural effusions with associated right basilar airspace opacity, likely atelectasis  Osseous structures appear within normal limits for patient age       Impression: Persistent right basilar pleural effusion and associated probable atelectasis  Workstation performed: [id number]     Xr Spine Thoracic 3 Views    Result Date: 10/25/2018  Narrative: THORACIC SPINE INDICATION:   Pain  COMPARISON:  Chest x-ray from 3/13/2013 VIEWS:  XR SPINE THORACIC 3 VW FINDINGS: There is no acute fracture or pathologic bone lesion  Minimal vertebral body height loss scattered in the thoracic spine similar to the prior exam probably degenerative  Thoracic vertebral alignment is within normal limits  Multilevel degenerative changes with flowing marginal osteophyte formation  There is no displacement of the paraspinal line  The pedicles appear intact  Impression: No acute osseous abnormality  Workstation performed: [id number]     Xr Spine Lumbar 2 Or 3 Views Injury    Result Date: 10/25/2018  Narrative: LUMBAR SPINE INDICATION:   pain  COMPARISON:  None VIEWS:  XR SPINE LUMBAR 2 OR 3 VIEWS INJURY FINDINGS: There is no evidence of acute fracture or destructive osseous lesion  Levoscoliosis is present about L3  Moderate degenerative changes present throughout the lumbar spine  The pedicles appear intact  Soft tissues are unremarkable  Impression: Levoscoliosis and moderate multilevel degenerative change  Workstation performed: [id number]     Xr Shoulder 2+ Views Left    Result Date: 10/25/2018  Narrative: LEFT SHOULDER INDICATION:   Pain  COMPARISON:  None VIEWS:  XR SHOULDER 2+ VW LEFT FINDINGS: There is no acute fracture or dislocation  Narrowing of the glenohumeral joint with mild spurring  No lytic or blastic lesions are seen  Soft tissues are unremarkable  Impression: No acute osseous abnormality  Workstation performed: [id number]     Ct Head Wo Contrast    Result Date: 10/26/2018  Narrative: CT BRAIN - WITHOUT CONTRAST INDICATION:   Confusion/delirium, altered LOC, unexplained Lethargy  COMPARISON:  CT head dated 10/25/2018 TECHNIQUE:  CT examination of the brain was performed    In addition to axial images, coronal 2D reformatted images were created and submitted for interpretation  Radiation dose length product (DLP) for this visit:  1090 97 mGy-cm   This examination, like all CT scans performed in the [facility], was performed utilizing techniques to minimize radiation dose exposure, including the use of iterative reconstruction and automated exposure control  IMAGE QUALITY:  Diagnostic  FINDINGS: PARENCHYMA: Decreased attenuation is noted in periventricular and subcortical white matter demonstrating an appearance that is statistically most likely to represent mild microangiopathic change  Gray-white differentiation appears maintained No CT signs of acute territorial infarction  No intracranial mass, mass effect or midline shift  No acute parenchymal hemorrhage  Mild generalized parenchymal atrophy redemonstrated  VENTRICLES AND EXTRA-AXIAL SPACES:  Ventricles and extra-axial CSF spaces are prominent commensurate with the degree of volume loss  No hydrocephalus  No acute extra-axial hemorrhage  VISUALIZED ORBITS AND PARANASAL SINUSES:  Unremarkable  CALVARIUM AND EXTRACRANIAL SOFT TISSUES:  The calvarium appears intact  Otherwise grossly unremarkable  Impression: No acute intracranial abnormality  Other nonacute findings as above  Workstation performed: [id number]     Ct Head Without Contrast    Result Date: 10/25/2018  Narrative: CT BRAIN - WITHOUT CONTRAST INDICATION:   fall  COMPARISON:  5/9/2018  TECHNIQUE:  CT examination of the brain was performed  In addition to axial images, coronal 2D reformatted images were created and submitted for interpretation  Radiation dose length product (DLP) for this visit:  1220 31 mGy-cm   This examination, like all CT scans performed in the [facility], was performed utilizing techniques to minimize radiation dose exposure, including the use of iterative reconstruction and automated exposure control  IMAGE QUALITY:  Diagnostic   FINDINGS: PARENCHYMA: Decreased attenuation is noted in periventricular and subcortical white matter demonstrating an appearance that is statistically most likely to represent moderate microangiopathic change  No CT signs of acute infarction  No intracranial mass, mass effect or midline shift  No acute parenchymal hemorrhage  VENTRICLES AND EXTRA-AXIAL SPACES:  Ventricles and extra-axial CSF spaces are prominent commensurate with the degree of volume loss  No hydrocephalus  No acute extra-axial hemorrhage  VISUALIZED ORBITS AND PARANASAL SINUSES:  Unremarkable  CALVARIUM AND EXTRACRANIAL SOFT TISSUES:  Normal      Impression: No acute intracranial abnormality  Workstation performed: [id number]     Ct Spine Cervical Without Contrast    Result Date: 10/25/2018  Narrative: CT CERVICAL SPINE - WITHOUT CONTRAST INDICATION:   fall  COMPARISON:  None  TECHNIQUE:  CT examination of the cervical spine was performed without intravenous contrast   Contiguous axial images were obtained  Sagittal and coronal reconstructions were performed  Radiation dose length product (DLP) for this visit:  415 18 mGy-cm   This examination, like all CT scans performed in the [facility], was performed utilizing techniques to minimize radiation dose exposure, including the use of iterative  reconstruction and automated exposure control  IMAGE QUALITY:  Diagnostic  FINDINGS: ALIGNMENT:  Normal alignment of the cervical spine  No subluxation  VERTEBRAL BODIES:  No fracture  DEGENERATIVE CHANGES:  Moderate multilevel cervical degenerative changes are noted  No critical central canal stenosis  PREVERTEBRAL AND PARASPINAL SOFT TISSUES:  Unremarkable  THORACIC INLET:  Normal      Impression: No cervical spine fracture or traumatic malalignment  Workstation performed: [id number]     Xr Chest 1 View    Result Date: 10/25/2018  Narrative: CHEST INDICATION:   Pain  Fall   COMPARISON:  Chest x-ray from 3/13/2013 EXAM PERFORMED/VIEWS:  XR CHEST 1 VIEW FINDINGS:  There has been interval sternotomy  Cardiac silhouette is moderately enlarged  Focal consolidation in the right lower lobe laterally  Left lung is clear  No pneumothorax or pleural effusion  Osseous structures appear within normal limits for patient age  Impression: Focal consolidation in the right lower lobe  This may represent pneumonia  If there is any trauma to the right chest, pulmonary contusion is a consideration  Recommend follow up to resolution  The study was marked in EPIC for significant notification  Workstation performed: [id number]     Xr Chest Portable Icu    Result Date: 11/1/2018  Narrative: CHEST INDICATION:   PNA  COMPARISON:  10/29/2018 EXAM PERFORMED/VIEWS:  XR CHEST PORTABLE ICU 1 image FINDINGS: Cardiomediastinal silhouette appears enlarged  A median sternotomy has been performed  No evidence of heart failure  A PICC line enters on the right, the tip extends to the SVC  Bibasilar densities may represent infiltrate, atelectasis or fluid  Osseous structures appear within normal limits for patient age  Impression: Cardiomegaly  Bibasilar parenchymal densities unchanged  Workstation performed: [id number]     Xr Chest Portable Icu    Result Date: 10/29/2018  Narrative: CHEST INDICATION:   Evaluate for aspiration pneumonia    COMPARISON:  10/29/2018 EXAM PERFORMED/VIEWS:  XR CHEST PORTABLE ICU FINDINGS:  There are median sternotomy wires indicating prior cardiac surgery  Heart shadow is enlarged but unchanged from prior exam  Bilateral pleural effusions appear similar size to the previous examination  In conjunction with prior chest CT 10/26/2018, right base airspace disease is again identified  Questionable left retrocardiac airspace disease as well, similar to prior  No pneumothorax  No pulmonary edema  Osseous structures appear within normal limits for patient age       Impression: No significant interval change since 10/29/2018 Workstation performed: [id number] Xr Chest Portable Icu    Result Date: 10/26/2018  Narrative: CHEST INDICATION:   Endotracheal tube positioning  COMPARISON:  10/25/2018 EXAM PERFORMED/VIEWS:  XR CHEST PORTABLE ICU Images: 2 FINDINGS: Cardiomediastinal silhouette appears unremarkable  A median sternotomy has been performed  Endotracheal tube ends approximately 3 3 cm above the yohana  A nasogastric tube is coiled in the stomach  Pulmonary vessels are normal  Consolidation at the right lung base  Minimal atelectasis at the left lung base  No evidence of heart failure  Osseous structures appear within normal limits for patient age  Impression: Endotracheal tube ending 3 3 cm above the yohana  Consolidation at the right lung base  Workstation performed: [id number]     Ir Picc Line    Result Date: 10/31/2018  Narrative: Examination: Right upper extremity double lumen Power PICC 10/31/2018 History: Needs access Contrast: None Fluoroscopy time:  0 9 minutes Procedure: The patient was identified verbally and by wristband  Timeout was performed  Informed consent was obtained  All elements of maximal sterile barrier technique, cap and mask and sterile gown and sterile gloves and sterile full-body drape and hand hygiene and 2% chlorhexidine for cutaneous antisepsis  Sterile ultrasound technique with sterile gel and sterile probe covers was also utilized  Following obtaining informed consent, the patient was prepped and draped in the usual sterile fashion  Using ultrasound guidance, access was gained to the patient's right antecubital vein using a micropuncture system  The micropuncture dilator was exchanged for a peel away sheath  A mandril wire was advanced to the level of the RA/SVC junction using fluoroscopic guidance to measure the catheter length  The catheter was cut to size and placed through the peel away sheath  The lumens were flushed with ease  The catheter was secured in place   The patient tolerated the procedure well without apparent immediate complication  The patient left the IR department in unchanged condition  Dr Edmonds Has performed the procedure Findings: Using ultrasound guidance, the right antecubital vein was cannulated using Seldinger technique  The right antecubital vein was evaluated as a potential access site  The right antecubital vein was patent, and free of thrombus  Static images of the vessel  was obtained  Visualization of real time needle entry into the vessel was obtained  A fluoroscopic spot image demonstrated a newly placed PICC with the central aspect at the RA/SVC junction  The catheter tubing is smooth in contour  Impression: Impression: Successful placement of a 49cm right upper extremity double lumen Power PICC  Workstation performed: [id number]     Imaging Reports Reviewed by myself    Cultures:   Blood Culture:   Lab Results   Component Value Date    BLOODCX No Growth After 5 Days  10/29/2018    BLOODCX No Growth After 5 Days  10/29/2018    BLOODCX Staphylococcus aureus (A) 10/25/2018    BLOODCX No Growth After 5 Days   10/25/2018     Urine Culture:   Lab Results   Component Value Date    URINECX >100,000 cfu/ml  10/25/2018    URINECX >100,000 cfu/ml Klebsiella oxytoca (A) 05/09/2018     Sputum Culture: No components found for: SPUTUMCX  Wound Culture:   Lab Results   Component Value Date    WOUNDCULT 4+ Growth of Staphylococcus aureus (A) 10/26/2018    WOUNDCULT 4+ Growth of Staphylococcus aureus (A) 10/25/2018       Last 24 Hours Medication List:     Current Facility-Administered Medications:  acetaminophen 650 mg Oral Q6H [address] Rhea GORDON John PA-C    albumin human 12 5 g Intravenous Daily Sheron Salazar MD    And        furosemide 20 mg Intravenous Daily Sheron Salazar MD    atorvastatin 10 mg Oral Daily Chica Dejesus MD    cefazolin 2,000 mg Intravenous Q8H Chica Dejesus MD Last Rate: 2,000 mg (11/13/18 1422)   collagenase  Topical Daily Michael Mccurdy MD    insulin detemir 8 Units Subcutaneous HS Jalen Ashraf MD    insulin lispro 1-5 Units Subcutaneous TID AC Jalen Ashraf MD    insulin lispro 3 Units Subcutaneous TID With Meals Jalen Ashraf MD    lisinopril 2 5 mg Oral Daily Jalen Ashraf MD    magnesium sulfate 2 g Intravenous Once Chavez Poe MD    melatonin 6 mg Oral HS Claudell Che, MD    metoprolol tartrate 12 5 mg Oral Q12H [facility] Belen Tee PA-C    pantoprazole 40 mg Intravenous Q12H Jesse Boss PA-C    potassium chloride 20 mEq Oral Daily Jalen Ashraf MD    pregabalin 50 mg Oral TID Claudell Che, MD    senna 1 tablet Oral HS Claudell Che, MD    tamsulosin 0 4 mg Oral Daily With [address]na [address] MD    traMADol 50 mg Oral Q6H PRN Jalen Ashraf MD         Today, Patient Was Seen By: Chavez Poe MD    ** Please Note: Dragon 360 Dictation voice to text software may have been used in the creation of this document   **

## 2018-11-14 LAB
ANION GAP SERPL CALCULATED.3IONS-SCNC: 4 MMOL/L (ref 4–13)
ATRIAL RATE: 98 BPM
BUN SERPL-MCNC: 17 MG/DL (ref 5–25)
CALCIUM SERPL-MCNC: 7.5 MG/DL (ref 8.3–10.1)
CHLORIDE SERPL-SCNC: 101 MMOL/L (ref 100–108)
CO2 SERPL-SCNC: 30 MMOL/L (ref 21–32)
CREAT SERPL-MCNC: 0.87 MG/DL (ref 0.6–1.3)
ERYTHROCYTE [DISTWIDTH] IN BLOOD BY AUTOMATED COUNT: 19.1 % (ref 11.6–15.1)
GFR SERPL CREATININE-BSD FRML MDRD: 82 ML/MIN/1.73SQ M
GLUCOSE SERPL-MCNC: 123 MG/DL (ref 65–140)
GLUCOSE SERPL-MCNC: 138 MG/DL (ref 65–140)
GLUCOSE SERPL-MCNC: 157 MG/DL (ref 65–140)
GLUCOSE SERPL-MCNC: 158 MG/DL (ref 65–140)
GLUCOSE SERPL-MCNC: 175 MG/DL (ref 65–140)
HCT VFR BLD AUTO: 24.2 % (ref 36.5–49.3)
HGB BLD-MCNC: 7.4 G/DL (ref 12–17)
MAGNESIUM SERPL-MCNC: 1.9 MG/DL (ref 1.6–2.6)
MCH RBC QN AUTO: 31.2 PG (ref 26.8–34.3)
MCHC RBC AUTO-ENTMCNC: 30.6 G/DL (ref 31.4–37.4)
MCV RBC AUTO: 102 FL (ref 82–98)
P AXIS: 33 DEGREES
PLATELET # BLD AUTO: 137 THOUSANDS/UL (ref 149–390)
PMV BLD AUTO: 11.1 FL (ref 8.9–12.7)
POTASSIUM SERPL-SCNC: 3.8 MMOL/L (ref 3.5–5.3)
PR INTERVAL: 152 MS
QRS AXIS: 16 DEGREES
QRSD INTERVAL: 98 MS
QT INTERVAL: 348 MS
QTC INTERVAL: 430 MS
RBC # BLD AUTO: 2.37 MILLION/UL (ref 3.88–5.62)
SODIUM SERPL-SCNC: 135 MMOL/L (ref 136–145)
T WAVE AXIS: 174 DEGREES
VENTRICULAR RATE: 92 BPM
WBC # BLD AUTO: 3.96 THOUSAND/UL (ref 4.31–10.16)

## 2018-11-14 PROCEDURE — 93010 ELECTROCARDIOGRAM REPORT: CPT | Performed by: INTERNAL MEDICINE

## 2018-11-14 PROCEDURE — 97110 THERAPEUTIC EXERCISES: CPT

## 2018-11-14 PROCEDURE — 82948 REAGENT STRIP/BLOOD GLUCOSE: CPT

## 2018-11-14 PROCEDURE — 85027 COMPLETE CBC AUTOMATED: CPT | Performed by: STUDENT IN AN ORGANIZED HEALTH CARE EDUCATION/TRAINING PROGRAM

## 2018-11-14 PROCEDURE — 99232 SBSQ HOSP IP/OBS MODERATE 35: CPT | Performed by: INTERNAL MEDICINE

## 2018-11-14 PROCEDURE — 11042 DBRDMT SUBQ TIS 1ST 20SQCM/<: CPT | Performed by: SURGERY

## 2018-11-14 PROCEDURE — 43255 EGD CONTROL BLEEDING ANY: CPT | Performed by: INTERNAL MEDICINE

## 2018-11-14 PROCEDURE — 99024 POSTOP FOLLOW-UP VISIT: CPT | Performed by: SURGERY

## 2018-11-14 PROCEDURE — 0JB70ZZ EXCISION OF BACK SUBCUTANEOUS TISSUE AND FASCIA, OPEN APPROACH: ICD-10-PCS | Performed by: STUDENT IN AN ORGANIZED HEALTH CARE EDUCATION/TRAINING PROGRAM

## 2018-11-14 PROCEDURE — 80048 BASIC METABOLIC PNL TOTAL CA: CPT | Performed by: STUDENT IN AN ORGANIZED HEALTH CARE EDUCATION/TRAINING PROGRAM

## 2018-11-14 PROCEDURE — 99232 SBSQ HOSP IP/OBS MODERATE 35: CPT | Performed by: STUDENT IN AN ORGANIZED HEALTH CARE EDUCATION/TRAINING PROGRAM

## 2018-11-14 PROCEDURE — 83735 ASSAY OF MAGNESIUM: CPT | Performed by: STUDENT IN AN ORGANIZED HEALTH CARE EDUCATION/TRAINING PROGRAM

## 2018-11-14 PROCEDURE — C9113 INJ PANTOPRAZOLE SODIUM, VIA: HCPCS | Performed by: PHYSICIAN ASSISTANT

## 2018-11-14 RX ADMIN — PREGABALIN 50 MG: 50 CAPSULE ORAL at 08:18

## 2018-11-14 RX ADMIN — PREGABALIN 50 MG: 50 CAPSULE ORAL at 22:51

## 2018-11-14 RX ADMIN — INSULIN LISPRO 3 UNITS: 100 INJECTION, SOLUTION INTRAVENOUS; SUBCUTANEOUS at 08:19

## 2018-11-14 RX ADMIN — TAMSULOSIN HYDROCHLORIDE 0.4 MG: 0.4 CAPSULE ORAL at 16:53

## 2018-11-14 RX ADMIN — INSULIN LISPRO 3 UNITS: 100 INJECTION, SOLUTION INTRAVENOUS; SUBCUTANEOUS at 16:54

## 2018-11-14 RX ADMIN — CEFAZOLIN SODIUM 2000 MG: 2 SOLUTION INTRAVENOUS at 05:32

## 2018-11-14 RX ADMIN — MELATONIN TAB 3 MG 6 MG: 3 TAB at 22:38

## 2018-11-14 RX ADMIN — FUROSEMIDE 20 MG: 10 INJECTION, SOLUTION INTRAMUSCULAR; INTRAVENOUS at 15:02

## 2018-11-14 RX ADMIN — SENNOSIDES 8.6 MG: 8.6 TABLET, FILM COATED ORAL at 22:38

## 2018-11-14 RX ADMIN — ALBUMIN HUMAN 12.5 G: 0.25 SOLUTION INTRAVENOUS at 08:18

## 2018-11-14 RX ADMIN — INSULIN DETEMIR 8 UNITS: 100 INJECTION, SOLUTION SUBCUTANEOUS at 22:38

## 2018-11-14 RX ADMIN — ACETAMINOPHEN 650 MG: 325 TABLET, FILM COATED ORAL at 18:14

## 2018-11-14 RX ADMIN — INSULIN LISPRO 3 UNITS: 100 INJECTION, SOLUTION INTRAVENOUS; SUBCUTANEOUS at 12:06

## 2018-11-14 RX ADMIN — ACETAMINOPHEN 650 MG: 325 TABLET, FILM COATED ORAL at 12:05

## 2018-11-14 RX ADMIN — ACETAMINOPHEN 650 MG: 325 TABLET, FILM COATED ORAL at 05:32

## 2018-11-14 RX ADMIN — PANTOPRAZOLE SODIUM 40 MG: 40 INJECTION, POWDER, FOR SOLUTION INTRAVENOUS at 08:18

## 2018-11-14 RX ADMIN — ATORVASTATIN CALCIUM 10 MG: 10 TABLET, FILM COATED ORAL at 18:14

## 2018-11-14 RX ADMIN — CEFAZOLIN SODIUM 2000 MG: 2 SOLUTION INTRAVENOUS at 15:00

## 2018-11-14 RX ADMIN — PANTOPRAZOLE SODIUM 40 MG: 40 INJECTION, POWDER, FOR SOLUTION INTRAVENOUS at 19:50

## 2018-11-14 RX ADMIN — TRAMADOL HYDROCHLORIDE 50 MG: 50 TABLET, COATED ORAL at 04:28

## 2018-11-14 RX ADMIN — POTASSIUM CHLORIDE 20 MEQ: 1500 TABLET, EXTENDED RELEASE ORAL at 08:19

## 2018-11-14 RX ADMIN — COLLAGENASE SANTYL: 250 OINTMENT TOPICAL at 08:19

## 2018-11-14 RX ADMIN — INSULIN LISPRO 1 UNITS: 100 INJECTION, SOLUTION INTRAVENOUS; SUBCUTANEOUS at 12:06

## 2018-11-14 RX ADMIN — CEFAZOLIN SODIUM 2000 MG: 2 SOLUTION INTRAVENOUS at 22:43

## 2018-11-14 RX ADMIN — TRAMADOL HYDROCHLORIDE 50 MG: 50 TABLET, COATED ORAL at 19:03

## 2018-11-14 RX ADMIN — METOPROLOL TARTRATE 12.5 MG: 25 TABLET ORAL at 22:36

## 2018-11-14 RX ADMIN — INSULIN LISPRO 1 UNITS: 100 INJECTION, SOLUTION INTRAVENOUS; SUBCUTANEOUS at 16:54

## 2018-11-14 RX ADMIN — PREGABALIN 50 MG: 50 CAPSULE ORAL at 16:53

## 2018-11-14 NOTE — PLAN OF CARE
GASTROINTESTINAL - ADULT     Maintains adequate nutritional intake Completed          DISCHARGE PLANNING     Discharge to home or other facility with appropriate resources Progressing        DISCHARGE PLANNING - CARE MANAGEMENT     Discharge to post-acute care or home with appropriate resources Progressing        GASTROINTESTINAL - ADULT     Maintains or returns to baseline bowel function Progressing        GENITOURINARY - ADULT     Maintains or returns to baseline urinary function Progressing     Urinary catheter remains patent Progressing        HEMATOLOGIC - ADULT     Maintains hematologic stability Progressing        INFECTION - ADULT     Absence or prevention of progression during hospitalization Progressing        Knowledge Deficit     Patient/family/caregiver demonstrates understanding of disease process, treatment plan, medications, and discharge instructions Progressing        METABOLIC, FLUID AND ELECTROLYTES - ADULT     Electrolytes maintained within normal limits Progressing     Glucose maintained within target range Progressing        Nutrition/Hydration-ADULT     Nutrient/Hydration intake appropriate for improving, restoring or maintaining nutritional needs Progressing        PAIN - ADULT     Verbalizes/displays adequate comfort level or baseline comfort level Progressing        Potential for Falls     Patient will remain free of falls Progressing        Prexisting or High Potential for Compromised Skin Integrity     Skin integrity is maintained or improved Progressing        SAFETY ADULT     Maintain or return to baseline ADL function Progressing     Maintain or return mobility status to optimal level Progressing        SKIN/TISSUE INTEGRITY - ADULT     Incision(s), wounds(s) or drain site(s) healing without S/S of infection Progressing

## 2018-11-14 NOTE — OCCUPATIONAL THERAPY NOTE
OT TREATMENT       11/14/18 1112   Restrictions/Precautions   Other Precautions Chair Alarm; Bed Alarm; Fall Risk   Pain Assessment   Pain Assessment No/denies pain   Pain Score No Pain   ROM- Right Upper Extremities   R Shoulder AROM; Flexion   R Elbow AROM;Elbow flexion;Elbow extension   R Wrist AROM; Wrist flexion;Wrist extension;Radial deviation;Ulnar deviation   R Hand AROM; Thumb; Index finger; Long finger;Ring finger;Little finger   R Position Seated   R Weight/Reps/Sets 2x10 no added weight   ROM - Left Upper Extremities    L Shoulder AROM; Flexion   L Elbow AROM;Elbow flexion;Elbow extension   L Wrist AROM; Wrist flexion;Wrist extension;Radial deviation;Ulnar deviation   L Hand AROM; Thumb; Index finger; Long finger;Ring finger;Little finger   L Position Seated   L Weight/Reps/Sets 2x10 no added weight   Activity Tolerance   Activity Tolerance Patient limited by fatigue   Assessment   Assessment Pt received in bedside chair  Agreeable to OT intervention but declines all ADL participation at this time  Does agree to UB ex in chair  based on ROM and activity tolerance but should be able to participate in UB care tasks with min A for bathe and dress and set up/SPV for oral care and grooming  Plan   Treatment Interventions ADL retraining;Functional transfer training;UE strengthening/ROM; Endurance training;Patient/family training;Equipment evaluation/education; Compensatory technique education; Energy conservation   Goal Expiration Date 11/15/18   Treatment Day (6)   OT Frequency 5x/wk   Recommendation   OT Discharge Recommendation [id number] [facility] License Number  Chun [place] OTR/L [id number]

## 2018-11-14 NOTE — PLAN OF CARE
DISCHARGE PLANNING     Discharge to home or other facility with appropriate resources Progressing        DISCHARGE PLANNING - CARE MANAGEMENT     Discharge to post-acute care or home with appropriate resources Progressing        GASTROINTESTINAL - ADULT     Maintains or returns to baseline bowel function Progressing     Maintains adequate nutritional intake Progressing        GENITOURINARY - ADULT     Maintains or returns to baseline urinary function Progressing     Urinary catheter remains patent Progressing        HEMATOLOGIC - ADULT     Maintains hematologic stability Progressing        INFECTION - ADULT     Absence or prevention of progression during hospitalization Progressing        Knowledge Deficit     Patient/family/caregiver demonstrates understanding of disease process, treatment plan, medications, and discharge instructions Progressing        METABOLIC, FLUID AND ELECTROLYTES - ADULT     Electrolytes maintained within normal limits Progressing     Glucose maintained within target range Progressing        Nutrition/Hydration-ADULT     Nutrient/Hydration intake appropriate for improving, restoring or maintaining nutritional needs Progressing        PAIN - ADULT     Verbalizes/displays adequate comfort level or baseline comfort level Progressing        Potential for Falls     Patient will remain free of falls Progressing        Prexisting or High Potential for Compromised Skin Integrity     Skin integrity is maintained or improved Progressing        SAFETY ADULT     Maintain or return to baseline ADL function Progressing     Maintain or return mobility status to optimal level Progressing        SKIN/TISSUE INTEGRITY - ADULT     Incision(s), wounds(s) or drain site(s) healing without S/S of infection Progressing

## 2018-11-14 NOTE — PROGRESS NOTES
Progress Note - Cardiology   Javi Dumont [de-identified] y o  male MRN: [id number]  Unit/Bed#: [address] [place] [address] Encounter: [id number]  11/14/18  11:25 AM        Assessment:  1  Acute on chronic combined systolic and diastolic congestive heart failure secondary to ischemia  Left ventricular ejection fraction of 20%  Repeat echocardiogram showed no change in ejection fraction  - patient on minimal doses of metoprolol and lisinopril due to hypotension which are being held today  - he has had multiple episodes of PVCs and short runs of VT  We discussed ICD/Life Vest   He does not wish to have ICD placed  He is aware of risk of SCD but does not wish to have defibrillator    - Possible switch Lasix to PO tomorrow  2  Hypoalbuminemia likely secondary to CHF/infection and large surgery  Improve nutrition  24 hour urine protein underway  3  CAD with CABG in 2017 - EF in May 2018 was 40%  Not on ASA due to duodenal ulcer bleeding  4  Anemia - Hg today is 7 4  Will discuss repeat/possible transfusion with hospitalist    5  Dyslipidemia - statin  6  DM - poorly controlled  7  Paroxysmal atrial fibrillation - currently in sinus rhythm  8  Hypotension      Plan:  As above      Subjective: Javi Dumont denies any chest pain or shortness of breath  No overnight events  STill with edema        Meds/Allergies   current meds:   Current Facility-Administered Medications   Medication Dose Route Frequency    acetaminophen (TYLENOL) tablet 650 mg  650 mg Oral Q6H [address]    albumin human (FLEXBUMIN) 25 % injection 12 5 g  12 5 g Intravenous Daily    And    furosemide (LASIX) injection 20 mg  20 mg Intravenous Daily    atorvastatin (LIPITOR) tablet 10 mg  10 mg Oral Daily    ceFAZolin (ANCEF) IVPB (premix) 2,000 mg  2,000 mg Intravenous Q8H    collagenase (SANTYL) ointment   Topical Daily    insulin detemir (LEVEMIR) subcutaneous injection 8 Units  8 Units Subcutaneous HS    insulin lispro (HumaLOG) 100 units/mL subcutaneous injection 1-5 Units  1-5 Units Subcutaneous TID AC    insulin lispro (HumaLOG) 100 units/mL subcutaneous injection 3 Units  3 Units Subcutaneous TID With Meals    lisinopril (ZESTRIL) tablet 2 5 mg  2 5 mg Oral Daily    melatonin tablet 6 mg  6 mg Oral HS    metoprolol tartrate (LOPRESSOR) partial tablet 12 5 mg  12 5 mg Oral Q12H [address]    pantoprazole (PROTONIX) injection 40 mg  40 mg Intravenous Q12H    potassium chloride (K-DUR,KLOR-CON) CR tablet 20 mEq  20 mEq Oral Daily    pregabalin (LYRICA) capsule 50 mg  50 mg Oral TID    senna (SENOKOT) tablet 8 6 mg  1 tablet Oral HS    tamsulosin (FLOMAX) capsule 0 4 mg  0 4 mg Oral Daily With Dinner    traMADol (ULTRAM) tablet 50 mg  50 mg Oral Q6H PRN     No Known Allergies    Objective   Vitals: Blood pressure (!) 87/54, pulse 102, temperature 97 6 °F (36 4 °C), temperature source Tympanic, resp  rate 18, height 5' 8 5" (1 74 m), weight 84 kg (185 lb 4 oz), SpO2 98 %  , Body mass index is 27 76 kg/m²  Intake/Output Summary (Last 24 hours) at 11/14/18 1125  Last data filed at 11/14/18 1044   Gross per 24 hour   Intake              340 ml   Output             1925 ml   Net            -1585 ml       Physical Exam   Constitutional: He appears healthy  No distress  HENT:   Nose: Nose normal    Mouth/Throat: Oropharynx is clear  Eyes: Pupils are equal, round, and reactive to light  Conjunctivae are normal    Neck: Neck supple  No JVD present  Cardiovascular: Normal rate and regular rhythm  Exam reveals no distant heart sounds and no friction rub  No murmur heard  Pulmonary/Chest: Effort normal and breath sounds normal  He has no wheezes  He has no rales  Left chest wall sutures  Wound healing well  Abdominal: Soft  He exhibits no distension  There is no tenderness  Musculoskeletal: He exhibits no edema  Neurological: He is alert and oriented to person, place, and time  Skin: Skin is warm and dry  No rash noted         Lab Results: Troponins:       Recent Results (from the past 24 hour(s))   ECG 12 lead    Collection Time: 11/13/18  2:04 PM   Result Value Ref Range    Ventricular Rate 92 BPM    Atrial Rate 98 BPM    MS Interval 152 ms    QRSD Interval 98 ms    QT Interval 348 ms    QTC Interval 430 ms    P Axis 33 degrees    QRS Axis 16 degrees    T Wave Axis 174 degrees   Hemoglobin and hematocrit, blood    Collection Time: 11/13/18  2:18 PM   Result Value Ref Range    Hemoglobin 8 4 (L) 12 0 - 17 0 g/dL    Hematocrit 26 9 (L) 36 5 - 49 3 %   Fingerstick Glucose (POCT)    Collection Time: 11/13/18  4:18 PM   Result Value Ref Range    POC Glucose 90 65 - 140 mg/dl   Fingerstick Glucose (POCT)    Collection Time: 11/13/18  9:03 PM   Result Value Ref Range    POC Glucose 174 (H) 65 - 140 mg/dl   CBC    Collection Time: 11/14/18  4:33 AM   Result Value Ref Range    WBC 3 96 (L) 4 31 - 10 16 Thousand/uL    RBC 2 37 (L) 3 88 - 5 62 Million/uL    Hemoglobin 7 4 (L) 12 0 - 17 0 g/dL    Hematocrit 24 2 (L) 36 5 - 49 3 %     (H) 82 - 98 fL    MCH 31 2 26 8 - 34 3 pg    MCHC 30 6 (L) 31 4 - 37 4 g/dL    RDW 19 1 (H) 11 6 - 15 1 %    Platelets 573 (L) 339 - 390 Thousands/uL    MPV 11 1 8 9 - 12 7 fL   Basic metabolic panel    Collection Time: 11/14/18  4:33 AM   Result Value Ref Range    Sodium 135 (L) 136 - 145 mmol/L    Potassium 3 8 3 5 - 5 3 mmol/L    Chloride 101 100 - 108 mmol/L    CO2 30 21 - 32 mmol/L    ANION GAP 4 4 - 13 mmol/L    BUN 17 5 - 25 mg/dL    Creatinine 0 87 0 60 - 1 30 mg/dL    Glucose 158 (H) 65 - 140 mg/dL    Calcium 7 5 (L) 8 3 - 10 1 mg/dL    eGFR 82 ml/min/1 73sq m   Magnesium    Collection Time: 11/14/18  4:33 AM   Result Value Ref Range    Magnesium 1 9 1 6 - 2 6 mg/dL   Fingerstick Glucose (POCT)    Collection Time: 11/14/18  6:53 AM   Result Value Ref Range    POC Glucose 138 65 - 140 mg/dl   Fingerstick Glucose (POCT)    Collection Time: 11/14/18 11:12 AM   Result Value Ref Range    POC Glucose 157 (H) 65 - 140 mg/dl          Cardiac testing:   Results for orders placed during the hospital encounter of 10/25/18   Echo complete with contrast if indicated    [address]  [address] [facility]  [place]   [phone]    Transthoracic Echocardiogram  2D, M-mode, Doppler, and Color Doppler    Study date:  26-Oct-2018    Patient: Keyshawn Gross  MR number: [id number]  Account number: [de-identified]  : 1940  Age: [de-identified] years  Gender: Male  Status: Inpatient  Location: Bedside  Height: 68 in  Weight: 155 8 lb  BP: 83/ 53 mmHg    Indications: Heart Failure    Diagnoses: I50 9 - Heart failure, unspecified    Sonographer:  ADRIEL Driver  Primary Physician:  Jonathan Peralta DO  Referring Physician:  Lillian Caicedo PA-C  Group:  [facility]  Interpreting Physician:  Deric Malagon DO    IMPRESSIONS:  These features are consistent with dilated cardiomyopathy  SUMMARY    LEFT VENTRICLE:  The ventricle was moderately dilated  Systolic function was severely reduced by visual assessment  Ejection fraction was estimated to be 20 %  There were no regional wall motion abnormalities  There was mild concentric hypertrophy  Doppler parameters were consistent with abnormal left ventricular relaxation (grade 1 diastolic dysfunction)  LEFT ATRIUM:  The atrium was moderately dilated  MITRAL VALVE:  There was moderate regurgitation  AORTIC VALVE:  There was no evidence for stenosis  There was no regurgitation  TRICUSPID VALVE:  There was mild regurgitation  Pulmonary artery systolic pressure was mildly increased  HISTORY: PRIOR HISTORY: HTN, Hyperlipidemia, DM, CHF, CABG    PROCEDURE: The procedure was performed at the bedside  This was a routine study  The transthoracic approach was used  The study included complete 2D imaging, M-mode, complete spectral Doppler, and color Doppler  The heart rate was 115 bpm,  at the start of the study   Images were not obtained from the subcostal or suprasternal notch acoustic windows  Intravenous contrast (Definity solution [1 3 ml Definity/8 7ml normal saline solution], 1 ml) was administered to opacify the  left ventricle  Echocardiographic views were limited due to restricted patient mobility, surgical dressings, poor acoustic window availability, lung interference, and patient on mechanical ventilator  This was a technically difficult study  LEFT VENTRICLE: The ventricle was moderately dilated  Systolic function was severely reduced by visual assessment  Ejection fraction was estimated to be 20 %  There were no regional wall motion abnormalities  There was mild concentric  hypertrophy  DOPPLER: Doppler parameters were consistent with abnormal left ventricular relaxation (grade 1 diastolic dysfunction)  RIGHT VENTRICLE: The size was normal  Systolic function was normal  DOPPLER: Systolic pressure was within the normal range  LEFT ATRIUM: The atrium was moderately dilated  RIGHT ATRIUM: Size was normal     MITRAL VALVE: There was annular calcification  Valve structure was normal  There was normal leaflet separation  No echocardiographic evidence for prolapse  DOPPLER: The transmitral velocity was within the normal range  There was no  evidence for stenosis  There was moderate regurgitation  AORTIC VALVE: The valve was trileaflet  Leaflets exhibited normal thickness, mild calcification, normal cuspal separation, and sclerosis  DOPPLER: Transaortic velocity was within the normal range  There was no evidence for stenosis  There  was no regurgitation  TRICUSPID VALVE: The valve structure was normal  There was normal leaflet separation  DOPPLER: The transtricuspid velocity was within the normal range  There was no evidence for stenosis  There was mild regurgitation  Pulmonary artery  systolic pressure was mildly increased  Estimated peak PA pressure was 43 mmHg      PULMONIC VALVE: Leaflets exhibited normal thickness, no calcification, and normal cuspal separation  DOPPLER: The transpulmonic velocity was within the normal range  There was no regurgitation  PERICARDIUM: There was no thickening  There was no pericardial effusion  AORTA: The root exhibited normal size  PULMONARY ARTERY: The size was normal  The morphology appeared normal     SYSTEM MEASUREMENT TABLES    2D mode  AoR Diam 2D: 3 7 cm  LA Diam (2D): 4 6 cm  LA/Ao (2D): 1 24  FS (2D Teich): 13 7 %  IVSd (2D): 1 17 cm  LVDEV: 189 cm³  LVEDV MOD BP: 180 cm³  LVESV: 135 cm³  LVIDd(2D): 6 13 cm  LVISd (2D): 5 29 cm  LVPWd (2D): 1 09 cm  SV (Teich): 54 cm³    Apical four chamber  LVEF A4C: 24 %    Apical two chamber  LVEF A2C: 13 %    Unspecified Scan Mode  MV Peak E Travon  Mean: 1120 mm/s  MVA (PHT): 7 33 cm squared  PHT: 30 ms  Max P mm[Hg]  V Max: 2970 mm/s  Vmax: 2770 mm/s  RA Area: 19 cm squared  RA Volume: 48 4 cm³  TAPSE: 1 2 cm    [facility]    Prepared and electronically signed by    Zonia Mark DO  Signed 26-Oct-2018 16:55:41         Results for orders placed during the hospital encounter of 18   NM myocardial perfusion spect (rx stress and/or rest)    [place] [address]  [phone]    Rest/Stress Gated SPECT Myocardial Perfusion Imaging After Regadenoson    Patient: Roberto Chavez  MR number: [id number]  Account number: [de-identified]  : 1940  Age: 76 years  Gender: Male  Status: Inpatient  Location: Stress lab  Height: 68 in  Weight: 147 lb  BP: 122/ 60 mmHg    Allergies: NO KNOWN ALLERGIES    Diagnosis: I25 5 - Ischemic cardiomyopathy    Primary Physician:  Ashok Wu DO  RN:  NATALY Bhagat  Group:  [facility]  Report Prepared By[de-identified]  NATALY Bhagat  Interpreting Physician:  Shama Gonzalez MD    INDICATIONS: Evaluation of known coronary artery disease      HISTORY: The patient is a 76year old  male  Chest pain status: no chest pain  Other symptoms: decreased effort tolerance  Coronary artery disease risk factors: dyslipidemia, hypertension, and diabetes mellitus  Cardiovascular  history:Ischemic cardiomyopathy, coronary artery disease and congestive heart failure  Prior cardiovascular procedures: coronary artery bypass grafting  Co-morbidity: age Medications: a beta blocker, aspirin, a lipid lowering agent, and  diabetic medications  PHYSICAL EXAM: Baseline physical exam screening:Weakness of extremities noted, no wheezes audible  REST ECG: Normal sinus rhythm  The ECG showed ventricular couplets  PROCEDURE: The study was performed in the stress lab  A regadenoson infusion pharmacologic stress test was performed  Gated SPECT myocardial perfusion imaging was performed after stress and at rest  Systolic blood pressure was 122 mmHg, at  the start of the study  Diastolic blood pressure was 60 mmHg, at the start of the study  The heart rate was 74 bpm, at the start of the study  IV double checked  Regadenoson protocol:  Time HR bpm SBP mmHg DBP mmHg Symptoms Rhythm/conduct  Baseline 09:54 74 122 60 none NSR, ventricular couplets  Immediate 09:56 102 97 54 none occasional PVC's  1 min 09:57 98 101 56 none --  2 min 09:58 98 102 58 none --  3 min 09:59 99 106 55 none --  4 min 10:00 97 105 56 none --  No medications or fluids given  STRESS SUMMARY: Duration of pharmacologic stress was 3 min  Maximal heart rate during stress was 102 bpm  The heart rate response to stress was normal  There was normal resting blood pressure with an appropriate response to stress  The  rate-pressure product for the peak heart rate and blood pressure was 49761  There was no chest pain during stress  The stress test was terminated due to protocol completion  Pre oxygen saturation: 99 %  Peak oxygen saturation: 100 %  The  stress ECG was equivocal for ischemia   Arrhythmia during stress: isolated premature ventricular beats     ISOTOPE ADMINISTRATION:  Resting isotope administration Stress isotope administration  Agent Tetrofosmin Tetrofosmin  Dose 10 6 mCi 32 2 mCi  Date 05/15/2018 05/15/2018    The radiopharmaceutical was injected at the peak effect of pharmacologic stress  MYOCARDIAL PERFUSION IMAGING:  The left ventricle was dilated  The TID ratio was 1 12  PERFUSION DEFECTS:  -  There was a moderate-sized, moderately severe, partially reversible myocardial perfusion defect of the entire inferior and apical wall  GATED SPECT:  The calculated left ventricular ejection fraction was 35 %  Left ventricular ejection fraction was moderately decreased by visual estimate  There was moderate global left ventricular hypokinesis with paradoxical septal motion  There was  moderately reduced myocardial thickening and motion of the inferior wall of the left ventricle  SUMMARY:  -  Stress results: There was no chest pain during stress  -  ECG conclusions: The stress ECG was equivocal for ischemia  -  Perfusion imaging: There was a moderate-sized, moderately severe, partially reversible myocardial perfusion defect of the entire inferior and apical wall  -  Gated SPECT: The calculated left ventricular ejection fraction was 35 %  Left ventricular ejection fraction was moderately decreased by visual estimate  There was moderate global left ventricular hypokinesis with paradoxical septal  motion  There was moderately reduced myocardial thickening and motion of the inferior wall of the left ventricle  IMPRESSIONS: Abnormal study after pharmacologic vasodilation  There was a moderate-sized infarct and a small amount of ischemia  Left ventricular systolic function was reduced, with regional wall motion abnormalities      Prepared and signed by    Uriah Fowler MD  Signed 05/16/2018 07:35:18           EKG/TELE: Personally reviewed      Imaging: I have personally reviewed pertinent films in PACS

## 2018-11-14 NOTE — PHYSICAL THERAPY NOTE
PT TREATMENT     11/14/18 1500   Pain Assessment   Pain Assessment No/denies pain   Restrictions/Precautions   Other Precautions Chair Alarm; Bed Alarm; Fall Risk   General   Chart Reviewed Yes   Family/Caregiver Present No   Cognition   Overall Cognitive Status WFL   Arousal/Participation Cooperative   Attention Attends with cues to redirect   Following Commands Follows multistep commands with increased time or repetition   Subjective   Subjective "I'm getting stronger everyday"   Activity Tolerance   Activity Tolerance Patient limited by fatigue   Exercises   Heelslides Supine;10 reps;Bilateral   Hip Flexion Supine;10 reps;AAROM; Bilateral  (SLRs)   Hip Abduction Supine;10 reps;AAROM; Bilateral   Knee AROM Short Arc Quad Supine;10 reps;Bilateral   Ankle Pumps Supine;20 reps;Bilateral   Assessment   Prognosis Good   Problem List Decreased strength;Decreased endurance; Impaired balance;Decreased mobility; Decreased coordination   Assessment Pt declines getting to edge of bed or walking dispite encouragement  Pt agreeable to exercises  Pt tolerated fairly well  AAROm for most   Cont  as per plan  Goals   Patient Goals to get stronger   Treatment Day 4   Plan   Treatment/Interventions Functional transfer training;LE strengthening/ROM; Therapeutic exercise; Endurance training;Patient/family training;Equipment eval/education; Bed mobility;Gait training   Progress Progressing toward goals   Recommendation   Recommendation (STR)   Licensure   [place] License Number  [employer] Curtis Harada PT [id number]

## 2018-11-14 NOTE — UTILIZATION REVIEW
Continued Stay Review    Date: 11/14    Vital Signs: BP 97/57 (BP Location: Left arm)   Pulse 99   Temp 97 7 °F (36 5 °C) (Oral)   Resp 18   Ht 5' 8 5" (1 74 m)   Wt 84 kg (185 lb 4 oz)   SpO2 98%   BMI 27 76 kg/m²     Medications:   Scheduled Meds:   Current Facility-Administered Medications:  acetaminophen 650 mg Oral Q6H [address]   albumin human 12 5 g Intravenous Daily   And      furosemide 20 mg Intravenous Daily   atorvastatin 10 mg Oral Daily   cefazolin 2,000 mg Intravenous Q8H   collagenase  Topical Daily   insulin detemir 8 Units Subcutaneous HS   insulin lispro 1-5 Units Subcutaneous TID AC   insulin lispro 3 Units Subcutaneous TID With Meals   lisinopril 2 5 mg Oral Daily   melatonin 6 mg Oral HS   metoprolol tartrate 12 5 mg Oral Q12H JEANNE   pantoprazole 40 mg Intravenous Q12H   potassium chloride 20 mEq Oral Daily   pregabalin 50 mg Oral TID   senna 1 tablet Oral HS   tamsulosin 0 4 mg Oral Daily With Dinner     Continuous Infusions:    PRN Meds: traMADol    Abnormal Labs/Diagnostic Results:   H/h = 7 4/24 2  Na = 135    Age/Sex: [de-identified] y o  male     Assessment/Plan:   11/14 Progress notes:  Abscess of chest wall/ S/p debridement in OR on 10/26 by Dr Kike Rodriguez with wound vac placed  11/14 - S/P Wound debridement of the right side of back  Antibiotics deescalated to Ancef as per ID recs, end date November 25th       Severe upper GI bleeding secondary to duodenal ulcer bleeding - Had a black stool yesterday per nursing, nothing since  Monitor hgb closely  Continue PPI Bid  Diet as tolerated  NPO after MN  If patient has signs of recurrent bleeding such as more melenotic stools or another drop in hgb, consider repeat EGD tomorrow to assess     Discharge Plan: TBD

## 2018-11-14 NOTE — PROCEDURES
Attending Surgeon:  Valarie Velasquez MD    Indications for Procedure:  Mary Hick tinged fibrinous slough of back, previously debrided and then subsequently treated with Santyl    Procedure:  Wound debridement of the right side of back    Verbal consent was received from Matt gardner given that this was a simple excision only of the necrotic tissues down to healthy pink tissue  I started off by simply taking iris scissors and debrided away the fibrinous slough within the level of the subcutaneous tissue  The total excision area was approximately 5 cm x 5 cm all of fibrinous material only  Patient did not have any preprocedure narcotics or anesthesia and tolerated the debridement well  Patient was brought down to healthy pink tissue with minimal oozing  No hemorrhage was noted and hemostasis was noted to be good prior to application of santal once again to the back and then covering with the ABD  Complications:  None  Anesthesia:  None, given that we were debriding necrotic tissue that was insensate  Patient did not complain of any pain during the procedure  Patient tolerated the procedure well  EBL:  None      Wound Class:  Dirty

## 2018-11-14 NOTE — PROGRESS NOTES
General Surgery Progress Note    Chief Complaint: "I'm doing ok  Oh, I called you a lot of names, huh? I was out of my mind   "    Subjective/24 hour/interim events:  Today, Darrick Elizabeth has no issues  No fevers or chills, but his blood pressure remains somewhat soft with the last 1 of 87/54 but with good mentation  Pulse was noted to be 102  Otherwise he complains of intermittent arm pain when he is raising his arm, which has been understandable  Objective:  BP (!) 87/54 (BP Location: Left arm)   Pulse 102   Temp 97 6 °F (36 4 °C) (Tympanic)   Resp 18   Ht 5' 8 5" (1 74 m)   Wt 84 kg (185 lb 4 oz)   SpO2 98%   BMI 27 76 kg/m²   General:  No acute distress  CV:  Borderline tachycardia  Back:  Please see my procedure note; patient noted to have some green staining on the dressing  Otherwise noted to have quite the bit of fibrinous exudate and underlying seroma therefore I did debride the area taking off all the freely debridable fibrous material   Chest/Axilla:  Left chest sutures removed  Santyl applied to the subclavicular wound  Otherwise the axilla shows an open area which I have currently packed  There is some undermining in the area and though the overlying skin is nearly healed, I have left the axillary sutures in place  Pertinent labs reviewed  Pertinent notes reviewed  Pertinent imaging reviewed  Assessment and Plan  We will see the patient every 3 days while the patient is here  On discharge, follow-up in the Wound Clinic  His axilla still needs some time to recover as does his back  Please see my procedure note from today  New wound orders placed  Counseling / Coordination of Care  Total floor/unit time spent today 20 minutes  Greater than 50% of total time was spent with the patient and/or family counseling and/or coordination of care    A description of the counseling / coordination of care: I performed an interim history, pertinent images and labs, performed a physical examination to arrive at the plan delineated above with associated thought processes  Plan discussed/conveyed with Gretchen Rg of primary team     Spoke with Gloria Kate at [id number] to convey plan and draw attention to the new wound care orders  [facility]ENETTA [facility]  Trauma, Acute Care, and [facility] [address]:  [address] [facility], [address] [place] [address]  Office - [phone]  Fax - [phone]    [facility]:  [address]    [address]  [place] [address]  Office - [phone]  Fax - [phone]    The areas in bold, if present, are summary statements/bullet points for nursing and consultant/primary teams  * Points delineated in red, if present, are not points of contention and should not be taken by any practitioner, nurse, therapist, , or anyone else with access to the patient chart  Hand off errors are prevalent and therefore, any changes or updates shown in red are to communicate to the next practitioner, points not discussed in handout but can affect patient care and management (yet not important enough to wake, or disturb, or alert the next practitioner in an emergent fashion)  Portions of the record may have been created with voice recognition software  Occasional wrong word or "sound a like" substitutions may have occurred due to the inherent limitations of voice recognition software  Read the chart carefully and recognize, using context, where substitutions have occurred

## 2018-11-14 NOTE — PROGRESS NOTES
Progress Note - Thais Ochoa 1940, [de-identified] y o  male MRN: [id number]    Unit/Bed#: [address] [place] [address] Encounter: [id number]    Primary Care Provider: Yang Garrett DO   Date and time admitted to hospital: 10/25/2018  3:02 PM        * Abscess of chest wall   Assessment & Plan    · With Cellulitis superimposed on shingles  ·   · ID and surgery following  · Blood culture staph aureus x 1  · Wound culture: 3+ staph aureus   · Repeat blood cultures from 10/29 no growth  · S/p debridement in OR on 10/26, wound vac which has since been removed, and bedside debridement 11/14  · Continue local wound care  Surgical follow up noted, likely suture removal on 11/21  · Antibiotics deescalated to Ancef, end date November 25th  Patient has PICC line for outpatient antibiotic therapy  · Continue on Lyrica 50 mg TID for possible post-hepatic neuralgia, scheduled tylenol, p r n  Tramadol     Acute on chronic combined systolic and diastolic heart failure (HCC)   Assessment & Plan    · Known EF of 20% in the past  · Repeat echo showing EF remains 25%  · Patient refused life vest/AICD  · Continues with episodes of PVCs on tele  · Cont Brock stocking due to low blood pressure  · Monitor I/O; net -16 8L negative  · Continue IV Lasix+albumin today, plan to transition to PO diuretic in AM     Cardiomyopathy, ischemic   Assessment & Plan    · EF prior was 35 to 40%  Recent stress test in May of 2018 showed reversible ischemic changes  Cardiology following as an outpatient with medical management  · ECHO repeated with EF 20%  · Frequent PVCs on telemetry, Episode of nonsustained V-tach overnight 11/6, asymptomatic  · Cardiology following, patient offered AICD and life vest and he does not want it     · Continue metoprolol, started lisinopril  · Continue to monitor electrolytes     Neuralgia, post-herpetic   Assessment & Plan    On Lyrica     Gastrointestinal hemorrhage associated with duodenal ulcer   Assessment & Plan    Status post EGD 11/4 revealed duodenal bleeding ulcer  Continue PPI  Monitor H&H, fluctuating but no evidence of ongoing bleeding  Advanced diet     Urinary retention   Assessment & Plan    Status post failing voiding trial  Likely secondary to BPH , on Flomax  Follow up Urology recommendation  Maintain Gordon for now, DC once mobility improves after discharge     Bacteremia due to Staphylococcus aureus   Assessment & Plan    · Admission Blood culture x 1 MSSA  · Repeat blood cultures no growth  · Per ID will need 4 wks antibiotics from negative blood cultures through 11/25  · PICC line in place     Acute blood loss anemia   Assessment & Plan    · Secondary to bleeding gastric ulcer  · Patient had tachycardia and drifting hemoglobin and later developed guanakito melena  · EGD with duodenal bulb ulcer with intervention 11/4  · S/p total 9 units of PRBCs  · Cont PPI, changed to PO  · Continue trend hemoglobin, drifted down today but has been fluctuating so will check again in AM  · Follow-up GI as outpatient for repeat EGD     Severe protein-calorie malnutrition ([facility] [address] )   Assessment & Plan    Malnutrition Findings:           BMI Findings: Body mass index is 27 76 kg/m²  Atrial fibrillation ([facility] [address] )   Assessment & Plan    · Hx of AFib in the setting of electrolyte abnormalities in the past   Monitored on telemetry  · Currently NSR with PVCs  · No anticoagulation on discharge  Particularly in light of recent episode of GI bleeding    · Frequent PVCs on telemetry, beta-blocker resumed  · Monitor and replete lytes     Benign essential hypertension   Assessment & Plan    Stable  Asymptomatic  Continue metoprolol, lisinoprol     CKD (chronic kidney disease) stage 3, GFR 30-59 ml/min ([facility])   Assessment & Plan    Stable, baseline creatinine 0 9-1 3  Monitor input output       CAD (coronary artery disease)   Assessment & Plan    Troponin negative  Continue statin, continue to hold aspirin secondary to GI bleed  Follow up with Cardiology     DM2 (diabetes mellitus, type 2) [facility])   Assessment & Plan    Lab Results   Component Value Date    HGBA1C 12 0 (H) 10/26/2018       Recent Labs      18   1618  18   2103  18   0653  18   1112   POCGLU  90  174*  138  157*       Blood Sugar Average: Last 72 hrs:  (P) 137 [id number]     Decreased Levemir at 8 units daily given hypoglycemia in a m  added pre meal insulin  Monitor           VTE Pharmacologic Prophylaxis:   Pharmacologic: none 2/2 GIB  Mechanical VTE Prophylaxis in Place: Yes    Patient Centered Rounds: I have performed bedside rounds with nursing staff today  Discussions with Specialists or Other Care Team Provider: Yes  Education and Discussions with Family / Patient:Yes  Time Spent for Care: 30 minutes  More than 50% of total time spent on counseling and coordination of care as described above  Current Length of Stay: 20 day(s)  Current Patient Status: Inpatient     Discharge Plan: STR once medically stable    Code Status: Level 2 - DNAR: but accepts endotracheal intubation      Subjective:   Feels fine  Denies fevers, chills, sweats  No abd pain, N/V/D  Had a soft brown BM yesterday after getting stool softener  Objective:     Vitals:   Temp (24hrs), Av 2 °F (36 8 °C), Min:97 6 °F (36 4 °C), Max:99 1 °F (37 3 °C)    Temp:  [97 6 °F (36 4 °C)-99 1 °F (37 3 °C)] 97 6 °F (36 4 °C)  HR:  [101-102] 102  Resp:  [18] 18  BP: ()/(51-60) 87/54  SpO2:  [98 %-99 %] 98 %  Body mass index is 27 76 kg/m²  Input and Output Summary (last 24 hours): Intake/Output Summary (Last 24 hours) at 18 1325  Last data filed at 18 1044   Gross per 24 hour   Intake              340 ml   Output             1925 ml   Net            -1585 ml        Physical Exam:     Physical Exam   Constitutional: He is oriented to person, place, and time  He appears well-developed  No distress  HENT:   Head: Normocephalic and atraumatic     Cardiovascular: Normal rate and regular rhythm  Murmur heard  Pulmonary/Chest: Effort normal and breath sounds normal  No respiratory distress  He has no wheezes  He has no rales  Abdominal: Soft  Bowel sounds are normal  He exhibits no distension  There is no tenderness  There is no rebound and no guarding  Genitourinary:   Genitourinary Comments: Gordon in place with clear yellow urine   Musculoskeletal: He exhibits edema  He exhibits no tenderness or deformity  Neurological: He is alert and oriented to person, place, and time  Skin: Skin is warm and dry  Left chest wall and left back dressing in place  Incision C/D/I, stitches in place   Psychiatric: He has a normal mood and affect  His behavior is normal    Nursing note and vitals reviewed  Additional Data:     Labs:      Results from last 7 days  Lab Units 11/14/18  0433 11/13/18  1418 11/13/18  0558  11/12/18  0548 11/11/18  0546   WBC Thousand/uL 3 96*  --  5 03  --  5 09 6 45   HEMOGLOBIN g/dL 7 4* 8 4* 7 9*  < > 8 1* 9 5*   HEMATOCRIT % 24 2* 26 9* 25 0*  < > 25 8* 29 7*   PLATELETS Thousands/uL 137*  --  137*  --  137* 172   NEUTROS PCT %  --   --  72  --  75 72   < > = values in this interval not displayed  Results from last 7 days  Lab Units 11/14/18  0433 11/13/18  0558 11/12/18  0548  11/08/18  0521   SODIUM mmol/L 135* 135* 136  < > 133*   POTASSIUM mmol/L 3 8 4 3 4 2  < > 3 6   CHLORIDE mmol/L 101 102 102  < > 103   CO2 mmol/L 30 28 28  < > 24   BUN mg/dL 17 20 16  < > 18   CREATININE mg/dL 0 87 0 93 0 98  < > 0 84   CALCIUM mg/dL 7 5* 7 4* 7 0*  < > 6 8*   TOTAL BILIRUBIN mg/dL  --   --   --   --  0 40   ALK PHOS U/L  --   --   --   --  67   ALT U/L  --   --   --   --  <6*   AST U/L  --   --   --   --  12   < > = values in this interval not displayed            Lab Results   Component Value Date/Time    HGBA1C 12 0 (H) 10/26/2018 05:24 AM       Results from last 7 days  Lab Units 11/14/18  1112 11/14/18  0653 11/13/18  2103 11/13/18  1619 11/13/18  1111 11/13/18  0719 11/12/18  2106 11/12/18  1617 11/12/18  1115 11/12/18  0710 11/11/18  2112 11/11/18  1750   POC GLUCOSE mg/dl 157* 138 174* 90 227* 118 227* 239* 163* 107 174* 127           * I Have Reviewed All Lab Data Listed Above  * Additional Pertinent Lab Tests Reviewed: All Labs Within Last 24 Hours Reviewed    Imaging:  Ct Chest Abdomen Pelvis Wo Contrast  Result Date: 10/26/2018  Impression: Patchy airspace opacities are seen in the right lower lobe superior segment  There is a large dense consolidation in the right middle lobe, lateral segment, nonspecific but suspicious for infection/pneumonia in the appropriate clinical setting  Cardiomegaly, bilateral pleural effusions, and body wall edema suggests a superimposed component of fluid overload/CHF  Multiple areas of skin thickening with associated subjacent complex fluid are seen in the superficial soft tissues of the left pectoralis region (for example axial image 30), as well as in the posterior and superior left thorax (axial image 12), as described  Consider cellulitis with developing abscesses  In addition, a few bubbles of air are seen in the anterior left chest subcutaneous tissues (axial image 8) which could be related to recent intervention but also raises suspicion for possible gas-forming infection  Partially distended bladder with catheter seen within the bladder lumen  Mild circumferential bladder wall thickening noted, probably exaggerated by underdistention  A cystitis could be considered in the appropriate clinical setting  Renal cysts, degenerative changes of the shoulder, spine, and hips, and other findings as above  Findings discussed with FRITZ Alexis by Dr Se Davis at 4:18 AM on 10/26/2018  Workstation performed: [id number]     Xr Chest Portable  Result Date: 10/29/2018  Impression: Persistent right basilar pleural effusion and associated probable atelectasis   Workstation performed: [id number]     Xr Spine Thoracic 3 Views  Result Date: 10/25/2018  Impression: No acute osseous abnormality  Workstation performed: [id number]     Xr Spine Lumbar 2 Or 3 Views Injury  Result Date: 10/25/2018  Impression: Levoscoliosis and moderate multilevel degenerative change  Workstation performed: [id number]     Xr Shoulder 2+ Views Left  Result Date: 10/25/2018  Impression: No acute osseous abnormality  Workstation performed: [id number]     Ct Head Wo Contrast  Result Date: 10/26/2018  Impression: No acute intracranial abnormality  Other nonacute findings as above  Workstation performed: [id number]     Ct Head Without Contrast  Result Date: 10/25/2018  Impression: No acute intracranial abnormality  Workstation performed: [id number]     Ct Spine Cervical Without Contrast  Result Date: 10/25/2018  Impression: No cervical spine fracture or traumatic malalignment  Workstation performed: [id number]     Xr Chest 1 View  Result Date: 10/25/2018  Impression: Focal consolidation in the right lower lobe  This may represent pneumonia  If there is any trauma to the right chest, pulmonary contusion is a consideration  Recommend follow up to resolution  The study was marked in EPIC for significant notification  Workstation performed: [id number]     Xr Chest Portable Icu  Result Date: 11/1/2018  Impression: Cardiomegaly  Bibasilar parenchymal densities unchanged  Workstation performed: [id number]     Xr Chest Portable Icu  Result Date: 10/29/2018  Impression: No significant interval change since 10/29/2018 Workstation performed: [id number]     Xr Chest Portable Icu  Result Date: 10/26/2018  Impression: Endotracheal tube ending 3 3 cm above the yohana  Consolidation at the right lung base  Workstation performed: [id number]     Ir Picc Line  Result Date: 10/31/2018  Impression: Impression: Successful placement of a 49cm right upper extremity double lumen Power PICC   Workstation performed: [id number]     Imaging Reports Reviewed by myself    Cultures:   Blood Culture:   Lab Results   Component Value Date    BLOODCX No Growth After 5 Days  10/29/2018    BLOODCX No Growth After 5 Days  10/29/2018    BLOODCX Staphylococcus aureus (A) 10/25/2018    BLOODCX No Growth After 5 Days   10/25/2018     Urine Culture:   Lab Results   Component Value Date    URINECX >100,000 cfu/ml  10/25/2018    URINECX >100,000 cfu/ml Klebsiella oxytoca (A) 05/09/2018     Sputum Culture: No components found for: SPUTUMCX  Wound Culture:   Lab Results   Component Value Date    WOUNDCULT 4+ Growth of Staphylococcus aureus (A) 10/26/2018    WOUNDCULT 4+ Growth of Staphylococcus aureus (A) 10/25/2018       Last 24 Hours Medication List:     Current Facility-Administered Medications:  acetaminophen 650 mg Oral Q6H [address] Rhea John PA-C    albumin human 12 5 g Intravenous Daily April MD [facility]    And        furosemide 20 mg Intravenous Daily April MD [facility]    atorvastatin 10 mg Oral Daily Mukesh Mack MD    cefazolin 2,000 mg Intravenous Q8H Mukesh Mack MD Last Rate: 2,000 mg (11/14/18 0532)   collagenase  Topical Daily Claudia Farmer MD    insulin detemir 8 Units Subcutaneous HS April MD [facility]    insulin lispro 1-5 Units Subcutaneous TID AC April MD [facility]    insulin lispro 3 Units Subcutaneous TID With Meals April MD [facility]    lisinopril 2 5 mg Oral Daily April MD [facility]    melatonin 6 mg Oral HS Mukesh Mack MD    metoprolol tartrate 12 5 mg Oral Q12H [address] Belen Tee PA-C    pantoprazole 40 mg Intravenous Q12H Ayana Reinoso PA-C    potassium chloride 20 mEq Oral Daily April MD [facility]    pregabalin 50 mg Oral TID Mukesh Mack MD    senna 1 tablet Oral HS Mukesh Mack MD    tamsulosin 0 4 mg Oral Daily With Zahra Mullen MD    traMADol 50 mg Oral Q6H PRN Jessica Petersen MD         Today, Patient Was Seen By: Gretchen Rg MD    ** Please Note: Dragon 360 Dictation voice to text software may have been used in the creation of this document   **

## 2018-11-14 NOTE — PROGRESS NOTES
Will follow up next week on Wednesday for suture removal   Otherwise no other acute surgical intervention

## 2018-11-14 NOTE — PROGRESS NOTES
Progress Note - Kenji Silverman [de-identified] y o  male MRN: [id number]    Unit/Bed#: [address] [address] Encounter: [id number]    Assessment and Plan:   Principal Problem:    Abscess of chest wall  Active Problems:    DM2 (diabetes mellitus, type 2) (HCC)    CAD (coronary artery disease)    CKD (chronic kidney disease) stage 3, GFR 30-59 ml/min (HCC)    Cardiomyopathy, ischemic    Benign essential hypertension    Atrial fibrillation (HCC)    Severe protein-calorie malnutrition (HCC)    Acute on chronic combined systolic and diastolic heart failure (HCC)    Acute blood loss anemia    Bacteremia due to Staphylococcus aureus    Urinary retention    Gastrointestinal hemorrhage associated with duodenal ulcer    Neuralgia, post-herpetic    #1  Severe upper GI bleeding secondary to duodenal ulcer bleeding: s/p EGD with epi and clips  Hgb 9 5>8 1>8 8>7 9>7 4 without any blood transfusions  Had a black stool yesterday per nursing, nothing since  Esophageal biopsies negative for candida, viral infection, or malignancy  -Continue to monitor hgb closely  -Miralax for constipation  Continue senokot which he has been getting  -Continue PPI BID  -Continue diet as tolerated today  -NPo after midnight  -If patient has signs of recurrent bleeding such as more melenotic stools or another drop in hgb, consider repeat EGD tomorrow to assess    ----------------------------------------------------------------------------------------------------------------    Subjective:     Had a black stool yesterday per nursing  Nothing else since  No abdominal pain, N/V  Objective:     Vitals: Blood pressure (!) 87/54, pulse 102, temperature 97 6 °F (36 4 °C), temperature source Tympanic, resp  rate 18, height 5' 8 5" (1 74 m), weight 84 kg (185 lb 4 oz), SpO2 98 %  ,Body mass index is 27 76 kg/m²        Intake/Output Summary (Last 24 hours) at 11/14/18 1044  Last data filed at 11/14/18 0601   Gross per 24 hour   Intake              340 ml   Output             1700 ml   Net            -1360 ml       Physical Exam:     General Appearance: Alert, appears stated age and cooperative  Lungs: Clear to auscultation bilaterally, no rales or rhonchi, no labored breathing/accessory muscle use  Heart: Regular rate and rhythm, S1, S2 normal, no click, rub or gallop; murmur present  Abdomen: Soft, non-tender, non-distended; bowel sounds normal; no masses or no organomegaly  Extremities: No cyanosis, clubbing; b/l LE edema    Invasive Devices     Peripherally Inserted Central Catheter Line            PICC Line 10/01/61 Right Basilic 13 days          Drain            Urethral Catheter 16 Fr  8 days                Lab Results:    Results from last 7 days  Lab Units 11/14/18  0433  11/13/18  0558   WBC Thousand/uL 3 96*  --  5 03   HEMOGLOBIN g/dL 7 4*  < > 7 9*   HEMATOCRIT % 24 2*  < > 25 0*   PLATELETS Thousands/uL 137*  --  137*   NEUTROS PCT %  --   --  72   LYMPHS PCT %  --   --  18   MONOS PCT %  --   --  7   EOS PCT %  --   --  2   < > = values in this interval not displayed  Results from last 7 days  Lab Units 11/14/18  0433  11/08/18  0521   POTASSIUM mmol/L 3 8  < > 3 6   CHLORIDE mmol/L 101  < > 103   CO2 mmol/L 30  < > 24   BUN mg/dL 17  < > 18   CREATININE mg/dL 0 87  < > 0 84   CALCIUM mg/dL 7 5*  < > 6 8*   ALK PHOS U/L  --   --  67   ALT U/L  --   --  <6*   AST U/L  --   --  12   < > = values in this interval not displayed  Imaging Studies: I have personally reviewed pertinent imaging studies  Ct Chest Abdomen Pelvis Wo Contrast    Result Date: 10/26/2018  Impression: Patchy airspace opacities are seen in the right lower lobe superior segment  There is a large dense consolidation in the right middle lobe, lateral segment, nonspecific but suspicious for infection/pneumonia in the appropriate clinical setting  Cardiomegaly, bilateral pleural effusions, and body wall edema suggests a superimposed component of fluid overload/CHF   Multiple areas of skin thickening with associated subjacent complex fluid are seen in the superficial soft tissues of the left pectoralis region (for example axial image 30), as well as in the posterior and superior left thorax (axial image 12), as described  Consider cellulitis with developing abscesses  In addition, a few bubbles of air are seen in the anterior left chest subcutaneous tissues (axial image 8) which could be related to recent intervention but also raises suspicion for possible gas-forming infection  Partially distended bladder with catheter seen within the bladder lumen  Mild circumferential bladder wall thickening noted, probably exaggerated by underdistention  A cystitis could be considered in the appropriate clinical setting  Renal cysts, degenerative changes of the shoulder, spine, and hips, and other findings as above  Findings discussed with FRITZ Alexis by Dr Author Mcnulty at 4:18 AM on 10/26/2018  Workstation performed: [id number]     Xr Chest Portable    Result Date: 10/29/2018  Impression: Persistent right basilar pleural effusion and associated probable atelectasis  Workstation performed: [id number]     Xr Spine Thoracic 3 Views    Result Date: 10/25/2018  Impression: No acute osseous abnormality  Workstation performed: [id number]     Xr Spine Lumbar 2 Or 3 Views Injury    Result Date: 10/25/2018  Impression: Levoscoliosis and moderate multilevel degenerative change  Workstation performed: [id number]     Xr Shoulder 2+ Views Left    Result Date: 10/25/2018  Impression: No acute osseous abnormality  Workstation performed: [id number]     Ct Head Wo Contrast    Result Date: 10/26/2018  Impression: No acute intracranial abnormality  Other nonacute findings as above  Workstation performed: [id number]     Ct Head Without Contrast    Result Date: 10/25/2018  Impression: No acute intracranial abnormality   Workstation performed: [id number]     Ct Spine Cervical Without Contrast    Result Date: 10/25/2018  Impression: No cervical spine fracture or traumatic malalignment  Workstation performed: [id number]     Xr Chest 1 View    Result Date: 10/25/2018  Impression: Focal consolidation in the right lower lobe  This may represent pneumonia  If there is any trauma to the right chest, pulmonary contusion is a consideration  Recommend follow up to resolution  The study was marked in EPIC for significant notification  Workstation performed: [id number]     Xr Chest Portable Icu    Result Date: 10/29/2018  Impression: No significant interval change since 10/29/2018 Workstation performed: [id number]     Xr Chest Portable Icu    Result Date: 10/26/2018  Impression: Endotracheal tube ending 3 3 cm above the yohana  Consolidation at the right lung base   Workstation performed: [id number]

## 2018-11-15 LAB
ABO GROUP BLD: NORMAL
ANION GAP SERPL CALCULATED.3IONS-SCNC: 3 MMOL/L (ref 4–13)
BLD GP AB SCN SERPL QL: NEGATIVE
BUN SERPL-MCNC: 19 MG/DL (ref 5–25)
CALCIUM SERPL-MCNC: 7.7 MG/DL (ref 8.3–10.1)
CHLORIDE SERPL-SCNC: 101 MMOL/L (ref 100–108)
CO2 SERPL-SCNC: 31 MMOL/L (ref 21–32)
CREAT SERPL-MCNC: 0.86 MG/DL (ref 0.6–1.3)
ERYTHROCYTE [DISTWIDTH] IN BLOOD BY AUTOMATED COUNT: 19.4 % (ref 11.6–15.1)
GFR SERPL CREATININE-BSD FRML MDRD: 82 ML/MIN/1.73SQ M
GLUCOSE SERPL-MCNC: 102 MG/DL (ref 65–140)
GLUCOSE SERPL-MCNC: 185 MG/DL (ref 65–140)
GLUCOSE SERPL-MCNC: 195 MG/DL (ref 65–140)
GLUCOSE SERPL-MCNC: 78 MG/DL (ref 65–140)
GLUCOSE SERPL-MCNC: 89 MG/DL (ref 65–140)
HCT VFR BLD AUTO: 23.9 % (ref 36.5–49.3)
HGB BLD-MCNC: 7.4 G/DL (ref 12–17)
MCH RBC QN AUTO: 31.8 PG (ref 26.8–34.3)
MCHC RBC AUTO-ENTMCNC: 31 G/DL (ref 31.4–37.4)
MCV RBC AUTO: 103 FL (ref 82–98)
PLATELET # BLD AUTO: 132 THOUSANDS/UL (ref 149–390)
PMV BLD AUTO: 10.8 FL (ref 8.9–12.7)
POTASSIUM SERPL-SCNC: 4.1 MMOL/L (ref 3.5–5.3)
PROT 24H UR-MCNC: 994.5 MG/24 HRS (ref 40–150)
RBC # BLD AUTO: 2.33 MILLION/UL (ref 3.88–5.62)
RH BLD: POSITIVE
SODIUM SERPL-SCNC: 135 MMOL/L (ref 136–145)
SPECIMEN EXPIRATION DATE: NORMAL
SPECIMEN VOL UR: 2550 ML
WBC # BLD AUTO: 4.33 THOUSAND/UL (ref 4.31–10.16)

## 2018-11-15 PROCEDURE — C9113 INJ PANTOPRAZOLE SODIUM, VIA: HCPCS | Performed by: PHYSICIAN ASSISTANT

## 2018-11-15 PROCEDURE — 99232 SBSQ HOSP IP/OBS MODERATE 35: CPT | Performed by: PHYSICIAN ASSISTANT

## 2018-11-15 PROCEDURE — 86900 BLOOD TYPING SEROLOGIC ABO: CPT | Performed by: STUDENT IN AN ORGANIZED HEALTH CARE EDUCATION/TRAINING PROGRAM

## 2018-11-15 PROCEDURE — G8988 SELF CARE GOAL STATUS: HCPCS

## 2018-11-15 PROCEDURE — 86920 COMPATIBILITY TEST SPIN: CPT

## 2018-11-15 PROCEDURE — 99232 SBSQ HOSP IP/OBS MODERATE 35: CPT | Performed by: STUDENT IN AN ORGANIZED HEALTH CARE EDUCATION/TRAINING PROGRAM

## 2018-11-15 PROCEDURE — G8987 SELF CARE CURRENT STATUS: HCPCS

## 2018-11-15 PROCEDURE — 84156 ASSAY OF PROTEIN URINE: CPT | Performed by: INTERNAL MEDICINE

## 2018-11-15 PROCEDURE — 86850 RBC ANTIBODY SCREEN: CPT | Performed by: STUDENT IN AN ORGANIZED HEALTH CARE EDUCATION/TRAINING PROGRAM

## 2018-11-15 PROCEDURE — 80048 BASIC METABOLIC PNL TOTAL CA: CPT | Performed by: STUDENT IN AN ORGANIZED HEALTH CARE EDUCATION/TRAINING PROGRAM

## 2018-11-15 PROCEDURE — 85027 COMPLETE CBC AUTOMATED: CPT | Performed by: STUDENT IN AN ORGANIZED HEALTH CARE EDUCATION/TRAINING PROGRAM

## 2018-11-15 PROCEDURE — P9021 RED BLOOD CELLS UNIT: HCPCS

## 2018-11-15 PROCEDURE — 82948 REAGENT STRIP/BLOOD GLUCOSE: CPT

## 2018-11-15 PROCEDURE — 99232 SBSQ HOSP IP/OBS MODERATE 35: CPT | Performed by: INTERNAL MEDICINE

## 2018-11-15 PROCEDURE — 97110 THERAPEUTIC EXERCISES: CPT

## 2018-11-15 PROCEDURE — 97530 THERAPEUTIC ACTIVITIES: CPT

## 2018-11-15 PROCEDURE — 86901 BLOOD TYPING SEROLOGIC RH(D): CPT | Performed by: STUDENT IN AN ORGANIZED HEALTH CARE EDUCATION/TRAINING PROGRAM

## 2018-11-15 RX ORDER — FUROSEMIDE 10 MG/ML
20 INJECTION INTRAMUSCULAR; INTRAVENOUS ONCE
Status: DISCONTINUED | OUTPATIENT
Start: 2018-11-15 | End: 2018-11-18 | Stop reason: HOSPADM

## 2018-11-15 RX ADMIN — INSULIN LISPRO 1 UNITS: 100 INJECTION, SOLUTION INTRAVENOUS; SUBCUTANEOUS at 17:59

## 2018-11-15 RX ADMIN — FUROSEMIDE 20 MG: 10 INJECTION, SOLUTION INTRAMUSCULAR; INTRAVENOUS at 09:43

## 2018-11-15 RX ADMIN — CEFAZOLIN SODIUM 2000 MG: 2 SOLUTION INTRAVENOUS at 22:15

## 2018-11-15 RX ADMIN — ALBUMIN HUMAN 12.5 G: 0.25 SOLUTION INTRAVENOUS at 09:44

## 2018-11-15 RX ADMIN — ACETAMINOPHEN 650 MG: 325 TABLET, FILM COATED ORAL at 06:14

## 2018-11-15 RX ADMIN — INSULIN DETEMIR 8 UNITS: 100 INJECTION, SOLUTION SUBCUTANEOUS at 22:13

## 2018-11-15 RX ADMIN — PREGABALIN 50 MG: 50 CAPSULE ORAL at 22:12

## 2018-11-15 RX ADMIN — ATORVASTATIN CALCIUM 10 MG: 10 TABLET, FILM COATED ORAL at 17:58

## 2018-11-15 RX ADMIN — PANTOPRAZOLE SODIUM 40 MG: 40 INJECTION, POWDER, FOR SOLUTION INTRAVENOUS at 09:42

## 2018-11-15 RX ADMIN — MELATONIN TAB 3 MG 6 MG: 3 TAB at 22:12

## 2018-11-15 RX ADMIN — PANTOPRAZOLE SODIUM 40 MG: 40 INJECTION, POWDER, FOR SOLUTION INTRAVENOUS at 21:57

## 2018-11-15 RX ADMIN — ACETAMINOPHEN 650 MG: 325 TABLET, FILM COATED ORAL at 17:58

## 2018-11-15 RX ADMIN — CEFAZOLIN SODIUM 2000 MG: 2 SOLUTION INTRAVENOUS at 14:40

## 2018-11-15 RX ADMIN — POTASSIUM CHLORIDE 20 MEQ: 1500 TABLET, EXTENDED RELEASE ORAL at 09:44

## 2018-11-15 RX ADMIN — METOPROLOL TARTRATE 12.5 MG: 25 TABLET ORAL at 09:44

## 2018-11-15 RX ADMIN — COLLAGENASE SANTYL 1 APPLICATION: 250 OINTMENT TOPICAL at 09:53

## 2018-11-15 RX ADMIN — INSULIN LISPRO 3 UNITS: 100 INJECTION, SOLUTION INTRAVENOUS; SUBCUTANEOUS at 17:58

## 2018-11-15 RX ADMIN — TRAMADOL HYDROCHLORIDE 50 MG: 50 TABLET, COATED ORAL at 02:07

## 2018-11-15 RX ADMIN — TRAMADOL HYDROCHLORIDE 50 MG: 50 TABLET, COATED ORAL at 20:26

## 2018-11-15 RX ADMIN — PREGABALIN 50 MG: 50 CAPSULE ORAL at 16:01

## 2018-11-15 RX ADMIN — SENNOSIDES 8.6 MG: 8.6 TABLET, FILM COATED ORAL at 22:11

## 2018-11-15 RX ADMIN — CEFAZOLIN SODIUM 2000 MG: 2 SOLUTION INTRAVENOUS at 06:14

## 2018-11-15 RX ADMIN — PREGABALIN 50 MG: 50 CAPSULE ORAL at 09:42

## 2018-11-15 RX ADMIN — ACETAMINOPHEN 650 MG: 325 TABLET, FILM COATED ORAL at 00:48

## 2018-11-15 RX ADMIN — TAMSULOSIN HYDROCHLORIDE 0.4 MG: 0.4 CAPSULE ORAL at 16:01

## 2018-11-15 NOTE — SOCIAL WORK
SW following to assist with DCP  STR placement is planned  Pt has been accepted by [facility] and there remains a bed available for pt at [facility]  Pt can be transferred whenever ready  SW will continue to follow to monitor progress and assist with transfer when ordered

## 2018-11-15 NOTE — OCCUPATIONAL THERAPY NOTE
Occupational Therapy Re-Evaluation     11/15/18 1241   Note Type   Note type Re-eval   Restrictions/Precautions   Other Precautions Fall Risk;Pain; Chair Alarm; Bed Alarm   Pain Assessment   Pain Assessment No/denies pain   Psychosocial   Psychosocial (WDL) WDL   Subjective   Subjective "I've been here 3 weeks today, but at least I'm getting better "   ADL   Where Assessed Chair   Eating Assistance 6  Modified independent   Grooming Assistance 5  Supervision/Setup   UB Bathing Assistance 3  Moderate Assistance   UB Bathing Deficit (limited due to multiple bandages/dressings)   LB Bathing Assistance 3  Moderate Assistance   UB Dressing Assistance 4  Minimal Assistance   LB Dressing Assistance 3  Moderate Assistance   Toileting Assistance  3  Moderate Assistance   Bed Mobility   Supine to Sit 4  Minimal assistance   Additional items HOB elevated   Transfers   Sit to Stand 4  Minimal assistance   Additional items Assist x 1;Verbal cues   Stand to Sit 4  Minimal assistance   Additional items Assist x 1;Verbal cues   Stand pivot 4  Minimal assistance   Additional items Assist x 1;Verbal cues   Functional Mobility   Functional Mobility 4  Minimal assistance   Additional Comments 20 feet   Additional items Rolling walker   Balance   Static Sitting Good   Dynamic Sitting Fair +   Static Standing Fair   Dynamic Standing Fair   Ambulatory Fair   Higher level balance (with RW)   Activity Tolerance   Activity Tolerance Patient limited by fatigue   RUE Assessment   RUE Assessment WFL  (gross strength 4-/5)   LUE Assessment   LUE Assessment WFL  (gross strength 4-/5)   Hand Function   Gross Motor Coordination Functional   Fine Motor Coordination Impaired   Vision-Basic Assessment   Current Vision Wears glasses all the time   Cognition   Overall Cognitive Status [facility]   Arousal/Participation Alert; Cooperative   Attention Attends with cues to redirect   Orientation Level Oriented X4   Memory Decreased short term memory   Following Commands Follows multistep commands with increased time or repetition   Assessment   Limitation Decreased ADL status; Decreased UE strength;Decreased Safe judgement during ADL;Decreased endurance;Decreased fine motor control;Decreased self-care trans;Decreased high-level ADLs  (decreased balance)   Prognosis Good   Assessment Pt demonstrating improved strength, balance, cognition and functional activity tolerance allowing for increased active participation with ADL and mobility tasks  Good progress towards goals  Several STG from Initial Evaluation met  Barthel Index score increased from 20 to 40  New goals added or continued for next 2 week time period  Pt is a good rehab candidate when medically appropriate  Pt left sitting in chair with all needs within reach and tab alarm in place  Cont OT per POC  Goals   Patient Goals "to get back to my home"   STG Time Frame (1-7)   Short Term Goal #1 Goals from Initial Evaluation MET:  Patient will increase bed mobility to min assist in preparation for ADLS and transfers; Patient will tolerate 10 minutes of UE ROM/strengthening to increase general activity tolerance and performance in ADLS/IADLS; Patient will increase static/dynamic sitting balance to min assist to improve the ability to sit at edge of bed or on a chair for ADLS;  Patient will increase static standing balance to min assist to improve postural stability and decrease fall risk during standing ADLS and transfers  Short Term Goal #2 Goals from Initial Evaluation PARTIALLY MET and NEW GOALS ADDED: Patient will increase bed mobility to supervision in preparation for ADLS and transfers;   Patient will improve functional activity tolerance to 15 minutes of sustained functional tasks to increase participation in basic self-care and decrease assistance level; Patient will increase standing tolerance to 4 minutes during ADL task to decrease assistance level and decrease fall risk; Patient will increase functional mobility to and from bathroom with rolling walker with min assist to increase performance with ADLS and to use a toilet;  Patient will be able to to verbalize understanding and perform energy conservation/proper body mechanics during ADLS and functional mobility at least 75% of the time with minimal cueing to decrease signs of fatigue and increase stamina to return to prior level of function; Patient will increase static/dynamic sitting balance to supervision to improve the ability to sit at edge of bed or on a chair for ADLS; Patient will increase static standing balance to supervision to improve postural stability and decrease fall risk during standing ADLS and transfers  LTG Time Frame (8-14)   Long Term Goal #1 Patient will increase standing tolerance to 8 minutes during ADL task to decrease assistance level and decrease fall risk; Patient will increase bed mobility to independent in preparation for ADLS and transfers; Patient will increase functional mobility to and from bathroom with assistive device independently to increase performance with ADLS and to use a toilet; Patient will improve functional activity tolerance to 20 minutes of sustained functional tasks to increase participation in basic self-care and decrease assistance level;  Patient will be able to to verbalize understanding and perform energy conservation/proper body mechanics during ADLS and functional mobility at least 90% of the time with nocueing to decrease signs of fatigue and increase stamina to return to prior level of function; Patient will increase static/dynamic sitting balance to independent to improve the ability to sit at edge of bed or on a chair for ADLS;  Patient will increase static/dynamic standing balance to independent to improve postural stability and decrease fall risk during standing ADLS and transfers  Functional Transfer Goals   Pt Will Perform All Functional Transfers With mod indep; With assistive devices; With good judgment/safety  (LTG -Independent)   ADL Goals   Pt Will Perform All ADL's In chair; With min assist  (LTg - Indpendent with AE)   Plan   Treatment Interventions ADL retraining;Functional transfer training;UE strengthening/ROM; Endurance training;Cognitive reorientation;Patient/family training;Equipment evaluation/education; Neuromuscular reeducation; Fine motor coordination activities; Compensatory technique education;Cardiac education; Energy conservation; Activityengagement   Goal Expiration Date 11/29/18   Treatment Day 7   OT Frequency 5x/wk   Recommendation   OT Discharge Recommendation Short Term Rehab   Barthel Index   Feeding 10   Bathing 0   Grooming Score 0   Dressing Score 5   Bladder Score 0   Bowels Score 10   Toilet Use Score 5   Transfers (Bed/Chair) Score 10   Mobility (Level Surface) Score 0   Stairs Score 0   Barthel Index Score 40   Licensure   [place] License Number  Abigail Peter   Ryan Quiroga [address] 85, OTR/L, [place] Lic# [id number]

## 2018-11-15 NOTE — NURSING NOTE
Spoke with Dr Juan David Dee on the phone about giving one time dose of Lasix IV in between the two blood transfusions  Will give one time dose of 20mg IV Lasix once first unit of blood is completed as ordered

## 2018-11-15 NOTE — PROGRESS NOTES
Progress Note - Urology   Negrito Bello [de-identified] y o  male MRN: [id number]  Unit/Bed#: [address] [place] [address] Encounter: [id number]    Assessment/Plan:  Urinary retention  Prior inability to void and luis placed 11/06/18  Continues with significant decreased mobility making a successful voiding trial not as likely   · Continue to monitor mobility and then arrange for a voiding trial      Subjective:  Seen on rounds and notes the following: Pt lying in bed in NAD  Denies any luis cath pain or discomfort  Notes still very weak and has only been moving over to his chair to sit  Objective:  Vitals Last 24 hours:   Temp:  [97 7 °F (36 5 °C)-99 °F (37 2 °C)] 97 7 °F (36 5 °C)  HR:  [] 86  Resp:  [18] 18  BP: ()/(52-60) 90/52  BMI: Body mass index is 27 75 kg/m²  Physical Exam   Abdominal: Soft  Musculoskeletal: He exhibits edema  Neurological: He is alert  Skin: Skin is warm  Psychiatric: He has a normal mood and affect  Vitals reviewed  Drains:   Luis draining yellow urine  No gross hematuria or clots        Lab Results and Cultures:     Results from last 7 days  Lab Units 11/15/18  0607 11/14/18  0433 11/13/18  1418 11/13/18  0558  11/12/18  0548  11/09/18  0538   WBC Thousand/uL 4 33 3 96*  --  5 03  --  5 09  < > 6 76   RBC Million/uL 2 33* 2 37*  --  2 47*  --  2 55*  < > 2 84*   HEMOGLOBIN g/dL 7 4* 7 4* 8 4* 7 9*  < > 8 1*  < > 8 8*   HEMATOCRIT % 23 9* 24 2* 26 9* 25 0*  < > 25 8*  < > 28 0*   PLATELETS Thousands/uL 132* 137*  --  137*  --  137*  < > 137*   POTASSIUM mmol/L 4 1 3 8  --  4 3  --  4 2  < > 3 5   CHLORIDE mmol/L 101 101  --  102  --  102  < > 101   CO2 mmol/L 31 30  --  28  --  28  < > 24   BUN mg/dL 19 17  --  20  --  16  < > 15   CREATININE mg/dL 0 86 0 87  --  0 93  --  0 98  < > 0 82   PHOSPHORUS mg/dL  --   --   --   --   --   --   --  2 2*   MAGNESIUM mg/dL  --  1 9  --  1 7  --  1 8  < > 1 9   CALCIUM mg/dL 7 7* 7 5*  --  7 4*  --  7 0*  < > 7 0*   EGFR ml/min/1 73sq m 82 82  --  77  --  73  < > 84   < > = values in this interval not displayed  Lab Results   Component Value Date    URINECX >100,000 cfu/ml  10/25/2018         Intake/Output Summary (Last 24 hours) at 11/15/18 1329  Last data filed at 11/15/18 1059   Gross per 24 hour   Intake                0 ml   Output             3425 ml   Net            -3425 ml         Darren Porter PA-C  11/15/2018    Portions of the record may have been created with voice recognition software   Occasional wrong word or "sound alike" substitutions may have occurred due to the inherent limitations of voice recognition software   Read the chart carefully and recognize, using context, where substitutions have occurred

## 2018-11-15 NOTE — PROGRESS NOTES
Progress Note - Lior Mena 1940, [de-identified] y o  male MRN: [id number]    Unit/Bed#: [address] [place] [address] Encounter: [id number]    Primary Care Provider: Gustavo Meyer DO   Date and time admitted to hospital: 10/25/2018  3:02 PM        * Abscess of chest wall   Assessment & Plan    · Abscess With Cellulitis superimposed on shingles  · ID and surgery following  · Blood culture staph aureus x 1  · Wound culture: 3+ staph aureus   · Repeat blood cultures from 10/29 no growth  · S/p debridement in OR on 10/26, wound vac which has since been removed, s/p bedside debridement 11/14  · Continue local wound care  Surgical follow up noted, likely suture removal on 11/21  · Antibiotics deescalated to Ancef, 4 week course with end date November 25th  Patient has PICC line for outpatient antibiotic therapy  · Continue on Lyrica 50 mg TID for possible post-hepatic neuralgia, scheduled tylenol, p r n  Tramadol     Acute on chronic combined systolic and diastolic heart failure (HCC)   Assessment & Plan    · Known EF of 20% in the past  · Repeat echo showing EF remains 25%  · Patient refused life vest/AICD  · Continues with episodes of PVCs on tele  · Cont Brock stocking due to low blood pressure  · Monitor I/O; net -20L negative  · Diuresing with IV Lasix+albumin     Cardiomyopathy, ischemic   Assessment & Plan    · EF prior was 35 to 40%  Recent stress test in May of 2018 showed reversible ischemic changes  Cardiology following as an outpatient with medical management  · ECHO repeated with EF 20%  · Frequent PVCs on telemetry, Episode of nonsustained V-tach overnight 11/6, asymptomatic  · Cardiology following, patient offered AICD and life vest and he does not want it     · Continue metoprolol, started lisinopril  · Continue to monitor electrolytes     Neuralgia, post-herpetic   Assessment & Plan    On Lyrica     Gastrointestinal hemorrhage associated with duodenal ulcer   Assessment & Plan    Status post EGD 11/4 revealed duodenal bleeding ulcer  Continue PPI  Monitor H&H, remains low but stable  Hold off on repeat EGD for now     Urinary retention   Assessment & Plan    Status post failing voiding trial  Likely secondary to BPH , on Flomax  Follow up Urology recommendation  Maintain Gordon for now, DC once mobility improves after discharge     Bacteremia due to Staphylococcus aureus   Assessment & Plan    · Admission Blood culture x 1 MSSA  · Repeat blood cultures no growth  · Per ID will need 4 wks antibiotics from negative blood cultures through 11/25  · PICC line in place     Acute blood loss anemia   Assessment & Plan    · Secondary to bleeding gastric ulcer  · Patient had tachycardia and drifting hemoglobin and later developed guanakito melena  · EGD with duodenal bulb ulcer with intervention 11/4  · S/p total 9 units of PRBCs  · Cont PPI, changed to PO  · Continue trend hemoglobin, remains low this morning, but stable  Hold off on repeat EGD for now   · Hg remains at 7 4, Will transfusion another 2 units PRBC today given his cardiac hx     Severe protein-calorie malnutrition ([facility] [address] )   Assessment & Plan    Malnutrition Findings:           BMI Findings: Body mass index is 27 76 kg/m²  Atrial fibrillation ([facility] [address] )   Assessment & Plan    · Hx of AFib in the setting of electrolyte abnormalities in the past   Monitored on telemetry  · Currently NSR with PVCs  · No anticoagulation on discharge  Particularly in light of recent episode of GI bleeding    · Frequent PVCs on telemetry, beta-blocker resumed  · Monitor and replete lytes     Benign essential hypertension   Assessment & Plan    Stable  Asymptomatic  Continue metoprolol, lisinoprol     CKD (chronic kidney disease) stage 3, GFR 30-59 ml/min ([facility])   Assessment & Plan    Stable, baseline creatinine 0 9-1 3  Monitor input output       CAD (coronary artery disease)   Assessment & Plan    Troponin negative  Continue statin, continue to hold aspirin secondary to GI bleed  Follow up with Cardiology     DM2 (diabetes mellitus, type 2) [facility])   Assessment & Plan    Lab Results   Component Value Date    HGBA1C 12 0 (H) 10/26/2018       Recent Labs      18   1618  18   2103  18   0653  18   1112   POCGLU  90  174*  138  157*       Blood Sugar Average: Last 72 hrs:  (P) 137 [id number]     Decreased Levemir at 8 units daily given hypoglycemia in a m  added pre meal insulin  Monitor           VTE Pharmacologic Prophylaxis:   Pharmacologic: none 2/2 GIB  Mechanical VTE Prophylaxis in Place: Yes    Patient Centered Rounds: I have performed bedside rounds with nursing staff today  Discussions with Specialists or Other Care Team Provider: Yes  Education and Discussions with Family / Patient:Yes  Time Spent for Care: 30 minutes  More than 50% of total time spent on counseling and coordination of care as described above  Current Length of Stay: 21 day(s)  Current Patient Status: Inpatient     Discharge Plan: STR once medically stable    Code Status: Level 2 - DNAR: but accepts endotracheal intubation      Subjective:   Denies fevers, chills, sweats  No abd pain, N/V/D  Has not had a BM overnight or today  Wants to eat      Objective:     Vitals:   Temp (24hrs), Av 4 °F (36 9 °C), Min:97 7 °F (36 5 °C), Max:99 °F (37 2 °C)    Temp:  [97 7 °F (36 5 °C)-99 °F (37 2 °C)] 98 5 °F (36 9 °C)  HR:  [] 93  Resp:  [18] 18  BP: ()/(54-60) 96/56  SpO2:  [97 %-99 %] 97 %  Body mass index is 27 75 kg/m²  Input and Output Summary (last 24 hours): Intake/Output Summary (Last 24 hours) at 11/15/18 2617  Last data filed at 11/15/18 3679   Gross per 24 hour   Intake                0 ml   Output             2900 ml   Net            -2900 ml        Physical Exam:     Physical Exam   Constitutional: He is oriented to person, place, and time  He appears well-developed  No distress  HENT:   Head: Normocephalic and atraumatic     Cardiovascular: Normal rate and regular rhythm  Murmur heard  Pulmonary/Chest: Effort normal and breath sounds normal  No respiratory distress  He has no wheezes  He has no rales  Abdominal: Soft  Bowel sounds are normal  He exhibits no distension  There is no tenderness  There is no rebound and no guarding  Genitourinary:   Genitourinary Comments: Gordon in place with clear yellow urine   Musculoskeletal: He exhibits no edema, tenderness or deformity  Neurological: He is alert and oriented to person, place, and time  Skin: Skin is warm and dry  Left chest wall and left back dressing in place  Incision C/D/I, stitches in place   Psychiatric: He has a normal mood and affect  His behavior is normal    Nursing note and vitals reviewed  Additional Data:     Labs:      Results from last 7 days  Lab Units 11/15/18  0607 11/14/18  0433 11/13/18  1418 11/13/18  0558  11/12/18  0548 11/11/18  0546   WBC Thousand/uL 4 33 3 96*  --  5 03  --  5 09 6 45   HEMOGLOBIN g/dL 7 4* 7 4* 8 4* 7 9*  < > 8 1* 9 5*   HEMATOCRIT % 23 9* 24 2* 26 9* 25 0*  < > 25 8* 29 7*   PLATELETS Thousands/uL 132* 137*  --  137*  --  137* 172   NEUTROS PCT %  --   --   --  72  --  75 72   < > = values in this interval not displayed  Results from last 7 days  Lab Units 11/15/18  0607 11/14/18  0433 11/13/18  0558   SODIUM mmol/L 135* 135* 135*   POTASSIUM mmol/L 4 1 3 8 4 3   CHLORIDE mmol/L 101 101 102   CO2 mmol/L 31 30 28   BUN mg/dL 19 17 20   CREATININE mg/dL 0 86 0 87 0 93   CALCIUM mg/dL 7 7* 7 5* 7 4*             Lab Results   Component Value Date/Time    HGBA1C 12 0 (H) 10/26/2018 05:24 AM       Results from last 7 days  Lab Units 11/15/18  0714 11/14/18  2039 11/14/18  1652 11/14/18  1112 11/14/18  0653 11/13/18  2103 11/13/18  1618 11/13/18  1111 11/13/18  0719 11/12/18  2106 11/12/18  1617 11/12/18  1115   POC GLUCOSE mg/dl 89 123 175* 157* 138 174* 90 227* 118 227* 239* 163*           * I Have Reviewed All Lab Data Listed Above    * Additional Pertinent Lab Tests Reviewed: All Labs Within Last 24 Hours Reviewed    Imaging:  Ct Chest Abdomen Pelvis Wo Contrast  Result Date: 10/26/2018  Impression: Patchy airspace opacities are seen in the right lower lobe superior segment  There is a large dense consolidation in the right middle lobe, lateral segment, nonspecific but suspicious for infection/pneumonia in the appropriate clinical setting  Cardiomegaly, bilateral pleural effusions, and body wall edema suggests a superimposed component of fluid overload/CHF  Multiple areas of skin thickening with associated subjacent complex fluid are seen in the superficial soft tissues of the left pectoralis region (for example axial image 30), as well as in the posterior and superior left thorax (axial image 12), as described  Consider cellulitis with developing abscesses  In addition, a few bubbles of air are seen in the anterior left chest subcutaneous tissues (axial image 8) which could be related to recent intervention but also raises suspicion for possible gas-forming infection  Partially distended bladder with catheter seen within the bladder lumen  Mild circumferential bladder wall thickening noted, probably exaggerated by underdistention  A cystitis could be considered in the appropriate clinical setting  Renal cysts, degenerative changes of the shoulder, spine, and hips, and other findings as above  Findings discussed with FRITZ Alexis by Dr Christi Carroll at 4:18 AM on 10/26/2018  Workstation performed: [id number]     Xr Chest Portable  Result Date: 10/29/2018  Impression: Persistent right basilar pleural effusion and associated probable atelectasis  Workstation performed: [id number]     Xr Spine Thoracic 3 Views  Result Date: 10/25/2018  Impression: No acute osseous abnormality  Workstation performed: [id number]     Xr Spine Lumbar 2 Or 3 Views Injury  Result Date: 10/25/2018  Impression: Levoscoliosis and moderate multilevel degenerative change   Workstation performed: [id number]     Xr Shoulder 2+ Views Left  Result Date: 10/25/2018  Impression: No acute osseous abnormality  Workstation performed: [id number]     Ct Head Wo Contrast  Result Date: 10/26/2018  Impression: No acute intracranial abnormality  Other nonacute findings as above  Workstation performed: [id number]     Ct Head Without Contrast  Result Date: 10/25/2018  Impression: No acute intracranial abnormality  Workstation performed: [id number]     Ct Spine Cervical Without Contrast  Result Date: 10/25/2018  Impression: No cervical spine fracture or traumatic malalignment  Workstation performed: [id number]     Xr Chest 1 View  Result Date: 10/25/2018  Impression: Focal consolidation in the right lower lobe  This may represent pneumonia  If there is any trauma to the right chest, pulmonary contusion is a consideration  Recommend follow up to resolution  The study was marked in EPIC for significant notification  Workstation performed: [id number]     Xr Chest Portable Icu  Result Date: 11/1/2018  Impression: Cardiomegaly  Bibasilar parenchymal densities unchanged  Workstation performed: [id number]     Xr Chest Portable Icu  Result Date: 10/29/2018  Impression: No significant interval change since 10/29/2018 Workstation performed: [id number]     Xr Chest Portable Icu  Result Date: 10/26/2018  Impression: Endotracheal tube ending 3 3 cm above the yohana  Consolidation at the right lung base  Workstation performed: [id number]     Ir Picc Line  Result Date: 10/31/2018  Impression: Impression: Successful placement of a 49cm right upper extremity double lumen Power PICC  Workstation performed: [id number]     Imaging Reports Reviewed by myself    Cultures:   Blood Culture:   Lab Results   Component Value Date    BLOODCX No Growth After 5 Days  10/29/2018    BLOODCX No Growth After 5 Days  10/29/2018    BLOODCX Staphylococcus aureus (A) 10/25/2018    BLOODCX No Growth After 5 Days   10/25/2018     Urine Culture:   Lab Results   Component Value Date    URINECX >100,000 cfu/ml  10/25/2018    URINECX >100,000 cfu/ml Klebsiella oxytoca (A) 05/09/2018     Sputum Culture: No components found for: SPUTUMCX  Wound Culture:   Lab Results   Component Value Date    WOUNDCULT 4+ Growth of Staphylococcus aureus (A) 10/26/2018    WOUNDCULT 4+ Growth of Staphylococcus aureus (A) 10/25/2018       Last 24 Hours Medication List:     Current Facility-Administered Medications:  acetaminophen 650 mg Oral Q6H [address] Rhea John PA-C    albumin human 12 5 g Intravenous Daily Valentina Foster MD    And        furosemide 20 mg Intravenous Daily Valentina Foster MD    atorvastatin 10 mg Oral Daily Doug Guzman MD    cefazolin 2,000 mg Intravenous Q8H Doug Guzman MD Last Rate: 2,000 mg (11/15/18 9846)   collagenase  Topical Daily Yeny Aguilar MD    insulin detemir 8 Units Subcutaneous HS Valentina Foster MD    insulin lispro 1-5 Units Subcutaneous TID AC Valentina Foster MD    insulin lispro 3 Units Subcutaneous TID With Meals Valentina Foster MD    lisinopril 2 5 mg Oral Daily Valentina Foster MD    melatonin 6 mg Oral HS Doug Guzman MD    metoprolol tartrate 12 5 mg Oral Q12H [address] Belen Tee PA-C    pantoprazole 40 mg Intravenous Q12H Carey Jean PA-C    potassium chloride 20 mEq Oral Daily Valentina Foster MD    pregabalin 50 mg Oral TID Doug Guzman MD    senna 1 tablet Oral HS Doug Guzman MD    tamsulosin 0 4 mg Oral Daily With Ranjj Davis MD    traMADol 50 mg Oral Q6H PRN Valentina Foster MD         Today, Patient Was Seen By: Tucker Humphries MD    ** Please Note: Dragon 360 Dictation voice to text software may have been used in the creation of this document   **

## 2018-11-15 NOTE — PHYSICAL THERAPY NOTE
PHYSICAL THERAPY RE-ASSESSMENT     11/15/18 1510   Pain Assessment   Pain Assessment No/denies pain   Restrictions/Precautions   Other Precautions Fall Risk; Chair Alarm; Bed Alarm   General   Chart Reviewed Yes   Family/Caregiver Present No   Cognition   Arousal/Participation Cooperative   Subjective   Subjective patient reports being hungry and wanting to eat   Transfers   Sit to Stand 4  Minimal assistance   Additional items Assist x 1   Stand to Sit 4  Minimal assistance   Additional items Assist x 1   Ambulation/Elevation   Gait Assistance 4  Minimal assist   Additional items Assist x 1;Verbal cues; Tactile cues   Assistive Device Rolling walker   Distance 35 feet with change in direction and verbal cuing for proper use of roller walker  with occasional loss of balance laterally at times   Balance   Static Sitting Good   Dynamic Sitting Fair +   Static Standing Fair   Dynamic Standing Fair -   Ambulatory Fair -   Activity Tolerance   Activity Tolerance Patient limited by fatigue   Nurse Made Aware yes   Exercises   Heelslides Sitting;20 reps;Bilateral   Glute Sets Sitting;20 reps   Hip Flexion Sitting;20 reps;Bilateral   Knee AROM Long Arc Quad Sitting;20 reps;Bilateral   Ankle Pumps Sitting;20 reps;Bilateral   Balance training  standing balance activity completed with sidestepping and backward walking   Assessment   Assessment patient demonstrating improving gait balance and endurance but continues to require skilled assist of 1 person and will benefit from continued PT with progression as tolerated      Goals   Patient Goals to get back home and be stronger   STG Expiration Date 11/22/18   Short Term Goal #1 transfers and gait with roller walker with supervision   Short Term Goal #2 gait endurance to 80 feet, strength BLEs 4-/5 all to meet patient goal of improved strength and returning home   LTG Expiration Date 11/29/18   Long Term Goal #1 transfers and gait with roller walker independently   Long Term Goal #2 gait endurance to functional household distances, strength BLEs 4/5   Treatment Day 1   Plan   Treatment/Interventions ADL retraining;Functional transfer training;LE strengthening/ROM; Elevations; Therapeutic exercise; Endurance training;Patient/family training;Equipment eval/education; Bed mobility;Gait training; Compensatory technique education   PT Frequency 5x/wk   Recommendation   Recommendation (str)   Licensure   [place] License Number  Del Perez PT [id number]

## 2018-11-15 NOTE — PROGRESS NOTES
Progress Note - Cardiology   [facility]     Rasheeda Roman [de-identified] y o  male MRN: [id number]  : 1940  Unit/Bed#: [address] [place] [address] Encounter: [id number]    Assessment and Plan:   1  Acute on chronic combined systolic and diastolic congestive heart failure secondary to ischemia  Left ventricular ejection fraction of 20%  Repeat echocardiogram showed no change in ejection fraction  Patient on low-dose Ace and beta-blocker due to hypotension  Continue monitor his vital signs  Patient currently on IV Lasix with albumin for diuresis  Diuresing well  2  Ventricular tachycardia in patient with a low ejection fraction:  As previously discussed with Dr Caren Vega, patient does not wish implantation of ICD  Patient verbalizes understanding of the risks of sudden cardiac death  3  Hypoalbuminemia likely secondary to CHF/infection and large surgery  Improve nutrition  24 hour urine protein underway  4  CAD with CABG in  - EF in May 2018 was 40%  Not on ASA due to duodenal ulcer bleeding  Continue beta-blocker and statin  5  Anemia - Hg today is 7 4  Will discuss repeat/possible transfusion with hospitalist      6  Dyslipidemia - statin    7  DM - poorly controlled    8  Paroxysmal atrial fibrillation - currently in sinus rhythm    9  Hypotension    Subjective / Objective:   Patient seen examined  Remains weak but feels that he is slowly improving  No cardiac complaints offered  Vitals: Blood pressure 90/52, pulse 86, temperature 97 7 °F (36 5 °C), temperature source Oral, resp  rate 18, height 5' 8 5" (1 74 m), weight 84 kg (185 lb 3 oz), SpO2 97 %  Vitals:    18 0546 11/15/18 0600   Weight: 84 kg (185 lb 4 oz) 84 kg (185 lb 3 oz)     Body mass index is 27 75 kg/m²    BP Readings from Last 3 Encounters:   11/15/18 90/52   18 107/58     Orthostatic Blood Pressures      Most Recent Value   Blood Pressure  90/52 filed at 11/15/2018 5188   Patient Position - Orthostatic VS  Lying filed at 11/15/2018 0937        I/O       11/13 0701 - 11/14 0700 11/14 0701 - 11/15 0700 11/15 0701 - 11/16 0700    P  O  240      IV Piggyback 100      Total Intake(mL/kg) 340 (4)      Urine (mL/kg/hr) 2000 (1) 2900 (1 4) 750 (1 6)    Total Output 2000 2900 750    Net -1660 -2900 -750               Invasive Devices     Peripherally Inserted Central Catheter Line            PICC Line 81/70/19 Right Basilic 14 days          Drain            Urethral Catheter 16 Fr  9 days                  Intake/Output Summary (Last 24 hours) at 11/15/18 1239  Last data filed at 11/15/18 1059   Gross per 24 hour   Intake                0 ml   Output             3425 ml   Net            -3425 ml         Physical Exam:   Physical Exam   Constitutional: He is oriented to person, place, and time  He appears well-developed and well-nourished  No distress  HENT:   Head: Normocephalic and atraumatic  Eyes: Pupils are equal, round, and reactive to light  Right eye exhibits no discharge  Left eye exhibits no discharge  Neck: Normal range of motion  Neck supple  No thyromegaly present  Cardiovascular: Normal rate and regular rhythm  Pulmonary/Chest: Effort normal and breath sounds normal  No respiratory distress  He has no wheezes  Wound VAC in place   Abdominal: Soft  Bowel sounds are normal    Musculoskeletal: He exhibits edema  Neurological: He is alert and oriented to person, place, and time  Skin: Skin is warm and dry  He is not diaphoretic  Psychiatric: He has a normal mood and affect  Nursing note and vitals reviewed              Medications/ Allergies:     Current Facility-Administered Medications:  acetaminophen 650 mg Oral Q6H [facility] Rhea John PA-C    albumin human 12 5 g Intravenous Daily Melissa Gomez MD    And        furosemide 20 mg Intravenous Daily Melissa Gomez MD    atorvastatin 10 mg Oral Daily Rachel Concepcion MD    cefazolin 2,000 mg Intravenous Q8H Rachel Concepcion MD Last Rate: 2,000 mg (11/15/18 8663)   collagenase  Topical Daily Amari Vasquez MD    insulin detemir 8 Units Subcutaneous HS Charo Laboy MD    insulin lispro 1-5 Units Subcutaneous TID AC hCaro Laboy MD    insulin lispro 3 Units Subcutaneous TID With Meals Charo Laboy MD    lisinopril 2 5 mg Oral Daily Charo Laboy MD    melatonin 6 mg Oral HS Santa Davis MD    metoprolol tartrate 12 5 mg Oral Q12H [address] Belen Tee PA-C    pantoprazole 40 mg Intravenous Q12H Glenroy Deng PA-C    potassium chloride 20 mEq Oral Daily Charo Laboy MD    pregabalin 50 mg Oral TID Santa Davis MD    senna 1 tablet Oral HS Santa Davis MD    tamsulosin 0 4 mg Oral Daily With Renetta Lopez MD    traMADol 50 mg Oral Q6H PRN Charo Laboy MD        traMADol 50 mg Q6H PRN     No Known Allergies    VTE Pharmacologic Prophylaxis:   Sequential compression device (Venodyne)     Labs:     CBC with diff:  Results from last 7 days  Lab Units 11/15/18  0607 11/14/18  0433 11/13/18  1418 11/13/18  0558 11/12/18  1426 11/12/18  0548 11/11/18  0546 11/10/18  0630 11/09/18  0538   WBC Thousand/uL 4 33 3 96*  --  5 03  --  5 09 6 45 7 78 6 76   HEMOGLOBIN g/dL 7 4* 7 4* 8 4* 7 9* 8 8* 8 1* 9 5* 9 3* 8 8*   HEMATOCRIT % 23 9* 24 2* 26 9* 25 0* 28 5* 25 8* 29 7* 29 3* 28 0*   MCV fL 103* 102*  --  101*  --  101* 99* 98 99*   PLATELETS Thousands/uL 132* 137*  --  137*  --  137* 172 157 137*   MCH pg 31 8 31 2  --  32 0  --  31 8 31 6 31 1 31 0   MCHC g/dL 31 0* 30 6*  --  31 6  --  31 4 32 0 31 7 31 4   RDW % 19 4* 19 1*  --  18 7*  --  18 5* 18 3* 18 0* 17 7*   MPV fL 10 8 11 1  --  11 1  --  10 7 10 9 10 8 10 5   NRBC AUTO /100 WBCs  --   --   --  0  --  0 0 0 0     CMP:  Results from last 7 days  Lab Units 11/15/18  0607 11/14/18  0433 11/13/18  0558 11/12/18  0548 11/11/18  0546 11/10/18  0630 11/09/18  0538   SODIUM mmol/L 135* 135* 135* 136 136 135* 132*   POTASSIUM mmol/L 4 1 3 8 4 3 4 2 3 5 3 4* 3 5   CHLORIDE mmol/L 101 101 102 102 103 102 101   CO2 mmol/L 31 30 28 28 27 25 24   BUN mg/dL 19 17 20 16 13 14 15   CREATININE mg/dL 0 86 0 87 0 93 0 98 0 86 0 82 0 82   CALCIUM mg/dL 7 7* 7 5* 7 4* 7 0* 7 4* 7 0* 7 0*   EGFR ml/min/1 73sq m 82 82 77 73 82 84 84     Magnesium:  Results from last 7 days  Lab Units 11/14/18  0433 11/13/18  0558 11/12/18  0548 11/11/18  0546 11/10/18  0630 11/09/18  0538   MAGNESIUM mg/dL 1 9 1 7 1 8 1 8 1 8 1 9       Imaging & Testing   I have personally reviewed pertinent reports  Ct Chest Abdomen Pelvis Wo Contrast    Result Date: 10/26/2018  Narrative: CT CHEST, ABDOMEN AND PELVIS WITHOUT IV CONTRAST INDICATION:   Sepsis  COMPARISON:  CT cervical spine dated 10/25/2018, radiographs of the left shoulder dated 10/25/2018 TECHNIQUE: CT examination of the chest, abdomen and pelvis was performed without intravenous contrast   Axial, sagittal, and coronal 2D reformatted images were created from the source data and submitted for interpretation  Radiation dose length product (DLP) for this visit:  652 78 mGy-cm   This examination, like all CT scans performed in the [facility], was performed utilizing techniques to minimize radiation dose exposure, including the use of iterative  reconstruction and automated exposure control  Enteric contrast was not administered  FINDINGS: CHEST LUNGS:  Some patchy airspace opacities are seen in the right lower lobe superior segment  There is a large dense consolidation in the right middle lobe, lateral segment  Some mucous plugging is suspected in a left upper lobe bronchus  There is mild dependent and bibasilar atelectasis  The lungs otherwise appear grossly clear  PLEURA:  Small bilateral pleural effusions are noted dependently  HEART/GREAT VESSELS:  There is left ventricular enlargement  At least moderate coronary atherosclerosis is noted  Status post CABG with a LIMA graft  Mild aortic atherosclerosis  No aortic aneurysm   MEDIASTINUM AND ALIZE:  Unremarkable  CHEST WALL AND LOWER NECK: Median sternotomy wires are noted  A few bubbles of air are seen in the anterior left chest subcutaneous tissues (axial image 8)  Multiple areas of skin thickening with associated subjacent complex fluid are seen in the superficial soft tissues of the left pectoralis region (for example axial image 30); this area measures approximately 5 5 x 1 1 x 4 0 cm in transverse, AP, and CC dimensions  There is body wall edema noted with some shotty left axillary lymph nodes  In  addition there is posterior skin thickening with some irregularity of the soft tissues of the posterior and superior left thorax (axial image 12)  Subjacent to this there is an apparent fluid collection measuring approximately 4 7 x 1 8 x 4 6 cm in size  ABDOMEN LIVER/BILIARY TREE:  Unremarkable  GALLBLADDER:  No calcified gallstones  No pericholecystic inflammatory change  SPLEEN:  Unremarkable  PANCREAS:  Mild atrophy  Otherwise grossly unremarkable ADRENAL GLANDS:  Grossly unremarkable KIDNEYS/URETERS:  Partially exophytic cyst in the posterior midportion of the right kidney measures approximately 1 5 cm in size  Exophytic cyst in the lower pole of the left kidney measures approximately 3 7 cm in size  Bilateral kidneys otherwise appear grossly unremarkable; no hydronephrosis  STOMACH AND BOWEL:  Grossly unremarkable APPENDIX:  A normal appendix was visualized  ABDOMINOPELVIC CAVITY:  No ascites or free intraperitoneal air  No lymphadenopathy  VESSELS:  Mild atherosclerosis; no aortic aneurysm PELVIS REPRODUCTIVE ORGANS:  Unremarkable for patient's age  URINARY BLADDER:  Bladder is partially distended  A catheter is seen within the bladder lumen  Some air is seen within the nondependent portion of the bladder  Mild circumferential bladder wall thickening is noted, probably exaggerated by underdistention  ABDOMINAL WALL/INGUINAL REGIONS:  A right-sided femoral venous catheter is noted  Some subcutaneous emphysema is seen in this region as well, probably related to recent intervention/placement  Diffuse body wall edema is noted  OSSEOUS STRUCTURES:  Mild curvature of the lumbar spine, apex to the left  Moderate degenerative changes of the bilateral hips, worse on the right  No acute fracture or destructive osseous lesion is identified  Prominent degenerative changes of the right shoulder  Multilevel degenerative changes of the spine with vacuum disc phenomenon at L4/L5  There is intervertebral disc space narrowing and facet joint arthropathy at L2/L3, L3/L4, and L4/L5  Impression: Patchy airspace opacities are seen in the right lower lobe superior segment  There is a large dense consolidation in the right middle lobe, lateral segment, nonspecific but suspicious for infection/pneumonia in the appropriate clinical setting  Cardiomegaly, bilateral pleural effusions, and body wall edema suggests a superimposed component of fluid overload/CHF  Multiple areas of skin thickening with associated subjacent complex fluid are seen in the superficial soft tissues of the left pectoralis region (for example axial image 30), as well as in the posterior and superior left thorax (axial image 12), as described  Consider cellulitis with developing abscesses  In addition, a few bubbles of air are seen in the anterior left chest subcutaneous tissues (axial image 8) which could be related to recent intervention but also raises suspicion for possible gas-forming infection  Partially distended bladder with catheter seen within the bladder lumen  Mild circumferential bladder wall thickening noted, probably exaggerated by underdistention  A cystitis could be considered in the appropriate clinical setting  Renal cysts, degenerative changes of the shoulder, spine, and hips, and other findings as above  Findings discussed with FRITZ Alexis by Dr Trinh Lopez at 4:18 AM on 10/26/2018   Workstation performed: [id number] Xr Chest Portable    Result Date: 10/29/2018  Narrative: CHEST INDICATION:   pna  COMPARISON:  10/26/2018 EXAM PERFORMED/VIEWS:  XR CHEST PORTABLE FINDINGS:  Endotracheal and nasogastric tubes have been removed during the interim  Cardiomediastinal silhouette appears unremarkable  Right greater than left basilar pleural effusions with associated right basilar airspace opacity, likely atelectasis  Osseous structures appear within normal limits for patient age  Impression: Persistent right basilar pleural effusion and associated probable atelectasis  Workstation performed: [id number]     Xr Spine Thoracic 3 Views    Result Date: 10/25/2018  Narrative: THORACIC SPINE INDICATION:   Pain  COMPARISON:  Chest x-ray from 3/13/2013 VIEWS:  XR SPINE THORACIC 3 VW FINDINGS: There is no acute fracture or pathologic bone lesion  Minimal vertebral body height loss scattered in the thoracic spine similar to the prior exam probably degenerative  Thoracic vertebral alignment is within normal limits  Multilevel degenerative changes with flowing marginal osteophyte formation  There is no displacement of the paraspinal line  The pedicles appear intact  Impression: No acute osseous abnormality  Workstation performed: [id number]     Xr Spine Lumbar 2 Or 3 Views Injury    Result Date: 10/25/2018  Narrative: LUMBAR SPINE INDICATION:   pain  COMPARISON:  None VIEWS:  XR SPINE LUMBAR 2 OR 3 VIEWS INJURY FINDINGS: There is no evidence of acute fracture or destructive osseous lesion  Levoscoliosis is present about L3  Moderate degenerative changes present throughout the lumbar spine  The pedicles appear intact  Soft tissues are unremarkable  Impression: Levoscoliosis and moderate multilevel degenerative change  Workstation performed: [id number]     Xr Shoulder 2+ Views Left    Result Date: 10/25/2018  Narrative: LEFT SHOULDER INDICATION:   Pain   COMPARISON:  None VIEWS:  XR SHOULDER 2+ VW LEFT FINDINGS: There is no acute fracture or dislocation  Narrowing of the glenohumeral joint with mild spurring  No lytic or blastic lesions are seen  Soft tissues are unremarkable  Impression: No acute osseous abnormality  Workstation performed: [id number]     Ct Head Wo Contrast    Result Date: 10/26/2018  Narrative: CT BRAIN - WITHOUT CONTRAST INDICATION:   Confusion/delirium, altered LOC, unexplained Lethargy  COMPARISON:  CT head dated 10/25/2018 TECHNIQUE:  CT examination of the brain was performed  In addition to axial images, coronal 2D reformatted images were created and submitted for interpretation  Radiation dose length product (DLP) for this visit:  1090 97 mGy-cm   This examination, like all CT scans performed in the [facility], was performed utilizing techniques to minimize radiation dose exposure, including the use of iterative reconstruction and automated exposure control  IMAGE QUALITY:  Diagnostic  FINDINGS: PARENCHYMA: Decreased attenuation is noted in periventricular and subcortical white matter demonstrating an appearance that is statistically most likely to represent mild microangiopathic change  Gray-white differentiation appears maintained No CT signs of acute territorial infarction  No intracranial mass, mass effect or midline shift  No acute parenchymal hemorrhage  Mild generalized parenchymal atrophy redemonstrated  VENTRICLES AND EXTRA-AXIAL SPACES:  Ventricles and extra-axial CSF spaces are prominent commensurate with the degree of volume loss  No hydrocephalus  No acute extra-axial hemorrhage  VISUALIZED ORBITS AND PARANASAL SINUSES:  Unremarkable  CALVARIUM AND EXTRACRANIAL SOFT TISSUES:  The calvarium appears intact  Otherwise grossly unremarkable  Impression: No acute intracranial abnormality  Other nonacute findings as above  Workstation performed: [id number]     Ct Head Without Contrast    Result Date: 10/25/2018  Narrative: CT BRAIN - WITHOUT CONTRAST INDICATION:   fall  COMPARISON:  5/9/2018  TECHNIQUE:  CT examination of the brain was performed  In addition to axial images, coronal 2D reformatted images were created and submitted for interpretation  Radiation dose length product (DLP) for this visit:  1220 31 mGy-cm   This examination, like all CT scans performed in the [facility], was performed utilizing techniques to minimize radiation dose exposure, including the use of iterative reconstruction and automated exposure control  IMAGE QUALITY:  Diagnostic  FINDINGS: PARENCHYMA: Decreased attenuation is noted in periventricular and subcortical white matter demonstrating an appearance that is statistically most likely to represent moderate microangiopathic change  No CT signs of acute infarction  No intracranial mass, mass effect or midline shift  No acute parenchymal hemorrhage  VENTRICLES AND EXTRA-AXIAL SPACES:  Ventricles and extra-axial CSF spaces are prominent commensurate with the degree of volume loss  No hydrocephalus  No acute extra-axial hemorrhage  VISUALIZED ORBITS AND PARANASAL SINUSES:  Unremarkable  CALVARIUM AND EXTRACRANIAL SOFT TISSUES:  Normal      Impression: No acute intracranial abnormality  Workstation performed: [id number]     Ct Spine Cervical Without Contrast    Result Date: 10/25/2018  Narrative: CT CERVICAL SPINE - WITHOUT CONTRAST INDICATION:   fall  COMPARISON:  None  TECHNIQUE:  CT examination of the cervical spine was performed without intravenous contrast   Contiguous axial images were obtained  Sagittal and coronal reconstructions were performed  Radiation dose length product (DLP) for this visit:  415 18 mGy-cm   This examination, like all CT scans performed in the [facility], was performed utilizing techniques to minimize radiation dose exposure, including the use of iterative  reconstruction and automated exposure control  IMAGE QUALITY:  Diagnostic   FINDINGS: ALIGNMENT:  Normal alignment of the cervical spine  No subluxation  VERTEBRAL BODIES:  No fracture  DEGENERATIVE CHANGES:  Moderate multilevel cervical degenerative changes are noted  No critical central canal stenosis  PREVERTEBRAL AND PARASPINAL SOFT TISSUES:  Unremarkable  THORACIC INLET:  Normal      Impression: No cervical spine fracture or traumatic malalignment  Workstation performed: [id number]     Xr Chest 1 View    Result Date: 10/25/2018  Narrative: CHEST INDICATION:   Pain  Fall  COMPARISON:  Chest x-ray from 3/13/2013 EXAM PERFORMED/VIEWS:  XR CHEST 1 VIEW FINDINGS:  There has been interval sternotomy  Cardiac silhouette is moderately enlarged  Focal consolidation in the right lower lobe laterally  Left lung is clear  No pneumothorax or pleural effusion  Osseous structures appear within normal limits for patient age  Impression: Focal consolidation in the right lower lobe  This may represent pneumonia  If there is any trauma to the right chest, pulmonary contusion is a consideration  Recommend follow up to resolution  The study was marked in EPIC for significant notification  Workstation performed: [id number]     Xr Chest Portable Icu    Result Date: 11/1/2018  Narrative: CHEST INDICATION:   PNA  COMPARISON:  10/29/2018 EXAM PERFORMED/VIEWS:  XR CHEST PORTABLE ICU 1 image FINDINGS: Cardiomediastinal silhouette appears enlarged  A median sternotomy has been performed  No evidence of heart failure  A PICC line enters on the right, the tip extends to the SVC  Bibasilar densities may represent infiltrate, atelectasis or fluid  Osseous structures appear within normal limits for patient age  Impression: Cardiomegaly  Bibasilar parenchymal densities unchanged  Workstation performed: [id number]     Xr Chest Portable Icu    Result Date: 10/29/2018  Narrative: CHEST INDICATION:   Evaluate for aspiration pneumonia    COMPARISON:  10/29/2018 EXAM PERFORMED/VIEWS:  XR CHEST PORTABLE ICU FINDINGS:  There are median sternotomy wires indicating prior cardiac surgery  Heart shadow is enlarged but unchanged from prior exam  Bilateral pleural effusions appear similar size to the previous examination  In conjunction with prior chest CT 10/26/2018, right base airspace disease is again identified  Questionable left retrocardiac airspace disease as well, similar to prior  No pneumothorax  No pulmonary edema  Osseous structures appear within normal limits for patient age  Impression: No significant interval change since 10/29/2018 Workstation performed: [id number]     Xr Chest Portable Icu    Result Date: 10/26/2018  Narrative: CHEST INDICATION:   Endotracheal tube positioning  COMPARISON:  10/25/2018 EXAM PERFORMED/VIEWS:  XR CHEST PORTABLE ICU Images: 2 FINDINGS: Cardiomediastinal silhouette appears unremarkable  A median sternotomy has been performed  Endotracheal tube ends approximately 3 3 cm above the yohana  A nasogastric tube is coiled in the stomach  Pulmonary vessels are normal  Consolidation at the right lung base  Minimal atelectasis at the left lung base  No evidence of heart failure  Osseous structures appear within normal limits for patient age  Impression: Endotracheal tube ending 3 3 cm above the yohana  Consolidation at the right lung base  Workstation performed: [id number]     Ir Picc Line    Result Date: 10/31/2018  Narrative: Examination: Right upper extremity double lumen Power PICC 10/31/2018 History: Needs access Contrast: None Fluoroscopy time:  0 9 minutes Procedure: The patient was identified verbally and by wristband  Timeout was performed  Informed consent was obtained  All elements of maximal sterile barrier technique, cap and mask and sterile gown and sterile gloves and sterile full-body drape and hand hygiene and 2% chlorhexidine for cutaneous antisepsis  Sterile ultrasound technique with sterile gel and sterile probe covers was also utilized   Following obtaining informed consent, the patient was prepped and draped in the usual sterile fashion  Using ultrasound guidance, access was gained to the patient's right antecubital vein using a micropuncture system  The micropuncture dilator was exchanged for a peel away sheath  A mandril wire was advanced to the level of the RA/SVC junction using fluoroscopic guidance to measure the catheter length  The catheter was cut to size and placed through the peel away sheath  The lumens were flushed with ease  The catheter was secured in place  The patient tolerated the procedure well without apparent immediate complication  The patient left the IR department in unchanged condition  Dr Ubaldo Mckinnon performed the procedure Findings: Using ultrasound guidance, the right antecubital vein was cannulated using Seldinger technique  The right antecubital vein was evaluated as a potential access site  The right antecubital vein was patent, and free of thrombus  Static images of the vessel  was obtained  Visualization of real time needle entry into the vessel was obtained  A fluoroscopic spot image demonstrated a newly placed PICC with the central aspect at the RA/SVC junction  The catheter tubing is smooth in contour  Impression: Impression: Successful placement of a 49cm right upper extremity double lumen Power PICC   Workstation performed: [id number]        Cardiac testing:   Results for orders placed during the hospital encounter of 10/25/18   Echo complete with contrast if indicated    Narrative [address]  [address] [facility] 6 [phone]    Transthoracic Echocardiogram  2D, M-mode, Doppler, and Color Doppler    Study date:  26-Oct-2018    Patient: Roula Bar  MR number: [id number]  Account number: [de-identified]  : 1940  Age: [de-identified] years  Gender: Male  Status: Inpatient  Location: Bedside  Height: 68 in  Weight: 155 8 lb  BP: 83/ 53 mmHg    Indications: Heart Failure    Diagnoses: I50 9 - Heart failure, unspecified    Sonographer:  ADRIEL Harden  Primary Physician:  Diego Palacios DO  Referring Physician:  Paul Avendaño PA-C  Group:  [facility]  Interpreting Physician:  Eunice Mcnulty DO    IMPRESSIONS:  These features are consistent with dilated cardiomyopathy  SUMMARY    LEFT VENTRICLE:  The ventricle was moderately dilated  Systolic function was severely reduced by visual assessment  Ejection fraction was estimated to be 20 %  There were no regional wall motion abnormalities  There was mild concentric hypertrophy  Doppler parameters were consistent with abnormal left ventricular relaxation (grade 1 diastolic dysfunction)  LEFT ATRIUM:  The atrium was moderately dilated  MITRAL VALVE:  There was moderate regurgitation  AORTIC VALVE:  There was no evidence for stenosis  There was no regurgitation  TRICUSPID VALVE:  There was mild regurgitation  Pulmonary artery systolic pressure was mildly increased  HISTORY: PRIOR HISTORY: HTN, Hyperlipidemia, DM, CHF, CABG    PROCEDURE: The procedure was performed at the bedside  This was a routine study  The transthoracic approach was used  The study included complete 2D imaging, M-mode, complete spectral Doppler, and color Doppler  The heart rate was 115 bpm,  at the start of the study  Images were not obtained from the subcostal or suprasternal notch acoustic windows  Intravenous contrast (Definity solution [1 3 ml Definity/8 7ml normal saline solution], 1 ml) was administered to opacify the  left ventricle  Echocardiographic views were limited due to restricted patient mobility, surgical dressings, poor acoustic window availability, lung interference, and patient on mechanical ventilator  This was a technically difficult study  LEFT VENTRICLE: The ventricle was moderately dilated  Systolic function was severely reduced by visual assessment  Ejection fraction was estimated to be 20 %   There were no regional wall motion abnormalities  There was mild concentric  hypertrophy  DOPPLER: Doppler parameters were consistent with abnormal left ventricular relaxation (grade 1 diastolic dysfunction)  RIGHT VENTRICLE: The size was normal  Systolic function was normal  DOPPLER: Systolic pressure was within the normal range  LEFT ATRIUM: The atrium was moderately dilated  RIGHT ATRIUM: Size was normal     MITRAL VALVE: There was annular calcification  Valve structure was normal  There was normal leaflet separation  No echocardiographic evidence for prolapse  DOPPLER: The transmitral velocity was within the normal range  There was no  evidence for stenosis  There was moderate regurgitation  AORTIC VALVE: The valve was trileaflet  Leaflets exhibited normal thickness, mild calcification, normal cuspal separation, and sclerosis  DOPPLER: Transaortic velocity was within the normal range  There was no evidence for stenosis  There  was no regurgitation  TRICUSPID VALVE: The valve structure was normal  There was normal leaflet separation  DOPPLER: The transtricuspid velocity was within the normal range  There was no evidence for stenosis  There was mild regurgitation  Pulmonary artery  systolic pressure was mildly increased  Estimated peak PA pressure was 43 mmHg  PULMONIC VALVE: Leaflets exhibited normal thickness, no calcification, and normal cuspal separation  DOPPLER: The transpulmonic velocity was within the normal range  There was no regurgitation  PERICARDIUM: There was no thickening  There was no pericardial effusion  AORTA: The root exhibited normal size      PULMONARY ARTERY: The size was normal  The morphology appeared normal     SYSTEM MEASUREMENT TABLES    2D mode  AoR Diam 2D: 3 7 cm  LA Diam (2D): 4 6 cm  LA/Ao (2D): 1 24  FS (2D Teich): 13 7 %  IVSd (2D): 1 17 cm  LVDEV: 189 cm³  LVEDV MOD BP: 180 cm³  LVESV: 135 cm³  LVIDd(2D): 6 13 cm  LVISd (2D): 5 29 cm  LVPWd (2D): 1 09 cm  SV (Teich): 54 cm³    Apical four chamber  LVEF A4C: 24 %    Apical two chamber  LVEF A2C: 13 %    Unspecified Scan Mode  MV Peak E Travon  Mean: 1120 mm/s  MVA (PHT): 7 33 cm squared  PHT: 30 ms  Max P mm[Hg]  V Max: 2970 mm/s  Vmax: 2770 mm/s  RA Area: 19 cm squared  RA Volume: 48 4 cm³  TAPSE: 1 2 cm    [facility] Accredited Echocardiography [facility]    Prepared and electronically signed by    Altagracia Fallon DO  Signed 26-Oct-2018 16:55:41         Counseling / Coordination of Care  Total time spent today 20 minutes  Greater than 50% of total time was spent with the patient and / or family counseling and / or coordination of care  MIGUEL Montaño        "This note has been constructed using a voice recognition system  Therefore there may be syntax, spelling, and/or grammatical errors   Please call if you have any questions  "

## 2018-11-15 NOTE — PROGRESS NOTES
Progress Note - Infectious Disease   Connor Liz [de-identified] y o  male MRN: [id number]  Unit/Bed#: [address] [place] [address] Encounter: [id number]      Impression/Plan:  1  MSSA bacteremia-appears to be secondary to chest wall abscesses   No other clear source appreciated  Consideration for the possibility of endocarditis but this is less likely with only 1 of 2 blood cultures positive and the transthoracic echocardiogram without valvular vegetations   Repeat blood cultures were negative   -continue high-dose IV cefazolin with plan for 4 week course from the date of 1st negative blood culture, through 11/25   -monitor weekly CBC with diff, BMP while on IV antibiotic  -PICC line in place   This will need to be removed after last dose of IV antibiotic   -outpatient follow-up with infectious diseases     2  MSSA chest wall abscesses-status post extensive I and D x2 with a VAC dressing in place  Merl Golds seems to have improved clinically   His pain seems reasonably well controlled   This all possibly as a complication of zoster   -antibiotics as above  -vac dressing and local care  -close surgical follow-up     3  Septic shock-POA   Leukocytosis and tachycardia and hypotension   Appears to be all secondary to MSSA bacteremia in the setting of chest wall abscess formation   No other clear sources appreciated   Patient is clinically improved overall   The patient's heart rate has come down, and the white cell count has come down   He seems to be tolerating the antibiotics without difficulty   Patient remains clinically stable from an infectious standpoint   -antibiotic plan as below     4  Herpes zoster-hard to make a definitive diagnosis at this point as the rash is relatively advanced in its presentation   Patient now status post 7 day course of Valtrex      5   Acute kidney injury on CKD-likely a pre renal issue   Renal function has improved and remains stable   -volume management     6  Esophageal candidiasis-started on fluconazole   Biopsy was negative for Candida   Fluconazole was discontinued  Subjective:  Patient seen on AM rounds  Patient is sitting in chair with no complaints at time  Denies fevers, chills, or sweats  Denies nausea, vomiting, or diarrhea  Objective:  Vitals:  Temp:  [97 7 °F (36 5 °C)-99 °F (37 2 °C)] 97 7 °F (36 5 °C)  HR:  [] 86  Resp:  [18] 18  BP: ()/(52-60) 90/52  SpO2:  [97 %-99 %] 97 %  Temp (24hrs), Av 2 °F (36 8 °C), Min:97 7 °F (36 5 °C), Max:99 °F (37 2 °C)  Current: Temperature: 97 7 °F (36 5 °C)    Physical Exam:   General:  No acute distress, out of bed in chair  Psychiatric:  Awake and alert  Heart:  Positive S1/S2  Pulmonary:  Normal respiratory excursion without accessory muscle use  Abdomen:  Soft, nontender  :  Positive Gordon catheter  Extremities:  Positive edema  Skin:  No rashes    Lab Results:  I have personally reviewed pertinent labs  Results from last 7 days  Lab Units 11/15/18  0607 18  0433 18  0558   POTASSIUM mmol/L 4 1 3 8 4 3   CHLORIDE mmol/L 101 101 102   CO2 mmol/L 31 30 28   BUN mg/dL 19 17 20   CREATININE mg/dL 0 86 0 87 0 93   EGFR ml/min/1 73sq m 82 82 77   CALCIUM mg/dL 7 7* 7 5* 7 4*       Results from last 7 days  Lab Units 11/15/18  0607 18  0433 18  1418 18  0558   WBC Thousand/uL 4 33 3 96*  --  5 03   HEMOGLOBIN g/dL 7 4* 7 4* 8 4* 7 9*   PLATELETS Thousands/uL 132* 137*  --  137*           Imaging Studies:   I have personally reviewed pertinent imaging study reports and images in PACS  EKG, Pathology, and Other Studies:   I have personally reviewed pertinent reports

## 2018-11-16 ENCOUNTER — APPOINTMENT (INPATIENT)
Dept: RADIOLOGY | Facility: HOSPITAL | Age: 78
DRG: 853 | End: 2018-11-16
Payer: MEDICARE

## 2018-11-16 PROBLEM — I80.8 THROMBOPHLEBITIS ARM: Status: ACTIVE | Noted: 2018-11-16

## 2018-11-16 PROBLEM — I82.890 SUPERFICIAL VEIN THROMBOSIS: Status: ACTIVE | Noted: 2018-11-16

## 2018-11-16 LAB
ABO GROUP BLD BPU: NORMAL
ABO GROUP BLD BPU: NORMAL
ANION GAP SERPL CALCULATED.3IONS-SCNC: 5 MMOL/L (ref 4–13)
BPU ID: NORMAL
BPU ID: NORMAL
BUN SERPL-MCNC: 18 MG/DL (ref 5–25)
CALCIUM SERPL-MCNC: 7.7 MG/DL (ref 8.3–10.1)
CHLORIDE SERPL-SCNC: 100 MMOL/L (ref 100–108)
CO2 SERPL-SCNC: 30 MMOL/L (ref 21–32)
CREAT SERPL-MCNC: 0.91 MG/DL (ref 0.6–1.3)
CROSSMATCH: NORMAL
CROSSMATCH: NORMAL
ERYTHROCYTE [DISTWIDTH] IN BLOOD BY AUTOMATED COUNT: 18.6 % (ref 11.6–15.1)
GFR SERPL CREATININE-BSD FRML MDRD: 79 ML/MIN/1.73SQ M
GLUCOSE SERPL-MCNC: 118 MG/DL (ref 65–140)
GLUCOSE SERPL-MCNC: 139 MG/DL (ref 65–140)
GLUCOSE SERPL-MCNC: 197 MG/DL (ref 65–140)
GLUCOSE SERPL-MCNC: 206 MG/DL (ref 65–140)
GLUCOSE SERPL-MCNC: 83 MG/DL (ref 65–140)
HCT VFR BLD AUTO: 30.4 % (ref 36.5–49.3)
HGB BLD-MCNC: 9.6 G/DL (ref 12–17)
MAGNESIUM SERPL-MCNC: 1.7 MG/DL (ref 1.6–2.6)
MCH RBC QN AUTO: 31.6 PG (ref 26.8–34.3)
MCHC RBC AUTO-ENTMCNC: 31.6 G/DL (ref 31.4–37.4)
MCV RBC AUTO: 100 FL (ref 82–98)
PLATELET # BLD AUTO: 132 THOUSANDS/UL (ref 149–390)
PMV BLD AUTO: 10.8 FL (ref 8.9–12.7)
POTASSIUM SERPL-SCNC: 4 MMOL/L (ref 3.5–5.3)
RBC # BLD AUTO: 3.04 MILLION/UL (ref 3.88–5.62)
SODIUM SERPL-SCNC: 135 MMOL/L (ref 136–145)
UNIT DISPENSE STATUS: NORMAL
UNIT DISPENSE STATUS: NORMAL
UNIT PRODUCT CODE: NORMAL
UNIT PRODUCT CODE: NORMAL
UNIT RH: NORMAL
UNIT RH: NORMAL
WBC # BLD AUTO: 4.6 THOUSAND/UL (ref 4.31–10.16)

## 2018-11-16 PROCEDURE — 93971 EXTREMITY STUDY: CPT

## 2018-11-16 PROCEDURE — 80048 BASIC METABOLIC PNL TOTAL CA: CPT | Performed by: STUDENT IN AN ORGANIZED HEALTH CARE EDUCATION/TRAINING PROGRAM

## 2018-11-16 PROCEDURE — 83735 ASSAY OF MAGNESIUM: CPT | Performed by: STUDENT IN AN ORGANIZED HEALTH CARE EDUCATION/TRAINING PROGRAM

## 2018-11-16 PROCEDURE — 99232 SBSQ HOSP IP/OBS MODERATE 35: CPT | Performed by: INTERNAL MEDICINE

## 2018-11-16 PROCEDURE — 82948 REAGENT STRIP/BLOOD GLUCOSE: CPT

## 2018-11-16 PROCEDURE — 93971 EXTREMITY STUDY: CPT | Performed by: SURGERY

## 2018-11-16 PROCEDURE — 85027 COMPLETE CBC AUTOMATED: CPT | Performed by: STUDENT IN AN ORGANIZED HEALTH CARE EDUCATION/TRAINING PROGRAM

## 2018-11-16 PROCEDURE — C9113 INJ PANTOPRAZOLE SODIUM, VIA: HCPCS | Performed by: PHYSICIAN ASSISTANT

## 2018-11-16 PROCEDURE — P9021 RED BLOOD CELLS UNIT: HCPCS

## 2018-11-16 PROCEDURE — 99233 SBSQ HOSP IP/OBS HIGH 50: CPT | Performed by: STUDENT IN AN ORGANIZED HEALTH CARE EDUCATION/TRAINING PROGRAM

## 2018-11-16 RX ORDER — SPIRONOLACTONE 25 MG/1
12.5 TABLET ORAL DAILY
Status: DISCONTINUED | OUTPATIENT
Start: 2018-11-16 | End: 2018-11-16

## 2018-11-16 RX ORDER — TORSEMIDE 10 MG/1
10 TABLET ORAL DAILY
Status: DISCONTINUED | OUTPATIENT
Start: 2018-11-17 | End: 2018-11-18 | Stop reason: HOSPADM

## 2018-11-16 RX ORDER — SPIRONOLACTONE 25 MG/1
12.5 TABLET ORAL DAILY
Status: DISCONTINUED | OUTPATIENT
Start: 2018-11-17 | End: 2018-11-18 | Stop reason: HOSPADM

## 2018-11-16 RX ADMIN — MELATONIN TAB 3 MG 6 MG: 3 TAB at 22:30

## 2018-11-16 RX ADMIN — PANTOPRAZOLE SODIUM 40 MG: 40 INJECTION, POWDER, FOR SOLUTION INTRAVENOUS at 22:27

## 2018-11-16 RX ADMIN — CEFAZOLIN SODIUM 2000 MG: 2 SOLUTION INTRAVENOUS at 05:37

## 2018-11-16 RX ADMIN — POTASSIUM CHLORIDE 20 MEQ: 1500 TABLET, EXTENDED RELEASE ORAL at 09:07

## 2018-11-16 RX ADMIN — ATORVASTATIN CALCIUM 10 MG: 10 TABLET, FILM COATED ORAL at 18:04

## 2018-11-16 RX ADMIN — INSULIN DETEMIR 8 UNITS: 100 INJECTION, SOLUTION SUBCUTANEOUS at 22:28

## 2018-11-16 RX ADMIN — ACETAMINOPHEN 650 MG: 325 TABLET, FILM COATED ORAL at 05:37

## 2018-11-16 RX ADMIN — TRAMADOL HYDROCHLORIDE 50 MG: 50 TABLET, COATED ORAL at 15:18

## 2018-11-16 RX ADMIN — ACETAMINOPHEN 650 MG: 325 TABLET, FILM COATED ORAL at 00:16

## 2018-11-16 RX ADMIN — INSULIN LISPRO 3 UNITS: 100 INJECTION, SOLUTION INTRAVENOUS; SUBCUTANEOUS at 16:45

## 2018-11-16 RX ADMIN — ACETAMINOPHEN 650 MG: 325 TABLET, FILM COATED ORAL at 18:04

## 2018-11-16 RX ADMIN — SENNOSIDES 8.6 MG: 8.6 TABLET, FILM COATED ORAL at 22:30

## 2018-11-16 RX ADMIN — METOPROLOL TARTRATE 12.5 MG: 25 TABLET ORAL at 22:29

## 2018-11-16 RX ADMIN — TAMSULOSIN HYDROCHLORIDE 0.4 MG: 0.4 CAPSULE ORAL at 16:51

## 2018-11-16 RX ADMIN — PREGABALIN 50 MG: 50 CAPSULE ORAL at 09:08

## 2018-11-16 RX ADMIN — CEFAZOLIN SODIUM 2000 MG: 2 SOLUTION INTRAVENOUS at 22:28

## 2018-11-16 RX ADMIN — COLLAGENASE SANTYL: 250 OINTMENT TOPICAL at 09:18

## 2018-11-16 RX ADMIN — PREGABALIN 50 MG: 50 CAPSULE ORAL at 22:29

## 2018-11-16 RX ADMIN — PREGABALIN 50 MG: 50 CAPSULE ORAL at 16:42

## 2018-11-16 RX ADMIN — CEFAZOLIN SODIUM 2000 MG: 2 SOLUTION INTRAVENOUS at 15:03

## 2018-11-16 RX ADMIN — ACETAMINOPHEN 650 MG: 325 TABLET, FILM COATED ORAL at 12:40

## 2018-11-16 RX ADMIN — INSULIN LISPRO 1 UNITS: 100 INJECTION, SOLUTION INTRAVENOUS; SUBCUTANEOUS at 16:45

## 2018-11-16 NOTE — PROGRESS NOTES
Attempted to discontinue PICC  Met resistance after 1 cm of removal  Taped PICC down at site of resistance  Covered with dry gauze and tape  Dr Alva Martinez paged  Endorsed to the charge nurse Michaell Nyhan RN to ensure follow up of removal  Michaell Nyhan RN verbalized that she will ensure a follow up  Patient denied pain at this time

## 2018-11-16 NOTE — PLAN OF CARE
Problem: GASTROINTESTINAL - ADULT  Goal: Maintains or returns to baseline bowel function  INTERVENTIONS:  - Assess bowel function  - Encourage oral fluids to ensure adequate hydration  - Administer IV fluids as ordered to ensure adequate hydration  - Administer ordered medications as needed  - Encourage mobilization and activity  - Nutrition services referral to assist patient with appropriate food choices   Outcome: Completed Date Met: 11/16/18

## 2018-11-16 NOTE — PLAN OF CARE

## 2018-11-16 NOTE — PROGRESS NOTES
Progress Note - Cardiology   [facility]     Negrito Bello [de-identified] y o  male MRN: [id number]  : 1940  Unit/Bed#: 2 [facility] Encounter: [id number]    Assessment and Plan:   1  Acute on chronic combined systolic and diastolic heart failure:  Ejection fraction is known to be 20%  Patient follows with [facility]  Patient currently on low-dose Ace and beta-blocker  He appears to be euvolemic at this time will discontinue IV Lasix and albumin and transition him back to his home regimen of Aldactone 12 5 mg and Demadex 10 mg once a day  Continue to monitor his response and vital signs  2  Ventricular tachycardia:  Patient with known history of ischemic cardiomyopathy  Patient verbalizes that he does not wish to have an implantation of an ICD and understands his risk of sudden cardiac death  3  Coronary artery disease with CABG in 2017:  Continue his beta-blocker and statin  Patient is not on aspirin due to history of duodenal bleeding  4  Anemia:  Improved after receiving transfusion  Continue monitor CBC  5  Diabetes:  Poorly controlled    6  Dyslipidemia:  Continue statin therapy    7  Paroxysmal atrial fibrillation:  Appears to be in sinus rhythm  Subjective / Objective:   Patient seen examined  He is sitting at the bedside in no acute distress  He denies any operative discomfort, does note occasional burning neuropathic pain in the left upper extremity  Incision on anterior chest is well-healed dressings on posterior scapula on left and chest clean dry and intact  Patient states his appetite is greatly improved  Patient noted to have acute superficial thrombophlebitis in the cephalic vein on right at the junction of the subclavian and the distal upper arm near the insertion site of his PICC  Vitals: Blood pressure 104/58, pulse 95, temperature (!) 97 4 °F (36 3 °C), temperature source Oral, resp   rate 19, height 5' 8 5" (1 74 m), weight 80 kg (176 lb 5 9 oz), SpO2 97 %  Vitals:    11/15/18 0600 11/16/18 1100   Weight: 84 kg (185 lb 3 oz) 80 kg (176 lb 5 9 oz)     Body mass index is 26 43 kg/m²  BP Readings from Last 3 Encounters:   11/16/18 104/58   05/16/18 107/58     Orthostatic Blood Pressures      Most Recent Value   Blood Pressure  104/58 filed at 11/16/2018 1258   Patient Position - Orthostatic VS  Sitting filed at 11/16/2018 1258        I/O       11/14 0701 - 11/15 0700 11/15 0701 - 11/16 0700 11/16 0701 - 11/17 0700    P  O   560     Blood  900     Total Intake(mL/kg)  1460 (17 4)     Urine (mL/kg/hr) 2900 (1 4) 2330 (1 2)     Total Output 2900 2330      Net -2900 -870                 Invasive Devices     Peripherally Inserted Central Catheter Line            PICC Line 22/88/07 Right Basilic 15 days          Drain            Urethral Catheter 16 Fr  10 days                  Intake/Output Summary (Last 24 hours) at 11/16/18 1300  Last data filed at 11/16/18 0531   Gross per 24 hour   Intake             1460 ml   Output             1580 ml   Net             -120 ml         Physical Exam:   Physical Exam   Constitutional: He is oriented to person, place, and time  He appears well-developed and well-nourished  No distress  HENT:   Head: Normocephalic and atraumatic  Eyes: Pupils are equal, round, and reactive to light  Right eye exhibits no discharge  Left eye exhibits no discharge  Neck: Normal range of motion  Neck supple  No JVD present  No thyromegaly present  Cardiovascular: Normal rate and regular rhythm  Murmur heard  Pulmonary/Chest: Effort normal and breath sounds normal  No respiratory distress  He has no wheezes  Decreased all fields with dry crackles heard at the bases   Abdominal: Soft  Bowel sounds are normal    Musculoskeletal: He exhibits no edema  Neurological: He is alert and oriented to person, place, and time  Skin: Skin is warm and dry  He is not diaphoretic     Psychiatric: He has a normal mood and affect  Nursing note and vitals reviewed              Medications/ Allergies:     Current Facility-Administered Medications:  acetaminophen 650 mg Oral Q6H [address] Rhea John PA-C    atorvastatin 10 mg Oral Daily Amber Lentz MD    cefazolin 2,000 mg Intravenous Q8H Amber Lentz MD Last Rate: 2,000 mg (11/16/18 0537)   collagenase  Topical Daily Yvette Mae MD    furosemide 20 mg Intravenous Once Dieudonne Lawson MD    insulin detemir 8 Units Subcutaneous HS Ingrid Hutton MD    insulin lispro 1-5 Units Subcutaneous TID AC Ingrid Hutton MD    insulin lispro 3 Units Subcutaneous TID With Meals Ingrid Hutton MD    lisinopril 2 5 mg Oral Daily Ingrid Hutton MD    melatonin 6 mg Oral HS Amber Lentz MD    metoprolol tartrate 12 5 mg Oral Q12H [address] Belen Tee PA-C    pantoprazole 40 mg Intravenous Q12H Wally Robles PA-C    potassium chloride 20 mEq Oral Daily Ingrid Hutton MD    pregabalin 50 mg Oral TID Amber Lentz MD    senna 1 tablet Oral HS Amber Lentz MD    [START ON 11/17/2018] spironolactone 12 5 mg Oral Daily Valentino Church, CRNP    tamsulosin 0 4 mg Oral Daily With MD Cinthia Holland ON 11/17/2018] torsemide 10 mg Oral Daily Valentino Church, CRNP    traMADol 50 mg Oral Q6H PRN Ingrid Hutton MD        traMADol 50 mg Q6H PRN     No Known Allergies    VTE Pharmacologic Prophylaxis:   Sequential compression device (Venodyne)     Labs:   Troponins:      CBC with diff:  Results from last 7 days  Lab Units 11/16/18  0531 11/15/18  0607 11/14/18  0433 11/13/18  1418 11/13/18  0558 11/12/18  1426 11/12/18  0548 11/11/18  0546 11/10/18  0630   WBC Thousand/uL 4 60 4 33 3 96*  --  5 03  --  5 09 6 45 7 78   HEMOGLOBIN g/dL 9 6* 7 4* 7 4* 8 4* 7 9* 8 8* 8 1* 9 5* 9 3*   HEMATOCRIT % 30 4* 23 9* 24 2* 26 9* 25 0* 28 5* 25 8* 29 7* 29 3*   MCV fL 100* 103* 102*  --  101*  --  101* 99* 98   PLATELETS Thousands/uL 132* 132* 137*  --  137*  --  137* 172 157   MCH pg 31 6 31 8 31 2  --  32 0  --  31 8 31 6 31 1   MCHC g/dL 31 6 31 0* 30 6*  --  31 6  --  31 4 32 0 31 7   RDW % 18 6* 19 4* 19 1*  --  18 7*  --  18 5* 18 3* 18 0*   MPV fL 10 8 10 8 11 1  --  11 1  --  10 7 10 9 10 8   NRBC AUTO /100 WBCs  --   --   --   --  0  --  0 0 0       CMP:  Results from last 7 days  Lab Units 11/16/18  0531 11/15/18  0607 11/14/18  0433 11/13/18  0558 11/12/18  0548 11/11/18  0546 11/10/18  0630   SODIUM mmol/L 135* 135* 135* 135* 136 136 135*   POTASSIUM mmol/L 4 0 4 1 3 8 4 3 4 2 3 5 3 4*   CHLORIDE mmol/L 100 101 101 102 102 103 102   CO2 mmol/L 30 31 30 28 28 27 25   BUN mg/dL 18 19 17 20 16 13 14   CREATININE mg/dL 0 91 0 86 0 87 0 93 0 98 0 86 0 82   CALCIUM mg/dL 7 7* 7 7* 7 5* 7 4* 7 0* 7 4* 7 0*   EGFR ml/min/1 73sq m 79 82 82 77 73 82 84       Magnesium:  Results from last 7 days  Lab Units 11/16/18  0531 11/14/18  0433 11/13/18  0558 11/12/18  0548 11/11/18  0546 11/10/18  0630   MAGNESIUM mg/dL 1 7 1 9 1 7 1 8 1 8 1 8       Imaging & Testing   I have personally reviewed pertinent reports  Ct Chest Abdomen Pelvis Wo Contrast    Result Date: 10/26/2018  Narrative: CT CHEST, ABDOMEN AND PELVIS WITHOUT IV CONTRAST INDICATION:   Sepsis  COMPARISON:  CT cervical spine dated 10/25/2018, radiographs of the left shoulder dated 10/25/2018 TECHNIQUE: CT examination of the chest, abdomen and pelvis was performed without intravenous contrast   Axial, sagittal, and coronal 2D reformatted images were created from the source data and submitted for interpretation  Radiation dose length product (DLP) for this visit:  652 78 mGy-cm   This examination, like all CT scans performed in the [facility], was performed utilizing techniques to minimize radiation dose exposure, including the use of iterative  reconstruction and automated exposure control  Enteric contrast was not administered   FINDINGS: CHEST LUNGS:  Some patchy airspace opacities are seen in the right lower lobe superior segment  There is a large dense consolidation in the right middle lobe, lateral segment  Some mucous plugging is suspected in a left upper lobe bronchus  There is mild dependent and bibasilar atelectasis  The lungs otherwise appear grossly clear  PLEURA:  Small bilateral pleural effusions are noted dependently  HEART/GREAT VESSELS:  There is left ventricular enlargement  At least moderate coronary atherosclerosis is noted  Status post CABG with a LIMA graft  Mild aortic atherosclerosis  No aortic aneurysm  MEDIASTINUM AND ALIZE:  Unremarkable  CHEST WALL AND LOWER NECK: Median sternotomy wires are noted  A few bubbles of air are seen in the anterior left chest subcutaneous tissues (axial image 8)  Multiple areas of skin thickening with associated subjacent complex fluid are seen in the superficial soft tissues of the left pectoralis region (for example axial image 30); this area measures approximately 5 5 x 1 1 x 4 0 cm in transverse, AP, and CC dimensions  There is body wall edema noted with some shotty left axillary lymph nodes  In  addition there is posterior skin thickening with some irregularity of the soft tissues of the posterior and superior left thorax (axial image 12)  Subjacent to this there is an apparent fluid collection measuring approximately 4 7 x 1 8 x 4 6 cm in size  ABDOMEN LIVER/BILIARY TREE:  Unremarkable  GALLBLADDER:  No calcified gallstones  No pericholecystic inflammatory change  SPLEEN:  Unremarkable  PANCREAS:  Mild atrophy  Otherwise grossly unremarkable ADRENAL GLANDS:  Grossly unremarkable KIDNEYS/URETERS:  Partially exophytic cyst in the posterior midportion of the right kidney measures approximately 1 5 cm in size  Exophytic cyst in the lower pole of the left kidney measures approximately 3 7 cm in size  Bilateral kidneys otherwise appear grossly unremarkable; no hydronephrosis  STOMACH AND BOWEL:  Grossly unremarkable APPENDIX:  A normal appendix was visualized  ABDOMINOPELVIC CAVITY:  No ascites or free intraperitoneal air  No lymphadenopathy  VESSELS:  Mild atherosclerosis; no aortic aneurysm PELVIS REPRODUCTIVE ORGANS:  Unremarkable for patient's age  URINARY BLADDER:  Bladder is partially distended  A catheter is seen within the bladder lumen  Some air is seen within the nondependent portion of the bladder  Mild circumferential bladder wall thickening is noted, probably exaggerated by underdistention  ABDOMINAL WALL/INGUINAL REGIONS:  A right-sided femoral venous catheter is noted  Some subcutaneous emphysema is seen in this region as well, probably related to recent intervention/placement  Diffuse body wall edema is noted  OSSEOUS STRUCTURES:  Mild curvature of the lumbar spine, apex to the left  Moderate degenerative changes of the bilateral hips, worse on the right  No acute fracture or destructive osseous lesion is identified  Prominent degenerative changes of the right shoulder  Multilevel degenerative changes of the spine with vacuum disc phenomenon at L4/L5  There is intervertebral disc space narrowing and facet joint arthropathy at L2/L3, L3/L4, and L4/L5  Impression: Patchy airspace opacities are seen in the right lower lobe superior segment  There is a large dense consolidation in the right middle lobe, lateral segment, nonspecific but suspicious for infection/pneumonia in the appropriate clinical setting  Cardiomegaly, bilateral pleural effusions, and body wall edema suggests a superimposed component of fluid overload/CHF  Multiple areas of skin thickening with associated subjacent complex fluid are seen in the superficial soft tissues of the left pectoralis region (for example axial image 30), as well as in the posterior and superior left thorax (axial image 12), as described  Consider cellulitis with developing abscesses    In addition, a few bubbles of air are seen in the anterior left chest subcutaneous tissues (axial image 8) which could be related to recent intervention but also raises suspicion for possible gas-forming infection  Partially distended bladder with catheter seen within the bladder lumen  Mild circumferential bladder wall thickening noted, probably exaggerated by underdistention  A cystitis could be considered in the appropriate clinical setting  Renal cysts, degenerative changes of the shoulder, spine, and hips, and other findings as above  Findings discussed with FRITZ Alexis by Dr Alxeey Guerra at 4:18 AM on 10/26/2018  Workstation performed: [id number]     Xr Chest Portable    Result Date: 10/29/2018  Narrative: CHEST INDICATION:   pna  COMPARISON:  10/26/2018 EXAM PERFORMED/VIEWS:  XR CHEST PORTABLE FINDINGS:  Endotracheal and nasogastric tubes have been removed during the interim  Cardiomediastinal silhouette appears unremarkable  Right greater than left basilar pleural effusions with associated right basilar airspace opacity, likely atelectasis  Osseous structures appear within normal limits for patient age  Impression: Persistent right basilar pleural effusion and associated probable atelectasis  Workstation performed: [id number]     Xr Spine Thoracic 3 Views    Result Date: 10/25/2018  Narrative: THORACIC SPINE INDICATION:   Pain  COMPARISON:  Chest x-ray from 3/13/2013 VIEWS:  XR SPINE THORACIC 3 VW FINDINGS: There is no acute fracture or pathologic bone lesion  Minimal vertebral body height loss scattered in the thoracic spine similar to the prior exam probably degenerative  Thoracic vertebral alignment is within normal limits  Multilevel degenerative changes with flowing marginal osteophyte formation  There is no displacement of the paraspinal line  The pedicles appear intact  Impression: No acute osseous abnormality  Workstation performed: [id number]     Xr Spine Lumbar 2 Or 3 Views Injury    Result Date: 10/25/2018  Narrative: LUMBAR SPINE INDICATION:   pain   COMPARISON:  None VIEWS:  XR SPINE LUMBAR 2 OR 3 VIEWS INJURY FINDINGS: There is no evidence of acute fracture or destructive osseous lesion  Levoscoliosis is present about L3  Moderate degenerative changes present throughout the lumbar spine  The pedicles appear intact  Soft tissues are unremarkable  Impression: Levoscoliosis and moderate multilevel degenerative change  Workstation performed: [id number]     Xr Shoulder 2+ Views Left    Result Date: 10/25/2018  Narrative: LEFT SHOULDER INDICATION:   Pain  COMPARISON:  None VIEWS:  XR SHOULDER 2+ VW LEFT FINDINGS: There is no acute fracture or dislocation  Narrowing of the glenohumeral joint with mild spurring  No lytic or blastic lesions are seen  Soft tissues are unremarkable  Impression: No acute osseous abnormality  Workstation performed: [id number]     Ct Head Wo Contrast    Result Date: 10/26/2018  Narrative: CT BRAIN - WITHOUT CONTRAST INDICATION:   Confusion/delirium, altered LOC, unexplained Lethargy  COMPARISON:  CT head dated 10/25/2018 TECHNIQUE:  CT examination of the brain was performed  In addition to axial images, coronal 2D reformatted images were created and submitted for interpretation  Radiation dose length product (DLP) for this visit:  1090 97 mGy-cm   This examination, like all CT scans performed in the [facility], was performed utilizing techniques to minimize radiation dose exposure, including the use of iterative reconstruction and automated exposure control  IMAGE QUALITY:  Diagnostic  FINDINGS: PARENCHYMA: Decreased attenuation is noted in periventricular and subcortical white matter demonstrating an appearance that is statistically most likely to represent mild microangiopathic change  Gray-white differentiation appears maintained No CT signs of acute territorial infarction  No intracranial mass, mass effect or midline shift  No acute parenchymal hemorrhage  Mild generalized parenchymal atrophy redemonstrated   VENTRICLES AND EXTRA-AXIAL SPACES:  Ventricles and extra-axial CSF spaces are prominent commensurate with the degree of volume loss  No hydrocephalus  No acute extra-axial hemorrhage  VISUALIZED ORBITS AND PARANASAL SINUSES:  Unremarkable  CALVARIUM AND EXTRACRANIAL SOFT TISSUES:  The calvarium appears intact  Otherwise grossly unremarkable  Impression: No acute intracranial abnormality  Other nonacute findings as above  Workstation performed: [id number]     Ct Head Without Contrast    Result Date: 10/25/2018  Narrative: CT BRAIN - WITHOUT CONTRAST INDICATION:   fall  COMPARISON:  5/9/2018  TECHNIQUE:  CT examination of the brain was performed  In addition to axial images, coronal 2D reformatted images were created and submitted for interpretation  Radiation dose length product (DLP) for this visit:  1220 31 mGy-cm   This examination, like all CT scans performed in the [facility], was performed utilizing techniques to minimize radiation dose exposure, including the use of iterative reconstruction and automated exposure control  IMAGE QUALITY:  Diagnostic  FINDINGS: PARENCHYMA: Decreased attenuation is noted in periventricular and subcortical white matter demonstrating an appearance that is statistically most likely to represent moderate microangiopathic change  No CT signs of acute infarction  No intracranial mass, mass effect or midline shift  No acute parenchymal hemorrhage  VENTRICLES AND EXTRA-AXIAL SPACES:  Ventricles and extra-axial CSF spaces are prominent commensurate with the degree of volume loss  No hydrocephalus  No acute extra-axial hemorrhage  VISUALIZED ORBITS AND PARANASAL SINUSES:  Unremarkable  CALVARIUM AND EXTRACRANIAL SOFT TISSUES:  Normal      Impression: No acute intracranial abnormality  Workstation performed: [id number]     Ct Spine Cervical Without Contrast    Result Date: 10/25/2018  Narrative: CT CERVICAL SPINE - WITHOUT CONTRAST INDICATION:   fall  COMPARISON:  None   TECHNIQUE:  CT examination of the cervical spine was performed without intravenous contrast   Contiguous axial images were obtained  Sagittal and coronal reconstructions were performed  Radiation dose length product (DLP) for this visit:  415 18 mGy-cm   This examination, like all CT scans performed in the [facility], was performed utilizing techniques to minimize radiation dose exposure, including the use of iterative  reconstruction and automated exposure control  IMAGE QUALITY:  Diagnostic  FINDINGS: ALIGNMENT:  Normal alignment of the cervical spine  No subluxation  VERTEBRAL BODIES:  No fracture  DEGENERATIVE CHANGES:  Moderate multilevel cervical degenerative changes are noted  No critical central canal stenosis  PREVERTEBRAL AND PARASPINAL SOFT TISSUES:  Unremarkable  THORACIC INLET:  Normal      Impression: No cervical spine fracture or traumatic malalignment  Workstation performed: [id number]     Xr Chest 1 View    Result Date: 10/25/2018  Narrative: CHEST INDICATION:   Pain  Fall  COMPARISON:  Chest x-ray from 3/13/2013 EXAM PERFORMED/VIEWS:  XR CHEST 1 VIEW FINDINGS:  There has been interval sternotomy  Cardiac silhouette is moderately enlarged  Focal consolidation in the right lower lobe laterally  Left lung is clear  No pneumothorax or pleural effusion  Osseous structures appear within normal limits for patient age  Impression: Focal consolidation in the right lower lobe  This may represent pneumonia  If there is any trauma to the right chest, pulmonary contusion is a consideration  Recommend follow up to resolution  The study was marked in EPIC for significant notification  Workstation performed: [id number]     Vas Upper Limb Venous Duplex Scan, Unilateral/limited    Result Date: 11/16/2018  Narrative:  [facility] REPORT CLINICAL: Indications: Patient presents with right upper extremity edema with Picc Line in place  Operative History: CABG Risk Factors:  The patient has history of HTN, A-Fib, CHF, Former Smoker, Diabetes (Yes), HLD, CKD and CAD  The patient current BMI is 28 13 and Weight is 185 lb  FINDINGS:  Right    Impression  Ceph AC  Bandages       CONCLUSION: Impression RIGHT UPPER LIMB: No evidence of acute or chronic deep vein thrombosis  There is a Picc line noted in the cephalic vein  Evidence of acute superficial thrombophlebitis noted in the cephalic vein the junction of the subclavian vein and the distal upper arm near the insertion site of the Picc line  Doppler evaluation shows a normal response to augmentation maneuvers  LEFT UPPER LIMB LIMITED: Evaluation shows no evidence of thrombus in the internal jugular vein, subclavian vein, and the brachiocephalic vein  Technical findings given to Dr Rachel Parker at 11:00 a m  SIGNATURE: Electronically Signed by: Yu Gonzales on 2018-11-16 12:40:48 PM    Xr Chest Portable Icu    Result Date: 11/1/2018  Narrative: CHEST INDICATION:   PNA  COMPARISON:  10/29/2018 EXAM PERFORMED/VIEWS:  XR CHEST PORTABLE ICU 1 image FINDINGS: Cardiomediastinal silhouette appears enlarged  A median sternotomy has been performed  No evidence of heart failure  A PICC line enters on the right, the tip extends to the SVC  Bibasilar densities may represent infiltrate, atelectasis or fluid  Osseous structures appear within normal limits for patient age  Impression: Cardiomegaly  Bibasilar parenchymal densities unchanged  Workstation performed: [id number]     Xr Chest Portable Icu    Result Date: 10/29/2018  Narrative: CHEST INDICATION:   Evaluate for aspiration pneumonia    COMPARISON:  10/29/2018 EXAM PERFORMED/VIEWS:  XR CHEST PORTABLE ICU FINDINGS:  There are median sternotomy wires indicating prior cardiac surgery  Heart shadow is enlarged but unchanged from prior exam  Bilateral pleural effusions appear similar size to the previous examination  In conjunction with prior chest CT 10/26/2018, right base airspace disease is again identified    Questionable left retrocardiac airspace disease as well, similar to prior  No pneumothorax  No pulmonary edema  Osseous structures appear within normal limits for patient age  Impression: No significant interval change since 10/29/2018 Workstation performed: [id number]     Xr Chest Portable Icu    Result Date: 10/26/2018  Narrative: CHEST INDICATION:   Endotracheal tube positioning  COMPARISON:  10/25/2018 EXAM PERFORMED/VIEWS:  XR CHEST PORTABLE ICU Images: 2 FINDINGS: Cardiomediastinal silhouette appears unremarkable  A median sternotomy has been performed  Endotracheal tube ends approximately 3 3 cm above the yohana  A nasogastric tube is coiled in the stomach  Pulmonary vessels are normal  Consolidation at the right lung base  Minimal atelectasis at the left lung base  No evidence of heart failure  Osseous structures appear within normal limits for patient age  Impression: Endotracheal tube ending 3 3 cm above the yohana  Consolidation at the right lung base  Workstation performed: [id number]     Ir Picc Line    Result Date: 10/31/2018  Narrative: Examination: Right upper extremity double lumen Power PICC 10/31/2018 History: Needs access Contrast: None Fluoroscopy time:  0 9 minutes Procedure: The patient was identified verbally and by wristband  Timeout was performed  Informed consent was obtained  All elements of maximal sterile barrier technique, cap and mask and sterile gown and sterile gloves and sterile full-body drape and hand hygiene and 2% chlorhexidine for cutaneous antisepsis  Sterile ultrasound technique with sterile gel and sterile probe covers was also utilized  Following obtaining informed consent, the patient was prepped and draped in the usual sterile fashion  Using ultrasound guidance, access was gained to the patient's right antecubital vein using a micropuncture system  The micropuncture dilator was exchanged for a peel away sheath    A mandril wire was advanced to the level of the RA/SVC junction using fluoroscopic guidance to measure the catheter length  The catheter was cut to size and placed through the peel away sheath  The lumens were flushed with ease  The catheter was secured in place  The patient tolerated the procedure well without apparent immediate complication  The patient left the IR department in unchanged condition  Dr Suzi Winchester performed the procedure Findings: Using ultrasound guidance, the right antecubital vein was cannulated using Seldinger technique  The right antecubital vein was evaluated as a potential access site  The right antecubital vein was patent, and free of thrombus  Static images of the vessel  was obtained  Visualization of real time needle entry into the vessel was obtained  A fluoroscopic spot image demonstrated a newly placed PICC with the central aspect at the RA/SVC junction  The catheter tubing is smooth in contour  Impression: Impression: Successful placement of a 49cm right upper extremity double lumen Power PICC   Workstation performed: [id number]        Cardiac testing:   Results for orders placed during the hospital encounter of 10/25/18   Echo complete with contrast if indicated    [facility]   [address] [address]  [phone]    Transthoracic Echocardiogram  2D, M-mode, Doppler, and Color Doppler    Study date:  26-Oct-2018    Patient: Reginaldo Mccauley  MR number: [id number]  Account number: [de-identified]  : 1940  Age: [de-identified] years  Gender: Male  Status: Inpatient  Location: Bedside  Height: 68 in  Weight: 155 8 lb  BP: 83/ 53 mmHg    Indications: Heart Failure    Diagnoses: I50 9 - Heart failure, unspecified    Sonographer:  Merlinda Mackie, RCS  Primary Physician:  Melina West DO  Referring Physician:  Curtis Pérez PA-C  Group:  [facility]  Interpreting Physician:  Robles Bnods DO    IMPRESSIONS:  These features are consistent with dilated cardiomyopathy  SUMMARY    LEFT VENTRICLE:  The ventricle was moderately dilated  Systolic function was severely reduced by visual assessment  Ejection fraction was estimated to be 20 %  There were no regional wall motion abnormalities  There was mild concentric hypertrophy  Doppler parameters were consistent with abnormal left ventricular relaxation (grade 1 diastolic dysfunction)  LEFT ATRIUM:  The atrium was moderately dilated  MITRAL VALVE:  There was moderate regurgitation  AORTIC VALVE:  There was no evidence for stenosis  There was no regurgitation  TRICUSPID VALVE:  There was mild regurgitation  Pulmonary artery systolic pressure was mildly increased  HISTORY: PRIOR HISTORY: HTN, Hyperlipidemia, DM, CHF, CABG    PROCEDURE: The procedure was performed at the bedside  This was a routine study  The transthoracic approach was used  The study included complete 2D imaging, M-mode, complete spectral Doppler, and color Doppler  The heart rate was 115 bpm,  at the start of the study  Images were not obtained from the subcostal or suprasternal notch acoustic windows  Intravenous contrast (Definity solution [1 3 ml Definity/8 7ml normal saline solution], 1 ml) was administered to opacify the  left ventricle  Echocardiographic views were limited due to restricted patient mobility, surgical dressings, poor acoustic window availability, lung interference, and patient on mechanical ventilator  This was a technically difficult study  LEFT VENTRICLE: The ventricle was moderately dilated  Systolic function was severely reduced by visual assessment  Ejection fraction was estimated to be 20 %  There were no regional wall motion abnormalities  There was mild concentric  hypertrophy  DOPPLER: Doppler parameters were consistent with abnormal left ventricular relaxation (grade 1 diastolic dysfunction)      RIGHT VENTRICLE: The size was normal  Systolic function was normal  DOPPLER: Systolic pressure was within the normal range  LEFT ATRIUM: The atrium was moderately dilated  RIGHT ATRIUM: Size was normal     MITRAL VALVE: There was annular calcification  Valve structure was normal  There was normal leaflet separation  No echocardiographic evidence for prolapse  DOPPLER: The transmitral velocity was within the normal range  There was no  evidence for stenosis  There was moderate regurgitation  AORTIC VALVE: The valve was trileaflet  Leaflets exhibited normal thickness, mild calcification, normal cuspal separation, and sclerosis  DOPPLER: Transaortic velocity was within the normal range  There was no evidence for stenosis  There  was no regurgitation  TRICUSPID VALVE: The valve structure was normal  There was normal leaflet separation  DOPPLER: The transtricuspid velocity was within the normal range  There was no evidence for stenosis  There was mild regurgitation  Pulmonary artery  systolic pressure was mildly increased  Estimated peak PA pressure was 43 mmHg  PULMONIC VALVE: Leaflets exhibited normal thickness, no calcification, and normal cuspal separation  DOPPLER: The transpulmonic velocity was within the normal range  There was no regurgitation  PERICARDIUM: There was no thickening  There was no pericardial effusion  AORTA: The root exhibited normal size  PULMONARY ARTERY: The size was normal  The morphology appeared normal     SYSTEM MEASUREMENT TABLES    2D mode  AoR Diam 2D: 3 7 cm  LA Diam (2D): 4 6 cm  LA/Ao (2D): 1 24  FS (2D Teich): 13 7 %  IVSd (2D): 1 17 cm  LVDEV: 189 cm³  LVEDV MOD BP: 180 cm³  LVESV: 135 cm³  LVIDd(2D): 6 13 cm  LVISd (2D): 5 29 cm  LVPWd (2D): 1 09 cm  SV (Teich): 54 cm³    Apical four chamber  LVEF A4C: 24 %    Apical two chamber  LVEF A2C: 13 %    Unspecified Scan Mode  MV Peak E Travon   Mean: 1120 mm/s  MVA (PHT): 7 33 cm squared  PHT: 30 ms  Max P mm[Hg]  V Max: 2970 mm/s  Vmax: 2770 mm/s  RA Area: 19 cm squared  RA Volume: 48 4 cm³  TAPSE: 1 2 aguilar    [facility]    Prepared and electronically signed by    Andre Guerra DO  Signed 26-Oct-2018 16:55:41         Counseling / Coordination of Care  Total time spent today 20 minutes  Greater than 50% of total time was spent with the patient and / or family counseling and / or coordination of care  MIGUEL Aviles        "This note has been constructed using a voice recognition system  Therefore there may be syntax, spelling, and/or grammatical errors   Please call if you have any questions  "

## 2018-11-16 NOTE — PROGRESS NOTES
Progress Note - Infectious Disease   Jerald Jordan [de-identified] y o  male MRN: [id number]  Unit/Bed#: [address] [address] Encounter: [id number]      Impression/Plan:  1  MSSA bacteremia-appears to be secondary to chest wall abscesses   No other clear source appreciated  Consideration for the possibility of endocarditis but this is less likely with only 1 of 2 blood cultures positive and the transthoracic echocardiogram without valvular vegetations   Repeat blood cultures were negative  Patient is tolerating IV cefazolin without difficulty  -continue high-dose IV cefazolin with plan for 4 week course from the date of 1st negative blood culture, through 11/25   -monitor weekly CBC with diff, BMP while on IV antibiotic  -PICC line in place   This will need to be removed after last dose of IV antibiotic   -outpatient follow-up with infectious diseases     2  MSSA chest wall abscesses-status post extensive I and D x2 with a VAC dressing in place  Clyde Samayoa seems to have improved clinically   His pain seems reasonably well controlled   This all possibly as a complication of zoster   -antibiotics as above  -vac dressing and local care  -close surgical follow-up     3  Right upper extremity superficial thrombophlebitis  In the area of PICC line  Will likely require PICC line removal today  Hopeful placement of peripheral line for continued administration of IV antibiotic  Frequent arm elevation  4  Herpes zoster-hard to make a definitive diagnosis at this point as the rash is relatively advanced in its presentation   Patient now status post 7 day course of Valtrex      5  Acute kidney injury on CKD-likely a pre renal issue   Renal function has improved and remains stable   -volume management     6  Esophageal candidiasis-started on fluconazole   Biopsy was negative for Candida   Fluconazole was discontinued  7  Urinary retention  With Gordon catheter in place  Urology evaluation noted    Will attempt weaning trial once mobility improved  8  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 EF of 20%  Cardiology evaluation noted  Diuresis per Cardiology  Discussed above with Dr Tim Milder  Will plan to see patient again next week  Please call with any new questions in the interim  Subjective:  Patient with no new complaints  Tolerating breakfast this morning  No fevers, chills  Fibrinous slough off was debrided from back by surgery on   His right upper extremity is swollen near the PICC line  Now ultrasound reveals superficial thrombophlebitis  Objective:  Vitals:  Temp:  [97 4 °F (36 3 °C)-98 1 °F (36 7 °C)] 97 7 °F (36 5 °C)  HR:  [] 88  Resp:  [18-20] 20  BP: (82-98)/(50-59) 86/52  SpO2:  [95 %-100 %] 97 %  Temp (24hrs), Av 8 °F (36 6 °C), Min:97 4 °F (36 3 °C), Max:98 1 °F (36 7 °C)  Current: Temperature: 97 7 °F (36 5 °C)    Physical Exam:   General:  No acute distress  Psychiatric:  Awake and alert  Pulmonary:  Normal respiratory excursion without accessory muscle use  Abdomen:  Soft, nontender  Gordon in place with clear yellow urine  Extremities:  No edema  Stable chest wall and back wounds  Skin:  No rashes    Lab Results:  I have personally reviewed pertinent labs  Results from last 7 days  Lab Units 18  0531 11/15/18  0607 18  0433   POTASSIUM mmol/L 4 0 4 1 3 8   CHLORIDE mmol/L 100 101 101   CO2 mmol/L 30 31 30   BUN mg/dL 18 19 17   CREATININE mg/dL 0 91 0 86 0 87   EGFR ml/min/1 73sq m 79 82 82   CALCIUM mg/dL 7 7* 7 7* 7 5*       Results from last 7 days  Lab Units 18  0531 11/15/18  0607 18  0433   WBC Thousand/uL 4 60 4 33 3 96*   HEMOGLOBIN g/dL 9 6* 7 4* 7 4*   PLATELETS Thousands/uL 132* 132* 137*           Imaging Studies:   I have personally reviewed pertinent imaging study reports and images in PACS  EKG, Pathology, and Other Studies:   I have personally reviewed pertinent reports

## 2018-11-16 NOTE — NURSING NOTE
Dr Krishna Kraft notified of inability to discontinue PICC  Order for IR consult was obtained  New CHG dressing was applied

## 2018-11-17 LAB
ANION GAP SERPL CALCULATED.3IONS-SCNC: 7 MMOL/L (ref 4–13)
BUN SERPL-MCNC: 15 MG/DL (ref 5–25)
CALCIUM SERPL-MCNC: 8.1 MG/DL (ref 8.3–10.1)
CHLORIDE SERPL-SCNC: 101 MMOL/L (ref 100–108)
CO2 SERPL-SCNC: 27 MMOL/L (ref 21–32)
CREAT SERPL-MCNC: 0.85 MG/DL (ref 0.6–1.3)
ERYTHROCYTE [DISTWIDTH] IN BLOOD BY AUTOMATED COUNT: 18.3 % (ref 11.6–15.1)
GFR SERPL CREATININE-BSD FRML MDRD: 82 ML/MIN/1.73SQ M
GLUCOSE SERPL-MCNC: 115 MG/DL (ref 65–140)
GLUCOSE SERPL-MCNC: 135 MG/DL (ref 65–140)
GLUCOSE SERPL-MCNC: 136 MG/DL (ref 65–140)
GLUCOSE SERPL-MCNC: 180 MG/DL (ref 65–140)
GLUCOSE SERPL-MCNC: 231 MG/DL (ref 65–140)
HCT VFR BLD AUTO: 31.2 % (ref 36.5–49.3)
HGB BLD-MCNC: 9.9 G/DL (ref 12–17)
MCH RBC QN AUTO: 31.5 PG (ref 26.8–34.3)
MCHC RBC AUTO-ENTMCNC: 31.7 G/DL (ref 31.4–37.4)
MCV RBC AUTO: 99 FL (ref 82–98)
PLATELET # BLD AUTO: 148 THOUSANDS/UL (ref 149–390)
PMV BLD AUTO: 11.1 FL (ref 8.9–12.7)
POTASSIUM SERPL-SCNC: 4.2 MMOL/L (ref 3.5–5.3)
RBC # BLD AUTO: 3.14 MILLION/UL (ref 3.88–5.62)
SODIUM SERPL-SCNC: 135 MMOL/L (ref 136–145)
WBC # BLD AUTO: 4.67 THOUSAND/UL (ref 4.31–10.16)

## 2018-11-17 PROCEDURE — 80048 BASIC METABOLIC PNL TOTAL CA: CPT | Performed by: STUDENT IN AN ORGANIZED HEALTH CARE EDUCATION/TRAINING PROGRAM

## 2018-11-17 PROCEDURE — 82948 REAGENT STRIP/BLOOD GLUCOSE: CPT

## 2018-11-17 PROCEDURE — 99221 1ST HOSP IP/OBS SF/LOW 40: CPT | Performed by: NURSE PRACTITIONER

## 2018-11-17 PROCEDURE — 99232 SBSQ HOSP IP/OBS MODERATE 35: CPT | Performed by: STUDENT IN AN ORGANIZED HEALTH CARE EDUCATION/TRAINING PROGRAM

## 2018-11-17 PROCEDURE — 97110 THERAPEUTIC EXERCISES: CPT

## 2018-11-17 PROCEDURE — C9113 INJ PANTOPRAZOLE SODIUM, VIA: HCPCS | Performed by: PHYSICIAN ASSISTANT

## 2018-11-17 PROCEDURE — 85027 COMPLETE CBC AUTOMATED: CPT | Performed by: STUDENT IN AN ORGANIZED HEALTH CARE EDUCATION/TRAINING PROGRAM

## 2018-11-17 RX ADMIN — TRAMADOL HYDROCHLORIDE 50 MG: 50 TABLET, COATED ORAL at 03:59

## 2018-11-17 RX ADMIN — INSULIN LISPRO 3 UNITS: 100 INJECTION, SOLUTION INTRAVENOUS; SUBCUTANEOUS at 08:54

## 2018-11-17 RX ADMIN — COLLAGENASE SANTYL: 250 OINTMENT TOPICAL at 08:53

## 2018-11-17 RX ADMIN — CEFAZOLIN SODIUM 2000 MG: 2 SOLUTION INTRAVENOUS at 05:55

## 2018-11-17 RX ADMIN — INSULIN LISPRO 1 UNITS: 100 INJECTION, SOLUTION INTRAVENOUS; SUBCUTANEOUS at 11:56

## 2018-11-17 RX ADMIN — ACETAMINOPHEN 650 MG: 325 TABLET, FILM COATED ORAL at 17:16

## 2018-11-17 RX ADMIN — TORSEMIDE 10 MG: 10 TABLET ORAL at 09:04

## 2018-11-17 RX ADMIN — PANTOPRAZOLE SODIUM 40 MG: 40 INJECTION, POWDER, FOR SOLUTION INTRAVENOUS at 09:05

## 2018-11-17 RX ADMIN — SPIRONOLACTONE 12.5 MG: 25 TABLET, FILM COATED ORAL at 09:02

## 2018-11-17 RX ADMIN — METOPROLOL TARTRATE 12.5 MG: 25 TABLET ORAL at 08:59

## 2018-11-17 RX ADMIN — PREGABALIN 50 MG: 50 CAPSULE ORAL at 21:41

## 2018-11-17 RX ADMIN — ACETAMINOPHEN 650 MG: 325 TABLET, FILM COATED ORAL at 05:55

## 2018-11-17 RX ADMIN — TRAMADOL HYDROCHLORIDE 50 MG: 50 TABLET, COATED ORAL at 21:52

## 2018-11-17 RX ADMIN — ATORVASTATIN CALCIUM 10 MG: 10 TABLET, FILM COATED ORAL at 17:16

## 2018-11-17 RX ADMIN — SENNOSIDES 8.6 MG: 8.6 TABLET, FILM COATED ORAL at 21:52

## 2018-11-17 RX ADMIN — LISINOPRIL 2.5 MG: 2.5 TABLET ORAL at 08:58

## 2018-11-17 RX ADMIN — PREGABALIN 50 MG: 50 CAPSULE ORAL at 09:01

## 2018-11-17 RX ADMIN — PREGABALIN 50 MG: 50 CAPSULE ORAL at 17:08

## 2018-11-17 RX ADMIN — ACETAMINOPHEN 650 MG: 325 TABLET, FILM COATED ORAL at 11:58

## 2018-11-17 RX ADMIN — PANTOPRAZOLE SODIUM 40 MG: 40 INJECTION, POWDER, FOR SOLUTION INTRAVENOUS at 21:43

## 2018-11-17 RX ADMIN — CEFAZOLIN SODIUM 2000 MG: 2 SOLUTION INTRAVENOUS at 21:41

## 2018-11-17 RX ADMIN — ACETAMINOPHEN 650 MG: 325 TABLET, FILM COATED ORAL at 00:22

## 2018-11-17 RX ADMIN — INSULIN DETEMIR 8 UNITS: 100 INJECTION, SOLUTION SUBCUTANEOUS at 21:43

## 2018-11-17 RX ADMIN — INSULIN LISPRO 2 UNITS: 100 INJECTION, SOLUTION INTRAVENOUS; SUBCUTANEOUS at 17:10

## 2018-11-17 RX ADMIN — CEFAZOLIN SODIUM 2000 MG: 2 SOLUTION INTRAVENOUS at 13:18

## 2018-11-17 RX ADMIN — TAMSULOSIN HYDROCHLORIDE 0.4 MG: 0.4 CAPSULE ORAL at 17:09

## 2018-11-17 RX ADMIN — INSULIN LISPRO 3 UNITS: 100 INJECTION, SOLUTION INTRAVENOUS; SUBCUTANEOUS at 17:11

## 2018-11-17 RX ADMIN — MELATONIN TAB 3 MG 6 MG: 3 TAB at 21:43

## 2018-11-17 RX ADMIN — INSULIN LISPRO 3 UNITS: 100 INJECTION, SOLUTION INTRAVENOUS; SUBCUTANEOUS at 11:56

## 2018-11-17 RX ADMIN — POTASSIUM CHLORIDE 20 MEQ: 1500 TABLET, EXTENDED RELEASE ORAL at 09:00

## 2018-11-17 RX ADMIN — METOPROLOL TARTRATE 12.5 MG: 25 TABLET ORAL at 21:41

## 2018-11-17 NOTE — PROGRESS NOTES
Back and chest dressings just changed by night nursing team   Back wound being treated daily with santyl  Patient comfortable, sitting up in bed, without significant pain complaints      Once able to be discharged, patient can be followed up Tuesday afternoons in the [address] with either Dr Roberto Carlos Atwood or myself

## 2018-11-17 NOTE — OCCUPATIONAL THERAPY NOTE
OT TREATMENT     11/17/18 1518   Restrictions/Precautions   Other Precautions Fall Risk;Bed Alarm; Chair Alarm   Pain Assessment   Pain Assessment 0-10   Pain Score No Pain   ROM- Right Upper Extremities   R Shoulder AROM;ABduction; Flexion   R Elbow AROM;Elbow flexion;Elbow extension   R Wrist AROM; Wrist flexion;Wrist extension   R Hand AROM; Thumb; Index finger; Long finger;Ring finger;Little finger   RUE ROM Comment Performed 2 set of 10 repetitions   ROM - Left Upper Extremities    L Shoulder AROM; Flexion;ABduction   L Elbow AROM;Elbow flexion;Elbow extension   L Wrist AROM; Wrist flexion;Wrist extension   L Hand AROM; Thumb; Index finger; Long finger;Ring finger;Little finger   LUE ROM Comment Performed 2 sets of 10 repetitions   Activity Tolerance   Activity Tolerance Patient limited by fatigue   Assessment   Assessment Patient declined to performed  ADL activities and therapeutic activities due to fatigue  Patient was agreeable to therapeutic exercise in bed  Patient was able to perform therapeutic exercise with supervion at 2 sets of 10 repetitions  to BUE in multiple planes  Patient tolerated exercises and was given rest breaks between sets     Plan   Treatment Day 2   Recommendation   OT Discharge Recommendation [id number] [facility] [place] [facility] [id number]

## 2018-11-17 NOTE — PLAN OF CARE
Problem: OCCUPATIONAL THERAPY ADULT  Goal: Performs self-care activities at highest level of function for planned discharge setting  See evaluation for individualized goals  Outcome: Progressing  Limitation: Decreased ADL status, Decreased UE strength, Decreased Safe judgement during ADL, Decreased endurance, Decreased fine motor control, Decreased self-care trans, Decreased high-level ADLs (decreased balance)  Prognosis: Good  Assessment: Patient declined to performed  ADL activities and therapeutic activities due to fatigue  Patient was agreeable to therapeutic exercise in bed  Patient was able to perform therapeutic exercise with supervion at 2 sets of 10 repetitions  to BUE in multiple planes  Patient tolerated exercises and was given rest breaks between sets       OT Discharge Recommendation: Short Term Rehab

## 2018-11-17 NOTE — PROGRESS NOTES
Progress Note - Yvan Rise 1940, [de-identified] y o  male MRN: [id number]    Unit/Bed#: [address] [address] Encounter: [id number]    Primary Care Provider: Maisha Eaton DO   Date and time admitted to hospital: 10/25/2018  3:02 PM        Thrombophlebitis arm   Assessment & Plan    · Patient has PICC in place for Abx therapy  · Today noted to have swelling of the UE with pain  · Vascular study positive for superficial vein thrombophlebitis  No DVT  · Because of pain and significant swelling, plan was to remove PICC and replace with another line, however PICC removal was met with resistance and some bleeding at the site so was kept in place  · Will consult vascular surgery regarding recs for safe removal of PICC line     * Abscess of chest wall   Assessment & Plan    · Abscess With Cellulitis superimposed on shingles  · ID and surgery following  · Blood culture staph aureus x 1  · Wound culture: 3+ staph aureus   · Repeat blood cultures from 10/29 no growth  · S/p debridement in OR on 10/26, wound vac which has since been removed, s/p bedside debridement 11/14  · Continue local wound care  Surgical follow up noted, likely suture removal on 11/21  · Antibiotics deescalated to Ancef, 4 week course with end date November 25th  · Continue on Lyrica 50 mg TID for possible post-hepatic neuralgia, scheduled tylenol, p r n  Tramadol     Acute on chronic combined systolic and diastolic heart failure (HCC)   Assessment & Plan    · Known EF of 20% in the past  · Repeat echo showing EF remains 25%  · Patient refused life vest/AICD  · Continues with episodes of PVCs on tele  · Cont Brock stocking due to low blood pressure  · Monitor I/O; net -21L negative  · Completed diuresis with IV Lasix+albumin, transitioned back to his home PO regimen of demadex and aldactone     Cardiomyopathy, ischemic   Assessment & Plan    · EF prior was 35 to 40%  Recent stress test in May of 2018 showed reversible ischemic changes    Cardiology following as an outpatient with medical management  · ECHO repeated with EF 20%  · Frequent PVCs on telemetry, Episode of nonsustained V-tach overnight 11/6, asymptomatic  · Cardiology following, patient offered AICD and life vest and he does not want it  · Continue metoprolol, started lisinopril  · Continue to monitor electrolytes     Neuralgia, post-herpetic   Assessment & Plan    On Lyrica     Gastrointestinal hemorrhage associated with duodenal ulcer   Assessment & Plan    Status post EGD 11/4 revealed duodenal bleeding ulcer  Continue PPI  Monitor H&H     Urinary retention   Assessment & Plan    Status post failing voiding trial  Likely secondary to BPH , on Flomax  Follow up Urology recommendation  Maintain Gordon for now, DC once mobility improves after discharge     Bacteremia due to Staphylococcus aureus   Assessment & Plan    · Admission Blood culture x 1 MSSA  · Repeat blood cultures no growth  · Per ID will need 4 wks antibiotics from negative blood cultures through 11/25  Acute blood loss anemia   Assessment & Plan    · Secondary to bleeding gastric ulcer  · Patient had tachycardia and drifting hemoglobin and later developed guanakito melena  · EGD with duodenal bulb ulcer with intervention 11/4  · S/p total 11 units of PRBCs  · Cont PPI, changed to PO  · Continue trend hemoglobin, improved after most recent PRBC transfusion  Hold off on repeat EGD for now      Severe protein-calorie malnutrition [facility])   Assessment & Plan    Malnutrition Findings:           BMI Findings: Body mass index is 27 76 kg/m²  Atrial fibrillation ([address] )   Assessment & Plan    · Hx of AFib in the setting of electrolyte abnormalities in the past   Monitored on telemetry  · Currently NSR with PVCs  · No anticoagulation on discharge  Particularly in light of recent episode of GI bleeding    · Frequent PVCs on telemetry, beta-blocker resumed  · Monitor and replete lytes     Benign essential hypertension   Assessment & Plan Stable  Asymptomatic  Continue metoprolol, lisinoprol     CKD (chronic kidney disease) stage 3, GFR 30-59 ml/min ([facility])   Assessment & Plan    Stable, baseline creatinine 0 9-1 3  Monitor input output       CAD (coronary artery disease)   Assessment & Plan    Troponin negative  Continue statin, continue to hold aspirin secondary to GI bleed  Follow up with Cardiology     DM2 (diabetes mellitus, type 2) [facility])   Assessment & Plan    Lab Results   Component Value Date    HGBA1C 12 0 (H) 10/26/2018       Recent Labs      18   1618  18   2103  18   0653  18   1112   POCGLU  90  174*  138  157*       Blood Sugar Average: Last 72 hrs:  (P) 137 [id number]     Decreased Levemir at 8 units daily given hypoglycemia in a m  added pre meal insulin  Monitor           VTE Pharmacologic Prophylaxis:   Pharmacologic: none 2/2 GIB  Mechanical VTE Prophylaxis in Place: Yes    Patient Centered Rounds: I have performed bedside rounds with nursing staff today  Discussions with Specialists or Other Care Team Provider: Yes  Education and Discussions with Family / Patient:Yes  Time Spent for Care: 30 minutes  More than 50% of total time spent on counseling and coordination of care as described above  Current Length of Stay: 22 day(s)  Current Patient Status: Inpatient     Discharge Plan: STR once medically stable    Code Status: Level 2 - DNAR: but accepts endotracheal intubation      Subjective:   Complains of right arm pain at the site of his PICC line  Otherwise has no complaints, and says he has a much better appetite since arrival      Objective:     Vitals:   Temp (24hrs), Av 8 °F (36 6 °C), Min:97 4 °F (36 3 °C), Max:98 1 °F (36 7 °C)    Temp:  [97 4 °F (36 3 °C)-98 1 °F (36 7 °C)] 97 4 °F (36 3 °C)  HR:  [] 95  Resp:  [18-20] 19  BP: ()/(50-67) 111/67  SpO2:  [97 %-100 %] 97 %  Body mass index is 26 43 kg/m²  Input and Output Summary (last 24 hours):      Intake/Output Summary (Last 24 hours) at 11/16/18 1957  Last data filed at 11/16/18 1310   Gross per 24 hour   Intake             1460 ml   Output             1055 ml   Net              405 ml        Physical Exam:     Physical Exam   Constitutional: He is oriented to person, place, and time  He appears well-developed  No distress  HENT:   Head: Normocephalic and atraumatic  Cardiovascular: Normal rate and regular rhythm  Murmur heard  Pulmonary/Chest: Effort normal and breath sounds normal  No respiratory distress  He has no wheezes  He has no rales  Abdominal: Soft  Bowel sounds are normal  He exhibits no distension  There is no tenderness  There is no rebound and no guarding  Genitourinary:   Genitourinary Comments: Gordon in place with clear yellow urine   Musculoskeletal: He exhibits edema  He exhibits no tenderness or deformity  Right upper extremity with nonpitting edema and inflammatory changes around PICC site insertion   Neurological: He is alert and oriented to person, place, and time  Skin: Skin is warm and dry  Left chest wall and left back dressing in place  Incision C/D/I, stitches in place   Psychiatric: He has a normal mood and affect  His behavior is normal    Nursing note and vitals reviewed  Additional Data:     Labs:      Results from last 7 days  Lab Units 11/16/18  0531 11/15/18  0607 11/14/18  0433  11/13/18  0558  11/12/18  0548 11/11/18  0546   WBC Thousand/uL 4 60 4 33 3 96*  --  5 03  --  5 09 6 45   HEMOGLOBIN g/dL 9 6* 7 4* 7 4*  < > 7 9*  < > 8 1* 9 5*   HEMATOCRIT % 30 4* 23 9* 24 2*  < > 25 0*  < > 25 8* 29 7*   PLATELETS Thousands/uL 132* 132* 137*  --  137*  --  137* 172   NEUTROS PCT %  --   --   --   --  72  --  75 72   < > = values in this interval not displayed      Results from last 7 days  Lab Units 11/16/18  0531 11/15/18  0607 11/14/18  0433   SODIUM mmol/L 135* 135* 135*   POTASSIUM mmol/L 4 0 4 1 3 8   CHLORIDE mmol/L 100 101 101   CO2 mmol/L 30 31 30   BUN mg/dL 18 19 17   CREATININE mg/dL 0 91 0 86 0 87   CALCIUM mg/dL 7 7* 7 7* 7 5*             Lab Results   Component Value Date/Time    HGBA1C 12 0 (H) 10/26/2018 05:24 AM       Results from last 7 days  Lab Units 11/16/18  1620 11/16/18  1143 11/16/18  0721 11/15/18  2131 11/15/18  1647 11/15/18  1117 11/15/18  0714 11/14/18  2039 11/14/18  1652 11/14/18  1112 11/14/18  0653 11/13/18  2103   POC GLUCOSE mg/dl 206* 139 83 185* 195* 78 89 123 175* 157* 138 174*           * I Have Reviewed All Lab Data Listed Above  * Additional Pertinent Lab Tests Reviewed: All Labs Within Last 24 Hours Reviewed    Imaging:  Ct Chest Abdomen Pelvis Wo Contrast  Result Date: 10/26/2018  Impression: Patchy airspace opacities are seen in the right lower lobe superior segment  There is a large dense consolidation in the right middle lobe, lateral segment, nonspecific but suspicious for infection/pneumonia in the appropriate clinical setting  Cardiomegaly, bilateral pleural effusions, and body wall edema suggests a superimposed component of fluid overload/CHF  Multiple areas of skin thickening with associated subjacent complex fluid are seen in the superficial soft tissues of the left pectoralis region (for example axial image 30), as well as in the posterior and superior left thorax (axial image 12), as described  Consider cellulitis with developing abscesses  In addition, a few bubbles of air are seen in the anterior left chest subcutaneous tissues (axial image 8) which could be related to recent intervention but also raises suspicion for possible gas-forming infection  Partially distended bladder with catheter seen within the bladder lumen  Mild circumferential bladder wall thickening noted, probably exaggerated by underdistention  A cystitis could be considered in the appropriate clinical setting  Renal cysts, degenerative changes of the shoulder, spine, and hips, and other findings as above  Findings discussed with FRITZ Alexis by Dr Javier Schneider  Jen Valle at 4:18 AM on 10/26/2018  Workstation performed: [id number]     Xr Chest Portable  Result Date: 10/29/2018  Impression: Persistent right basilar pleural effusion and associated probable atelectasis  Workstation performed: [id number]     Xr Spine Thoracic 3 Views  Result Date: 10/25/2018  Impression: No acute osseous abnormality  Workstation performed: [id number]     Xr Spine Lumbar 2 Or 3 Views Injury  Result Date: 10/25/2018  Impression: Levoscoliosis and moderate multilevel degenerative change  Workstation performed: [id number]     Xr Shoulder 2+ Views Left  Result Date: 10/25/2018  Impression: No acute osseous abnormality  Workstation performed: [id number]     Ct Head Wo Contrast  Result Date: 10/26/2018  Impression: No acute intracranial abnormality  Other nonacute findings as above  Workstation performed: [id number]     Ct Head Without Contrast  Result Date: 10/25/2018  Impression: No acute intracranial abnormality  Workstation performed: [id number]     Ct Spine Cervical Without Contrast  Result Date: 10/25/2018  Impression: No cervical spine fracture or traumatic malalignment  Workstation performed: [id number]     Xr Chest 1 View  Result Date: 10/25/2018  Impression: Focal consolidation in the right lower lobe  This may represent pneumonia  If there is any trauma to the right chest, pulmonary contusion is a consideration  Recommend follow up to resolution  The study was marked in EPIC for significant notification  Workstation performed: [id number]     Xr Chest Portable Icu  Result Date: 11/1/2018  Impression: Cardiomegaly  Bibasilar parenchymal densities unchanged  Workstation performed: [id number]     Xr Chest Portable Icu  Result Date: 10/29/2018  Impression: No significant interval change since 10/29/2018 Workstation performed: [id number]     Xr Chest Portable Icu  Result Date: 10/26/2018  Impression: Endotracheal tube ending 3 3 cm above the yohana    Consolidation at the right lung base  Workstation performed: [id number]     Ir Picc Line  Result Date: 10/31/2018  Impression: Impression: Successful placement of a 49cm right upper extremity double lumen Power PICC  Workstation performed: [id number]     Imaging Reports Reviewed by myself    Cultures:   Blood Culture:   Lab Results   Component Value Date    BLOODCX No Growth After 5 Days  10/29/2018    BLOODCX No Growth After 5 Days  10/29/2018    BLOODCX Staphylococcus aureus (A) 10/25/2018    BLOODCX No Growth After 5 Days   10/25/2018     Urine Culture:   Lab Results   Component Value Date    URINECX >100,000 cfu/ml  10/25/2018    URINECX >100,000 cfu/ml Klebsiella oxytoca (A) 05/09/2018     Sputum Culture: No components found for: SPUTUMCX  Wound Culture:   Lab Results   Component Value Date    WOUNDCULT 4+ Growth of Staphylococcus aureus (A) 10/26/2018    WOUNDCULT 4+ Growth of Staphylococcus aureus (A) 10/25/2018       Last 24 Hours Medication List:     Current Facility-Administered Medications:  acetaminophen 650 mg Oral Q6H [address] Rhea John PA-C    atorvastatin 10 mg Oral Daily Doug Guzman MD    cefazolin 2,000 mg Intravenous Q8H Doug Guzman MD Last Rate: 2,000 mg (11/16/18 1503)   collagenase  Topical Daily Yeny Aguilar MD    furosemide 20 mg Intravenous Once Tucker Humphries MD    insulin detemir 8 Units Subcutaneous HS Valentina Foster MD    insulin lispro 1-5 Units Subcutaneous TID AC Valentina Foster MD    insulin lispro 3 Units Subcutaneous TID With Meals Valentina Foster MD    lisinopril 2 5 mg Oral Daily Valentina Foster MD    melatonin 6 mg Oral HS Doug Guzman MD    metoprolol tartrate 12 5 mg Oral Q12H [address] Belen Tee PA-C    pantoprazole 40 mg Intravenous Q12H Carey Jean PA-C    potassium chloride 20 mEq Oral Daily Valentina Foster MD    pregabalin 50 mg Oral TID Doug Guzman MD    senna 1 tablet Oral HS Doug Guzman MD    [START ON 11/17/2018] spironolactone 12 5 mg Oral Daily MIGUEL Dean tamsulosin 0 4 mg Oral Daily With MD Delilah Hollandh Amish ON 11/17/2018] torsemide 10 mg Oral Daily Valentino Church, CRNP    traMADol 50 mg Oral Q6H PRN Ingrid Hutton MD         Today, Patient Was Seen By: Dieudonne Lawson MD    ** Please Note: Dragon 360 Dictation voice to text software may have been used in the creation of this document   **

## 2018-11-17 NOTE — PROGRESS NOTES
Progress Note - Pavan Hallman 1940, [de-identified] y o  male MRN: [id number]    Unit/Bed#: [address] [place] [address] Encounter: [id number]    Primary Care Provider: Juni Thao DO   Date and time admitted to hospital: 10/25/2018  3:02 PM        Thrombophlebitis arm   Assessment & Plan    · Patient has PICC in place for Abx therapy  · Today noted to have swelling of the UE with pain  · Vascular study positive for superficial vein thrombophlebitis  No DVT  · Because of pain and significant swelling, plan was to remove PICC and replace with another line, however PICC removal was met with resistance and some bleeding at the site so was kept in place  · Consulted vascular surgery regarding recs for safe removal of PICC line, recs appreciated     * Abscess of chest wall   Assessment & Plan    · Abscess With Cellulitis superimposed on shingles  · ID and surgery following  · Blood culture staph aureus x 1  · Wound culture: 3+ staph aureus   · Repeat blood cultures from 10/29 no growth  · S/p debridement in OR on 10/26, wound vac which has since been removed, s/p bedside debridement 11/14  · Continue local wound care  Surgical follow up noted, likely suture removal on 11/21  · Antibiotics deescalated to Ancef, 4 week course with end date November 25th  · Continue on Lyrica 50 mg TID for possible post-hepatic neuralgia, scheduled tylenol, p r n  Tramadol     Acute on chronic combined systolic and diastolic heart failure (HCC)   Assessment & Plan    · Known EF of 20% in the past  · Repeat echo showing EF remains 25%  · Patient refused life vest/AICD  · Continues with episodes of PVCs on tele  · Cont Brock stocking due to low blood pressure  · Monitor I/O; net -22L negative  · Completed diuresis with IV Lasix+albumin  · Now transitioned back to his home PO regimen of demadex and aldactone     Cardiomyopathy, ischemic   Assessment & Plan    · EF prior was 35 to 40%  Recent stress test in May of 2018 showed reversible ischemic changes  Cardiology following as an outpatient with medical management  · ECHO repeated with EF 20%  · Frequent PVCs on telemetry, Episode of nonsustained V-tach overnight 11/6, asymptomatic  · Cardiology following, patient offered AICD and life vest and he does not want it  · Continue metoprolol, started lisinopril     Neuralgia, post-herpetic   Assessment & Plan    On Lyrica     Gastrointestinal hemorrhage associated with duodenal ulcer   Assessment & Plan    Status post EGD 11/4 revealed duodenal bleeding ulcer  Continue PPI  Monitor H&H     Urinary retention   Assessment & Plan    Status post failing voiding trial  Likely secondary to BPH , on Flomax  Follow up Urology recommendation  Maintain Gordon for now, DC once mobility improves after discharge     Bacteremia due to Staphylococcus aureus   Assessment & Plan    · Admission Blood culture x 1 MSSA  · Repeat blood cultures no growth  · Per ID will need 4 wks antibiotics from negative blood cultures through 11/25  Acute blood loss anemia   Assessment & Plan    · Secondary to bleeding gastric ulcer  · Patient had tachycardia and drifting hemoglobin and later developed guanakito melena  · EGD with duodenal bulb ulcer with intervention 11/4  · S/p total 11 units of PRBCs  · Cont PPI, changed to PO  · Continue trend hemoglobin, improved after most recent PRBC transfusion  Hold off on repeat EGD for now      Severe protein-calorie malnutrition [facility])   Assessment & Plan    Malnutrition Findings:           BMI Findings: Body mass index is 27 76 kg/m²  Atrial fibrillation ([facility] [address] )   Assessment & Plan    · Hx of AFib in the setting of electrolyte abnormalities in the past   Monitored on telemetry  · Currently NSR with PVCs  · No anticoagulation on discharge  Particularly in light of recent episode of GI bleeding    · Frequent PVCs on telemetry, beta-blocker resumed  · Monitor and replete lytes     Benign essential hypertension   Assessment & Plan Stable  Asymptomatic  Continue metoprolol, lisinoprol     CKD (chronic kidney disease) stage 3, GFR 30-59 ml/min ([facility])   Assessment & Plan    Stable, baseline creatinine 0 9-1 3  Monitor input output       CAD (coronary artery disease)   Assessment & Plan    Troponin negative  Continue statin, continue to hold aspirin secondary to GI bleed  Follow up with Cardiology     DM2 (diabetes mellitus, type 2) [facility])   Assessment & Plan    Lab Results   Component Value Date    HGBA1C 12 0 (H) 10/26/2018       Recent Labs      18   1618  18   2103  18   0653  18   1112   POCGLU  90  174*  138  157*       Blood Sugar Average: Last 72 hrs:  (P) 137 [id number]     Decreased Levemir at 8 units daily given hypoglycemia in a m  added pre meal insulin 3 units TIDAC  Monitor           VTE Pharmacologic Prophylaxis:   Pharmacologic: none 2/2 GIB  Mechanical VTE Prophylaxis in Place: Yes    Patient Centered Rounds: I have performed bedside rounds with nursing staff today  Discussions with Specialists or Other Care Team Provider: Yes  Education and Discussions with Family / Patient:Yes  Time Spent for Care: 30 minutes  More than 50% of total time spent on counseling and coordination of care as described above  Current Length of Stay: 23 day(s)  Current Patient Status: Inpatient     Discharge Plan: STR once medically stable    Code Status: Level 2 - DNAR: but accepts endotracheal intubation      Subjective:   Right arm pain is better today, but still swollen  Yesterday had left arm midline placed       Objective:     Vitals:   Temp (24hrs), Av 8 °F (36 6 °C), Min:97 4 °F (36 3 °C), Max:98 2 °F (36 8 °C)    Temp:  [97 4 °F (36 3 °C)-98 2 °F (36 8 °C)] 98 2 °F (36 8 °C)  HR:  [90-97] 90  Resp:  [18-19] 18  BP: ()/(54-67) 116/57  SpO2:  [97 %-98 %] 97 %  Body mass index is 26 43 kg/m²  Input and Output Summary (last 24 hours):      Intake/Output Summary (Last 24 hours) at 18 0914  Last data filed at 11/17/18 0601   Gross per 24 hour   Intake              380 ml   Output             2250 ml   Net            -1870 ml        Physical Exam:     Physical Exam   Constitutional: He is oriented to person, place, and time  He appears well-developed  No distress  HENT:   Head: Normocephalic and atraumatic  Cardiovascular: Normal rate and regular rhythm  Murmur heard  Pulmonary/Chest: Effort normal and breath sounds normal  No respiratory distress  He has no wheezes  He has no rales  Abdominal: Soft  Bowel sounds are normal  He exhibits no distension  There is no tenderness  There is no rebound and no guarding  Genitourinary:   Genitourinary Comments: Gordon in place with clear yellow urine   Musculoskeletal: He exhibits edema  He exhibits no tenderness or deformity  Right upper extremity with edema around the PICC site  No warmth or erythema today   Neurological: He is alert and oriented to person, place, and time  Skin: Skin is warm and dry  Left chest wall and left back dressing in place   Psychiatric: He has a normal mood and affect  His behavior is normal    Nursing note and vitals reviewed  Additional Data:     Labs:      Results from last 7 days  Lab Units 11/17/18  0611 11/16/18  0531 11/15/18  0607  11/13/18  0558  11/12/18  0548 11/11/18  0546   WBC Thousand/uL 4 67 4 60 4 33  < > 5 03  --  5 09 6 45   HEMOGLOBIN g/dL 9 9* 9 6* 7 4*  < > 7 9*  < > 8 1* 9 5*   HEMATOCRIT % 31 2* 30 4* 23 9*  < > 25 0*  < > 25 8* 29 7*   PLATELETS Thousands/uL 148* 132* 132*  < > 137*  --  137* 172   NEUTROS PCT %  --   --   --   --  72  --  75 72   < > = values in this interval not displayed      Results from last 7 days  Lab Units 11/17/18  0611 11/16/18  0531 11/15/18  0607   SODIUM mmol/L 135* 135* 135*   POTASSIUM mmol/L 4 2 4 0 4 1   CHLORIDE mmol/L 101 100 101   CO2 mmol/L 27 30 31   BUN mg/dL 15 18 19   CREATININE mg/dL 0 85 0 91 0 86   CALCIUM mg/dL 8 1* 7 7* 7 7*             Lab Results Component Value Date/Time    HGBA1C 12 0 (H) 10/26/2018 05:24 AM       Results from last 7 days  Lab Units 11/17/18  0702 11/16/18  2213 11/16/18  1620 11/16/18  1143 11/16/18  0721 11/15/18  2131 11/15/18  1647 11/15/18  1117 11/15/18  0714 11/14/18  2039 11/14/18  1652 11/14/18  1112   POC GLUCOSE mg/dl 136 197* 206* 139 83 185* 195* 78 89 123 175* 157*           * I Have Reviewed All Lab Data Listed Above  * Additional Pertinent Lab Tests Reviewed: All Labs Within Last 24 Hours Reviewed    Imaging:  Ct Chest Abdomen Pelvis Wo Contrast  Result Date: 10/26/2018  Impression: Patchy airspace opacities are seen in the right lower lobe superior segment  There is a large dense consolidation in the right middle lobe, lateral segment, nonspecific but suspicious for infection/pneumonia in the appropriate clinical setting  Cardiomegaly, bilateral pleural effusions, and body wall edema suggests a superimposed component of fluid overload/CHF  Multiple areas of skin thickening with associated subjacent complex fluid are seen in the superficial soft tissues of the left pectoralis region (for example axial image 30), as well as in the posterior and superior left thorax (axial image 12), as described  Consider cellulitis with developing abscesses  In addition, a few bubbles of air are seen in the anterior left chest subcutaneous tissues (axial image 8) which could be related to recent intervention but also raises suspicion for possible gas-forming infection  Partially distended bladder with catheter seen within the bladder lumen  Mild circumferential bladder wall thickening noted, probably exaggerated by underdistention  A cystitis could be considered in the appropriate clinical setting  Renal cysts, degenerative changes of the shoulder, spine, and hips, and other findings as above  Findings discussed with FRITZ Alexis by Dr Benjamin Spear at 4:18 AM on 10/26/2018   Workstation performed: [id number]     Xr Chest Portable  Result Date: 10/29/2018  Impression: Persistent right basilar pleural effusion and associated probable atelectasis  Workstation performed: [id number]     Xr Spine Thoracic 3 Views  Result Date: 10/25/2018  Impression: No acute osseous abnormality  Workstation performed: [id number]     Xr Spine Lumbar 2 Or 3 Views Injury  Result Date: 10/25/2018  Impression: Levoscoliosis and moderate multilevel degenerative change  Workstation performed: [id number]     Xr Shoulder 2+ Views Left  Result Date: 10/25/2018  Impression: No acute osseous abnormality  Workstation performed: [id number]     Ct Head Wo Contrast  Result Date: 10/26/2018  Impression: No acute intracranial abnormality  Other nonacute findings as above  Workstation performed: [id number]     Ct Head Without Contrast  Result Date: 10/25/2018  Impression: No acute intracranial abnormality  Workstation performed: [id number]     Ct Spine Cervical Without Contrast  Result Date: 10/25/2018  Impression: No cervical spine fracture or traumatic malalignment  Workstation performed: [id number]     Xr Chest 1 View  Result Date: 10/25/2018  Impression: Focal consolidation in the right lower lobe  This may represent pneumonia  If there is any trauma to the right chest, pulmonary contusion is a consideration  Recommend follow up to resolution  The study was marked in EPIC for significant notification  Workstation performed: [id number]     Xr Chest Portable Icu  Result Date: 11/1/2018  Impression: Cardiomegaly  Bibasilar parenchymal densities unchanged  Workstation performed: [id number]     Xr Chest Portable Icu  Result Date: 10/29/2018  Impression: No significant interval change since 10/29/2018 Workstation performed: [id number]     Xr Chest Portable Icu  Result Date: 10/26/2018  Impression: Endotracheal tube ending 3 3 cm above the yohana  Consolidation at the right lung base   Workstation performed: [id number]     Ir Picc Line  Result Date: 10/31/2018  Impression: Impression: Successful placement of a 49cm right upper extremity double lumen Power PICC  Workstation performed: [id number]     Imaging Reports Reviewed by myself    Cultures:   Blood Culture:   Lab Results   Component Value Date    BLOODCX No Growth After 5 Days  10/29/2018    BLOODCX No Growth After 5 Days  10/29/2018    BLOODCX Staphylococcus aureus (A) 10/25/2018    BLOODCX No Growth After 5 Days   10/25/2018     Urine Culture:   Lab Results   Component Value Date    URINECX >100,000 cfu/ml  10/25/2018    URINECX >100,000 cfu/ml Klebsiella oxytoca (A) 05/09/2018     Sputum Culture: No components found for: SPUTUMCX  Wound Culture:   Lab Results   Component Value Date    WOUNDCULT 4+ Growth of Staphylococcus aureus (A) 10/26/2018    WOUNDCULT 4+ Growth of Staphylococcus aureus (A) 10/25/2018       Last 24 Hours Medication List:     Current Facility-Administered Medications:  acetaminophen 650 mg Oral Q6H [facility] Rhea John PA-C    atorvastatin 10 mg Oral Daily Monica Mazariegos MD    cefazolin 2,000 mg Intravenous Q8H Monica Mazariegos MD Last Rate: Stopped (11/17/18 8436)   collagenase  Topical Daily Jihan Van MD    furosemide 20 mg Intravenous Once Daniela Lewis MD    insulin detemir 8 Units Subcutaneous HS Jenny Berg MD    insulin lispro 1-5 Units Subcutaneous TID AC Jenny Berg MD    insulin lispro 3 Units Subcutaneous TID With Meals Jenny Berg MD    lisinopril 2 5 mg Oral Daily Jenny Berg MD    melatonin 6 mg Oral HS Monica Mazariegos MD    metoprolol tartrate 12 5 mg Oral Q12H [facility] Belen Tee PA-C    pantoprazole 40 mg Intravenous Q12H [facility], PA-C    potassium chloride 20 mEq Oral Daily Jenny Berg MD    pregabalin 50 mg Oral TID Monica Mazariegos MD    senna 1 tablet Oral HS Monica Mazariegos MD    spironolactone 12 5 mg Oral Daily Minal Saliva, CRNP    tamsulosin 0 4 mg Oral Daily With [facility], MD    torsemide 10 mg Oral Daily Minal Saliva, CRNP traMADol 50 mg Oral Q6H PRN Jesus Louie MD         Today, Patient Was Seen By: Mu Leon MD    ** Please Note: Dragon 360 Dictation voice to text software may have been used in the creation of this document   **

## 2018-11-17 NOTE — PLAN OF CARE
DISCHARGE PLANNING     Discharge to home or other facility with appropriate resources Progressing        DISCHARGE PLANNING - CARE MANAGEMENT     Discharge to post-acute care or home with appropriate resources Progressing        GENITOURINARY - ADULT     Maintains or returns to baseline urinary function Progressing     Urinary catheter remains patent Progressing        HEMATOLOGIC - ADULT     Maintains hematologic stability Progressing        INFECTION - ADULT     Absence or prevention of progression during hospitalization Progressing        Knowledge Deficit     Patient/family/caregiver demonstrates understanding of disease process, treatment plan, medications, and discharge instructions Progressing        METABOLIC, FLUID AND ELECTROLYTES - ADULT     Electrolytes maintained within normal limits Progressing     Glucose maintained within target range Progressing        PAIN - ADULT     Verbalizes/displays adequate comfort level or baseline comfort level Progressing        Potential for Falls     Patient will remain free of falls Progressing        Prexisting or High Potential for Compromised Skin Integrity     Skin integrity is maintained or improved Progressing        SAFETY ADULT     Maintain or return to baseline ADL function Progressing     Maintain or return mobility status to optimal level Progressing        SKIN/TISSUE INTEGRITY - ADULT     Incision(s), wounds(s) or drain site(s) healing without S/S of infection Progressing

## 2018-11-18 VITALS
WEIGHT: 176.81 LBS | OXYGEN SATURATION: 99 % | SYSTOLIC BLOOD PRESSURE: 99 MMHG | HEART RATE: 97 BPM | BODY MASS INDEX: 26.19 KG/M2 | HEIGHT: 69 IN | TEMPERATURE: 97.5 F | DIASTOLIC BLOOD PRESSURE: 57 MMHG | RESPIRATION RATE: 18 BRPM

## 2018-11-18 PROBLEM — I50.42 CHRONIC COMBINED SYSTOLIC AND DIASTOLIC HEART FAILURE (HCC): Status: ACTIVE | Noted: 2018-10-27

## 2018-11-18 PROBLEM — R78.81 BACTEREMIA DUE TO STAPHYLOCOCCUS AUREUS: Status: RESOLVED | Noted: 2018-10-29 | Resolved: 2018-11-18

## 2018-11-18 PROBLEM — K26.9 DUODENAL ULCER: Status: ACTIVE | Noted: 2018-11-18

## 2018-11-18 PROBLEM — B95.61 BACTEREMIA DUE TO STAPHYLOCOCCUS AUREUS: Status: RESOLVED | Noted: 2018-10-29 | Resolved: 2018-11-18

## 2018-11-18 PROBLEM — K26.4 GASTROINTESTINAL HEMORRHAGE ASSOCIATED WITH DUODENAL ULCER: Status: RESOLVED | Noted: 2018-11-03 | Resolved: 2018-11-18

## 2018-11-18 PROBLEM — R10.9 FLANK PAIN: Status: RESOLVED | Noted: 2018-11-18 | Resolved: 2018-11-18

## 2018-11-18 PROBLEM — R10.9 FLANK PAIN: Status: ACTIVE | Noted: 2018-11-18

## 2018-11-18 PROBLEM — E11.10 DKA (DIABETIC KETOACIDOSES): Status: ACTIVE | Noted: 2018-11-18

## 2018-11-18 PROBLEM — B02.9 SHINGLES: Status: RESOLVED | Noted: 2018-11-18 | Resolved: 2018-11-18

## 2018-11-18 PROBLEM — B02.9 SHINGLES: Status: ACTIVE | Noted: 2018-11-18

## 2018-11-18 PROBLEM — D62 ACUTE BLOOD LOSS ANEMIA: Status: RESOLVED | Noted: 2018-10-28 | Resolved: 2018-11-18

## 2018-11-18 PROBLEM — L03.90 CELLULITIS: Status: ACTIVE | Noted: 2018-11-18

## 2018-11-18 PROBLEM — E11.10 DKA (DIABETIC KETOACIDOSES): Status: RESOLVED | Noted: 2018-11-18 | Resolved: 2018-11-18

## 2018-11-18 PROBLEM — L03.90 CELLULITIS: Status: RESOLVED | Noted: 2018-11-18 | Resolved: 2018-11-18

## 2018-11-18 PROBLEM — L02.213 ABSCESS OF CHEST WALL: Status: RESOLVED | Noted: 2018-10-25 | Resolved: 2018-11-18

## 2018-11-18 LAB
ANION GAP SERPL CALCULATED.3IONS-SCNC: 7 MMOL/L (ref 4–13)
BUN SERPL-MCNC: 16 MG/DL (ref 5–25)
CALCIUM SERPL-MCNC: 8.3 MG/DL (ref 8.3–10.1)
CHLORIDE SERPL-SCNC: 102 MMOL/L (ref 100–108)
CO2 SERPL-SCNC: 28 MMOL/L (ref 21–32)
CREAT SERPL-MCNC: 0.92 MG/DL (ref 0.6–1.3)
ERYTHROCYTE [DISTWIDTH] IN BLOOD BY AUTOMATED COUNT: 18.5 % (ref 11.6–15.1)
GFR SERPL CREATININE-BSD FRML MDRD: 78 ML/MIN/1.73SQ M
GLUCOSE SERPL-MCNC: 131 MG/DL (ref 65–140)
GLUCOSE SERPL-MCNC: 71 MG/DL (ref 65–140)
GLUCOSE SERPL-MCNC: 84 MG/DL (ref 65–140)
GLUCOSE SERPL-MCNC: 87 MG/DL (ref 65–140)
HCT VFR BLD AUTO: 31.9 % (ref 36.5–49.3)
HGB BLD-MCNC: 10.2 G/DL (ref 12–17)
MCH RBC QN AUTO: 32 PG (ref 26.8–34.3)
MCHC RBC AUTO-ENTMCNC: 32 G/DL (ref 31.4–37.4)
MCV RBC AUTO: 100 FL (ref 82–98)
PLATELET # BLD AUTO: 165 THOUSANDS/UL (ref 149–390)
PMV BLD AUTO: 10.7 FL (ref 8.9–12.7)
POTASSIUM SERPL-SCNC: 4.1 MMOL/L (ref 3.5–5.3)
RBC # BLD AUTO: 3.19 MILLION/UL (ref 3.88–5.62)
SODIUM SERPL-SCNC: 137 MMOL/L (ref 136–145)
WBC # BLD AUTO: 5.38 THOUSAND/UL (ref 4.31–10.16)

## 2018-11-18 PROCEDURE — 99239 HOSP IP/OBS DSCHRG MGMT >30: CPT | Performed by: STUDENT IN AN ORGANIZED HEALTH CARE EDUCATION/TRAINING PROGRAM

## 2018-11-18 PROCEDURE — 80048 BASIC METABOLIC PNL TOTAL CA: CPT | Performed by: STUDENT IN AN ORGANIZED HEALTH CARE EDUCATION/TRAINING PROGRAM

## 2018-11-18 PROCEDURE — C9113 INJ PANTOPRAZOLE SODIUM, VIA: HCPCS | Performed by: PHYSICIAN ASSISTANT

## 2018-11-18 PROCEDURE — 82948 REAGENT STRIP/BLOOD GLUCOSE: CPT

## 2018-11-18 PROCEDURE — 85027 COMPLETE CBC AUTOMATED: CPT | Performed by: STUDENT IN AN ORGANIZED HEALTH CARE EDUCATION/TRAINING PROGRAM

## 2018-11-18 RX ORDER — TRAMADOL HYDROCHLORIDE 50 MG/1
50 TABLET ORAL EVERY 6 HOURS PRN
Qty: 15 TABLET | Refills: 0 | Status: SHIPPED | OUTPATIENT
Start: 2018-11-18 | End: 2018-11-28

## 2018-11-18 RX ORDER — LANOLIN ALCOHOL/MO/W.PET/CERES
6 CREAM (GRAM) TOPICAL
Refills: 0 | Status: ON HOLD
Start: 2018-11-18 | End: 2020-03-18 | Stop reason: SDUPTHER

## 2018-11-18 RX ORDER — PREGABALIN 50 MG/1
50 CAPSULE ORAL 3 TIMES DAILY
Qty: 30 CAPSULE | Refills: 0
Start: 2018-11-18 | End: 2020-03-18 | Stop reason: HOSPADM

## 2018-11-18 RX ORDER — TAMSULOSIN HYDROCHLORIDE 0.4 MG/1
0.4 CAPSULE ORAL
Refills: 0
Start: 2018-11-18

## 2018-11-18 RX ORDER — PANTOPRAZOLE SODIUM 40 MG/1
40 TABLET, DELAYED RELEASE ORAL 2 TIMES DAILY
Refills: 0
Start: 2018-11-18 | End: 2020-03-18 | Stop reason: HOSPADM

## 2018-11-18 RX ORDER — SENNOSIDES 8.6 MG
1 TABLET ORAL
Qty: 120 EACH | Refills: 0
Start: 2018-11-18 | End: 2020-03-18 | Stop reason: HOSPADM

## 2018-11-18 RX ORDER — METOPROLOL SUCCINATE 25 MG/1
12.5 TABLET, EXTENDED RELEASE ORAL DAILY
Qty: 30 TABLET | Refills: 0
Start: 2018-11-18

## 2018-11-18 RX ORDER — POTASSIUM CHLORIDE 20 MEQ/1
20 TABLET, EXTENDED RELEASE ORAL DAILY
Refills: 0
Start: 2018-11-19

## 2018-11-18 RX ORDER — ACETAMINOPHEN 325 MG/1
650 TABLET ORAL EVERY 6 HOURS PRN
Qty: 30 TABLET | Refills: 0
Start: 2018-11-18

## 2018-11-18 RX ADMIN — INSULIN LISPRO 3 UNITS: 100 INJECTION, SOLUTION INTRAVENOUS; SUBCUTANEOUS at 11:56

## 2018-11-18 RX ADMIN — POTASSIUM CHLORIDE 20 MEQ: 1500 TABLET, EXTENDED RELEASE ORAL at 08:53

## 2018-11-18 RX ADMIN — COLLAGENASE SANTYL 1 APPLICATION: 250 OINTMENT TOPICAL at 08:51

## 2018-11-18 RX ADMIN — INSULIN LISPRO 3 UNITS: 100 INJECTION, SOLUTION INTRAVENOUS; SUBCUTANEOUS at 08:58

## 2018-11-18 RX ADMIN — ACETAMINOPHEN 650 MG: 325 TABLET, FILM COATED ORAL at 05:37

## 2018-11-18 RX ADMIN — CEFAZOLIN SODIUM 2000 MG: 2 SOLUTION INTRAVENOUS at 05:37

## 2018-11-18 RX ADMIN — METOPROLOL TARTRATE 12.5 MG: 25 TABLET ORAL at 08:52

## 2018-11-18 RX ADMIN — TORSEMIDE 10 MG: 10 TABLET ORAL at 08:54

## 2018-11-18 RX ADMIN — PREGABALIN 50 MG: 50 CAPSULE ORAL at 08:53

## 2018-11-18 RX ADMIN — LISINOPRIL 2.5 MG: 2.5 TABLET ORAL at 08:52

## 2018-11-18 RX ADMIN — SPIRONOLACTONE 12.5 MG: 25 TABLET, FILM COATED ORAL at 08:54

## 2018-11-18 RX ADMIN — PANTOPRAZOLE SODIUM 40 MG: 40 INJECTION, POWDER, FOR SOLUTION INTRAVENOUS at 08:58

## 2018-11-18 RX ADMIN — ACETAMINOPHEN 650 MG: 325 TABLET, FILM COATED ORAL at 11:54

## 2018-11-18 NOTE — DISCHARGE INSTRUCTIONS
· Cont IV Antibiotic through midline catheter, and then remove catheter once completed Abx regimen  · Repeat right upper extremity ultrasound to be done in 1 week after discharge to monitor superficial thrombophlebitis to ensure no propagation to deep veins  · Avoid blood pressures in the right upper extremity  · Follow up with urology for luis and urinary retention once mobility improves for voiding trial  · Repeat blood work to monitor anemia due in 1 week, follow up with GI as needed for recurrent GI bleeding

## 2018-11-18 NOTE — DISCHARGE SUMMARY
Discharge- Javi Dumont 1940, [de-identified] y o  male MRN: [id number]    Unit/Bed#: [address] [place] [address] Encounter: [id number]    Primary Care Provider: Willa Tovar DO   Date and time admitted to hospital: 10/25/2018  3:02 PM        Thrombophlebitis arm   Assessment & Plan    · 2/2 right arm PICC which was placed for Abx therapy  · During hospitalization was noted to have swelling of the UE with pain  · Vascular study positive for superficial vein thrombophlebitis  No DVT however the clot was close to the deep vein  · Because of pain and significant swelling, plan was to remove PICC and replace with another line, however PICC removal by RN was met with resistance and some bleeding at the site so was kept in place  · Consulted vascular surgery regarding recs for safe removal of PICC line  · They recommended either keeping PICC line and start anticoagulation or removing PICC line and ok to forgo anticoagulation given recent GI bleeding  · PICC was removed by MD without issue prior to DC  · He will need repeat US in 1 week to monitor superficial clot to ensure it does not propagate to deep vein     * Abscess of chest wall   Assessment & Plan    · Abscess With Cellulitis superimposed on shingles  · ID and surgery consulted  · Blood culture staph aureus x 1  · Wound culture: 3+ staph aureus   · Repeat blood cultures from 10/29 no growth  · S/p debridement in OR on 10/26, wound vac which has since been removed, and s/p bedside debridement 11/14  · Continue local wound care  Surgical follow up noted, likely suture removal on 11/21  · Antibiotics deescalated to Ancef, 4 week course with end date November 25th, to be completed at [address] [place] [address]  · Continue on Lyrica 50 mg TID for possible post-hepatic neuralgia, scheduled tylenol, p r n  Tramadol     Acute on chronic combined systolic and diastolic heart failure (HCC)   Assessment & Plan    · Known EF of 20% in the past  · Repeat echo showing EF remains 25%     · Patient refused life vest/AICD  · Continues with episodes of PVCs on tele  · Cont Brock stocking due to low blood pressure  · Monitor I/O; net -22L negative  · Completed diuresis with IV Lasix+albumin  · Now transitioned back to his home PO regimen of demadex and aldactone     Cardiomyopathy, ischemic   Assessment & Plan    · EF prior was 35 to 40%  Recent stress test in May of 2018 showed reversible ischemic changes  Cardiology following as an outpatient with medical management  · ECHO repeated with EF 20%  · Frequent PVCs on telemetry, Episode of nonsustained V-tach overnight 11/6, asymptomatic  · Cardiology following, patient offered AICD and life vest and he does not want it  · Continue metoprolol, started lisinopril     Neuralgia, post-herpetic   Assessment & Plan    On Lyrica     Gastrointestinal hemorrhage associated with duodenal ulcer   Assessment & Plan    Status post EGD 11/4 revealed duodenal bleeding ulcer  Continue PPI  Monitor H&H     Urinary retention   Assessment & Plan    Status post failing voiding trial, urology consulted and luis placed  Likely secondary to BPH , placed on Flomax  Maintain Luis on DC, void trial once mobility improves after discharge  Follow up with urology      Bacteremia due to Staphylococcus aureus   Assessment & Plan    · Admission Blood culture x 1 MSSA  · Repeat blood cultures no growth  · Per ID will need 4 wks antibiotics from negative blood cultures through 11/25  · Discharged on IV ancef to complete course     Acute blood loss anemia   Assessment & Plan    · Secondary to bleeding gastric ulcer  · Patient had tachycardia and drifting hemoglobin and later developed guanakito melena  · EGD with duodenal bulb ulcer with intervention 11/4  · S/p total 11 units of PRBCs  · Cont PPI  · Continue trend hemoglobin, stabilized  · Will need to continue to monitor     Severe protein-calorie malnutrition [facility])   Assessment & Plan    Malnutrition Findings:           BMI Findings:         Body mass index is 27 76 kg/m²  Atrial fibrillation ([facility] )   Assessment & Plan    · Hx of AFib in the setting of electrolyte abnormalities in the past   Monitored on telemetry  · Currently NSR with PVCs  · No anticoagulation on discharge  Particularly in light of recent episode of GI bleeding  · Frequent PVCs on telemetry, beta-blocker resumed  · Monitor and replete lytes     Benign essential hypertension   Assessment & Plan    Stable  Asymptomatic  Continue metoprolol, lisinoprol     CKD (chronic kidney disease) stage 3, GFR 30-59 ml/min ([facility])   Assessment & Plan    Stable, baseline creatinine 0 9-1 3  Monitor input output       CAD (coronary artery disease)   Assessment & Plan    Troponin negative  Continue statin, continue to hold aspirin secondary to GI bleed  Follow up with Cardiology     DM2 (diabetes mellitus, type 2) [facility])   Assessment & Plan    Lab Results   Component Value Date    HGBA1C 12 0 (H) 10/26/2018       Recent Labs      11/13/18   1618  11/13/18   2103  11/14/18   0653  11/14/18   1112   POCGLU  90  174*  138  157*       Blood Sugar Average: Last 72 hrs:  (P) 137 [id number]     Decreased Levemir at 8 units daily given hypoglycemia in a m  added pre meal insulin 3 units TIDAC but patient was not getting it as his sugars were stable so it was stopped on DC  Monitor, cont ISS           Discharging Physician / Practitioner: Lucien Joaquin MD  PCP: Maisha Eaton DO  Admission Date: 10/25/2018  Discharge Date: 11/18/18    Reason for Admission: Back Pain (Pt sts he has shingles and has back pain for 4 weeks  Carol Serum last night because he lost his balance  Area left upper back scabbed, red swollen with greenis drainage    Scabbed area over scapula and under left arm )        Resolved Problems  Date Reviewed: 11/6/2018          Resolved    Hyponatremia 10/27/2018     Resolved by  MIGUEL White    CHRIS (acute kidney injury) ([facility] ) 10/27/2018     Resolved by  MIGUEL White Urinary tract infection 10/29/2018     Resolved by  Trae Bone PA-C    Septic shock ([facility] ) 11/2/2018     Resolved by  Trae Bone PA-C    Shingles 11/1/2018     Resolved by  Trae Bone PA-C    Lactic acid acidosis 10/26/2018     Resolved by  Trae Bone PA-C    DKA (diabetic ketoacidoses) ([facility] ) 10/26/2018     Resolved by  Trae Bone PA-C    Lethargy 10/27/2018     Resolved by  Manny Lopez PA-C    Sepsis ([facility] ) 10/26/2018     Resolved by  Ethel Miranda MD    Overview Signed 10/26/2018  6:17 AM by Terrance Singleton MD     Added automatically from request for surgery [id number]         Cellulitis of left axilla 10/26/2018     Resolved by  Ethel Miranda MD    Acute respiratory failure secondary to septic shock 10/28/2018     Resolved by  Trae Bone PA-C    Respiratory infection 11/3/2018     Resolved by  Trae Bone PA-C    Acute cystitis with hematuria 10/29/2018     Resolved by  Ethel Miranda MD    Esophagitis 11/4/2018     Resolved by  Sandeep Platt MD    Delirium 11/4/2018     Resolved by  Trae Bone PA-C    Candida esophagitis ([facility] ) 11/5/2018     Resolved by  Ethel Miranda MD          Consultations During Hospital Stay:  [address] [place] [address]  IP CONSULT TO CASE MANAGEMENT  IP CONSULT TO ACUTE CARE SURGERY  IP CONSULT TO INFECTIOUS DISEASES  IP CONSULT TO GASTROENTEROLOGY  IP CONSULT TO CARDIOLOGY  IP CONSULT TO UROLOGY  IP CONSULT TO NUTRITION SERVICES  IP CONSULT TO VASCULAR SURGERY    Procedures Performed:     · 10/26/2018: Chest wall abscess debridement with wound vac placement  · 10/29/2018: repeat chest wall washout  · 10/30/2018: EGD, mild gastritis, no gastric ulcers, large duodenal ulcer, 2cm, this was obliterated using gold probe  The ulcer bed was subsequently closed using 2 hemoclips  There was bile noted in the duodenal bulb, duodenal sweep and D2    There was no active bleeding noted   · 11/4/2018: repeat EGD:  2 ulcers in the posterior wall with 1 which had a visible vessel that was actively bleeding  This was injected with 7 cc of 1 in 100,000 epinephrine, tried to stop the bleeding with gold probe which was unsuccessful, 3 clips were successfully  There is no further bleeding  Extensively irrigated and washed for quite some time with no active bleeding  Patient tolerated the procedure well  He received blood in pressors during the procedure but was normotensive but end of the procedure  Significant Findings / Test Results:     ·       Results from last 7 days  Lab Units 11/18/18  0616 11/17/18  0611 11/16/18  0531   WBC Thousand/uL 5 38 4 67 4 60   HEMOGLOBIN g/dL 10 2* 9 9* 9 6*   PLATELETS Thousands/uL 165 148* 132*       Results from last 7 days  Lab Units 11/18/18  0616 11/17/18  0611 11/16/18  0531   SODIUM mmol/L 137 135* 135*   POTASSIUM mmol/L 4 1 4 2 4 0   CHLORIDE mmol/L 102 101 100   CO2 mmol/L 28 27 30   BUN mg/dL 16 15 18   CREATININE mg/dL 0 92 0 85 0 91   CALCIUM mg/dL 8 3 8 1* 7 7*             Lab Results   Component Value Date/Time    HGBA1C 12 0 (H) 10/26/2018 05:24 AM       Results from last 7 days  Lab Units 11/18/18  1125 11/18/18  0857 11/18/18  0736 11/17/18  2029 11/17/18  1611 11/17/18  1137 11/17/18  0702 11/16/18  2213 11/16/18  1620 11/16/18  1143 11/16/18  0721 11/15/18  2131   POC GLUCOSE mg/dl 87 131 71 115 231* 180* 136 197* 206* 139 83 185*         Blood Culture:   Lab Results   Component Value Date    BLOODCX No Growth After 5 Days  10/29/2018    BLOODCX No Growth After 5 Days  10/29/2018    BLOODCX Staphylococcus aureus (A) 10/25/2018    BLOODCX No Growth After 5 Days   10/25/2018     Urine Culture:   Lab Results   Component Value Date    URINECX >100,000 cfu/ml  10/25/2018    URINECX >100,000 cfu/ml Klebsiella oxytoca (A) 05/09/2018     Sputum Culture: No components found for: SPUTUMCX  Wound Culture:   Lab Results   Component Value Date WOUNDCULT 4+ Growth of Staphylococcus aureus (A) 10/26/2018    WOUNDCULT 4+ Growth of Staphylococcus aureus (A) 10/25/2018        VAS upper limb venous duplex scan, unilateral/limited   Final Result by Fabiana Bob MD (11/16 1240)      XR chest portable ICU   Final Result by Jg Russo MD (11/01 2836)      Cardiomegaly  Bibasilar parenchymal densities unchanged  Workstation performed: [id number]         IR PICC line   Final Result by German Duarte DO (10/31 1535)   Impression:       Successful placement of a 49cm right upper extremity double lumen Power PICC  Workstation performed: [id number]         XR chest portable ICU   Final Result by Marcos Zhu MD (10/29 1627)      No significant interval change since 10/29/2018            Workstation performed: [id number]         XR chest portable   Final Result by Angle Barillas MD (10/29 1026)   Persistent right basilar pleural effusion and associated probable atelectasis  Workstation performed: [id number]         XR chest portable ICU   Final Result by Jg Russo MD (10/26 7919)      Endotracheal tube ending 3 3 cm above the yohana  Consolidation at the right lung base  Workstation performed: [id number]         CT chest abdomen pelvis wo contrast   Final Result by Natalya Brody DO (10/26 0392)      Patchy airspace opacities are seen in the right lower lobe superior segment  There is a large dense consolidation in the right middle lobe, lateral segment, nonspecific but suspicious for infection/pneumonia in the appropriate clinical setting  Cardiomegaly, bilateral pleural effusions, and body wall edema suggests a superimposed component of fluid overload/CHF        Multiple areas of skin thickening with associated subjacent complex fluid are seen in the superficial soft tissues of the left pectoralis region (for example axial image 30), as well as in the posterior and superior left thorax (axial image 12), as described  Consider cellulitis with developing abscesses  In addition, a few bubbles of air are seen in the anterior left chest subcutaneous tissues (axial image 8) which could be related to recent intervention but also raises suspicion for possible    gas-forming infection  Partially distended bladder with catheter seen within the bladder lumen  Mild circumferential bladder wall thickening noted, probably exaggerated by underdistention  A cystitis could be considered in the appropriate clinical setting  Renal cysts, degenerative changes of the shoulder, spine, and hips, and other findings as above  Findings discussed with FRITZ Alexis by Dr Elizabeth Hendrix at 4:18 AM on 10/26/2018  Workstation performed: [id number]         CT head wo contrast   Final Result by Natalya Brody DO (10/26 8830)      No acute intracranial abnormality  Other nonacute findings as above  Workstation performed: [id number]         XR spine lumbar 2 or 3 views injury   Final Result by Diana Clark MD (10/25 2122)      Levoscoliosis and moderate multilevel degenerative change  Workstation performed: [id number]         XR chest 1 view   Final Result by Rick Degroot MD (10/25 1653)      Focal consolidation in the right lower lobe  This may represent pneumonia  If there is any trauma to the right chest, pulmonary contusion is a consideration  Recommend follow up to resolution  The study was marked in EPIC for significant notification  Workstation performed: [id number]         XR spine thoracic 3 views   Final Result by Rick Degroot MD (10/25 1651)      No acute osseous abnormality  Workstation performed: [id number]         XR shoulder 2+ views LEFT   Final Result by Rick Degroot MD (10/25 1858)      No acute osseous abnormality              Workstation performed: [id number]         CT spine cervical without contrast   Final Result by Diana Clark MD (10/25 1645)      No cervical spine fracture or traumatic malalignment  Workstation performed: [id number]         CT head without contrast   Final Result by Diana Clark MD (10/25 1643)      No acute intracranial abnormality  Workstation performed: [id number]         VAS upper limb venous duplex scan, unilateral/limited    (Results Pending)          Incidental Findings:   ·      Test Results Pending at Discharge (will require follow up):   ·      Outpatient Tests Requested:  · CBC  · RUE venous duplex in 1 week    Complications:      Reason for Admission:   Chief Complaint   Patient presents with    Back Pain     Pt sts he has shingles and has back pain for 4 weeks  Ita Power last night because he lost his balance  Area left upper back scabbed, red swollen with greenis drainage  Scabbed area over scapula and under left arm  Hospital Course:     Thais Ochoa is a [de-identified] y o  male patient with a PMH of CAD s/p CABG, CHF,Afib,  type 2 diabetes, HDL, HTN who originally presented to the hospital on 10/25/2018 due being to found down for an unknown duration of time  ED workup revealed he was in septic shock, had DKA, had left pectoral cellulitis with abscesses and necrotic tissue overlying would appear to be a herpes zoster infection, CHRIS on CKD  He was initially admitted to the ICU, intubated and placed on pressors  DKA was treated per protocol and resolved  Source of shock was multifactorial   Multiple specialist services were consulted and had a long complicated hospital course  He had a UTI and pneumonia which were treated with antibiotics  For his cellulitis and abscesses which was quite extensive, overlying shingles he underwent multiple OR washouts for source control  Wound cultures grew Staph aureus  Blood cultures 1 of 2 also grew MSSA therefore he required prolonged IV antibiotics for presumed true bacteremia  He had a PICC line placed for this    During hospitalization he also began to have maroon stools with acute blood loss anemia  GI was consulted and he underwent 2 EGDs showing duodenal ulcers requiring bleeding control  He was previously on [facility] for Afib which was stopped  Given aggressive fluid hydration he developed acute on his chronic CHF  He required IV Lasix with albumin  Echo showed an EF of 20% with known history of ischemic cardiomyopathy  He was offered AICD and life vest but refused  He was treated conservatively  Because of severe illness and immobility he developed urinary retention and required a Gordon  This will remain in place until mobility improves after discharge  He also developed a provoked right upper extremity superficial thrombophlebitis secondary to his PICC line  However on ultrasound the clot was noted to be quite close to the deep veins  PICC line initially could not be removed because of resistance so vascular surgery was consulted  They recommended that he be anticoagulated however given his recent GI bleed this was not possible  The PICC line was removed eventually without issue and he will need follow-up ultrasound to monitor for propagation of the clot  He was seen by Physical therapy who recommended short-term rehab  See above for specific plans on DC         Please see above list of diagnoses and related plan for additional information  Condition at Discharge: stable       Discharge Day Visit / Exam:     Subjective:  Feels good, ready to leave the hospital  Right arm has no pain  No CP, palpitations, SOB, ABD pain, N/V/D/C, no rectal bleeding  Appetite is good       Vitals: Blood Pressure: 99/57 (11/18/18 1300)  Pulse: 97 (11/18/18 1300)  Temperature: 97 5 °F (36 4 °C) (11/18/18 1300)  Temp Source: Oral (11/18/18 1300)  Respirations: 18 (11/18/18 1300)  Height: 5' 8 5" (174 cm) (10/25/18 1507)  Weight - Scale: 80 2 kg (176 lb 12 9 oz) (11/18/18 0600)  SpO2: 99 % (11/18/18 1300)  Exam:   Physical Exam  Constitutional: He is oriented to person, place, and time  He appears well-developed  No distress  HENT:   Head: Normocephalic and atraumatic  Cardiovascular: Normal rate and regular rhythm  Murmur heard  Pulmonary/Chest: Effort normal and breath sounds normal  No respiratory distress  He has no wheezes  He has no rales  Abdominal: Soft  Bowel sounds are normal  He exhibits no distension  There is no tenderness  There is no rebound and no guarding  Genitourinary:   Genitourinary Comments: Gordon in place with clear yellow urine   Musculoskeletal: He exhibits edema  He exhibits no tenderness or deformity  Right upper extremity with edema around the PICC site  No warmth or erythema today   Neurological: He is alert and oriented to person, place, and time  Skin: Skin is warm and dry  Left chest wall and left back dressing in place   Psychiatric: He has a normal mood and affect  His behavior is normal    Nursing note and vitals reviewed  Discharge instructions/Information to patient and family:   See after visit summary for information provided to patient and family  Provisions for Follow-Up Care:  See after visit summary for information related to follow-up care and any pertinent home health orders  Disposition:     Acute Rehab at [facility]    Planned Readmission: no     Discharge Statement:  I spent >30 minutes discharging the patient  This time was spent on the day of discharge  I had direct contact with the patient on the day of discharge  Greater than 50% of the total time was spent examining patient, answering all patient questions, arranging and discussing plan of care with patient as well as directly providing post-discharge instructions  Additional time then spent on discharge activities  Discharge Medications:  See after visit summary for reconciled discharge medications provided to patient and family        ** Please Note: This note has been constructed using a voice recognition system **

## 2018-11-18 NOTE — NURSING NOTE
Patient discharged to [address] [place] [address]  Report called, spoke with Ragini Wilburn receiving RN  Opportunity for questions provided and my number was given where she could reach me should any questions arise  Vitals stable   Discharge documents printed and sent with transport team

## 2018-11-18 NOTE — SOCIAL WORK
Per PCP, pt stable for DC to [facility] today  Spoke with unit RN, requested time for Wound care to be done prior to his DC  Referrral out to [facility],  1-130pm   Pt wife is aware of DC and transport times, asked that [facility] call her when he arrives to update her where he is so she can visit him tonight    Message out to the Care Line for this request

## 2018-11-19 ENCOUNTER — TRANSITIONAL CARE MANAGEMENT (OUTPATIENT)
Dept: FAMILY MEDICINE CLINIC | Facility: CLINIC | Age: 78
End: 2018-11-19

## 2018-11-29 ENCOUNTER — TELEPHONE (OUTPATIENT)
Dept: SURGERY | Facility: CLINIC | Age: 78
End: 2018-11-29

## 2018-11-29 NOTE — TELEPHONE ENCOUNTER
Received a call from Eller Krabbe at [facility]; she was instructed to call the office to schedule wound care appt  Patient had surgery with Dr Anjali Pham and was discharged to [address] [place] [address]  Patient has been seen by Dr Nury Prajapati at [address] [place] [address] for back wound care  Eller Krabbe was unsure if patient needed appointment with Dr Anjali Pham for suture removal of axilla incision  Patient was scheduled to see Dr Nury Prajapati in wound care on 11/30/18  I discussed with Dr Anjali Pham who then discussed case with Dr Nury Prajapati and both providers agreed for patient to be seen in our office on 12/03/18 for post-op/woundcare  I called care center back and discussed and scheduled office visit for 12/03/18 at 11:45a with Dr Anjali Pham

## 2018-12-03 ENCOUNTER — OFFICE VISIT (OUTPATIENT)
Dept: SURGERY | Facility: CLINIC | Age: 78
End: 2018-12-03
Payer: MEDICARE

## 2018-12-03 VITALS — HEART RATE: 70 BPM | TEMPERATURE: 98 F

## 2018-12-03 DIAGNOSIS — Z01.818 PRE-OPERATIVE EXAMINATION: ICD-10-CM

## 2018-12-03 DIAGNOSIS — S21.202D BACK WOUND, LEFT, SUBSEQUENT ENCOUNTER: Primary | ICD-10-CM

## 2018-12-03 PROCEDURE — 99215 OFFICE O/P EST HI 40 MIN: CPT | Performed by: SURGERY

## 2018-12-03 RX ORDER — SODIUM CHLORIDE, SODIUM LACTATE, POTASSIUM CHLORIDE, CALCIUM CHLORIDE 600; 310; 30; 20 MG/100ML; MG/100ML; MG/100ML; MG/100ML
75 INJECTION, SOLUTION INTRAVENOUS CONTINUOUS
Status: CANCELLED | OUTPATIENT
Start: 2018-12-10

## 2018-12-03 RX ORDER — TRAMADOL HYDROCHLORIDE 50 MG/1
50 TABLET ORAL EVERY 6 HOURS PRN
COMMUNITY

## 2018-12-03 RX ORDER — CHLORHEXIDINE GLUCONATE 4 G/100ML
SOLUTION TOPICAL DAILY PRN
Status: CANCELLED | OUTPATIENT
Start: 2018-12-10

## 2018-12-03 RX ORDER — OMEPRAZOLE 20 MG/1
20 CAPSULE, DELAYED RELEASE ORAL
COMMUNITY

## 2018-12-03 NOTE — PROGRESS NOTES
[address] [place] [address] [facility] History and Physical Note:    Admitting Physician: Shruti Francisco MD    Chief Complaint:  Back wound and follow up for chest wound    HPI:  Pat Heck is completely awake and alert now and states that he is very happy with the chest wound  He states that his left upper arm still is or every once in a while, but that his axillary region is relatively pain and discomfort free  He does not have any discomfort in his back, however he is aware of the wound that is there  He had been following [facility] with Dr Denise Rojas  Otherwise all sutures have been removed at this point and he denies any fevers or chills  PMH:  Past Medical History:   Diagnosis Date    [facility]congestive [facility] failure) [facility]     Diabetes mellitus ([facility] )     Gastrointestinal hemorrhage associated with duodenal ulcer 11/3/2018    History of open heart surgery     Hyperlipidemia     Hypertension      PSH:  Past Surgical History:   Procedure Laterality Date    CARDIAC SURGERY      ESOPHAGOGASTRODUODENOSCOPY N/A 10/30/2018    Procedure: ESOPHAGOGASTRODUODENOSCOPY (EGD); Surgeon: Yoanna Hoang MD;  Location: [facility] GI LAB; Service: Gastroenterology    ESOPHAGOGASTRODUODENOSCOPY N/A 11/4/2018    Procedure: ESOPHAGOGASTRODUODENOSCOPY (EGD); Surgeon: Rashmi White MD;  Location: [facility] GI [facility]; Service: Gastroenterology    IR PICC LINE  10/31/2018    WOUND DEBRIDEMENT Left 10/26/2018    Procedure: Chest wall wound debridement (extensive) with pulse lavage and wound vac placement;  Surgeon: Chelsey Capps MD;  Location: [address] [place] [address];  Service: General    WOUND DEBRIDEMENT Left 10/29/2018    Procedure: DEBRIDEMENT WOUND AND DRESSING CHANGE ([address] OUT);   Surgeon: Chelsey Capps MD;  Location: [address] [place] [address];  Service: General     Home Meds:  Current Outpatient Prescriptions on File Prior to Visit   Medication Sig Dispense Refill    acetaminophen (TYLENOL) 325 mg tablet Take 2 tablets (650 mg total) by mouth every 6 (six) hours as needed for mild pain, headaches or fever 30 tablet 0    atorvastatin (LIPITOR) 10 mg tablet Take 10 mg by mouth daily      collagenase (SANTYL) ointment Apply topically daily 15 g 0    docusate sodium (COLACE) 100 mg capsule Take 100 mg by mouth 2 (two) times a day      ferrous sulfate 325 (65 Fe) mg tablet Take 325 mg by mouth 3 (three) times a day with meals      glimepiride (AMARYL) 4 mg tablet Take 4 mg by mouth 2 (two) times a day with meals      insulin detemir (LEVEMIR) 100 units/mL subcutaneous injection Inject 8 Units under the skin daily at bedtime  0    insulin lispro (HumaLOG) 100 units/mL injection Inject 1-5 Units under the skin 3 (three) times a day before meals  0    lisinopril (ZESTRIL) 2 5 mg tablet Take 2 5 mg by mouth daily      melatonin 3 mg Take 2 tablets (6 mg total) by mouth daily at bedtime  0    metoprolol succinate (TOPROL-XL) 25 mg 24 hr tablet Take 0 5 tablets (12 5 mg total) by mouth daily 30 tablet 0    pantoprazole (PROTONIX) 40 mg tablet Take 1 tablet (40 mg total) by mouth 2 (two) times a day  0    potassium chloride (K-DUR,KLOR-CON) 20 mEq tablet Take 1 tablet (20 mEq total) by mouth daily  0    pregabalin (LYRICA) 50 mg capsule Take 1 capsule (50 mg total) by mouth 3 (three) times a day for 10 days 30 capsule 0    senna (SENOKOT) 8 6 mg Take 1 tablet (8 6 mg total) by mouth daily at bedtime 120 each 0    spironolactone (ALDACTONE) 25 mg tablet Take 0 5 tablets (12 5 mg total) by mouth daily 30 tablet 0    tamsulosin (FLOMAX) 0 4 mg Take 1 capsule (0 4 mg total) by mouth daily with dinner  0    torsemide (DEMADEX) 10 mg tablet Take 1 tablet (10 mg total) by mouth daily  0     No current facility-administered medications on file prior to visit        Allergies:  No Known Allergies    Social Hx:  Social History     Social History    Marital status: Single     Spouse name: N/A    Number of children: N/A    Years of education: N/A     Occupational History    Not on file  Social History Main Topics    Smoking status: Former Smoker     Types: Pipe    Smokeless tobacco: Never Used    Alcohol use No    Drug use: No    Sexual activity: Not on file     Other Topics Concern    Not on file     Social History Narrative    No narrative on file     Family Hx:    No family history on file  A  Bleeding diatheses:  Denies  B  Thrombophilias:  Denies    Review of Systems:  1  Constitutional: No fevers or chills  2  Eyes: No blurry vision  3  ENT: No rhinorrhea, sore throat  4  CV: No chest pain, No REDD  5  Respiratory: No shortness of breath  6  Gastrointestinal:  No issues   7  Genitourinary: No hematuria, no dysuria  8  MSK: No myalgias, no arthralgias, otherwise as per HPI  9  Integumentary: No rashes, otherwise as per HPI  10  Neurological: No weakness or numbness  11  Psychiatric: No depression, no suicidal ideation  12  Endocrine: No polyuria  13  Hematologic/Lymphatic: No easy bruising, no self-reported lymphadenopathy  14  Immunologic: No recent infections    Physical Examination:  1  Constitutional:   Vitals:    12/03/18 1208   Pulse: 70   Temp: 98 °F (36 7 °C)     2  GEN: NAD  3  Eyes: PERRL  4  ENT: MMM  5  Neck: Supple  6  CV: RRR  7  Pulm: CTAB  8  Chest: No deformities, wounds have healed in very well and the wound tracking all the way into the axilla has closed up very well with some hypergranulation tissue in the triangular center portion of the axillary portion of the wound  The applied silver nitrate using 2 sticks to this area to chemically cauterize the hypergranulation tissue  All sutures had been previously removed by combination of myself and Dr Joseph Long  9  Abdomen/GI:  Soft, nondistended  10  MSK: FROM, deconditioned and currently in wheelchair  11  Skin: Warm and dry, the back shows several foci of non infected but fibrinous exudate that seems to have caked itself to the bottom of these wounds    No green discoloration is noted as previous  No warmth to touch, surrounding erythema, fluctuance, discharge, or malodor is noted  12  Neurologic: CN II-XI intact  13  Psychiatric: Appropriate    ASSESSMENT AND PLAN:   I certainly thank Dr Luz Marina Watson for doing such a wonderful job in keeping the wound clean and free of any infection  At this point in speaking to him, I think that Gretta Ponce can use another debridement  No rush, of course but certainly should be done within the next week so I will plan him for 12/10/2018 at 1:00 p m         I did already speak with Dr Destini Love of anesthesia to give him a heads up of his EF and general status  Dr Chavira Clarity recall some well and we have decided that we will try to minimize anesthesia  Patient to be sufficient for a right lateral decubitus positioning  No need to go prone  Counseling / Coordination of Care  Total floor/unit time spent today 50 minutes  Greater than 50% of total time was spent with the patient and/or family counseling and/or coordination of care  A description of the counseling / coordination of care:  I performed an interim history, pertinent images and labs, performed a physical examination to arrive at the plan delineated above with associated thought processes  Greater than 50% of total time was spent with the patient and/or family counseling and/or coordination of care  Furthermore, in calculating this total time I have been careful to only include the minimum time during which I personally was providing the critical care services listed to this patient while I was on the unit or floor  A description of the counseling / coordination of care:  I performed an interim history, pertinent images and labs, performed a physical examination to arrive at the plan delineated above with associated thought processes  RENETTA Baum [facility]  Trauma, Acute Care, and [address] [place] [address] St:  [address] [facility], [address] [place] [address]  Office - [phone]  Fax - [phone]    [facility]:  [address]  Ángel Cummins   Office - [phone]  Fax - [phone]    The areas in bold, if present, are summary statements/bullet points for nursing and consultant/primary teams  * Points delineated in red, if present, are not points of contention and should not be taken by any practitioner, nurse, therapist, , or anyone else with access to the patient chart  Hand off errors are prevalent and therefore, any changes or updates shown in red are to communicate to the next practitioner, points not discussed in handout but can affect patient care and management (yet not important enough to wake, or disturb, or alert the next practitioner in an emergent fashion)  Portions of the record may have been created with voice recognition software  Occasional wrong word or "sound a like" substitutions may have occurred due to the inherent limitations of voice recognition software  Read the chart carefully and recognize, using context, where substitutions have occurred

## 2018-12-06 RX ORDER — DIGOXIN 125 MCG
125 TABLET ORAL DAILY
COMMUNITY

## 2018-12-06 NOTE — PRE-PROCEDURE INSTRUCTIONS
My Surgical Experience    The following information was developed to assist you to prepare for your operation  What do I need to do before coming to the hospital?   Arrange for a responsible person to drive you to and from the hospital    Arrange care for your children at home  Children are not allowed in the recovery areas of the hospital   Plan to wear clothing that is easy to put on and take off  If you are having shoulder surgery, wear a shirt that buttons or zippers in the front  Bathing  o Shower the evening before and the morning of your surgery with an antibacterial soap  Please refer to the Pre Op Showering Instructions for Surgery Patients Sheet   o Remove nail polish and all body piercing jewelry  o Do not shave any body part for at least 24 hours before surgery-this includes face, arms, legs and upper body  Food  o Nothing to eat or drink after midnight the night before your surgery  This includes candy and chewing gum  o Exception: If your surgery is after 12:00pm (noon), you may have clear liquids such as 7-Up®, ginger ale, apple or cranberry juice, Jell-O®, water, or clear broth until 8:00 am  o Do not drink milk or juice with pulp on the morning before surgery  o Do not drink alcohol 24 hours before surgery  Medicine  o Follow instructions you received from your surgeon about which medicines you may take on the day of surgery  o If instructed to take medicine on the morning of surgery, take pills with just a small sip of water  Call your prescribing doctor for specific infroamtion on what to do if you take insulin    What should I bring to the hospital?    Bring:  Floydene Amour or a walker, if you have them, for foot or knee surgery   A list of the daily medicines, vitamins, minerals, herbals and nutritional supplements you take   Include the dosages of medicines and the time you take them each day   Glasses, dentures or hearing aids   Minimal clothing; you will be wearing hospital sleepwear   Photo ID; required to verify your identity   If you have a Living Will or Power of , bring a copy of the documents   If you have an ostomy, bring an extra pouch and any supplies you use    Do not bring   Medicines or inhalers   Money, valuables or jewelry    What other information should I know about the day of surgery?  Notify your surgeons if you develop a cold, sore throat, cough, fever, rash or any other illness   Report to the Ambulatory Surgical/Same Day Surgery Unit   You will be instructed to stop at Registration only if you have not been pre-registered   Inform your  fi they do not stay that they will be asked by the staff to leave a phone number where they can be reached   Be available to be reached before surgery  In the event the operating room schedule changes, you may be asked to come in earlier or later than expected    *It is important to tell your doctor and others involved in your health care if you are taking or have been taking any non-prescription drugs, vitamins, minerals, herbals or other nutritional supplements  Any of these may interact with some food or medicines and cause a reaction      Pre-Surgery Instructions:   Medication Instructions    acetaminophen (TYLENOL) 325 mg tablet Instructed patient per Anesthesia Guidelines   atorvastatin (LIPITOR) 10 mg tablet Instructed patient per Anesthesia Guidelines   collagenase (SANTYL) ointment Instructed patient per Anesthesia Guidelines   digoxin (LANOXIN) 0 125 mg tablet Instructed patient per Anesthesia Guidelines   docusate sodium (COLACE) 100 mg capsule Instructed patient per Anesthesia Guidelines   ferrous sulfate 325 (65 Fe) mg tablet Instructed patient per Anesthesia Guidelines   glimepiride (AMARYL) 4 mg tablet Instructed patient per Anesthesia Guidelines   insulin detemir (LEVEMIR) 100 units/mL subcutaneous injection Instructed patient per Anesthesia Guidelines      insulin lispro (HumaLOG) 100 units/mL injection Instructed patient per Anesthesia Guidelines   lisinopril (ZESTRIL) 2 5 mg tablet Instructed patient per Anesthesia Guidelines   melatonin 3 mg Instructed patient per Anesthesia Guidelines   metoprolol succinate (TOPROL-XL) 25 mg 24 hr tablet Instructed patient per Anesthesia Guidelines   omeprazole (PriLOSEC) 20 mg delayed release capsule Instructed patient per Anesthesia Guidelines   pantoprazole (PROTONIX) 40 mg tablet Instructed patient per Anesthesia Guidelines   potassium chloride (K-DUR,KLOR-CON) 20 mEq tablet Instructed patient per Anesthesia Guidelines   senna (SENOKOT) 8 6 mg Instructed patient per Anesthesia Guidelines   spironolactone (ALDACTONE) 25 mg tablet Instructed patient per Anesthesia Guidelines   tamsulosin (FLOMAX) 0 4 mg Instructed patient per Anesthesia Guidelines   torsemide (DEMADEX) 10 mg tablet Instructed patient per Anesthesia Guidelines  To take digoxin, metoprolol,lisinopril, protonix, and toprol a m  Of surgery

## 2018-12-10 ENCOUNTER — HOSPITAL ENCOUNTER (OUTPATIENT)
Facility: HOSPITAL | Age: 78
Setting detail: OUTPATIENT SURGERY
Discharge: HOME | End: 2018-12-10
Attending: SURGERY | Admitting: SURGERY
Payer: MEDICARE

## 2018-12-10 ENCOUNTER — ANESTHESIA (OUTPATIENT)
Dept: PERIOP | Facility: HOSPITAL | Age: 78
End: 2018-12-10
Payer: MEDICARE

## 2018-12-10 ENCOUNTER — ANESTHESIA EVENT (OUTPATIENT)
Dept: PERIOP | Facility: HOSPITAL | Age: 78
End: 2018-12-10
Payer: MEDICARE

## 2018-12-10 VITALS
HEART RATE: 91 BPM | BODY MASS INDEX: 25.17 KG/M2 | RESPIRATION RATE: 18 BRPM | TEMPERATURE: 98.4 F | SYSTOLIC BLOOD PRESSURE: 111 MMHG | WEIGHT: 168 LBS | OXYGEN SATURATION: 100 % | DIASTOLIC BLOOD PRESSURE: 65 MMHG

## 2018-12-10 DIAGNOSIS — S21.202D BACK WOUND, LEFT, SUBSEQUENT ENCOUNTER: Primary | ICD-10-CM

## 2018-12-10 LAB — GLUCOSE SERPL-MCNC: 146 MG/DL (ref 65–140)

## 2018-12-10 PROCEDURE — 11045 DBRDMT SUBQ TISS EACH ADDL: CPT | Performed by: SURGERY

## 2018-12-10 PROCEDURE — 82948 REAGENT STRIP/BLOOD GLUCOSE: CPT

## 2018-12-10 PROCEDURE — 11042 DBRDMT SUBQ TIS 1ST 20SQCM/<: CPT | Performed by: SURGERY

## 2018-12-10 RX ORDER — LIDOCAINE HYDROCHLORIDE 10 MG/ML
INJECTION, SOLUTION INFILTRATION; PERINEURAL AS NEEDED
Status: DISCONTINUED | OUTPATIENT
Start: 2018-12-10 | End: 2018-12-10 | Stop reason: SURG

## 2018-12-10 RX ORDER — OXYCODONE HYDROCHLORIDE 5 MG/1
5 TABLET ORAL EVERY 4 HOURS PRN
Qty: 30 TABLET | Refills: 0 | Status: SHIPPED | OUTPATIENT
Start: 2018-12-10 | End: 2018-12-20

## 2018-12-10 RX ORDER — FENTANYL CITRATE 50 UG/ML
INJECTION, SOLUTION INTRAMUSCULAR; INTRAVENOUS AS NEEDED
Status: DISCONTINUED | OUTPATIENT
Start: 2018-12-10 | End: 2018-12-10 | Stop reason: SURG

## 2018-12-10 RX ORDER — CEFAZOLIN SODIUM 2 G/50ML
2000 SOLUTION INTRAVENOUS ONCE
Status: COMPLETED | OUTPATIENT
Start: 2018-12-10 | End: 2018-12-10

## 2018-12-10 RX ORDER — LIDOCAINE HYDROCHLORIDE 10 MG/ML
INJECTION, SOLUTION INFILTRATION; PERINEURAL AS NEEDED
Status: DISCONTINUED | OUTPATIENT
Start: 2018-12-10 | End: 2018-12-10 | Stop reason: HOSPADM

## 2018-12-10 RX ORDER — BUPIVACAINE HYDROCHLORIDE AND EPINEPHRINE 5; 5 MG/ML; UG/ML
INJECTION, SOLUTION PERINEURAL AS NEEDED
Status: DISCONTINUED | OUTPATIENT
Start: 2018-12-10 | End: 2018-12-10 | Stop reason: HOSPADM

## 2018-12-10 RX ORDER — PROPOFOL 10 MG/ML
INJECTION, EMULSION INTRAVENOUS AS NEEDED
Status: DISCONTINUED | OUTPATIENT
Start: 2018-12-10 | End: 2018-12-10 | Stop reason: SURG

## 2018-12-10 RX ORDER — SODIUM CHLORIDE, SODIUM LACTATE, POTASSIUM CHLORIDE, CALCIUM CHLORIDE 600; 310; 30; 20 MG/100ML; MG/100ML; MG/100ML; MG/100ML
75 INJECTION, SOLUTION INTRAVENOUS CONTINUOUS
Status: DISCONTINUED | OUTPATIENT
Start: 2018-12-10 | End: 2018-12-10

## 2018-12-10 RX ORDER — SODIUM CHLORIDE, SODIUM LACTATE, POTASSIUM CHLORIDE, CALCIUM CHLORIDE 600; 310; 30; 20 MG/100ML; MG/100ML; MG/100ML; MG/100ML
100 INJECTION, SOLUTION INTRAVENOUS CONTINUOUS
Status: DISCONTINUED | OUTPATIENT
Start: 2018-12-10 | End: 2018-12-10 | Stop reason: HOSPADM

## 2018-12-10 RX ORDER — ONDANSETRON 2 MG/ML
INJECTION INTRAMUSCULAR; INTRAVENOUS AS NEEDED
Status: DISCONTINUED | OUTPATIENT
Start: 2018-12-10 | End: 2018-12-10 | Stop reason: SURG

## 2018-12-10 RX ORDER — PROMETHAZINE HYDROCHLORIDE 25 MG/ML
25 INJECTION, SOLUTION INTRAMUSCULAR; INTRAVENOUS ONCE AS NEEDED
Status: DISCONTINUED | OUTPATIENT
Start: 2018-12-10 | End: 2018-12-10 | Stop reason: HOSPADM

## 2018-12-10 RX ORDER — FENTANYL CITRATE/PF 50 MCG/ML
25 SYRINGE (ML) INJECTION
Status: DISCONTINUED | OUTPATIENT
Start: 2018-12-10 | End: 2018-12-10 | Stop reason: HOSPADM

## 2018-12-10 RX ORDER — ONDANSETRON 2 MG/ML
4 INJECTION INTRAMUSCULAR; INTRAVENOUS ONCE AS NEEDED
Status: DISCONTINUED | OUTPATIENT
Start: 2018-12-10 | End: 2018-12-10 | Stop reason: HOSPADM

## 2018-12-10 RX ORDER — CHLORHEXIDINE GLUCONATE 4 G/100ML
SOLUTION TOPICAL DAILY PRN
Status: DISCONTINUED | OUTPATIENT
Start: 2018-12-10 | End: 2018-12-10 | Stop reason: HOSPADM

## 2018-12-10 RX ADMIN — ONDANSETRON 4 MG: 2 INJECTION INTRAMUSCULAR; INTRAVENOUS at 13:40

## 2018-12-10 RX ADMIN — SODIUM CHLORIDE, SODIUM LACTATE, POTASSIUM CHLORIDE, AND CALCIUM CHLORIDE 75 ML/HR: .6; .31; .03; .02 INJECTION, SOLUTION INTRAVENOUS at 11:44

## 2018-12-10 RX ADMIN — FENTANYL CITRATE 50 MCG: 50 INJECTION, SOLUTION INTRAMUSCULAR; INTRAVENOUS at 13:17

## 2018-12-10 RX ADMIN — CEFAZOLIN SODIUM 2000 MG: 2 SOLUTION INTRAVENOUS at 13:10

## 2018-12-10 RX ADMIN — PROPOFOL 90 MG: 10 INJECTION, EMULSION INTRAVENOUS at 13:17

## 2018-12-10 RX ADMIN — FENTANYL CITRATE 50 MCG: 50 INJECTION, SOLUTION INTRAMUSCULAR; INTRAVENOUS at 13:38

## 2018-12-10 RX ADMIN — LIDOCAINE HYDROCHLORIDE 50 MG: 10 INJECTION, SOLUTION INFILTRATION; PERINEURAL at 13:17

## 2018-12-10 NOTE — DISCHARGE INSTR - AVS FIRST PAGE
1  Please keep all wounds clean and dry  You may shower  Please do not immerse wounds in tub or pool for 2 weeks  2  Wash all wounds with mild soap and water  There is not a need for any dressings  3  Please call for any fever greater than 101 5 degrees fahrenheit, redness or drainage from the wound, purulence (pus), malodor (bad odor), or if wound comes apart  4  Please call with any other concerns: [phone]   5  Please call for follow up, make an appointment for 2 weeks post-discharge [phone]   6  Please packed the wound with 0 5 in iodoform gauze daily  Dress with ABD pad and tape thereafter

## 2018-12-10 NOTE — DISCHARGE INSTRUCTIONS
1  Please keep all wounds clean and dry  You may shower  Please do not immerse wounds in tub or pool for 2 weeks  2  Wash all wounds with mild soap and water  There is not a need for any dressings  3  Please call for any fever greater than 101 5 degrees fahrenheit, redness or drainage from the wound, purulence (pus), malodor (bad odor), or if wound comes apart  4  Please call with any other concerns: [phone]   5  Please call for follow up, make an appointment for 2 weeks post-discharge [phone]   6  Please packed the wound with 0 5 in iodoform gauze daily  Dress with ABD pad and tape thereafter

## 2018-12-10 NOTE — H&P (VIEW-ONLY)
[address] [place] [address] [facility] History and Physical Note:    Admitting Physician: Abril Butts MD    Chief Complaint:  Back wound and follow up for chest wound    HPI:  Liset Thomas is completely awake and alert now and states that he is very happy with the chest wound  He states that his left upper arm still is or every once in a while, but that his axillary region is relatively pain and discomfort free  He does not have any discomfort in his back, however he is aware of the wound that is there  He had been following up in the [facility] with Dr Padmaja Ibanez  Otherwise all sutures have been removed at this point and he denies any fevers or chills  PMH:  Past Medical History:   Diagnosis Date    [facility]congest[facility]) [facility] [address]     [facility]     Gastrointestinal hemorrhage associated with duodenal ulcer 11/3/2018    History of open heart surgery     Hyperlipidemia     Hypertension      PSH:  Past Surgical History:   Procedure Laterality Date    CARDIAC SURGERY      ESOPHAGOGASTRODUODENOSCOPY N/A 10/30/2018    Procedure: ESOPHAGOGASTRODUODENOSCOPY (EGD); Surgeon: Chvey Acosta MD;  Location: [facility]; Service: Gastroenterology    ESOPHAGOGASTRODUODENOSCOPY N/A 11/4/2018    Procedure: ESOPHAGOGASTRODUODENOSCOPY (EGD); Surgeon: Lucio Weston MD;  Location: [facility]; Service: Gastroenterology    IR PICC LINE  10/31/2018    WOUND DEBRIDEMENT Left 10/26/2018    Procedure: Chest wall wound debridement (extensive) with pulse lavage and wound vac placement;  Surgeon: Dorothea Bates MD;  Location: [address] [place] [address];  Service: General    WOUND DEBRIDEMENT Left 10/29/2018    Procedure: DEBRIDEMENT WOUND AND DRESSING CHANGE ([address] OUT);   Surgeon: Dorothea Bates MD;  Location: [address] [place] [address];  Service: General     Home Meds:  Current Outpatient Prescriptions on File Prior to Visit   Medication Sig Dispense Refill    acetaminophen (TYLENOL) 325 mg tablet Take 2 tablets (650 mg total) by mouth every 6 (six) hours as needed for mild pain, headaches or fever 30 tablet 0    atorvastatin (LIPITOR) 10 mg tablet Take 10 mg by mouth daily      collagenase (SANTYL) ointment Apply topically daily 15 g 0    docusate sodium (COLACE) 100 mg capsule Take 100 mg by mouth 2 (two) times a day      ferrous sulfate 325 (65 Fe) mg tablet Take 325 mg by mouth 3 (three) times a day with meals      glimepiride (AMARYL) 4 mg tablet Take 4 mg by mouth 2 (two) times a day with meals      insulin detemir (LEVEMIR) 100 units/mL subcutaneous injection Inject 8 Units under the skin daily at bedtime  0    insulin lispro (HumaLOG) 100 units/mL injection Inject 1-5 Units under the skin 3 (three) times a day before meals  0    lisinopril (ZESTRIL) 2 5 mg tablet Take 2 5 mg by mouth daily      melatonin 3 mg Take 2 tablets (6 mg total) by mouth daily at bedtime  0    metoprolol succinate (TOPROL-XL) 25 mg 24 hr tablet Take 0 5 tablets (12 5 mg total) by mouth daily 30 tablet 0    pantoprazole (PROTONIX) 40 mg tablet Take 1 tablet (40 mg total) by mouth 2 (two) times a day  0    potassium chloride (K-DUR,KLOR-CON) 20 mEq tablet Take 1 tablet (20 mEq total) by mouth daily  0    pregabalin (LYRICA) 50 mg capsule Take 1 capsule (50 mg total) by mouth 3 (three) times a day for 10 days 30 capsule 0    senna (SENOKOT) 8 6 mg Take 1 tablet (8 6 mg total) by mouth daily at bedtime 120 each 0    spironolactone (ALDACTONE) 25 mg tablet Take 0 5 tablets (12 5 mg total) by mouth daily 30 tablet 0    tamsulosin (FLOMAX) 0 4 mg Take 1 capsule (0 4 mg total) by mouth daily with dinner  0    torsemide (DEMADEX) 10 mg tablet Take 1 tablet (10 mg total) by mouth daily  0     No current facility-administered medications on file prior to visit        Allergies:  No Known Allergies    Social Hx:  Social History     Social History    Marital status: Single     Spouse name: N/A    Number of children: N/A    Years of education: N/A     Occupational History    Not on file  Social History Main Topics    Smoking status: Former Smoker     Types: Pipe    Smokeless tobacco: Never Used    Alcohol use No    Drug use: No    Sexual activity: Not on file     Other Topics Concern    Not on file     Social History Narrative    No narrative on file     Family Hx:    No family history on file  A  Bleeding diatheses:  Denies  B  Thrombophilias:  Denies    Review of Systems:  1  Constitutional: No fevers or chills  2  Eyes: No blurry vision  3  ENT: No rhinorrhea, sore throat  4  CV: No chest pain, No REDD  5  Respiratory: No shortness of breath  6  Gastrointestinal:  No issues   7  Genitourinary: No hematuria, no dysuria  8  MSK: No myalgias, no arthralgias, otherwise as per HPI  9  Integumentary: No rashes, otherwise as per HPI  10  Neurological: No weakness or numbness  11  Psychiatric: No depression, no suicidal ideation  12  Endocrine: No polyuria  13  Hematologic/Lymphatic: No easy bruising, no self-reported lymphadenopathy  14  Immunologic: No recent infections    Physical Examination:  1  Constitutional:   Vitals:    12/03/18 1208   Pulse: 70   Temp: 98 °F (36 7 °C)     2  GEN: NAD  3  Eyes: PERRL  4  ENT: MMM  5  Neck: Supple  6  CV: RRR  7  Pulm: CTAB  8  Chest: No deformities, wounds have healed in very well and the wound tracking all the way into the axilla has closed up very well with some hypergranulation tissue in the triangular center portion of the axillary portion of the wound  The applied silver nitrate using 2 sticks to this area to chemically cauterize the hypergranulation tissue  All sutures had been previously removed by combination of myself and Dr Fiorella Palacios  9  Abdomen/GI:  Soft, nondistended  10  MSK: FROM, deconditioned and currently in wheelchair  11  Skin: Warm and dry, the back shows several foci of non infected but fibrinous exudate that seems to have caked itself to the bottom of these wounds    No green discoloration is noted as previous  No warmth to touch, surrounding erythema, fluctuance, discharge, or malodor is noted  12  Neurologic: CN II-XI intact  13  Psychiatric: Appropriate    ASSESSMENT AND PLAN:   I certainly thank Dr Cesar Pedraza for doing such a wonderful job in keeping the wound clean and free of any infection  At this point in speaking to him, I think that Roberto May can use another debridement  No rush, of course but certainly should be done within the next week so I will plan him for 12/10/2018 at 1:00 p m         I did already speak with Dr Maile Blunt of anesthesia to give him a heads up of his EF and general status  Dr Starla Sever recall some well and we have decided that we will try to minimize anesthesia  Patient to be sufficient for a right lateral decubitus positioning  No need to go prone  Counseling / Coordination of Care  Total floor/unit time spent today 50 minutes  Greater than 50% of total time was spent with the patient and/or family counseling and/or coordination of care  A description of the counseling / coordination of care:  I performed an interim history, pertinent images and labs, performed a physical examination to arrive at the plan delineated above with associated thought processes  Greater than 50% of total time was spent with the patient and/or family counseling and/or coordination of care  Furthermore, in calculating this total time I have been careful to only include the minimum time during which I personally was providing the critical care services listed to this patient while I was on the unit or floor  A description of the counseling / coordination of care:  I performed an interim history, pertinent images and labs, performed a physical examination to arrive at the plan delineated above with associated thought processes  RENETTA Reed    [address] [place] [address]  Trauma, Acute Care, and [address] [place] [address] St:  [address] [facility], [address] [place] [address]  Office - [phone]  Fax - [phone] [address]:  [address]  [place]ngel Cummins   Office - [phone]  Fax - [phone]    The areas in bold, if present, are summary statements/bullet points for nursing and consultant/primary teams  * Points delineated in red, if present, are not points of contention and should not be taken by any practitioner, nurse, therapist, , or anyone else with access to the patient chart  Hand off errors are prevalent and therefore, any changes or updates shown in red are to communicate to the next practitioner, points not discussed in handout but can affect patient care and management (yet not important enough to wake, or disturb, or alert the next practitioner in an emergent fashion)  Portions of the record may have been created with voice recognition software  Occasional wrong word or "sound a like" substitutions may have occurred due to the inherent limitations of voice recognition software  Read the chart carefully and recognize, using context, where substitutions have occurred

## 2018-12-10 NOTE — ANESTHESIA PREPROCEDURE EVALUATION
Review of Systems/Medical History  Patient summary reviewed  Chart reviewed  No history of anesthetic complications     Cardiovascular  Exercise tolerance (METS): <4,  Hyperlipidemia, Hypertension , CAD , History of CABG, Dysrhythmias , atrial fibrillation, CHF ,   Comment: SUMMARY:  Compared with prior echo, no significant change in LV size or systolic function     LEFT VENTRICLE:  The ventricle was mildly dilated  Systolic function was markedly reduced  Ejection fraction was estimated in the range of 20 % to 25 % to be 25 %  There was severe diffuse hypokinesis  ,  Pulmonary  Smoker ex-smoker  ,        GI/Hepatic       Kidney disease CKD, Chronic kidney disease stage 3,        Endo/Other  Diabetes type 2 Insulin,      GYN       Hematology  Anemia anemia of chronic disease,     Musculoskeletal       Neurology   Psychology           Physical Exam    Airway    Mallampati score: II  TM Distance: >3 FB  Neck ROM: full     Dental       Cardiovascular  Rhythm: irregular,     Pulmonary  Decreased breath sounds,     Other Findings        Anesthesia Plan  ASA Score- 4     Anesthesia Type- general with ASA Monitors  Additional Monitors:   Airway Plan: LMA  Plan Factors-    Induction- intravenous  Postoperative Plan- Plan for postoperative opioid use  Informed Consent- Anesthetic plan and risks discussed with patient  I personally reviewed this patient with the CRNA  Discussed and agreed on the Anesthesia Plan with the CRNA  Kalyn Olivas

## 2018-12-10 NOTE — OP NOTE
OPERATIVE REPORT  PATIENT NAME: Verdia Dance    :  1940  MRN: [id number]  Pt Location: [facility] OR ROOM 04    SURGERY DATE: 12/10/2018    Surgeon(s) and Role:     * Claudia Farmer MD - Primary    Preop Diagnosis:  Back wound, left, subsequent encounter [S2D]    Post-Op Diagnosis Codes:     * Back wound, left, subsequent encounter [S2D]    Procedure(s) (LRB):  DEBRIDEMENT WOUND AND DRESSING CHANGE ([address] OUT) (Left)    Specimen(s):  * No specimens in log *    Estimated Blood Loss:   Minimal    Drains:  Urethral Catheter 16 Fr  (Active)   Number of days: 34       Anesthesia Type:   Choice    Operative Indications:  Back wound, left, subsequent encounter [S2]    Operative Findings:  Significant dries fibrinous slough at the four back wounds  No malodor, discharge, or warmth the touch noted  In the most superior wound, small eschar is noted  Complications:   None    Procedure and Technique:  After the patient was brought into the OR, the patient was identified as Jena Raphael, date of birth 1940  We reviewed that we had planned on a debridement of the back wounds as the surgical procedure:  Key steps and modifications were reviewed  We noted that the patient was not on beta blockers, but with his history of AFib had taken his digoxin today  He was not on any therapeutic anticoagulation  Fire safety level was noted to be 3  We reviewed the patient's allergies and noted that he had none  On and in place  Patient was anesthetized, prepped, and draped in the supine position then changed over to the right lateral decubitus position  Patient was given 2 g Ancef prior to incision      Starting off, we took the electrocautery knife and traced the periphery of the most inferior wound and subsequently were able to excise skin, subcutaneous tissue and this process was repeated for the remaining 3 wounds and the superior most and right lateral most, given their proximity were made to be connected into 1 wound for a total of 3 wounds down to the subcutaneous tissue in the end  Total excision area was noted to be approximately 10 cm x 10 cm  Depth was approximately 2 cm for each wound  The wounds were then packed and then subsequently dressed with ABD pad  Patient tolerated the procedure well and was taken to PACU for further care and management  I was present for the entire procedure and a qualified resident physician was not available      Patient Disposition:  PACU     SIGNATURE: Johan Duckworth MD  DATE: December 10, 2018  TIME: 2:06 PM

## 2018-12-27 ENCOUNTER — OFFICE VISIT (OUTPATIENT)
Dept: SURGERY | Facility: CLINIC | Age: 78
End: 2018-12-27

## 2018-12-27 DIAGNOSIS — S21.202D BACK WOUND, LEFT, SUBSEQUENT ENCOUNTER: Primary | ICD-10-CM

## 2018-12-27 PROCEDURE — 1123F ACP DISCUSS/DSCN MKR DOCD: CPT | Performed by: SURGERY

## 2018-12-27 PROCEDURE — 99024 POSTOP FOLLOW-UP VISIT: CPT | Performed by: SURGERY

## 2018-12-27 NOTE — PROGRESS NOTES
General Surgery Progress Note    Chief Complaint: "I'm doing ok? How was your holiday?"    Subjective/24 hour/interim events:  No acute issues  No fevers or chills  Objective: There were no vitals taken for this visit  General:  No acute distress, very pleasant  Back:  The 3 areas in the back are all filling in well without any signs of infection  Good granulation tissue is in place upon removal of the wound VAC  I did re-dress this with an abdominal pad for now as he should get the wound VAC replaced once he returns the facility  Ext:  Currently in a wheelchair  Pertinent labs reviewed  Pertinent notes reviewed  Pertinent imaging reviewed  Assessment and Plan  Please have the patient follow up with me in the [address] [place] [address] Road - please send an appointment for 01/08/2019:  Uzma Salamanca MD  General Surgery and Wound Care  [address] [place], [place] [address]   Phone: [phone]  Fax: [phone]     Otherwise please continue the wound VAC  Counseling / Coordination of Care  Total floor/unit time spent today 10 minutes  Greater than 50% of total time was spent with the patient and/or family counseling and/or coordination of care  A description of the counseling / coordination of care:  I performed an interim history, pertinent images and labs, performed a physical examination to arrive at the plan delineated above with associated thought processes  RENETTA Blackmon  [facility]  Trauma, Acute Care, and [address] [place] [address] St:  [address] [place] [address]  Office - [phone]  Fax - [phone]    [facility]:  [address]  [address]   Office - [phone]  Fax - [phone]    The areas in bold, if present, are summary statements/bullet points for nursing and consultant/primary teams      * Points delineated in red, if present, are not points of contention and should not be taken by any practitioner, nurse, therapist, , or anyone else with access to the patient chart  Hand off errors are prevalent and therefore, any changes or updates shown in red are to communicate to the next practitioner, points not discussed in handout but can affect patient care and management (yet not important enough to wake, or disturb, or alert the next practitioner in an emergent fashion)  Portions of the record may have been created with voice recognition software  Occasional wrong word or "sound a like" substitutions may have occurred due to the inherent limitations of voice recognition software  Read the chart carefully and recognize, using context, where substitutions have occurred

## 2019-01-08 ENCOUNTER — APPOINTMENT (OUTPATIENT)
Dept: WOUND CARE | Facility: HOSPITAL | Age: 79
End: 2019-01-08
Payer: MEDICARE

## 2019-01-08 PROCEDURE — 99213 OFFICE O/P EST LOW 20 MIN: CPT

## 2019-01-08 PROCEDURE — 97606 NEG PRS WND THER DME>50 SQCM: CPT

## 2019-01-22 ENCOUNTER — APPOINTMENT (OUTPATIENT)
Dept: WOUND CARE | Facility: HOSPITAL | Age: 79
End: 2019-01-22
Payer: COMMERCIAL

## 2019-01-22 PROCEDURE — 97597 DBRDMT OPN WND 1ST 20 CM/<: CPT

## 2019-01-23 NOTE — ASSESSMENT & PLAN NOTE
Bacteremia, positive blood cultures with Staph aureus being treated on long-term antibiotics  -infectious disease managing antibiotic therapy  -patient has PICC in the right upper extremity, notable swelling today with venous ultrasound showing a cephalic vein thrombophlebitis which extends to the junction of the subclavian vein  -see plan under thrombophlebitis
Lab Results   Component Value Date    HGBA1C 12 0 (H) 10/26/2018       Recent Labs      11/03/18   1101  11/03/18   1624  11/03/18   2116  11/04/18   0714   POCGLU  156*  257*  241*  230*       Blood Sugar Average: Last 72 hrs:  (P) 902 [id number]     · DKA resolved  · Goal blood sugar under 180
Lab Results   Component Value Date    HGBA1C 12 0 (H) 10/26/2018       Recent Labs      11/03/18   1101  11/03/18   1624  11/03/18   2116  11/04/18   0714   POCGLU  156*  257*  241*  230*     · Blood sugar poorly controlled    NPO at this time, held bedtime Lantus at this time  · Continue SSI algorithm 4  · Goal blood sugar under 180
Lab Results   Component Value Date    HGBA1C 12 0 (H) 10/26/2018       Recent Labs      11/07/18   2121  11/08/18   1058  11/08/18   1609  11/08/18   2205   POCGLU  121  142*  157*  103       Blood Sugar Average: Last 72 hrs:  (P) 105 75
Lab Results   Component Value Date    HGBA1C 12 0 (H) 10/26/2018       Recent Labs      11/12/18   0710  11/12/18   1115  11/12/18   1617  11/12/18   2106   POCGLU  107  163*  239*  227*       Blood Sugar Average: Last 72 hrs:  (P) 135     Decreased Levemir at 8 units daily given hypoglycemia in a m  added pre meal insulin  Monitor
Lab Results   Component Value Date    HGBA1C 12 0 (H) 10/26/2018       Recent Labs      11/13/18   1618  11/13/18   2103  11/14/18   0653  11/14/18   1112   POCGLU  90  174*  138  157*       Blood Sugar Average: Last 72 hrs:  (P) 137 [id number]     Decreased Levemir at 8 units daily given hypoglycemia in a m  added pre meal insulin 3 units TIDAC but patient was not getting it as his sugars were stable so it was stopped on DC  Monitor, cont ISS
Lab Results   Component Value Date    HGBA1C 12 0 (H) 10/26/2018       Recent Labs      11/16/18   1620  11/16/18   2213  11/17/18   0702  11/17/18   1137   POCGLU  206*  197*  136  180*       Blood Sugar Average: Last 72 hrs:  (P) 746 [id number]     -continue with tight glycemic control for optimal wound healing  -managed per primary team
Likely related hypoactive delirium  Continue environmental controls  Seroquel HS 
Malnutrition Findings:           BMI Findings: Body mass index is 24 02 kg/m² 
Malnutrition Findings:           BMI Findings: Body mass index is 27 76 kg/m² 
Malnutrition Findings:     patient with temporal wasting and muscle wasting  Appreciate nutrition input       BMI Findings: Body mass index is 26 26 kg/m² 
Noted to be 111 on presentation likely secondary to combination of hypovolemic hyponatremia secondary to dehydration, sepsis and DKA plus pseudo hyponatremia secondary to hyperglycemia
On Gabriellaa
On Gabriellaa
Renal secondary to septic shock in DKA  Improved  Monitor
Stable  Asymptomatic  Continue metoprolol, lisinoprol
Stable  Asymptomatic  Continue metoprolol, lisinoprol
Stable, baseline creatinine 0 9-1 3  Monitor input output
Stable, baseline creatinine 0 9-1 3  Monitor input output
Status post EGD 11/4 revealed duodenal bleeding ulcer  Continue PPI  Monitor H&H
Status post EGD 11/4 revealed duodenal bleeding ulcer  Continue PPI  Monitor H&H, drifting but no evidence of ongoing bleeding  Advanced diet
Status post extubation 10/26
Status post failing voiding trial  Likely secondary to BPH , on Flomax  Follow up Urology recommendation  Maintain Gordon for now, DC once mobility improves
Status post failing voiding trial, urology consulted and luis placed  Likely secondary to BPH , placed on Flomax  Maintain Luis on DC, void trial once mobility improves after discharge   Follow up with urology
Status post treatment with Valtrex 10/31
Superficial thrombophlebitis to right cephalic vein which extends to the junction subclavian vein  -patient has PICC line in place for long-term antibiotic therapy, noticed right upper extremity swelling; patient denies any pain to right upper extremity  -discussed case with Dr Edilberto Sanchez, recommends use of the PICC line as well as anticoagulation for 3 months due to the proximity of the subclavian vein; another option would be to remove the PICC line and continue with anticoagulation  -this was communicated to Dr Juan David Dee who will continue with PICC line use for antibiotic therapy and add anticoagulation medication to regimen  -elevate arm on 1-2 pillows throughout the day to decrease swelling  -may use warm compress if patient experiences any tenderness
Suspected possible source on admission  Urine culture without specific pathogen
Troponin negative  Continue statin, continue to hold aspirin secondary to GI bleed  Follow up with Cardiology
Troponin negative  Continue statin, continue to hold aspirin secondary to GI bleed  Follow up with Cardiology
· 2/2 right arm PICC which was placed for Abx therapy  · During hospitalization was noted to have swelling of the UE with pain  · Vascular study positive for superficial vein thrombophlebitis   No DVT however the clot was close to the deep vein  · Because of pain and significant swelling, plan was to remove PICC and replace with another line, however PICC removal by RN was met with resistance and some bleeding at the site so was kept in place  · Consulted vascular surgery regarding recs for safe removal of PICC line  · They recommended either keeping PICC line and start anticoagulation or removing PICC line and ok to forgo anticoagulation given recent GI bleeding  · PICC was removed by MD without issue prior to DC  · He will need repeat US in 1 week to monitor superficial clot to ensure it does not propagate to deep vein
· Abscess With Cellulitis superimposed on shingles  · ID and surgery consulted  · Blood culture staph aureus x 1  · Wound culture: 3+ staph aureus   · Repeat blood cultures from 10/29 no growth  · S/p debridement in OR on 10/26, wound vac which has since been removed, and s/p bedside debridement 11/14  · Continue local wound care  Surgical follow up noted, likely suture removal on 11/21  · Antibiotics deescalated to [facility], 4 week course with end date November 25th, to be completed at [facility] facility  · Continue on Lyrica 50 mg TID for possible post-hepatic neuralgia, scheduled tylenol, p r n   Tramadol
· Admission Blood culture x 1 MSSA  · Repeat blood cultures no growth  · Per ID will need 4 wks antibiotics from negative blood cultures through 11/25     · Discharged on IV ancef to complete course
· Admission Blood culture x 1 MSSA  · Repeat blood cultures no growth  · Per ID will need 4 wks antibiotics from negative blood cultures through 11/25     · PICC line in place
· Admission Blood culture x 1 MSSA  · Repeat blood cultures no growth to date  · Per ID will need 4 wks antibiotics from negative blood cultures through 11/25     · PICC line in place
· Aggressive  debridement in OR on 10/26 by Dr Bernie Fonseca, wound VAC discontinued this a m  · Antibiotics deescalated to Ancef as per ID recs, patient will require IV antibiotics until November 25th  Patient has PICC line for outpatient antibiotic therapy  · ID following  · Blood culture staph aureus x 1 - will treat as bacteremia  · Wound culture: 3+ staph aureus   · Urine cultures > 100,000 mixed containments   · ABX: Ancef day #10  Total Antibiotic therapy day 13  · Repeat blood cultures from 10/29 no growth to date  · Wound VAC removed 11/3    Continue local wound care  · Started on Lyrica 50 mg TID for possible post-hepatic neuralgia, scheduled tylenol, d/c oxycodone
· EF prior was 35 to 40%  Recent stress test in May of 2018 showed reversible ischemic changes  Cardiology following as an outpatient with medical management    · ECHO repeat with EF 20% possibly related to sepsis  · Cardiac output and indexes while patient was intubated were acceptable  · Frequent PVCs on telemetry, Episode of nonsustained V-tach overnight 11/6, asymptomatic  · Cardiology following, consider AICD and life vest before discharge  · Continue metoprolol, started lisinopril  · Repeat echo with EF 20%   · Continue IV Lasix  · Added albumin and Brock stocking due to low blood pressure  · Continue to monitor electrolytes  · Follow up with Cardiology
· EF prior was 35 to 40%  Recent stress test in May of 2018 showed reversible ischemic changes  Cardiology following as an outpatient with medical management    · ECHO repeat with EF 20% possibly related to sepsis, will need repeat echo when GI bleed is stable  · Cardiac output and indexes while patient was intubated were acceptable  · Frequent PVCs on telemetry, Episode of nonsustained V-tach overnight 11/6, asymptomatic  · Cardiology following, consider AICD and life vest before discharge  · Tolerating metoprolol 12 5 mg BID PO
· EF prior was 35 to 40%  Recent stress test in May of 2018 showed reversible ischemic changes  Cardiology following as an outpatient with medical management  · ECHO repeated with EF 20%  · Frequent PVCs on telemetry, Episode of nonsustained V-tach overnight 11/6, asymptomatic  · Cardiology following, patient offered AICD and life vest and he does not want it     · Continue metoprolol, started lisinopril
· History of AFib in the setting of electrolyte abnormalities in the past   Monitor on telemetry  · Current NSR to Sinus Tach  · Continue to hold on anticoagulation at this time  Particularly in light of recent episode of GI bleeding    · Frequent PVCs on telemetry, cardiology consulted, beta-blocker resumed
· Hx of AFib in the setting of electrolyte abnormalities in the past   Monitored on telemetry  · Currently NSR  · Continue to hold on anticoagulation at this time  Particularly in light of recent episode of GI bleeding    · Frequent PVCs on telemetry, beta-blocker resumed  · Monitor and replete lytes
· Hx of AFib in the setting of electrolyte abnormalities in the past   Monitored on telemetry  · Currently NSR with PVCs  · No anticoagulation on discharge  Particularly in light of recent episode of GI bleeding    · Frequent PVCs on telemetry, beta-blocker resumed  · Monitor and replete lytes
· Increased pain along left axilla, not in location of incisions  · Discontinued oxycodone, started lyrica 50 mg TID with scheduled tylenol
· Known EF of 20% in the past  · Repeat echo showing EF remains 25%     · Patient refused life vest/AICD  · Continues with episodes of PVCs on tele  · Cont Brock stocking due to low blood pressure  · Monitor I/O; net -22L negative  · Completed diuresis with IV Lasix+albumin  · Now transitioned back to his home PO regimen of demadex and aldactone
· Known EF of 20% in the past  · Repeat echo showing EF remains 25%  Patient may need LifeVest prior to discharge  · Continues with episodes of PVCs    · Some tachycardia is likely related to anemia, able to tolerate beta-blocker at this time  · Cont with IV lasix  · Monitor I/O; net -13 9L negative  ·
· Known EF of 20%, no evidence of fluid overload at this time  May require lasix s/p blood transfusion  · Continue to monitor I/Os  · Repeat echocardiogram on Monday  Patient may need LifeVest prior to discharge home if EF is still below 30%  · Continues with episodes of PVCs    ·  Some tachycardia is likely related to anemia, able to tolerate beta-blocker at this time
· Melena developed on 10/30, EGD: duodenal bulb ulcer with possible candida esophagitis  · Repeat EGD on 11-4 revealed bleeding duodenal ulcer, clipped  · protonix gtt    · Transfused 9 units PRBCs this admission
· Negative pathology - discontinue fluconizole
· Now resolved  · Antiviral therapy discontinued on October 31st
· Patient received 9 units packed red blood cells total as admission  · EGD on 11/04 revealed duodenal bleeding ulcer  · Hemoglobin stable 9 1 today from 9 5 yesterday  · Continue to follow H&H  · protonix PO  · Clear liquid diet today  · GI following
· Pt failed voiding trial 11/3    · Straight cath x 2 for > 700 ml  · Luis catheter replaced 11/5  · Likely secondary to BPH, on flomax  · Urology consulted, continue with luis until patient is more mobile
· Resolved
· Resolved    ·  Patient extubated on 10/26  · Now on room air
· Resolved  ·  Present on admission as evidence by CT scan suggestive of infectious process  · No sputum culture to confirm pneumonia    · Respiratory status improved  Extubated on the 26th  Tolerating room air 
· Resolved  · EGD on 10/31: duodenal bulb ulcer with possible candida esophagitis  · Biopsies negative for candida - d/c fluconizole 11/4
· Resolved  · EGD on 10/31: duodenal bulb ulcer with possible candida esophagitis  · Biopsies negative for candida - d/c fluconizole 11/4
· Resolved  · Likely Pre renal   Likely secondary to septic shock and DKA    · Improving with fluids  · FENa consistent with Pre-renal cause  · Gordon catheter in place
· Resolved  · Likely hypovolemic hyponatremia in the setting of dehydration, sepsis, and DKA  · Possibly a component of pseudo hyponatremia
· Resolved  · Patient AAOX3  Sleeping well at night    · Cont Seroquel 50mg at bedtime, patient did sleep well again overnight
· Resolved  · Urine culture with greater than 100,000 CFU of mixed contaminants 
· Resolved  · Urine culture with mixed roberto 
· Resolved at this time  Vasopressor therapy discontinued October 30th  ·   Source likely presumed cellulitis/absess of chest wall   · Patient was initially Fluid resuscitation with 30ml/kg   · Levophed and vasopressor d/zaida 10/30  · Poor EF 20% on Echo  · [place] protocol - Stress Dose Steroids, Vit C, thiamine- now discontinued 11 1  · ID consult  ·  debridement in OR 10/26 of chest wall cellulitis, wound vac in place   Scant serosanguineous output  · Return to OR for debridement 10/29, wound vac in place  · Wound culture: 3+ staph aureus   · Urine cultures > 100,000 mixed containments   · ABX: Ancef day #6  Fluconazole day #5  Total day 10 of antibiotics    Day   · Valtrex d/zaida  · WBCs normalized  · Repeat blood cultures no growth since 10/29
· Secondary to bleeding gastric ulcer    · Patient had tachycardia and drifting hemoglobin and later developed guanakito melena  · EGD with duodenal bulb ulcer with intervention 11/4  · S/p total 11 units of PRBCs  · Cont PPI  · Continue trend hemoglobin, stabilized  · Will need to continue to monitor
· Secondary to bleeding gastric ulcer  · Patient continued with tachycardia and drifting hemoglobin and later developed guanakito melena  · EGD with duodenal bulb ulcer with intervention 11/4  · Pt has been Transfused 9 units of packed red blood cells total this admission  1 unit 10/28, 3 units 10/30, 1 unit 11/2  4 unit 11/4     · Hgb stable, 9 1 today from 9 3 yesterday  ·  Protonix PO, advanced to clear liquid diet
· Secondary to bleeding gastric ulcer  · Patient continued with tachycardia and drifting hemoglobin and later developed guanakito melena  · EGD with duodenal bulb ulcer with intervention 11/4  · S/p total 9 units of PRBCs this admission  1 unit 10/28, 3 units 10/30, 1 unit 11/2  4 unit 11/4     · Hgb remains relatively stable without any evidence of overt bleeding  · Continue IV PPI, transition to PO per GI  · Continue trend hemoglobin, stable  · Follow-up GI as outpatient for repeat EGD
· Stable, Baseline 0 9-1 3  · Adequate urine output at this time  · Patient is diuretic dependent at home with torsemide 10 mg daily and Aldactone 12 5 mg daily, continue to hold for low blood pressure
· Troponin negative  · Continue statin  Hold aspirin in light of GI bleeding    · Echo obtained,  EF 20%
· With Cellulitis superimposed on shingles  S/p debridement in OR on 10/26 by Dr Davide Luz with wound vac placed  · Antibiotics deescalated to Ancef as per ID recs, end date November 25th  Patient has PICC line for outpatient antibiotic therapy  · ID following  · Blood culture staph aureus x 1  · Wound culture: 3+ staph aureus   · Repeat blood cultures from 10/29 no growth to date  · Wound VAC removed 11/3  Continue local wound care  Surgical follow up noted, likely suture removal on 11/21  · Continue on Lyrica 50 mg TID for possible post-hepatic neuralgia, scheduled tylenol, p r n   Tramadol
Statement Selected

## 2019-02-19 ENCOUNTER — APPOINTMENT (OUTPATIENT)
Dept: WOUND CARE | Facility: HOSPITAL | Age: 79
End: 2019-02-19
Payer: MEDICARE

## 2019-02-19 PROCEDURE — 99213 OFFICE O/P EST LOW 20 MIN: CPT

## 2019-03-12 ENCOUNTER — APPOINTMENT (OUTPATIENT)
Dept: WOUND CARE | Facility: HOSPITAL | Age: 79
End: 2019-03-12
Payer: MEDICARE

## 2019-03-12 PROCEDURE — 99212 OFFICE O/P EST SF 10 MIN: CPT

## 2019-04-25 ENCOUNTER — TRANSCRIBE ORDERS (OUTPATIENT)
Dept: ADMINISTRATIVE | Facility: HOSPITAL | Age: 79
End: 2019-04-25

## 2019-04-25 ENCOUNTER — APPOINTMENT (OUTPATIENT)
Dept: LAB | Facility: CLINIC | Age: 79
End: 2019-04-25
Payer: MEDICARE

## 2019-04-25 DIAGNOSIS — N18.30 CHRONIC KIDNEY DISEASE, STAGE III (MODERATE) (HCC): ICD-10-CM

## 2019-04-25 DIAGNOSIS — E11.9 DIABETES MELLITUS WITHOUT COMPLICATION (HCC): ICD-10-CM

## 2019-04-25 DIAGNOSIS — E11.9 DIABETES MELLITUS WITHOUT COMPLICATION (HCC): Primary | ICD-10-CM

## 2019-04-25 LAB
ALBUMIN SERPL BCP-MCNC: 3.8 G/DL (ref 3.5–5)
ALP SERPL-CCNC: 117 U/L (ref 46–116)
ALT SERPL W P-5'-P-CCNC: 32 U/L (ref 12–78)
ANION GAP SERPL CALCULATED.3IONS-SCNC: 7 MMOL/L (ref 4–13)
AST SERPL W P-5'-P-CCNC: 20 U/L (ref 5–45)
BASOPHILS # BLD AUTO: 0.04 THOUSANDS/ΜL (ref 0–0.1)
BASOPHILS NFR BLD AUTO: 1 % (ref 0–1)
BILIRUB SERPL-MCNC: 1.12 MG/DL (ref 0.2–1)
BUN SERPL-MCNC: 62 MG/DL (ref 5–25)
CALCIUM SERPL-MCNC: 9.2 MG/DL (ref 8.3–10.1)
CHLORIDE SERPL-SCNC: 102 MMOL/L (ref 100–108)
CO2 SERPL-SCNC: 28 MMOL/L (ref 21–32)
CREAT SERPL-MCNC: 1.56 MG/DL (ref 0.6–1.3)
EOSINOPHIL # BLD AUTO: 0.15 THOUSAND/ΜL (ref 0–0.61)
EOSINOPHIL NFR BLD AUTO: 2 % (ref 0–6)
ERYTHROCYTE [DISTWIDTH] IN BLOOD BY AUTOMATED COUNT: 15.9 % (ref 11.6–15.1)
EST. AVERAGE GLUCOSE BLD GHB EST-MCNC: 183 MG/DL
GFR SERPL CREATININE-BSD FRML MDRD: 41 ML/MIN/1.73SQ M
GLUCOSE P FAST SERPL-MCNC: 118 MG/DL (ref 65–99)
HBA1C MFR BLD: 8 % (ref 4.2–6.3)
HCT VFR BLD AUTO: 45 % (ref 36.5–49.3)
HGB BLD-MCNC: 15.1 G/DL (ref 12–17)
IMM GRANULOCYTES # BLD AUTO: 0.02 THOUSAND/UL (ref 0–0.2)
IMM GRANULOCYTES NFR BLD AUTO: 0 % (ref 0–2)
LYMPHOCYTES # BLD AUTO: 1.51 THOUSANDS/ΜL (ref 0.6–4.47)
LYMPHOCYTES NFR BLD AUTO: 24 % (ref 14–44)
MCH RBC QN AUTO: 35.4 PG (ref 26.8–34.3)
MCHC RBC AUTO-ENTMCNC: 33.6 G/DL (ref 31.4–37.4)
MCV RBC AUTO: 105 FL (ref 82–98)
MONOCYTES # BLD AUTO: 0.59 THOUSAND/ΜL (ref 0.17–1.22)
MONOCYTES NFR BLD AUTO: 9 % (ref 4–12)
NEUTROPHILS # BLD AUTO: 3.98 THOUSANDS/ΜL (ref 1.85–7.62)
NEUTS SEG NFR BLD AUTO: 64 % (ref 43–75)
NRBC BLD AUTO-RTO: 0 /100 WBCS
PLATELET # BLD AUTO: 212 THOUSANDS/UL (ref 149–390)
PMV BLD AUTO: 11.2 FL (ref 8.9–12.7)
POTASSIUM SERPL-SCNC: 4 MMOL/L (ref 3.5–5.3)
PROT SERPL-MCNC: 7.8 G/DL (ref 6.4–8.2)
RBC # BLD AUTO: 4.27 MILLION/UL (ref 3.88–5.62)
SODIUM SERPL-SCNC: 137 MMOL/L (ref 136–145)
WBC # BLD AUTO: 6.29 THOUSAND/UL (ref 4.31–10.16)

## 2019-04-25 PROCEDURE — 36415 COLL VENOUS BLD VENIPUNCTURE: CPT

## 2019-04-25 PROCEDURE — 83036 HEMOGLOBIN GLYCOSYLATED A1C: CPT

## 2019-04-25 PROCEDURE — 85025 COMPLETE CBC W/AUTO DIFF WBC: CPT

## 2019-04-25 PROCEDURE — 80053 COMPREHEN METABOLIC PANEL: CPT

## 2019-11-19 ENCOUNTER — APPOINTMENT (OUTPATIENT)
Dept: LAB | Facility: CLINIC | Age: 79
End: 2019-11-19
Payer: MEDICARE

## 2019-11-19 ENCOUNTER — TRANSCRIBE ORDERS (OUTPATIENT)
Dept: ADMINISTRATIVE | Facility: HOSPITAL | Age: 79
End: 2019-11-19

## 2019-11-19 DIAGNOSIS — D64.9 RELATIVE ANEMIA: ICD-10-CM

## 2019-11-19 DIAGNOSIS — E11.00 TYPE II DIABETES MELLITUS WITH HYPEROSMOLARITY, UNCONTROLLED (HCC): ICD-10-CM

## 2019-11-19 DIAGNOSIS — E11.00 TYPE II DIABETES MELLITUS WITH HYPEROSMOLARITY, UNCONTROLLED (HCC): Primary | ICD-10-CM

## 2019-11-19 DIAGNOSIS — I25.5 ISCHEMIC CARDIOMYOPATHY: ICD-10-CM

## 2019-11-19 DIAGNOSIS — E11.65 TYPE II DIABETES MELLITUS WITH HYPEROSMOLARITY, UNCONTROLLED (HCC): Primary | ICD-10-CM

## 2019-11-19 DIAGNOSIS — N18.30 CHRONIC KIDNEY DISEASE, STAGE III (MODERATE) (HCC): ICD-10-CM

## 2019-11-19 DIAGNOSIS — E11.65 TYPE II DIABETES MELLITUS WITH HYPEROSMOLARITY, UNCONTROLLED (HCC): ICD-10-CM

## 2019-11-19 LAB
ALBUMIN SERPL BCP-MCNC: 4.2 G/DL (ref 3.5–5)
ALP SERPL-CCNC: 152 U/L (ref 46–116)
ALT SERPL W P-5'-P-CCNC: 49 U/L (ref 12–78)
ANION GAP SERPL CALCULATED.3IONS-SCNC: 9 MMOL/L (ref 4–13)
AST SERPL W P-5'-P-CCNC: 18 U/L (ref 5–45)
BASOPHILS # BLD AUTO: 0.05 THOUSANDS/ΜL (ref 0–0.1)
BASOPHILS NFR BLD AUTO: 1 % (ref 0–1)
BILIRUB SERPL-MCNC: 0.71 MG/DL (ref 0.2–1)
BUN SERPL-MCNC: 42 MG/DL (ref 5–25)
CALCIUM SERPL-MCNC: 9.6 MG/DL (ref 8.3–10.1)
CHLORIDE SERPL-SCNC: 96 MMOL/L (ref 100–108)
CO2 SERPL-SCNC: 27 MMOL/L (ref 21–32)
CREAT SERPL-MCNC: 1.46 MG/DL (ref 0.6–1.3)
EOSINOPHIL # BLD AUTO: 0.11 THOUSAND/ΜL (ref 0–0.61)
EOSINOPHIL NFR BLD AUTO: 2 % (ref 0–6)
ERYTHROCYTE [DISTWIDTH] IN BLOOD BY AUTOMATED COUNT: 12.1 % (ref 11.6–15.1)
EST. AVERAGE GLUCOSE BLD GHB EST-MCNC: 361 MG/DL
GFR SERPL CREATININE-BSD FRML MDRD: 44 ML/MIN/1.73SQ M
GLUCOSE P FAST SERPL-MCNC: 416 MG/DL (ref 65–99)
HBA1C MFR BLD: 14.2 % (ref 4.2–6.3)
HCT VFR BLD AUTO: 45.7 % (ref 36.5–49.3)
HGB BLD-MCNC: 14.7 G/DL (ref 12–17)
IMM GRANULOCYTES # BLD AUTO: 0.02 THOUSAND/UL (ref 0–0.2)
IMM GRANULOCYTES NFR BLD AUTO: 0 % (ref 0–2)
LYMPHOCYTES # BLD AUTO: 0.87 THOUSANDS/ΜL (ref 0.6–4.47)
LYMPHOCYTES NFR BLD AUTO: 15 % (ref 14–44)
MCH RBC QN AUTO: 35.2 PG (ref 26.8–34.3)
MCHC RBC AUTO-ENTMCNC: 32.2 G/DL (ref 31.4–37.4)
MCV RBC AUTO: 109 FL (ref 82–98)
MONOCYTES # BLD AUTO: 0.46 THOUSAND/ΜL (ref 0.17–1.22)
MONOCYTES NFR BLD AUTO: 8 % (ref 4–12)
NEUTROPHILS # BLD AUTO: 4.31 THOUSANDS/ΜL (ref 1.85–7.62)
NEUTS SEG NFR BLD AUTO: 74 % (ref 43–75)
NRBC BLD AUTO-RTO: 0 /100 WBCS
PLATELET # BLD AUTO: 200 THOUSANDS/UL (ref 149–390)
PMV BLD AUTO: 11.3 FL (ref 8.9–12.7)
POTASSIUM SERPL-SCNC: 4.2 MMOL/L (ref 3.5–5.3)
PROT SERPL-MCNC: 7.5 G/DL (ref 6.4–8.2)
RBC # BLD AUTO: 4.18 MILLION/UL (ref 3.88–5.62)
SODIUM SERPL-SCNC: 132 MMOL/L (ref 136–145)
TSH SERPL DL<=0.05 MIU/L-ACNC: 1.66 UIU/ML (ref 0.36–3.74)
WBC # BLD AUTO: 5.82 THOUSAND/UL (ref 4.31–10.16)

## 2019-11-19 PROCEDURE — 36415 COLL VENOUS BLD VENIPUNCTURE: CPT

## 2019-11-19 PROCEDURE — 83036 HEMOGLOBIN GLYCOSYLATED A1C: CPT

## 2019-11-19 PROCEDURE — 80053 COMPREHEN METABOLIC PANEL: CPT

## 2019-11-19 PROCEDURE — 84443 ASSAY THYROID STIM HORMONE: CPT

## 2019-11-19 PROCEDURE — 85025 COMPLETE CBC W/AUTO DIFF WBC: CPT

## 2020-03-11 ENCOUNTER — APPOINTMENT (EMERGENCY)
Dept: RADIOLOGY | Facility: HOSPITAL | Age: 80
DRG: 291 | End: 2020-03-11
Payer: MEDICARE

## 2020-03-11 ENCOUNTER — HOSPITAL ENCOUNTER (INPATIENT)
Facility: HOSPITAL | Age: 80
LOS: 7 days | Discharge: NURSING, REHAB OR LONG-TERM CARE | DRG: 291 | End: 2020-03-18
Attending: EMERGENCY MEDICINE | Admitting: ANESTHESIOLOGY
Payer: MEDICARE

## 2020-03-11 DIAGNOSIS — R77.8 ELEVATED TROPONIN: ICD-10-CM
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
## 2020-03-11 DIAGNOSIS — E87.2 LACTIC ACIDOSIS: ICD-10-CM

## 2020-03-11 DIAGNOSIS — E11.01: ICD-10-CM

## 2020-03-11 DIAGNOSIS — K59.00 CONSTIPATION: ICD-10-CM

## 2020-03-11 DIAGNOSIS — N39.0 UTI (URINARY TRACT INFECTION): ICD-10-CM

## 2020-03-11 DIAGNOSIS — I25.810 CORONARY ARTERY DISEASE INVOLVING CORONARY BYPASS GRAFT OF NATIVE HEART WITHOUT ANGINA PECTORIS: ICD-10-CM

## 2020-03-11 DIAGNOSIS — E11.8 TYPE 2 DIABETES MELLITUS WITH COMPLICATION, WITH LONG-TERM CURRENT USE OF INSULIN (HCC): Chronic | ICD-10-CM

## 2020-03-11 DIAGNOSIS — L03.90 CELLULITIS: ICD-10-CM

## 2020-03-11 DIAGNOSIS — I25.5 CARDIOMYOPATHY, ISCHEMIC: Chronic | ICD-10-CM

## 2020-03-11 DIAGNOSIS — G47.00 INSOMNIA: ICD-10-CM

## 2020-03-11 DIAGNOSIS — N17.9 AKI (ACUTE KIDNEY INJURY) (HCC): ICD-10-CM

## 2020-03-11 DIAGNOSIS — Z79.4 TYPE 2 DIABETES MELLITUS WITH COMPLICATION, WITH LONG-TERM CURRENT USE OF INSULIN (HCC): Chronic | ICD-10-CM

## 2020-03-11 PROBLEM — F32.A DEPRESSION: Status: ACTIVE | Noted: 2020-03-11

## 2020-03-11 PROBLEM — R73.9 HYPERGLYCEMIA WITHOUT KETOSIS: Status: ACTIVE | Noted: 2018-11-18

## 2020-03-11 LAB
ALBUMIN SERPL BCP-MCNC: 3 G/DL (ref 3.5–5)
ALBUMIN SERPL BCP-MCNC: 3.6 G/DL (ref 3.5–5)
ALP SERPL-CCNC: 125 U/L (ref 46–116)
ALP SERPL-CCNC: 153 U/L (ref 46–116)
ALT SERPL W P-5'-P-CCNC: 24 U/L (ref 12–78)
ALT SERPL W P-5'-P-CCNC: 25 U/L (ref 12–78)
ANION GAP SERPL CALCULATED.3IONS-SCNC: 11 MMOL/L (ref 4–13)
ANION GAP SERPL CALCULATED.3IONS-SCNC: 11 MMOL/L (ref 4–13)
APTT PPP: 25 SECONDS (ref 25–32)
AST SERPL W P-5'-P-CCNC: 17 U/L (ref 5–45)
AST SERPL W P-5'-P-CCNC: <5 U/L (ref 5–45)
ATRIAL RATE: 91 BPM
BACTERIA UR QL AUTO: ABNORMAL /HPF
BASE EX.OXY STD BLDV CALC-SCNC: 35.9 % (ref 60–80)
BASE EXCESS BLDV CALC-SCNC: -0.2 MMOL/L
BASOPHILS # BLD AUTO: 0.02 THOUSANDS/ΜL (ref 0–0.1)
BASOPHILS # BLD AUTO: 0.03 THOUSANDS/ΜL (ref 0–0.1)
BASOPHILS NFR BLD AUTO: 0 % (ref 0–1)
BASOPHILS NFR BLD AUTO: 0 % (ref 0–1)
BETA-HYDROXYBUTYRATE: 1.4 MMOL/L
BILIRUB DIRECT SERPL-MCNC: 0.35 MG/DL (ref 0–0.2)
BILIRUB SERPL-MCNC: 0.8 MG/DL (ref 0.2–1)
BILIRUB SERPL-MCNC: 1.1 MG/DL (ref 0.2–1)
BILIRUB UR QL STRIP: NEGATIVE
BUN SERPL-MCNC: 37 MG/DL (ref 5–25)
BUN SERPL-MCNC: 41 MG/DL (ref 5–25)
CALCIUM SERPL-MCNC: 9.1 MG/DL (ref 8.3–10.1)
CALCIUM SERPL-MCNC: 9.3 MG/DL (ref 8.3–10.1)
CHLORIDE SERPL-SCNC: 100 MMOL/L (ref 100–108)
CHLORIDE SERPL-SCNC: 92 MMOL/L (ref 100–108)
CLARITY UR: CLEAR
CO2 SERPL-SCNC: 24 MMOL/L (ref 21–32)
CO2 SERPL-SCNC: 27 MMOL/L (ref 21–32)
COLOR UR: COLORLESS
CREAT SERPL-MCNC: 1.57 MG/DL (ref 0.6–1.3)
CREAT SERPL-MCNC: 1.82 MG/DL (ref 0.6–1.3)
DIGOXIN SERPL-MCNC: <0.2 NG/ML (ref 0.8–2)
EOSINOPHIL # BLD AUTO: 0.04 THOUSAND/ΜL (ref 0–0.61)
EOSINOPHIL # BLD AUTO: 0.08 THOUSAND/ΜL (ref 0–0.61)
EOSINOPHIL NFR BLD AUTO: 1 % (ref 0–6)
EOSINOPHIL NFR BLD AUTO: 1 % (ref 0–6)
ERYTHROCYTE [DISTWIDTH] IN BLOOD BY AUTOMATED COUNT: 13.3 % (ref 11.6–15.1)
ERYTHROCYTE [DISTWIDTH] IN BLOOD BY AUTOMATED COUNT: 13.4 % (ref 11.6–15.1)
GFR SERPL CREATININE-BSD FRML MDRD: 34 ML/MIN/1.73SQ M
GFR SERPL CREATININE-BSD FRML MDRD: 41 ML/MIN/1.73SQ M
GLUCOSE SERPL-MCNC: 318 MG/DL (ref 65–140)
GLUCOSE SERPL-MCNC: 319 MG/DL (ref 65–140)
GLUCOSE SERPL-MCNC: 497 MG/DL (ref 65–140)
GLUCOSE SERPL-MCNC: 680 MG/DL (ref 65–140)
GLUCOSE SERPL-MCNC: 823 MG/DL (ref 65–140)
GLUCOSE SERPL-MCNC: >500 MG/DL (ref 65–140)
GLUCOSE SERPL-MCNC: >500 MG/DL (ref 65–140)
GLUCOSE UR STRIP-MCNC: ABNORMAL MG/DL
HCO3 BLDV-SCNC: 26.7 MMOL/L (ref 24–30)
HCT VFR BLD AUTO: 45.5 % (ref 36.5–49.3)
HCT VFR BLD AUTO: 47.5 % (ref 36.5–49.3)
HGB BLD-MCNC: 14.6 G/DL (ref 12–17)
HGB BLD-MCNC: 15.3 G/DL (ref 12–17)
HGB UR QL STRIP.AUTO: ABNORMAL
IMM GRANULOCYTES # BLD AUTO: 0.06 THOUSAND/UL (ref 0–0.2)
IMM GRANULOCYTES # BLD AUTO: 0.08 THOUSAND/UL (ref 0–0.2)
IMM GRANULOCYTES NFR BLD AUTO: 1 % (ref 0–2)
IMM GRANULOCYTES NFR BLD AUTO: 1 % (ref 0–2)
INR PPP: 1.1 (ref 0.91–1.09)
KETONES UR STRIP-MCNC: NEGATIVE MG/DL
LACTATE SERPL-SCNC: 3.4 MMOL/L (ref 0.5–2)
LACTATE SERPL-SCNC: 3.6 MMOL/L (ref 0.5–2)
LACTATE SERPL-SCNC: 4.3 MMOL/L (ref 0.5–2)
LACTATE SERPL-SCNC: 4.4 MMOL/L (ref 0.5–2)
LEUKOCYTE ESTERASE UR QL STRIP: ABNORMAL
LYMPHOCYTES # BLD AUTO: 0.48 THOUSANDS/ΜL (ref 0.6–4.47)
LYMPHOCYTES # BLD AUTO: 0.8 THOUSANDS/ΜL (ref 0.6–4.47)
LYMPHOCYTES NFR BLD AUTO: 10 % (ref 14–44)
LYMPHOCYTES NFR BLD AUTO: 6 % (ref 14–44)
MAGNESIUM SERPL-MCNC: 2.5 MG/DL (ref 1.6–2.6)
MCH RBC QN AUTO: 36.3 PG (ref 26.8–34.3)
MCH RBC QN AUTO: 36.6 PG (ref 26.8–34.3)
MCHC RBC AUTO-ENTMCNC: 32.1 G/DL (ref 31.4–37.4)
MCHC RBC AUTO-ENTMCNC: 32.2 G/DL (ref 31.4–37.4)
MCV RBC AUTO: 113 FL (ref 82–98)
MCV RBC AUTO: 114 FL (ref 82–98)
MONOCYTES # BLD AUTO: 0.48 THOUSAND/ΜL (ref 0.17–1.22)
MONOCYTES # BLD AUTO: 0.59 THOUSAND/ΜL (ref 0.17–1.22)
MONOCYTES NFR BLD AUTO: 6 % (ref 4–12)
MONOCYTES NFR BLD AUTO: 8 % (ref 4–12)
NEUTROPHILS # BLD AUTO: 6.26 THOUSANDS/ΜL (ref 1.85–7.62)
NEUTROPHILS # BLD AUTO: 6.41 THOUSANDS/ΜL (ref 1.85–7.62)
NEUTS SEG NFR BLD AUTO: 80 % (ref 43–75)
NEUTS SEG NFR BLD AUTO: 86 % (ref 43–75)
NITRITE UR QL STRIP: POSITIVE
NON-SQ EPI CELLS URNS QL MICRO: ABNORMAL /HPF
NRBC BLD AUTO-RTO: 0 /100 WBCS
NRBC BLD AUTO-RTO: 0 /100 WBCS
NT-PROBNP SERPL-MCNC: ABNORMAL PG/ML
O2 CT BLDV-SCNC: 7.9 ML/DL
P AXIS: 74 DEGREES
PCO2 BLDV: 52 MM HG (ref 42–50)
PH BLDV: 7.33 [PH] (ref 7.3–7.4)
PH UR STRIP.AUTO: 5.5 [PH]
PHOSPHATE SERPL-MCNC: 3.6 MG/DL (ref 2.3–4.1)
PLATELET # BLD AUTO: 159 THOUSANDS/UL (ref 149–390)
PLATELET # BLD AUTO: 197 THOUSANDS/UL (ref 149–390)
PMV BLD AUTO: 12.3 FL (ref 8.9–12.7)
PMV BLD AUTO: 12.3 FL (ref 8.9–12.7)
PO2 BLDV: 22.5 MM HG (ref 35–45)
POTASSIUM SERPL-SCNC: 4 MMOL/L (ref 3.5–5.3)
POTASSIUM SERPL-SCNC: 4.9 MMOL/L (ref 3.5–5.3)
PR INTERVAL: 202 MS
PROT SERPL-MCNC: 6.2 G/DL (ref 6.4–8.2)
PROT SERPL-MCNC: 6.9 G/DL (ref 6.4–8.2)
PROT UR STRIP-MCNC: NEGATIVE MG/DL
PROTHROMBIN TIME: 11.7 SECONDS (ref 9.8–12)
QRS AXIS: -66 DEGREES
QRSD INTERVAL: 138 MS
QT INTERVAL: 418 MS
QTC INTERVAL: 514 MS
RBC # BLD AUTO: 3.99 MILLION/UL (ref 3.88–5.62)
RBC # BLD AUTO: 4.21 MILLION/UL (ref 3.88–5.62)
RBC #/AREA URNS AUTO: ABNORMAL /HPF
SODIUM SERPL-SCNC: 130 MMOL/L (ref 136–145)
SODIUM SERPL-SCNC: 135 MMOL/L (ref 136–145)
SP GR UR STRIP.AUTO: <=1.005 (ref 1–1.03)
T WAVE AXIS: 117 DEGREES
TROPONIN I SERPL-MCNC: 0.04 NG/ML
TROPONIN I SERPL-MCNC: 0.05 NG/ML
TROPONIN I SERPL-MCNC: 0.05 NG/ML
TROPONIN I SERPL-MCNC: 0.06 NG/ML
UROBILINOGEN UR QL STRIP.AUTO: 0.2 E.U./DL
VENTRICULAR RATE: 91 BPM
WBC # BLD AUTO: 7.49 THOUSAND/UL (ref 4.31–10.16)
WBC # BLD AUTO: 7.84 THOUSAND/UL (ref 4.31–10.16)
WBC #/AREA URNS AUTO: ABNORMAL /HPF

## 2020-03-11 PROCEDURE — 82948 REAGENT STRIP/BLOOD GLUCOSE: CPT

## 2020-03-11 PROCEDURE — 85730 THROMBOPLASTIN TIME PARTIAL: CPT | Performed by: EMERGENCY MEDICINE

## 2020-03-11 PROCEDURE — 99285 EMERGENCY DEPT VISIT HI MDM: CPT | Performed by: EMERGENCY MEDICINE

## 2020-03-11 PROCEDURE — 70450 CT HEAD/BRAIN W/O DYE: CPT

## 2020-03-11 PROCEDURE — 93005 ELECTROCARDIOGRAM TRACING: CPT

## 2020-03-11 PROCEDURE — 93010 ELECTROCARDIOGRAM REPORT: CPT | Performed by: INTERNAL MEDICINE

## 2020-03-11 PROCEDURE — 81001 URINALYSIS AUTO W/SCOPE: CPT | Performed by: EMERGENCY MEDICINE

## 2020-03-11 PROCEDURE — 87040 BLOOD CULTURE FOR BACTERIA: CPT | Performed by: EMERGENCY MEDICINE

## 2020-03-11 PROCEDURE — 73080 X-RAY EXAM OF ELBOW: CPT

## 2020-03-11 PROCEDURE — 85025 COMPLETE CBC W/AUTO DIFF WBC: CPT | Performed by: EMERGENCY MEDICINE

## 2020-03-11 PROCEDURE — 96365 THER/PROPH/DIAG IV INF INIT: CPT

## 2020-03-11 PROCEDURE — 84100 ASSAY OF PHOSPHORUS: CPT | Performed by: NURSE PRACTITIONER

## 2020-03-11 PROCEDURE — 83880 ASSAY OF NATRIURETIC PEPTIDE: CPT | Performed by: EMERGENCY MEDICINE

## 2020-03-11 PROCEDURE — 71045 X-RAY EXAM CHEST 1 VIEW: CPT

## 2020-03-11 PROCEDURE — 84484 ASSAY OF TROPONIN QUANT: CPT | Performed by: INTERNAL MEDICINE

## 2020-03-11 PROCEDURE — 80076 HEPATIC FUNCTION PANEL: CPT | Performed by: NURSE PRACTITIONER

## 2020-03-11 PROCEDURE — 80053 COMPREHEN METABOLIC PANEL: CPT | Performed by: EMERGENCY MEDICINE

## 2020-03-11 PROCEDURE — 87186 SC STD MICRODIL/AGAR DIL: CPT | Performed by: EMERGENCY MEDICINE

## 2020-03-11 PROCEDURE — 94760 N-INVAS EAR/PLS OXIMETRY 1: CPT

## 2020-03-11 PROCEDURE — 84145 PROCALCITONIN (PCT): CPT | Performed by: INTERNAL MEDICINE

## 2020-03-11 PROCEDURE — 82805 BLOOD GASES W/O2 SATURATION: CPT | Performed by: EMERGENCY MEDICINE

## 2020-03-11 PROCEDURE — 82947 ASSAY GLUCOSE BLOOD QUANT: CPT | Performed by: INTERNAL MEDICINE

## 2020-03-11 PROCEDURE — 87205 SMEAR GRAM STAIN: CPT | Performed by: EMERGENCY MEDICINE

## 2020-03-11 PROCEDURE — 36415 COLL VENOUS BLD VENIPUNCTURE: CPT | Performed by: EMERGENCY MEDICINE

## 2020-03-11 PROCEDURE — 87086 URINE CULTURE/COLONY COUNT: CPT | Performed by: EMERGENCY MEDICINE

## 2020-03-11 PROCEDURE — 84484 ASSAY OF TROPONIN QUANT: CPT | Performed by: EMERGENCY MEDICINE

## 2020-03-11 PROCEDURE — 83735 ASSAY OF MAGNESIUM: CPT | Performed by: NURSE PRACTITIONER

## 2020-03-11 PROCEDURE — 96375 TX/PRO/DX INJ NEW DRUG ADDON: CPT

## 2020-03-11 PROCEDURE — 84484 ASSAY OF TROPONIN QUANT: CPT | Performed by: NURSE PRACTITIONER

## 2020-03-11 PROCEDURE — NC001 PR NO CHARGE: Performed by: NURSE PRACTITIONER

## 2020-03-11 PROCEDURE — 87077 CULTURE AEROBIC IDENTIFY: CPT | Performed by: EMERGENCY MEDICINE

## 2020-03-11 PROCEDURE — 87070 CULTURE OTHR SPECIMN AEROBIC: CPT | Performed by: EMERGENCY MEDICINE

## 2020-03-11 PROCEDURE — 82010 KETONE BODYS QUAN: CPT | Performed by: EMERGENCY MEDICINE

## 2020-03-11 PROCEDURE — 99223 1ST HOSP IP/OBS HIGH 75: CPT | Performed by: INTERNAL MEDICINE

## 2020-03-11 PROCEDURE — 87081 CULTURE SCREEN ONLY: CPT | Performed by: ANESTHESIOLOGY

## 2020-03-11 PROCEDURE — 72125 CT NECK SPINE W/O DYE: CPT

## 2020-03-11 PROCEDURE — 85025 COMPLETE CBC W/AUTO DIFF WBC: CPT | Performed by: NURSE PRACTITIONER

## 2020-03-11 PROCEDURE — 80048 BASIC METABOLIC PNL TOTAL CA: CPT | Performed by: NURSE PRACTITIONER

## 2020-03-11 PROCEDURE — 83605 ASSAY OF LACTIC ACID: CPT | Performed by: EMERGENCY MEDICINE

## 2020-03-11 PROCEDURE — 99285 EMERGENCY DEPT VISIT HI MDM: CPT

## 2020-03-11 PROCEDURE — 83605 ASSAY OF LACTIC ACID: CPT | Performed by: NURSE PRACTITIONER

## 2020-03-11 PROCEDURE — 85610 PROTHROMBIN TIME: CPT | Performed by: EMERGENCY MEDICINE

## 2020-03-11 PROCEDURE — 83605 ASSAY OF LACTIC ACID: CPT | Performed by: INTERNAL MEDICINE

## 2020-03-11 PROCEDURE — 87081 CULTURE SCREEN ONLY: CPT | Performed by: INTERNAL MEDICINE

## 2020-03-11 PROCEDURE — 80162 ASSAY OF DIGOXIN TOTAL: CPT | Performed by: INTERNAL MEDICINE

## 2020-03-11 RX ORDER — SODIUM CHLORIDE 450 MG/100ML
500 INJECTION, SOLUTION INTRAVENOUS CONTINUOUS
Status: DISCONTINUED | OUTPATIENT
Start: 2020-03-11 | End: 2020-03-11

## 2020-03-11 RX ORDER — SODIUM CHLORIDE 450 MG/100ML
1000 INJECTION, SOLUTION INTRAVENOUS CONTINUOUS
Status: DISCONTINUED | OUTPATIENT
Start: 2020-03-11 | End: 2020-03-11

## 2020-03-11 RX ORDER — SODIUM CHLORIDE 9 MG/ML
75 INJECTION, SOLUTION INTRAVENOUS CONTINUOUS
Status: DISCONTINUED | OUTPATIENT
Start: 2020-03-11 | End: 2020-03-11

## 2020-03-11 RX ORDER — VANCOMYCIN HYDROCHLORIDE 1 G/200ML
15 INJECTION, SOLUTION INTRAVENOUS EVERY 24 HOURS
Status: DISCONTINUED | OUTPATIENT
Start: 2020-03-11 | End: 2020-03-11 | Stop reason: DRUGHIGH

## 2020-03-11 RX ORDER — ATORVASTATIN CALCIUM 10 MG/1
10 TABLET, FILM COATED ORAL
Status: DISCONTINUED | OUTPATIENT
Start: 2020-03-12 | End: 2020-03-18 | Stop reason: HOSPADM

## 2020-03-11 RX ORDER — SODIUM CHLORIDE 450 MG/100ML
250 INJECTION, SOLUTION INTRAVENOUS CONTINUOUS
Status: DISCONTINUED | OUTPATIENT
Start: 2020-03-11 | End: 2020-03-11

## 2020-03-11 RX ORDER — ACETAMINOPHEN 325 MG/1
650 TABLET ORAL EVERY 6 HOURS PRN
Status: DISCONTINUED | OUTPATIENT
Start: 2020-03-11 | End: 2020-03-18 | Stop reason: HOSPADM

## 2020-03-11 RX ORDER — PANTOPRAZOLE SODIUM 40 MG/1
40 TABLET, DELAYED RELEASE ORAL
Status: DISCONTINUED | OUTPATIENT
Start: 2020-03-12 | End: 2020-03-18 | Stop reason: HOSPADM

## 2020-03-11 RX ORDER — DEXTROSE AND SODIUM CHLORIDE 5; .9 G/100ML; G/100ML
250 INJECTION, SOLUTION INTRAVENOUS CONTINUOUS
Status: DISCONTINUED | OUTPATIENT
Start: 2020-03-11 | End: 2020-03-11

## 2020-03-11 RX ORDER — VANCOMYCIN HYDROCHLORIDE 1 G/200ML
1000 INJECTION, SOLUTION INTRAVENOUS ONCE
Status: COMPLETED | OUTPATIENT
Start: 2020-03-11 | End: 2020-03-11

## 2020-03-11 RX ADMIN — CEFEPIME HYDROCHLORIDE 2000 MG: 2 INJECTION, SOLUTION INTRAVENOUS at 14:40

## 2020-03-11 RX ADMIN — Medication 12 UNITS/HR: at 16:59

## 2020-03-11 RX ADMIN — SODIUM CHLORIDE 75 ML/HR: 0.9 INJECTION, SOLUTION INTRAVENOUS at 19:37

## 2020-03-11 RX ADMIN — SODIUM CHLORIDE 250 ML: 0.9 INJECTION, SOLUTION INTRAVENOUS at 17:06

## 2020-03-11 RX ADMIN — SODIUM CHLORIDE 7.8 UNITS/HR: 9 INJECTION, SOLUTION INTRAVENOUS at 15:03

## 2020-03-11 RX ADMIN — VANCOMYCIN HYDROCHLORIDE 1000 MG: 1 INJECTION, SOLUTION INTRAVENOUS at 15:21

## 2020-03-11 RX ADMIN — SODIUM CHLORIDE 500 ML: 0.9 INJECTION, SOLUTION INTRAVENOUS at 14:36

## 2020-03-11 RX ADMIN — Medication 11 UNITS/HR: at 23:26

## 2020-03-11 NOTE — ED PROVIDER NOTES
History  Chief Complaint   Patient presents with    Fall     Pt brought in via EMS fell at home walking to bathrrom hit head off of sink no LOC  As per EMS patient is on BT as poer patient and med list no BT noted  Patient is poor historian  PAtient was on floor for tashi  1 hour  HPI     78 year male that presents today after a fall  Patient has history of dementia  Patient was walking to the bathroom fell and hit his head on the sink  No loss of conscious  Patient is a poor historian  Patient has some swelling on the left elbow which appears to be old  Will get an x-ray  Patient denies any pain  Patient does appear to be slightly pale  Patient does have some edema along with bowel incontinence  Will get some basic blood work along with images  Patient is only alert and oriented to self which according to EMS is his baseline    Prior to Admission Medications   Prescriptions Last Dose Informant Patient Reported?  Taking?   acetaminophen (TYLENOL) 325 mg tablet Past Month at Unknown time  No Yes   Sig: Take 2 tablets (650 mg total) by mouth every 6 (six) hours as needed for mild pain, headaches or fever   atorvastatin (LIPITOR) 10 mg tablet 3/11/2020 at Unknown time  Yes Yes   Sig: Take 10 mg by mouth daily   digoxin (LANOXIN) 0 125 mg tablet Not Taking at Unknown time  Yes No   Sig: Take 125 mcg by mouth daily   docusate sodium (COLACE) 100 mg capsule 3/11/2020 at Unknown time  Yes Yes   Sig: Take 100 mg by mouth 2 (two) times a day   ferrous sulfate 325 (65 Fe) mg tablet 3/11/2020 at Unknown time  Yes Yes   Sig: Take 325 mg by mouth daily with breakfast    glimepiride (AMARYL) 4 mg tablet Not Taking at Unknown time  Yes No   Sig: Take 4 mg by mouth 2 (two) times a day with meals   insulin detemir (LEVEMIR) 100 units/mL subcutaneous injection Not Taking at Unknown time  No No   Sig: Inject 8 Units under the skin daily at bedtime   Patient not taking: Reported on 3/11/2020   insulin lispro (HumaLOG) 100 units/mL injection Not Taking at Unknown time  No No   Sig: Inject 1-5 Units under the skin 3 (three) times a day before meals   Patient not taking: Reported on 3/11/2020   lisinopril (ZESTRIL) 2 5 mg tablet 3/11/2020 at Unknown time  Yes Yes   Sig: Take 2 5 mg by mouth daily   melatonin 3 mg Not Taking at Unknown time  No No   Sig: Take 2 tablets (6 mg total) by mouth daily at bedtime   Patient not taking: Reported on 3/11/2020   metoprolol succinate (TOPROL-XL) 25 mg 24 hr tablet 3/11/2020 at Unknown time  No Yes   Sig: Take 0 5 tablets (12 5 mg total) by mouth daily   omeprazole (PriLOSEC) 20 mg delayed release capsule Not Taking at Unknown time  Yes No   Sig: Take 20 mg by mouth daily at bedtime     pantoprazole (PROTONIX) 40 mg tablet Not Taking at Unknown time  No No   Sig: Take 1 tablet (40 mg total) by mouth 2 (two) times a day   Patient not taking: Reported on 3/11/2020   potassium chloride (K-DUR,KLOR-CON) 20 mEq tablet 3/11/2020 at Unknown time  No Yes   Sig: Take 1 tablet (20 mEq total) by mouth daily   pregabalin (LYRICA) 50 mg capsule   No No   Sig: Take 1 capsule (50 mg total) by mouth 3 (three) times a day for 10 days   senna (SENOKOT) 8 6 mg Not Taking at Unknown time  No No   Sig: Take 1 tablet (8 6 mg total) by mouth daily at bedtime   Patient not taking: Reported on 3/11/2020   spironolactone (ALDACTONE) 25 mg tablet 3/11/2020 at Unknown time  No Yes   Sig: Take 0 5 tablets (12 5 mg total) by mouth daily   tamsulosin (FLOMAX) 0 4 mg 3/11/2020 at Unknown time  No Yes   Sig: Take 1 capsule (0 4 mg total) by mouth daily with dinner   torsemide (DEMADEX) 10 mg tablet 3/11/2020 at Unknown time  No Yes   Sig: Take 1 tablet (10 mg total) by mouth daily   traMADol (ULTRAM) 50 mg tablet Not Taking at Unknown time  Yes No   Sig: Take 50 mg by mouth every 6 (six) hours as needed for moderate pain      Facility-Administered Medications: None       Past Medical History:   Diagnosis Date    [facility]congest[facility] [address] [facility]     Diabetes mellitus ([facility] [address] )     Gastrointestinal hemorrhage associated with duodenal ulcer 11/3/2018    History of open heart surgery     Hyperlipidemia     Hypertension        Past Surgical History:   Procedure Laterality Date    CARDIAC SURGERY      ESOPHAGOGASTRODUODENOSCOPY N/A 10/30/2018    Procedure: ESOPHAGOGASTRODUODENOSCOPY (EGD); Surgeon: Ame Salmon MD;  Location: [facility]; Service: Gastroenterology    ESOPHAGOGASTRODUODENOSCOPY N/A 11/4/2018    Procedure: ESOPHAGOGASTRODUODENOSCOPY (EGD); Surgeon: Lionel Kilgore MD;  Location: [facility]; Service: Gastroenterology    IR PICC LINE  10/31/2018    ME DEBRIDEMENT, SKIN, SUB-Q TISSUE,=<20 SQ CM Left 12/10/2018    Procedure: DEBRIDEMENT WOUND AND DRESSING CHANGE [place] OUT); Surgeon: Devon Felty, MD;  Location: [address] [place] [address];  Service: General    WOUND DEBRIDEMENT Left 10/26/2018    Procedure: Chest wall wound debridement (extensive) with pulse lavage and wound vac placement;  Surgeon: Devon Felty, MD;  Location: [address] [place] [address];  Service: General    WOUND DEBRIDEMENT Left 10/29/2018    Procedure: DEBRIDEMENT WOUND AND DRESSING CHANGE ([address] OUT); Surgeon: Devon Felty, MD;  Location: [address] [place] [address];  Service: General       History reviewed  No pertinent family history  I have reviewed and agree with the history as documented  E-Cigarette/Vaping    E-Cigarette Use Never User      E-Cigarette/Vaping Substances    Nicotine No     THC No     CBD No     Flavoring No     Other No     Unknown No      Social History     Tobacco Use    Smoking status: Former Smoker     Types: Pipe    Smokeless tobacco: Never Used   Substance Use Topics    Alcohol use: Not Currently     Frequency: Never    Drug use: No       Review of Systems   Constitutional: Negative  Negative for diaphoresis and fever  HENT: Negative  Respiratory: Negative  Negative for cough, shortness of breath and wheezing  Cardiovascular: Negative  Negative for chest pain, palpitations and leg swelling  Gastrointestinal: Negative for abdominal distention, abdominal pain, nausea and vomiting  Genitourinary: Negative  Musculoskeletal: Negative  Skin: Negative  Neurological: Negative  Psychiatric/Behavioral: Negative  All other systems reviewed and are negative  Physical Exam  Physical Exam   Constitutional: He appears well-developed and well-nourished  No distress  HENT:   Head: Normocephalic and atraumatic  Nose: Nose normal    Mouth/Throat: Oropharynx is clear and moist    Eyes: Pupils are equal, round, and reactive to light  Conjunctivae and EOM are normal    Neck: Normal range of motion  Neck supple  Cardiovascular: Normal rate, regular rhythm and normal heart sounds  No murmur heard  Pulmonary/Chest: Effort normal and breath sounds normal  No respiratory distress  He has no wheezes  He has no rales  Abdominal: Soft  Bowel sounds are normal  He exhibits no distension  There is no tenderness  There is no rebound and no guarding  Musculoskeletal: Normal range of motion  He exhibits edema  He exhibits no tenderness or deformity  2+ pitting edema lower extremity  Left arm erythematous area with abrasion  Swelling  Normal pulses  Soft compartment  Tender to palpate   Neurological: He is alert  No cranial nerve deficit  Oriented to self moving all extremities   Skin: Skin is warm and dry  No rash noted  He is not diaphoretic  There is pallor  Psychiatric: He has a normal mood and affect  Vitals reviewed        Vital Signs  ED Triage Vitals   Temperature Pulse Respirations Blood Pressure SpO2   03/11/20 1306 03/11/20 1306 03/11/20 1306 03/11/20 1306 03/11/20 1306   (!) 96 5 °F (35 8 °C) 98 18 118/74 97 %      Temp Source Heart Rate Source Patient Position - Orthostatic VS BP Location FiO2 (%)   03/11/20 1306 03/11/20 1306 03/11/20 1306 03/11/20 1306 --   Tympanic Monitor Lying Right arm Pain Score       03/11/20 2032       2           Vitals:    03/13/20 0823 03/13/20 0900 03/13/20 0930 03/13/20 1000   BP:  (!) 85/52 90/54 110/64   Pulse: 88 77 76 78   Patient Position - Orthostatic VS:    Lying         Visual Acuity  Visual Acuity      Most Recent Value   L Pupil Size (mm)  3   R Pupil Size (mm)  3   L Pupil Shape  Round   R Pupil Shape  Round          ED Medications  Medications   acetaminophen (TYLENOL) tablet 650 mg ( Oral MAR Unhold 3/11/20 2212)   enoxaparin (LOVENOX) subcutaneous injection 30 mg (30 mg Subcutaneous Given 3/13/20 0824)   atorvastatin (LIPITOR) tablet 10 mg (10 mg Oral Given 3/12/20 1638)   pantoprazole (PROTONIX) EC tablet 40 mg (40 mg Oral Given 3/13/20 0604)   amiodarone (CORDARONE) 900 mg in dextrose 5 % 500 mL infusion (0 mg/min Intravenous Stopped 3/12/20 0915)   cefTRIAXone (ROCEPHIN) IVPB (premix) 1,000 mg (0 mg Intravenous Stopped 3/12/20 1710)   digoxin (LANOXIN) tablet 125 mcg (125 mcg Oral Given 3/13/20 0823)   insulin lispro (HumaLOG) 100 units/mL subcutaneous injection 2-12 Units (4 Units Subcutaneous Given 3/13/20 0717)   DOPamine (INTROPIN) 400 mg in 250 mL infusion (premix) (2 5 mcg/kg/min × 75 3 kg Intravenous Rate/Dose Change 3/13/20 0800)   insulin detemir (LEVEMIR) subcutaneous injection 12 Units (has no administration in time range)   cefepime (MAXIPIME) 2 g/50 mL dextrose IVPB (0 mg Intravenous Stopped 3/11/20 1520)   sodium chloride 0 9 % bolus 500 mL (0 mL Intravenous Stopped 3/11/20 1547)   vancomycin (VANCOCIN) IVPB (premix) 1,000 mg (1,000 mg Intravenous New Bag 3/11/20 1521)   sodium chloride 0 9 % bolus 250 mL (250 mL Intravenous New Bag 3/11/20 1706)   amiodarone 150 mg in dextrose 5 % 100 mL IV bolus (0 mg Intravenous Stopped 3/12/20 0315)   amiodarone 150 mg/3 mL injection **ADS Override Pull** (  Given 3/12/20 0309)   amiodarone (CORDARONE) 900 mg/18 mL injection **ADS Override Pull** (  Given 3/12/20 0308)   potassium chloride 20 mEq IVPB (premix) (0 mEq Intravenous Stopped 3/12/20 1029)     Followed by   potassium chloride 20 mEq IVPB (premix) (0 mEq Intravenous Stopped 3/12/20 1230)   multi-electrolyte (ISOLYTE-S PH 7 4) bolus 250 mL (0 mL Intravenous Stopped 3/12/20 1455)   multi-electrolyte (ISOLYTE-S PH 7 4) bolus 250 mL (0 mL Intravenous Stopped 3/12/20 1609)   amiodarone (CORDARONE) 900 mg/18 mL injection **ADS Override Pull** (0 5 mg  Given 3/13/20 0618)   furosemide (LASIX) injection 20 mg (20 mg Intravenous Given 3/13/20 1020)       Diagnostic Studies  Results Reviewed     Procedure Component Value Units Date/Time    Urine culture [[id number]]  (Abnormal) Collected:  03/11/20 1440    Lab Status:  Preliminary result Specimen:  Urine Updated:  03/12/20 2324     Urine Culture 60,000-69,000 cfu/ml Gram Negative Ole    Blood culture #1 [[id number]] Collected:  03/11/20 1336    Lab Status:  Preliminary result Specimen:  Blood from Arm, Right Updated:  03/12/20 1601     Blood Culture No Growth at 24 hrs  Blood culture #2 [[id number]] Collected:  03/11/20 1405    Lab Status:  Preliminary result Specimen:  Blood from Arm, Right Updated:  03/12/20 1601     Blood Culture No Growth at 24 hrs  Wound culture and Gram stain [[id number]]  (Abnormal) Collected:  03/11/20 1440    Lab Status:  Preliminary result Specimen:  Wound from Arm, Left Updated:  03/12/20 1522     Wound Culture No growth     Gram Stain Result 1+ Disintegrating polys      2+ Gram positive rods      1+ Gram positive cocci in pairs    Digoxin level [[id number]]  (Abnormal) Collected:  03/11/20 1336    Lab Status:  Final result Specimen:  Blood from Arm, Right Updated:  03/11/20 1725     Digoxin Lvl <0 2 ng/mL     Lactic acid, plasma x2 [[id number]]  (Abnormal) Collected:  03/11/20 1545    Lab Status:  Final result Specimen:  Blood from Arm, Left Updated:  03/11/20 1611     LACTIC ACID 3 6 mmol/L     Narrative:       Result may be elevated if tourniquet was used during collection      Urine Microscopic [[id number]]  (Abnormal) Collected:  03/11/20 1440    Lab Status:  Final result Specimen:  Urine, Clean Catch Updated:  03/11/20 1455     RBC, UA 0-1 /hpf      WBC, UA Innumerable /hpf      Epithelial Cells Occasional /hpf      Bacteria, UA Moderate /hpf     UA (URINE) with reflex to Scope [[id number]]  (Abnormal) Collected:  03/11/20 1440    Lab Status:  Final result Specimen:  Urine, Clean Catch Updated:  03/11/20 1447     Color, UA Colorless     Clarity, UA Clear     Specific Gravity, UA <=1 005     pH, UA 5 5     Leukocytes, UA Trace     Nitrite, UA Positive     Protein, UA Negative mg/dl      Glucose, UA >=1000 (1%) mg/dl      Ketones, UA Negative mg/dl      Urobilinogen, UA 0 2 E U /dl      Bilirubin, UA Negative     Blood, UA Small    Comprehensive metabolic panel [[id number]]  (Abnormal) Collected:  03/11/20 1336    Lab Status:  Final result Specimen:  Blood from Arm, Right Updated:  03/11/20 1428     Sodium 130 mmol/L      Potassium 4 9 mmol/L      Chloride 92 mmol/L      CO2 27 mmol/L      ANION GAP 11 mmol/L      BUN 41 mg/dL      Creatinine 1 82 mg/dL      Glucose 823 mg/dL      Calcium 9 3 mg/dL      AST <5 U/L      ALT 25 U/L      Alkaline Phosphatase 153 U/L      Total Protein 6 9 g/dL      Albumin 3 6 g/dL      Total Bilirubin 1 10 mg/dL      eGFR 34 ml/min/1 73sq m     Narrative:       Reji guidelines for Chronic Kidney Disease (CKD):     Stage 1 with normal or high GFR (GFR > 90 mL/min/1 73 square meters)    Stage 2 Mild CKD (GFR = 60-89 mL/min/1 73 square meters)    Stage 3A Moderate CKD (GFR = 45-59 mL/min/1 73 square meters)    Stage 3B Moderate CKD (GFR = 30-44 mL/min/1 73 square meters)    Stage 4 Severe CKD (GFR = 15-29 mL/min/1 73 square meters)    Stage 5 End Stage CKD (GFR <15 mL/min/1 73 square meters)  Note: GFR calculation is accurate only with a steady state creatinine    NT-BNP PRO [[id number]]  (Abnormal) Collected:  03/11/20 1336    Lab Status:  Final result Specimen:  Blood from Arm, Right Updated:  03/11/20 1423     NT-proBNP 25,444 pg/mL     Lactic acid, plasma x2 [[id number]]  (Abnormal) Collected:  03/11/20 1336    Lab Status:  Final result Specimen:  Blood from Arm, Right Updated:  03/11/20 1411     LACTIC ACID 3 4 mmol/L     Narrative:       Result may be elevated if tourniquet was used during collection      Protime-INR [[id number]]  (Abnormal) Collected:  03/11/20 1336    Lab Status:  Final result Specimen:  Blood from Arm, Right Updated:  03/11/20 1410     Protime 11 7 seconds      INR 1 10    APTT [[id number]]  (Normal) Collected:  03/11/20 1336    Lab Status:  Final result Specimen:  Blood from Arm, Right Updated:  03/11/20 1410     PTT 25 seconds     Troponin I [[id number]]  (Abnormal) Collected:  03/11/20 1336    Lab Status:  Final result Specimen:  Blood from Arm, Right Updated:  03/11/20 1408     Troponin I 0 05 ng/mL     Beta Hydroxybutyrate [[id number]]  (Abnormal) Collected:  03/11/20 1336    Lab Status:  Final result Specimen:  Blood from Arm, Right Updated:  03/11/20 1355     BETA-HYDROXYBUTYRATE 1 4 mmol/L     Blood gas, venous [[id number]]  (Abnormal) Collected:  03/11/20 1336    Lab Status:  Final result Specimen:  Blood from Arm, Right Updated:  03/11/20 1345     pH, Ruel 7 329     pCO2, Ruel 52 0 mm Hg      pO2, Ruel 22 5 mm Hg      HCO3, Ruel 26 7 mmol/L      Base Excess, Ruel -0 2 mmol/L      O2 Content, Ruel 7 9 ml/dL      O2 HGB, VENOUS 35 9 %     CBC and differential [[id number]]  (Abnormal) Collected:  03/11/20 1336    Lab Status:  Final result Specimen:  Blood from Arm, Right Updated:  03/11/20 1344     WBC 7 49 Thousand/uL      RBC 4 21 Million/uL      Hemoglobin 15 3 g/dL      Hematocrit 47 5 %       fL      MCH 36 3 pg      MCHC 32 2 g/dL      RDW 13 4 %      MPV 12 3 fL      Platelets 376 Thousands/uL      nRBC 0 /100 WBCs      Neutrophils Relative 86 %      Immat GRANS % 1 %      Lymphocytes Relative 6 % Monocytes Relative 6 %      Eosinophils Relative 1 %      Basophils Relative 0 %      Neutrophils Absolute 6 41 Thousands/µL      Immature Grans Absolute 0 06 Thousand/uL      Lymphocytes Absolute 0 48 Thousands/µL      Monocytes Absolute 0 48 Thousand/µL      Eosinophils Absolute 0 04 Thousand/µL      Basophils Absolute 0 02 Thousands/µL     Fingerstick Glucose (POCT) [[id number]]  (Abnormal) Collected:  03/11/20 1315    Lab Status:  Final result Updated:  03/11/20 1321     POC Glucose >500 mg/dl                  XR elbow 3+ views LEFT   Final Result by Antonio Daily MD (03/11 1501)      Swelling  No acute fracture seen  Some degenerative bone spurring is seen  Slightly limited lateral view  Workstation performed: [id number]         CT head without contrast   Final Result by Vijaya Estrada MD (03/11 1431)      No acute intracranial abnormality  Workstation performed: [id number]         CT cervical spine without contrast   Final Result by Vijaya Estrada MD (03/11 1427)      1  No cervical spine fracture or traumatic malalignment  2   Bilateral pleural effusions, larger on the right  Workstation performed: [id number]         XR chest 1 view portable   Final Result by Kailey Torres MD (03/11 1445)      Congestive failure and small moderate right pleural effusion  Workstation performed: [id number]                    Procedures  Procedures         ED Course  ED Course as of Mar 13 1030   Wed Mar 11, 2020   1413 Procedure Note: EKG  Date/Time: 03/11/20 2:14 PM   Performed by: Reina Sanchez by: Jerrod Weiss   Indications / Diagnosis: weakness  ECG reviewed by me, the ED Provider: yes   The EKG demonstrates:  Rhythm: normal sinus  Intervals: normal intervals  Axis: normal axis  QRS/Blocks: normal QRS  ST Changes: No acute ST Changes, no STD/KALEN  ST depression v6   LBBB        1424 NT-proBNP(!): 25,444   1434 Treating for left arm cellulitis       [facility] with ICU       [phone] with critical care, patietn stable for floor  Will speak with slim                         Initial Sepsis Screening     Row Name 03/11/20 1415                Is the patient's history suggestive of a new or worsening infection?         Suspected source of infection  suspect infection, source unknown  -SK        Are two or more of the following signs & symptoms of infection both present and new to the patient?         Indicate SIRS criteria  Tachycardia > 90 bpm;Hypothermia < 36C (96 8F)  -SK        If the answer is yes to both questions, suspicion of sepsis is present          If severe sepsis is present AND tissue hypoperfusion perists in the hour after fluid resuscitation or lactate > 4, the patient meets criteria for SEPTIC SHOCK          Are any of the following organ dysfunction criteria present within 6 hours of suspected infection and SIRS criteria that are NOT considered to be chronic conditions? (!) Yes  -SK        Organ dysfunction  Lactate > 2 0 mmol/L  -SK        Date of presentation of severe sepsis  03/11/20  -SK        Time of presentation of severe sepsis  1416  -SK        Tissue hypoperfusion persists in the hour after crystalloid fluid administration, evidenced, by either:          Was hypotension present within one hour of the conclusion of crystalloid fluid administration?           Date of presentation of septic shock          Time of presentation of septic shock            User Key  (r) = Recorded By, (t) = Taken By, (c) = Cosigned By    Initials Name Provider Type    SK Moe Perez MD Physician           Default Flowsheet Data (last 720 hours)      Sepsis Reassess     Row Name 03/11/20 1719                   Repeat Volume Status and Tissue Perfusion Assessment Performed    Repeat Volume Status and Tissue Perfusion Assessment Performed             Volume Status and Tissue Perfusion Post Fluid Resuscitation * Must Document All * Vital Signs Reviewed (HR, RR, BP, T)  Yes  -DP        Shock Index Reviewed          Arterial Oxygen Saturation Reviewed (POx, SaO2 or SpO2)          Cardio  Normal S1/S2  -DP        Pulmonary  Normal effort  -DP        Capillary Refill  Brisk  -DP        Peripheral Pulses  Radial  -DP        Peripheral Pulse  +2  -DP        Skin          Urine output assessed  Adequate  -DP           *OR*   Intensive Monitoring- Must Document One of the Following Four *:    Vital Signs Reviewed          * Central Venous Pressure (CVP or RAP)          * Central Venous Oxygen (SVO2, ScvO2 or Oxygen saturation via central catheter)          * Bedside Cardiovascular US in IVC diameter and % collapse          * Passive Leg Raise OR Crystalloid Challenge            User Key  (r) = Recorded By, (t) = Taken By, (c) = Cosigned By    Initials Name Provider Type    DP Bradley Mcgee MD Physician                MDM      Disposition  Final diagnoses:   Fall   Hyperglycemic coma ([place] [address] )   Elevated troponin   UTI (urinary tract infection)   Cellulitis   CHRIS (acute kidney injury) ([place] [address] )     Time reflects when diagnosis was documented in both MDM as applicable and the Disposition within this note     Time User Action Codes Description Comment    3/11/2020  3:10 PM Dyke Spatz, [address]  Diego Gram  XXXA] Fall     3/11/2020  3:10 PM Dyke Spatz, [place] 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[address] )     3/11/2020  3:11 PM Dyke Spatz, [place] Remove [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) ([place] [address] )     3/11/2020  3:11 PM Dyke Spatz, [address] Add [E11 01] Hyperglycemic coma ([place] [address] )     3/11/2020  3:11 PM Rodriguez Coventrsravani Add [R79 89] Elevated troponin     3/11/2020  3:12 PM Dyke Spatz, [address] Add [N39 0] UTI (urinary tract infection)     3/11/2020  3:12 PM Dyke Spatz, [place] Add [L03 90] Cellulitis     3/11/2020  3:12 PM Dyke Spatz, [place] Add [N17 9] CHRIS (acute kidney injury) ([place] [address] )     3/12/2020 12:01 AM Alba Kwan Add [E87 2] Lactic acidosis     3/12/2020 10:39 AM Myles Chaudhary Add [I25 5] Cardiomyopathy, ischemic     3/12/2020 10:39 AM Eva Vences Osiel [I25 5] Cardiomyopathy, ischemic       ED Disposition     ED Disposition Condition Date/Time Comment    Admit Stable Wed Mar 11, 2020  3:10 PM Case was discussed with medicine and the patient's admission status was agreed to be Admission Status: inpatient status to the service of Dr Candelario Benjamin           Follow-up Information    None         Current Discharge Medication List      CONTINUE these medications which have NOT CHANGED    Details   acetaminophen (TYLENOL) 325 mg tablet Take 2 tablets (650 mg total) by mouth every 6 (six) hours as needed for mild pain, headaches or fever  Qty: 30 tablet, Refills: 0    Associated Diagnoses: Shingles      atorvastatin (LIPITOR) 10 mg tablet Take 10 mg by mouth daily      docusate sodium (COLACE) 100 mg capsule Take 100 mg by mouth 2 (two) times a day      ferrous sulfate 325 (65 Fe) mg tablet Take 325 mg by mouth daily with breakfast       lisinopril (ZESTRIL) 2 5 mg tablet Take 2 5 mg by mouth daily      metoprolol succinate (TOPROL-XL) 25 mg 24 hr tablet Take 0 5 tablets (12 5 mg total) by mouth daily  Qty: 30 tablet, Refills: 0    Associated Diagnoses: Cardiomyopathy, ischemic      potassium chloride (K-DUR,KLOR-CON) 20 mEq tablet Take 1 tablet (20 mEq total) by mouth daily  Refills: 0    Associated Diagnoses: Acute on chronic combined systolic and diastolic heart failure (HCC)      spironolactone (ALDACTONE) 25 mg tablet Take 0 5 tablets (12 5 mg total) by mouth daily  Qty: 30 tablet, Refills: 0    Associated Diagnoses: Cardiomyopathy, ischemic      tamsulosin (FLOMAX) 0 4 mg Take 1 capsule (0 4 mg total) by mouth daily with dinner  Refills: 0    Associated Diagnoses: Urinary retention      torsemide (DEMADEX) 10 mg tablet Take 1 tablet (10 mg total) by mouth daily  Refills: 0    Associated Diagnoses: Cardiomyopathy, ischemic      digoxin (LANOXIN) 0 125 mg tablet Take 125 mcg by mouth daily glimepiride (AMARYL) 4 mg tablet Take 4 mg by mouth 2 (two) times a day with meals      insulin detemir (LEVEMIR) 100 units/mL subcutaneous injection Inject 8 Units under the skin daily at bedtime  Refills: 0    Associated Diagnoses: Type 2 diabetes mellitus with complication, with long-term current use of insulin (HCC)      insulin lispro (HumaLOG) 100 units/mL injection Inject 1-5 Units under the skin 3 (three) times a day before meals  Refills: 0    Associated Diagnoses: Type 2 diabetes mellitus with complication, with long-term current use of insulin (HCC)      melatonin 3 mg Take 2 tablets (6 mg total) by mouth daily at bedtime  Refills: 0    Associated Diagnoses: Insomnia      omeprazole (PriLOSEC) 20 mg delayed release capsule Take 20 mg by mouth daily at bedtime        pantoprazole (PROTONIX) 40 mg tablet Take 1 tablet (40 mg total) by mouth 2 (two) times a day  Refills: 0    Associated Diagnoses: GERD (gastroesophageal reflux disease); Gastrointestinal hemorrhage associated with duodenal ulcer      pregabalin (LYRICA) 50 mg capsule Take 1 capsule (50 mg total) by mouth 3 (three) times a day for 10 days  Qty: 30 capsule, Refills: 0    Associated Diagnoses: Shingles      senna (SENOKOT) 8 6 mg Take 1 tablet (8 6 mg total) by mouth daily at bedtime  Qty: 120 each, Refills: 0    Associated Diagnoses: Constipation      traMADol (ULTRAM) 50 mg tablet Take 50 mg by mouth every 6 (six) hours as needed for moderate pain           No discharge procedures on file      PDMP Review     None          ED Provider  Electronically Signed by           Kenny Upton MD  03/13/20 6699

## 2020-03-11 NOTE — PROGRESS NOTES
Vancomycin Assessment    Nita Sandifer is a 78 y o  male who is currently receiving vancomycin 1000 mg IV q24h for skin-soft tissue infection  Relevant clinical data and objective history reviewed:  Creatinine   Date Value Ref Range Status   03/11/2020 1 82 (H) 0 60 - 1 30 mg/dL Final     Comment:     Standardized to IDMS reference method   11/19/2019 1 46 (H) 0 60 - 1 30 mg/dL Final     Comment:     Standardized to IDMS reference method   04/25/2019 1 56 (H) 0 60 - 1 30 mg/dL Final     Comment:     Standardized to IDMS reference method     /71 (BP Location: Left arm)   Pulse 86   Temp (!) 96 5 °F (35 8 °C) (Temporal)   Resp 18   Wt 77 2 kg (170 lb 1 6 oz)   SpO2 98%   BMI 25 49 kg/m²   I/O last 3 completed shifts: In: 550 [IV Piggyback:550]  Out: -   Lab Results   Component Value Date/Time    BUN 41 (H) 03/11/2020 01:36 PM    WBC 7 49 03/11/2020 01:36 PM    HGB 15 3 03/11/2020 01:36 PM    HCT 47 5 03/11/2020 01:36 PM     (H) 03/11/2020 01:36 PM     03/11/2020 01:36 PM     Temp Readings from Last 3 Encounters:   03/11/20 (!) 96 5 °F (35 8 °C) (Temporal)   12/10/18 98 4 °F (36 9 °C) (Tympanic)   12/03/18 98 °F (36 7 °C) (Tympanic)     Vancomycin Days of Therapy: 1    Assessment/Plan  The patient is currently on vancomycin utilizing scheduled dosing based on actual body weight  Baseline risks associated with therapy include: pre-existing renal impairment and advanced age  The patient is currently receiving 1000 mg IV q24h and after clinical evaluation will be changed to 1250 mg IV q24h  Pharmacy will also follow closely for s/sx of nephrotoxicity, infusion reactions and appropriateness of therapy  BMP and CBC will be ordered per protocol  Plan for trough as patient approaches steady state, prior to the 4th  dose at approximately 9:30 on 3/14/20  Due to infection severity, will target a trough of 15-20 (appropriate for most indications)     Pharmacy will continue to follow the patients culture results and clinical progress daily      Diane Toussaint, Pharmacist

## 2020-03-11 NOTE — ED NOTES
Dr Carrie Finley notified of patient's SIRs criteria  Sepsis alert called on patient        Anjelica Gardner RN  03/11/20 0235

## 2020-03-11 NOTE — H&P
History and Physical - [facility]    Patient Information: Ap La 78 y o  male MRN: [id number]  Unit/Bed#: ED 03 Encounter: [id number]  Admitting Physician: Gayatri Brooks MD  PCP: Sonya Lord DO  Date of Admission:  03/11/20        Hospital Problem List:     Principal Problem:    Hyperglycemic hyperosmolar state  Active Problems:    Acute on chronic combined systolic and diastolic CHF (congestive heart failure) ([facility])    Acute kidney injury ([facility] )    Lactic acidosis    Cellulitis    CAD (coronary artery disease)    CKD (chronic kidney disease) stage 3, GFR 30-59 ml/min ([facility])    Benign essential hypertension    UTI (urinary tract infection)    Atrial fibrillation ([facility] [address] )    Urinary retention    Hyponatremia      Assessment/Plan:    Acute on chronic combined systolic and diastolic CHF (congestive heart failure) ([facility])  Assessment & Plan  Wt Readings from Last 3 Encounters:   03/11/20 77 2 kg (170 lb 1 6 oz)   12/06/18 76 2 kg (168 lb)   11/18/18 80 2 kg (176 lb 12 9 oz)     History of ischemic cardiomyopathy (EF 20-25%, severe diffuse hypokinesis, grade 1 diastolic dysfunction -0823 with abnormal nuclear stress test)  Previously declined life vest  Evidence of volume overload clinically and radiographically with peripheral edema and pleural effusion  Currently hemodynamically stable, saturating adequately on room air at rest  Will hold diuretics at present     Monitor intake output , monitor volume and respiratory status  Check repeat Echo    * Hyperglycemic hyperosmolar state  Assessment & Plan  Presented with blood sugar of 823  pH-7 329, with normal anion gap and bicarb  Beta hydroxybutyrate is elevated, UA without any ketones  Patient is not currently on any medication for diabetes at home or does not check his blood sugar  Check hemoglobin A1c  Insulin drip  Monitor blood sugar q 2 hours  IV fluids    Cellulitis  Assessment & Plan  Possible mild cellulitis with left forearm wound with surrounding erythema induration without any fluctuance or drainage  Compartments soft  Patient does have hypothermia but with normal white count and without any fever  Doubt sepsis  Follow-up follow-up cultures, MRSA surveillance  Continue broad-spectrum antibiotic for now      Lactic acidosis  Assessment & Plan  Likely secondary to DKA and cardiomyopathy, doubt sepsis  IV fluids  Trend lactic acid    Acute kidney injury ([facility] [address] )  Assessment & Plan  Presented with creatinine of 1 8 in setting of hyperglycemia  UA-0-1 RBC, negative protein, pyuria and bacteriuria  Will check bladder scan, monitor intake output  Hold lisinopril, Aldactone, torsemide  IV hydration setting of hyperglycemia with close monitoring of volume status    Urinary retention  Assessment & Plan  Check bladder scan  Hold Flomax for now    Atrial fibrillation [facility])  Assessment & Plan  History of AFib in the past  Not on anticoagulation due to history of GI bleed  Currently EKG with normal sinus rhythm  Telemetry  Patient was previously on digoxin and metoprolol    Does not appear to be on digoxin at present  Will resume metoprolol at lower dose    UTI (urinary tract infection)  Assessment & Plan  Possible given abnormal UA with positive nitrite, pyuria and bacteriuria but patient does not report any urinary symptoms  Follow-up urine culture  Continue antibiotics for now as above    Benign essential hypertension  Assessment & Plan  Low normal, at baseline  Monitor with metoprolol  Holding lisinopril, spironolactone and torsemide for now        CKD (chronic kidney disease) stage 3, GFR 30-59 ml/min ([facility])  Assessment & Plan  Previous baseline appears to be 0 8-1    CAD (coronary artery disease)  Assessment & Plan  History of CAD status post CABG  Patient currently denies any checks pain, EKG without any acute abnormality  Continue metoprolol, Lipitor  Check lipid profile    Hyponatremia  Assessment & Plan  Secondary to hyperglycemia  Monitor    Addendum 8: 00 p m  Patient's lactic acid continues to trend up despite correction of hyperglycemia and IV fluids  Likely secondary to poor perfusion related to cardiomyopathy, may require inotropes  Discussed with ICU team, patient is accepted for transfer to step-down unit  Family updated  VTE Prophylaxis: Enoxaparin (Lovenox)  / sequential compression device   Code Status: Level 3 - DNAR and DNI    Anticipated Length of Stay:  Patient will be admitted on an Inpatient basis with an anticipated length of stay of  > 2 midnights  Justification for Hospital Stay:  CHF, hypoglycemia    Total Time for Visit, including Counseling / Coordination of Care: 45 minutes  Greater than 50% of this total time spent on direct patient counseling and coordination of care  Discussed with patient's daughter-in-law at bedside     Chief Complaint:     Fall (Pt brought in via EMS fell at home walking to bathrrom hit head off of sink no LOC  As per EMS patient is on BT as poer patient and med list no BT noted  Patient is poor historian  PAtient was on floor for tashi  1 hour  )    History of Present Illness:    Patel Acosta is a 78 y o  male with multiple comorbidities including CAD status post CABG , ischemic cardiomyopathy (EF-20-25%, severe diffuse hypokinesis, grade 1 diastolic dysfunction, 0095), diabetes mellitus type 2, AFib, dyslipidemia, hypertension, history of GI bleed, dementia who was brought to ED after a fall  Patient is poor historian and history obtained from the daughter-in-law was present at the bedside  As per the daughter and patient not been eating well for last few months has been having increasing fall about 3 over last month, episodes of hallucinations  She thinks that this may be because of his blood sugar as he does not check his blood sugar as he passes out after seeing blood  She also reported that patient has not seen any physician for few months and does not want to follow-up with the doctors    Today patient was found by his son after a fall lying face down  As per the patient if fell while walking to the bathroom as he lost his balance he denies any loss of consciousness but reports hitting his head to the sink  Patient was on the floor approximately for 1 hour as per history and his son could not get him up and EMS was called and patient was brought to ED  In ED, patient was hypothermic other vital signs were stable  noted to have blood sugar of 823 elevated creatinine, elevated proBNP lactic acidosis  Beta hydroxybutyrate was elevated but anion gap and pH was within normal limit  Patient was also noted to have wound over left forearm which he sustained from the 1 of the prior fall  Cultures were obtained in ED, CT head and C-spine was without any acute abnormality  Patient was given IV fluids and started on insulin drip  Case was reviewed with Critical Care Team and patient was deemed stable for general medical floor and subsequently admitted  Patient is currently lying comfortably in bed answering appropriately denies any chest pain shortness of breath or abdominal pain  He fails to report any urinary symptoms and reports mild discomfort at left arm wound  Review of Systems:    Review of Systems   Reason unable to perform ROS: Limited as patient is poor historian  Constitutional: Negative for appetite change  HENT: Negative for rhinorrhea, sore throat and trouble swallowing  Respiratory: Negative for cough, chest tightness and shortness of breath  Cardiovascular: Positive for leg swelling  Negative for chest pain  Gastrointestinal: Negative for abdominal pain, constipation, diarrhea, nausea and vomiting  Musculoskeletal: Positive for gait problem  Negative for neck pain  Skin: Positive for wound  Neurological: Positive for dizziness  Negative for syncope and headaches  Psychiatric/Behavioral: Positive for confusion and hallucinations         Past Medical and Surgical History: Past Medical History:   Diagnosis Date    [facility]congestive heart failure) ([facility] [address] )     Diabetes mellitus ([facility] [address] )     Gastrointestinal hemorrhage associated with duodenal ulcer 11/3/2018    History of open heart surgery     Hyperlipidemia     Hypertension        Past Surgical History:   Procedure Laterality Date    CARDIAC SURGERY      ESOPHAGOGASTRODUODENOSCOPY N/A 10/30/2018    Procedure: ESOPHAGOGASTRODUODENOSCOPY (EGD); Surgeon: Amparo Bustillo MD;  Location: [facility]; Service: Gastroenterology    ESOPHAGOGASTRODUODENOSCOPY N/A 11/4/2018    Procedure: ESOPHAGOGASTRODUODENOSCOPY (EGD); Surgeon: Christianne Jin MD;  Location: [facility]; Service: Gastroenterology    IR PICC LINE  10/31/2018    NH DEBRIDEMENT, SKIN, SUB-Q TISSUE,=<20 SQ CM Left 12/10/2018    Procedure: DEBRIDEMENT WOUND AND DRESSING CHANGE [facility] OUT); Surgeon: Gurdeep Shaw MD;  Location: [address] [place] [address];  Service: General    WOUND DEBRIDEMENT Left 10/26/2018    Procedure: Chest wall wound debridement (extensive) with pulse lavage and wound vac placement;  Surgeon: Gurdeep Shaw MD;  Location: [address] [place] [address];  Service: General    WOUND DEBRIDEMENT Left 10/29/2018    Procedure: DEBRIDEMENT WOUND AND DRESSING CHANGE ([address] OUT); Surgeon: Gurdeep Shaw MD;  Location: [address] [place] [address];  Service: General       Meds/Allergies:    PTA meds:   Prior to Admission Medications   Prescriptions Last Dose Informant Patient Reported?  Taking?   acetaminophen (TYLENOL) 325 mg tablet Past Month at Unknown time  No Yes   Sig: Take 2 tablets (650 mg total) by mouth every 6 (six) hours as needed for mild pain, headaches or fever   atorvastatin (LIPITOR) 10 mg tablet 3/11/2020 at Unknown time  Yes Yes   Sig: Take 10 mg by mouth daily   digoxin (LANOXIN) 0 125 mg tablet Not Taking at Unknown time  Yes No   Sig: Take 125 mcg by mouth daily   docusate sodium (COLACE) 100 mg capsule 3/11/2020 at Unknown time  Yes Yes   Sig: Take 100 mg by mouth 2 (two) times a day   ferrous sulfate 325 (65 Fe) mg tablet 3/11/2020 at Unknown time  Yes Yes   Sig: Take 325 mg by mouth daily with breakfast    glimepiride (AMARYL) 4 mg tablet Not Taking at Unknown time  Yes No   Sig: Take 4 mg by mouth 2 (two) times a day with meals   insulin detemir (LEVEMIR) 100 units/mL subcutaneous injection Not Taking at Unknown time  No No   Sig: Inject 8 Units under the skin daily at bedtime   Patient not taking: Reported on 3/11/2020   insulin lispro (HumaLOG) 100 units/mL injection Not Taking at Unknown time  No No   Sig: Inject 1-5 Units under the skin 3 (three) times a day before meals   Patient not taking: Reported on 3/11/2020   lisinopril (ZESTRIL) 2 5 mg tablet 3/11/2020 at Unknown time  Yes Yes   Sig: Take 2 5 mg by mouth daily   melatonin 3 mg Not Taking at Unknown time  No No   Sig: Take 2 tablets (6 mg total) by mouth daily at bedtime   Patient not taking: Reported on 3/11/2020   metoprolol succinate (TOPROL-XL) 25 mg 24 hr tablet 3/11/2020 at Unknown time  No Yes   Sig: Take 0 5 tablets (12 5 mg total) by mouth daily   omeprazole (PriLOSEC) 20 mg delayed release capsule Not Taking at Unknown time  Yes No   Sig: Take 20 mg by mouth daily at bedtime     pantoprazole (PROTONIX) 40 mg tablet Not Taking at Unknown time  No No   Sig: Take 1 tablet (40 mg total) by mouth 2 (two) times a day   Patient not taking: Reported on 3/11/2020   potassium chloride (K-DUR,KLOR-CON) 20 mEq tablet 3/11/2020 at Unknown time  No Yes   Sig: Take 1 tablet (20 mEq total) by mouth daily   pregabalin (LYRICA) 50 mg capsule   No No   Sig: Take 1 capsule (50 mg total) by mouth 3 (three) times a day for 10 days   senna (SENOKOT) 8 6 mg Not Taking at Unknown time  No No   Sig: Take 1 tablet (8 6 mg total) by mouth daily at bedtime   Patient not taking: Reported on 3/11/2020   spironolactone (ALDACTONE) 25 mg tablet 3/11/2020 at Unknown time  No Yes   Sig: Take 0 5 tablets (12 5 mg total) by mouth daily tamsulosin (FLOMAX) 0 4 mg 3/11/2020 at Unknown time  No Yes   Sig: Take 1 capsule (0 4 mg total) by mouth daily with dinner   torsemide (DEMADEX) 10 mg tablet 3/11/2020 at Unknown time  No Yes   Sig: Take 1 tablet (10 mg total) by mouth daily   traMADol (ULTRAM) 50 mg tablet Not Taking at Unknown time  Yes No   Sig: Take 50 mg by mouth every 6 (six) hours as needed for moderate pain      Facility-Administered Medications: None       Allergies: No Known Allergies  History:     Marital Status: Single     Substance Use History:   Social History     Substance and Sexual Activity   Alcohol Use No     Social History     Tobacco Use   Smoking Status Former Smoker    Types: Pipe   Smokeless Tobacco Never Used     Social History     Substance and Sexual Activity   Drug Use No       Family History:    History reviewed  No pertinent family history  Physical Exam:     Vitals:   Blood Pressure: 114/80 (03/11/20 1430)  Pulse: 90 (03/11/20 1430)  Temperature: (!) 96 5 °F (35 8 °C) (03/11/20 1306)  Temp Source: Tympanic (03/11/20 1306)  Respirations: 17 (03/11/20 1430)  Weight - Scale: 77 6 kg (171 lb 1 2 oz) (03/11/20 1306)  SpO2: 96 % (03/11/20 1430) on room air    Physical Exam   Constitutional: No distress  Appears chronically ill, deconditioned   HENT:   Head: Normocephalic and atraumatic  Eyes: Pupils are equal, round, and reactive to light  Neck: Neck supple  Cardiovascular: Normal rate, regular rhythm, normal heart sounds and intact distal pulses  Pulmonary/Chest: Effort normal  No respiratory distress  He has no wheezes  He has no rhonchi  He has no rales  He exhibits no tenderness  Diminished   Abdominal: Soft  Bowel sounds are normal  He exhibits no distension  There is no tenderness  There is no rebound and no guarding  Musculoskeletal: He exhibits edema  Bilateral lower extremity pitting edema  Lower extremities are cold to touch  Peripheral pulses are noted   Neurological: He is alert   No cranial nerve deficit or sensory deficit  GCS eye subscore is 4  GCS verbal subscore is 5  GCS motor subscore is 6  Skin: Skin is warm and dry  No rash noted  Left forearm wound with mild surrounding erythema induration and tenderness  No significant drainage  Range of motion, pulses are intact  Soft compartments         Lab Results: I have personally reviewed pertinent reports  Results from last 7 days   Lab Units 03/11/20  1336   WBC Thousand/uL 7 49   HEMOGLOBIN g/dL 15 3   HEMATOCRIT % 47 5   PLATELETS Thousands/uL 197   NEUTROS PCT % 86*   LYMPHS PCT % 6*   MONOS PCT % 6   EOS PCT % 1     Results from last 7 days   Lab Units 03/11/20  1336   POTASSIUM mmol/L 4 9   CHLORIDE mmol/L 92*   CO2 mmol/L 27   BUN mg/dL 41*   CREATININE mg/dL 1 82*   CALCIUM mg/dL 9 3   ALK PHOS U/L 153*   ALT U/L 25   AST U/L <5*     Results from last 7 days   Lab Units 03/11/20  1336   INR  1 10*       Imaging: I have personally reviewed pertinent reports  Xr Chest 1 View Portable    Result Date: 3/11/2020  Narrative: CHEST INDICATION:   weakness  COMPARISON:  11/1/2018  EXAM PERFORMED/VIEWS:  XR CHEST PORTABLE FINDINGS:  Median sternotomy wires are present  Stable cardiomegaly is noted  Congestive failure is present  Small to moderate right pleural effusion is noted  Osseous structures appear within normal limits for patient age  Impression: Congestive failure and small moderate right pleural effusion  Workstation performed: [id number]     Xr Elbow 3+ Views Left    Result Date: 3/11/2020  Narrative: LEFT ELBOW INDICATION:   swelling  COMPARISON:  None VIEWS:  XR ELBOW 3+ VW LEFT FINDINGS: No acute fractures are seen  The lateral view is slightly rotated which limits assessment for the possibility of a small joint effusion  There does not seem to be significant effusion visible, however  There are some degenerative spurs seen at the margins of the medial and lateral humeral epicondyles   There is also bone spur at the olecranon process  No lytic or blastic lesions are seen  There is prominent swelling around the elbow  Impression: Swelling  No acute fracture seen  Some degenerative bone spurring is seen  Slightly limited lateral view  Workstation performed: [id number]     Ct Head Without Contrast    Result Date: 3/11/2020  Narrative: CT BRAIN - WITHOUT CONTRAST INDICATION:   fall  COMPARISON:  October 26, 2018  TECHNIQUE:  CT examination of the brain was performed  In addition to axial images, coronal 2D reformatted images were created and submitted for interpretation  Radiation dose length product (DLP) for this visit:  876 4 mGy-cm   This examination, like all CT scans performed in the [facility], was performed utilizing techniques to minimize radiation dose exposure, including the use of iterative reconstruction and automated exposure control  IMAGE QUALITY:  Diagnostic  FINDINGS: PARENCHYMA:  No intracranial mass, mass effect or midline shift  No CT signs of acute infarction  No acute parenchymal hemorrhage  Decreased white matter attenuation, typical of chronic microvascular ischemic change  VENTRICLES AND EXTRA-AXIAL SPACES:  Ventricles and extra-axial CSF spaces are prominent commensurate with the degree of volume loss  No hydrocephalus  No acute extra-axial hemorrhage  VISUALIZED ORBITS AND PARANASAL SINUSES:  Unremarkable  CALVARIUM AND EXTRACRANIAL SOFT TISSUES:  Normal      Impression: No acute intracranial abnormality  Workstation performed: [id number]     Ct Cervical Spine Without Contrast    Result Date: 3/11/2020  Narrative: CT CERVICAL SPINE - WITHOUT CONTRAST INDICATION:   fall  COMPARISON:  CT cervical spine from October 25, 2018  Portable chest radiograph from this afternoon  TECHNIQUE:  CT examination of the cervical spine was performed without intravenous contrast   Contiguous axial images were obtained  Sagittal and coronal reconstructions were performed    Radiation dose length product (DLP) for this visit:  421 31 mGy-cm   This examination, like all CT scans performed in the [facility], was performed utilizing techniques to minimize radiation dose exposure, including the use of iterative  reconstruction and automated exposure control  IMAGE QUALITY:  Diagnostic  FINDINGS: ALIGNMENT:  3 mm of anterolisthesis of C4 on C5, similar to the last CT  Otherwise normal alignment  VERTEBRAE:  No fracture  No bone destruction or osteoblastic lesion  DISC SPACES:  Severe degenerative disc disease at C5-C6 and C6-C7  FACET JOINTS:  Multilevel degenerative facet arthropathy, most severe from C2-C3 through C4-C5  FORAMINA:   Foraminal stenosis at several mid and lower cervical levels  SPINAL CANAL:  Spinal canal normal in caliber  No evidence of hematoma  PREVERTEBRAL AND PARASPINAL SOFT TISSUES:  Normal  OTHER SOFT TISSUES OF THE NECK: Normal  INCLUDED SKULL BASE: Normal  IMAGED PORTIONS OF THE BRAIN: Normal  THORACIC INLET:  Opacification of the apical portion of the right hemithorax, probably layering pleural fluid  Moderate to large left pleural effusion  Left lung apex clear  Impression: 1  No cervical spine fracture or traumatic malalignment  2   Bilateral pleural effusions, larger on the right  Workstation performed: [id number]       XR elbow 3+ views LEFT   Final Result      Swelling  No acute fracture seen  Some degenerative bone spurring is seen  Slightly limited lateral view  Workstation performed: [id number]         CT head without contrast   Final Result      No acute intracranial abnormality  Workstation performed: [id number]         CT cervical spine without contrast   Final Result      1  No cervical spine fracture or traumatic malalignment  2   Bilateral pleural effusions, larger on the right                       Workstation performed: [id number]         XR chest 1 view portable   Final Result      Congestive failure and small moderate right pleural effusion  Workstation performed: [id number]             EKG, Pathology, and Other Studies Reviewed on Admission:   · EKG-normal sinus rhythm at 91 beats per minute, LVH, LAD,     Allscripts/EPIC Records Reviewed: Yes     ** Please Note: "This note has been constructed using a voice recognition system  Therefore there may be syntax, spelling, and/or grammatical errors   Please call if you have any questions  "**

## 2020-03-12 ENCOUNTER — APPOINTMENT (INPATIENT)
Dept: NON INVASIVE DIAGNOSTICS | Facility: HOSPITAL | Age: 80
DRG: 291 | End: 2020-03-12
Payer: MEDICARE

## 2020-03-12 PROBLEM — D64.9 ANEMIA: Status: RESOLVED | Noted: 2018-05-14 | Resolved: 2020-03-12

## 2020-03-12 PROBLEM — I95.9 HYPOTENSION: Status: ACTIVE | Noted: 2020-03-12

## 2020-03-12 LAB
ALBUMIN SERPL BCP-MCNC: 3 G/DL (ref 3.5–5)
ALP SERPL-CCNC: 113 U/L (ref 46–116)
ALT SERPL W P-5'-P-CCNC: 22 U/L (ref 12–78)
ANION GAP SERPL CALCULATED.3IONS-SCNC: 10 MMOL/L (ref 4–13)
AST SERPL W P-5'-P-CCNC: 16 U/L (ref 5–45)
BASOPHILS # BLD AUTO: 0.04 THOUSANDS/ΜL (ref 0–0.1)
BASOPHILS NFR BLD AUTO: 0 % (ref 0–1)
BILIRUB SERPL-MCNC: 0.8 MG/DL (ref 0.2–1)
BUN SERPL-MCNC: 35 MG/DL (ref 5–25)
CALCIUM SERPL-MCNC: 9.1 MG/DL (ref 8.3–10.1)
CHLORIDE SERPL-SCNC: 99 MMOL/L (ref 100–108)
CHOLEST SERPL-MCNC: 77 MG/DL (ref 50–200)
CO2 SERPL-SCNC: 27 MMOL/L (ref 21–32)
CREAT SERPL-MCNC: 1.39 MG/DL (ref 0.6–1.3)
EOSINOPHIL # BLD AUTO: 0.07 THOUSAND/ΜL (ref 0–0.61)
EOSINOPHIL NFR BLD AUTO: 1 % (ref 0–6)
ERYTHROCYTE [DISTWIDTH] IN BLOOD BY AUTOMATED COUNT: 13.3 % (ref 11.6–15.1)
EST. AVERAGE GLUCOSE BLD GHB EST-MCNC: >355 MG/DL
GFR SERPL CREATININE-BSD FRML MDRD: 47 ML/MIN/1.73SQ M
GLUCOSE SERPL-MCNC: 108 MG/DL (ref 65–140)
GLUCOSE SERPL-MCNC: 128 MG/DL (ref 65–140)
GLUCOSE SERPL-MCNC: 146 MG/DL (ref 65–140)
GLUCOSE SERPL-MCNC: 173 MG/DL (ref 65–140)
GLUCOSE SERPL-MCNC: 179 MG/DL (ref 65–140)
GLUCOSE SERPL-MCNC: 182 MG/DL (ref 65–140)
GLUCOSE SERPL-MCNC: 262 MG/DL (ref 65–140)
GLUCOSE SERPL-MCNC: 79 MG/DL (ref 65–140)
GLUCOSE SERPL-MCNC: 86 MG/DL (ref 65–140)
GLUCOSE SERPL-MCNC: 96 MG/DL (ref 65–140)
HBA1C MFR BLD: >14 %
HCT VFR BLD AUTO: 46.4 % (ref 36.5–49.3)
HDLC SERPL-MCNC: 51 MG/DL
HGB BLD-MCNC: 15.5 G/DL (ref 12–17)
IMM GRANULOCYTES # BLD AUTO: 0.08 THOUSAND/UL (ref 0–0.2)
IMM GRANULOCYTES NFR BLD AUTO: 1 % (ref 0–2)
LACTATE SERPL-SCNC: 2.1 MMOL/L (ref 0.5–2)
LACTATE SERPL-SCNC: 2.7 MMOL/L (ref 0.5–2)
LACTATE SERPL-SCNC: 3.6 MMOL/L (ref 0.5–2)
LACTATE SERPL-SCNC: 4.3 MMOL/L (ref 0.5–2)
LACTATE SERPL-SCNC: 4.5 MMOL/L (ref 0.5–2)
LDLC SERPL CALC-MCNC: 13 MG/DL (ref 0–100)
LYMPHOCYTES # BLD AUTO: 0.8 THOUSANDS/ΜL (ref 0.6–4.47)
LYMPHOCYTES NFR BLD AUTO: 6 % (ref 14–44)
MAGNESIUM SERPL-MCNC: 2.2 MG/DL (ref 1.6–2.6)
MCH RBC QN AUTO: 36.6 PG (ref 26.8–34.3)
MCHC RBC AUTO-ENTMCNC: 33.4 G/DL (ref 31.4–37.4)
MCV RBC AUTO: 110 FL (ref 82–98)
MONOCYTES # BLD AUTO: 1.06 THOUSAND/ΜL (ref 0.17–1.22)
MONOCYTES NFR BLD AUTO: 8 % (ref 4–12)
NEUTROPHILS # BLD AUTO: 12.04 THOUSANDS/ΜL (ref 1.85–7.62)
NEUTS SEG NFR BLD AUTO: 84 % (ref 43–75)
NRBC BLD AUTO-RTO: 0 /100 WBCS
PHOSPHATE SERPL-MCNC: 2.8 MG/DL (ref 2.3–4.1)
PLATELET # BLD AUTO: 182 THOUSANDS/UL (ref 149–390)
PMV BLD AUTO: 11.9 FL (ref 8.9–12.7)
POTASSIUM SERPL-SCNC: 3.6 MMOL/L (ref 3.5–5.3)
PROCALCITONIN SERPL-MCNC: 0.07 NG/ML
PROCALCITONIN SERPL-MCNC: <0.05 NG/ML
PROT SERPL-MCNC: 6.2 G/DL (ref 6.4–8.2)
RBC # BLD AUTO: 4.23 MILLION/UL (ref 3.88–5.62)
SODIUM SERPL-SCNC: 136 MMOL/L (ref 136–145)
TRIGL SERPL-MCNC: 64 MG/DL
TSH SERPL DL<=0.05 MIU/L-ACNC: 1.37 UIU/ML (ref 0.36–3.74)
WBC # BLD AUTO: 14.09 THOUSAND/UL (ref 4.31–10.16)

## 2020-03-12 PROCEDURE — 80061 LIPID PANEL: CPT | Performed by: INTERNAL MEDICINE

## 2020-03-12 PROCEDURE — 83036 HEMOGLOBIN GLYCOSYLATED A1C: CPT | Performed by: INTERNAL MEDICINE

## 2020-03-12 PROCEDURE — 99223 1ST HOSP IP/OBS HIGH 75: CPT | Performed by: INTERNAL MEDICINE

## 2020-03-12 PROCEDURE — 84443 ASSAY THYROID STIM HORMONE: CPT | Performed by: NURSE PRACTITIONER

## 2020-03-12 PROCEDURE — 83605 ASSAY OF LACTIC ACID: CPT | Performed by: NURSE PRACTITIONER

## 2020-03-12 PROCEDURE — 97163 PT EVAL HIGH COMPLEX 45 MIN: CPT

## 2020-03-12 PROCEDURE — 93306 TTE W/DOPPLER COMPLETE: CPT | Performed by: INTERNAL MEDICINE

## 2020-03-12 PROCEDURE — 93306 TTE W/DOPPLER COMPLETE: CPT

## 2020-03-12 PROCEDURE — 84145 PROCALCITONIN (PCT): CPT | Performed by: INTERNAL MEDICINE

## 2020-03-12 PROCEDURE — 82948 REAGENT STRIP/BLOOD GLUCOSE: CPT

## 2020-03-12 PROCEDURE — 80053 COMPREHEN METABOLIC PANEL: CPT | Performed by: INTERNAL MEDICINE

## 2020-03-12 PROCEDURE — 97167 OT EVAL HIGH COMPLEX 60 MIN: CPT

## 2020-03-12 PROCEDURE — 99291 CRITICAL CARE FIRST HOUR: CPT | Performed by: NURSE PRACTITIONER

## 2020-03-12 PROCEDURE — 83735 ASSAY OF MAGNESIUM: CPT | Performed by: INTERNAL MEDICINE

## 2020-03-12 PROCEDURE — 99292 CRITICAL CARE ADDL 30 MIN: CPT | Performed by: ANESTHESIOLOGY

## 2020-03-12 PROCEDURE — 0T9B70Z DRAINAGE OF BLADDER WITH DRAINAGE DEVICE, VIA NATURAL OR ARTIFICIAL OPENING: ICD-10-PCS | Performed by: STUDENT IN AN ORGANIZED HEALTH CARE EDUCATION/TRAINING PROGRAM

## 2020-03-12 PROCEDURE — 84100 ASSAY OF PHOSPHORUS: CPT | Performed by: INTERNAL MEDICINE

## 2020-03-12 PROCEDURE — 85025 COMPLETE CBC W/AUTO DIFF WBC: CPT | Performed by: INTERNAL MEDICINE

## 2020-03-12 RX ORDER — DIGOXIN 125 MCG
125 TABLET ORAL DAILY
Status: DISCONTINUED | OUTPATIENT
Start: 2020-03-12 | End: 2020-03-18 | Stop reason: HOSPADM

## 2020-03-12 RX ORDER — CEFTRIAXONE 1 G/50ML
1000 INJECTION, SOLUTION INTRAVENOUS EVERY 24 HOURS
Status: DISCONTINUED | OUTPATIENT
Start: 2020-03-12 | End: 2020-03-14

## 2020-03-12 RX ORDER — SODIUM CHLORIDE, SODIUM GLUCONATE, SODIUM ACETATE, POTASSIUM CHLORIDE, MAGNESIUM CHLORIDE, SODIUM PHOSPHATE, DIBASIC, AND POTASSIUM PHOSPHATE .53; .5; .37; .037; .03; .012; .00082 G/100ML; G/100ML; G/100ML; G/100ML; G/100ML; G/100ML; G/100ML
250 INJECTION, SOLUTION INTRAVENOUS ONCE
Status: COMPLETED | OUTPATIENT
Start: 2020-03-12 | End: 2020-03-12

## 2020-03-12 RX ORDER — AMIODARONE HYDROCHLORIDE 900 MG/18ML
INJECTION, SOLUTION INTRAVENOUS
Status: COMPLETED
Start: 2020-03-12 | End: 2020-03-12

## 2020-03-12 RX ORDER — AMIODARONE HYDROCHLORIDE 50 MG/ML
INJECTION, SOLUTION INTRAVENOUS
Status: COMPLETED
Start: 2020-03-12 | End: 2020-03-12

## 2020-03-12 RX ORDER — DOPAMINE HYDROCHLORIDE 160 MG/100ML
1-20 INJECTION, SOLUTION INTRAVENOUS
Status: DISCONTINUED | OUTPATIENT
Start: 2020-03-12 | End: 2020-03-12

## 2020-03-12 RX ORDER — POTASSIUM CHLORIDE 14.9 MG/ML
20 INJECTION INTRAVENOUS ONCE
Status: COMPLETED | OUTPATIENT
Start: 2020-03-12 | End: 2020-03-12

## 2020-03-12 RX ORDER — DOPAMINE HYDROCHLORIDE 160 MG/100ML
1-20 INJECTION, SOLUTION INTRAVENOUS
Status: DISCONTINUED | OUTPATIENT
Start: 2020-03-12 | End: 2020-03-15

## 2020-03-12 RX ADMIN — DOPAMINE HYDROCHLORIDE 5 MCG/KG/MIN: 160 INJECTION, SOLUTION INTRAVENOUS at 02:13

## 2020-03-12 RX ADMIN — AMIODARONE HYDROCHLORIDE: 900 INJECTION, SOLUTION INTRAVENOUS at 03:08

## 2020-03-12 RX ADMIN — CEFEPIME HYDROCHLORIDE 2000 MG: 2 INJECTION, POWDER, FOR SOLUTION INTRAVENOUS at 03:00

## 2020-03-12 RX ADMIN — CEFTRIAXONE 1000 MG: 1 INJECTION, SOLUTION INTRAVENOUS at 16:41

## 2020-03-12 RX ADMIN — AMIODARONE HYDROCHLORIDE 1 MG/MIN: 50 INJECTION, SOLUTION INTRAVENOUS at 03:15

## 2020-03-12 RX ADMIN — AMIODARONE HYDROCHLORIDE 0.5 MG/MIN: 50 INJECTION, SOLUTION INTRAVENOUS at 09:15

## 2020-03-12 RX ADMIN — DIGOXIN 125 MCG: 125 TABLET ORAL at 11:17

## 2020-03-12 RX ADMIN — POTASSIUM CHLORIDE 20 MEQ: 14.9 INJECTION, SOLUTION INTRAVENOUS at 10:29

## 2020-03-12 RX ADMIN — POTASSIUM CHLORIDE 20 MEQ: 14.9 INJECTION, SOLUTION INTRAVENOUS at 08:34

## 2020-03-12 RX ADMIN — ATORVASTATIN CALCIUM 10 MG: 20 TABLET, FILM COATED ORAL at 16:38

## 2020-03-12 RX ADMIN — INSULIN DETEMIR 8 UNITS: 100 INJECTION, SOLUTION SUBCUTANEOUS at 22:10

## 2020-03-12 RX ADMIN — INSULIN LISPRO 2 UNITS: 100 INJECTION, SOLUTION INTRAVENOUS; SUBCUTANEOUS at 22:21

## 2020-03-12 RX ADMIN — Medication 150 MG: at 03:05

## 2020-03-12 RX ADMIN — DOPAMINE HYDROCHLORIDE IN DEXTROSE 2.5 MCG/KG/MIN: 1.6 INJECTION, SOLUTION INTRAVENOUS at 15:48

## 2020-03-12 RX ADMIN — INSULIN LISPRO 2 UNITS: 100 INJECTION, SOLUTION INTRAVENOUS; SUBCUTANEOUS at 11:57

## 2020-03-12 RX ADMIN — INSULIN LISPRO 6 UNITS: 100 INJECTION, SOLUTION INTRAVENOUS; SUBCUTANEOUS at 16:03

## 2020-03-12 RX ADMIN — SODIUM CHLORIDE, SODIUM GLUCONATE, SODIUM ACETATE, POTASSIUM CHLORIDE, MAGNESIUM CHLORIDE, SODIUM PHOSPHATE, DIBASIC, AND POTASSIUM PHOSPHATE 250 ML: .53; .5; .37; .037; .03; .012; .00082 INJECTION, SOLUTION INTRAVENOUS at 13:52

## 2020-03-12 RX ADMIN — ENOXAPARIN SODIUM 30 MG: 30 INJECTION SUBCUTANEOUS at 08:37

## 2020-03-12 RX ADMIN — AMIODARONE HYDROCHLORIDE: 50 INJECTION, SOLUTION INTRAVENOUS at 03:09

## 2020-03-12 RX ADMIN — SODIUM CHLORIDE, SODIUM GLUCONATE, SODIUM ACETATE, POTASSIUM CHLORIDE, MAGNESIUM CHLORIDE, SODIUM PHOSPHATE, DIBASIC, AND POTASSIUM PHOSPHATE 250 ML: .53; .5; .37; .037; .03; .012; .00082 INJECTION, SOLUTION INTRAVENOUS at 15:10

## 2020-03-12 NOTE — CONSULTS
Consultation - Cardiology   Ziggy Avina 78 y o  male MRN: [id number]  Unit/Bed#: ICU 05 Encounter: [id number]  03/12/20  11:05 AM          Physician Requesting Consult: Tiffany Villatoro MD  Reason for Consult / Principal Problem:  CHF      Assessment:  1  Acute on chronic combined systolic and diastolic congestive heart failure  Patient with ejection fraction of 15-20% which is consistent with his known ejection fraction  He refuses ICD  - cannot give IV Lasix due to hyperosmolar state   - may give IV fluids if needed to treat hypotension and will diurese as necessary   - his home medications include lisinopril 2 5 mg daily and metoprolol 12 5 mg daily and spironolactone 12 5 mg daily  Medications on hold due to hypotension   - no indication for inotropic support at this time  2  Frequent PVCs  - patient was started on amiodarone   3  DM with hyperglycemic hyperosmolar state  - management per ICU team    4  CAD with prior CABG  5  Dyslipidemia    Plan:  As above  Discussed with ICU team      History of Present Illness   HPI: Ziggy Avina is a 78y o  year old male who presented to [facility] Emergency room after a fall at home  He was walking in his kitchen when he fell and hit the sink  He reports unsteady gait for the past several months  Denies any shortness of breath with exertion but is limited in activity  He does not leave his house often  In emergency room, he was noted to have bilateral lower extremity edema  Workup in ER was notable for Hyperglycemic hyperosmolar state, lactic acidosis, acute renal failure and congestive heart failure  He was admitted to the intensive care unit for further care  Echocardiogram done today showed an ejection fraction of 15-20% in line with his previous echocardiograms  He has a past medical history ischemic cardiomyopathy with prior coronary artery bypass graft in 2017  He also has history of paroxysmal atrial fibrillation    He does not follow regularly with cardiologist   Was previously seeing Dr Henny Robins but has not seen him in over a year  In the past, he was admitted for an infection in his chest wall  During that time, he was noted to have frequent ectopy and episodes of ventricular tachycardia  LifeVest was offered to him along with permanent defibrillator but he did not wish to have it done  Review of Systems:    Review of Systems   Constitutional: Positive for fatigue  Negative for chills and fever  HENT: Negative for congestion, nosebleeds and postnasal drip  Respiratory: Positive for shortness of breath  Negative for cough and chest tightness  Cardiovascular: Positive for leg swelling  Negative for chest pain and palpitations  Gastrointestinal: Negative for abdominal distention, abdominal pain, diarrhea, nausea and vomiting  Endocrine: Negative for polydipsia, polyphagia and polyuria  Musculoskeletal: Positive for arthralgias and gait problem  Negative for myalgias  Skin: Negative for color change, pallor and rash  Allergic/Immunologic: Negative for environmental allergies, food allergies and immunocompromised state  Neurological: Positive for light-headedness  Negative for dizziness, seizures and syncope  Hematological: Negative for adenopathy  Does not bruise/bleed easily  Psychiatric/Behavioral: Negative for dysphoric mood  The patient is not nervous/anxious  Historical Information   Past Medical History:   Diagnosis Date    [facility]congest[facility]     Diabetes mellitus ([facility] )     Gastrointestinal hemorrhage associated with duodenal ulcer 11/3/2018    History of open heart surgery     Hyperlipidemia     Hypertension      Past Surgical History:   Procedure Laterality Date    CARDIAC SURGERY      ESOPHAGOGASTRODUODENOSCOPY N/A 10/30/2018    Procedure: ESOPHAGOGASTRODUODENOSCOPY (EGD); Surgeon: Senait Guerrero MD;  Location: [facility] GI LAB;   Service: Gastroenterology   Bela Larios ESOPHAGOGASTRODUODENOSCOPY N/A 11/4/2018    Procedure: ESOPHAGOGASTRODUODENOSCOPY (EGD); Surgeon: Moises Smith MD;  Location: [facility] GI LAB; Service: Gastroenterology    IR PICC LINE  10/31/2018    ID DEBRIDEMENT, SKIN, SUB-Q TISSUE,=<20 SQ CM Left 12/10/2018    Procedure: DEBRIDEMENT WOUND AND DRESSING CHANGE [facility] OUT); Surgeon: Kota Benz MD;  Location: [address] [place] [address];  Service: General    WOUND DEBRIDEMENT Left 10/26/2018    Procedure: Chest wall wound debridement (extensive) with pulse lavage and wound vac placement;  Surgeon: Kota Benz MD;  Location: [address] [place] [address];  Service: General    WOUND DEBRIDEMENT Left 10/29/2018    Procedure: DEBRIDEMENT WOUND AND DRESSING CHANGE ([address] OUT); Surgeon: Kota Benz MD;  Location: [address] [place] [address];  Service: General     Social History     Substance and Sexual Activity   Alcohol Use No    Frequency: Never     Social History     Substance and Sexual Activity   Drug Use No     Social History     Tobacco Use   Smoking Status Former Smoker    Types: Pipe   Smokeless Tobacco Never Used       Family History: History reviewed  No pertinent family history      Meds/Allergies   current meds:   Current Facility-Administered Medications   Medication Dose Route Frequency    acetaminophen (TYLENOL) tablet 650 mg  650 mg Oral Q6H PRN    amiodarone (CORDARONE) 900 mg in dextrose 5 % 500 mL infusion  0 5 mg/min Intravenous Continuous    atorvastatin (LIPITOR) tablet 10 mg  10 mg Oral Daily With Dinner    cefTRIAXone (ROCEPHIN) IVPB (premix) 1,000 mg  1,000 mg Intravenous Q24H    digoxin (LANOXIN) tablet 125 mcg  125 mcg Oral Daily    DOPamine (INTROPIN) 400 mg in 250 mL infusion (premix)  1-20 mcg/kg/min Intravenous Titrated    enoxaparin (LOVENOX) subcutaneous injection 30 mg  30 mg Subcutaneous Q24H JEANNE    insulin detemir (LEVEMIR) subcutaneous injection 8 Units  8 Units Subcutaneous HS    insulin lispro (HumaLOG) 100 units/mL subcutaneous injection 2-12 Units 2-12 Units Subcutaneous 4x Daily (AC & HS)    pantoprazole (PROTONIX) EC tablet 40 mg  40 mg Oral Early Morning     No Known Allergies    Objective   Vitals: Blood pressure (!) 85/60, pulse 72, temperature 98 1 °F (36 7 °C), temperature source Tympanic, resp  rate 18, height 5' 8 5" (1 74 m), weight 75 3 kg (166 lb 0 1 oz), SpO2 98 %  , Body mass index is 24 87 kg/m²  Physical Exam   Constitutional: He appears healthy  No distress  HENT:   Nose: Nose normal    Mouth/Throat: Oropharynx is clear  Eyes: Pupils are equal, round, and reactive to light  Conjunctivae are normal    Neck: Normal range of motion  Neck supple  No JVD present  Cardiovascular: Normal rate and regular rhythm  Occasional extrasystoles are present  Exam reveals no gallop and no friction rub  Murmur heard  Pulmonary/Chest: Effort normal and breath sounds normal  He has no wheezes  He has no rales  Abdominal: Soft  He exhibits no distension  There is no tenderness  Musculoskeletal: He exhibits edema  Neurological: He is alert and oriented to person, place, and time  Skin: Skin is warm and dry     Wound left arm       Lab Results:     Troponins:   Results from last 7 days   Lab Units 03/11/20 2138 03/11/20 2046 03/11/20  1811   TROPONIN I ng/mL 0 04 0 05* 0 06*       CBC with diff:   Results from last 7 days   Lab Units 03/12/20 0400 03/11/20 2138 03/11/20  1336   WBC Thousand/uL 14 09* 7 84 7 49   HEMOGLOBIN g/dL 15 5 14 6 15 3   HEMATOCRIT % 46 4 45 5 47 5   MCV fL 110* 114* 113*   PLATELETS Thousands/uL 182 159 197   MCH pg 36 6* 36 6* 36 3*   MCHC g/dL 33 4 32 1 32 2   RDW % 13 3 13 3 13 4   MPV fL 11 9 12 3 12 3   NRBC AUTO /100 WBCs 0 0 0       CMP:   Results from last 7 days   Lab Units 03/12/20 0400 03/11/20 2138 03/11/20  1336   POTASSIUM mmol/L 3 6 4 0 4 9   CHLORIDE mmol/L 99* 100 92*   CO2 mmol/L 27 24 27   BUN mg/dL 35* 37* 41*   CREATININE mg/dL 1 39* 1 57* 1 82*   CALCIUM mg/dL 9 1 9 1 9 3   AST U/L 16 17 <5* ALT U/L 22 24 25   ALK PHOS U/L 113 125* 153*   EGFR ml/min/1 73sq m 47 41 34       Magnesium:   Results from last 7 days   Lab Units 20  0400 20  2138   MAGNESIUM mg/dL 2 2 2 5       Coags:   Results from last 7 days   Lab Units 20  1336   PTT seconds 25   INR  1 10*       Lipid Profile:   Results from last 7 days   Lab Units 20  0400   TRIGLYCERIDES mg/dL 64   HDL mg/dL 51   LDL CALC mg/dL 13         Cardiac testing:   Results for orders placed during the hospital encounter of 20   Echo complete with contrast if indicated    Narrative [address]  [address] [facility] [address]  [phone]    Transthoracic Echocardiogram  2D, M-mode, Doppler, and Color Doppler    Study date:  12-Mar-2020    Patient: Aric Gutierrez  MR number: [id number]  Account number: [de-identified]  : 1940  Age: 78 years  Gender: Male  Status: Inpatient  Location: Bedside  Height: 68 in  Weight: 165 7 lb  BP: 96/ 62 mmHg    Indications: Cardiomyopathy    Diagnoses: I42 9 - Cardiomyopathy, unspecified    Sonographer:  ADRIEL Mary  Primary Physician:  Lela Leone DO  Referring Physician:  Darlene Jean Baptiste MD  Group:  [facility]  Interpreting Physician:  Gary Jaffe DO    IMPRESSIONS:  These features are consistent with dilated cardiomyopathy  Ejection fraction is severely reduced  No significant change compared to previous exam     SUMMARY    LEFT VENTRICLE:  The ventricle was mildly dilated  Systolic function was severely reduced by visual assessment  Ejection fraction was estimated in the range of 15 % to 20 %  There was severe diffuse hypokinesis  Doppler parameters were consistent with elevated mean left atrial filling pressure  RIGHT VENTRICLE:  The ventricle was mildly dilated  Systolic function was markedly reduced  MITRAL VALVE:  There was moderate annular calcification  There was mild regurgitation      AORTIC VALVE:  The valve was trileaflet  Leaflets exhibited moderate calcification and mildly reduced cuspal separation  Transaortic velocity was decreased due to decreased transaortic flow (decreased stroke volume)  There was mild stenosis  There was mild to moderate regurgitation  TRICUSPID VALVE:  There was moderate regurgitation  Pulmonary artery systolic pressure was mildly increased  Estimated peak PA pressure was 45 mmHg  HISTORY: PRIOR HISTORY: HTN, CAD,CM, AF, CHF, CABG, DM, CKD    PROCEDURE: The procedure was performed at the bedside  This was a routine study  The transthoracic approach was used  The study included complete 2D imaging, M-mode, complete spectral Doppler, and color Doppler  The heart rate was 74 bpm,  at the start of the study  Image quality was adequate  LEFT VENTRICLE: The ventricle was mildly dilated  Systolic function was severely reduced by visual assessment  Ejection fraction was estimated in the range of 15 % to 20 %  There was severe diffuse hypokinesis  Wall thickness was normal   DOPPLER: Doppler parameters were consistent with elevated mean left atrial filling pressure  RIGHT VENTRICLE: The ventricle was mildly dilated  Systolic function was markedly reduced  DOPPLER: Systolic pressure was mildly increased  LEFT ATRIUM: The atrium was markedly dilated  RIGHT ATRIUM: The atrium was moderately dilated  MITRAL VALVE: There was moderate annular calcification  Valve structure was normal  There was normal leaflet separation  No echocardiographic evidence for prolapse  DOPPLER: The transmitral velocity was within the normal range  There was  no evidence for stenosis  There was mild regurgitation  AORTIC VALVE: The valve was trileaflet  Leaflets exhibited moderate calcification and mildly reduced cuspal separation  DOPPLER: Transaortic velocity was decreased due to decreased transaortic flow (decreased stroke volume)  There was mild  stenosis   There was mild to moderate regurgitation  TRICUSPID VALVE: The valve structure was normal  There was normal leaflet separation  DOPPLER: The transtricuspid velocity was within the normal range  There was no evidence for stenosis  There was moderate regurgitation  Pulmonary artery  systolic pressure was mildly increased  Estimated peak PA pressure was 45 mmHg  PULMONIC VALVE: Leaflets exhibited normal thickness, no calcification, and normal cuspal separation  DOPPLER: The transpulmonic velocity was within the normal range  There was no regurgitation  PERICARDIUM: There was no thickening  There was no pericardial effusion  AORTA: The root exhibited normal size  PULMONARY ARTERY: The size was normal  The morphology appeared normal     SYSTEM MEASUREMENT TABLES    2D mode  AoR Diam 2D: 3 5 cm  LA Diam (2D): 4 9 cm  LA/Ao (2D): 1 4  FS (2D Teich): 7 91 %  IVSd (2D): 0 96 cm  LVDEV: 185 cmï¾³  LVEDV MOD BP: 201 cmï¾³  LVESV: 153 cmï¾³  LVIDd(2D): 6 07 cm  LVISd (2D): 5 59 cm  LVOT Area 2D: 3 14 cmï¾²  LVPWd (2D): 0 92 cm  SV (Teich): 32 cmï¾³    Apical four chamber  LVEF A4C: 17 %    Apical two chamber  LVEF A2C: 14 %    Unspecified Scan Mode  CARL Cont Eq (Peak Travon): 2 29 cmï¾²  CARL Cont Eq (VTI): 1 88 cmï¾²  LVOT (VTI): 19 1 cm  LVOT Diam : 2 cm  LVOT Vmax: 970 mm/s  LVOT Vmax; Mean: 970 mm/s  Peak Grad ; Mean: 4 mm[Hg]  SV (LVOT): 60 cmï¾³  VTI;Mean: 2 mm[Hg]  MV Peak A Travon: 312 mm/s  MV Peak E Travon  Mean: 1070 mm/s  MVA (PHT): 5 5 cmï¾²  PHT: 40 ms  Max P mm[Hg]  V Max: 2990 mm/s  Vmax: 2890 mm/s  RA Area: 21 4 cmï¾²  RA Volume: 64 6 cmï¾³  TAPSE: 0 6 cm    [employer] Accredited Echocardiography Laboratory    Prepared and electronically signed by    Nano Knight DO  Signed 12-Mar-2020 09:41:30       No results found for this or any previous visit  No results found for this or any previous visit  No results found for this or any previous visit  No results found for this or any previous visit    Results for orders placed during the hospital encounter of 18   NM myocardial perfusion spect (rx stress and/or rest)    Narrative [address]  [address] [facility]  [address] [phone]    Rest/Stress Gated SPECT Myocardial Perfusion Imaging After Regadenoson    Patient: Nieves Myers  MR number: [id number]  Account number: [de-identified]  : 1940  Age: 76 years  Gender: Male  Status: Inpatient  Location: Stress lab  Height: 68 in  Weight: 147 lb  BP: 122/ 60 mmHg    Allergies: NO KNOWN ALLERGIES    Diagnosis: I25 5 - Ischemic cardiomyopathy    Primary Physician:  Angelina Walker DO  RN:  NATALY Viveros  Group:  [facility]  Report Prepared By[de-identified]  NATALY Viveros  Interpreting Physician:  Tram Wagner MD    INDICATIONS: Evaluation of known coronary artery disease  HISTORY: The patient is a 76year old  male  Chest pain status: no chest pain  Other symptoms: decreased effort tolerance  Coronary artery disease risk factors: dyslipidemia, hypertension, and diabetes mellitus  Cardiovascular  history:Ischemic cardiomyopathy, coronary artery disease and congestive heart failure  Prior cardiovascular procedures: coronary artery bypass grafting  Co-morbidity: age Medications: a beta blocker, aspirin, a lipid lowering agent, and  diabetic medications  PHYSICAL EXAM: Baseline physical exam screening:Weakness of extremities noted, no wheezes audible  REST ECG: Normal sinus rhythm  The ECG showed ventricular couplets  PROCEDURE: The study was performed in the stress lab  A regadenoson infusion pharmacologic stress test was performed  Gated SPECT myocardial perfusion imaging was performed after stress and at rest  Systolic blood pressure was 122 mmHg, at  the start of the study  Diastolic blood pressure was 60 mmHg, at the start of the study  The heart rate was 74 bpm, at the start of the study  IV double checked    Regadenoson protocol:  Time HR bpm SBP mmHg DBP mmHg Symptoms Rhythm/conduct  Baseline 09:54 74 122 60 none NSR, ventricular couplets  Immediate 09:56 102 97 54 none occasional PVC's  1 min 09:57 98 101 56 none --  2 min 09:58 98 102 58 none --  3 min 09:59 99 106 55 none --  4 min 10:00 97 105 56 none --  No medications or fluids given  STRESS SUMMARY: Duration of pharmacologic stress was 3 min  Maximal heart rate during stress was 102 bpm  The heart rate response to stress was normal  There was normal resting blood pressure with an appropriate response to stress  The  rate-pressure product for the peak heart rate and blood pressure was 77187  There was no chest pain during stress  The stress test was terminated due to protocol completion  Pre oxygen saturation: 99 %  Peak oxygen saturation: 100 %  The  stress ECG was equivocal for ischemia  Arrhythmia during stress: isolated premature ventricular beats  ISOTOPE ADMINISTRATION:  Resting isotope administration Stress isotope administration  Agent Tetrofosmin Tetrofosmin  Dose 10 6 mCi 32 2 mCi  Date 05/15/2018 05/15/2018    The radiopharmaceutical was injected at the peak effect of pharmacologic stress  MYOCARDIAL PERFUSION IMAGING:  The left ventricle was dilated  The TID ratio was 1 12  PERFUSION DEFECTS:  -  There was a moderate-sized, moderately severe, partially reversible myocardial perfusion defect of the entire inferior and apical wall  GATED SPECT:  The calculated left ventricular ejection fraction was 35 %  Left ventricular ejection fraction was moderately decreased by visual estimate  There was moderate global left ventricular hypokinesis with paradoxical septal motion  There was  moderately reduced myocardial thickening and motion of the inferior wall of the left ventricle  SUMMARY:  -  Stress results: There was no chest pain during stress  -  ECG conclusions: The stress ECG was equivocal for ischemia  -  Perfusion imaging:  There was a moderate-sized, moderately severe, partially reversible myocardial perfusion defect of the entire inferior and apical wall  -  Gated SPECT: The calculated left ventricular ejection fraction was 35 %  Left ventricular ejection fraction was moderately decreased by visual estimate  There was moderate global left ventricular hypokinesis with paradoxical septal  motion  There was moderately reduced myocardial thickening and motion of the inferior wall of the left ventricle  IMPRESSIONS: Abnormal study after pharmacologic vasodilation  There was a moderate-sized infarct and a small amount of ischemia  Left ventricular systolic function was reduced, with regional wall motion abnormalities      Prepared and signed by    Harrison Singleton MD  Signed 05/16/2018 07:35:18           EKG: Personally reviewed:  Sinus rhythm with left bundle-branch block    Imaging: I have personally reviewed pertinent films in PACS

## 2020-03-12 NOTE — ASSESSMENT & PLAN NOTE
Wt Readings from Last 3 Encounters:   03/11/20 77 2 kg (170 lb 1 6 oz)   12/06/18 76 2 kg (168 lb)   11/18/18 80 2 kg (176 lb 12 9 oz)   · Limited echo done November 2019 demonstrated EF of 25%, echo done October of 2018 demonstrated EF of 20% with a mild increase in PA pressures  · Per chart review patient had refused LifeVest/AICD  · Patient's frequent PVCs on cardiac monitoring  · On exam patient is cachectic with JVD and +2 pitting edema lower extremities  Patient is cold to the touch  Given exam findings and patient's history of severely reduced EF, concern for worsening heart function, and volume overload with additional volume resuscitation  · Hold diuretics and antihypertensive medications in light of recent volume resuscitation  · Continue Metoprolol with hold parameters  Dig level <0 2  · Repeat echo tomorrow a m  · Consult cardiology in am  · Will hold on further volume resuscitation  · Strict I&Os  · Daily weight  · Continue monitor lactate  · Consider low-dose peripheral dopamine with signs/symptoms cardiogenic shock

## 2020-03-12 NOTE — PROGRESS NOTES
Vancomycin Assessment    Twila Bartlett is a 78 y o  male who is currently receiving vancomycin Vancomycin 1250 mg IV q24h for skin-soft tissue infection     Relevant clinical data and objective history reviewed:  Creatinine   Date Value Ref Range Status   03/12/2020 1 39 (H) 0 60 - 1 30 mg/dL Final     Comment:     Standardized to IDMS reference method   03/11/2020 1 57 (H) 0 60 - 1 30 mg/dL Final     Comment:     Standardized to IDMS reference method   03/11/2020 1 82 (H) 0 60 - 1 30 mg/dL Final     Comment:     Standardized to IDMS reference method     BP 91/57   Pulse 74   Temp 98 1 °F (36 7 °C) (Tympanic)   Resp 20   Ht 5' 8 5" (1 74 m)   Wt 75 3 kg (166 lb 0 1 oz)   SpO2 97%   BMI 24 87 kg/m²   I/O last 3 completed shifts: In: 969 9 [I V :269 9; IV Piggyback:700]  Out: 1290 [Urine:1290]  Lab Results   Component Value Date/Time    BUN 35 (H) 03/12/2020 04:00 AM    WBC 14 09 (H) 03/12/2020 04:00 AM    HGB 15 5 03/12/2020 04:00 AM    HCT 46 4 03/12/2020 04:00 AM     (H) 03/12/2020 04:00 AM     03/12/2020 04:00 AM     Temp Readings from Last 3 Encounters:   03/12/20 98 1 °F (36 7 °C) (Tympanic)   12/10/18 98 4 °F (36 9 °C) (Tympanic)   12/03/18 98 °F (36 7 °C) (Tympanic)     Vancomycin Days of Therapy: 2    Assessment/Plan  The patient is currently on vancomycin utilizing scheduled dosing  Baseline risks associated with therapy include: pre-existing renal impairment, concomitant nephrotoxic medications and advanced age  The patient is receiving Vancomycin 1250 mg IV q24h with the most recent vancomycin level being at steady-state and therapeutic based on a goal of 15-20 (appropriate for most indications) ; therefore, after clinical evaluation will be changed to Vancomycin 1500 mg IV q24h likely due to the improvement in Cr from 2 01 to 1 39  Pharmacy will continue to follow closely for s/sx of nephrotoxicity, infusion reactions and appropriateness of therapy    BMP and CBC will be ordered per protocol  Plan for trough as patient approaches steady state, prior to the 4th  dose at approximately 1000 on 03/15/2020  Pharmacy will continue to follow the patients culture results and clinical progress daily      Clint Goodman, Pharmacist

## 2020-03-12 NOTE — ASSESSMENT & PLAN NOTE
· Patient with history of atrial fibrillation not on anticoagulation due to history of GI bleeding  · EKG currently normal sinus rhythm with PVC  · Patient states he takes his medications, whatever his son gives him  · Continue metoprolol with hold parameters  · Potassium 4 0, magnesium 2 5  · Continue to monitor rhythm on tele

## 2020-03-12 NOTE — ASSESSMENT & PLAN NOTE
Wt Readings from Last 3 Encounters:   03/17/20 77 1 kg (169 lb 15 6 oz)   12/06/18 76 2 kg (168 lb)   11/18/18 80 2 kg (176 lb 12 9 oz)     History of ischemic cardiomyopathy  Previously declined life vest  prior echo from 2018 showed EF 20-25%, severe diffuse hypokinesis, grade 1 diastolic dysfunction  Abnormal nuclear stress test in 2018  Evidence of volume overload clinically and radiographically with peripheral edema and pleural effusion on admission  Echo on admission showed worsened EF 15-20%, severe diffuse hypokinesis  · Cardiology consulted, recs appreciated  · Patient was initially on dopamine gtt, now stopped     · Monitor intake output, daily weights- improved volume statue  · Restarted home torsemide 10mg daily   · Restarted home lisinopril 2 5mg daily

## 2020-03-12 NOTE — ASSESSMENT & PLAN NOTE
Presented with creatinine of 1 8 in setting of hyperglycemia  UA-0-1 RBC, negative protein, pyuria and bacteriuria  Initially held lisinopril, Aldactone, torsemide  IV hydration setting of hyperglycemia with close monitoring of volume status  CHIRS resolved    Results from last 7 days   Lab Units 03/17/20  0623 03/16/20  0546 03/15/20  0441 03/14/20  0530 03/13/20  0750 03/12/20  0400 03/11/20  2138 03/11/20  1336   BUN mg/dL 33* 35* 36* 39* 43* 35* 37* 41*   CREATININE mg/dL 1 04 1 10 1 10 1 21 1 57* 1 39* 1 57* 1 82*

## 2020-03-12 NOTE — ASSESSMENT & PLAN NOTE
Possible mild cellulitis with left forearm wound with surrounding erythema induration without any fluctuance or drainage  Compartments soft  Patient does have hypothermia but with normal white count and without any fever  Doubt sepsis  Follow-up follow-up cultures, MRSA surveillance  Continue broad-spectrum antibiotic for now

## 2020-03-12 NOTE — ASSESSMENT & PLAN NOTE
· Patient noted to have mild swelling and redness in the left anterior aspect of his arm  No drainage noted  Patient stated is an old wound from a fall  He does not remember when it occurred  · Patient with no leukocytosis with hypothermia but no fever  · Do not suspect sepsis at this time  · Continue to follow cultures  · Continue to monitor  · Patient initiated on vancomycin and cefepime

## 2020-03-12 NOTE — ASSESSMENT & PLAN NOTE
DISCHARGE PLANNING     Discharge to home or other facility with appropriate resources Progressing        Knowledge Deficit     Patient/family/caregiver demonstrates understanding of disease process, treatment plan, medications, and discharge instructions Progressing        PAIN - ADULT     Verbalizes/displays adequate comfort level or baseline comfort level Progressing        Potential for Falls     Patient will remain free of falls Progressing        Prexisting or High Potential for Compromised Skin Integrity     Skin integrity is maintained or improved Progressing        SAFETY ADULT     Maintain or return to baseline ADL function Progressing     Maintain or return mobility status to optimal level Progressing History of CAD status post CABG  Patient currently denies any checks pain  EKG without any acute abnormality  Continue metoprolol, Lipitor

## 2020-03-12 NOTE — ACP (ADVANCE CARE PLANNING)
Discussion patient regarding goals of care  Patient adamantly wishes not to be resuscitated or intubated and is currently level 3 DNR/DNI  He states he has discussed this with his family  I asked who he wishes to make medical decisions for him in the event he was unable to make decisions on his own  He states his daughter-in-law, is very involved in his care and he would like her to make medical decisions  He voices concern that his son, Moreno Ramirez, has limited understanding may not make decisions based on patient's interest   Patient does have a living will on file from November 2018 with Humberto Raya as surrogate

## 2020-03-12 NOTE — ASSESSMENT & PLAN NOTE
· Will hold lisinopril in light of CHRIS  Continue to hold Aldactone and Demadex  · Continue metoprolol 12 5 q 12 hours PO with hold parameters

## 2020-03-12 NOTE — ASSESSMENT & PLAN NOTE
History of AFib in the past  Not on anticoagulation due to history of GI bleed  Currently EKG with normal sinus rhythm  Telemetry  Patient was previously on digoxin and metoprolol    Does not appear to be on digoxin at present  Will resume metoprolol at lower dose

## 2020-03-12 NOTE — PHYSICAL THERAPY NOTE
PT EVALUATION       03/12/20 0925   Note Type   Note type Eval only   Pain Assessment   Pain Assessment Tool FLACC   Pain Rating: FLACC (Rest) - Face 0   Pain Rating: FLACC (Rest) - Legs 0   Pain Rating: FLACC (Rest) - Activity 0   Pain Rating: FLACC (Rest) - Cry 0   Pain Rating: FLACC (Rest) - Consolability 0   Score: FLACC (Rest) 0   Home Living   Type of Home Mobile home  (2 KALEN)   Home Layout One level   Home Equipment Cane   Prior Function   Level of [place] Independent with ADLs and functional mobility   Lives With Alone   Receives Help From Family   ADL Assistance Independent   Falls in the last 6 months   (multiple)   Restrictions/Precautions   Other Precautions Chair Alarm; Bed Alarm; Fall Risk   General   Additional Pertinent History Pt admitted with blood sugar in the 800s after a fall  Pt has been having recent falls  Pt unable to give any history, social history from previous admission  Pt is currently in ICU  Family/Caregiver Present No   Cognition   Overall Cognitive Status Impaired   Arousal/Participation Lethargic   Attention HYACINTH   Orientation Level Unable to assess   Following Commands Unable to follow one step commands   RLE Assessment   RLE Assessment   (PROM WFL, little to no LE AROM)   LLE Assessment   LLE Assessment   (PROM WFL, little to no AROM)   Bed Mobility   Rolling R 2  Maximal assistance   Rolling L 2  Maximal assistance   Activity Tolerance   Activity Tolerance Treatment limited secondary to medical complications/ low BP and pt lethargic; Patient limited by fatigue   Assessment   Prognosis Good   Problem List Decreased strength;Decreased endurance; Impaired balance;Decreased mobility   Assessment Patient seen for Physical Therapy evaluation  Patient admitted with Hyperglycemia without ketosis  Comorbidities affecting patient's physical performance include: uncontrolled DM    Personal factors affecting patient at time of initial evaluation include: inability to ambulate household distances, decreased initiation and engagement, depression, inability to perform physical activity, inability to perform ADLS and inability to live alone  Prior to admission, patient was independent with functional mobility with cane  Please find objective findings from Physical Therapy assessment regarding body systems outlined above with impairments and limitations including weakness, impaired balance, decreased endurance, decreased activity tolerance, decreased functional mobility tolerance, fall risk and decreased cognition  The Barthel Index was used as a functional outcome tool presenting with a score of 0 today indicating marked limitations of functional mobility and ADLS  Patient's clinical presentation is currently unstable/unpredictable as seen in patient's presentation of varying levels of cognitive performance, increased fall risk, new onset of impairment of functional mobility, decreased endurance and new onset of weakness  Pt would benefit from continued Physical Therapy treatment to address deficits as defined above and maximize level of functional mobility  As demonstrated by objective findings, the assigned level of complexity for this evaluation is high  Goals   Patient Goals unable to state due to lethargy   STG Expiration Date 03/19/20   Short Term Goal #1 improve bed mobility to mod assist, Pt will sit OOB x 2 hours, improve transfers bed to chair to mod assist x 2   LTG Expiration Date 03/26/20   Long Term Goal #1 improve bed mobility to min assist, improve transfers to min assist, pt will ambulate with walker x50 feet with min assist, improve LE strength to at least 4/5 to improve ability to stand and transfer  Plan   Treatment/Interventions ADL retraining;Functional transfer training;LE strengthening/ROM; Therapeutic exercise; Endurance training;Gait training;Bed mobility; Equipment eval/education;Patient/family training   PT Frequency 5x/wk   Recommendation   Recommendation Short-term skilled PT   Barthel Index   Feeding 0   Bathing 0   Grooming Score 0   Dressing Score 0   Bladder Score 0   Bowels Score 0   Toilet Use Score 0   Transfers (Bed/Chair) Score 0   Mobility (Level Surface) Score 0   Stairs Score 0   Barthel Index Score 0   Licensure   [place] License Number  Rene [id number] [id number]

## 2020-03-12 NOTE — ASSESSMENT & PLAN NOTE
· Patient's baseline creatinine in 2018 appears 0 8-0 9  · Since April 2019 patient's creatinine has been 1 4-1 5  · Continue to monitor and trend  · Strict I&Os  · Daily weights

## 2020-03-12 NOTE — OCCUPATIONAL THERAPY NOTE
OT EVALUATION       03/12/20 0915   Note Type   Note type Eval only   Restrictions/Precautions   Other Precautions Chair Alarm; Bed Alarm; Fall Risk   Pain Assessment   Pain Assessment Tool FLACC   Pain Rating: FLACC (Rest) - Face 0   Pain Rating: FLACC (Rest) - Legs 0   Pain Rating: FLACC (Rest) - Activity 0   Pain Rating: FLACC (Rest) - Cry 0   Pain Rating: FLACC (Rest) - Consolability 0   Score: FLACC (Rest) 0   Home Living   Type of Home Mobile home  (2 MARII)   Home Layout One level   Bathroom Shower/Tub Tub/shower unit   Bathroom Equipment Grab bars in [address]   Prior Function   Level of [place] Independent with ADLs and functional mobility   Lives With Alone   Receives Help From Family   ADL Assistance Independent   Comments home setup per chart from last admission, pt lethargic and unable to state today    ADL   Eating Assistance 2  Maximal Assistance   Grooming Assistance 1  Total Assistance   UB Bathing Assistance 1  Total Assistance   LB Bathing Assistance 1  Total Assistance   UB Dressing Assistance 1  Total Assistance   LB Dressing Assistance 1  Total Assistance   Toileting Assistance  1  Total Assistance   Functional Assistance 1  Total Assistance   Bed Mobility   Rolling R 2  Maximal assistance   Rolling L 2  Maximal assistance   Additional Comments pt lethargic will continue to assess mobility    Transfers   Sit to Stand Unable to assess   Activity Tolerance   Activity Tolerance Patient limited by fatigue;Treatment limited secondary to medical complications (Comment)  (lethargy)   RUE Assessment   RUE Assessment   (PROM WFL)   LUE Assessment   LUE Assessment   (PROM WFL )   Cognition   Overall Cognitive Status Impaired   Arousal/Participation Lethargic   Attention HYACINTH   Orientation Level Unable to assess   Following Commands Unable to follow one step commands   Assessment   Limitation Decreased ADL status; Decreased UE ROM; Decreased UE strength;Decreased Safe judgement during ADL;Decreased cognition;Decreased endurance;Decreased self-care trans;Decreased high-level ADLs  (decreased balance and mobility )   Prognosis Good   Assessment Patient evaluated by Occupational Therapy  Patient admitted with Hyperglycemia without ketosis  The patients occupational profile, medical and therapy history includes a extensive additional review of physical, cognitive, or psychosocial history related to current functional performance  Comorbidities affecting functional mobility and ADLS include: CHF, diabetes and hypertension  Prior to admission, patient was independent with functional mobility with cane, independent with ADLS and living alone in a 1 level home with 2 steps to enter  The evaluation identifies the following performance deficits: weakness, decreased ROM, impaired balance, decreased endurance, increased fall risk, new onset of impairment of functional mobility, decreased ADLS, decreased IADLS, decreased activity tolerance, decreased safety awareness, impaired judgement, decreased cognition and decreased strength, that result in activity limitations and/or participation restrictions  This evaluation requires clinical decision making of high complexity, because the patient presents with comorbidites that affect occupational performance and required significant modification of tasks or assistance with consideration of multiple treatment options  The Barthel Index was used as a functional outcome tool presenting with a score of 0, indicating marked limitations of functional mobility and ADLS  Patient will benefit from skilled Occupational Therapy services to address above deficits and facilitate a safe return to prior level of function     Goals   Patient Goals unable to state due to lethargy   STG Time Frame   (1-7 days)   Short Term Goal  Patient will increase standing tolerance to 1 minutes during ADL task to decrease assistance level and decrease fall risk; Patient will increase bed mobility to mod assist in preparation for ADLS and transfers; Patient will increase functional mobility to and from bathroom with rolling walker with mod assist to increase performance with ADLS and to use a toilet; Patient will tolerate 10 minutes of UE ROM/strengthening to increase general activity tolerance and performance in ADLS/IADLS; Patient will improve functional activity tolerance to 10 minutes of sustained functional tasks to increase participation in basic self-care and decrease assistance level; Patient will increase dynamic sitting balance to fair- to improve the ability to sit at edge of bed or on a chair for ADLS;  Patient will increase dynamic standing balance to poor+ to improve postural stability and decrease fall risk during standing ADLS and transfers  LTG Time Frame   (8-14 days)   Long Term Goal Patient will increase standing tolerance to 2 minutes during ADL task to decrease assistance level and decrease fall risk; Patient will increase bed mobility to min assist in preparation for ADLS and transfers; Patient will increase functional mobility to and from bathroom with rolling walker with min assist to increase performance with ADLS and to use a toilet; Patient will tolerate 20 minutes of UE ROM/strengthening to increase general activity tolerance and performance in ADLS/IADLS; Patient will improve functional activity tolerance to 20 minutes of sustained functional tasks to increase participation in basic self-care and decrease assistance level; Patient will increase dynamic sitting balance to fair to improve the ability to sit at edge of bed or on a chair for ADLS;  Patient will increase dynamic standing balance to fair- to improve postural stability and decrease fall risk during standing ADLS and transfers       Functional Transfer Goals   Pt Will Perform All Functional Transfers   (STG mod assist LTG min assist )   ADL Goals   Pt Will Perform All ADL's   (STG mod assist LTG min assist ) Plan   Treatment Interventions ADL retraining;Functional transfer training;UE strengthening/ROM; Endurance training;Patient/family training;Equipment evaluation/education; Activityengagement;Cognitive reorientation; Compensatory technique education   Goal Expiration Date 03/26/20   OT Frequency 3-5x/wk   Recommendation   OT Discharge Recommendation Short Term Rehab   Barthel Index   Feeding 0   Bathing 0   Grooming Score 0   Dressing Score 0   Bladder Score 0   Bowels Score 0   Toilet Use Score 0   Transfers (Bed/Chair) Score 0   Mobility (Level Surface) Score 0   Stairs Score 0   Barthel Index Score 0   Licensure   [place] License Number  Valentino Kimani MS OTR/L [id number]

## 2020-03-12 NOTE — ASSESSMENT & PLAN NOTE
Stable  Continue metoprolol at lower dose 12 5mg q12hrs  Restarted home lisinopril, demadex  Holding spironolactone

## 2020-03-12 NOTE — ASSESSMENT & PLAN NOTE
· Patient's sodium 130 on admission after 700 mL of normal saline improved to 135    · Continue to monitor

## 2020-03-12 NOTE — ASSESSMENT & PLAN NOTE
· Patient with asymptomatic bacteriuria, although patient does have history of retention    · Follow-up urine culture  · Continue current antibiotic regimen

## 2020-03-12 NOTE — PLAN OF CARE
Problem: Potential for Falls  Goal: Patient will remain free of falls  Description  INTERVENTIONS:  - Assess patient frequently for physical needs  -  Identify cognitive and physical deficits and behaviors that affect risk of falls    -  [place] fall precautions as indicated by assessment   - Educate patient/family on patient safety including physical limitations  - Instruct patient to call for assistance with activity based on assessment  - Modify environment to reduce risk of injury  - Consider OT/PT consult to assist with strengthening/mobility  Outcome: Progressing     Problem: Prexisting or High Potential for Compromised Skin Integrity  Goal: Skin integrity is maintained or improved  Description  INTERVENTIONS:  - Identify patients at risk for skin breakdown  - Assess and monitor skin integrity  - Assess and monitor nutrition and hydration status  - Monitor labs   - Assess for incontinence   - Turn and reposition patient  - Assist with mobility/ambulation  - Relieve pressure over bony prominences  - Avoid friction and shearing  - Provide appropriate hygiene as needed including keeping skin clean and dry  - Evaluate need for skin moisturizer/barrier cream  - Collaborate with interdisciplinary team   - Patient/family teaching  - Consider wound care consult   Outcome: Progressing     Problem: PAIN - ADULT  Goal: Verbalizes/displays adequate comfort level or baseline comfort level  Description  Interventions:  - Encourage patient to monitor pain and request assistance  - Assess pain using appropriate pain scale  - Administer analgesics based on type and severity of pain and evaluate response  - Implement non-pharmacological measures as appropriate and evaluate response  - Consider cultural and social influences on pain and pain management  - Notify physician/advanced practitioner if interventions unsuccessful or patient reports new pain  Outcome: Progressing     Problem: INFECTION - ADULT  Goal: Absence or prevention of progression during hospitalization  Description  INTERVENTIONS:  - Assess and monitor for signs and symptoms of infection  - Monitor lab/diagnostic results  - Monitor all insertion sites, i e  indwelling lines, tubes, and drains  - Monitor endotracheal if appropriate and nasal secretions for changes in amount and color  - [place] appropriate cooling/warming therapies per order  - Administer medications as ordered  - Instruct and encourage patient and family to use good hand hygiene technique  - Identify and instruct in appropriate isolation precautions for identified infection/condition  Outcome: Progressing  Goal: Absence of fever/infection during neutropenic period  Description  INTERVENTIONS:  - Monitor WBC    Outcome: Progressing     Problem: SAFETY ADULT  Goal: Maintain or return to baseline ADL function  Description  INTERVENTIONS:  -  Assess patient's ability to carry out ADLs; assess patient's baseline for ADL function and identify physical deficits which impact ability to perform ADLs (bathing, care of mouth/teeth, toileting, grooming, dressing, etc )  - Assess/evaluate cause of self-care deficits   - Assess range of motion  - Assess patient's mobility; develop plan if impaired  - Assess patient's need for assistive devices and provide as appropriate  - Encourage maximum independence but intervene and supervise when necessary  - Involve family in performance of ADLs  - Assess for home care needs following discharge   - Consider OT consult to assist with ADL evaluation and planning for discharge  - Provide patient education as appropriate  Outcome: Progressing  Goal: Maintain or return mobility status to optimal level  Description  INTERVENTIONS:  - Assess patient's baseline mobility status (ambulation, transfers, stairs, etc )    - Identify cognitive and physical deficits and behaviors that affect mobility  - Identify mobility aids required to assist with transfers and/or ambulation (gait belt, sit-to-stand, lift, walker, cane, etc )  - [place] fall precautions as indicated by assessment  - Record patient progress and toleration of activity level on Mobility SBAR; progress patient to next Phase/Stage  - Instruct patient to call for assistance with activity based on assessment  - Consider rehabilitation consult to assist with strengthening/weightbearing, etc   Outcome: Progressing     Problem: DISCHARGE PLANNING  Goal: Discharge to home or other facility with appropriate resources  Description  INTERVENTIONS:  - Identify barriers to discharge w/patient and caregiver  - Arrange for needed discharge resources and transportation as appropriate  - Identify discharge learning needs (meds, wound care, etc )  - Arrange for interpretive services to assist at discharge as needed  - Refer to Case Management Department for coordinating discharge planning if the patient needs post-hospital services based on physician/advanced practitioner order or complex needs related to functional status, cognitive ability, or social support system  Outcome: Progressing     Problem: Knowledge Deficit  Goal: Patient/family/caregiver demonstrates understanding of disease process, treatment plan, medications, and discharge instructions  Description  Complete learning assessment and assess knowledge base    Interventions:  - Provide teaching at level of understanding  - Provide teaching via preferred learning methods  Outcome: Progressing     Problem: CARDIOVASCULAR - ADULT  Goal: Maintains optimal cardiac output and hemodynamic stability  Description  INTERVENTIONS:  - Monitor I/O, vital signs and rhythm  - Monitor for S/S and trends of decreased cardiac output  - Administer and titrate ordered vasoactive medications to optimize hemodynamic stability  - Assess quality of pulses, skin color and temperature  - Assess for signs of decreased coronary artery perfusion  - Instruct patient to report change in severity of symptoms  Outcome: Progressing  Goal: Absence of cardiac dysrhythmias or at baseline rhythm  Description  INTERVENTIONS:  - Continuous cardiac monitoring, vital signs, obtain 12 lead EKG if ordered  - Administer antiarrhythmic and heart rate control medications as ordered  - Monitor electrolytes and administer replacement therapy as ordered  Outcome: Progressing     Problem: RESPIRATORY - ADULT  Goal: Achieves optimal ventilation and oxygenation  Description  INTERVENTIONS:  - Assess for changes in respiratory status  - Assess for changes in mentation and behavior  - Position to facilitate oxygenation and minimize respiratory effort  - Oxygen administered by appropriate delivery if ordered  - Initiate smoking cessation education as indicated  - Encourage broncho-pulmonary hygiene including cough, deep breathe, Incentive Spirometry  - Assess the need for suctioning and aspirate as needed  - Assess and instruct to report SOB or any respiratory difficulty  - Respiratory Therapy support as indicated  Outcome: Progressing     Problem: GENITOURINARY - ADULT  Goal: Maintains or returns to baseline urinary function  Description  INTERVENTIONS:  - Assess urinary function  - Encourage oral fluids to ensure adequate hydration if ordered  - Administer IV fluids as ordered to ensure adequate hydration  - Administer ordered medications as needed  - Offer frequent toileting  - Follow urinary retention protocol if ordered  Outcome: Progressing  Goal: Absence of urinary retention  Description  INTERVENTIONS:  - Assess patient's ability to void and empty bladder  - Monitor I/O  - Bladder scan as needed  - Discuss with physician/AP medications to alleviate retention as needed  - Discuss catheterization for long term situations as appropriate   Outcome: Progressing  Goal: Urinary catheter remains patent  Description  INTERVENTIONS:  - Assess patency of urinary catheter  - If patient has a chronic luis, consider changing catheter if non-functioning  - Follow guidelines for intermittent irrigation of non-functioning urinary catheter  Outcome: Progressing     Problem: METABOLIC, FLUID AND ELECTROLYTES - ADULT  Goal: Electrolytes maintained within normal limits  Description  INTERVENTIONS:  - Monitor labs and assess patient for signs and symptoms of electrolyte imbalances  - Administer electrolyte replacement as ordered  - Monitor response to electrolyte replacements, including repeat lab results as appropriate  - Instruct patient on fluid and nutrition as appropriate  Outcome: Progressing  Goal: Fluid balance maintained  Description  INTERVENTIONS:  - Monitor labs   - Monitor I/O and WT  - Instruct patient on fluid and nutrition as appropriate  - Assess for signs & symptoms of volume excess or deficit  Outcome: Progressing  Goal: Glucose maintained within target range  Description  INTERVENTIONS:  - Monitor Blood Glucose as ordered  - Assess for signs and symptoms of hyperglycemia and hypoglycemia  - Administer ordered medications to maintain glucose within target range  - Assess nutritional intake and initiate nutrition service referral as needed  Outcome: Progressing     Problem: SKIN/TISSUE INTEGRITY - ADULT  Goal: Skin integrity remains intact  Description  INTERVENTIONS  - Identify patients at risk for skin breakdown  - Assess and monitor skin integrity  - Assess and monitor nutrition and hydration status  - Monitor labs (i e  albumin)  - Assess for incontinence   - Turn and reposition patient  - Assist with mobility/ambulation  - Relieve pressure over bony prominences  - Avoid friction and shearing  - Provide appropriate hygiene as needed including keeping skin clean and dry  - Evaluate need for skin moisturizer/barrier cream  - Collaborate with interdisciplinary team (i e  Nutrition, Rehabilitation, etc )   - Patient/family teaching  Outcome: Progressing     Problem: HEMATOLOGIC - ADULT  Goal: Maintains hematologic stability  Description  INTERVENTIONS  - Assess for signs and symptoms of bleeding or hemorrhage  - Monitor labs  - Administer supportive blood products/factors as ordered and appropriate  Outcome: Progressing     Problem: MUSCULOSKELETAL - ADULT  Goal: Maintain or return mobility to safest level of function  Description  INTERVENTIONS:  - Assess patient's ability to carry out ADLs; assess patient's baseline for ADL function and identify physical deficits which impact ability to perform ADLs (bathing, care of mouth/teeth, toileting, grooming, dressing, etc )  - Assess/evaluate cause of self-care deficits   - Assess range of motion  - Assess patient's mobility  - Assess patient's need for assistive devices and provide as appropriate  - Encourage maximum independence but intervene and supervise when necessary  - Involve family in performance of ADLs  - Assess for home care needs following discharge   - Consider OT consult to assist with ADL evaluation and planning for discharge  - Provide patient education as appropriate  Outcome: Progressing  Goal: Maintain proper alignment of affected body part  Description  INTERVENTIONS:  - Support, maintain and protect limb and body alignment  - Provide patient/ family with appropriate education  Outcome: Progressing

## 2020-03-12 NOTE — ASSESSMENT & PLAN NOTE
Possible given abnormal UA with positive nitrite, pyuria and bacteriuria but patient does not report any urinary symptoms  Urine culture grew pseudomonas  Patient received 6 days of IV Abx (2 days ceftriaxone and 4 days IV cefepime)  DC Abxs now

## 2020-03-12 NOTE — ASSESSMENT & PLAN NOTE
· Per chart review patient's last office visit blood pressure 92/63, blood pressure at office visit from March 2019 was 86/60, however on admission patient's blood pressure noted to be 118/74  · Patient states his blood pressure always runs low  · Continue to monitor blood pressure  · Continue metoprolol with hold parameters  · Suspect low blood pressure likely secondary to worsening heart function

## 2020-03-12 NOTE — ASSESSMENT & PLAN NOTE
· Patient presents with creatinine of 1 8 in the setting of HHS, improved 1 57, which may be patient's new baseline? Creatinine in 2018 0 8-0 9  · Continue to hold lisinopril, Aldactone, and furosemide  · Patient received 750 mL volume resuscitation    · Place [place] catheter for strict I&Os  · Monitor daily weights  · Patient with a total of 790 mL of urine since admission

## 2020-03-12 NOTE — ASSESSMENT & PLAN NOTE
· Patient with history of CAD status post CABG  · Denies chest pain/chest discomfort, shortness of breath  · Normal sinus rhythm with PVCs on monitor, repeat EKG pending  · Continue metoprolol and Lipitor  · Lipid profile pending

## 2020-03-12 NOTE — ASSESSMENT & PLAN NOTE
· Patient presented with HHS, still on diuretics at home  · Suspected secondary to cardiomyopathy with poor perfusion state  · Patient received 750 mL of IV fluid resuscitation  · Continue to trend lactic  Lactic 4 4 now down to 4 3  Patient making adequate urine  · Will hold on further IV fluid resuscitation and continue to trend

## 2020-03-12 NOTE — CONSULTS
Consult- Scott Parker 1940, 78 y o  male MRN: [id number]    Unit/Bed#: ICU 05 Encounter: [id number]    Primary Care Provider: Kp Velazco DO   Date and time admitted to hospital: 3/11/2020 12:52 PM      Inpatient consult to Santos Santoyo performed by: MIGUEL Olmstead  Consult ordered by: Darlene Jean Baptiste MD          * Hyperglycemic hyperosmolar state  Assessment & Plan  · Patient presents to the emergency department a blood sugar of 823, pH 7 329 with a normal anion gap and bicarb  Beta hydroxybutyrate was elevated UA without any ketones  · Per discussion with patient, and chart review he does not take any medications for diabetes and he does not check his blood sugar  · Per chart review, he is supposed to be taking glimepiride and Levemir  · Patient has received a total of 750 mL of volume since admission  · Continue insulin drip  · Hemoglobin A1c November of 2019 14 2  Currently pending  · Will hold further IV fluids as patient with a history of Congestive heart failure with an EF of 25%, jugular venous distension on exam     Acute on chronic combined systolic and diastolic CHF (congestive heart failure) ([facility] [address] )  Assessment & Plan  Wt Readings from Last 3 Encounters:   03/11/20 77 2 kg (170 lb 1 6 oz)   12/06/18 76 2 kg (168 lb)   11/18/18 80 2 kg (176 lb 12 9 oz)   · Limited echo done November 2019 demonstrated EF of 25%, echo done October of 2018 demonstrated EF of 20% with a mild increase in PA pressures  · Per chart review patient had refused LifeVest/AICD  · Patient's frequent PVCs on cardiac monitoring  · On exam patient is cachectic with JVD and +2 pitting edema lower extremities  Patient is cold to the touch  Given exam findings and patient's history of severely reduced EF, concern for worsening heart function, and volume overload with additional volume resuscitation  · Hold diuretics and antihypertensive medications in light of recent volume resuscitation  · Continue Metoprolol with hold parameters  Dig level <0 2  · Repeat echo tomorrow a m  · Consult cardiology in am  · Will hold on further volume resuscitation  · Strict I&Os  · Daily weight  · Continue monitor lactate  · Consider low-dose peripheral dopamine with signs/symptoms cardiogenic shock  Acute kidney injury [facility])  Assessment & Plan  · Patient presents with creatinine of 1 8 in the setting of HHS, improved 1 57, which may be patient's new baseline? Creatinine in 2018 0 8-0 9  · Continue to hold lisinopril, Aldactone, and furosemide  · Patient received 750 mL volume resuscitation  · Place Gordon catheter for strict I&Os  · Monitor daily weights  · Patient with a total of 790 mL of urine since admission    Hyponatremia  Assessment & Plan    · Patient's sodium 130 on admission after 700 mL of normal saline improved to 135  · Continue to monitor     Depression  Assessment & Plan  · When I ask patient how he felt, he stated I feel nothing  He states he has been feeling depressed since his wife  about 4- 5 years ago  He states, "She  in my arms "  · He states he has been progressively declining since the death of his wife  · Patient also stated that he feels as if he is a burden to his daughter-in-law  He states she takes very good care of him and feels that she should be taking more care of her stepson, rather than himself  · Will consult psych in the a m  [facility] Cellulitis  Assessment & Plan  · Patient noted to have mild swelling and redness in the left anterior aspect of his arm  No drainage noted  Patient stated is an old wound from a fall  He does not remember when it occurred  · Patient with no leukocytosis with hypothermia but no fever  · Do not suspect sepsis at this time  · Continue to follow cultures  · Continue to monitor  · Patient initiated on vancomycin and cefepime      Lactic acidosis  Assessment & Plan  · Patient presented with HHS, still on diuretics at home   · Suspected secondary to cardiomyopathy with poor perfusion state  · Patient received 750 mL of IV fluid resuscitation  · Continue to trend lactic  Lactic 4 4 now down to 4 3  Patient making adequate urine  · Will hold on further IV fluid resuscitation and continue to trend  UTI (urinary tract infection)  Assessment & Plan  · Patient with asymptomatic bacteriuria, although patient does have history of retention  · Follow-up urine culture  · Continue current antibiotic regimen    Urinary retention  Assessment & Plan  · Gordon catheter placed for strict I&Os  · Continue to monitor    Atrial fibrillation ([facility])  Assessment & Plan  · Patient with history of atrial fibrillation not on anticoagulation due to history of GI bleeding  · EKG currently normal sinus rhythm with PVC  · Patient states he takes his medications, whatever his son gives him  · Continue metoprolol with hold parameters  · Potassium 4 0, magnesium 2 5  · Continue to monitor rhythm on tele    Benign essential hypertension  Assessment & Plan  · Will hold lisinopril in light of CHRIS  Continue to hold Aldactone and Demadex  · Continue metoprolol 12 5 q 12 hours PO with hold parameters       CKD (chronic kidney disease) stage 3, GFR 30-59 ml/min ([facility])  Assessment & Plan  · Patient's baseline creatinine in 2018 appears 0 8-0 9  · Since April 2019 patient's creatinine has been 1 4-1 5  · Continue to monitor and trend  · Strict I&Os  · Daily weights    CAD (coronary artery disease)  Assessment & Plan  · Patient with history of CAD status post CABG  · Denies chest pain/chest discomfort, shortness of breath  · Normal sinus rhythm with PVCs on monitor, repeat EKG pending  · Continue metoprolol and Lipitor  · Lipid profile pending      ----------------------------------------------------------------------------------------  HPI/24hr events:   78-year-old male, former smoker, with a past medical history of CAD status post CABG, ischemic cardiomyopathy EF 20 25% with severe diffuse hypokinesis grade 1 diastolic dysfunction in 2822, type 2 diabetes poorly controlled, AFib not currently on anticoagulation secondary to GI bleed, hyperlipidemia, hypertension  Patient reported to the ED today after a fall  Patient was found down by his son  Patient states that he fell while he is walking to the bathroom because he lost his balance  He denies any loss of consciousness, but reports hitting his head on the sink  Patient is a poor historian  Per chart review discussion with hospitalist team and daughter revealed patient had not been eating well for the past few months with frequent falls and episodes of hallucinations  Discussion also reveals patient has not been checking his blood sugar because he passes out when he sees blood  Patient's daughter-in-law also reported that he has not seen a physician in a few months and has refused visiting nurses  Chart review reveals last office visit November 2019 with Dr Ave Chung  His last hospitalization was November of 2018 with septic shock secondary to left pectoral cellulitis with abscess from a herpes zoster infection  In the emergency department, patient was found hypothermic other vital signs were stable  Patient was noted have a blood sugar of 823 and elevated creatinine and elevated proBNP with a lactic acidosis  Beta hydroxy  Was elevated patient's pH was in normal limits with no anion gap  Ketones negative in the urine  Patient was initiated on an insulin infusion and given 750 mL of normal saline for volume resuscitation  Patient noted to have an old healing wound over his left forearm which he stated occurred when he cell a while ago  Cultures drawn in the ED, CT of the head and C-spine without any acute abnormality  Later this evening, critical care team was asked to re-evaluate the patient as lactic acid had not cleared despite fluids    On exam patient has cold extremities, jugular venous distension, with +2 bilateral pitting edema  Fine crackles auscultated at lower lung fields bilaterally  Of note patient states that his wife  in his arms approximately 4-5 years ago  He explains that he has not felt right since and has been slowly declining over the years, more so over the past  1 year to 6 months  He states he used to be able to prepare his own meals and has some energy in the morning, but by mid afternoon he is very tired  He talks about his recent falls and states he feels dizzy and loses his balance frequently  He states he has little appetite and feels very tired all the time  He talks about his daughter-in-law and son  He states his daughter-in-law takes very good care of him but he does feel like he is a burden  Disposition: Admit to Stepdown Level 1  Code Status: Level 3 - DNAR and DNI  ---------------------------------------------------------------------------------------  SUBJECTIVE  Patient states I have no feelings and I feel tired all the time    Review of Systems   Constitutional: Positive for activity change, appetite change, chills, fatigue and unexpected weight change  HENT: Negative  Eyes: Negative  Respiratory: Negative  Cardiovascular: Positive for leg swelling  Gastrointestinal: Negative  Endocrine: Negative  Genitourinary: Negative  Musculoskeletal: Positive for arthralgias  Allergic/Immunologic: Negative  Neurological: Positive for dizziness, weakness, light-headedness and numbness  Hematological: Negative      Psychiatric/Behavioral:        Depressed     Review of systems was reviewed and negative unless stated above in HPI/24-hour events   ---------------------------------------------------------------------------------------  OBJECTIVE    Vitals   Vitals:    20 0000   BP: 112/69 101/66 96/64 102/65   BP Location:       Pulse: 88 73 71 71   Resp: (!) 23 (!) 25 (!) 29 17   Temp:       TempSrc: SpO2: 100% 96% 97% 95%   Weight:         Temp (24hrs), Av 8 °F (36 °C), Min:96 5 °F (35 8 °C), Max:97 5 °F (36 4 °C)  Current: Temperature: 97 5 °F (36 4 °C)        SpO2: SpO2: 94 %       Physical Exam   Constitutional: He is oriented to person, place, and time  He appears well-developed  He appears lethargic  He appears cachectic  He is easily aroused  He appears ill  No distress  HENT:   Head: Normocephalic and atraumatic  Mouth/Throat: Mucous membranes are dry  Abnormal dentition  Dental caries present  Eyes: Pupils are equal, round, and reactive to light  EOM are normal    Neck: Trachea normal and normal range of motion  JVD present  Cardiovascular: Normal rate, regular rhythm and intact distal pulses  Frequent extrasystoles are present  PMI is displaced  Exam reveals distant heart sounds  Plus two pitting edema in lower extremities   Pulmonary/Chest: Effort normal  Tachypnea noted  He has decreased breath sounds  Abdominal: Soft  Bowel sounds are normal    Musculoskeletal: He exhibits edema  Generalized weakness atrophy and deconditioning   Neurological: He is oriented to person, place, and time and easily aroused  He appears lethargic  He displays atrophy  He exhibits abnormal muscle tone  GCS eye subscore is 4  GCS verbal subscore is 5  GCS motor subscore is 6  Skin: Skin is dry  Capillary refill takes 2 to 3 seconds  There is pallor  Anterior portion of left arm erythema with edema  Healed, scabbed area centrally  Psychiatric: He is withdrawn  He exhibits a depressed mood         Invasive/non-invasive ventilation settings   Respiratory    Lab Data (Last 4 hours)    None         O2/Vent Data (Last 4 hours)    None                Laboratory and Diagnostics:  Results from last 7 days   Lab Units 20  2138 20  1336   WBC Thousand/uL 7 84 7 49   HEMOGLOBIN g/dL 14 6 15 3   HEMATOCRIT % 45 5 47 5   PLATELETS Thousands/uL 159 197   NEUTROS PCT % 80* 86*   MONOS PCT % 8 6 Results from last 7 days   Lab Units 03/11/20  2138 03/11/20  1716 03/11/20  1336   SODIUM mmol/L 135*  --  130*   POTASSIUM mmol/L 4 0  --  4 9   CHLORIDE mmol/L 100  --  92*   CO2 mmol/L 24  --  27   ANION GAP mmol/L 11  --  11   BUN mg/dL 37*  --  41*   CREATININE mg/dL 1 57*  --  1 82*   CALCIUM mg/dL 9 1  --  9 3   GLUCOSE RANDOM mg/dL 318* 680* 823*   ALT U/L 24  --  25   AST U/L 17  --  <5*   ALK PHOS U/L 125*  --  153*   ALBUMIN g/dL 3 0*  --  3 6   TOTAL BILIRUBIN mg/dL 0 80  --  1 10*     Results from last 7 days   Lab Units 03/11/20  2138   MAGNESIUM mg/dL 2 5   PHOSPHORUS mg/dL 3 6      Results from last 7 days   Lab Units 03/11/20  1336   INR  1 10*   PTT seconds 25      Results from last 7 days   Lab Units 03/11/20  2138 03/11/20  2046 03/11/20  1811 03/11/20  1336   TROPONIN I ng/mL 0 04 0 05* 0 06* 0 05*     Results from last 7 days   Lab Units 03/11/20  2138 03/11/20  1833 03/11/20  1545 03/11/20  1336   LACTIC ACID mmol/L 4 3* 4 4* 3 6* 3 4*     ABG:    VBG:  Results from last 7 days   Lab Units 03/11/20  1336   PH KIMI  7 329   PCO2 KIMI mm Hg 52 0*   PO2 KIMI mm Hg 22 5*   HCO3 KIMI mmol/L 26 7   BASE EXC KIMI mmol/L -0 2           Micro  Results from last 7 days   Lab Units 03/11/20  1405 03/11/20  1336   BLOOD CULTURE  Received in Microbiology Lab  Culture in Progress  Received in Microbiology Lab  Culture in Progress  EKG:   NSR with frequent PVC's  Imaging: I have personally reviewed pertinent reports  XR elbow 3+ views LEFT   Final Result      Swelling  No acute fracture seen  Some degenerative bone spurring is seen  Slightly limited lateral view  Workstation performed: [id number]         CT head without contrast   Final Result      No acute intracranial abnormality  Workstation performed: [id number]         CT cervical spine without contrast   Final Result      1  No cervical spine fracture or traumatic malalignment     2   Bilateral pleural effusions, larger on the right  Workstation performed: [id number]         XR chest 1 view portable   Final Result      Congestive failure and small moderate right pleural effusion  Workstation performed: [id number]           Intake and Output  I/O       03/10 0701 - 03/11 0700 03/11 0701 - 03/12 0700    I V  (mL/kg)  170 (2 2)    IV Piggyback  550    Total Intake(mL/kg)  720 (9 3)    Urine (mL/kg/hr)  1015    Total Output  1015    Net  -295                Height and Weights      IBW: -88 kg  Body mass index is 25 49 kg/m²  Weight (last 2 days)     Date/Time   Weight    03/11/20 1611   77 2 (170 1)    03/11/20 1306   77 6 (171 08)                Nutrition       Diet Orders   (From admission, onward)             Start     Ordered    03/11/20 1655  Room Service  Once     Question:  Type of Service  Answer:  Room Service - Appropriate with Assistance    03/11/20 1654    03/11/20 1618  Diet NPO; Sips with meds  Diet effective now     Question Answer Comment   Diet Type NPO    NPO Except: Sips with meds    RD to adjust diet per protocol?  Yes        03/11/20 1618                  Active Medications  Scheduled Meds:  Current Facility-Administered Medications:  acetaminophen 650 mg Oral Q6H PRN Ana Laura Agrawal MD    atorvastatin 10 mg Oral Daily With Mart Garcia MD    cefepime 2,000 mg Intravenous Q12H Ana Laura Agrawal MD    enoxaparin 30 mg Subcutaneous Q24H Eddie Rivera MD    insulin regular (HumuLIN R,NovoLIN R) infusion 0 3-21 Units/hr Intravenous Titrated Ana Laura Agrawal MD Last Rate: 4 Units/hr (03/12/20 0004)   pantoprazole 40 mg Oral Early Morning Ana Laura Agrawal MD    vancomycin 15 mg/kg Intravenous Q24H Ana Laura Agrawal MD      Continuous Infusions:    insulin regular (HumuLIN R,NovoLIN R) infusion 0 3-21 Units/hr Last Rate: 4 Units/hr (03/12/20 0004)     PRN Meds:     acetaminophen 650 mg Q6H PRN       Invasive Devices Review  Invasive Devices     Peripheral Intravenous Line            Peripheral IV 03/11/20 Right Forearm less than 1 day    Peripheral IV 03/11/20 Right Forearm less than 1 day          Drain            Urethral Catheter Non-latex 16 Fr  less than 1 day                Rationale for remaining devices:  Urinary catheter remains for Strict I&Os  ---------------------------------------------------------------------------------------  Advance Directive and Living Will: Yes    Power of :    POLST:    ---------------------------------------------------------------------------------------  Care Time Delivered:   Upon my evaluation, this patient had a high probability of imminent or life-threatening deterioration due to Acute on chronic heart failure, which required my direct attention, intervention, and personal management  I have personally provided 40 minutes (1130 to 0010) of critical care time, exclusive of procedures, teaching, family meetings, and any prior time recorded by providers other than myself  MIGUEL Morales      Portions of the record may have been created with voice recognition software  Occasional wrong word or "sound a like" substitutions may have occurred due to the inherent limitations of voice recognition software    Read the chart carefully and recognize, using context, where substitutions have occurred

## 2020-03-12 NOTE — ASSESSMENT & PLAN NOTE
· Patient presents to the emergency department a blood sugar of 823, pH 7 329 with a normal anion gap and bicarb  Beta hydroxybutyrate was elevated UA without any ketones  · Per discussion with patient, and chart review he does not take any medications for diabetes and he does not check his blood sugar  · Per chart review, he is supposed to be taking glimepiride and Levemir  · Patient has received a total of 750 mL of volume since admission  · Continue insulin drip  · Hemoglobin A1c November of 2019 14 2    Currently pending  · Will hold further IV fluids as patient with a history of Congestive heart failure with an EF of 25%, jugular venous distension on exam

## 2020-03-12 NOTE — ASSESSMENT & PLAN NOTE
Presented with blood sugar of 823  pH-7 329, with normal anion gap and bicarb  Beta hydroxybutyrate is elevated, UA without any ketones  Patient is not currently on any medication for diabetes at home or does not check his blood sugar  Check hemoglobin A1c  Insulin drip  Monitor blood sugar q 2 hours  IV fluids

## 2020-03-12 NOTE — SOCIAL WORK
Pt is current LOS 1  Not a readmission  Is a MB for CHF  Met with pt's daughter in law Yari Baljeet/JESUS  +LW  Pt resides alone in a single floor mobile home with 2 steps to enter  Pt had been using a spc for ambulation, has RW  No h/o hhc  Has been for STR in [facility] in the past   Son and daughter in law [place] and are supportive  Uses the [employer] in pharmacy in [place]  A post acute care recommendation was made by your care team for short term rehab  CM discussed the freedom of choice with family  CM provided skilled facility list, given to family via printed packet  Family is aware the list is custom filtered by patient's zip code and that [address] post acute providers are designated  Daughter in law states pt would only chose [facility] again  Will make referral, but did request additional choices if needed  Explained role of cm, will follow  CM reviewed d/c planning process including the following: identifying help at home, patient preference for d/c planning needs, and availability of the treatment team to discuss questions or concerns patient and/or family may have regarding understanding of medications and recognizing signs and symptoms once discharged  CM also encouraged patient to follow up with all recommended appointments after discharge  Patient advised of importance for patient and family to participate in managing patient's medical well being

## 2020-03-12 NOTE — ASSESSMENT & PLAN NOTE
· When I ask patient how he felt, he stated I feel nothing  He states he has been feeling depressed since his wife  about 4- 5 years ago  He states, "She  in my arms "  · He states he has been progressively declining since the death of his wife  · Patient also stated that he feels as if he is a burden to his daughter-in-law  He states she takes very good care of him and feels that she should be taking more care of her stepson, rather than himself  · Will consult psych in the a [facility]

## 2020-03-12 NOTE — CONSULTS
The patient's vancomycin therapy has been discontinued  Pharmacy will sign off now, thank you for this consult  Marisa GuthrieD

## 2020-03-13 PROBLEM — G93.40 ENCEPHALOPATHY: Status: ACTIVE | Noted: 2020-03-13

## 2020-03-13 PROBLEM — E87.2 LACTIC ACIDOSIS: Status: RESOLVED | Noted: 2018-10-25 | Resolved: 2020-03-13

## 2020-03-13 PROBLEM — I49.3 FREQUENT PVCS: Status: ACTIVE | Noted: 2020-03-13

## 2020-03-13 LAB
ALBUMIN SERPL BCP-MCNC: 3 G/DL (ref 3.5–5)
ALP SERPL-CCNC: 123 U/L (ref 46–116)
ALT SERPL W P-5'-P-CCNC: 25 U/L (ref 12–78)
ANION GAP SERPL CALCULATED.3IONS-SCNC: 7 MMOL/L (ref 4–13)
AST SERPL W P-5'-P-CCNC: 25 U/L (ref 5–45)
BACTERIA UR CULT: ABNORMAL
BACTERIA WND AEROBE CULT: ABNORMAL
BASOPHILS # BLD AUTO: 0.03 THOUSANDS/ΜL (ref 0–0.1)
BASOPHILS NFR BLD AUTO: 0 % (ref 0–1)
BILIRUB SERPL-MCNC: 1.2 MG/DL (ref 0.2–1)
BUN SERPL-MCNC: 43 MG/DL (ref 5–25)
CALCIUM SERPL-MCNC: 8.9 MG/DL (ref 8.3–10.1)
CHLORIDE SERPL-SCNC: 98 MMOL/L (ref 100–108)
CO2 SERPL-SCNC: 27 MMOL/L (ref 21–32)
CREAT SERPL-MCNC: 1.57 MG/DL (ref 0.6–1.3)
EOSINOPHIL # BLD AUTO: 0.05 THOUSAND/ΜL (ref 0–0.61)
EOSINOPHIL NFR BLD AUTO: 0 % (ref 0–6)
ERYTHROCYTE [DISTWIDTH] IN BLOOD BY AUTOMATED COUNT: 13.3 % (ref 11.6–15.1)
GFR SERPL CREATININE-BSD FRML MDRD: 41 ML/MIN/1.73SQ M
GLUCOSE SERPL-MCNC: 192 MG/DL (ref 65–140)
GLUCOSE SERPL-MCNC: 213 MG/DL (ref 65–140)
GLUCOSE SERPL-MCNC: 225 MG/DL (ref 65–140)
GLUCOSE SERPL-MCNC: 232 MG/DL (ref 65–140)
GLUCOSE SERPL-MCNC: 72 MG/DL (ref 65–140)
GRAM STN SPEC: ABNORMAL
HCT VFR BLD AUTO: 46.3 % (ref 36.5–49.3)
HGB BLD-MCNC: 15.4 G/DL (ref 12–17)
IMM GRANULOCYTES # BLD AUTO: 0.06 THOUSAND/UL (ref 0–0.2)
IMM GRANULOCYTES NFR BLD AUTO: 1 % (ref 0–2)
LYMPHOCYTES # BLD AUTO: 0.55 THOUSANDS/ΜL (ref 0.6–4.47)
LYMPHOCYTES NFR BLD AUTO: 5 % (ref 14–44)
MAGNESIUM SERPL-MCNC: 2.3 MG/DL (ref 1.6–2.6)
MCH RBC QN AUTO: 36.3 PG (ref 26.8–34.3)
MCHC RBC AUTO-ENTMCNC: 33.3 G/DL (ref 31.4–37.4)
MCV RBC AUTO: 109 FL (ref 82–98)
MONOCYTES # BLD AUTO: 0.87 THOUSAND/ΜL (ref 0.17–1.22)
MONOCYTES NFR BLD AUTO: 8 % (ref 4–12)
MRSA NOSE QL CULT: NORMAL
MRSA NOSE QL CULT: NORMAL
NEUTROPHILS # BLD AUTO: 9.95 THOUSANDS/ΜL (ref 1.85–7.62)
NEUTS SEG NFR BLD AUTO: 86 % (ref 43–75)
NRBC BLD AUTO-RTO: 0 /100 WBCS
PHOSPHATE SERPL-MCNC: 3.4 MG/DL (ref 2.3–4.1)
PLATELET # BLD AUTO: 200 THOUSANDS/UL (ref 149–390)
PMV BLD AUTO: 11.4 FL (ref 8.9–12.7)
POTASSIUM SERPL-SCNC: 4.7 MMOL/L (ref 3.5–5.3)
PROT SERPL-MCNC: 6.4 G/DL (ref 6.4–8.2)
RBC # BLD AUTO: 4.24 MILLION/UL (ref 3.88–5.62)
SODIUM SERPL-SCNC: 132 MMOL/L (ref 136–145)
WBC # BLD AUTO: 11.51 THOUSAND/UL (ref 4.31–10.16)

## 2020-03-13 PROCEDURE — 84100 ASSAY OF PHOSPHORUS: CPT | Performed by: NURSE PRACTITIONER

## 2020-03-13 PROCEDURE — 82948 REAGENT STRIP/BLOOD GLUCOSE: CPT

## 2020-03-13 PROCEDURE — 93005 ELECTROCARDIOGRAM TRACING: CPT

## 2020-03-13 PROCEDURE — 97110 THERAPEUTIC EXERCISES: CPT

## 2020-03-13 PROCEDURE — 85025 COMPLETE CBC W/AUTO DIFF WBC: CPT | Performed by: NURSE PRACTITIONER

## 2020-03-13 PROCEDURE — 97535 SELF CARE MNGMENT TRAINING: CPT

## 2020-03-13 PROCEDURE — 80053 COMPREHEN METABOLIC PANEL: CPT | Performed by: NURSE PRACTITIONER

## 2020-03-13 PROCEDURE — 99232 SBSQ HOSP IP/OBS MODERATE 35: CPT | Performed by: INTERNAL MEDICINE

## 2020-03-13 PROCEDURE — 83735 ASSAY OF MAGNESIUM: CPT | Performed by: NURSE PRACTITIONER

## 2020-03-13 PROCEDURE — 99291 CRITICAL CARE FIRST HOUR: CPT | Performed by: ANESTHESIOLOGY

## 2020-03-13 PROCEDURE — 99222 1ST HOSP IP/OBS MODERATE 55: CPT | Performed by: PHYSICIAN ASSISTANT

## 2020-03-13 RX ORDER — AMIODARONE HYDROCHLORIDE 900 MG/18ML
INJECTION, SOLUTION INTRAVENOUS
Status: COMPLETED
Start: 2020-03-13 | End: 2020-03-13

## 2020-03-13 RX ORDER — FUROSEMIDE 10 MG/ML
20 INJECTION INTRAMUSCULAR; INTRAVENOUS ONCE
Status: COMPLETED | OUTPATIENT
Start: 2020-03-13 | End: 2020-03-13

## 2020-03-13 RX ORDER — GINSENG 100 MG
1 CAPSULE ORAL 2 TIMES DAILY
Status: DISCONTINUED | OUTPATIENT
Start: 2020-03-13 | End: 2020-03-18 | Stop reason: HOSPADM

## 2020-03-13 RX ADMIN — INSULIN LISPRO 4 UNITS: 100 INJECTION, SOLUTION INTRAVENOUS; SUBCUTANEOUS at 16:15

## 2020-03-13 RX ADMIN — INSULIN DETEMIR 12 UNITS: 100 INJECTION, SOLUTION SUBCUTANEOUS at 23:00

## 2020-03-13 RX ADMIN — INSULIN LISPRO 4 UNITS: 100 INJECTION, SOLUTION INTRAVENOUS; SUBCUTANEOUS at 07:17

## 2020-03-13 RX ADMIN — INSULIN LISPRO 2 UNITS: 100 INJECTION, SOLUTION INTRAVENOUS; SUBCUTANEOUS at 11:23

## 2020-03-13 RX ADMIN — FUROSEMIDE 20 MG: 10 INJECTION, SOLUTION INTRAMUSCULAR; INTRAVENOUS at 10:20

## 2020-03-13 RX ADMIN — AMIODARONE HYDROCHLORIDE 0.5 MG: 900 INJECTION, SOLUTION INTRAVENOUS at 06:18

## 2020-03-13 RX ADMIN — PANTOPRAZOLE SODIUM 40 MG: 40 TABLET, DELAYED RELEASE ORAL at 06:04

## 2020-03-13 RX ADMIN — ENOXAPARIN SODIUM 30 MG: 30 INJECTION SUBCUTANEOUS at 08:24

## 2020-03-13 RX ADMIN — DIGOXIN 125 MCG: 125 TABLET ORAL at 08:23

## 2020-03-13 RX ADMIN — DOPAMINE HYDROCHLORIDE IN DEXTROSE 5 MCG/KG/MIN: 1.6 INJECTION, SOLUTION INTRAVENOUS at 07:11

## 2020-03-13 RX ADMIN — CEFTRIAXONE 1000 MG: 1 INJECTION, SOLUTION INTRAVENOUS at 16:17

## 2020-03-13 RX ADMIN — ATORVASTATIN CALCIUM 10 MG: 20 TABLET, FILM COATED ORAL at 16:23

## 2020-03-13 NOTE — ASSESSMENT & PLAN NOTE
· improving  · Continue to hold lisinopril, Aldactone, and furosemide    · Place Gordon catheter for strict I&Os  · Monitor daily weights

## 2020-03-13 NOTE — OCCUPATIONAL THERAPY NOTE
OT TREATMENT       03/13/20 1510   Restrictions/Precautions   Other Precautions Fall Risk; Chair Alarm; Bed Alarm   Pain Assessment   Pain Assessment Tool Savage-Baker FACES   Savage-Baker FACES Pain Rating 4   Pain Location/Orientation Location: Generalized   ADL   Grooming Assistance 3  Moderate Assistance   Grooming Deficit Wash/dry hands; Wash/dry face   Grooming Comments limited by fatigue    ROM- Right Upper Extremities   R Elbow AROM;Elbow flexion;Elbow extension   R Hand AROM; Index finger; Thumb; Long finger;Ring finger;Little finger   R Weight/Reps/Sets 10 times each supine    ROM - Left Upper Extremities    L Elbow AROM;Elbow flexion;Elbow extension   L Hand AROM; Index finger; Thumb; Long finger;Ring finger;Little finger   L Weight/Reps/Sets 10 times each supine    Cognition   Attention Attends with cues to redirect   Orientation Level Oriented to person;Disoriented to place; Disoriented to time;Disoriented to situation   Following Commands Follows one step commands with increased time or repetition   Assessment   Assessment Patient is more alert today  Patient is confused and slow to respond  Patient is generally weak and deconditioned and would benefit from STR  Patients nurse stated in bed activity only today  Plan   Treatment Interventions ADL retraining;UE strengthening/ROM; Activityengagement   Recommendation   OT Discharge Recommendation [id number] [employer] License Number  Jose Zaire [address] 85 OTR/L [id number]

## 2020-03-13 NOTE — PROGRESS NOTES
Progress Note - Eduardo Roberts 1940, 78 y o  male MRN: [id number]    Unit/Bed#: ICU 05 Encounter: [id number]    Primary Care Provider: Gena Reddy DO   Date and time admitted to hospital: 3/11/2020 12:52 PM        * Hyperglycemic hyperosmolar state  Assessment & Plan  · Improving  · Off of insulin infusion and now on prescribed home dose Levemir and SSI (although not taking dose at home)  · Evaluate glucose today and adjust as necessary    Acute on chronic combined systolic and diastolic CHF (congestive heart failure) (HCC)  Assessment & Plan  Wt Readings from Last 3 Encounters:   03/13/20 76 2 kg (167 lb 15 8 oz)   12/06/18 76 2 kg (168 lb)   11/18/18 80 2 kg (176 lb 12 9 oz)     · Echo 3/12/20 The ventricle was mildly dilated  Systolic function was severely reduced by visual assessment  Ejection fraction was estimated in the range of 15 % to 20 %  There was severe diffuse hypokinesis  · Doppler parameters were consistent with elevated mean left atrial filling pressure  · Per chart review patient had refused LifeVest/AICD  · Patient's frequent PVCs on cardiac monitoring  · On exam patient is cachectic with JVD and +2 pitting edema lower extremities  Patient is cold to the touch  Given exam findings and patient's history of severely reduced EF, concern for worsening heart function, and volume overload with additional volume resuscitation  · Cardiology consult appreciated  · Currently on dopamine 5mcg/kg/min for support with improvement   · Attrempt to wean off today  · Diuresis today, gently  · ? Mixed shock in the setting of UTI/underlying poor EF  · Strict I&Os  · Daily weight        Acute kidney injury ([address] )  Assessment & Plan  · improving  · Continue to hold lisinopril, Aldactone, and furosemide  · Place Gordon catheter for strict I&Os  · Monitor daily weights    Cellulitis  Assessment & Plan  · Patient noted to have mild swelling and redness in the left anterior aspect of his arm    No drainage noted  Patient stated is an old wound from a fall  He does not remember when it occurred  · Hx of multiple wounds in past with uncontrolled DM2  · Do not suspect sepsis at this time  · Continue to follow cultures  · Continue to monitor  · Patient on Ceftriaxone  UTI (urinary tract infection)  Assessment & Plan    · Follow-up urine culture  · Continue current antibiotic regimen    Urinary retention  Assessment & Plan  · Luis catheter placed for strict I&Os  · Continue to monitor  · Restart flomax, remove luis    Atrial fibrillation ([facility])  Assessment & Plan  · Patient with history of atrial fibrillation not on anticoagulation due to history of GI bleeding  · Currently on Amiodarone infusion for frequent ectopy  · Transition to oral    Benign essential hypertension  Assessment & Plan  · Will hold lisinopril in light of CHRIS  Continue to hold Aldactone and Demadex  CKD (chronic kidney disease) stage 3, GFR 30-59 ml/min ([facility])  Assessment & Plan  · Patient's baseline creatinine in 2018 appears 0 8-0 9  · Since April 2019 patient's creatinine has been 1 4-1 5  · Continue to monitor and trend  · Strict I&Os  · Daily weights    CAD (coronary artery disease)  Assessment & Plan  · Patient with history of CAD status post CABG  · Denies chest pain/chest discomfort, shortness of breath  · Continue metoprolol and Lipitor        ----------------------------------------------------------------------------------------  HPI/24hr events: no acute events overnight    Disposition: Continue Stepdown Level 1 level of care   Code Status: Level 3 - DNAR and DNI  ---------------------------------------------------------------------------------------  SUBJECTIVE  Feels lousy today  Tired, didn't sleep well   Denies shortness of breath/chest pain/Nausea/vomiting    Review of Systems  Review of systems was reviewed and negative unless stated above in HPI/24-hour events ---------------------------------------------------------------------------------------  OBJECTIVE    Vitals   Vitals:    20 0500 20 0530 20 0600 20 0630   BP: 130/84 95/67 91/66 127/77   BP Location:       Pulse: (!) 112 104 (!) 109 (!) 107   Resp: (!) 26 (!) 26 (!) 24 (!) 26   Temp:       TempSrc:       SpO2: 96% 94% 96% 95%   Weight:   76 2 kg (167 lb 15 8 oz)    Height:         Temp (24hrs), Av 1 °F (36 7 °C), Min:97 5 °F (36 4 °C), Max:98 6 °F (37 °C)  Current: Temperature: 97 9 °F (36 6 °C)        SpO2: SpO2: 98 %, SpO2 Activity: SpO2 Activity: At Rest, SpO2 Device: O2 Device: Nasal cannula, Capnography: Capnography: No  O2 Flow Rate (L/min): 2 L/min    Physical Exam   Constitutional: He is oriented to person, place, and time  He appears well-developed and well-nourished  No distress  HENT:   Head: Normocephalic and atraumatic  Eyes: Pupils are equal, round, and reactive to light  Neck: Normal range of motion  Neck supple  Cardiovascular: Normal rate and regular rhythm  No murmur heard  Pulmonary/Chest: Effort normal and breath sounds normal  No respiratory distress  Abdominal: Soft  Bowel sounds are normal  He exhibits no distension  Musculoskeletal: He exhibits edema  Neurological: He is alert and oriented to person, place, and time  Skin: Skin is warm and dry  Nursing note and vitals reviewed        Invasive/non-invasive ventilation settings   Respiratory    Lab Data (Last 4 hours)    None         O2/Vent Data (Last 4 hours)    None                Laboratory and Diagnostics:  Results from last 7 days   Lab Units 20  0400 20  2138 20  1336   WBC Thousand/uL 14 09* 7 84 7 49   HEMOGLOBIN g/dL 15 5 14 6 15 3   HEMATOCRIT % 46 4 45 5 47 5   PLATELETS Thousands/uL 182 159 197   NEUTROS PCT % 84* 80* 86*   MONOS PCT % 8 8 6     Results from last 7 days   Lab Units 20  0400 20  2138 20  1716 20  1336   SODIUM mmol/L 136 135*  --  130*   POTASSIUM mmol/L 3 6 4 0  --  4 9   CHLORIDE mmol/L 99* 100  --  92*   CO2 mmol/L 27 24  --  27   ANION GAP mmol/L 10 11  --  11   BUN mg/dL 35* 37*  --  41*   CREATININE mg/dL 1 39* 1 57*  --  1 82*   CALCIUM mg/dL 9 1 9 1  --  9 3   GLUCOSE RANDOM mg/dL 79 318* 680* 823*   ALT U/L 22 24  --  25   AST U/L 16 17  --  <5*   ALK PHOS U/L 113 125*  --  153*   ALBUMIN g/dL 3 0* 3 0*  --  3 6   TOTAL BILIRUBIN mg/dL 0 80 0 80  --  1 10*     Results from last 7 days   Lab Units 03/12/20  0400 03/11/20  2138   MAGNESIUM mg/dL 2 2 2 5   PHOSPHORUS mg/dL 2 8 3 6      Results from last 7 days   Lab Units 03/11/20  1336   INR  1 10*   PTT seconds 25      Results from last 7 days   Lab Units 03/11/20  2138 03/11/20  2046 03/11/20  1811 03/11/20  1336   TROPONIN I ng/mL 0 04 0 05* 0 06* 0 05*     Results from last 7 days   Lab Units 03/12/20  1007 03/12/20  0821 03/12/20  0630 03/12/20  0359 03/12/20  0041 03/11/20  2138 03/11/20  1833   LACTIC ACID mmol/L 2 1* 2 7* 3 6* 4 3* 4 5* 4 3* 4 4*     ABG:    VBG:  Results from last 7 days   Lab Units 03/11/20  1336   PH KIMI  7 329   PCO2 KIMI mm Hg 52 0*   PO2 KIMI mm Hg 22 5*   HCO3 KIMI mmol/L 26 7   BASE EXC KIMI mmol/L -0 2     Results from last 7 days   Lab Units 03/12/20  0400 03/11/20  1716   PROCALCITONIN ng/ml 0 07 <0 05       Micro  Results from last 7 days   Lab Units 03/11/20  1440 03/11/20  1405 03/11/20  1336   BLOOD CULTURE   --  No Growth at 24 hrs  No Growth at 24 hrs  GRAM STAIN RESULT  1+ Disintegrating polys*  2+ Gram positive rods*  1+ Gram positive cocci in pairs*  --   --    URINE CULTURE  60,000-69,000 cfu/ml Gram Negative Ole*  --   --    WOUND CULTURE  No growth  --   --        EKG:   Imaging:   Intake and Output  I/O       03/11 0701 - 03/12 0700 03/12 0701 - 03/13 0700 03/13 0701 - 03/14 0700    P  O   480     I V  (mL/kg) 269 9 (3 6) 864 7 (11 3)     IV Piggyback 700 250     Total Intake(mL/kg) 969 9 (12 9) 1594 7 (20 9)     Urine (mL/kg/hr) 1290 495 (0 3)     Total Output 1290 495     Net -320 1 +1099 7                  Height and Weights   Height: 5' 8 5" (174 cm)  IBW: 69 55 kg  Body mass index is 25 17 kg/m²  Weight (last 2 days)     Date/Time   Weight    03/13/20 0600   76 2 (167 99)    03/12/20 0600   75 3 (166 01)    03/12/20 0000   75 3 (166 01)    03/11/20 1611   77 2 (170 1)    03/11/20 1306   77 6 (171 08)                Nutrition       Diet Orders   (From admission, onward)             Start     Ordered    03/12/20 1042  Diet Omid/CHO Controlled; Consistent Carbohydrate Diet Level 2 (5 carb servings/75 grams CHO/meal)  Diet effective now     Question Answer Comment   Diet Type Omid/CHO Controlled    Omid/CHO Controlled Consistent Carbohydrate Diet Level 2 (5 carb servings/75 grams CHO/meal)    RD to adjust diet per protocol?  Yes        03/12/20 1047    03/11/20 1655  Room Service  Once     Question:  Type of Service  Answer:  Room Service - Appropriate with Assistance    03/11/20 1654                  Active Medications  Scheduled Meds:  Current Facility-Administered Medications:  acetaminophen 650 mg Oral Q6H PRN Kim Luis MD    amiodarone 0 5 mg/min Intravenous Continuous MIGUEL Morales Last Rate: 0 5 mg/min (03/13/20 0701)   atorvastatin 10 mg Oral Daily With Dinner Kim Luis MD    cefTRIAXone 1,000 mg Intravenous Q24H Rhea John PA-C Last Rate: Stopped (03/12/20 1710)   digoxin 125 mcg Oral Daily Rhea John PA-C    DOPamine in dextrose 5% 1-20 mcg/kg/min Intravenous Titrated Rhea John PA-C Last Rate: 5 mcg/kg/min (03/13/20 0711)   enoxaparin 30 mg Subcutaneous Q24H Da Valadez MD    insulin detemir 8 Units Subcutaneous HS Rhea John PA-C    insulin lispro 2-12 Units Subcutaneous 4x Daily (AC & HS) Rhea John PA-C    pantoprazole 40 mg Oral Early Morning Kim Luis MD      Continuous Infusions:    amiodarone 0 5 mg/min Last Rate: 0 5 mg/min (03/13/20 0701)   DOPamine in dextrose 5% 1-20 mcg/kg/min Last Rate: 5 mcg/kg/min (03/13/20 0711)     PRN Meds:     acetaminophen 650 mg Q6H PRN       Invasive Devices Review  Invasive Devices     Peripheral Intravenous Line            Peripheral IV 03/11/20 Right Forearm 1 day    Peripheral IV 03/12/20 Right Wrist less than 1 day          Drain            Urethral Catheter Non-latex 16 Fr  1 day                Rationale for remaining devices: toby need for accurate measurements of UOP  ---------------------------------------------------------------------------------------  Advance Directive and Living Will: Yes    Power of :    POLST:    ---------------------------------------------------------------------------------------  Care Time Delivered:   No Critical Care time spent       [facility] [place]      Portions of the record may have been created with voice recognition software  Occasional wrong word or "sound a like" substitutions may have occurred due to the inherent limitations of voice recognition software    Read the chart carefully and recognize, using context, where substitutions have occurred

## 2020-03-13 NOTE — ASSESSMENT & PLAN NOTE
· Patient noted to have mild swelling and redness in the left anterior aspect of his arm  No drainage noted  Patient stated is an old wound from a fall  He does not remember when it occurred  · Hx of multiple wounds in past with uncontrolled DM2  · Do not suspect sepsis at this time  · Continue to follow cultures  · Continue to monitor  · Patient on Ceftriaxone

## 2020-03-13 NOTE — ASSESSMENT & PLAN NOTE
· Improving  · Off of insulin infusion and now on prescribed home dose Levemir and SSI (although not taking dose at home)  · Evaluate glucose today and adjust as necessary

## 2020-03-13 NOTE — ASSESSMENT & PLAN NOTE
· Patient with history of atrial fibrillation not on anticoagulation due to history of GI bleeding  · Currently on Amiodarone infusion for frequent ectopy  · Transition to oral

## 2020-03-13 NOTE — PHYSICAL THERAPY NOTE
PT TREATMENT     03/13/20 1340   Pain Assessment   Pain Assessment Tool Savage-Baker FACES   Savage-Baker FACES Pain Rating 6   Pain Location/Orientation   (B shoulders and elbows with ROM)   Restrictions/Precautions   Other Precautions Fall Risk; Chair Alarm; Bed Alarm   General   Chart Reviewed Yes   Cognition   Arousal/Participation Cooperative   Attention Attends with cues to redirect   Following Commands Follows one step commands with increased time or repetition   Subjective   Subjective "I feel pretty good"   Bed Mobility   Additional Comments No OOB per RN Carrie as pt is on a dopamine drip   Activity Tolerance   Activity Tolerance Patient tolerated treatment well   Exercises   Neuro re-ed 2x10 each LE AAROM SLR, ankle pumps, SAQ, hip IR/ER, hip abd/add;  x 10 each AAROM finger flex/ext, wrist flex/ext, pron/sup, elbow flex/ext, shoulder elevation   Assessment   Prognosis Good   Problem List Decreased strength;Decreased endurance; Impaired balance;Decreased mobility   Assessment Pt more alert today and able to participate in 25 minutes of bedside exercises  Plan to assess bed mobility and transfers next session is medically appropriate  Plan   Treatment/Interventions ADL retraining;Functional transfer training;LE strengthening/ROM; Endurance training; Therapeutic exercise;Gait training;Bed mobility; Equipment eval/education;Patient/family training;Cognitive reorientation   Progress Progressing toward goals   PT Frequency 5x/wk   Recommendation   Recommendation Short-term skilled PT   Licensure   [place] License Number  [place] [facility] [id number]

## 2020-03-13 NOTE — PLAN OF CARE
Problem: OCCUPATIONAL THERAPY ADULT  Goal: Performs self-care activities at highest level of function for planned discharge setting  See evaluation for individualized goals  Outcome: Progressing  Note:   Limitation: Decreased ADL status, Decreased UE ROM, Decreased UE strength, Decreased Safe judgement during ADL, Decreased cognition, Decreased endurance, Decreased self-care trans, Decreased high-level ADLs(decreased balance and mobility )  Prognosis: Good  Assessment: Patient is more alert today  Patient is confused and slow to respond  Patient is generally weak and deconditioned and would benefit from STR  Patients nurse stated in bed activity only today         OT Discharge Recommendation: Short Term Rehab

## 2020-03-13 NOTE — PROGRESS NOTES
Progress Note - Cardiology   Rich Maria 78 y o  male MRN: [id number]  Unit/Bed#: ICU 05 Encounter: [id number]    Assessment:  1  Acute on chronic combined systolic and diastolic congestive heart failure  Patient with ejection fraction of 15-20% which is consistent with his known ejection fraction  He refuses ICD   - IV Hydration as needed  - begin treatment with IV lasix  - continue dopamine for inotropic support  - his home medications include lisinopril 2 5 mg daily and metoprolol 12 5 mg daily and spironolactone 12 5 mg daily  Medications on hold due to hypotension  2  Frequent PVCs  - patient was started on IV amiodarone  He has a history of frequent ventricular ectopy during previous hospitalization  May discontinue at this time  - Will not benefit from long term PO amiodarone therapy based on SCD-HeFT trial data  - does not wish to have ICD placement  3  DM with hyperglycemic hyperosmolar state  - management per ICU team   Appears to be improving  4  CAD with prior CABG  5  Dyslipidemia - continue atorvastatin       Plan:  As above  Discussed with ICU team       Subjective/Objective   Chief Complaint: CHF    Subjective: Shortness of breath improved  Complains of pain and swelling in his right hand  Denies any chest pain  LE edema improved       Objective:     Vitals: /64 (BP Location: Left arm)   Pulse 78   Temp 99 °F (37 2 °C) (Tympanic)   Resp 20   Ht 5' 8 5" (1 74 m)   Wt 76 2 kg (167 lb 15 8 oz)   SpO2 96%   BMI 25 17 kg/m²   Vitals:    03/12/20 0600 03/13/20 0600   Weight: 75 3 kg (166 lb 0 1 oz) 76 2 kg (167 lb 15 8 oz)     Orthostatic Blood Pressures      Most Recent Value   Blood Pressure  110/64 filed at 03/13/2020 1000   Patient Position - Orthostatic VS  Lying filed at 03/13/2020 1000            Intake/Output Summary (Last 24 hours) at 3/13/2020 1042  Last data filed at 3/13/2020 1000  Gross per 24 hour   Intake 1474 69 ml   Output 665 ml   Net 809 69 ml Invasive Devices     Peripheral Intravenous Line            Peripheral IV 03/13/20 Right Antecubital less than 1 day          Drain            Urethral Catheter Non-latex 16 Fr  1 day                Physical Exam   Constitutional: No distress  He appears chronically ill  HENT:   Nose: Nose normal    Mouth/Throat: Oropharynx is clear  Eyes: Pupils are equal, round, and reactive to light  Conjunctivae are normal    Neck: Normal range of motion  Neck supple  No JVD present  Cardiovascular: Normal rate and regular rhythm  Occasional extrasystoles are present  Exam reveals no gallop and no friction rub  Murmur heard  Systolic murmur is present with a grade of 2/6 at the upper left sternal border  Pulmonary/Chest: Effort normal  He has bibasilar rales and scattered wheezes  Abdominal: Soft  He exhibits no distension  There is no tenderness  Musculoskeletal: He exhibits edema and tenderness  Neurological: He is alert and oriented to person, place, and time  Skin: Skin is warm and dry     Left arm wound       Lab Results:   CBC with diff:   Results from last 7 days   Lab Units 03/13/20  0749   WBC Thousand/uL 11 51*   RBC Million/uL 4 24   HEMOGLOBIN g/dL 15 4   HEMATOCRIT % 46 3   MCV fL 109*   MCH pg 36 3*   MCHC g/dL 33 3   RDW % 13 3   MPV fL 11 4   PLATELETS Thousands/uL 200     CMP:   Results from last 7 days   Lab Units 03/13/20  0750   SODIUM mmol/L 132*   POTASSIUM mmol/L 4 7   CHLORIDE mmol/L 98*   CO2 mmol/L 27   BUN mg/dL 43*   CREATININE mg/dL 1 57*   CALCIUM mg/dL 8 9   AST U/L 25   ALT U/L 25   ALK PHOS U/L 123*   EGFR ml/min/1 73sq m 41     Troponin:   0   Lab Value Date/Time    TROPONINI 0 04 03/11/2020 2138    TROPONINI 0 05 (H) 03/11/2020 2046    TROPONINI 0 06 (H) 03/11/2020 1811    TROPONINI 0 05 (H) 03/11/2020 1336    TROPONINI <0 02 10/25/2018 1534    TROPONINI <0 02 05/09/2018 1927     Imaging: I have personally reviewed pertinent films in PACS  Tele/EKG: NSR with LBBB frequent PVCs    Short episode of VT

## 2020-03-13 NOTE — ASSESSMENT & PLAN NOTE
· Patient with history of CAD status post CABG  · Denies chest pain/chest discomfort, shortness of breath  · Continue metoprolol and Lipitor

## 2020-03-13 NOTE — ASSESSMENT & PLAN NOTE
Wt Readings from Last 3 Encounters:   03/13/20 76 2 kg (167 lb 15 8 oz)   12/06/18 76 2 kg (168 lb)   11/18/18 80 2 kg (176 lb 12 9 oz)     · Echo 3/12/20 The ventricle was mildly dilated  Systolic function was severely reduced by visual assessment  Ejection fraction was estimated in the range of 15 % to 20 %  There was severe diffuse hypokinesis  · Doppler parameters were consistent with elevated mean left atrial filling pressure  · Per chart review patient had refused LifeVest/AICD  · Patient's frequent PVCs on cardiac monitoring  · On exam patient is cachectic with JVD and +2 pitting edema lower extremities  Patient is cold to the touch  Given exam findings and patient's history of severely reduced EF, concern for worsening heart function, and volume overload with additional volume resuscitation  · Cardiology consult appreciated  · Currently on dopamine 5mcg/kg/min for support with improvement   · Attrempt to wean off today  · Diuresis today, gently  · ? Mixed shock in the setting of UTI/underlying poor EF  · Strict I&Os  · Daily weight

## 2020-03-14 PROBLEM — E87.1 HYPONATREMIA: Status: RESOLVED | Noted: 2018-05-09 | Resolved: 2020-03-14

## 2020-03-14 PROBLEM — R73.9 HYPERGLYCEMIA WITHOUT KETOSIS: Status: RESOLVED | Noted: 2018-11-18 | Resolved: 2020-03-14

## 2020-03-14 LAB
ANION GAP SERPL CALCULATED.3IONS-SCNC: 6 MMOL/L (ref 4–13)
BASOPHILS # BLD AUTO: 0.04 THOUSANDS/ΜL (ref 0–0.1)
BASOPHILS NFR BLD AUTO: 0 % (ref 0–1)
BUN SERPL-MCNC: 39 MG/DL (ref 5–25)
CALCIUM SERPL-MCNC: 8.4 MG/DL (ref 8.3–10.1)
CHLORIDE SERPL-SCNC: 100 MMOL/L (ref 100–108)
CO2 SERPL-SCNC: 24 MMOL/L (ref 21–32)
CREAT SERPL-MCNC: 1.21 MG/DL (ref 0.6–1.3)
EOSINOPHIL # BLD AUTO: 0.11 THOUSAND/ΜL (ref 0–0.61)
EOSINOPHIL NFR BLD AUTO: 1 % (ref 0–6)
ERYTHROCYTE [DISTWIDTH] IN BLOOD BY AUTOMATED COUNT: 13.2 % (ref 11.6–15.1)
GFR SERPL CREATININE-BSD FRML MDRD: 56 ML/MIN/1.73SQ M
GLUCOSE SERPL-MCNC: 148 MG/DL (ref 65–140)
GLUCOSE SERPL-MCNC: 152 MG/DL (ref 65–140)
GLUCOSE SERPL-MCNC: 154 MG/DL (ref 65–140)
GLUCOSE SERPL-MCNC: 175 MG/DL (ref 65–140)
GLUCOSE SERPL-MCNC: 213 MG/DL (ref 65–140)
HCT VFR BLD AUTO: 45.9 % (ref 36.5–49.3)
HGB BLD-MCNC: 15.4 G/DL (ref 12–17)
IMM GRANULOCYTES # BLD AUTO: 0.06 THOUSAND/UL (ref 0–0.2)
IMM GRANULOCYTES NFR BLD AUTO: 1 % (ref 0–2)
LYMPHOCYTES # BLD AUTO: 0.4 THOUSANDS/ΜL (ref 0.6–4.47)
LYMPHOCYTES NFR BLD AUTO: 4 % (ref 14–44)
MAGNESIUM SERPL-MCNC: 2.2 MG/DL (ref 1.6–2.6)
MAGNESIUM SERPL-MCNC: 2.3 MG/DL (ref 1.6–2.6)
MCH RBC QN AUTO: 36.8 PG (ref 26.8–34.3)
MCHC RBC AUTO-ENTMCNC: 33.6 G/DL (ref 31.4–37.4)
MCV RBC AUTO: 110 FL (ref 82–98)
MONOCYTES # BLD AUTO: 0.8 THOUSAND/ΜL (ref 0.17–1.22)
MONOCYTES NFR BLD AUTO: 8 % (ref 4–12)
NEUTROPHILS # BLD AUTO: 8.81 THOUSANDS/ΜL (ref 1.85–7.62)
NEUTS SEG NFR BLD AUTO: 86 % (ref 43–75)
NRBC BLD AUTO-RTO: 0 /100 WBCS
PHOSPHATE SERPL-MCNC: 3.2 MG/DL (ref 2.3–4.1)
PLATELET # BLD AUTO: 172 THOUSANDS/UL (ref 149–390)
PMV BLD AUTO: 11.2 FL (ref 8.9–12.7)
POTASSIUM SERPL-SCNC: 4.3 MMOL/L (ref 3.5–5.3)
POTASSIUM SERPL-SCNC: 5.9 MMOL/L (ref 3.5–5.3)
RBC # BLD AUTO: 4.19 MILLION/UL (ref 3.88–5.62)
SODIUM SERPL-SCNC: 130 MMOL/L (ref 136–145)
WBC # BLD AUTO: 10.22 THOUSAND/UL (ref 4.31–10.16)

## 2020-03-14 PROCEDURE — 84132 ASSAY OF SERUM POTASSIUM: CPT | Performed by: NURSE PRACTITIONER

## 2020-03-14 PROCEDURE — 80048 BASIC METABOLIC PNL TOTAL CA: CPT | Performed by: PHYSICIAN ASSISTANT

## 2020-03-14 PROCEDURE — 99291 CRITICAL CARE FIRST HOUR: CPT | Performed by: ANESTHESIOLOGY

## 2020-03-14 PROCEDURE — 99232 SBSQ HOSP IP/OBS MODERATE 35: CPT | Performed by: INTERNAL MEDICINE

## 2020-03-14 PROCEDURE — 83735 ASSAY OF MAGNESIUM: CPT | Performed by: NURSE PRACTITIONER

## 2020-03-14 PROCEDURE — 82948 REAGENT STRIP/BLOOD GLUCOSE: CPT

## 2020-03-14 PROCEDURE — 85025 COMPLETE CBC W/AUTO DIFF WBC: CPT | Performed by: PHYSICIAN ASSISTANT

## 2020-03-14 PROCEDURE — 84100 ASSAY OF PHOSPHORUS: CPT | Performed by: PHYSICIAN ASSISTANT

## 2020-03-14 PROCEDURE — 83735 ASSAY OF MAGNESIUM: CPT | Performed by: PHYSICIAN ASSISTANT

## 2020-03-14 RX ORDER — ALBUMIN, HUMAN INJ 5% 5 %
12.5 SOLUTION INTRAVENOUS ONCE
Status: COMPLETED | OUTPATIENT
Start: 2020-03-14 | End: 2020-03-14

## 2020-03-14 RX ADMIN — BACITRACIN ZINC 1 SMALL APPLICATION: 500 OINTMENT TOPICAL at 00:00

## 2020-03-14 RX ADMIN — INSULIN LISPRO 4 UNITS: 100 INJECTION, SOLUTION INTRAVENOUS; SUBCUTANEOUS at 17:04

## 2020-03-14 RX ADMIN — ATORVASTATIN CALCIUM 10 MG: 20 TABLET, FILM COATED ORAL at 17:04

## 2020-03-14 RX ADMIN — ENOXAPARIN SODIUM 30 MG: 30 INJECTION SUBCUTANEOUS at 08:16

## 2020-03-14 RX ADMIN — PANTOPRAZOLE SODIUM 40 MG: 40 TABLET, DELAYED RELEASE ORAL at 05:35

## 2020-03-14 RX ADMIN — BACITRACIN ZINC 1 SMALL APPLICATION: 500 OINTMENT TOPICAL at 08:16

## 2020-03-14 RX ADMIN — INSULIN LISPRO 2 UNITS: 100 INJECTION, SOLUTION INTRAVENOUS; SUBCUTANEOUS at 06:46

## 2020-03-14 RX ADMIN — CEFEPIME HYDROCHLORIDE 2000 MG: 2 INJECTION, POWDER, FOR SOLUTION INTRAVENOUS at 08:16

## 2020-03-14 RX ADMIN — CEFEPIME HYDROCHLORIDE 2000 MG: 2 INJECTION, POWDER, FOR SOLUTION INTRAVENOUS at 17:31

## 2020-03-14 RX ADMIN — INSULIN DETEMIR 12 UNITS: 100 INJECTION, SOLUTION SUBCUTANEOUS at 21:42

## 2020-03-14 RX ADMIN — ALBUMIN (HUMAN) 12.5 G: 12.5 INJECTION, SOLUTION INTRAVENOUS at 10:21

## 2020-03-14 RX ADMIN — DOPAMINE HYDROCHLORIDE IN DEXTROSE 5 MCG/KG/MIN: 1.6 INJECTION, SOLUTION INTRAVENOUS at 05:46

## 2020-03-14 RX ADMIN — DIGOXIN 125 MCG: 125 TABLET ORAL at 08:17

## 2020-03-14 RX ADMIN — ALBUMIN (HUMAN) 12.5 G: 12.5 INJECTION, SOLUTION INTRAVENOUS at 09:13

## 2020-03-14 RX ADMIN — BACITRACIN ZINC 1 SMALL APPLICATION: 500 OINTMENT TOPICAL at 17:11

## 2020-03-14 RX ADMIN — INSULIN LISPRO 2 UNITS: 100 INJECTION, SOLUTION INTRAVENOUS; SUBCUTANEOUS at 12:19

## 2020-03-14 RX ADMIN — INSULIN LISPRO 2 UNITS: 100 INJECTION, SOLUTION INTRAVENOUS; SUBCUTANEOUS at 21:42

## 2020-03-14 NOTE — ASSESSMENT & PLAN NOTE
· Improving  · Creatinine 1 8 on admission, now 1 57  · Given 1 dose of 20mg IV lasix yesterday   · Continue to hold lisinopril, Aldactone  · Continue strict I&Os  · Monitor daily weights

## 2020-03-14 NOTE — ASSESSMENT & PLAN NOTE
· Cellulitis improved  · Patient noted to have mild swelling and redness in the left anterior aspect of his arm  No drainage noted  Patient stated is an old wound from a fall  He does not remember when it occurred  · 3/13 Xray: Swelling  No acute fracture seen  Some degenerative bone spurring is seen  Slightly limited lateral view  · Hx of multiple wounds in past with uncontrolled DM2  · Do not suspect this as cause sepsis at this time  · Continue to follow cultures  · Continue to monitor  · Patient on Ceftriaxone

## 2020-03-14 NOTE — ASSESSMENT & PLAN NOTE
Lab Results   Component Value Date    HGBA1C >14 0 (H) 03/12/2020       Recent Labs     03/13/20  0716 03/13/20  1121 03/13/20  1613 03/13/20  2126   POCGLU 232* 192* 225* 72       Blood Sugar Average: Last 72 hrs:  (P) 474 [id number]   ·   · Patient presented with HHS, now resolved  · Hypoglycemic episode yesterday morning and again this morning  · Decrease Levemir to 9 units daily  · On 2-12 U  4X / day > Alg 4   · SSI with meals

## 2020-03-14 NOTE — ASSESSMENT & PLAN NOTE
· Patient alert and conversant but confused to place  · Continue frequent reorientation  · Delirium precautions  · CAM ICU daily  · Sleep hygiene

## 2020-03-14 NOTE — ASSESSMENT & PLAN NOTE
· When I ask patient how he felt, he stated I feel nothing  He states he has been feeling depressed since his wife  about 4- 5 years ago  He states, "She  in my arms "  · He states he has been progressively declining since the death of his wife  · Patient also stated that he feels as if he is a burden to his daughter-in-law  He states she takes very good care of him and feels that she should be taking more care of her stepson, rather than himself    · Currently DNR/DNI   · Consider Psych consult

## 2020-03-14 NOTE — ASSESSMENT & PLAN NOTE
· Urine growing 60,000-69,000 cfu/ml Pseudomonas aeruginosa  · Continue current antibiotic regimen  · Currently ceftriaxone, day 4

## 2020-03-14 NOTE — ASSESSMENT & PLAN NOTE
· Patient with history of atrial fibrillation not on anticoagulation due to history of GI bleeding  · Currently sinus rhythm/sinus tachycardia with PVCs/, PAC/s  · Amiodarone infusion discontinued  · Metoprolol held secondary to hypotension   · Continue digoxin 125mcg/day   · Digoxin level <0 2 on 3/11

## 2020-03-14 NOTE — ASSESSMENT & PLAN NOTE
Lab Results   Component Value Date    HGBA1C >14 0 (H) 03/12/2020       Recent Labs     03/13/20  0716 03/13/20  1121 03/13/20  1613 03/13/20  2126   POCGLU 232* 192* 225* 72       Blood Sugar Average: Last 72 hrs:  (P) 969 [id number]   ·   · Patient presented with HHS, now resolved  · Continue levemir 12u HS (increased from 8u HS on 3/13)  · SSI with meals

## 2020-03-14 NOTE — CONSULTS
Consultation - Wound Care   Scott Parker 78 y o  male MRN: [id number]  Unit/Bed#: ICU 01 Encounter: [id number]    Assessment/Plan     Assessment:  1) Left Upper Extremity Wound - LUE wound that appears trauma related, wound has dramatically improved  Almost no palpable tenderness, no drainage, related to fall, appears to be in acute stages of healing currently   2) blanchable erythema of sacrum - close to stage 1 sacral ulcer formation  Plan:  1-2)   - wound care instructions   - no surgical intervention  - no noted need for imaging of LUE  - shift patient Q2 hours    History of Present Illness   HPI:  Scott Parker is a 78 y o  male who presents with a wound located at Left Upper Extremity  The wound has been present for 3day(s)  The wound seems to be by product of a traumatic fall  Pt was having unsteady gait and felt that he could confidently get up to ambulate to the bathroom at home  However, he over estimated his capabilities when walking out to the bathroom fell  Pt believes he had contact to head on sink  Pt made contact with Left arm on the ground which caused development of wound on arm  Patient does have issues with upper and lower extremity edema secondary to cardiac status  Patient isn't sure of what caused him to fall but he has had issues with his blood glucose recently and cardiac function  Patient originally had erythema and significant tenderness overlying wound but that has since resolved for the most part  Pt denies any significant erythema or tenderness  Patient denies any fever or chills  Pt has intact motor and sensation of distal LUE  Pt denies any weeping of wound or purulence  Pt primary reason for admission was due to wound  Inpatient consult to Wound Care  Consult performed by: Mina Del Rio PA-C  Consult ordered by: Patricia Vaz PA-C          Review of Systems   Constitutional: Positive for appetite change, fever and unexpected weight change   Negative for activity change, chills and fatigue  Respiratory: Negative for chest tightness, shortness of breath and wheezing  Cardiovascular: Positive for leg swelling  Negative for chest pain and palpitations  Gastrointestinal: Negative for abdominal distention, abdominal pain, nausea and vomiting  Genitourinary: Negative for dysuria and flank pain  Musculoskeletal: Positive for arthralgias and gait problem  Skin: Positive for wound  Neurological: Negative for dizziness and light-headedness  Historical Information   Past Medical History:   Diagnosis Date    [facility]congest[facility] failure) ([facility] )     Diabetes mellitus ([facility] )     Gastrointestinal hemorrhage associated with duodenal ulcer 11/3/2018    History of open heart surgery     Hyperlipidemia     Hypertension      Past Surgical History:   Procedure Laterality Date    CARDIAC SURGERY      ESOPHAGOGASTRODUODENOSCOPY N/A 10/30/2018    Procedure: ESOPHAGOGASTRODUODENOSCOPY (EGD); Surgeon: Margy Mcintosh MD;  Location: [facility] GI LAB; Service: Gastroenterology    ESOPHAGOGASTRODUODENOSCOPY N/A 11/4/2018    Procedure: ESOPHAGOGASTRODUODENOSCOPY (EGD); Surgeon: Sarah Shaw MD;  Location: [facility] GI LAB; Service: Gastroenterology    IR PICC LINE  10/31/2018    OH DEBRIDEMENT, SKIN, SUB-Q TISSUE,=<20 SQ CM Left 12/10/2018    Procedure: DEBRIDEMENT WOUND AND DRESSING CHANGE [facility] OUT); Surgeon: Ghulam Presley MD;  Location: [address] [place] [address];  Service: General    WOUND DEBRIDEMENT Left 10/26/2018    Procedure: Chest wall wound debridement (extensive) with pulse lavage and wound vac placement;  Surgeon: Ghulam Presley MD;  Location: [address] [place] [address];  Service: General    WOUND DEBRIDEMENT Left 10/29/2018    Procedure: DEBRIDEMENT WOUND AND DRESSING CHANGE ([address] OUT);   Surgeon: Ghulam Presley MD;  Location: [address] [place] [address];  Service: General     Social History   Social History     Substance and Sexual Activity   Alcohol Use Not Currently    Frequency: Never Social History     Substance and Sexual Activity   Drug Use No     E-Cigarette/Vaping    E-Cigarette Use Never User      E-Cigarette/Vaping Substances    Nicotine No     THC No     CBD No     Flavoring No     Other No     Unknown No      Social History     Tobacco Use   Smoking Status Former Smoker    Types: Pipe   Smokeless Tobacco Never Used     Family History: non-contributory    Meds/Allergies   all current active meds have been reviewed and current meds:   Current Facility-Administered Medications   Medication Dose Route Frequency    acetaminophen (TYLENOL) tablet 650 mg  650 mg Oral Q6H PRN    atorvastatin (LIPITOR) tablet 10 mg  10 mg Oral Daily With Dinner    cefTRIAXone (ROCEPHIN) IVPB (premix) 1,000 mg  1,000 mg Intravenous Q24H    digoxin (LANOXIN) tablet 125 mcg  125 mcg Oral Daily    DOPamine (INTROPIN) 400 mg in 250 mL infusion (premix)  1-20 mcg/kg/min Intravenous Titrated    enoxaparin (LOVENOX) subcutaneous injection 30 mg  30 mg Subcutaneous Q24H JEANNE    insulin detemir (LEVEMIR) subcutaneous injection 12 Units  12 Units Subcutaneous HS    insulin lispro (HumaLOG) 100 units/mL subcutaneous injection 2-12 Units  2-12 Units Subcutaneous 4x Daily (AC & HS)    pantoprazole (PROTONIX) EC tablet 40 mg  40 mg Oral Early Morning     No Known Allergies    Objective   Vitals: Blood pressure 122/71, pulse 100, temperature 99 9 °F (37 7 °C), temperature source Tympanic, resp  rate 19, height 5' 8 5" (1 74 m), weight 76 2 kg (167 lb 15 8 oz), SpO2 99 %       Wounds:     Wound 03/11/20 Elbow Left (Active)   Wound Image   3/11/2020  9:15 PM   Wound Description Fragile;Granulation tissue;Pale;Pink 3/13/2020  4:00 PM   Wound Length (cm) 4 cm 3/12/2020  8:00 PM   Wound Width (cm) 3 cm 3/12/2020  8:00 PM   Calculated Wound Area (cm^2) 12 cm^2 3/12/2020  8:00 PM   Closure None 3/13/2020  4:00 PM   Drainage Amount None 3/13/2020  4:00 PM   Drainage Description HYACINTH 3/13/2020  4:00 AM   Non-staged Wound Description Not applicable 6/20/6517  1:59 AM   Treatments Cleansed;Site care;Elevated 3/13/2020  8:00 AM   Dressing Open to air 3/13/2020  4:00 PM   Wound packed? No 3/13/2020  4:00 AM   Dressing Changed Reinforced 3/12/2020  8:00 PM   Patient Tolerance Tolerated well 3/12/2020 12:00 PM   Dressing Status Other (Comment) 3/13/2020  8:00 AM         Physical Exam   Constitutional: He is oriented to person, place, and time  Vital signs are normal  He does not have a sickly appearance  He appears ill  No distress  He is not intubated  HENT:   Head: Normocephalic and atraumatic  Right Ear: External ear normal  Decreased hearing is noted  Left Ear: External ear normal  Decreased hearing is noted  Nose: Nose normal    Cardiovascular: Normal rate  Pulses:       Radial pulses are 2+ on the right side, and 2+ on the left side  Pulmonary/Chest: No accessory muscle usage  No apnea, no tachypnea and no bradypnea  He is not intubated  No respiratory distress  Neurological: He is alert and oriented to person, place, and time  He has normal strength  He is not disoriented  No sensory deficit  GCS eye subscore is 4  GCS verbal subscore is 5  GCS motor subscore is 6  Skin: Capillary refill takes less than 2 seconds  There is erythema  Lab Results: I have personally reviewed pertinent reports  and I have personally reviewed pertinent films in PACS  Imaging: I have personally reviewed pertinent reports  EKG, Pathology, and Other Studies: I have personally reviewed pertinent reports  Code Status: Level 3 - DNAR and DNI  Advance Directive and Living Will: Yes    Power of :    POLST:      Counseling / Coordination of Care  Total floor / unit time spent today 25 minutes  Greater than 50% of total time was spent with the patient and / or family counseling and / or coordination of care    A description of the counseling / coordination of care: developing plan, reviewing with attending, coordinating care with nursing, reviewing labs, reviewing imaging,

## 2020-03-14 NOTE — ASSESSMENT & PLAN NOTE
· Per chart review patient's last office visit blood pressure 92/63, blood pressure at office visit from March 2019 was 86/60, however on admission patient's blood pressure noted to be 118/74     · Patient states his blood pressure always runs low  · Mixed shock state given low cardiac output state and sepsis  · Dopamine started on 3/12 secondary to persistent hypotension with evidence of cardiogenic shock   · Dopamine now at 4mcg/kg/min, wean as tolerated  · Continue to monitor blood pressure, goal MAP >65   · Monitor urine output closely   · Hold home antihypertensives

## 2020-03-14 NOTE — ASSESSMENT & PLAN NOTE
· Patient with history of CAD status post CABG  · Denies chest pain/chest discomfort, shortness of breath  · Continue lipitor

## 2020-03-14 NOTE — ASSESSMENT & PLAN NOTE
· Patient's sodium 130 on admission however pseudohyponatremia given elevated glucose  · Sodium now 136  · Continue to monitor

## 2020-03-14 NOTE — PROGRESS NOTES
Progress Note - José Miguel Thayer 1940, 78 y o  male MRN: [id number]    Unit/Bed#: ICU 01 Encounter: [id number]    Primary Care Provider: Gissell Bella DO   Date and time admitted to hospital: 3/11/2020 12:52 PM        Acute on chronic combined systolic and diastolic CHF (congestive heart failure) [facility])  Assessment & Plan  Wt Readings from Last 3 Encounters:   03/13/20 76 2 kg (167 lb 15 8 oz)   12/06/18 76 2 kg (168 lb)   11/18/18 80 2 kg (176 lb 12 9 oz)     · Echo 3/12/20 The ventricle was mildly dilated  Systolic function was severely reduced by visual assessment  Ejection fraction was estimated in the range of 15 % to 20 %  There was severe diffuse hypokinesis  · Doppler parameters were consistent with elevated mean left atrial filling pressure  · Per chart review patient had refused LifeVest/AICD  · Patient's frequent PVCs on cardiac monitoring  · 3/12: On exam patient is cachectic with JVD and +2 pitting edema lower extremities  Patient is cold to the touch  Given exam findings and patient's history of severely reduced EF, concern for worsening heart function, and volume overload with additional volume resuscitation  Dopamine infusion started to treat cardiogenic shock   · Cardiology consult, recommendations appreciated   · Currently on dopamine 5mcg/kg/min for support with improvement in urine output and extremity perfusion  · Given 20mg IV lasix yesterday, continue gentle diuresis   · ?  Mixed shock in the setting of UTI/underlying poor EF  · Strict I&Os  · Daily weight        * Hyperglycemic hyperosmolar stateresolved as of 3/14/2020  Assessment & Plan  · Improving  · BG 800s with normal anion gap and negative ketones on admission  · Off of insulin infusion and now on prescribed home dose Levemir and SSI (although not taking dose at home)  · Evaluate glucose today and adjust as necessary    Hypotension  Assessment & Plan  · Per chart review patient's last office visit blood pressure 92/63, blood pressure at office visit from 2019 was 86/60, however on admission patient's blood pressure noted to be 118/74  · Patient states his blood pressure always runs low  · Mixed shock state given low cardiac output state and sepsis  · Dopamine started on 3/12 secondary to persistent hypotension with evidence of cardiogenic shock   · Dopamine now at 4mcg/kg/min, wean as tolerated  · Continue to monitor blood pressure, goal MAP >65   · Monitor urine output closely   · Hold home antihypertensives       UTI (urinary tract infection)  Assessment & Plan    · Urine growing 60,000-69,000 cfu/ml Pseudomonas aeruginosa  · Continue current antibiotic regimen  · Currently ceftriaxone, day 4    Acute kidney injury ([facility] )  Assessment & Plan  · Improving  · Creatinine 1 8 on admission, now 1 57  · Given 1 dose of 20mg IV lasix yesterday   · Continue to hold lisinopril, Aldactone  · Continue strict I&Os  · Monitor daily weights    Hyponatremiaresolved as of 3/14/2020  Assessment & Plan    · Patient's sodium 130 on admission however pseudohyponatremia given elevated glucose  · Sodium now 136  · Continue to monitor     Depression  Assessment & Plan  · When I ask patient how he felt, he stated I feel nothing  He states he has been feeling depressed since his wife  about 4- 5 years ago  He states, "She  in my arms "  · He states he has been progressively declining since the death of his wife  · Patient also stated that he feels as if he is a burden to his daughter-in-law  He states she takes very good care of him and feels that she should be taking more care of her stepson, rather than himself  · Currently DNR/DNI   · Consider Psych consult    Cellulitis  Assessment & Plan  · Cellulitis improved  · Patient noted to have mild swelling and redness in the left anterior aspect of his arm  No drainage noted  Patient stated is an old wound from a fall  He does not remember when it occurred  · 3/13 Xray: Swelling   No acute fracture seen  Some degenerative bone spurring is seen  Slightly limited lateral view  · Hx of multiple wounds in past with uncontrolled DM2  · Do not suspect this as cause sepsis at this time  · Continue to follow cultures  · Continue to monitor  · Patient on Ceftriaxone      Urinary retention  Assessment & Plan  · Gordon catheter placed for strict I&Os  · Continue to monitor  · Restart flomax    Encephalopathy  Assessment & Plan  · Patient alert and conversant but confused to place  · Continue frequent reorientation  · Delirium precautions  · CAM ICU daily  · Sleep hygiene     Frequent PVCs  Assessment & Plan  · Amiodarone gtt on this admission, now discontinued  · Per cardiology, can D/C amiodarone as patient will likely not benefit from long term PO amiodarone therapy     Atrial fibrillation ([facility] )  Assessment & Plan  · Patient with history of atrial fibrillation not on anticoagulation due to history of GI bleeding  · Currently sinus rhythm/sinus tachycardia with PVCs/, PAC/s  · Amiodarone infusion discontinued  · Metoprolol held secondary to hypotension   · Continue digoxin 125mcg/day   · Digoxin level <0 2 on 3/11    Benign essential hypertension  Assessment & Plan  · Holding home antihypertensives given hypotension on admission     CKD (chronic kidney disease) stage 3, GFR 30-59 ml/min ([facility])  Assessment & Plan  · Patient's baseline creatinine in 2018 appears 0 8-0 9  · Since April 2019 patient's creatinine has been 1 4-1 5  · Continue to monitor and trend  · Strict I&Os  · Daily weights    CAD (coronary artery disease)  Assessment & Plan  · Patient with history of CAD status post CABG  · Denies chest pain/chest discomfort, shortness of breath  · Continue lipitor       DM2 (diabetes mellitus, type 2) [facility])  Assessment & Plan  Lab Results   Component Value Date    HGBA1C >14 0 (H) 03/12/2020       Recent Labs     03/13/20  0716 03/13/20  1121 03/13/20  1613 03/13/20  2126   POCGLU 232* 192* 225* 72 Blood Sugar Average: Last 72 hrs:  (P) 243 [id number]   ·   · Patient presented with HHS, now resolved  · Continue levemir 12u HS (increased from 8u HS on 3/13)  · SSI with meals      ----------------------------------------------------------------------------------------  HPI/24hr events: No acute events overnight  Patient sleeping without complaints  Dopamine infusion continues at 4mcg/kg/min  Right hand slightly more swollen and erythematous compared to previous  Patient denies any numbness/tingling and has full range of motion  Disposition: Continue Stepdown Level 1 level of care   Code Status: Level 3 - DNAR and DNI  ---------------------------------------------------------------------------------------  SUBJECTIVE  "I feel better today"    Review of Systems  Review of systems was reviewed and negative unless stated above in HPI/24-hour events   ---------------------------------------------------------------------------------------  OBJECTIVE    Vitals   Vitals:    20 0000 20 0100 20 0200 20 0300   BP: 97/70 126/81 97/52 91/65   Pulse: (!) 110 (!) 109 96 102   Resp: (!) 24 (!) 26 (!) 24 (!) 27   Temp:  99 7 °F (37 6 °C)     TempSrc:  Tympanic     SpO2: 96% 97% 97% 97%   Weight:       Height:         Temp (24hrs), Av 2 °F (37 3 °C), Min:98 7 °F (37 1 °C), Max:99 9 °F (37 7 °C)  Current: Temperature: 99 7 °F (37 6 °C)        SpO2: SpO2: 97 %  O2 Flow Rate (L/min): 2 L/min    Physical Exam   Constitutional: He appears well-developed  He appears ill  HENT:   Head: Normocephalic and atraumatic  Mouth/Throat: No oropharyngeal exudate  Eyes: Pupils are equal, round, and reactive to light  EOM are normal  No scleral icterus  Neck: Normal range of motion  Neck supple  No JVD present  Cardiovascular: Normal heart sounds  An irregularly irregular rhythm present  Pulses:       Radial pulses are 1+ on the right side, and 1+ on the left side          Dorsalis pedis pulses are 1+ on the right side, and 1+ on the left side  Pulmonary/Chest: Effort normal and breath sounds normal  No stridor  No respiratory distress  He has no wheezes  Abdominal: Soft  Bowel sounds are normal  He exhibits no distension  There is no tenderness  There is no guarding  Genitourinary:   Genitourinary Comments: Gordon with clear, yellow urine    Musculoskeletal: Normal range of motion  He exhibits edema  +3 right hand edema, +2 left hand edema  +1 lower extremity edema   Lymphadenopathy:     He has no cervical adenopathy  Neurological: He is alert  GCS eye subscore is 4  GCS verbal subscore is 4  GCS motor subscore is 6  Oriented to self, place  Disoriented to month  Skin: Skin is warm  Capillary refill takes 2 to 3 seconds  There is pallor  Scattered bruises, think skin  Right arm is erythematous   Left arm wound dressing intact       Invasive/non-invasive ventilation settings   Respiratory    Lab Data (Last 4 hours)    None         O2/Vent Data (Last 4 hours)    None                Laboratory and Diagnostics:  Results from last 7 days   Lab Units 03/14/20  0530 03/13/20  0749 03/12/20  0400 03/11/20 2138 03/11/20  1336   WBC Thousand/uL 10 22* 11 51* 14 09* 7 84 7 49   HEMOGLOBIN g/dL 15 4 15 4 15 5 14 6 15 3   HEMATOCRIT % 45 9 46 3 46 4 45 5 47 5   PLATELETS Thousands/uL 172 200 182 159 197   NEUTROS PCT % 86* 86* 84* 80* 86*   MONOS PCT % 8 8 8 8 6     Results from last 7 days   Lab Units 03/13/20  0750 03/12/20  0400 03/11/20 2138 03/11/20  1716 03/11/20  1336   SODIUM mmol/L 132* 136 135*  --  130*   POTASSIUM mmol/L 4 7 3 6 4 0  --  4 9   CHLORIDE mmol/L 98* 99* 100  --  92*   CO2 mmol/L 27 27 24  --  27   ANION GAP mmol/L 7 10 11  --  11   BUN mg/dL 43* 35* 37*  --  41*   CREATININE mg/dL 1 57* 1 39* 1 57*  --  1 82*   CALCIUM mg/dL 8 9 9 1 9 1  --  9 3   GLUCOSE RANDOM mg/dL 213* 79 318* 680* 823*   ALT U/L 25 22 24  --  25   AST U/L 25 16 17  --  <5*   ALK PHOS U/L 123* 113 125*  --  153*   ALBUMIN g/dL 3 0* 3 0* 3 0*  --  3 6   TOTAL BILIRUBIN mg/dL 1 20* 0 80 0 80  --  1 10*     Results from last 7 days   Lab Units 03/13/20  0750 03/12/20  0400 03/11/20  2138   MAGNESIUM mg/dL 2 3 2 2 2 5   PHOSPHORUS mg/dL 3 4 2 8 3 6      Results from last 7 days   Lab Units 03/11/20  1336   INR  1 10*   PTT seconds 25      Results from last 7 days   Lab Units 03/11/20  2138 03/11/20  2046 03/11/20  1811 03/11/20  1336   TROPONIN I ng/mL 0 04 0 05* 0 06* 0 05*     Results from last 7 days   Lab Units 03/12/20  1007 03/12/20  0821 03/12/20  0630 03/12/20  0359 03/12/20  0041 03/11/20  2138 03/11/20  1833   LACTIC ACID mmol/L 2 1* 2 7* 3 6* 4 3* 4 5* 4 3* 4 4*     ABG:    VBG:  Results from last 7 days   Lab Units 03/11/20  1336   PH KIMI  7 329   PCO2 KIMI mm Hg 52 0*   PO2 KIMI mm Hg 22 5*   HCO3 KIMI mmol/L 26 7   BASE EXC KIMI mmol/L -0 2     Results from last 7 days   Lab Units 03/12/20  0400 03/11/20  1716   PROCALCITONIN ng/ml 0 07 <0 05       Micro  Results from last 7 days   Lab Units 03/11/20  2138 03/11/20  1828 03/11/20  1440 03/11/20  1405 03/11/20  1336   BLOOD CULTURE   --   --   --  No Growth at 48 hrs  No Growth at 48 hrs  GRAM STAIN RESULT   --   --  1+ Disintegrating polys*  2+ Gram positive rods*  1+ Gram positive cocci in pairs*  --   --    URINE CULTURE   --   --  60,000-69,000 cfu/ml Pseudomonas aeruginosa*  --   --    WOUND CULTURE   --   --  4+ Growth of   --   --    MRSA CULTURE ONLY  No Methicillin Resistant Staphlyococcus aureus (MRSA) isolated No Methicillin Resistant Staphlyococcus aureus (MRSA) isolated  --   --   --        EKG: sinus tachycardia   Imaging: no new imaging     Intake and Output  I/O       03/12 0701 - 03/13 0700 03/13 0701 - 03/14 0700    P  O  480 600    I V  (mL/kg) 1048 (13 8) 227 9 (3)    IV Piggyback 250 50    Total Intake(mL/kg) 1778 (23 3) 877 9 (11 5)    Urine (mL/kg/hr) 495 (0 3) 2010 (1 1)    Total Output 495 2010    Net +1283 -1132 1 Height and Weights   Height: 5' 8 5" (174 cm)  IBW: 69 55 kg  Body mass index is 25 17 kg/m²  Weight (last 2 days)     Date/Time   Weight    03/13/20 0600   76 2 (167 99)    03/12/20 0600   75 3 (166 01)    03/12/20 0000   75 3 (166 01)                Nutrition       Diet Orders   (From admission, onward)             Start     Ordered    03/12/20 1042  Diet Omid/CHO Controlled; Consistent Carbohydrate Diet Level 2 (5 carb servings/75 grams CHO/meal)  Diet effective now     Question Answer Comment   Diet Type Omid/CHO Controlled    Omid/CHO Controlled Consistent Carbohydrate Diet Level 2 (5 carb servings/75 grams CHO/meal)    RD to adjust diet per protocol?  Yes        03/12/20 1047    03/11/20 1655  Room Service  Once     Question:  Type of Service  Answer:  Room Service - Appropriate with Assistance    03/11/20 1654                  Active Medications  Scheduled Meds:    Current Facility-Administered Medications:  acetaminophen 650 mg Oral Q6H PRN Elisha Glasgow MD    atorvastatin 10 mg Oral Daily With Iam Gallardo MD    bacitracin 1 small application Topical BID Mindy Rich PA-C    cefTRIAXone 1,000 mg Intravenous Q24H Rhea John PA-C Last Rate: 1,000 mg (03/13/20 1617)   digoxin 125 mcg Oral Daily Rhea John PA-C    DOPamine in dextrose 5% 1-20 mcg/kg/min Intravenous Titrated Rhea John PA-C Last Rate: 5 mcg/kg/min (03/13/20 1901)   enoxaparin 30 mg Subcutaneous Q24H [facility] Elisha Glasgow MD    insulin detemir 12 Units Subcutaneous HS Rhea John PA-C    insulin lispro 2-12 Units Subcutaneous 4x Daily (AC & HS) Rhea John PA-C    pantoprazole 40 mg Oral Early Morning Elisha Glasgow MD      Continuous Infusions:    DOPamine in dextrose 5% 1-20 mcg/kg/min Last Rate: 5 mcg/kg/min (03/13/20 1901)     PRN Meds:     acetaminophen 650 mg Q6H PRN       Invasive Devices Review  Invasive Devices     Peripheral Intravenous Line            Peripheral IV 03/13/20 Right Antecubital less than 1 day          Drain            Urethral Catheter Non-latex 16 Fr  2 days                ---------------------------------------------------------------------------------------  Advance Directive and Living Will: Yes    Power of :    POLST:    ---------------------------------------------------------------------------------------  Care Time Delivered:   No Critical Care time spent       MIGUEL Delgado      Portions of the record may have been created with voice recognition software  Occasional wrong word or "sound a like" substitutions may have occurred due to the inherent limitations of voice recognition software    Read the chart carefully and recognize, using context, where substitutions have occurred

## 2020-03-14 NOTE — PROGRESS NOTES
Progress Note - Cardiology   Dinh Conti 78 y o  male MRN: [id number]  Unit/Bed#: ICU 01 Encounter: [id number]    Assessment:  1  Acute on chronic combined systolic and diastolic congestive heart failure  Patient with ejection fraction of 15-20% which is consistent with his known ejection fraction  He refuses ICD   - IV Hydration as needed  - BP improving  May taper off dopamine gtt  - his home medications include lisinopril 2 5 mg daily and metoprolol 12 5 mg daily and spironolactone 12 5 mg daily  Medications on hold due to hypotension  Restart if possible  2  Frequent PVCs  - patient was started on IV amiodarone which was discontinued yesterday  He has a history of frequent ventricular ectopy during previous hospitalization  Will not benefit from long term PO amiodarone therapy based on SCD-HeFT trial data  - does not wish to have ICD placement  3  DM with hyperglycemic hyperosmolar state - improving  - management per ICU team      4  CAD with prior CABG  5  Dyslipidemia - continue atorvastatin       Plan:  As above  Discussed with ICU team       Subjective/Objective   Chief Complaint: CHF    Subjective: Shortness of breath improved  Denies any chest pain  Lower extremity edema has improved      Objective:     Vitals: /62 (BP Location: Left arm)   Pulse (!) 110   Temp 98 1 °F (36 7 °C) (Temporal)   Resp (!) 23   Ht 5' 8 5" (1 74 m)   Wt 76 5 kg (168 lb 10 4 oz)   SpO2 98%   BMI 25 27 kg/m²   Vitals:    03/13/20 0600 03/14/20 0600   Weight: 76 2 kg (167 lb 15 8 oz) 76 5 kg (168 lb 10 4 oz)     Orthostatic Blood Pressures      Most Recent Value   Blood Pressure  100/62 filed at 03/14/2020 1200   Patient Position - Orthostatic VS  Lying filed at 03/14/2020 1200            Intake/Output Summary (Last 24 hours) at 3/14/2020 1229  Last data filed at 3/14/2020 1201  Gross per 24 hour   Intake 1267 94 ml   Output 2550 ml   Net -1282 06 ml       Invasive Devices     Peripheral Intravenous Line            Peripheral IV 03/13/20 Right Antecubital 1 day    Long-Dwell Peripheral IV (Midline) 37/70/84 Left Basilic less than 1 day          Drain            Urethral Catheter Non-latex 16 Fr  2 days                Physical Exam   Constitutional: No distress  He appears chronically ill  HENT:   Nose: Nose normal    Mouth/Throat: Oropharynx is clear  Eyes: Pupils are equal, round, and reactive to light  Conjunctivae are normal    Neck: Normal range of motion  Neck supple  No JVD present  Cardiovascular: Normal rate and regular rhythm  Exam reveals no gallop and no friction rub  Murmur heard  Pulmonary/Chest: Effort normal and breath sounds normal  He has no wheezes  He has no rales  Abdominal: Soft  He exhibits no distension  There is no tenderness  Musculoskeletal: He exhibits edema  Neurological: He is alert and oriented to person, place, and time  Skin: Skin is warm and dry  Lab Results:   CBC with diff:   Results from last 7 days   Lab Units 03/14/20  0530   WBC Thousand/uL 10 22*   RBC Million/uL 4 19   HEMOGLOBIN g/dL 15 4   HEMATOCRIT % 45 9   MCV fL 110*   MCH pg 36 8*   MCHC g/dL 33 6   RDW % 13 2   MPV fL 11 2   PLATELETS Thousands/uL 172     CMP:   Results from last 7 days   Lab Units 03/14/20  0530 03/13/20  0750   SODIUM mmol/L 130* 132*   POTASSIUM mmol/L 5 9* 4 7   CHLORIDE mmol/L 100 98*   CO2 mmol/L 24 27   BUN mg/dL 39* 43*   CREATININE mg/dL 1 21 1 57*   CALCIUM mg/dL 8 4 8 9   AST U/L  --  25   ALT U/L  --  25   ALK PHOS U/L  --  123*   EGFR ml/min/1 73sq m 56 41     Troponin:   0   Lab Value Date/Time    TROPONINI 0 04 03/11/2020 2138    TROPONINI 0 05 (H) 03/11/2020 2046    TROPONINI 0 06 (H) 03/11/2020 1811    TROPONINI 0 05 (H) 03/11/2020 1336    TROPONINI <0 02 10/25/2018 1534    TROPONINI <0 02 05/09/2018 1927     Imaging: I have personally reviewed pertinent films in PACS  Tele/EKG: NSR with LBBB frequent PVCs

## 2020-03-14 NOTE — ASSESSMENT & PLAN NOTE
· Patient's baseline creatinine in 2018 appears 0 8-0 9  · Since April 2019 patient's creatinine has been 1 4-1 5  · Continue to monitor and trend  · Currently w/ Cr  1 1  · Strict I&Os  · Daily weights

## 2020-03-14 NOTE — ASSESSMENT & PLAN NOTE
· Amiodarone gtt on this admission, now discontinued  · Per cardiology, can D/C amiodarone as patient will likely not benefit from long term PO amiodarone therapy

## 2020-03-14 NOTE — ASSESSMENT & PLAN NOTE
Wt Readings from Last 3 Encounters:   03/13/20 76 2 kg (167 lb 15 8 oz)   12/06/18 76 2 kg (168 lb)   11/18/18 80 2 kg (176 lb 12 9 oz)     · Echo 3/12/20 The ventricle was mildly dilated  Systolic function was severely reduced by visual assessment  Ejection fraction was estimated in the range of 15 % to 20 %  There was severe diffuse hypokinesis  · Doppler parameters were consistent with elevated mean left atrial filling pressure  · Per chart review patient had refused LifeVest/AICD  · Patient's frequent PVCs on cardiac monitoring  · 3/12: On exam patient is cachectic with JVD and +2 pitting edema lower extremities  Patient is cold to the touch  Given exam findings and patient's history of severely reduced EF, concern for worsening heart function, and volume overload with additional volume resuscitation  Dopamine infusion started to treat cardiogenic shock   · Cardiology consult, recommendations appreciated   · Currently on dopamine 5mcg/kg/min for support with improvement in urine output and extremity perfusion  · Given 20mg IV lasix yesterday, continue gentle diuresis   · ?  Mixed shock in the setting of UTI/underlying poor EF  · Strict I&Os  · Daily weight

## 2020-03-14 NOTE — ASSESSMENT & PLAN NOTE
· Improving  · BG 800s with normal anion gap and negative ketones on admission  · Off of insulin infusion and now on prescribed home dose Levemir and SSI (although not taking dose at home)  · Evaluate glucose today and adjust as necessary

## 2020-03-15 LAB
ANION GAP SERPL CALCULATED.3IONS-SCNC: 5 MMOL/L (ref 4–13)
BASOPHILS # BLD AUTO: 0.02 THOUSANDS/ΜL (ref 0–0.1)
BASOPHILS NFR BLD AUTO: 0 % (ref 0–1)
BUN SERPL-MCNC: 36 MG/DL (ref 5–25)
CALCIUM SERPL-MCNC: 8.3 MG/DL (ref 8.3–10.1)
CHLORIDE SERPL-SCNC: 104 MMOL/L (ref 100–108)
CO2 SERPL-SCNC: 28 MMOL/L (ref 21–32)
CREAT SERPL-MCNC: 1.1 MG/DL (ref 0.6–1.3)
EOSINOPHIL # BLD AUTO: 0.24 THOUSAND/ΜL (ref 0–0.61)
EOSINOPHIL NFR BLD AUTO: 3 % (ref 0–6)
ERYTHROCYTE [DISTWIDTH] IN BLOOD BY AUTOMATED COUNT: 13.2 % (ref 11.6–15.1)
GFR SERPL CREATININE-BSD FRML MDRD: 63 ML/MIN/1.73SQ M
GLUCOSE SERPL-MCNC: 183 MG/DL (ref 65–140)
GLUCOSE SERPL-MCNC: 189 MG/DL (ref 65–140)
GLUCOSE SERPL-MCNC: 244 MG/DL (ref 65–140)
GLUCOSE SERPL-MCNC: 260 MG/DL (ref 65–140)
GLUCOSE SERPL-MCNC: 58 MG/DL (ref 65–140)
GLUCOSE SERPL-MCNC: 62 MG/DL (ref 65–140)
GLUCOSE SERPL-MCNC: 78 MG/DL (ref 65–140)
HCT VFR BLD AUTO: 42.3 % (ref 36.5–49.3)
HGB BLD-MCNC: 14.1 G/DL (ref 12–17)
IMM GRANULOCYTES # BLD AUTO: 0.04 THOUSAND/UL (ref 0–0.2)
IMM GRANULOCYTES NFR BLD AUTO: 1 % (ref 0–2)
LYMPHOCYTES # BLD AUTO: 0.53 THOUSANDS/ΜL (ref 0.6–4.47)
LYMPHOCYTES NFR BLD AUTO: 7 % (ref 14–44)
MAGNESIUM SERPL-MCNC: 2.3 MG/DL (ref 1.6–2.6)
MCH RBC QN AUTO: 36.6 PG (ref 26.8–34.3)
MCHC RBC AUTO-ENTMCNC: 33.3 G/DL (ref 31.4–37.4)
MCV RBC AUTO: 110 FL (ref 82–98)
MONOCYTES # BLD AUTO: 0.76 THOUSAND/ΜL (ref 0.17–1.22)
MONOCYTES NFR BLD AUTO: 10 % (ref 4–12)
NEUTROPHILS # BLD AUTO: 6.22 THOUSANDS/ΜL (ref 1.85–7.62)
NEUTS SEG NFR BLD AUTO: 79 % (ref 43–75)
NRBC BLD AUTO-RTO: 0 /100 WBCS
PHOSPHATE SERPL-MCNC: 2.8 MG/DL (ref 2.3–4.1)
PLATELET # BLD AUTO: 154 THOUSANDS/UL (ref 149–390)
PMV BLD AUTO: 11.3 FL (ref 8.9–12.7)
POTASSIUM SERPL-SCNC: 4 MMOL/L (ref 3.5–5.3)
RBC # BLD AUTO: 3.85 MILLION/UL (ref 3.88–5.62)
SODIUM SERPL-SCNC: 137 MMOL/L (ref 136–145)
WBC # BLD AUTO: 7.81 THOUSAND/UL (ref 4.31–10.16)

## 2020-03-15 PROCEDURE — 82948 REAGENT STRIP/BLOOD GLUCOSE: CPT

## 2020-03-15 PROCEDURE — 80048 BASIC METABOLIC PNL TOTAL CA: CPT | Performed by: PHYSICIAN ASSISTANT

## 2020-03-15 PROCEDURE — 83735 ASSAY OF MAGNESIUM: CPT | Performed by: PHYSICIAN ASSISTANT

## 2020-03-15 PROCEDURE — 85025 COMPLETE CBC W/AUTO DIFF WBC: CPT | Performed by: PHYSICIAN ASSISTANT

## 2020-03-15 PROCEDURE — 99232 SBSQ HOSP IP/OBS MODERATE 35: CPT | Performed by: ANESTHESIOLOGY

## 2020-03-15 PROCEDURE — 99232 SBSQ HOSP IP/OBS MODERATE 35: CPT | Performed by: INTERNAL MEDICINE

## 2020-03-15 PROCEDURE — 84100 ASSAY OF PHOSPHORUS: CPT | Performed by: PHYSICIAN ASSISTANT

## 2020-03-15 RX ORDER — TORSEMIDE 10 MG/1
10 TABLET ORAL ONCE
Status: COMPLETED | OUTPATIENT
Start: 2020-03-15 | End: 2020-03-15

## 2020-03-15 RX ORDER — DOCUSATE SODIUM 100 MG/1
100 CAPSULE, LIQUID FILLED ORAL 2 TIMES DAILY
Status: DISCONTINUED | OUTPATIENT
Start: 2020-03-15 | End: 2020-03-18 | Stop reason: HOSPADM

## 2020-03-15 RX ORDER — DEXTROSE MONOHYDRATE 25 G/50ML
25 INJECTION, SOLUTION INTRAVENOUS AS NEEDED
Status: DISCONTINUED | OUTPATIENT
Start: 2020-03-15 | End: 2020-03-18 | Stop reason: HOSPADM

## 2020-03-15 RX ORDER — BISACODYL 10 MG
10 SUPPOSITORY, RECTAL RECTAL DAILY PRN
Status: DISCONTINUED | OUTPATIENT
Start: 2020-03-15 | End: 2020-03-18 | Stop reason: HOSPADM

## 2020-03-15 RX ORDER — POLYETHYLENE GLYCOL 3350 17 G/17G
17 POWDER, FOR SOLUTION ORAL DAILY PRN
Status: DISCONTINUED | OUTPATIENT
Start: 2020-03-15 | End: 2020-03-18

## 2020-03-15 RX ADMIN — ENOXAPARIN SODIUM 30 MG: 30 INJECTION SUBCUTANEOUS at 08:01

## 2020-03-15 RX ADMIN — DIGOXIN 125 MCG: 125 TABLET ORAL at 08:01

## 2020-03-15 RX ADMIN — INSULIN LISPRO 2 UNITS: 100 INJECTION, SOLUTION INTRAVENOUS; SUBCUTANEOUS at 11:58

## 2020-03-15 RX ADMIN — INSULIN DETEMIR 9 UNITS: 100 INJECTION, SOLUTION SUBCUTANEOUS at 21:33

## 2020-03-15 RX ADMIN — POLYETHYLENE GLYCOL 3350 17 G: 17 POWDER, FOR SOLUTION ORAL at 21:32

## 2020-03-15 RX ADMIN — BACITRACIN ZINC 1 SMALL APPLICATION: 500 OINTMENT TOPICAL at 08:01

## 2020-03-15 RX ADMIN — CEFEPIME HYDROCHLORIDE 2000 MG: 2 INJECTION, POWDER, FOR SOLUTION INTRAVENOUS at 17:45

## 2020-03-15 RX ADMIN — PANTOPRAZOLE SODIUM 40 MG: 40 TABLET, DELAYED RELEASE ORAL at 05:03

## 2020-03-15 RX ADMIN — ATORVASTATIN CALCIUM 10 MG: 20 TABLET, FILM COATED ORAL at 17:05

## 2020-03-15 RX ADMIN — INSULIN LISPRO 4 UNITS: 100 INJECTION, SOLUTION INTRAVENOUS; SUBCUTANEOUS at 17:05

## 2020-03-15 RX ADMIN — DOCUSATE SODIUM 100 MG: 100 CAPSULE, LIQUID FILLED ORAL at 21:32

## 2020-03-15 RX ADMIN — INSULIN LISPRO 6 UNITS: 100 INJECTION, SOLUTION INTRAVENOUS; SUBCUTANEOUS at 21:45

## 2020-03-15 RX ADMIN — CEFEPIME HYDROCHLORIDE 2000 MG: 2 INJECTION, POWDER, FOR SOLUTION INTRAVENOUS at 05:31

## 2020-03-15 RX ADMIN — BACITRACIN ZINC 1 SMALL APPLICATION: 500 OINTMENT TOPICAL at 17:06

## 2020-03-15 RX ADMIN — TORSEMIDE 10 MG: 10 TABLET ORAL at 17:05

## 2020-03-15 RX ADMIN — DEXTROSE 50 % IN WATER (D50W) INTRAVENOUS SYRINGE 25 ML: at 08:14

## 2020-03-15 NOTE — ASSESSMENT & PLAN NOTE
· Per chart review patient's last office visit blood pressure 92/63, blood pressure at office visit from March 2019 was 86/60, however on admission patient's blood pressure noted to be 118/74     · Patient states his blood pressure always runs low 100s  · Mixed shock state given low cardiac output state and sepsis  · Dopamine started on 3/12 secondary to persistent hypotension with evidence of cardiogenic shock , able to be weaned off this morning  · Continue to monitor blood pressure, goal MAP >65   · Monitor urine output closely   · Hold home antihypertensives

## 2020-03-15 NOTE — PROGRESS NOTES
Progress Note - Sajan Marino 1940, 78 y o  male MRN: [id number]    Unit/Bed#: ICU 01 Encounter: [id number]    Primary Care Provider: Pedro Cleveland DO   Date and time admitted to hospital: 3/11/2020 12:52 PM        Hypotension  Assessment & Plan  · Per chart review patient's last office visit blood pressure 92/63, blood pressure at office visit from March 2019 was 86/60, however on admission patient's blood pressure noted to be 118/74  · Patient states his blood pressure always runs low 100s  · Mixed shock state given low cardiac output state and sepsis  · Dopamine started on 3/12 secondary to persistent hypotension with evidence of cardiogenic shock , able to be weaned off this morning  · Continue to monitor blood pressure, goal MAP >65   · Monitor urine output closely   · Hold home antihypertensives       Cellulitis  Assessment & Plan  · Question of prior cellulitis/appears to be improved > question of cellulitic versus hyperemic  · Patient noted to have mild swelling and redness in the left anterior aspect of his arm  No drainage noted  Patient stated is an old wound from a fall  He does not remember when it occurred  · He is pending left upper extremity venous duplex ordered on 03/14  · 3/13 Xray: Swelling  No acute fracture seen  Some degenerative bone spurring is seen  Slightly limited lateral view  · Hx of multiple wounds in past with uncontrolled DM2  · Do not suspect this as cause sepsis at this time  · Continue to follow cultures  · Continue to monitor  · Patient on cefepime for UTI with Pseudomonas    Acute on chronic combined systolic and diastolic CHF (congestive heart failure) ([facility])  Assessment & Plan  Wt Readings from Last 3 Encounters:   03/15/20 75 5 kg (166 lb 7 2 oz)   12/06/18 76 2 kg (168 lb)   11/18/18 80 2 kg (176 lb 12 9 oz)     · Echo 3/12/20 The ventricle was mildly dilated  Systolic function was severely reduced by visual assessment   Ejection fraction was estimated in the range of 15 % to 20 %  There was severe diffuse hypokinesis  · Doppler parameters were consistent with elevated mean left atrial filling pressure  · Per chart review patient had refused LifeVest/AICD  · Patient's frequent PVCs on cardiac monitoring  · Most likely mixed septic and cardiogenic shock in the setting of UTI/underlying poor EF  · Dopamine infusion started to treat cardiogenic shock with improvement in urine output and extremity perfusion  · Cardiology consult, recommendations appreciated   · Dopamine was titrated off this morning  · Strict I&Os  · Daily weight        UTI (urinary tract infection)  Assessment & Plan    · Urine growing 60,000-69,000 cfu/ml Pseudomonas aeruginosa  · Continue current antibiotic regimen  · Currently back on cefepime day 2       Acute kidney injury ([facility] [address] )  Assessment & Plan  · Improving  · Creatinine 1 8 on admission, now 1 1 today  · Urine output 2 0 L in last 24 hours, net -327  · Continue to hold lisinopril  · Continue strict I&Os  · Monitor daily weights  · Consideration for gentle diuretics today    CKD (chronic kidney disease) stage 3, GFR 30-59 ml/min ([facility])  Assessment & Plan  · Patient's baseline creatinine in 2018 appears 0 8-0 9  · Since April 2019 patient's creatinine has been 1 4-1 5  · Continue to monitor and trend  · Currently w/ Cr  1 1  · Strict I&Os  · Daily weights    DM2 (diabetes mellitus, type 2) [facility])  Assessment & Plan  Lab Results   Component Value Date    HGBA1C >14 0 (H) 03/12/2020       Recent Labs     03/13/20  0716 03/13/20  1121 03/13/20  1613 03/13/20  2126   POCGLU 232* 192* 225* 72       Blood Sugar Average: Last 72 hrs:  (P) 399 [id number]   ·   · Patient presented with HHS, now resolved  · Hypoglycemic episode yesterday morning and again this morning  · Decrease Levemir to 9 units daily  · On 2-12 U  4X / day > Alg 4   · SSI with meals    ----------------------------------------------------------------------------------------  HPI/24hr events:  Overall doing well  Improved clinical status  Off dopamine as of this morning    Disposition: Continue Stepdown Level 1 level of care  with consideration for transfer to med [facility] telemetry if remains stable throughout the day  Code Status: Level 3 - DNAR and DNI  ---------------------------------------------------------------------------------------  SUBJECTIVE  No complaints  My breakfast is good   Review of Systems   Constitutional: Negative for activity change, chills, fatigue, fever and unexpected weight change  HENT: Negative for congestion, postnasal drip and rhinorrhea  Eyes: Negative for visual disturbance  Respiratory: Negative for cough, chest tightness, shortness of breath, wheezing and stridor  Cardiovascular: Negative for chest pain and leg swelling  Gastrointestinal: Negative for abdominal pain, diarrhea, nausea and vomiting  Endocrine: Negative for cold intolerance and heat intolerance  Genitourinary: Negative for flank pain  Musculoskeletal: Negative for arthralgias, joint swelling and myalgias  Skin: Negative for color change  Allergic/Immunologic: Negative for environmental allergies  Neurological: Negative for syncope and light-headedness  Hematological: Negative for adenopathy     Psychiatric/Behavioral: Negative for confusion        ---------------------------------------------------------------------------------------  OBJECTIVE    Vitals   Vitals:    03/15/20 0630 03/15/20 0700 03/15/20 0730 03/15/20 0800   BP: 96/54 103/59  118/61   BP Location:  Left arm     Pulse: 80 78 83 82   Resp: (!) 24 22 20 (!) 24   Temp:   98 4 °F (36 9 °C)    TempSrc:   Tympanic    SpO2: 97% 96% 97% 96%   Weight:       Height:         Temp (24hrs), Av 7 °F (36 5 °C), Min:96 8 °F (36 °C), Max:98 7 °F (37 1 °C)  Current: Temperature: 98 4 °F (36 9 °C)        SpO2: SpO2: 96 %, SpO2 Activity: SpO2 Activity: At Rest, SpO2 Device: O2 Device: None (Room air)  O2 Flow Rate (L/min): 2 L/min    Physical Exam   Constitutional: He is oriented to person, place, and time  He appears well-developed  HENT:   Head: Normocephalic and atraumatic  Eyes: Pupils are equal, round, and reactive to light  Conjunctivae and EOM are normal    Neck: Normal range of motion  Neck supple  Cardiovascular: Normal rate, regular rhythm and normal heart sounds  Pulmonary/Chest: Effort normal  He has decreased breath sounds in the right lower field and the left lower field  Mildly decreased breath sounds in the bases    97% room air   Abdominal: Soft  Bowel sounds are normal    Musculoskeletal: Normal range of motion  Right forearm: He exhibits edema  Left forearm: He exhibits edema  Right lower leg: He exhibits edema  Left lower leg: He exhibits edema  2 to 3+ lower extremity edema   Neurological: He is alert and oriented to person, place, and time  Skin: Skin is warm and dry         Invasive/non-invasive ventilation settings   Respiratory    Lab Data (Last 4 hours)    None         O2/Vent Data (Last 4 hours)    None                Laboratory and Diagnostics:  Results from last 7 days   Lab Units 03/15/20  0441 03/14/20  0530 03/13/20  0749 03/12/20  0400 03/11/20 2138 03/11/20  1336   WBC Thousand/uL 7 81 10 22* 11 51* 14 09* 7 84 7 49   HEMOGLOBIN g/dL 14 1 15 4 15 4 15 5 14 6 15 3   HEMATOCRIT % 42 3 45 9 46 3 46 4 45 5 47 5   PLATELETS Thousands/uL 154 172 200 182 159 197   NEUTROS PCT % 79* 86* 86* 84* 80* 86*   MONOS PCT % 10 8 8 8 8 6     Results from last 7 days   Lab Units 03/15/20  0441 03/14/20 2007 03/14/20  0530 03/13/20  0750 03/12/20  0400 03/11/20 2138 03/11/20  1716 03/11/20  1336   SODIUM mmol/L 137  --  130* 132* 136 135*  --  130*   POTASSIUM mmol/L 4 0 4 3 5 9* 4 7 3 6 4 0  --  4 9   CHLORIDE mmol/L 104  --  100 98* 99* 100  --  92*   CO2 mmol/L 28  --  24 27 27 24  --  27   ANION GAP mmol/L 5  --  6 7 10 11  --  11   BUN mg/dL 36*  --  39* 43* 35* 37*  --  41*   CREATININE mg/dL 1 10  --  1 21 1 57* 1 39* 1 57*  --  1 82*   CALCIUM mg/dL 8 3  --  8 4 8 9 9 1 9 1  --  9 3   GLUCOSE RANDOM mg/dL 78  --  154* 213* 79 318* 680* 823*   ALT U/L  --   --   --  25 22 24  --  25   AST U/L  --   --   --  25 16 17  --  <5*   ALK PHOS U/L  --   --   --  123* 113 125*  --  153*   ALBUMIN g/dL  --   --   --  3 0* 3 0* 3 0*  --  3 6   TOTAL BILIRUBIN mg/dL  --   --   --  1 20* 0 80 0 80  --  1 10*     Results from last 7 days   Lab Units 03/15/20  0441 03/14/20 2007 03/14/20  0530 03/13/20  0750 03/12/20  0400 03/11/20  2138   MAGNESIUM mg/dL 2 3 2 2 2 3 2 3 2 2 2 5   PHOSPHORUS mg/dL 2 8  --  3 2 3 4 2 8 3 6      Results from last 7 days   Lab Units 03/11/20  1336   INR  1 10*   PTT seconds 25      Results from last 7 days   Lab Units 03/11/20  2138 03/11/20  2046 03/11/20  1811 03/11/20  1336   TROPONIN I ng/mL 0 04 0 05* 0 06* 0 05*     Results from last 7 days   Lab Units 03/12/20  1007 03/12/20  0821 03/12/20  0630 03/12/20  0359 03/12/20  0041 03/11/20  2138 03/11/20  1833   LACTIC ACID mmol/L 2 1* 2 7* 3 6* 4 3* 4 5* 4 3* 4 4*     ABG:    VBG:  Results from last 7 days   Lab Units 03/11/20  1336   PH KIMI  7 329   PCO2 KIMI mm Hg 52 0*   PO2 KIMI mm Hg 22 5*   HCO3 KIMI mmol/L 26 7   BASE EXC KIMI mmol/L -0 2     Results from last 7 days   Lab Units 03/12/20  0400 03/11/20  1716   PROCALCITONIN ng/ml 0 07 <0 05       Micro  Results from last 7 days   Lab Units 03/11/20  2138 03/11/20  1828 03/11/20  1440 03/11/20  1405 03/11/20  1336   BLOOD CULTURE   --   --   --  No Growth at 72 hrs  No Growth at 72 hrs     GRAM STAIN RESULT   --   --  1+ Disintegrating polys*  2+ Gram positive rods*  1+ Gram positive cocci in pairs*  --   --    URINE CULTURE   --   --  60,000-69,000 cfu/ml Pseudomonas aeruginosa*  --   --    WOUND CULTURE   --   --  4+ Growth of   --   --    MRSA CULTURE ONLY  No Methicillin Resistant Staphlyococcus aureus (MRSA) isolated No Methicillin Resistant Staphlyococcus aureus (MRSA) isolated  --   --   --          Imaging: I have personally reviewed pertinent reports  XR elbow 3+ views LEFT   Final Result by Michael Cross MD (03/11 1501)      Swelling  No acute fracture seen  Some degenerative bone spurring is seen  Slightly limited lateral view  Workstation performed: [id number]         CT head without contrast   Final Result by Alison Escoto MD (03/11 1431)      No acute intracranial abnormality  Workstation performed: [id number]         CT cervical spine without contrast   Final Result by Alison Escoto MD (03/11 1427)      1  No cervical spine fracture or traumatic malalignment  2   Bilateral pleural effusions, larger on the right  Workstation performed: [id number]         XR chest 1 view portable   Final Result by Dana Barahona MD (03/11 1445)      Congestive failure and small moderate right pleural effusion  Workstation performed: [id number]         VAS upper limb venous duplex scan, unilateral/limited    (Results Pending)         Intake and Output  I/O       03/13 0701 - 03/14 0700 03/14 0701 - 03/15 0700 03/15 0701 - 03/16 0700    P  O  800 1095     I V  (mL/kg) 227 9 (3) 452 1 (6)     IV Piggyback 50 650     Total Intake(mL/kg) 1077 9 (14 1) 2197 1 (29 1)     Urine (mL/kg/hr) 2350 (1 3) 2090 (1 2) 125 (0 9)    Total Output 2350 2090 125    Net -1272 1 +107 1 -125                 Height and Weights   Height: 5' 8 5" (174 cm)  IBW: 69 55 kg  Body mass index is 24 94 kg/m²    Weight (last 2 days)     Date/Time   Weight    03/15/20 0504   75 5 (166 45)    03/14/20 0600   76 5 (168 65)    03/13/20 0600   76 2 (167 99)                Nutrition       Diet Orders   (From admission, onward)             Start     Ordered    03/12/20 1042  Diet Omid/CHO Controlled; Consistent Carbohydrate Diet Level 2 (5 carb servings/75 grams CHO/meal)  Diet effective now     Question Answer Comment   Diet Type Omid/CHO Controlled    Omid/CHO Controlled Consistent Carbohydrate Diet Level 2 (5 carb servings/75 grams CHO/meal)    RD to adjust diet per protocol?  Yes        03/12/20 1047    03/11/20 1655  Room Service  Once     Question:  Type of Service  Answer:  Room Service - Appropriate with Assistance    03/11/20 1654                  Active Medications  Scheduled Meds:  Current Facility-Administered Medications:  acetaminophen 650 mg Oral Q6H PRN Charlee Butler MD    atorvastatin 10 mg Oral Daily With Rasheeda Porter MD    bacitracin 1 small application Topical BID Elisa Rankin PA-C    cefepime 2,000 mg Intravenous Q12H Rajesh Sadler PA-C Last Rate: Stopped (03/15/20 0601)   dextrose 25 mL Intravenous PRN Rajesh Sadler PA-C    digoxin 125 mcg Oral Daily Rhea John PA-C    DOPamine in dextrose 5% 1-20 mcg/kg/min Intravenous Titrated Beronica Jiménez PA-C Last Rate: Stopped (03/15/20 0551)   enoxaparin 30 mg Subcutaneous Q24H [address] Charlee Butler MD    insulin detemir 9 Units Subcutaneous HS Rajesh Sadler PA-C    insulin lispro 2-12 Units Subcutaneous 4x Daily (AC & HS) Rhea John PA-C    pantoprazole 40 mg Oral Early Morning Charlee Butler MD      Continuous Infusions:    DOPamine in dextrose 5% 1-20 mcg/kg/min Last Rate: Stopped (03/15/20 0551)     PRN Meds:     acetaminophen 650 mg Q6H PRN   dextrose 25 mL PRN       Invasive Devices Review  Invasive Devices     Peripheral Intravenous Line            Peripheral IV 03/13/20 Right Antecubital 1 day    Long-Dwell Peripheral IV (Midline) 29/10/39 Left Basilic less than 1 day          Drain            Urethral Catheter Non-latex 16 Fr  3 days                Rationale for remaining devices:  No devices  ---------------------------------------------------------------------------------------  Advance Directive and Living Will: Yes    Power of :    POLST: ---------------------------------------------------------------------------------------  Care Time Delivered:   No Critical Care time spent       Edilson Mascorro PA-C      Portions of the record may have been created with voice recognition software  Occasional wrong word or "sound a like" substitutions may have occurred due to the inherent limitations of voice recognition software    Read the chart carefully and recognize, using context, where substitutions have occurred

## 2020-03-15 NOTE — ASSESSMENT & PLAN NOTE
Wt Readings from Last 3 Encounters:   03/15/20 75 5 kg (166 lb 7 2 oz)   12/06/18 76 2 kg (168 lb)   11/18/18 80 2 kg (176 lb 12 9 oz)     · Echo 3/12/20 The ventricle was mildly dilated  Systolic function was severely reduced by visual assessment  Ejection fraction was estimated in the range of 15 % to 20 %  There was severe diffuse hypokinesis  · Doppler parameters were consistent with elevated mean left atrial filling pressure  · Per chart review patient had refused LifeVest/AICD    · Patient's frequent PVCs on cardiac monitoring  · Most likely mixed septic and cardiogenic shock in the setting of UTI/underlying poor EF  · Dopamine infusion started to treat cardiogenic shock with improvement in urine output and extremity perfusion  · Cardiology consult, recommendations appreciated   · Dopamine was titrated off this morning  · Strict I&Os  · Daily weight

## 2020-03-15 NOTE — ASSESSMENT & PLAN NOTE
· Urine growing 60,000-69,000 cfu/ml Pseudomonas aeruginosa  · Continue current antibiotic regimen  · Currently back on cefepime day 2

## 2020-03-15 NOTE — PROGRESS NOTES
Progress Note - Cardiology   Wilfredo Mcdaniel 78 y o  male MRN: [id number]  Unit/Bed#: ICU 01 Encounter: [id number]    Assessment:  1  Acute on chronic combined systolic and diastolic congestive heart failure  Patient with ejection fraction of 15-20% which is consistent with his known ejection fraction  He refuses ICD   - BP improving    - his home medications include lisinopril 2 5 mg daily and metoprolol 12 5 mg daily and spironolactone 12 5 mg daily  Medications on hold due to hypotension     - He has some LE edema present - Will give torsemide    - Restart medications if possible - begin metoprolol 12 5 mg first   2  Frequent PVCs  - patient was started on IV amiodarone which was discontinued yesterday  He has a history of frequent ventricular ectopy during previous hospitalization  Will not benefit from long term PO amiodarone therapy based on SCD-HeFT trial data  - does not wish to have ICD placement  - Begin metoprolol if BP tolerates  3  DM with hyperglycemic hyperosmolar state - improving  - management per ICU team      4  CAD with prior CABG  5  Dyslipidemia - continue atorvastatin       Plan:  As above  Discussed with ICU team       Subjective/Objective   Chief Complaint: CHF    Subjective: Denies any chest pain  Complains of shortness of breath        Objective:     Vitals: /59 (BP Location: Left arm)   Pulse 92   Temp 99 °F (37 2 °C) (Tympanic)   Resp 22   Ht 5' 8 5" (1 74 m)   Wt 75 5 kg (166 lb 7 2 oz)   SpO2 97%   BMI 24 94 kg/m²   Vitals:    03/14/20 0600 03/15/20 0504   Weight: 76 5 kg (168 lb 10 4 oz) 75 5 kg (166 lb 7 2 oz)     Orthostatic Blood Pressures      Most Recent Value   Blood Pressure  102/59 filed at 03/15/2020 1600   Patient Position - Orthostatic VS  Lying filed at 03/15/2020 1600            Intake/Output Summary (Last 24 hours) at 3/15/2020 1623  Last data filed at 3/15/2020 1600  Gross per 24 hour   Intake 1557 71 ml   Output 1330 ml   Net 227 71 ml Invasive Devices     Peripheral Intravenous Line            Peripheral IV 03/13/20 Right Antecubital 2 days    Long-Dwell Peripheral IV (Midline) 48/38/22 Left Basilic 1 day          Drain            Urethral Catheter Non-latex 16 Fr  3 days                Physical Exam   Constitutional: He appears healthy  No distress  HENT:   Nose: Nose normal    Mouth/Throat: Oropharynx is clear  Eyes: Pupils are equal, round, and reactive to light  Conjunctivae are normal    Neck: Normal range of motion  Neck supple  No JVD present  Cardiovascular: Normal rate, regular rhythm and normal heart sounds  Exam reveals no gallop and no friction rub  No murmur heard  Pulmonary/Chest: Effort normal and breath sounds normal  He has no wheezes  He has no rales  Abdominal: Soft  He exhibits no distension  There is no tenderness  Musculoskeletal: He exhibits edema and tenderness  Neurological: He is alert and oriented to person, place, and time  Skin: Skin is warm and dry  Lab Results:   CBC with diff:   Results from last 7 days   Lab Units 03/15/20  0441   WBC Thousand/uL 7 81   RBC Million/uL 3 85*   HEMOGLOBIN g/dL 14 1   HEMATOCRIT % 42 3   MCV fL 110*   MCH pg 36 6*   MCHC g/dL 33 3   RDW % 13 2   MPV fL 11 3   PLATELETS Thousands/uL 154     CMP:   Results from last 7 days   Lab Units 03/15/20  0441  03/13/20  0750   SODIUM mmol/L 137   < > 132*   POTASSIUM mmol/L 4 0   < > 4 7   CHLORIDE mmol/L 104   < > 98*   CO2 mmol/L 28   < > 27   BUN mg/dL 36*   < > 43*   CREATININE mg/dL 1 10   < > 1 57*   CALCIUM mg/dL 8 3   < > 8 9   AST U/L  --   --  25   ALT U/L  --   --  25   ALK PHOS U/L  --   --  123*   EGFR ml/min/1 73sq m 63   < > 41    < > = values in this interval not displayed       Troponin:   0   Lab Value Date/Time    TROPONINI 0 04 03/11/2020 2138    TROPONINI 0 05 (H) 03/11/2020 2046    TROPONINI 0 06 (H) 03/11/2020 1811    TROPONINI 0 05 (H) 03/11/2020 1336    TROPONINI <0 02 10/25/2018 1534 TROPONINI <0 02 05/09/2018 1927     Imaging: I have personally reviewed pertinent films in PACS  Tele/EKG: NSR with LBBB frequent PVCs

## 2020-03-15 NOTE — ASSESSMENT & PLAN NOTE
· Question of prior cellulitis/appears to be improved > question of cellulitic versus hyperemic  · Patient noted to have mild swelling and redness in the left anterior aspect of his arm  No drainage noted  Patient stated is an old wound from a fall  He does not remember when it occurred  · He is pending left upper extremity venous duplex ordered on 03/14  · 3/13 Xray: Swelling  No acute fracture seen  Some degenerative bone spurring is seen  Slightly limited lateral view    · Hx of multiple wounds in past with uncontrolled DM2  · Do not suspect this as cause sepsis at this time  · Continue to follow cultures  · Continue to monitor  · Patient on cefepime for UTI with Pseudomonas

## 2020-03-15 NOTE — ASSESSMENT & PLAN NOTE
· Improving  · Creatinine 1 8 on admission, now 1 1 today  · Urine output 2 0 L in last 24 hours, net -327  · Continue to hold lisinopril  · Continue strict I&Os  · Monitor daily weights  · Consideration for gentle diuretics today

## 2020-03-16 ENCOUNTER — APPOINTMENT (INPATIENT)
Dept: RADIOLOGY | Facility: HOSPITAL | Age: 80
DRG: 291 | End: 2020-03-16
Payer: MEDICARE

## 2020-03-16 PROBLEM — I95.9 HYPOTENSION: Status: RESOLVED | Noted: 2020-03-12 | Resolved: 2020-03-16

## 2020-03-16 LAB
ANION GAP SERPL CALCULATED.3IONS-SCNC: 7 MMOL/L (ref 4–13)
BACTERIA BLD CULT: NORMAL
BACTERIA BLD CULT: NORMAL
BASOPHILS # BLD AUTO: 0.04 THOUSANDS/ΜL (ref 0–0.1)
BASOPHILS NFR BLD AUTO: 0 % (ref 0–1)
BUN SERPL-MCNC: 35 MG/DL (ref 5–25)
CALCIUM SERPL-MCNC: 8 MG/DL (ref 8.3–10.1)
CHLORIDE SERPL-SCNC: 102 MMOL/L (ref 100–108)
CO2 SERPL-SCNC: 25 MMOL/L (ref 21–32)
CREAT SERPL-MCNC: 1.1 MG/DL (ref 0.6–1.3)
EOSINOPHIL # BLD AUTO: 0.22 THOUSAND/ΜL (ref 0–0.61)
EOSINOPHIL NFR BLD AUTO: 2 % (ref 0–6)
ERYTHROCYTE [DISTWIDTH] IN BLOOD BY AUTOMATED COUNT: 13.1 % (ref 11.6–15.1)
GFR SERPL CREATININE-BSD FRML MDRD: 63 ML/MIN/1.73SQ M
GLUCOSE SERPL-MCNC: 201 MG/DL (ref 65–140)
GLUCOSE SERPL-MCNC: 206 MG/DL (ref 65–140)
GLUCOSE SERPL-MCNC: 219 MG/DL (ref 65–140)
GLUCOSE SERPL-MCNC: 224 MG/DL (ref 65–140)
HCT VFR BLD AUTO: 41.4 % (ref 36.5–49.3)
HGB BLD-MCNC: 14 G/DL (ref 12–17)
IMM GRANULOCYTES # BLD AUTO: 0.05 THOUSAND/UL (ref 0–0.2)
IMM GRANULOCYTES NFR BLD AUTO: 1 % (ref 0–2)
LYMPHOCYTES # BLD AUTO: 0.24 THOUSANDS/ΜL (ref 0.6–4.47)
LYMPHOCYTES NFR BLD AUTO: 3 % (ref 14–44)
MAGNESIUM SERPL-MCNC: 2.2 MG/DL (ref 1.6–2.6)
MCH RBC QN AUTO: 37.5 PG (ref 26.8–34.3)
MCHC RBC AUTO-ENTMCNC: 33.8 G/DL (ref 31.4–37.4)
MCV RBC AUTO: 111 FL (ref 82–98)
MONOCYTES # BLD AUTO: 0.78 THOUSAND/ΜL (ref 0.17–1.22)
MONOCYTES NFR BLD AUTO: 8 % (ref 4–12)
NEUTROPHILS # BLD AUTO: 8.17 THOUSANDS/ΜL (ref 1.85–7.62)
NEUTS SEG NFR BLD AUTO: 86 % (ref 43–75)
NRBC BLD AUTO-RTO: 0 /100 WBCS
PHOSPHATE SERPL-MCNC: 2.5 MG/DL (ref 2.3–4.1)
PLATELET # BLD AUTO: 147 THOUSANDS/UL (ref 149–390)
PMV BLD AUTO: 10.8 FL (ref 8.9–12.7)
POTASSIUM SERPL-SCNC: 4.7 MMOL/L (ref 3.5–5.3)
RBC # BLD AUTO: 3.73 MILLION/UL (ref 3.88–5.62)
SODIUM SERPL-SCNC: 134 MMOL/L (ref 136–145)
WBC # BLD AUTO: 9.5 THOUSAND/UL (ref 4.31–10.16)

## 2020-03-16 PROCEDURE — 97530 THERAPEUTIC ACTIVITIES: CPT

## 2020-03-16 PROCEDURE — 82948 REAGENT STRIP/BLOOD GLUCOSE: CPT

## 2020-03-16 PROCEDURE — 85025 COMPLETE CBC W/AUTO DIFF WBC: CPT | Performed by: PHYSICIAN ASSISTANT

## 2020-03-16 PROCEDURE — 83735 ASSAY OF MAGNESIUM: CPT | Performed by: PHYSICIAN ASSISTANT

## 2020-03-16 PROCEDURE — 99232 SBSQ HOSP IP/OBS MODERATE 35: CPT | Performed by: INTERNAL MEDICINE

## 2020-03-16 PROCEDURE — 97110 THERAPEUTIC EXERCISES: CPT

## 2020-03-16 PROCEDURE — 80048 BASIC METABOLIC PNL TOTAL CA: CPT | Performed by: PHYSICIAN ASSISTANT

## 2020-03-16 PROCEDURE — 84100 ASSAY OF PHOSPHORUS: CPT | Performed by: PHYSICIAN ASSISTANT

## 2020-03-16 RX ORDER — ASPIRIN 81 MG/1
81 TABLET, CHEWABLE ORAL DAILY
Status: DISCONTINUED | OUTPATIENT
Start: 2020-03-16 | End: 2020-03-18 | Stop reason: HOSPADM

## 2020-03-16 RX ADMIN — BACITRACIN ZINC 1 SMALL APPLICATION: 500 OINTMENT TOPICAL at 19:12

## 2020-03-16 RX ADMIN — INSULIN LISPRO 4 UNITS: 100 INJECTION, SOLUTION INTRAVENOUS; SUBCUTANEOUS at 06:34

## 2020-03-16 RX ADMIN — DOCUSATE SODIUM 100 MG: 100 CAPSULE, LIQUID FILLED ORAL at 09:14

## 2020-03-16 RX ADMIN — METOPROLOL TARTRATE 12.5 MG: 25 TABLET ORAL at 12:14

## 2020-03-16 RX ADMIN — BACITRACIN ZINC 1 SMALL APPLICATION: 500 OINTMENT TOPICAL at 09:14

## 2020-03-16 RX ADMIN — INSULIN LISPRO 4 UNITS: 100 INJECTION, SOLUTION INTRAVENOUS; SUBCUTANEOUS at 12:17

## 2020-03-16 RX ADMIN — ASPIRIN 81 MG 81 MG: 81 TABLET ORAL at 13:26

## 2020-03-16 RX ADMIN — DIGOXIN 125 MCG: 125 TABLET ORAL at 09:14

## 2020-03-16 RX ADMIN — METOPROLOL TARTRATE 12.5 MG: 25 TABLET ORAL at 22:31

## 2020-03-16 RX ADMIN — DOCUSATE SODIUM 100 MG: 100 CAPSULE, LIQUID FILLED ORAL at 19:12

## 2020-03-16 RX ADMIN — ENOXAPARIN SODIUM 30 MG: 30 INJECTION SUBCUTANEOUS at 09:13

## 2020-03-16 RX ADMIN — INSULIN DETEMIR 12 UNITS: 100 INJECTION, SOLUTION SUBCUTANEOUS at 22:31

## 2020-03-16 RX ADMIN — CEFEPIME HYDROCHLORIDE 2000 MG: 2 INJECTION, POWDER, FOR SOLUTION INTRAVENOUS at 19:20

## 2020-03-16 RX ADMIN — CEFEPIME HYDROCHLORIDE 2000 MG: 2 INJECTION, POWDER, FOR SOLUTION INTRAVENOUS at 06:00

## 2020-03-16 RX ADMIN — INSULIN LISPRO 4 UNITS: 100 INJECTION, SOLUTION INTRAVENOUS; SUBCUTANEOUS at 19:12

## 2020-03-16 RX ADMIN — PANTOPRAZOLE SODIUM 40 MG: 40 TABLET, DELAYED RELEASE ORAL at 06:25

## 2020-03-16 RX ADMIN — ATORVASTATIN CALCIUM 10 MG: 20 TABLET, FILM COATED ORAL at 19:13

## 2020-03-16 RX ADMIN — INSULIN LISPRO 4 UNITS: 100 INJECTION, SOLUTION INTRAVENOUS; SUBCUTANEOUS at 22:31

## 2020-03-16 NOTE — PROGRESS NOTES
Progress Note - Paco Albino 1940, 78 y o  male MRN: [id number]    Unit/Bed#: ICU 01 Encounter: [id number]    Primary Care Provider: Sheron Porras DO   Date and time admitted to hospital: 3/11/2020 12:52 PM        Acute on chronic combined systolic and diastolic CHF (congestive heart failure) [facility])  Assessment & Plan  Wt Readings from Last 3 Encounters:   03/15/20 75 5 kg (166 lb 7 2 oz)   12/06/18 76 2 kg (168 lb)   11/18/18 80 2 kg (176 lb 12 9 oz)     · Echo 3/12/20 The ventricle was mildly dilated  Systolic function was severely reduced by visual assessment  Ejection fraction was estimated in the range of 15 % to 20 %  There was severe diffuse hypokinesis  Doppler parameters were consistent with elevated mean left atrial filling pressure  · Per chart review patient had refused LifeVest/AICD  · Patient's frequent PVCs on cardiac monitoring  · Most likely mixed septic and cardiogenic shock in the setting of UTI/underlying poor EF  · Dopamine infusion started on 3/12 to treat cardiogenic shock with improvement in urine output and extremity perfusion  · Dopamine weaned to off 3/15  · Cardiology following, recommendations appreciated   · Torsemide resumed 3/15  · Continue digoxin (re-started 3/15)  · Lisinopril continues to be on hold secondary to hypotension   · Strict I&Os  · Daily weight        Hypotensionresolved as of 3/16/2020  Assessment & Plan  · Per chart review patient's last office visit blood pressure 92/63, blood pressure at office visit from March 2019 was 86/60, however on admission patient's blood pressure noted to be 118/74     · Patient states his blood pressure always runs low 100s  · Mixed shock state given low cardiac output state and sepsis  · Dopamine started on 3/12 secondary to persistent hypotension with evidence of cardiogenic shock , able to be weaned off this morning  · Continue to monitor blood pressure, goal MAP >65   · Monitor urine output closely   · Hold home antihypertensives       UTI (urinary tract infection)  Assessment & Plan    · Urine growing 60,000-69,000 cfu/ml Pseudomonas aeruginosa  · Continue current antibiotic regimen, cefepime    Acute kidney injury ([facility] [address] )  Assessment & Plan  · Improving  · Creatinine 1 8 on admission, yesterday 1 1   · Urine output adequate  · PO daily torsemide started yesterday   · Continue to hold lisinopril  · Continue strict I&Os  · Monitor daily weights    Depression  Assessment & Plan  · When I ask patient how he felt, he stated I feel nothing  He states he has been feeling depressed since his wife  about 4- 5 years ago  He states, "She  in my arms "  · He states he has been progressively declining since the death of his wife  · Patient also stated that he feels as if he is a burden to his daughter-in-law  He states she takes very good care of him and feels that she should be taking more care of her stepson, rather than himself  · Currently DNR/DNI   · Consider Psych consult    Cellulitis  Assessment & Plan  · Question of prior cellulitis/appears to be improved > question of cellulitic versus hyperemic  · Patient noted to have mild swelling and redness in the left anterior aspect of his arm  No drainage noted  Patient stated is an old wound from a fall  He does not remember when it occurred  · He is pending left upper extremity venous duplex ordered on   · 3/13 Xray: Swelling  No acute fracture seen  Some degenerative bone spurring is seen  Slightly limited lateral view    · Hx of multiple wounds in past with uncontrolled DM2  · Do not suspect this as cause sepsis at this time  · Continue to follow cultures  · Continue to monitor  · Patient on cefepime for UTI with Pseudomonas    Urinary retention  Assessment & Plan  · Luis catheter placed for strict I&Os  · Continue to monitor  · Consider D/C luis for voiding trial   · Restart flomax    Encephalopathy  Assessment & Plan  · Patient alert and conversant but confused to place  · Continue frequent reorientation  · Delirium precautions  · CAM ICU daily  · Sleep hygiene     Frequent PVCs  Assessment & Plan  · Amiodarone gtt on this admission, now discontinued  · Per cardiology, can D/C amiodarone as patient will likely not benefit from long term PO amiodarone therapy     Atrial fibrillation ([facility] [address] )  Assessment & Plan  · Patient with history of atrial fibrillation not on anticoagulation due to history of GI bleeding  · Currently sinus rhythm/sinus tachycardia with PVCs/, PAC/s  · Amiodarone infusion discontinued per Cardiology   · Metoprolol held secondary to hypotension   · Continue digoxin 125mcg/day   · Digoxin level <0 2 on 3/11    Benign essential hypertension  Assessment & Plan  · Holding home antihypertensives given hypotension on admission   · Restart metoprolol when BP will tolerate     CKD (chronic kidney disease) stage 3, GFR 30-59 ml/min ([facility])  Assessment & Plan  · Patient's baseline creatinine in 2018 appears 0 8-0 9  · Since April 2019 patient's creatinine has been 1 4-1 5  · Continue to monitor and trend  · Currently w/ Cr  1 1  · Strict I&Os  · Daily weights    CAD (coronary artery disease)  Assessment & Plan  · Patient with history of CAD status post CABG  · Denies chest pain/chest discomfort, shortness of breath  · Continue lipitor   · Metoprolol held secondary to hypotension , consider restarting       DM2 (diabetes mellitus, type 2) [facility])  Assessment & Plan  Lab Results   Component Value Date    HGBA1C >14 0 (H) 03/12/2020       Recent Labs     03/15/20  0836 03/15/20  1156 03/15/20  1612 03/15/20  2110   POCGLU 189* 183* 244* 260*       Blood Sugar Average: Last 72 hrs:  (P) 553 [id number]   ·   · Patient presented with HHS, now resolved  · Hypoglycemic episode yesterday morning and again this morning  · Decrease Levemir to 9 units daily  · On 2-12 U  4X / day > Alg 4   · SSI with meals        ----------------------------------------------------------------------------------------  HPI/24hr events: No acute events overnight  BP remained stable off dopamine despite reintroduction of torsemide  Right hand swelling improving  Disposition: Transfer to Med-Surg   Code Status: Level 3 - DNAR and DNI  ---------------------------------------------------------------------------------------  SUBJECTIVE  "I feel better and my appetite is good"    Review of Systems  Review of systems was reviewed and negative unless stated above in HPI/24-hour events   ---------------------------------------------------------------------------------------  OBJECTIVE    Vitals   Vitals:    03/15/20 2200 20 0000 20 0200 20 0400   BP: 113/75 109/67 135/77 115/83   BP Location:       Pulse: 100 100 101 105   Resp: (!) 33 22 20 (!) 33   Temp:  99 3 °F (37 4 °C)     TempSrc:  Tympanic     SpO2: 98% 98% 97% 98%   Weight:       Height:         Temp (24hrs), Av 6 °F (37 °C), Min:98 1 °F (36 7 °C), Max:99 3 °F (37 4 °C)  Current: Temperature: 99 3 °F (37 4 °C)        SpO2: SpO2: 98 %  O2 Flow Rate (L/min): 2 L/min    Physical Exam   Constitutional: He appears well-developed  No distress  HENT:   Head: Normocephalic and atraumatic  Mouth/Throat: No oropharyngeal exudate  Eyes: Pupils are equal, round, and reactive to light  EOM are normal  No scleral icterus  Neck: Normal range of motion  Neck supple  No JVD present  Cardiovascular: Normal rate, regular rhythm, normal heart sounds and intact distal pulses  Exam reveals no gallop and no friction rub  No murmur heard  Pulmonary/Chest: Effort normal  No stridor  No respiratory distress  He has no wheezes  He has rales in the right lower field and the left lower field  Abdominal: Soft  Bowel sounds are normal  He exhibits no distension  There is no tenderness  There is no guarding     Genitourinary:   Genitourinary Comments: Gordon with clear, yellow urine   Musculoskeletal: Normal range of motion  Right hand erythematous but improved from previous exam  +1 edema  +1 bilateral lower extremity edema   Lymphadenopathy:     He has no cervical adenopathy  Neurological: He is alert  No cranial nerve deficit  Coordination normal    Skin: Skin is warm and dry  Capillary refill takes less than 2 seconds  He is not diaphoretic  No erythema         Invasive/non-invasive ventilation settings   Respiratory    Lab Data (Last 4 hours)    None         O2/Vent Data (Last 4 hours)    None                Laboratory and Diagnostics:  Results from last 7 days   Lab Units 03/15/20  0441 03/14/20  0530 03/13/20  0749 03/12/20  0400 03/11/20  2138 03/11/20  1336   WBC Thousand/uL 7 81 10 22* 11 51* 14 09* 7 84 7 49   HEMOGLOBIN g/dL 14 1 15 4 15 4 15 5 14 6 15 3   HEMATOCRIT % 42 3 45 9 46 3 46 4 45 5 47 5   PLATELETS Thousands/uL 154 172 200 182 159 197   NEUTROS PCT % 79* 86* 86* 84* 80* 86*   MONOS PCT % 10 8 8 8 8 6     Results from last 7 days   Lab Units 03/15/20  0441 03/14/20  2007 03/14/20  0530 03/13/20  0750 03/12/20  0400 03/11/20  2138 03/11/20  1716 03/11/20  1336   SODIUM mmol/L 137  --  130* 132* 136 135*  --  130*   POTASSIUM mmol/L 4 0 4 3 5 9* 4 7 3 6 4 0  --  4 9   CHLORIDE mmol/L 104  --  100 98* 99* 100  --  92*   CO2 mmol/L 28  --  24 27 27 24  --  27   ANION GAP mmol/L 5  --  6 7 10 11  --  11   BUN mg/dL 36*  --  39* 43* 35* 37*  --  41*   CREATININE mg/dL 1 10  --  1 21 1 57* 1 39* 1 57*  --  1 82*   CALCIUM mg/dL 8 3  --  8 4 8 9 9 1 9 1  --  9 3   GLUCOSE RANDOM mg/dL 78  --  154* 213* 79 318* 680* 823*   ALT U/L  --   --   --  25 22 24  --  25   AST U/L  --   --   --  25 16 17  --  <5*   ALK PHOS U/L  --   --   --  123* 113 125*  --  153*   ALBUMIN g/dL  --   --   --  3 0* 3 0* 3 0*  --  3 6   TOTAL BILIRUBIN mg/dL  --   --   --  1 20* 0 80 0 80  --  1 10*     Results from last 7 days   Lab Units 03/15/20  0441 03/14/20 2007 03/14/20  0530 03/13/20  0750 03/12/20  0400 03/11/20  2138   MAGNESIUM mg/dL 2 3 2 2 2 3 2 3 2 2 2 5   PHOSPHORUS mg/dL 2 8  --  3 2 3 4 2 8 3 6      Results from last 7 days   Lab Units 03/11/20  1336   INR  1 10*   PTT seconds 25      Results from last 7 days   Lab Units 03/11/20  2138 03/11/20  2046 03/11/20  1811 03/11/20  1336   TROPONIN I ng/mL 0 04 0 05* 0 06* 0 05*     Results from last 7 days   Lab Units 03/12/20  1007 03/12/20  0821 03/12/20  0630 03/12/20  0359 03/12/20  0041 03/11/20  2138 03/11/20  1833   LACTIC ACID mmol/L 2 1* 2 7* 3 6* 4 3* 4 5* 4 3* 4 4*     ABG:    VBG:  Results from last 7 days   Lab Units 03/11/20  1336   PH KIMI  7 329   PCO2 KIMI mm Hg 52 0*   PO2 KIMI mm Hg 22 5*   HCO3 KIMI mmol/L 26 7   BASE EXC KIMI mmol/L -0 2     Results from last 7 days   Lab Units 03/12/20  0400 03/11/20  1716   PROCALCITONIN ng/ml 0 07 <0 05       Micro  Results from last 7 days   Lab Units 03/11/20  2138 03/11/20  1828 03/11/20  1440 03/11/20  1405 03/11/20  1336   BLOOD CULTURE   --   --   --  No Growth After 4 Days  No Growth After 4 Days  GRAM STAIN RESULT   --   --  1+ Disintegrating polys*  2+ Gram positive rods*  1+ Gram positive cocci in pairs*  --   --    URINE CULTURE   --   --  60,000-69,000 cfu/ml Pseudomonas aeruginosa*  --   --    WOUND CULTURE   --   --  4+ Growth of   --   --    MRSA CULTURE ONLY  No Methicillin Resistant Staphlyococcus aureus (MRSA) isolated No Methicillin Resistant Staphlyococcus aureus (MRSA) isolated  --   --   --        EKG: Sinus rhythm with PACs  Imaging: NO new imaging    Intake and Output  I/O       03/14 0701 - 03/15 0700 03/15 0701 - 03/16 0700    P  O  1095 1250    I V  (mL/kg) 452 1 (6)     IV Piggyback 650     Total Intake(mL/kg) 2197 1 (29 1) 1250 (16 6)    Urine (mL/kg/hr) 2090 (1 2) 1570 (0 9)    Total Output 2090 1570    Net +107 1 -320                Height and Weights   Height: 5' 8 5" (174 cm)  IBW: 69 55 kg  Body mass index is 24 94 kg/m²    Weight (last 2 days) Date/Time   Weight    03/15/20 0504   75 5 (166 45)    03/14/20 0600   76 5 (168 65)                Nutrition       Diet Orders   (From admission, onward)             Start     Ordered    03/12/20 1042  Diet Omid/CHO Controlled; Consistent Carbohydrate Diet Level 2 (5 carb servings/75 grams CHO/meal)  Diet effective now     Question Answer Comment   Diet Type Omid/CHO Controlled    Omid/CHO Controlled Consistent Carbohydrate Diet Level 2 (5 carb servings/75 grams CHO/meal)    RD to adjust diet per protocol?  Yes        03/12/20 1047    03/11/20 1655  Room Service  Once     Question:  Type of Service  Answer:  Room Service - Appropriate with Assistance    03/11/20 1654                  Active Medications  Scheduled Meds:  Current Facility-Administered Medications:  acetaminophen 650 mg Oral Q6H PRN Dominic Atkinson MD    atorvastatin 10 mg Oral Daily With Janey Liz MD    bacitracin 1 small application Topical BID Sangita Rice PA-C    bisacodyl 10 mg Rectal Daily PRN MIGUEL Romo    cefepime 2,000 mg Intravenous Q12H Sharyle Lora, PA-C Last Rate: 2,000 mg (03/15/20 1745)   dextrose 25 mL Intravenous PRN Sharyle Lora, PA-C    digoxin 125 mcg Oral Daily Rhea John PA-C    docusate sodium 100 mg Oral BID MIGUEL Romo    enoxaparin 30 mg Subcutaneous Q24H [address] Dominic Atkinson MD    insulin detemir 9 Units Subcutaneous HS Sharyle Lora, PA-C    insulin lispro 2-12 Units Subcutaneous 4x Daily (AC & HS) Rhea John PA-C    pantoprazole 40 mg Oral Early Morning Dominic Atkinson MD    polyethylene glycol 17 g Oral Daily PRN MIGUEL Romo      Continuous Infusions:     PRN Meds:     acetaminophen 650 mg Q6H PRN   bisacodyl 10 mg Daily PRN   dextrose 25 mL PRN   polyethylene glycol 17 g Daily PRN       Invasive Devices Review  Invasive Devices     Peripheral Intravenous Line            Peripheral IV 03/13/20 Right Antecubital 2 days    Long-Dwell Peripheral IV (Midline) 41/81/54 Left Basilic 1 day          Drain            Urethral Catheter Non-latex 16 Fr  4 days                ---------------------------------------------------------------------------------------  Advance Directive and Living Will: Yes    Power of :    POLST:    ---------------------------------------------------------------------------------------  Care Time Delivered:   No Critical Care time spent       MIGUEL Franco      Portions of the record may have been created with voice recognition software  Occasional wrong word or "sound a like" substitutions may have occurred due to the inherent limitations of voice recognition software    Read the chart carefully and recognize, using context, where substitutions have occurred

## 2020-03-16 NOTE — ASSESSMENT & PLAN NOTE
· Patient with history of atrial fibrillation not on anticoagulation due to history of GI bleeding  · Currently sinus rhythm/sinus tachycardia with PVCs/, PAC/s  · Amiodarone infusion discontinued per Cardiology   · Metoprolol held secondary to hypotension   · Continue digoxin 125mcg/day   · Digoxin level <0 2 on 3/11

## 2020-03-16 NOTE — PHYSICAL THERAPY NOTE
PT TREATMENT     03/16/20 5988   Pain Assessment   Pain Assessment Tool 0-10   Pain Score No Pain   Restrictions/Precautions   Weight Bearing Precautions Per Order No   Other Precautions Chair Alarm; Bed Alarm;Multiple lines;Telemetry;O2;Fall Risk   General   Chart Reviewed Yes   Family/Caregiver Present No   Cognition   Overall Cognitive Status Impaired   Arousal/Participation Cooperative   Orientation Level Oriented to person   Following Commands Follows one step commands with increased time or repetition   Subjective   Subjective "They created this whole place today"  (meaning the room?)   Bed Mobility   Additional Comments presents in chair   Transfers   Sit to Stand 3  Moderate assistance   Additional items Assist x 1;Verbal cues;Armrests   Stand to Sit 4  Minimal assistance   Additional items Verbal cues;Armrests   Additional Comments Pt declined to stand for second try and also declined to try marching   Ambulation/Elevation   Gait Assistance Not tested   Balance   Static Standing Fair   Ambulatory Zero   Activity Tolerance   Activity Tolerance Patient limited by fatigue   Exercises   Hip Flexion Sitting;20 reps;Bilateral   Knee AROM Long Arc Quad Sitting;20 reps;Bilateral   Ankle Pumps Sitting;20 reps;Bilateral   Heel Cord Stretch 5 reps; Sitting  (at end range of APs)   Balance training  static standing at RW: wgt shifting (declined to try to lift his foot to march)"Not today"   Assessment   Prognosis Good   Problem List Decreased strength;Decreased endurance; Impaired balance;Decreased mobility; Decreased coordination;Decreased cognition; Impaired judgement;Decreased safety awareness   Assessment Pt presents in chair, per RN : assist of 2  Able to stand from chair with Mod A of 1 (standby of CNA, but not needed )  Pt declined to attempt marching , although this PT feel pt could complete with little assist from therapist   Will continue to progress functional mobility as tolerated     Goals   Patient Goals To not attempt further activity in standing   Plan   Treatment/Interventions ADL retraining;Functional transfer training;LE strengthening/ROM; Therapeutic exercise; Endurance training;Cognitive reorientation;Patient/family training;Equipment eval/education; Bed mobility;Gait training;Spoke to nursing;OT   PT Frequency 5x/wk   Recommendation   Recommendation Short-term skilled PT   Licensure   [place] License Number  Osiel Mark PT  [id number]

## 2020-03-16 NOTE — PLAN OF CARE
Problem: Potential for Falls  Goal: Patient will remain free of falls  Description  INTERVENTIONS:  - Assess patient frequently for physical needs  -  Identify cognitive and physical deficits and behaviors that affect risk of falls    -  [place] fall precautions as indicated by assessment   - Educate patient/family on patient safety including physical limitations  - Instruct patient to call for assistance with activity based on assessment  - Modify environment to reduce risk of injury  - Consider OT/PT consult to assist with strengthening/mobility  Outcome: Progressing     Problem: Prexisting or High Potential for Compromised Skin Integrity  Goal: Skin integrity is maintained or improved  Description  INTERVENTIONS:  - Identify patients at risk for skin breakdown  - Assess and monitor skin integrity  - Assess and monitor nutrition and hydration status  - Monitor labs   - Assess for incontinence   - Turn and reposition patient  - Assist with mobility/ambulation  - Relieve pressure over bony prominences  - Avoid friction and shearing  - Provide appropriate hygiene as needed including keeping skin clean and dry  - Evaluate need for skin moisturizer/barrier cream  - Collaborate with interdisciplinary team   - Patient/family teaching  - Consider wound care consult   Outcome: Progressing     Problem: PAIN - ADULT  Goal: Verbalizes/displays adequate comfort level or baseline comfort level  Description  Interventions:  - Encourage patient to monitor pain and request assistance  - Assess pain using appropriate pain scale  - Administer analgesics based on type and severity of pain and evaluate response  - Implement non-pharmacological measures as appropriate and evaluate response  - Consider cultural and social influences on pain and pain management  - Notify physician/advanced practitioner if interventions unsuccessful or patient reports new pain  Outcome: Progressing     Problem: INFECTION - ADULT  Goal: Absence or prevention of progression during hospitalization  Description  INTERVENTIONS:  - Assess and monitor for signs and symptoms of infection  - Monitor lab/diagnostic results  - Monitor all insertion sites, i e  indwelling lines, tubes, and drains  - Monitor endotracheal if appropriate and nasal secretions for changes in amount and color  - [place] appropriate cooling/warming therapies per order  - Administer medications as ordered  - Instruct and encourage patient and family to use good hand hygiene technique  - Identify and instruct in appropriate isolation precautions for identified infection/condition  Outcome: Progressing  Goal: Absence of fever/infection during neutropenic period  Description  INTERVENTIONS:  - Monitor WBC    Outcome: Progressing     Problem: SAFETY ADULT  Goal: Maintain or return to baseline ADL function  Description  INTERVENTIONS:  -  Assess patient's ability to carry out ADLs; assess patient's baseline for ADL function and identify physical deficits which impact ability to perform ADLs (bathing, care of mouth/teeth, toileting, grooming, dressing, etc )  - Assess/evaluate cause of self-care deficits   - Assess range of motion  - Assess patient's mobility; develop plan if impaired  - Assess patient's need for assistive devices and provide as appropriate  - Encourage maximum independence but intervene and supervise when necessary  - Involve family in performance of ADLs  - Assess for home care needs following discharge   - Consider OT consult to assist with ADL evaluation and planning for discharge  - Provide patient education as appropriate  Outcome: Progressing  Goal: Maintain or return mobility status to optimal level  Description  INTERVENTIONS:  - Assess patient's baseline mobility status (ambulation, transfers, stairs, etc )    - Identify cognitive and physical deficits and behaviors that affect mobility  - Identify mobility aids required to assist with transfers and/or ambulation (gait belt, sit-to-stand, lift, walker, cane, etc )  - [place] fall precautions as indicated by assessment  - Record patient progress and toleration of activity level on Mobility SBAR; progress patient to next Phase/Stage  - Instruct patient to call for assistance with activity based on assessment  - Consider rehabilitation consult to assist with strengthening/weightbearing, etc   Outcome: Progressing     Problem: DISCHARGE PLANNING  Goal: Discharge to home or other facility with appropriate resources  Description  INTERVENTIONS:  - Identify barriers to discharge w/patient and caregiver  - Arrange for needed discharge resources and transportation as appropriate  - Identify discharge learning needs (meds, wound care, etc )  - Arrange for interpretive services to assist at discharge as needed  - Refer to Case Management Department for coordinating discharge planning if the patient needs post-hospital services based on physician/advanced practitioner order or complex needs related to functional status, cognitive ability, or social support system  Outcome: Progressing     Problem: Knowledge Deficit  Goal: Patient/family/caregiver demonstrates understanding of disease process, treatment plan, medications, and discharge instructions  Description  Complete learning assessment and assess knowledge base    Interventions:  - Provide teaching at level of understanding  - Provide teaching via preferred learning methods  Outcome: Progressing     Problem: CARDIOVASCULAR - ADULT  Goal: Maintains optimal cardiac output and hemodynamic stability  Description  INTERVENTIONS:  - Monitor I/O, vital signs and rhythm  - Monitor for S/S and trends of decreased cardiac output  - Administer and titrate ordered vasoactive medications to optimize hemodynamic stability  - Assess quality of pulses, skin color and temperature  - Assess for signs of decreased coronary artery perfusion  - Instruct patient to report change in severity of symptoms  Outcome: Progressing  Goal: Absence of cardiac dysrhythmias or at baseline rhythm  Description  INTERVENTIONS:  - Continuous cardiac monitoring, vital signs, obtain 12 lead EKG if ordered  - Administer antiarrhythmic and heart rate control medications as ordered  - Monitor electrolytes and administer replacement therapy as ordered  Outcome: Progressing     Problem: RESPIRATORY - ADULT  Goal: Achieves optimal ventilation and oxygenation  Description  INTERVENTIONS:  - Assess for changes in respiratory status  - Assess for changes in mentation and behavior  - Position to facilitate oxygenation and minimize respiratory effort  - Oxygen administered by appropriate delivery if ordered  - Initiate smoking cessation education as indicated  - Encourage broncho-pulmonary hygiene including cough, deep breathe, Incentive Spirometry  - Assess the need for suctioning and aspirate as needed  - Assess and instruct to report SOB or any respiratory difficulty  - Respiratory Therapy support as indicated  Outcome: Progressing     Problem: GENITOURINARY - ADULT  Goal: Maintains or returns to baseline urinary function  Description  INTERVENTIONS:  - Assess urinary function  - Encourage oral fluids to ensure adequate hydration if ordered  - Administer IV fluids as ordered to ensure adequate hydration  - Administer ordered medications as needed  - Offer frequent toileting  - Follow urinary retention protocol if ordered  Outcome: Progressing  Goal: Absence of urinary retention  Description  INTERVENTIONS:  - Assess patient's ability to void and empty bladder  - Monitor I/O  - Bladder scan as needed  - Discuss with physician/AP medications to alleviate retention as needed  - Discuss catheterization for long term situations as appropriate   Outcome: Progressing  Goal: Urinary catheter remains patent  Description  INTERVENTIONS:  - Assess patency of urinary catheter  - If patient has a chronic luis, consider changing catheter if non-functioning  - Follow guidelines for intermittent irrigation of non-functioning urinary catheter  Outcome: Progressing     Problem: METABOLIC, FLUID AND ELECTROLYTES - ADULT  Goal: Electrolytes maintained within normal limits  Description  INTERVENTIONS:  - Monitor labs and assess patient for signs and symptoms of electrolyte imbalances  - Administer electrolyte replacement as ordered  - Monitor response to electrolyte replacements, including repeat lab results as appropriate  - Instruct patient on fluid and nutrition as appropriate  Outcome: Progressing  Goal: Fluid balance maintained  Description  INTERVENTIONS:  - Monitor labs   - Monitor I/O and WT  - Instruct patient on fluid and nutrition as appropriate  - Assess for signs & symptoms of volume excess or deficit  Outcome: Progressing  Goal: Glucose maintained within target range  Description  INTERVENTIONS:  - Monitor Blood Glucose as ordered  - Assess for signs and symptoms of hyperglycemia and hypoglycemia  - Administer ordered medications to maintain glucose within target range  - Assess nutritional intake and initiate nutrition service referral as needed  Outcome: Progressing     Problem: SKIN/TISSUE INTEGRITY - ADULT  Goal: Skin integrity remains intact  Description  INTERVENTIONS  - Identify patients at risk for skin breakdown  - Assess and monitor skin integrity  - Assess and monitor nutrition and hydration status  - Monitor labs (i e  albumin)  - Assess for incontinence   - Turn and reposition patient  - Assist with mobility/ambulation  - Relieve pressure over bony prominences  - Avoid friction and shearing  - Provide appropriate hygiene as needed including keeping skin clean and dry  - Evaluate need for skin moisturizer/barrier cream  - Collaborate with interdisciplinary team (i e  Nutrition, Rehabilitation, etc )   - Patient/family teaching  Outcome: Progressing     Problem: MUSCULOSKELETAL - ADULT  Goal: Maintain or return mobility to safest level of function  Description  INTERVENTIONS:  - Assess patient's ability to carry out ADLs; assess patient's baseline for ADL function and identify physical deficits which impact ability to perform ADLs (bathing, care of mouth/teeth, toileting, grooming, dressing, etc )  - Assess/evaluate cause of self-care deficits   - Assess range of motion  - Assess patient's mobility  - Assess patient's need for assistive devices and provide as appropriate  - Encourage maximum independence but intervene and supervise when necessary  - Involve family in performance of ADLs  - Assess for home care needs following discharge   - Consider OT consult to assist with ADL evaluation and planning for discharge  - Provide patient education as appropriate  Outcome: Progressing

## 2020-03-16 NOTE — ASSESSMENT & PLAN NOTE
Lab Results   Component Value Date    HGBA1C >14 0 (H) 03/12/2020       Recent Labs     03/15/20  0836 03/15/20  1156 03/15/20  1612 03/15/20  2110   POCGLU 189* 183* 244* 260*       Blood Sugar Average: Last 72 hrs:  (P) 313 [id number]   ·   · Patient presented with HHS, now resolved  · Hypoglycemic episode yesterday morning and again this morning  · Decrease Levemir to 9 units daily  · On 2-12 U  4X / day > Alg 4   · SSI with meals

## 2020-03-16 NOTE — ASSESSMENT & PLAN NOTE
· Urine growing 60,000-69,000 cfu/ml Pseudomonas aeruginosa  · Continue current antibiotic regimen, cefepime

## 2020-03-16 NOTE — ASSESSMENT & PLAN NOTE
· Holding home antihypertensives given hypotension on admission   · Restart metoprolol when BP will tolerate

## 2020-03-16 NOTE — ASSESSMENT & PLAN NOTE
· Luis catheter placed for strict I&Os  · Continue to monitor  · Consider D/C luis for voiding trial   · Restart flomax

## 2020-03-16 NOTE — ASSESSMENT & PLAN NOTE
· Patient with history of CAD status post CABG  · Denies chest pain/chest discomfort, shortness of breath  · Continue lipitor   · Metoprolol held secondary to hypotension , consider restarting

## 2020-03-16 NOTE — PROGRESS NOTES
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-20% + RV failure- weaned off dopamine  improved volume status  Net -1 1 L  Wt 168  Resuming home torsemide  Has declined ICD  Rechallenge with home ace-inhibitor- lisinopril 2 5mg  Acute on chronic kidney injury- trending lower  Urinary tract infection currently on cefepime  Mild aortic stenosis  Mild mitral regurgitation  Moderate tricuspid regurgitation  Pulmonary hypertension  Frequent PVCs- improved  amiodarone discontinue March 13th  Currently on metoprolol 12 5 mg twice a day + digoxin 5 mcg daily  Diabetes mellitus with hyperglycemic hyperosmolar state- hemoglobin A1c greater than 14  Coronary artery disease with history of bypass surgery  Dyslipidemia- atorvastatin 10 mg      Subjective:   Telemetry- remains in sinus rhythm  Few PVCs  No evidence of atrial fibrillation  Feels deconditioned  Denies having chest pain    Review of Systems:   Vitals: Blood pressure (!) 160/108, pulse (!) 117, temperature 99 3 °F (37 4 °C), temperature source Tympanic, resp  rate (!) 28, height 5' 8 5" (1 74 m), weight 77 kg (169 lb 12 1 oz), SpO2 98 %  Vitals:    03/15/20 0504 03/16/20 0600   Weight: 75 5 kg (166 lb 7 2 oz) 77 kg (169 lb 12 1 oz)     Body mass index is 25 44 kg/m²  BP Readings from Last 3 Encounters:   03/16/20 (!) 160/108   12/10/18 111/65   11/18/18 99/57     Orthostatic Blood Pressures      Most Recent Value   Blood Pressure  (!) 160/108 filed at 03/16/2020 0800   Patient Position - Orthostatic VS  Lying filed at 03/16/2020 0800        I/O       03/14 0701 - 03/15 0700 03/15 0701 - 03/16 0700 03/16 0701 - 03/17 0700    P  O  1095 1250     I V  (mL/kg) 452 1 (6)      IV Piggyback 650      Total Intake(mL/kg) 2197 1 (29 1) 1250 (16 2)     Urine (mL/kg/hr) 2090 (1 2) 2170 (1 2)     Total Output 2090 2170     Net +107 1 -920                Invasive Devices     Peripheral Intravenous Line            Peripheral IV 03/13/20 Right Antecubital 3 days    Long-Dwell Peripheral IV (Midline) 42/54/27 Left Basilic 1 day                  Intake/Output Summary (Last 24 hours) at 3/16/2020 1023  Last data filed at 3/16/2020 0601  Gross per 24 hour   Intake 925 ml   Output 1945 ml   Net -1020 ml     Physical Examination:   Physical Exam   Constitutional: He is oriented to person, place, and time  No distress  HENT:   Head: Normocephalic and atraumatic  Right Ear: External ear normal    Left Ear: External ear normal    Eyes: Pupils are equal, round, and reactive to light  Conjunctivae are normal  Right eye exhibits no discharge  Left eye exhibits no discharge  No scleral icterus  Neck: Normal range of motion  Neck supple  No JVD present  No tracheal deviation present  No thyromegaly present  Cardiovascular: Normal rate and regular rhythm  Exam reveals gallop  Exam reveals no friction rub  Murmur heard  Pulmonary/Chest: No stridor  No respiratory distress  He has no wheezes  He has no rales  He exhibits no tenderness  Abdominal: Soft  Bowel sounds are normal  He exhibits no distension and no mass  There is no tenderness  There is no rebound and no guarding  Musculoskeletal: Normal range of motion  He exhibits edema  He exhibits no tenderness or deformity  Neurological: He is alert and oriented to person, place, and time  He has normal reflexes  He displays normal reflexes  No cranial nerve deficit  He exhibits normal muscle tone  Coordination normal    Skin: Skin is warm and dry  No rash noted  He is not diaphoretic  No erythema  No pallor  Psychiatric: He has a normal mood and affect  His behavior is normal  Judgment and thought content normal        Labs:   Lab Results:  I Have Reviewed All Lab Data Below:  CBC with diff:  Results from last 7 days   Lab Units 03/16/20  0546 03/15/20  0441 03/14/20  0530 03/13/20  0749 03/12/20  0400 03/11/20  2138 03/11/20  1336   WBC Thousand/uL 9 50 7 81 10 22* 11 51* 14 09* 7 84 7 49   HEMOGLOBIN g/dL 14 0 14 1 15 4 15 4 15 5 14 6 15 3   HEMATOCRIT % 41 4 42 3 45 9 46 3 46 4 45 5 47 5   MCV fL 111* 110* 110* 109* 110* 114* 113*   PLATELETS Thousands/uL 147* 154 172 200 182 159 197   MCH pg 37 5* 36 6* 36 8* 36 3* 36 6* 36 6* 36 3*   MCHC g/dL 33 8 33 3 33 6 33 3 33 4 32 1 32 2   RDW % 13 1 13 2 13 2 13 3 13 3 13 3 13 4   MPV fL 10 8 11 3 11 2 11 4 11 9 12 3 12 3   NRBC AUTO /100 WBCs 0 0 0 0 0 0 0       CMP:  Results from last 7 days   Lab Units 03/16/20  0546 03/15/20  0441 03/14/20 2007 03/14/20  0530 03/13/20  0750 03/12/20  0400 03/11/20 2138 03/11/20  1336   SODIUM mmol/L 134* 137  --  130* 132* 136 135* 130*   POTASSIUM mmol/L 4 7 4 0 4 3 5 9* 4 7 3 6 4 0 4 9   CHLORIDE mmol/L 102 104  --  100 98* 99* 100 92*   CO2 mmol/L 25 28  --  24 27 27 24 27   ANION GAP mmol/L 7 5  --  6 7 10 11 11   BUN mg/dL 35* 36*  --  39* 43* 35* 37* 41*   CREATININE mg/dL 1 10 1 10  --  1 21 1 57* 1 39* 1 57* 1 82*   CALCIUM mg/dL 8 0* 8 3  --  8 4 8 9 9 1 9 1 9 3   AST U/L  --   --   --   --  25 16 17 <5*   ALT U/L  --   --   --   --  25 22 24 25   ALK PHOS U/L  --   --   --   --  123* 113 125* 153*   TOTAL PROTEIN g/dL  --   --   --   --  6 4 6 2* 6 2* 6 9   ALBUMIN g/dL  --   --   --   --  3 0* 3 0* 3 0* 3 6   TOTAL BILIRUBIN mg/dL  --   --   --   --  1 20* 0 80 0 80 1 10*   EGFR ml/min/1 73sq m 63 63  --  56 41 47 41 34     Magnesium:  Results from last 7 days   Lab Units 03/16/20  0546 03/15/20  0441 03/14/20 2007 03/14/20  0530 03/13/20  0750 03/12/20  0400 03/11/20  2138   MAGNESIUM mg/dL 2 2 2 3 2 2 2 3 2 3 2 2 2 5       Coags:  Results from last 7 days   Lab Units 03/11/20  1336   PTT seconds 25   INR  1 10*     TSH:  Results from last 7 days   Lab Units 03/12/20  0041   TSH 3RD GENERATON uIU/mL 1 373     Lipid Profile:  Results from last 7 days   Lab Units 20  0400   CHOLESTEROL mg/dL 77   TRIGLYCERIDES mg/dL 64   HDL mg/dL 51   LDL CALC mg/dL 13     Hgb A1c:  Results from last 7 days   Lab Units 20  0400   HEMOGLOBIN A1C % >14 0*     NT-proBNP: No results for input(s): NTBNP in the last 72 hours  Troponins:  Results from last 7 days   Lab Units 20  2138 20  2046 20  1811   TROPONIN I ng/mL 0 04 0 05* 0 06*       Imaging & Testing   I have personally reviewed pertinent reports  Cardiac Testing     Results for orders placed during the hospital encounter of 20   Echo complete with contrast if indicated    Narrative [address]  [address] [address]  [phone]    Transthoracic Echocardiogram  2D, M-mode, Doppler, and Color Doppler    Study date:  12-Mar-2020    Patient: Ted Madera  MR number: [id number]  Account number: [de-identified]  : 1940  Age: 78 years  Gender: Male  Status: Inpatient  Location: Bedside  Height: 68 in  Weight: 165 7 lb  BP: 96/ 62 mmHg    Indications: Cardiomyopathy    Diagnoses: I42 9 - Cardiomyopathy, unspecified    Sonographer:  ADRIEL Montero  Primary Physician:  Yasmin Michaud DO  Referring Physician:  Dinah Vaughn MD  Group:  [facility]  Interpreting Physician:  Jozef Samuels DO    IMPRESSIONS:  These features are consistent with dilated cardiomyopathy  Ejection fraction is severely reduced  No significant change compared to previous exam     SUMMARY    LEFT VENTRICLE:  The ventricle was mildly dilated  Systolic function was severely reduced by visual assessment  Ejection fraction was estimated in the range of 15 % to 20 %  There was severe diffuse hypokinesis  Doppler parameters were consistent with elevated mean left atrial filling pressure  RIGHT VENTRICLE:  The ventricle was mildly dilated  Systolic function was markedly reduced  MITRAL VALVE:  There was moderate annular calcification    There was mild regurgitation  AORTIC VALVE:  The valve was trileaflet  Leaflets exhibited moderate calcification and mildly reduced cuspal separation  Transaortic velocity was decreased due to decreased transaortic flow (decreased stroke volume)  There was mild stenosis  There was mild to moderate regurgitation  TRICUSPID VALVE:  There was moderate regurgitation  Pulmonary artery systolic pressure was mildly increased  Estimated peak PA pressure was 45 mmHg  HISTORY: PRIOR HISTORY: HTN, CAD,CM, AF, CHF, CABG, DM, CKD    PROCEDURE: The procedure was performed at the bedside  This was a routine study  The transthoracic approach was used  The study included complete 2D imaging, M-mode, complete spectral Doppler, and color Doppler  The heart rate was 74 bpm,  at the start of the study  Image quality was adequate  LEFT VENTRICLE: The ventricle was mildly dilated  Systolic function was severely reduced by visual assessment  Ejection fraction was estimated in the range of 15 % to 20 %  There was severe diffuse hypokinesis  Wall thickness was normal   DOPPLER: Doppler parameters were consistent with elevated mean left atrial filling pressure  RIGHT VENTRICLE: The ventricle was mildly dilated  Systolic function was markedly reduced  DOPPLER: Systolic pressure was mildly increased  LEFT ATRIUM: The atrium was markedly dilated  RIGHT ATRIUM: The atrium was moderately dilated  MITRAL VALVE: There was moderate annular calcification  Valve structure was normal  There was normal leaflet separation  No echocardiographic evidence for prolapse  DOPPLER: The transmitral velocity was within the normal range  There was  no evidence for stenosis  There was mild regurgitation  AORTIC VALVE: The valve was trileaflet  Leaflets exhibited moderate calcification and mildly reduced cuspal separation  DOPPLER: Transaortic velocity was decreased due to decreased transaortic flow (decreased stroke volume)   There was mild  stenosis  There was mild to moderate regurgitation  TRICUSPID VALVE: The valve structure was normal  There was normal leaflet separation  DOPPLER: The transtricuspid velocity was within the normal range  There was no evidence for stenosis  There was moderate regurgitation  Pulmonary artery  systolic pressure was mildly increased  Estimated peak PA pressure was 45 mmHg  PULMONIC VALVE: Leaflets exhibited normal thickness, no calcification, and normal cuspal separation  DOPPLER: The transpulmonic velocity was within the normal range  There was no regurgitation  PERICARDIUM: There was no thickening  There was no pericardial effusion  AORTA: The root exhibited normal size  PULMONARY ARTERY: The size was normal  The morphology appeared normal     SYSTEM MEASUREMENT TABLES    2D mode  AoR Diam 2D: 3 5 cm  LA Diam (2D): 4 9 cm  LA/Ao (2D): 1 4  FS (2D Teich): 7 91 %  IVSd (2D): 0 96 cm  LVDEV: 185 cmï¾³  LVEDV MOD BP: 201 cmï¾³  LVESV: 153 cmï¾³  LVIDd(2D): 6 07 cm  LVISd (2D): 5 59 cm  LVOT Area 2D: 3 14 cmï¾²  LVPWd (2D): 0 92 cm  SV (Teich): 32 cmï¾³    Apical four chamber  LVEF A4C: 17 %    Apical two chamber  LVEF A2C: 14 %    Unspecified Scan Mode  CARL Cont Eq (Peak Travon): 2 29 cmï¾²  CARL Cont Eq (VTI): 1 88 cmï¾²  LVOT (VTI): 19 1 cm  LVOT Diam : 2 cm  LVOT Vmax: 970 mm/s  LVOT Vmax; Mean: 970 mm/s  Peak Grad ; Mean: 4 mm[Hg]  SV (LVOT): 60 cmï¾³  VTI;Mean: 2 mm[Hg]  MV Peak A Travon: 312 mm/s  MV Peak E Travon   Mean: 1070 mm/s  MVA (PHT): 5 5 cmï¾²  PHT: 40 ms  Max P mm[Hg]  V Max: 2990 mm/s  Vmax: 2890 mm/s  RA Area: 21 4 cmï¾²  RA Volume: 64 6 cmï¾³  TAPSE: 0 6 cm    [employer] Accredited Echocardiography Laboratory    Prepared and electronically signed by    Forrest Jones DO  Signed 12-Mar-2020 09:41:30         Results for orders placed during the hospital encounter of 18   NM myocardial perfusion spect (rx stress and/or rest)    Tano [address], [address]  [phone]    Rest/Stress Gated SPECT Myocardial Perfusion Imaging After Regadenoson    Patient: Saul Allen  MR number: [id number]  Account number: [de-identified]  : 1940  Age: 76 years  Gender: Male  Status: Inpatient  Location: Stress lab  Height: 68 in  Weight: 147 lb  BP: 122/ 60 mmHg    Allergies: NO KNOWN ALLERGIES    Diagnosis: I25 5 - Ischemic cardiomyopathy    Primary Physician:  Ayanna Lino DO  RN:  NATALY Jackson  Group:  N[facility] Zarate  Report Prepared By[de-identified]  NATALY Jackson  Interpreting Physician:  Abdoulaye Padilla MD    INDICATIONS: Evaluation of known coronary artery disease  HISTORY: The patient is a 76year old  male  Chest pain status: no chest pain  Other symptoms: decreased effort tolerance  Coronary artery disease risk factors: dyslipidemia, hypertension, and diabetes mellitus  Cardiovascular  history:Ischemic cardiomyopathy, coronary artery disease and congestive heart failure  Prior cardiovascular procedures: coronary artery bypass grafting  Co-morbidity: age Medications: a beta blocker, aspirin, a lipid lowering agent, and  diabetic medications  PHYSICAL EXAM: Baseline physical exam screening:Weakness of extremities noted, no wheezes audible  REST ECG: Normal sinus rhythm  The ECG showed ventricular couplets  PROCEDURE: The study was performed in the stress lab  A regadenoson infusion pharmacologic stress test was performed  Gated SPECT myocardial perfusion imaging was performed after stress and at rest  Systolic blood pressure was 122 mmHg, at  the start of the study  Diastolic blood pressure was 60 mmHg, at the start of the study  The heart rate was 74 bpm, at the start of the study  IV double checked    Regadenoson protocol:  Time HR bpm SBP mmHg DBP mmHg Symptoms Rhythm/conduct  Baseline 09:54 74 122 60 none NSR, ventricular couplets  Immediate 09:56 102 97 54 none occasional PVC's  1 min 09:57 98 101 56 none --  2 min 09:58 98 102 58 none --  3 min 09:59 99 106 55 none --  4 min 10:00 97 105 56 none --  No medications or fluids given  STRESS SUMMARY: Duration of pharmacologic stress was 3 min  Maximal heart rate during stress was 102 bpm  The heart rate response to stress was normal  There was normal resting blood pressure with an appropriate response to stress  The  rate-pressure product for the peak heart rate and blood pressure was 25702  There was no chest pain during stress  The stress test was terminated due to protocol completion  Pre oxygen saturation: 99 %  Peak oxygen saturation: 100 %  The  stress ECG was equivocal for ischemia  Arrhythmia during stress: isolated premature ventricular beats  ISOTOPE ADMINISTRATION:  Resting isotope administration Stress isotope administration  Agent Tetrofosmin Tetrofosmin  Dose 10 6 mCi 32 2 mCi  Date 05/15/2018 05/15/2018    The radiopharmaceutical was injected at the peak effect of pharmacologic stress  MYOCARDIAL PERFUSION IMAGING:  The left ventricle was dilated  The TID ratio was 1 12  PERFUSION DEFECTS:  -  There was a moderate-sized, moderately severe, partially reversible myocardial perfusion defect of the entire inferior and apical wall  GATED SPECT:  The calculated left ventricular ejection fraction was 35 %  Left ventricular ejection fraction was moderately decreased by visual estimate  There was moderate global left ventricular hypokinesis with paradoxical septal motion  There was  moderately reduced myocardial thickening and motion of the inferior wall of the left ventricle  SUMMARY:  -  Stress results: There was no chest pain during stress  -  ECG conclusions: The stress ECG was equivocal for ischemia  -  Perfusion imaging: There was a moderate-sized, moderately severe, partially reversible myocardial perfusion defect of the entire inferior and apical wall  -  Gated SPECT: The calculated left ventricular ejection fraction was 35 %  Left ventricular ejection fraction was moderately decreased by visual estimate  There was moderate global left ventricular hypokinesis with paradoxical septal  motion  There was moderately reduced myocardial thickening and motion of the inferior wall of the left ventricle  IMPRESSIONS: Abnormal study after pharmacologic vasodilation  There was a moderate-sized infarct and a small amount of ischemia  Left ventricular systolic function was reduced, with regional wall motion abnormalities  Prepared and signed by    Karie Toro MD  Signed 05/16/2018 07:35:18         EKG: Personally reviewed  Normal sinus rhythm with left bundle branch block    Counseling :  Counseling / Coordination of Care Time: 40min  Greater than 50% of total time spent on patient counseling and coordination of care  Code Status: Level 3 - DNAR and DNI  Collaboration of Care: Were Recommendations Directly Discussed with Primary Treatment Team? - Yes     Gisel Burger MD [facility]    "This note has been constructed using a voice recognition system  Therefore there may be syntax, spelling, and/or grammatical errors   Please call if you have any questions  "

## 2020-03-16 NOTE — ASSESSMENT & PLAN NOTE
· Improving  · Creatinine 1 8 on admission, yesterday 1 1   · Urine output adequate  · PO daily torsemide started yesterday   · Continue to hold lisinopril  · Continue strict I&Os  · Monitor daily weights

## 2020-03-16 NOTE — ASSESSMENT & PLAN NOTE
Wt Readings from Last 3 Encounters:   03/15/20 75 5 kg (166 lb 7 2 oz)   12/06/18 76 2 kg (168 lb)   11/18/18 80 2 kg (176 lb 12 9 oz)     · Echo 3/12/20 The ventricle was mildly dilated  Systolic function was severely reduced by visual assessment  Ejection fraction was estimated in the range of 15 % to 20 %  There was severe diffuse hypokinesis  Doppler parameters were consistent with elevated mean left atrial filling pressure  · Per chart review patient had refused LifeVest/AICD    · Patient's frequent PVCs on cardiac monitoring  · Most likely mixed septic and cardiogenic shock in the setting of UTI/underlying poor EF  · Dopamine infusion started on 3/12 to treat cardiogenic shock with improvement in urine output and extremity perfusion  · Dopamine weaned to off 3/15  · Cardiology following, recommendations appreciated   · Torsemide resumed 3/15  · Continue digoxin (re-started 3/15)  · Lisinopril continues to be on hold secondary to hypotension   · Strict I&Os  · Daily weight

## 2020-03-17 ENCOUNTER — APPOINTMENT (INPATIENT)
Dept: RADIOLOGY | Facility: HOSPITAL | Age: 80
DRG: 291 | End: 2020-03-17
Payer: MEDICARE

## 2020-03-17 PROBLEM — G93.40 ENCEPHALOPATHY: Status: RESOLVED | Noted: 2020-03-13 | Resolved: 2020-03-17

## 2020-03-17 PROBLEM — F33.1 MODERATE EPISODE OF RECURRENT MAJOR DEPRESSIVE DISORDER (HCC): Status: ACTIVE | Noted: 2020-03-11

## 2020-03-17 PROBLEM — B96.5 PSEUDOMONAS URINARY TRACT INFECTION: Status: ACTIVE | Noted: 2018-05-10

## 2020-03-17 PROBLEM — N39.0 PSEUDOMONAS URINARY TRACT INFECTION: Status: RESOLVED | Noted: 2018-05-10 | Resolved: 2020-03-17

## 2020-03-17 PROBLEM — E11.65 TYPE 2 DIABETES MELLITUS WITH HYPERGLYCEMIA, WITHOUT LONG-TERM CURRENT USE OF INSULIN (HCC): Status: ACTIVE | Noted: 2018-05-09

## 2020-03-17 PROBLEM — N17.9 ACUTE KIDNEY INJURY (HCC): Status: RESOLVED | Noted: 2018-05-09 | Resolved: 2020-03-17

## 2020-03-17 PROBLEM — B96.5 PSEUDOMONAS URINARY TRACT INFECTION: Status: RESOLVED | Noted: 2018-05-10 | Resolved: 2020-03-17

## 2020-03-17 PROBLEM — I25.810 CORONARY ARTERY DISEASE INVOLVING CORONARY BYPASS GRAFT OF NATIVE HEART WITHOUT ANGINA PECTORIS: Status: ACTIVE | Noted: 2018-05-09

## 2020-03-17 LAB
ANION GAP SERPL CALCULATED.3IONS-SCNC: 6 MMOL/L (ref 4–13)
BASOPHILS # BLD AUTO: 0.05 THOUSANDS/ΜL (ref 0–0.1)
BASOPHILS NFR BLD AUTO: 0 % (ref 0–1)
BUN SERPL-MCNC: 33 MG/DL (ref 5–25)
CALCIUM SERPL-MCNC: 8.5 MG/DL (ref 8.3–10.1)
CHLORIDE SERPL-SCNC: 102 MMOL/L (ref 100–108)
CO2 SERPL-SCNC: 27 MMOL/L (ref 21–32)
CREAT SERPL-MCNC: 1.04 MG/DL (ref 0.6–1.3)
EOSINOPHIL # BLD AUTO: 0.06 THOUSAND/ΜL (ref 0–0.61)
EOSINOPHIL NFR BLD AUTO: 0 % (ref 0–6)
ERYTHROCYTE [DISTWIDTH] IN BLOOD BY AUTOMATED COUNT: 13.2 % (ref 11.6–15.1)
GFR SERPL CREATININE-BSD FRML MDRD: 67 ML/MIN/1.73SQ M
GLUCOSE SERPL-MCNC: 214 MG/DL (ref 65–140)
GLUCOSE SERPL-MCNC: 67 MG/DL (ref 65–140)
GLUCOSE SERPL-MCNC: 78 MG/DL (ref 65–140)
GLUCOSE SERPL-MCNC: 90 MG/DL (ref 65–140)
GLUCOSE SERPL-MCNC: 91 MG/DL (ref 65–140)
GLUCOSE SERPL-MCNC: 92 MG/DL (ref 65–140)
HCT VFR BLD AUTO: 41.6 % (ref 36.5–49.3)
HGB BLD-MCNC: 13.9 G/DL (ref 12–17)
IMM GRANULOCYTES # BLD AUTO: 0.08 THOUSAND/UL (ref 0–0.2)
IMM GRANULOCYTES NFR BLD AUTO: 1 % (ref 0–2)
LYMPHOCYTES # BLD AUTO: 0.4 THOUSANDS/ΜL (ref 0.6–4.47)
LYMPHOCYTES NFR BLD AUTO: 3 % (ref 14–44)
MAGNESIUM SERPL-MCNC: 2.1 MG/DL (ref 1.6–2.6)
MCH RBC QN AUTO: 37.1 PG (ref 26.8–34.3)
MCHC RBC AUTO-ENTMCNC: 33.4 G/DL (ref 31.4–37.4)
MCV RBC AUTO: 111 FL (ref 82–98)
MONOCYTES # BLD AUTO: 1.14 THOUSAND/ΜL (ref 0.17–1.22)
MONOCYTES NFR BLD AUTO: 8 % (ref 4–12)
NEUTROPHILS # BLD AUTO: 12.46 THOUSANDS/ΜL (ref 1.85–7.62)
NEUTS SEG NFR BLD AUTO: 88 % (ref 43–75)
NRBC BLD AUTO-RTO: 0 /100 WBCS
PHOSPHATE SERPL-MCNC: 2 MG/DL (ref 2.3–4.1)
PLATELET # BLD AUTO: 172 THOUSANDS/UL (ref 149–390)
PMV BLD AUTO: 11.2 FL (ref 8.9–12.7)
POTASSIUM SERPL-SCNC: 4 MMOL/L (ref 3.5–5.3)
RBC # BLD AUTO: 3.75 MILLION/UL (ref 3.88–5.62)
SODIUM SERPL-SCNC: 135 MMOL/L (ref 136–145)
WBC # BLD AUTO: 14.19 THOUSAND/UL (ref 4.31–10.16)

## 2020-03-17 PROCEDURE — 84100 ASSAY OF PHOSPHORUS: CPT | Performed by: NURSE PRACTITIONER

## 2020-03-17 PROCEDURE — 83735 ASSAY OF MAGNESIUM: CPT | Performed by: NURSE PRACTITIONER

## 2020-03-17 PROCEDURE — 85025 COMPLETE CBC W/AUTO DIFF WBC: CPT | Performed by: NURSE PRACTITIONER

## 2020-03-17 PROCEDURE — 93971 EXTREMITY STUDY: CPT | Performed by: SURGERY

## 2020-03-17 PROCEDURE — 97530 THERAPEUTIC ACTIVITIES: CPT

## 2020-03-17 PROCEDURE — 93971 EXTREMITY STUDY: CPT

## 2020-03-17 PROCEDURE — 97535 SELF CARE MNGMENT TRAINING: CPT

## 2020-03-17 PROCEDURE — 99232 SBSQ HOSP IP/OBS MODERATE 35: CPT | Performed by: STUDENT IN AN ORGANIZED HEALTH CARE EDUCATION/TRAINING PROGRAM

## 2020-03-17 PROCEDURE — 80048 BASIC METABOLIC PNL TOTAL CA: CPT | Performed by: NURSE PRACTITIONER

## 2020-03-17 PROCEDURE — 99232 SBSQ HOSP IP/OBS MODERATE 35: CPT | Performed by: INTERNAL MEDICINE

## 2020-03-17 PROCEDURE — 82948 REAGENT STRIP/BLOOD GLUCOSE: CPT

## 2020-03-17 RX ORDER — TORSEMIDE 10 MG/1
10 TABLET ORAL DAILY
Status: DISCONTINUED | OUTPATIENT
Start: 2020-03-17 | End: 2020-03-18 | Stop reason: HOSPADM

## 2020-03-17 RX ORDER — LISINOPRIL 2.5 MG/1
2.5 TABLET ORAL DAILY
Status: DISCONTINUED | OUTPATIENT
Start: 2020-03-17 | End: 2020-03-18 | Stop reason: HOSPADM

## 2020-03-17 RX ADMIN — PANTOPRAZOLE SODIUM 40 MG: 40 TABLET, DELAYED RELEASE ORAL at 06:13

## 2020-03-17 RX ADMIN — ATORVASTATIN CALCIUM 10 MG: 20 TABLET, FILM COATED ORAL at 17:09

## 2020-03-17 RX ADMIN — BACITRACIN ZINC 1 SMALL APPLICATION: 500 OINTMENT TOPICAL at 09:20

## 2020-03-17 RX ADMIN — DOCUSATE SODIUM 100 MG: 100 CAPSULE, LIQUID FILLED ORAL at 09:20

## 2020-03-17 RX ADMIN — BACITRACIN ZINC 1 SMALL APPLICATION: 500 OINTMENT TOPICAL at 17:09

## 2020-03-17 RX ADMIN — CEFEPIME HYDROCHLORIDE 2000 MG: 2 INJECTION, POWDER, FOR SOLUTION INTRAVENOUS at 06:13

## 2020-03-17 RX ADMIN — ENOXAPARIN SODIUM 30 MG: 30 INJECTION SUBCUTANEOUS at 09:20

## 2020-03-17 RX ADMIN — INSULIN LISPRO 4 UNITS: 100 INJECTION, SOLUTION INTRAVENOUS; SUBCUTANEOUS at 22:31

## 2020-03-17 RX ADMIN — METOPROLOL TARTRATE 12.5 MG: 25 TABLET ORAL at 09:20

## 2020-03-17 RX ADMIN — DOCUSATE SODIUM 100 MG: 100 CAPSULE, LIQUID FILLED ORAL at 17:09

## 2020-03-17 RX ADMIN — INSULIN DETEMIR 12 UNITS: 100 INJECTION, SOLUTION SUBCUTANEOUS at 22:31

## 2020-03-17 RX ADMIN — ASPIRIN 81 MG 81 MG: 81 TABLET ORAL at 09:20

## 2020-03-17 RX ADMIN — METOPROLOL TARTRATE 12.5 MG: 25 TABLET ORAL at 22:31

## 2020-03-17 RX ADMIN — DIGOXIN 125 MCG: 125 TABLET ORAL at 09:20

## 2020-03-17 NOTE — PHYSICAL THERAPY NOTE
PT TREATMENT     03/17/20 1115   Pain Assessment   Pain Assessment Tool Pain Assessment not indicated - pt denies pain   Restrictions/Precautions   Other Precautions Chair Alarm; Fall Risk   General   Chart Reviewed Yes   Cognition   Arousal/Participation Cooperative   Subjective   Subjective agreeable to get OOB   Bed Mobility   Supine to Sit 3  Moderate assistance   Transfers   Sit to Stand 4  Minimal assistance   Additional items Increased time required;Verbal cues   Stand to Sit 3  Moderate assistance   Additional items Increased time required;Verbal cues   Stand pivot 4  Minimal assistance   Additional items Increased time required;Verbal cues  (with walker)   Ambulation/Elevation   Gait pattern   (very small steps)   Gait Assistance   (min/mod)   Additional items Verbal cues; Tactile cues   Assistive Device Rolling walker   Distance 2 feet   Activity Tolerance   Activity Tolerance Patient tolerated treatment well   Assessment   Prognosis Good   Problem List Decreased strength;Decreased endurance; Impaired balance;Decreased mobility   Assessment Pt demonstrates good improvement in arousal level, strength, and function  Plan   Treatment/Interventions ADL retraining;Functional transfer training;LE strengthening/ROM; Therapeutic exercise; Endurance training;Gait training;Bed mobility; Equipment eval/education;Patient/family training   Progress Progressing toward goals   PT Frequency 5x/wk   Recommendation   Recommendation Short-term skilled PT   Licensure   [place] License Number  [place] MA[place] [id number]

## 2020-03-17 NOTE — ASSESSMENT & PLAN NOTE
Patient with depression for 4-5 years since his wifes death, with progressive decline     Patient DNR/DNI but denies suicidal ideation  He will need psych follow up after DC

## 2020-03-17 NOTE — OCCUPATIONAL THERAPY NOTE
OT Treatment Note       03/17/20 1410   Restrictions/Precautions   Other Precautions Chair Alarm; Bed Alarm; Fall Risk   Pain Assessment   Pain Assessment Tool Pain Assessment not indicated - pt denies pain   Pain Score No Pain   ADL   UB Dressing Assistance 3  Moderate Assistance   UB Dressing Comments Adjust hosptial gown   LB Dressing Assistance 2  Maximal Assistance   LB Dressing Deficit Don/doff R sock; Don/doff L sock   Toileting Assistance  4  Minimal Assistance   Toileting Comments Attempted use of urinal while seated EOB, assist to position urinal however patient unable to urinate after few minutes of trying   Bed Mobility   Supine to Sit 3  Moderate assistance   Sit to Supine 3  Moderate assistance   Transfers   Sit to Stand 3  Moderate assistance  (From bedside chair)   Additional items Assist x 1; Increased time required;Verbal cues   Stand to Sit 3  Moderate assistance   Additional items Assist x 1;Verbal cues   Stand pivot 3  Moderate assistance   Additional items Assist x 1;Verbal cues  (With RW)   Functional Mobility   Functional Mobility 3  Moderate assistance   Additional Comments Ambulated several steps bed to chair with RW and mod assist, min VCs for safety awareness   Cognition   Arousal/Participation Alert; Cooperative   Attention Attends with cues to redirect   Orientation Level Oriented to person;Oriented to place   Following Commands Follows multistep commands with increased time or repetition   Activity Tolerance   Activity Tolerance Patient limited by fatigue   Assessment   Assessment Patient seen for occupational therapy treatment this PM  Patient reporting increased fatigue from sitting in chair and it being later in the afternoon, required increased assist for transfers  Patient currently requires mod assist for transfers/mobility with RW, mod -max assist for ADLs   Patient will continue to benefit from skilled inpatient OT services in order to maximize functional independence and prepare for safe discharge  Continue OT per POC     Recommendation   OT Discharge Recommendation [address] [facility] License Number  [place], [place] [id number]

## 2020-03-17 NOTE — PROGRESS NOTES
Progress Note - Cardiology   [facility]     Rody Muhammad 78 y o  male MRN: [id number]  : 1940  Unit/Bed#: [address] [address] Encounter: [id number]    Assessment and Plan:   1  Acute on chronic combined systolic and diastolic heart failure:  EF is 15-20% with diffuse LV hypokinesis  Beta-blocker, Demadex and low-dose lisinopril reintroduced  Will continue monitor I&O, daily weights and electrolytes  2  Acute kidney injury on chronic kidney disease:  Slowly improving    3  Urinary tract infection:  Had been on cefepime while in the ICU  4  Mild aortic stenosis    5  Pulmonary hypertension:  Estimated peak PA pressure on echocardiogram is 45 mm Hg    6  Diabetes with hemoglobin A1c greater than 14:  Managed per the primary team   Patient had stopped all his medications in the outpatient setting  7  Coronary artery disease with history of CABG:  Beta-blocker restarted now that patient has resolved his septic state  Continue statin therapy  Monitor vital signs  8  Dyslipidemia:  Continue statin therapy    Subjective / Objective:   Patient seen and examined  He is sitting out of bed in chair  He does not offer any complaints  Patient diuresing well  Appetite fair  He denies any chest pain, pressure or palpitations  He denies any shortness of breath  Vitals: Blood pressure 113/74, pulse 98, temperature (!) 97 4 °F (36 3 °C), resp  rate 17, height 5' 8 5" (1 74 m), weight 77 1 kg (169 lb 15 6 oz), SpO2 96 %  Vitals:    20 0600 20 0600   Weight: 77 kg (169 lb 12 1 oz) 77 1 kg (169 lb 15 6 oz)     Body mass index is 25 47 kg/m²    BP Readings from Last 3 Encounters:   20 113/74   12/10/18 111/65   18 99/57     Orthostatic Blood Pressures      Most Recent Value   Blood Pressure  113/74 filed at 2020 8620   Patient Position - Orthostatic VS  Lying filed at 2020 0004        I/O       03/15 07 -  0700  07 -  07 07 - 03/18 0700    P  O  1250 400     I V  (mL/kg)       IV Piggyback  50     Total Intake(mL/kg) 1250 (16 2) 450 (5 8)     Urine (mL/kg/hr) 2170 (1 2) 1250 (0 7)     Total Output 2170 1250     Net -920 -800                Invasive Devices     Peripheral Intravenous Line            Long-Dwell Peripheral IV (Midline) 93/85/80 Left Basilic 3 days                  Intake/Output Summary (Last 24 hours) at 3/17/2020 1321  Last data filed at 3/17/2020 9706  Gross per 24 hour   Intake 170 ml   Output 1250 ml   Net -1080 ml         Physical Exam:   Physical Exam   Constitutional: He is oriented to person, place, and time  He appears well-developed and well-nourished  No distress  HENT:   Head: Normocephalic and atraumatic  Right Ear: External ear normal    Left Ear: External ear normal    Eyes: Pupils are equal, round, and reactive to light  Conjunctivae are normal  Right eye exhibits no discharge  Left eye exhibits no discharge  No scleral icterus  Neck: Normal range of motion  Neck supple  No JVD present  No thyromegaly present  Cardiovascular: Normal rate, regular rhythm and intact distal pulses  Murmur heard  Pulmonary/Chest: Effort normal  No accessory muscle usage  No respiratory distress  He has decreased breath sounds in the right lower field and the left lower field  Abdominal: Soft  Bowel sounds are normal  He exhibits no distension  Musculoskeletal: He exhibits edema  Neurological: He is alert and oriented to person, place, and time  Skin: Skin is warm and dry  Capillary refill takes less than 2 seconds  He is not diaphoretic  Psychiatric: He has a normal mood and affect  His behavior is normal  Judgment and thought content normal    Nursing note and vitals reviewed                  Medications/ Allergies:     Current Facility-Administered Medications:  acetaminophen 650 mg Oral Q6H PRN Josefina Spironello V, CRNP   aspirin 81 mg Oral Daily Josefina Spironello V, CRNP   atorvastatin 10 mg Oral Daily With Dinner Josefina Doveo LAYA, MIGUEL   bacitracin 1 small application Topical BID Josefina Spiropadmajao V, MIGUEL   bisacodyl 10 mg Rectal Daily PRN Josefina Spiropadmajao V, MIGUEL   dextrose 25 mL Intravenous PRN Josefina Spiropadmajao V, MIGUEL   digoxin 125 mcg Oral Daily Josefina Spiropadmajao V, MIGUEL   docusate sodium 100 mg Oral BID Josefina Doveo V, MIGUEL   enoxaparin 30 mg Subcutaneous Q24H [facility] Josefina Spiropadmajao V, MIGUEL   insulin detemir 12 Units Subcutaneous HS Josefina Doveo V, MIGUEL   insulin lispro 2-12 Units Subcutaneous 4x Daily (AC & HS) Josefina Doveo MIGUEL ASIF   lisinopril 2 5 mg Oral Daily MIGUEL Davis   metoprolol tartrate 12 5 mg Oral Q12H [facility] Josefina Spironello V, MIGUEL   pantoprazole 40 mg Oral Early Morning Josefina Doveo LAYA, CRNP   polyethylene glycol 17 g Oral Daily PRN Josefina Spiropadmajao LAYA, MIGUEL   torsemide 10 mg Oral Daily Chucho Strickland MD       acetaminophen 650 mg Q6H PRN   bisacodyl 10 mg Daily PRN   dextrose 25 mL PRN   polyethylene glycol 17 g Daily PRN     No Known Allergies    VTE Pharmacologic Prophylaxis:   Sequential compression device (Venodyne)     Labs:   Troponins:  Results from last 7 days   Lab Units 03/11/20  2138 03/11/20  2046 03/11/20  1811   TROPONIN I ng/mL 0 04 0 05* 0 06*     CBC with diff:  Results from last 7 days   Lab Units 03/17/20  0623 03/16/20  0546 03/15/20  0441 03/14/20  0530 03/13/20  0749 03/12/20  0400 03/11/20  2138   WBC Thousand/uL 14 19* 9 50 7 81 10 22* 11 51* 14 09* 7 84   HEMOGLOBIN g/dL 13 9 14 0 14 1 15 4 15 4 15 5 14 6   HEMATOCRIT % 41 6 41 4 42 3 45 9 46 3 46 4 45 5   MCV fL 111* 111* 110* 110* 109* 110* 114*   PLATELETS Thousands/uL 172 147* 154 172 200 182 159   MCH pg 37 1* 37 5* 36 6* 36 8* 36 3* 36 6* 36 6*   MCHC g/dL 33 4 33 8 33 3 33 6 33 3 33 4 32 1   RDW % 13 2 13 1 13 2 13 2 13 3 13 3 13 3   MPV fL 11 2 10 8 11 3 11 2 11 4 11 9 12 3   NRBC AUTO /100 WBCs 0 0 0 0 0 0 0     CMP:  Results from last 7 days   Lab Units 03/17/20  3929 03/16/20  0546 03/15/20  0441 03/14/20 2007 03/14/20  0530 03/13/20  0750 03/12/20  0400 03/11/20  2138 03/11/20  1336   SODIUM mmol/L 135* 134* 137  --  130* 132* 136 135* 130*   POTASSIUM mmol/L 4 0 4 7 4 0 4 3 5 9* 4 7 3 6 4 0 4 9   CHLORIDE mmol/L 102 102 104  --  100 98* 99* 100 92*   CO2 mmol/L 27 25 28  --  24 27 27 24 27   ANION GAP mmol/L 6 7 5  --  6 7 10 11 11   BUN mg/dL 33* 35* 36*  --  39* 43* 35* 37* 41*   CREATININE mg/dL 1 04 1 10 1 10  --  1 21 1 57* 1 39* 1 57* 1 82*   CALCIUM mg/dL 8 5 8 0* 8 3  --  8 4 8 9 9 1 9 1 9 3   AST U/L  --   --   --   --   --  25 16 17 <5*   ALT U/L  --   --   --   --   --  25 22 24 25   ALK PHOS U/L  --   --   --   --   --  123* 113 125* 153*   TOTAL PROTEIN g/dL  --   --   --   --   --  6 4 6 2* 6 2* 6 9   ALBUMIN g/dL  --   --   --   --   --  3 0* 3 0* 3 0* 3 6   TOTAL BILIRUBIN mg/dL  --   --   --   --   --  1 20* 0 80 0 80 1 10*   EGFR ml/min/1 73sq m 67 63 63  --  56 41 47 41 34     Magnesium:  Results from last 7 days   Lab Units 03/17/20  0623 03/16/20  0546 03/15/20  0441 03/14/20 2007 03/14/20  0530 03/13/20  0750 03/12/20  0400   MAGNESIUM mg/dL 2 1 2 2 2 3 2 2 2 3 2 3 2 2     Coags:  Results from last 7 days   Lab Units 03/11/20  1336   PTT seconds 25   INR  1 10*     TSH:  Results from last 7 days   Lab Units 03/12/20  0041   TSH 3RD GENERATON uIU/mL 1 373     Lipid Profile:  Results from last 7 days   Lab Units 03/12/20  0400   CHOLESTEROL mg/dL 77   TRIGLYCERIDES mg/dL 64   HDL mg/dL 51   LDL CALC mg/dL 13     Hgb A1c:  Results from last 7 days   Lab Units 03/12/20  0400   HEMOGLOBIN A1C % >14 0*       Imaging & Testing   I have personally reviewed pertinent reports  Xr Chest 1 View Portable    Result Date: 3/11/2020  Narrative: CHEST INDICATION:   weakness  COMPARISON:  11/1/2018  EXAM PERFORMED/VIEWS:  XR CHEST PORTABLE FINDINGS:  Median sternotomy wires are present  Stable cardiomegaly is noted  Congestive failure is present    Small to moderate right pleural effusion is noted  Osseous structures appear within normal limits for patient age  Impression: Congestive failure and small moderate right pleural effusion  Workstation performed: [id number]     Xr Elbow 3+ Views Left    Result Date: 3/11/2020  Narrative: LEFT ELBOW INDICATION:   swelling  COMPARISON:  None VIEWS:  XR ELBOW 3+ VW LEFT FINDINGS: No acute fractures are seen  The lateral view is slightly rotated which limits assessment for the possibility of a small joint effusion  There does not seem to be significant effusion visible, however  There are some degenerative spurs seen at the margins of the medial and lateral humeral epicondyles  There is also bone spur at the olecranon process  No lytic or blastic lesions are seen  There is prominent swelling around the elbow  Impression: Swelling  No acute fracture seen  Some degenerative bone spurring is seen  Slightly limited lateral view  Workstation performed: [id number]     Ct Head Without Contrast    Result Date: 3/11/2020  Narrative: CT BRAIN - WITHOUT CONTRAST INDICATION:   fall  COMPARISON:  October 26, 2018  TECHNIQUE:  CT examination of the brain was performed  In addition to axial images, coronal 2D reformatted images were created and submitted for interpretation  Radiation dose length product (DLP) for this visit:  876 4 mGy-cm   This examination, like all CT scans performed in the [facility], was performed utilizing techniques to minimize radiation dose exposure, including the use of iterative reconstruction and automated exposure control  IMAGE QUALITY:  Diagnostic  FINDINGS: PARENCHYMA:  No intracranial mass, mass effect or midline shift  No CT signs of acute infarction  No acute parenchymal hemorrhage  Decreased white matter attenuation, typical of chronic microvascular ischemic change   VENTRICLES AND EXTRA-AXIAL SPACES:  Ventricles and extra-axial CSF spaces are prominent commensurate with the degree of volume loss  No hydrocephalus  No acute extra-axial hemorrhage  VISUALIZED ORBITS AND PARANASAL SINUSES:  Unremarkable  CALVARIUM AND EXTRACRANIAL SOFT TISSUES:  Normal      Impression: No acute intracranial abnormality  Workstation performed: [id number]     Ct Cervical Spine Without Contrast    Result Date: 3/11/2020  Narrative: CT CERVICAL SPINE - WITHOUT CONTRAST INDICATION:   fall  COMPARISON:  CT cervical spine from October 25, 2018  Portable chest radiograph from this afternoon  TECHNIQUE:  CT examination of the cervical spine was performed without intravenous contrast   Contiguous axial images were obtained  Sagittal and coronal reconstructions were performed  Radiation dose length product (DLP) for this visit:  421 31 mGy-cm   This examination, like all CT scans performed in the [facility], was performed utilizing techniques to minimize radiation dose exposure, including the use of iterative  reconstruction and automated exposure control  IMAGE QUALITY:  Diagnostic  FINDINGS: ALIGNMENT:  3 mm of anterolisthesis of C4 on C5, similar to the last CT  Otherwise normal alignment  VERTEBRAE:  No fracture  No bone destruction or osteoblastic lesion  DISC SPACES:  Severe degenerative disc disease at C5-C6 and C6-C7  FACET JOINTS:  Multilevel degenerative facet arthropathy, most severe from C2-C3 through C4-C5  FORAMINA:   Foraminal stenosis at several mid and lower cervical levels  SPINAL CANAL:  Spinal canal normal in caliber  No evidence of hematoma  PREVERTEBRAL AND PARASPINAL SOFT TISSUES:  Normal  OTHER SOFT TISSUES OF THE NECK: Normal  INCLUDED SKULL BASE: Normal  IMAGED PORTIONS OF THE BRAIN: Normal  THORACIC INLET:  Opacification of the apical portion of the right hemithorax, probably layering pleural fluid  Moderate to large left pleural effusion  Left lung apex clear  Impression: 1  No cervical spine fracture or traumatic malalignment   2   Bilateral pleural effusions, larger on the right  Workstation performed: [id number]     Vas Upper Limb Venous Duplex Scan, Unilateral/limited    Result Date: 3/17/2020  Narrative:  [facility] REPORT CLINICAL: Indications: Patient presents with right upper extremity edema several days  Operative History: CABG Risk Factors: The patient has history of HTN, Diabetes (Yes), HLD, CKD and CAD  CONCLUSION: RIGHT UPPER LIMB: No evidence of acute or chronic deep vein thrombosis  Evidence of acute, occlusive superficial thrombophlebitis noted in the cephalic vein at the antecubital fossa and basilic at the wrist  Doppler evaluation shows a normal response to augmentation maneuvers  LEFT UPPER LIMB LIMITED: Evaluation shows no evidence of thrombus in the internal jugular vein, subclavian vein, and the brachiocephalic vein  Technical findings given to Dr Darline Chamorro at 9:15 am     Cardiac testing:   Results for orders placed during the hospital encounter of 20   Echo complete with contrast if indicated    Narrative [address]  [address] [address]  [phone]    Transthoracic Echocardiogram  2D, M-mode, Doppler, and Color Doppler    Study date:  12-Mar-2020    Patient: Anum Lacye  MR number: [id number]  Account number: [de-identified]  : 1940  Age: 78 years  Gender: Male  Status: Inpatient  Location: Bedside  Height: 68 in  Weight: 165 7 lb  BP: 96/ 62 mmHg    Indications: Cardiomyopathy    Diagnoses: I42 9 - Cardiomyopathy, unspecified    Sonographer:  ADRIEL Rainey  Primary Physician:  Ronen Santana DO  Referring Physician:  Conception Reading, MD  Group:  [facility]  Interpreting Physician:  Rosemary Garrison DO    IMPRESSIONS:  These features are consistent with dilated cardiomyopathy  Ejection fraction is severely reduced  No significant change compared to previous exam     SUMMARY    LEFT VENTRICLE:  The ventricle was mildly dilated    Systolic function was severely reduced by visual assessment  Ejection fraction was estimated in the range of 15 % to 20 %  There was severe diffuse hypokinesis  Doppler parameters were consistent with elevated mean left atrial filling pressure  RIGHT VENTRICLE:  The ventricle was mildly dilated  Systolic function was markedly reduced  MITRAL VALVE:  There was moderate annular calcification  There was mild regurgitation  AORTIC VALVE:  The valve was trileaflet  Leaflets exhibited moderate calcification and mildly reduced cuspal separation  Transaortic velocity was decreased due to decreased transaortic flow (decreased stroke volume)  There was mild stenosis  There was mild to moderate regurgitation  TRICUSPID VALVE:  There was moderate regurgitation  Pulmonary artery systolic pressure was mildly increased  Estimated peak PA pressure was 45 mmHg  HISTORY: PRIOR HISTORY: HTN, CAD,CM, AF, CHF, CABG, DM, CKD    PROCEDURE: The procedure was performed at the bedside  This was a routine study  The transthoracic approach was used  The study included complete 2D imaging, M-mode, complete spectral Doppler, and color Doppler  The heart rate was 74 bpm,  at the start of the study  Image quality was adequate  LEFT VENTRICLE: The ventricle was mildly dilated  Systolic function was severely reduced by visual assessment  Ejection fraction was estimated in the range of 15 % to 20 %  There was severe diffuse hypokinesis  Wall thickness was normal   DOPPLER: Doppler parameters were consistent with elevated mean left atrial filling pressure  RIGHT VENTRICLE: The ventricle was mildly dilated  Systolic function was markedly reduced  DOPPLER: Systolic pressure was mildly increased  LEFT ATRIUM: The atrium was markedly dilated  RIGHT ATRIUM: The atrium was moderately dilated  MITRAL VALVE: There was moderate annular calcification  Valve structure was normal  There was normal leaflet separation   No echocardiographic evidence for prolapse  DOPPLER: The transmitral velocity was within the normal range  There was  no evidence for stenosis  There was mild regurgitation  AORTIC VALVE: The valve was trileaflet  Leaflets exhibited moderate calcification and mildly reduced cuspal separation  DOPPLER: Transaortic velocity was decreased due to decreased transaortic flow (decreased stroke volume)  There was mild  stenosis  There was mild to moderate regurgitation  TRICUSPID VALVE: The valve structure was normal  There was normal leaflet separation  DOPPLER: The transtricuspid velocity was within the normal range  There was no evidence for stenosis  There was moderate regurgitation  Pulmonary artery  systolic pressure was mildly increased  Estimated peak PA pressure was 45 mmHg  PULMONIC VALVE: Leaflets exhibited normal thickness, no calcification, and normal cuspal separation  DOPPLER: The transpulmonic velocity was within the normal range  There was no regurgitation  PERICARDIUM: There was no thickening  There was no pericardial effusion  AORTA: The root exhibited normal size  PULMONARY ARTERY: The size was normal  The morphology appeared normal     SYSTEM MEASUREMENT TABLES    2D mode  AoR Diam 2D: 3 5 cm  LA Diam (2D): 4 9 cm  LA/Ao (2D): 1 4  FS (2D Teich): 7 91 %  IVSd (2D): 0 96 cm  LVDEV: 185 cmï¾³  LVEDV MOD BP: 201 cmï¾³  LVESV: 153 cmï¾³  LVIDd(2D): 6 07 cm  LVISd (2D): 5 59 cm  LVOT Area 2D: 3 14 cmï¾²  LVPWd (2D): 0 92 cm  SV (Teich): 32 cmï¾³    Apical four chamber  LVEF A4C: 17 %    Apical two chamber  LVEF A2C: 14 %    Unspecified Scan Mode  CARL Cont Eq (Peak Travon): 2 29 cmï¾²  CARL Cont Eq (VTI): 1 88 cmï¾²  LVOT (VTI): 19 1 cm  LVOT Diam : 2 cm  LVOT Vmax: 970 mm/s  LVOT Vmax; Mean: 970 mm/s  Peak Grad ; Mean: 4 mm[Hg]  SV (LVOT): 60 cmï¾³  VTI;Mean: 2 mm[Hg]  MV Peak A Travon: 312 mm/s  MV Peak E Travon   Mean: 1070 mm/s  MVA (PHT): 5 5 cmï¾²  PHT: 40 ms  Max P mm[Hg]  V Max: 2990 mm/s  Vmax: 2890 mm/s  RA Area: 21 4 cmï¾²  RA Volume: 64 6 cmï¾³  TAPSE: 0 6 cm    [facility] Accredited Echocardiography Laboratory    Prepared and electronically signed by    Josephine France DO  Signed 12-Mar-2020 09:41:30         Gaby Cervantes        "This note has been constructed using a voice recognition system  Therefore there may be syntax, spelling, and/or grammatical errors   Please call if you have any questions  "

## 2020-03-17 NOTE — ASSESSMENT & PLAN NOTE
Patient was on amiodarone gtt, has bee stopped since 3/13  · Continue metoprolol 12 5mg BID  · Started digoxin 5mg daily  · Tele monitor

## 2020-03-17 NOTE — PROGRESS NOTES
Progress Note - Marina Shepard 1940, 78 y o  male MRN: [id number]    Unit/Bed#: [address] [place] [address] Encounter: [id number]    Primary Care Provider: Yaya Fowler DO   Date and time admitted to hospital: 3/11/2020 12:52 PM        Acute on chronic combined systolic and diastolic CHF (congestive heart failure) [facility])  Assessment & Plan  Wt Readings from Last 3 Encounters:   03/17/20 77 1 kg (169 lb 15 6 oz)   12/06/18 76 2 kg (168 lb)   11/18/18 80 2 kg (176 lb 12 9 oz)     History of ischemic cardiomyopathy  Previously declined life vest  prior echo from 2018 showed EF 20-25%, severe diffuse hypokinesis, grade 1 diastolic dysfunction  Abnormal nuclear stress test in 2018  Evidence of volume overload clinically and radiographically with peripheral edema and pleural effusion on admission  Echo on admission showed worsened EF 15-20%, severe diffuse hypokinesis  · Cardiology consulted, recs appreciated  · Patient was initially on dopamine gtt, now stopped  · Monitor intake output, daily weights- improved volume statue  · Restarted home torsemide 10mg daily   · Restarted home lisinopril 2 5mg daily    Thrombophlebitis right arm  Assessment & Plan  Patient with bilateral arm swelling, mild erythema  Initially thought to be cellulitis  XR left elbow showed soft tissue swelling  Duplex BUE US showed right arm with superficial thrombophlebitis  · Monitor off Abx  · Apply heat and elevate arm, supportive care    Atrial fibrillation [facility])  Assessment & Plan  History of AFib in the past  Not on anticoagulation due to history of GI bleed  Currently EKG with normal sinus rhythm  Telemetry  Patient was previously on digoxin and metoprolol    Does not appear to be on digoxin at present  Will resume metoprolol at lower dose    Benign essential hypertension  Assessment & Plan  Stable  Continue metoprolol at lower dose 12 5mg q12hrs  Restarted home lisinopril, demadex  Holding spironolactone        Type 2 diabetes mellitus with hyperglycemia, without long-term current use of insulin [facility])  Assessment & Plan  Lab Results   Component Value Date    HGBA1C >14 0 (H) 03/12/2020       Recent Labs     03/16/20 2038 03/17/20  0734 03/17/20  0916 03/17/20  1128   POCGLU 201* 67 90 92       Blood Sugar Average: Last 72 hrs:  (P) 024 0444     · Patient with HHS on admission, now resolved  · Started levemir 9 units daily  · Continue ISS with meals  · Patient will need endocrine follow up after DC  · Nutrition consult for DM education    Frequent PVCs  Assessment & Plan  Patient was on amiodarone gtt, has bee stopped since 3/13  · Continue metoprolol 12 5mg BID  · Started digoxin 5mg daily  · Tele monitor    Moderate episode of recurrent major depressive disorder [facility])  Assessment & Plan  Patient with depression for 4-5 years since his wifes death, with progressive decline     Patient DNR/DNI but denies suicidal ideation  He will need psych follow up after DC    Urinary retention  Assessment & Plan  Monitor post void residuals and bladder scans  Holding Flomax for now    CKD (chronic kidney disease) stage 3, GFR 30-59 ml/min (HCC)  Assessment & Plan  Previous baseline appears to be 0 8-1    Coronary artery disease involving coronary bypass graft of native heart without angina pectoris  Assessment & Plan  History of CAD status post CABG  Patient currently denies any checks pain  EKG without any acute abnormality  Continue metoprolol, Lipitor    Lactic acidosisresolved as of 3/13/2020  Assessment & Plan  Likely secondary to DKA and cardiomyopathy, doubt sepsis  IV fluids  Trend lactic acid    Pseudomonas urinary tract infectionresolved as of 3/17/2020  Assessment & Plan  Possible given abnormal UA with positive nitrite, pyuria and bacteriuria but patient does not report any urinary symptoms  Urine culture grew pseudomonas  Patient received 6 days of IV Abx (2 days ceftriaxone and 4 days IV cefepime)  DC Abxs now    Acute kidney injury (HCC)resolved as of 3/17/2020  Assessment & Plan  Presented with creatinine of 1 8 in setting of hyperglycemia  UA-0-1 RBC, negative protein, pyuria and bacteriuria  Initially held lisinopril, Aldactone, torsemide  IV hydration setting of hyperglycemia with close monitoring of volume status  CHRIS resolved    Results from last 7 days   Lab Units 20  0623 20  0546 03/15/20  0441 20  0530 20  0750 20  0400 20  2138 20  1336   BUN mg/dL 33* 35* 36* 39* 43* 35* 37* 41*   CREATININE mg/dL 1 04 1 10 1 10 1 21 1 57* 1 39* 1 57* 1 82*         Hyponatremiaresolved as of 3/14/2020  Assessment & Plan  Secondary to hyperglycemia  Monitor    * Hyperglycemic hyperosmolar stateresolved as of 3/14/2020  Assessment & Plan  Presented with blood sugar of 823  pH-7 329, with normal anion gap and bicarb  Beta hydroxybutyrate is elevated, UA without any ketones  Patient is not currently on any medication for diabetes at home or does not check his blood sugar  Check hemoglobin A1c  Insulin drip  Monitor blood sugar q 2 hours  IV fluids        VTE Pharmacologic Prophylaxis:   Pharmacologic: Enoxaparin (Lovenox)  Mechanical VTE Prophylaxis in Place: Yes    Patient Centered Rounds: I have performed bedside rounds with nursing staff today  Discussions with Specialists or Other Care Team Provider: Yes  Education and Discussions with Family / Patient:Yes  Time Spent for Care: 30 minutes  More than 50% of total time spent on counseling and coordination of care as described above  Current Length of Stay: 6 day(s)  Current Patient Status: Inpatient     Discharge Plan: pending    Code Status: Level 3 - DNAR and DNI      Subjective:   Patient offers no complaints           Objective:     Vitals:   Temp (24hrs), Av 9 °F (36 6 °C), Min:97 3 °F (36 3 °C), Max:98 9 °F (37 2 °C)    Temp:  [97 3 °F (36 3 °C)-98 9 °F (37 2 °C)] 97 4 °F (36 3 °C)  HR:  [] 98  Resp:  [17-27] 17  BP: (113-118)/(68-75) 113/74  SpO2:  [90 %-98 %] 96 %  Body mass index is 25 47 kg/m²  Input and Output Summary (last 24 hours): Intake/Output Summary (Last 24 hours) at 3/17/2020 1308  Last data filed at 3/17/2020 6172  Gross per 24 hour   Intake 170 ml   Output 1250 ml   Net -1080 ml        Physical Exam:     Physical Exam   Constitutional: He appears well-developed  No distress  HENT:   Head: Normocephalic and atraumatic  Cardiovascular: Normal rate and regular rhythm  Murmur heard  Pulmonary/Chest: Effort normal and breath sounds normal  No respiratory distress  He has no wheezes  He has no rales  Abdominal: Soft  Bowel sounds are normal  He exhibits no distension  There is no tenderness  There is no rebound  Musculoskeletal:   Left elbow with mild swelling, right arm with swelling as well   Neurological: He is alert  Skin: Skin is warm and dry  Psychiatric: He has a normal mood and affect  His behavior is normal    Nursing note and vitals reviewed  Additional Data:     Labs:    Results from last 7 days   Lab Units 03/17/20  0623 03/16/20  0546 03/15/20  0441   WBC Thousand/uL 14 19* 9 50 7 81   HEMOGLOBIN g/dL 13 9 14 0 14 1   HEMATOCRIT % 41 6 41 4 42 3   PLATELETS Thousands/uL 172 147* 154   NEUTROS PCT % 88* 86* 79*     Results from last 7 days   Lab Units 03/17/20  0623 03/16/20  0546 03/15/20  0441  03/13/20  0750 03/12/20  0400 03/11/20  2138   SODIUM mmol/L 135* 134* 137   < > 132* 136 135*   POTASSIUM mmol/L 4 0 4 7 4 0   < > 4 7 3 6 4 0   CHLORIDE mmol/L 102 102 104   < > 98* 99* 100   CO2 mmol/L 27 25 28   < > 27 27 24   BUN mg/dL 33* 35* 36*   < > 43* 35* 37*   CREATININE mg/dL 1 04 1 10 1 10   < > 1 57* 1 39* 1 57*   CALCIUM mg/dL 8 5 8 0* 8 3   < > 8 9 9 1 9 1   TOTAL BILIRUBIN mg/dL  --   --   --   --  1 20* 0 80 0 80   ALK PHOS U/L  --   --   --   --  123* 113 125*   ALT U/L  --   --   --   --  25 22 24   AST U/L  --   --   --   --  25 16 17    < > = values in this interval not displayed       Results from last 7 days   Lab Units 03/11/20  1336   INR  1 10*     Results from last 7 days   Lab Units 03/11/20  2138 03/11/20  2046 03/11/20  1811   TROPONIN I ng/mL 0 04 0 05* 0 06*     Lab Results   Component Value Date/Time    HGBA1C >14 0 (H) 03/12/2020 04:00 AM     Results from last 7 days   Lab Units 03/17/20  1128 03/17/20  0916 03/17/20  0734 03/16/20  2038 03/16/20  1719 03/16/20  1216 03/15/20  2110 03/15/20  1612 03/15/20  1156 03/15/20  0836 03/15/20  0804 03/15/20  0750   POC GLUCOSE mg/dl 92 90 67 201* 224* 219* 260* 244* 183* 189* 62* 58*     Results from last 7 days   Lab Units 03/12/20  1007 03/12/20  0821 03/12/20  0630 03/12/20  0400 03/12/20  0359  03/11/20  1716   LACTIC ACID mmol/L 2 1* 2 7* 3 6*  --  4 3*   < >  --    PROCALCITONIN ng/ml  --   --   --  0 07  --   --  <0 05    < > = values in this interval not displayed  * I Have Reviewed All Lab Data Listed Above  * Additional Pertinent Lab Tests Reviewed: All Labs Within Last 24 Hours Reviewed    Imaging:     XR elbow 3+ views LEFT   Final Result by Fernando Peralta MD (03/11 1501)      Swelling  No acute fracture seen  Some degenerative bone spurring is seen  Slightly limited lateral view  Workstation performed: [id number]         CT head without contrast   Final Result by Andrea Mcdermott MD (03/11 1431)      No acute intracranial abnormality  Workstation performed: [id number]         CT cervical spine without contrast   Final Result by Andrea Mcdermott MD (03/11 1427)      1  No cervical spine fracture or traumatic malalignment  2   Bilateral pleural effusions, larger on the right  Workstation performed: [id number]         XR chest 1 view portable   Final Result by Ricky Gross MD (03/11 1445)      Congestive failure and small moderate right pleural effusion              Workstation performed: [id number]         VAS upper limb venous duplex scan, unilateral/limited    (Results Pending) Imaging Reports Reviewed by myself    Cultures:   Blood Culture:   Lab Results   Component Value Date    BLOODCX No Growth After 5 Days  03/11/2020    BLOODCX No Growth After 5 Days  03/11/2020    BLOODCX No Growth After 5 Days  10/29/2018    BLOODCX No Growth After 5 Days  10/29/2018    BLOODCX Staphylococcus aureus (A) 10/25/2018    BLOODCX No Growth After 5 Days   10/25/2018     Urine Culture:   Lab Results   Component Value Date    URINECX 60,000-69,000 cfu/ml Pseudomonas aeruginosa (A) 03/11/2020    URINECX >100,000 cfu/ml  10/25/2018    URINECX >100,000 cfu/ml Klebsiella oxytoca (A) 05/09/2018     Sputum Culture: No components found for: SPUTUMCX  Wound Culture:   Lab Results   Component Value Date    WOUNDCULT 4+ Growth of  03/11/2020    WOUNDCULT 4+ Growth of Staphylococcus aureus (A) 10/26/2018    WOUNDCULT 4+ Growth of Staphylococcus aureus (A) 10/25/2018       Last 24 Hours Medication List:     Current Facility-Administered Medications:  acetaminophen 650 mg Oral Q6H PRN Josefina Spironello V, CRNP   aspirin 81 mg Oral Daily Josefina Rogersnello V, CRNP   atorvastatin 10 mg Oral Daily With [employer] V, CRNP   bacitracin 1 small application Topical BID Josefina Spironello V, CRNP   bisacodyl 10 mg Rectal Daily PRN Josefina Spironello V, CRNP   dextrose 25 mL Intravenous PRN Josefina Spironello V, CRNP   digoxin 125 mcg Oral Daily Josefina Spironello V, CRNP   docusate sodium 100 mg Oral BID Josefina Spironello V, CRNP   enoxaparin 30 mg Subcutaneous Q24H [facility] Josefina Spironello V, CRNP   insulin detemir 12 Units Subcutaneous HS Josefina Spironello V, CRNP   insulin lispro 2-12 Units Subcutaneous 4x Daily (AC & HS) Josefina Spironello V, CRNP   metoprolol tartrate 12 5 mg Oral Q12H [facility] Josefina Spironello V, CRNP   pantoprazole 40 mg Oral Early Morning Josefina Spironello V, CRNP   polyethylene glycol 17 g Oral Daily PRN Josefina Spironello V, CRNP   torsemide 10 mg Oral Daily Marisol Reaves, MD        Today, Patient Was Seen By: Steven Randall MD    ** Please Note: Dragon 360 Dictation voice to text software may have been used in the creation of this document   **

## 2020-03-17 NOTE — ASSESSMENT & PLAN NOTE
Patient with bilateral arm swelling, mild erythema    Initially thought to be cellulitis  XR left elbow showed soft tissue swelling  Duplex BUE US showed right arm with superficial thrombophlebitis  · Monitor off Abx  · Apply heat and elevate arm, supportive care

## 2020-03-17 NOTE — PLAN OF CARE
Problem: OCCUPATIONAL THERAPY ADULT  Goal: Performs self-care activities at highest level of function for planned discharge setting  See evaluation for individualized goals  Outcome: Progressing  Note:   Limitation: Decreased ADL status, Decreased UE ROM, Decreased UE strength, Decreased Safe judgement during ADL, Decreased cognition, Decreased endurance, Decreased self-care trans, Decreased high-level ADLs(decreased balance and mobility )  Prognosis: Good  Assessment: Patient seen for occupational therapy treatment this PM  Patient reporting increased fatigue from sitting in chair and it being later in the afternoon, required increased assist for transfers  Patient currently requires mod assist for transfers/mobility with RW, mod -max assist for ADLs  Patient will continue to benefit from skilled inpatient OT services in order to maximize functional independence and prepare for safe discharge  Continue OT per POC       OT Discharge Recommendation: Short Term Rehab

## 2020-03-17 NOTE — PLAN OF CARE
Problem: Potential for Falls  Goal: Patient will remain free of falls  Description  INTERVENTIONS:  - Assess patient frequently for physical needs  -  Identify cognitive and physical deficits and behaviors that affect risk of falls    -  [place] fall precautions as indicated by assessment   - Educate patient/family on patient safety including physical limitations  - Instruct patient to call for assistance with activity based on assessment  - Modify environment to reduce risk of injury  - Consider OT/PT consult to assist with strengthening/mobility  Outcome: Progressing     Problem: Prexisting or High Potential for Compromised Skin Integrity  Goal: Skin integrity is maintained or improved  Description  INTERVENTIONS:  - Identify patients at risk for skin breakdown  - Assess and monitor skin integrity  - Assess and monitor nutrition and hydration status  - Monitor labs   - Assess for incontinence   - Turn and reposition patient  - Assist with mobility/ambulation  - Relieve pressure over bony prominences  - Avoid friction and shearing  - Provide appropriate hygiene as needed including keeping skin clean and dry  - Evaluate need for skin moisturizer/barrier cream  - Collaborate with interdisciplinary team   - Patient/family teaching  - Consider wound care consult   Outcome: Progressing     Problem: PAIN - ADULT  Goal: Verbalizes/displays adequate comfort level or baseline comfort level  Description  Interventions:  - Encourage patient to monitor pain and request assistance  - Assess pain using appropriate pain scale  - Administer analgesics based on type and severity of pain and evaluate response  - Implement non-pharmacological measures as appropriate and evaluate response  - Consider cultural and social influences on pain and pain management  - Notify physician/advanced practitioner if interventions unsuccessful or patient reports new pain  Outcome: Progressing     Problem: INFECTION - ADULT  Goal: Absence or prevention of progression during hospitalization  Description  INTERVENTIONS:  - Assess and monitor for signs and symptoms of infection  - Monitor lab/diagnostic results  - Monitor all insertion sites, i e  indwelling lines, tubes, and drains  - Monitor endotracheal if appropriate and nasal secretions for changes in amount and color  - [place] appropriate cooling/warming therapies per order  - Administer medications as ordered  - Instruct and encourage patient and family to use good hand hygiene technique  - Identify and instruct in appropriate isolation precautions for identified infection/condition  Outcome: Progressing  Goal: Absence of fever/infection during neutropenic period  Description  INTERVENTIONS:  - Monitor WBC    Outcome: Progressing     Problem: SAFETY ADULT  Goal: Maintain or return to baseline ADL function  Description  INTERVENTIONS:  -  Assess patient's ability to carry out ADLs; assess patient's baseline for ADL function and identify physical deficits which impact ability to perform ADLs (bathing, care of mouth/teeth, toileting, grooming, dressing, etc )  - Assess/evaluate cause of self-care deficits   - Assess range of motion  - Assess patient's mobility; develop plan if impaired  - Assess patient's need for assistive devices and provide as appropriate  - Encourage maximum independence but intervene and supervise when necessary  - Involve family in performance of ADLs  - Assess for home care needs following discharge   - Consider OT consult to assist with ADL evaluation and planning for discharge  - Provide patient education as appropriate  Outcome: Progressing  Goal: Maintain or return mobility status to optimal level  Description  INTERVENTIONS:  - Assess patient's baseline mobility status (ambulation, transfers, stairs, etc )    - Identify cognitive and physical deficits and behaviors that affect mobility  - Identify mobility aids required to assist with transfers and/or ambulation (gait belt, sit-to-stand, lift, walker, cane, etc )  - [place] fall precautions as indicated by assessment  - Record patient progress and toleration of activity level on Mobility SBAR; progress patient to next Phase/Stage  - Instruct patient to call for assistance with activity based on assessment  - Consider rehabilitation consult to assist with strengthening/weightbearing, etc   Outcome: Progressing     Problem: DISCHARGE PLANNING  Goal: Discharge to home or other facility with appropriate resources  Description  INTERVENTIONS:  - Identify barriers to discharge w/patient and caregiver  - Arrange for needed discharge resources and transportation as appropriate  - Identify discharge learning needs (meds, wound care, etc )  - Arrange for interpretive services to assist at discharge as needed  - Refer to Case Management Department for coordinating discharge planning if the patient needs post-hospital services based on physician/advanced practitioner order or complex needs related to functional status, cognitive ability, or social support system  Outcome: Progressing     Problem: Knowledge Deficit  Goal: Patient/family/caregiver demonstrates understanding of disease process, treatment plan, medications, and discharge instructions  Description  Complete learning assessment and assess knowledge base    Interventions:  - Provide teaching at level of understanding  - Provide teaching via preferred learning methods  Outcome: Progressing     Problem: CARDIOVASCULAR - ADULT  Goal: Maintains optimal cardiac output and hemodynamic stability  Description  INTERVENTIONS:  - Monitor I/O, vital signs and rhythm  - Monitor for S/S and trends of decreased cardiac output  - Administer and titrate ordered vasoactive medications to optimize hemodynamic stability  - Assess quality of pulses, skin color and temperature  - Assess for signs of decreased coronary artery perfusion  - Instruct patient to report change in severity of symptoms  Outcome: Progressing  Goal: Absence of cardiac dysrhythmias or at baseline rhythm  Description  INTERVENTIONS:  - Continuous cardiac monitoring, vital signs, obtain 12 lead EKG if ordered  - Administer antiarrhythmic and heart rate control medications as ordered  - Monitor electrolytes and administer replacement therapy as ordered  Outcome: Progressing     Problem: RESPIRATORY - ADULT  Goal: Achieves optimal ventilation and oxygenation  Description  INTERVENTIONS:  - Assess for changes in respiratory status  - Assess for changes in mentation and behavior  - Position to facilitate oxygenation and minimize respiratory effort  - Oxygen administered by appropriate delivery if ordered  - Initiate smoking cessation education as indicated  - Encourage broncho-pulmonary hygiene including cough, deep breathe, Incentive Spirometry  - Assess the need for suctioning and aspirate as needed  - Assess and instruct to report SOB or any respiratory difficulty  - Respiratory Therapy support as indicated  Outcome: Progressing     Problem: GENITOURINARY - ADULT  Goal: Maintains or returns to baseline urinary function  Description  INTERVENTIONS:  - Assess urinary function  - Encourage oral fluids to ensure adequate hydration if ordered  - Administer IV fluids as ordered to ensure adequate hydration  - Administer ordered medications as needed  - Offer frequent toileting  - Follow urinary retention protocol if ordered  Outcome: Progressing  Goal: Absence of urinary retention  Description  INTERVENTIONS:  - Assess patient's ability to void and empty bladder  - Monitor I/O  - Bladder scan as needed  - Discuss with physician/AP medications to alleviate retention as needed  - Discuss catheterization for long term situations as appropriate   Outcome: Progressing  Goal: Urinary catheter remains patent  Description  INTERVENTIONS:  - Assess patency of urinary catheter  - If patient has a chronic luis, consider changing catheter if non-functioning  - Follow guidelines for intermittent irrigation of non-functioning urinary catheter  Outcome: Progressing     Problem: METABOLIC, FLUID AND ELECTROLYTES - ADULT  Goal: Electrolytes maintained within normal limits  Description  INTERVENTIONS:  - Monitor labs and assess patient for signs and symptoms of electrolyte imbalances  - Administer electrolyte replacement as ordered  - Monitor response to electrolyte replacements, including repeat lab results as appropriate  - Instruct patient on fluid and nutrition as appropriate  Outcome: Progressing  Goal: Fluid balance maintained  Description  INTERVENTIONS:  - Monitor labs   - Monitor I/O and WT  - Instruct patient on fluid and nutrition as appropriate  - Assess for signs & symptoms of volume excess or deficit  Outcome: Progressing  Goal: Glucose maintained within target range  Description  INTERVENTIONS:  - Monitor Blood Glucose as ordered  - Assess for signs and symptoms of hyperglycemia and hypoglycemia  - Administer ordered medications to maintain glucose within target range  - Assess nutritional intake and initiate nutrition service referral as needed  Outcome: Progressing     Problem: SKIN/TISSUE INTEGRITY - ADULT  Goal: Skin integrity remains intact  Description  INTERVENTIONS  - Identify patients at risk for skin breakdown  - Assess and monitor skin integrity  - Assess and monitor nutrition and hydration status  - Monitor labs (i e  albumin)  - Assess for incontinence   - Turn and reposition patient  - Assist with mobility/ambulation  - Relieve pressure over bony prominences  - Avoid friction and shearing  - Provide appropriate hygiene as needed including keeping skin clean and dry  - Evaluate need for skin moisturizer/barrier cream  - Collaborate with interdisciplinary team (i e  Nutrition, Rehabilitation, etc )   - Patient/family teaching  Outcome: Progressing     Problem: MUSCULOSKELETAL - ADULT  Goal: Maintain or return mobility to safest level of function  Description  INTERVENTIONS:  - Assess patient's ability to carry out ADLs; assess patient's baseline for ADL function and identify physical deficits which impact ability to perform ADLs (bathing, care of mouth/teeth, toileting, grooming, dressing, etc )  - Assess/evaluate cause of self-care deficits   - Assess range of motion  - Assess patient's mobility  - Assess patient's need for assistive devices and provide as appropriate  - Encourage maximum independence but intervene and supervise when necessary  - Involve family in performance of ADLs  - Assess for home care needs following discharge   - Consider OT consult to assist with ADL evaluation and planning for discharge  - Provide patient education as appropriate  Outcome: Progressing

## 2020-03-17 NOTE — ASSESSMENT & PLAN NOTE
Lab Results   Component Value Date    HGBA1C >14 0 (H) 03/12/2020       Recent Labs     03/16/20 2038 03/17/20  0734 03/17/20  0916 03/17/20  1128   POCGLU 201* 67 90 92       Blood Sugar Average: Last 72 hrs:  (P) 583 9680     · Patient with HHS on admission, now resolved  · Started levemir 9 units daily  · Continue ISS with meals  · Patient will need endocrine follow up after DC  · Nutrition consult for DM education

## 2020-03-18 VITALS
HEIGHT: 69 IN | TEMPERATURE: 98.5 F | SYSTOLIC BLOOD PRESSURE: 108 MMHG | DIASTOLIC BLOOD PRESSURE: 58 MMHG | OXYGEN SATURATION: 95 % | RESPIRATION RATE: 18 BRPM | BODY MASS INDEX: 25.47 KG/M2 | HEART RATE: 91 BPM | WEIGHT: 171.96 LBS

## 2020-03-18 LAB
ANION GAP SERPL CALCULATED.3IONS-SCNC: 5 MMOL/L (ref 4–13)
BUN SERPL-MCNC: 34 MG/DL (ref 5–25)
CALCIUM SERPL-MCNC: 8.4 MG/DL (ref 8.3–10.1)
CHLORIDE SERPL-SCNC: 103 MMOL/L (ref 100–108)
CO2 SERPL-SCNC: 28 MMOL/L (ref 21–32)
CREAT SERPL-MCNC: 1.05 MG/DL (ref 0.6–1.3)
ERYTHROCYTE [DISTWIDTH] IN BLOOD BY AUTOMATED COUNT: 13.2 % (ref 11.6–15.1)
GFR SERPL CREATININE-BSD FRML MDRD: 66 ML/MIN/1.73SQ M
GLUCOSE SERPL-MCNC: 118 MG/DL (ref 65–140)
GLUCOSE SERPL-MCNC: 54 MG/DL (ref 65–140)
GLUCOSE SERPL-MCNC: 71 MG/DL (ref 65–140)
GLUCOSE SERPL-MCNC: 81 MG/DL (ref 65–140)
HCT VFR BLD AUTO: 40.4 % (ref 36.5–49.3)
HGB BLD-MCNC: 13.3 G/DL (ref 12–17)
MAGNESIUM SERPL-MCNC: 2.1 MG/DL (ref 1.6–2.6)
MCH RBC QN AUTO: 36.6 PG (ref 26.8–34.3)
MCHC RBC AUTO-ENTMCNC: 32.9 G/DL (ref 31.4–37.4)
MCV RBC AUTO: 111 FL (ref 82–98)
PLATELET # BLD AUTO: 182 THOUSANDS/UL (ref 149–390)
PMV BLD AUTO: 11.3 FL (ref 8.9–12.7)
POTASSIUM SERPL-SCNC: 4 MMOL/L (ref 3.5–5.3)
RBC # BLD AUTO: 3.63 MILLION/UL (ref 3.88–5.62)
SODIUM SERPL-SCNC: 136 MMOL/L (ref 136–145)
WBC # BLD AUTO: 13.51 THOUSAND/UL (ref 4.31–10.16)

## 2020-03-18 PROCEDURE — 80048 BASIC METABOLIC PNL TOTAL CA: CPT | Performed by: STUDENT IN AN ORGANIZED HEALTH CARE EDUCATION/TRAINING PROGRAM

## 2020-03-18 PROCEDURE — 93005 ELECTROCARDIOGRAM TRACING: CPT

## 2020-03-18 PROCEDURE — 85027 COMPLETE CBC AUTOMATED: CPT | Performed by: STUDENT IN AN ORGANIZED HEALTH CARE EDUCATION/TRAINING PROGRAM

## 2020-03-18 PROCEDURE — 83735 ASSAY OF MAGNESIUM: CPT | Performed by: STUDENT IN AN ORGANIZED HEALTH CARE EDUCATION/TRAINING PROGRAM

## 2020-03-18 PROCEDURE — 99239 HOSP IP/OBS DSCHRG MGMT >30: CPT | Performed by: STUDENT IN AN ORGANIZED HEALTH CARE EDUCATION/TRAINING PROGRAM

## 2020-03-18 PROCEDURE — 82948 REAGENT STRIP/BLOOD GLUCOSE: CPT

## 2020-03-18 RX ORDER — BISACODYL 10 MG
10 SUPPOSITORY, RECTAL RECTAL DAILY PRN
Qty: 12 SUPPOSITORY | Refills: 0
Start: 2020-03-18

## 2020-03-18 RX ORDER — LANOLIN ALCOHOL/MO/W.PET/CERES
6 CREAM (GRAM) TOPICAL
Refills: 0
Start: 2020-03-18

## 2020-03-18 RX ORDER — POLYETHYLENE GLYCOL 3350 17 G/17G
17 POWDER, FOR SOLUTION ORAL DAILY PRN
Status: DISCONTINUED | OUTPATIENT
Start: 2020-03-18 | End: 2020-03-18 | Stop reason: HOSPADM

## 2020-03-18 RX ORDER — POLYETHYLENE GLYCOL 3350 17 G/17G
17 POWDER, FOR SOLUTION ORAL DAILY PRN
Qty: 14 EACH | Refills: 0
Start: 2020-03-18

## 2020-03-18 RX ORDER — ASPIRIN 81 MG/1
81 TABLET, CHEWABLE ORAL DAILY
Refills: 0
Start: 2020-03-19

## 2020-03-18 RX ADMIN — TORSEMIDE 10 MG: 10 TABLET ORAL at 09:15

## 2020-03-18 RX ADMIN — PANTOPRAZOLE SODIUM 40 MG: 40 TABLET, DELAYED RELEASE ORAL at 05:14

## 2020-03-18 RX ADMIN — BACITRACIN ZINC 1 SMALL APPLICATION: 500 OINTMENT TOPICAL at 09:16

## 2020-03-18 RX ADMIN — LISINOPRIL 2.5 MG: 2.5 TABLET ORAL at 09:16

## 2020-03-18 RX ADMIN — ENOXAPARIN SODIUM 30 MG: 30 INJECTION SUBCUTANEOUS at 09:16

## 2020-03-18 RX ADMIN — METOPROLOL TARTRATE 12.5 MG: 25 TABLET ORAL at 09:15

## 2020-03-18 RX ADMIN — ASPIRIN 81 MG 81 MG: 81 TABLET ORAL at 09:15

## 2020-03-18 RX ADMIN — DIGOXIN 125 MCG: 125 TABLET ORAL at 09:15

## 2020-03-18 RX ADMIN — DOCUSATE SODIUM 100 MG: 100 CAPSULE, LIQUID FILLED ORAL at 09:16

## 2020-03-18 NOTE — [place] UNIVERSAL TRANSFER FORM
[place] UNIVERSAL TRANSFER FORM  (ALL ITEMS MUST BE COMPLETED)    1  TRANSFER FROM: [facility] TO: [facility]    2  DATE OF TRANSFER: 3/18/2020                        TIME OF TRANSFER: 1230    3  PATIENT NAME: GORDON Rush      YOB: 1940                             GENDER: male    4  LANGUAGE:   English    5  PHYSICIAN NAME:  Mary Dennison MD                   PHONE: [phone]    6  CODE STATUS: Level 3 - DNAR and DNI        Out of Hospital DNR Attached: No    7  :                                      :  Extended Emergency Contact Information  Primary Emergency Contact: Gena Lynch[address] Phone: [phone]  Mobile Phone: [phone]  Relation: Other  Secondary Emergency Contact: Sly Delong   [address] Phone: [phone]  Mobile Phone: [phone]  Relation: [address] Representative/Proxy:  No           Legal Guardian:  No             NAME OF:           HEALTH CARE REPRESENTATIVE/PROXY:                                         OR           LEGAL GUARDIAN, IF NOT :                                               PHONE:  (Day)           (Night)                        (Cell)    8  REASON FOR TRANSFER: (Must include brief medical history and recent changes in physical function or cognition ) rehab due to deconditioning, chf, dm            V/S: /58 (BP Location: Right arm)   Pulse 91   Temp 98 5 °F (36 9 °C) (Oral)   Resp 18   Ht 5' 8 5" (1 74 m)   Wt 78 kg (171 lb 15 3 oz)   SpO2 95%   BMI 25 77 kg/m²           PAIN: None    9  PRIMARY DIAGNOSIS: Hyperglycemia without ketosis      Secondary Diagnosis:         Pacemaker: Yes      Internal Defib: No          Mental Health Diagnosis (if Applicable):    10  RESTRAINTS: No     11  RESPIRATORY NEEDS: None    12  ISOLATION/PRECAUTION: None    13   ALLERGY: Patient has no known allergies  14  SENSORY:       Vision Glasses, Hearing Poor and Speech Clear    15  SKIN CONDITION: Yes:  [place] left arm    16  DIET: Special (describe)cardiac    17  IV ACCESS: None    18  PERSONAL ITEMS SENT WITH PATIENT: Glasses    19  ATTACHED DOCUMENTS: MUST ATTACH CURRENT MEDICATION INFORMATION Face Sheet, MAR, Medication Reconciliation, Diagnostic Studies, Discharge Summary, PT Note, OT Note and HX/PE    20  AT RISK ALERTS:Falls and Pressure Ulcer        HARM TO: N/A    21  WEIGHT BEARING STATUS:         Left Leg: Full        Right Leg: Full    22  MENTAL STATUS:Alert and Forgetful    23  FUNCTION:        Walk: With Help        Transfer: With Help        Toilet: With Help        Feed: With Help    24  IMMUNIZATIONS/SCREENING:     Immunization History   Administered Date(s) Administered    Influenza, high dose seasonal 0 5 mL 10/31/2018    Pneumococcal Conjugate 13-Valent 05/16/2018       25  BOWEL: Continent and Date Last BM3/16    32  BLADDER: Continent    27   SENDING FACILITY CONTACT: michell rai                  Title: [facility]ak        Unit: [address] [place] [address]        Phone: [phone] [address] (if known):        Title:        Unit:         Phone:         FORM PREFILLED BY (if applicable)       Title:       Unit:        Phone:         FORM COMPLETED BY Rajat Soto RN      Title: rn      Phone: [phone]

## 2020-03-18 NOTE — SOCIAL WORK
Discharge ordered  Patient will be discharged to [facility] today for STR  Transportation arranged with [facility] at 12:30 pm  Family, Nurse, Facility made aware

## 2020-03-18 NOTE — NURSING NOTE
Patient discharge to Facility by transporter  IV line removed before discharge  Patients med's instruction discussed using a teach back method  Rx called into the pharmacy  Patients belongings taken with patient  Report given to [facility]

## 2020-03-19 LAB
ATRIAL RATE: 103 BPM
ATRIAL RATE: 113 BPM
ATRIAL RATE: 78 BPM
ATRIAL RATE: 92 BPM
ATRIAL RATE: 92 BPM
ATRIAL RATE: 93 BPM
P AXIS: 51 DEGREES
P AXIS: 51 DEGREES
P AXIS: 57 DEGREES
P AXIS: 61 DEGREES
P AXIS: 76 DEGREES
P AXIS: 79 DEGREES
PR INTERVAL: 180 MS
PR INTERVAL: 200 MS
PR INTERVAL: 208 MS
PR INTERVAL: 210 MS
PR INTERVAL: 212 MS
PR INTERVAL: 220 MS
QRS AXIS: -15 DEGREES
QRS AXIS: -17 DEGREES
QRS AXIS: -27 DEGREES
QRS AXIS: -29 DEGREES
QRS AXIS: -82 DEGREES
QRS AXIS: 186 DEGREES
QRSD INTERVAL: 120 MS
QRSD INTERVAL: 122 MS
QRSD INTERVAL: 124 MS
QRSD INTERVAL: 138 MS
QT INTERVAL: 366 MS
QT INTERVAL: 370 MS
QT INTERVAL: 374 MS
QT INTERVAL: 376 MS
QT INTERVAL: 382 MS
QT INTERVAL: 444 MS
QTC INTERVAL: 434 MS
QTC INTERVAL: 452 MS
QTC INTERVAL: 457 MS
QTC INTERVAL: 467 MS
QTC INTERVAL: 500 MS
QTC INTERVAL: 506 MS
T WAVE AXIS: 100 DEGREES
T WAVE AXIS: 141 DEGREES
T WAVE AXIS: 143 DEGREES
T WAVE AXIS: 143 DEGREES
T WAVE AXIS: 147 DEGREES
T WAVE AXIS: 23 DEGREES
VENTRICULAR RATE: 103 BPM
VENTRICULAR RATE: 78 BPM
VENTRICULAR RATE: 81 BPM
VENTRICULAR RATE: 92 BPM
VENTRICULAR RATE: 92 BPM
VENTRICULAR RATE: 93 BPM

## 2020-03-19 PROCEDURE — 93010 ELECTROCARDIOGRAM REPORT: CPT | Performed by: INTERNAL MEDICINE

## 2020-03-19 NOTE — ASSESSMENT & PLAN NOTE
Stable  Continue metoprolol at lower dose 12 5mg q12hrs  Restarted home lisinopril, demadex and spironolactone

## 2020-03-19 NOTE — DISCHARGE SUMMARY
Discharge- Haze So 1940, 78 y o  male MRN: [id number]    Unit/Bed#: [address] [place] [address] Encounter: [id number]    Primary Care Provider: Wolf Castillo DO   Date and time admitted to hospital: 3/11/2020 12:52 PM        Acute on chronic combined systolic and diastolic CHF (congestive heart failure) [facility])  Assessment & Plan  Wt Readings from Last 3 Encounters:   03/18/20 78 kg (171 lb 15 3 oz)   12/06/18 76 2 kg (168 lb)   11/18/18 80 2 kg (176 lb 12 9 oz)     History of ischemic cardiomyopathy  Previously declined life vest  prior echo from 2018 showed EF 20-25%, severe diffuse hypokinesis, grade 1 diastolic dysfunction  Abnormal nuclear stress test in 2018  Evidence of volume overload clinically and radiographically with peripheral edema and pleural effusion on admission  Echo on admission showed worsened EF 15-20%, severe diffuse hypokinesis  · Cardiology consulted, recs appreciated  · Patient was initially on dopamine gtt, now stopped  · Monitor intake output, daily weights- improved volume statue  · Restarted home torsemide 10mg daily   · Restarted home lisinopril 2 5mg daily    Thrombophlebitis right arm  Assessment & Plan  Patient with bilateral arm swelling, mild erythema    Initially thought to be cellulitis  XR left elbow showed soft tissue swelling  Duplex BUE US showed right arm with superficial thrombophlebitis  · Monitor off Abx  · Apply heat and elevate arm, supportive care    Atrial fibrillation [facility])  Assessment & Plan  History of AFib in the past  Not on anticoagulation due to history of GI bleed  Currently EKG with normal sinus rhythm  Telemetry showed NSR, times of Afib  Patient was previously on digoxin and metoprolol, both resumed    Benign essential hypertension  Assessment & Plan  Stable  Continue metoprolol at lower dose 12 5mg q12hrs  Restarted home lisinopril, demadex and spironolactone        Type 2 diabetes mellitus with hyperglycemia, without long-term current use of insulin [facility])  Assessment & Plan  Lab Results   Component Value Date    HGBA1C >14 0 (H) 03/12/2020       Recent Labs     03/17/20  2128 03/18/20  0739 03/18/20  0920 03/18/20  1119   POCGLU 214* 54* 118 81       Blood Sugar Average: Last 72 hrs:  (P) 131 [id number]     · Patient with HHS on admission, now resolved  · Started levemir 9 units daily  · Continue ISS with meals  · Patient will need endocrine follow up after DC  · Patients family was made aware of severely uncontrolled DM and need for treatment    Moderate episode of recurrent major depressive disorder [facility])  Assessment & Plan  Patient with depression for 4-5 years since his wifes death, with progressive decline  Patient DNR/DNI but denies suicidal ideation  He will need psych follow up after DC    Urinary retention  Assessment & Plan  Monitor post void residuals and bladder scans  Held Flomax initially, restarted on discharge    CKD (chronic kidney disease) stage 3, GFR 30-59 ml/min ([facility])  Assessment & Plan  Previous baseline appears to be 0 8-1    * Hyperglycemic hyperosmolar stateresolved as of 3/14/2020  Assessment & Plan  Presented with blood sugar of 823  pH-7 329, with normal anion gap and bicarb  Beta hydroxybutyrate is elevated, UA without any ketones  Patient is not currently on any medication for diabetes at home or does not check his blood sugar  Required insulin drip, then changed to sq  Monitor blood sugar q 2 hours  IV fluids          Discharging Physician / Practitioner: Chucho Strickland MD  PCP: Ashley Crowder DO  Admission Date: 3/11/2020  Discharge Date: 03/18/2020    Reason for Admission: Fall (Pt brought in via EMS fell at home walking to bathrrom hit head off of sink no LOC  As per EMS patient is on BT as poer patient and med list no BT noted  Patient is poor historian   PAtient was on floor for tashi  1 hour  )        Resolved Problems  Date Reviewed: 3/16/2020          Resolved    Hyponatremia 3/14/2020     Resolved by  Siena Thakur MIGUEL    Acute kidney injury ([address] ) 3/17/2020     Resolved by  Anjum Spence MD    Pseudomonas urinary tract infection 3/17/2020     Resolved by  Anjum Spence MD    Lactic acidosis 3/13/2020     Resolved by  Elvia Zuluaga PA-C    * (Principal) Hyperglycemic hyperosmolar state 3/14/2020     Resolved by  MIGUEL Garcia    Hypotension 3/16/2020     Resolved by  Anjum Spence MD    Encephalopathy 3/17/2020     Resolved by  Anjum Spence MD          Consultations During Hospital Stay:  IP CONSULT TO MEDICAL CRITICAL CARE  IP CONSULT TO CARDIOLOGY  IP CONSULT TO WOUND CARE    Procedures Performed:     · 2d echo: EF 15-20%, G2DD, severe diffuse hypokinesis  Significant Findings / Test Results:     ·   Results from last 7 days   Lab Units 03/18/20  0652 03/17/20  0623 03/16/20  0546   WBC Thousand/uL 13 51* 14 19* 9 50   HEMOGLOBIN g/dL 13 3 13 9 14 0   PLATELETS Thousands/uL 182 172 147*     Results from last 7 days   Lab Units 03/18/20  0652 03/17/20  0623 03/16/20  0546  03/13/20  0750   SODIUM mmol/L 136 135* 134*   < > 132*   POTASSIUM mmol/L 4 0 4 0 4 7   < > 4 7   CHLORIDE mmol/L 103 102 102   < > 98*   CO2 mmol/L 28 27 25   < > 27   BUN mg/dL 34* 33* 35*   < > 43*   CREATININE mg/dL 1 05 1 04 1 10   < > 1 57*   CALCIUM mg/dL 8 4 8 5 8 0*   < > 8 9   TOTAL BILIRUBIN mg/dL  --   --   --   --  1 20*   ALK PHOS U/L  --   --   --   --  123*   ALT U/L  --   --   --   --  25   AST U/L  --   --   --   --  25    < > = values in this interval not displayed               Lab Results   Component Value Date/Time    HGBA1C >14 0 (H) 03/12/2020 04:00 AM     Results from last 7 days   Lab Units 03/18/20  1119 03/18/20  0920 03/18/20  0739 03/17/20  2128 03/17/20  1617 03/17/20  1128 03/17/20  0916 03/17/20  0734 03/16/20  2038 03/16/20  1719 03/16/20  1216 03/15/20  2110   POC GLUCOSE mg/dl 81 118 54* 214* 91 92 90 67 201* 224* 219* 260*     Results from last 7 days   Lab Units 03/12/20  1007 03/12/20  0821   LACTIC ACID mmol/L 2  1* 2 7*     Blood Culture:   Lab Results   Component Value Date    BLOODCX No Growth After 5 Days  03/11/2020    BLOODCX No Growth After 5 Days  03/11/2020    BLOODCX No Growth After 5 Days  10/29/2018    BLOODCX No Growth After 5 Days  10/29/2018    BLOODCX Staphylococcus aureus (A) 10/25/2018    BLOODCX No Growth After 5 Days  10/25/2018     Urine Culture:   Lab Results   Component Value Date    URINECX 60,000-69,000 cfu/ml Pseudomonas aeruginosa (A) 03/11/2020    URINECX >100,000 cfu/ml  10/25/2018    URINECX >100,000 cfu/ml Klebsiella oxytoca (A) 05/09/2018     Sputum Culture: No components found for: SPUTUMCX  Wound Culture:   Lab Results   Component Value Date    WOUNDCULT 4+ Growth of  03/11/2020    WOUNDCULT 4+ Growth of Staphylococcus aureus (A) 10/26/2018    WOUNDCULT 4+ Growth of Staphylococcus aureus (A) 10/25/2018        VAS upper limb venous duplex scan, unilateral/limited   Final Result by Mari Santo MD (03/17 1532)      XR elbow 3+ views LEFT   Final Result by Phi Vidal MD (03/11 1501)      Swelling  No acute fracture seen  Some degenerative bone spurring is seen  Slightly limited lateral view  Workstation performed: [id number]         CT head without contrast   Final Result by Dominguez Viera MD (03/11 1431)      No acute intracranial abnormality  Workstation performed: [id number]         CT cervical spine without contrast   Final Result by Dominguez Viera MD (03/11 1427)      1  No cervical spine fracture or traumatic malalignment  2   Bilateral pleural effusions, larger on the right  Workstation performed: [id number]         XR chest 1 view portable   Final Result by Ember Posadas MD (03/11 1445)      Congestive failure and small moderate right pleural effusion              Workstation performed: [id number]                Incidental Findings:   ·      Test Results Pending at Discharge (will require follow up):   ·      Outpatient Tests Requested:  ·     Complications:  none    Reason for Admission:   Chief Complaint   Patient presents with    Fall     Pt brought in via EMS fell at home walking to bathrrom hit head off of sink no LOC  As per EMS patient is on BT as poer patient and med list no BT noted  Patient is poor historian  PAtient was on floor for tashi  1 hour  Hospital Course:     Per HPI: Ericka Stanton is a 78 y o  male patient with a PMH of CAD status post CABG , ischemic cardiomyopathy (EF-20-25%, severe diffuse hypokinesis, grade 1 diastolic dysfunction, 4746), diabetes mellitus type 2, AFib, dyslipidemia, hypertension, history of GI bleed, dementia who was brought to ED after a fall  Patient is poor historian and history obtained from the daughter-in-law was present at the bedside  As per the daughter and patient not been eating well for last few months has been having increasing fall about 3 over last month, episodes of hallucinations  She thinks that this may be because of his blood sugar as he does not check his blood sugar as he passes out after seeing blood  She also reported that patient has not seen any physician for few months and does not want to follow-up with the doctors  Today patient was found by his son after a fall lying face down  As per the patient if fell while walking to the bathroom as he lost his balance he denies any loss of consciousness but reports hitting his head to the sink  Patient was on the floor approximately for 1 hour as per history and his son could not get him up and EMS was called and patient was brought to ED  In ED, patient was hypothermic other vital signs were stable  noted to have blood sugar of 823 elevated creatinine, elevated proBNP lactic acidosis  Beta hydroxybutyrate was elevated but anion gap and pH was within normal limit  Patient was also noted to have wound over left forearm which he sustained from the 1 of the prior fall    Cultures were obtained in ED, CT head and C-spine was without any acute abnormality  Patient was given IV fluids and started on insulin drip  Case was reviewed with Critical Care Team and patient was deemed stable for general medical floor and subsequently admitted  Patient is currently lying comfortably in bed answering appropriately denies any chest pain shortness of breath or abdominal pain  He fails to report any urinary symptoms and reports mild discomfort at left arm wound  Hospital Course:    Please see above list of diagnoses and related plan for additional information  Condition at Discharge: stable       Discharge Day Visit / Exam:     Subjective:  Patient feels good, wants to go home  Vitals: Blood Pressure: 108/58 (03/18/20 1237)  Pulse: 91 (03/18/20 1121)  Temperature: 98 5 °F (36 9 °C) (03/18/20 1121)  Temp Source: Oral (03/18/20 1121)  Respirations: 18 (03/18/20 1121)  Height: 5' 8 5" (174 cm) (03/12/20 0000)  Weight - Scale: 78 kg (171 lb 15 3 oz) (03/18/20 0600)  SpO2: 95 % (03/18/20 1121)  Exam:   Physical Exam   Constitutional: He appears well-developed  No distress  HENT:   Head: Normocephalic and atraumatic  Cardiovascular: Normal rate, regular rhythm and normal heart sounds  No murmur heard  Pulmonary/Chest: Effort normal and breath sounds normal  No respiratory distress  He has no wheezes  He has no rales  Abdominal: Soft  Bowel sounds are normal  He exhibits no distension  There is no tenderness  There is no rebound  Neurological: He is alert  Skin: Skin is warm and dry  Psychiatric: He has a normal mood and affect  His behavior is normal    Nursing note and vitals reviewed  Discharge instructions/Information to patient and family:   See after visit summary for information provided to patient and family  Provisions for Follow-Up Care:  See after visit summary for information related to follow-up care and any pertinent home health orders        Disposition:     Acute Rehab at [facility]    Planned Readmission: no     Discharge Statement:  I spent >30 minutes discharging the patient  This time was spent on the day of discharge  I had direct contact with the patient on the day of discharge  Greater than 50% of the total time was spent examining patient, answering all patient questions, arranging and discussing plan of care with patient as well as directly providing post-discharge instructions  Additional time then spent on discharge activities  Discharge Medications:  See after visit summary for reconciled discharge medications provided to patient and family        ** Please Note: This note has been constructed using a voice recognition system **

## 2020-03-19 NOTE — ASSESSMENT & PLAN NOTE
History of AFib in the past  Not on anticoagulation due to history of GI bleed  Currently EKG with normal sinus rhythm  Telemetry showed NSR, times of Afib  Patient was previously on digoxin and metoprolol, both resumed

## 2020-03-19 NOTE — ASSESSMENT & PLAN NOTE
Presented with blood sugar of 823  pH-7 329, with normal anion gap and bicarb  Beta hydroxybutyrate is elevated, UA without any ketones  Patient is not currently on any medication for diabetes at home or does not check his blood sugar  Required insulin drip, then changed to sq  Monitor blood sugar q 2 hours  IV fluids

## 2020-03-19 NOTE — ASSESSMENT & PLAN NOTE
Lab Results   Component Value Date    HGBA1C >14 0 (H) 03/12/2020       Recent Labs     03/17/20  2128 03/18/20  0739 03/18/20  0920 03/18/20  1119   POCGLU 214* 54* 118 81       Blood Sugar Average: Last 72 hrs:  (P) 131 [id number]     · Patient with HHS on admission, now resolved  · Started levemir 9 units daily  · Continue ISS with meals  · Patient will need endocrine follow up after DC  · Patients family was made aware of severely uncontrolled DM and need for treatment

## 2020-03-19 NOTE — ASSESSMENT & PLAN NOTE
Wt Readings from Last 3 Encounters:   03/18/20 78 kg (171 lb 15 3 oz)   12/06/18 76 2 kg (168 lb)   11/18/18 80 2 kg (176 lb 12 9 oz)     History of ischemic cardiomyopathy  Previously declined life vest  prior echo from 2018 showed EF 20-25%, severe diffuse hypokinesis, grade 1 diastolic dysfunction  Abnormal nuclear stress test in 2018  Evidence of volume overload clinically and radiographically with peripheral edema and pleural effusion on admission  Echo on admission showed worsened EF 15-20%, severe diffuse hypokinesis  · Cardiology consulted, recs appreciated  · Patient was initially on dopamine gtt, now stopped     · Monitor intake output, daily weights- improved volume statue  · Restarted home torsemide 10mg daily   · Restarted home lisinopril 2 5mg daily

## 2020-03-20 ENCOUNTER — PATIENT OUTREACH (OUTPATIENT)
Dept: CASE MANAGEMENT | Facility: OTHER | Age: 80
End: 2020-03-20

## 2020-03-20 NOTE — PROGRESS NOTES
Inbasket notification received for new bundle  Email with bundle communication tool sent to facility with bundle dates, DRG, and ELOS  This care manager will continue to monitor via chart and CarePort review throughout bundle episode

## 2020-03-24 ENCOUNTER — PATIENT OUTREACH (OUTPATIENT)
Dept: CASE MANAGEMENT | Facility: OTHER | Age: 80
End: 2020-03-24

## 2020-04-02 ENCOUNTER — PATIENT OUTREACH (OUTPATIENT)
Dept: CASE MANAGEMENT | Facility: OTHER | Age: 80
End: 2020-04-02

## 2020-04-06 ENCOUNTER — PATIENT OUTREACH (OUTPATIENT)
Dept: CASE MANAGEMENT | Facility: OTHER | Age: 80
End: 2020-04-06

## 2020-04-08 ENCOUNTER — PATIENT OUTREACH (OUTPATIENT)
Dept: CASE MANAGEMENT | Facility: OTHER | Age: 80
End: 2020-04-08

## 2020-04-15 ENCOUNTER — PATIENT OUTREACH (OUTPATIENT)
Dept: CASE MANAGEMENT | Facility: OTHER | Age: 80
End: 2020-04-15

## 2020-04-27 ENCOUNTER — PATIENT OUTREACH (OUTPATIENT)
Dept: CASE MANAGEMENT | Facility: OTHER | Age: 80
End: 2020-04-27

## 2020-05-06 ENCOUNTER — PATIENT OUTREACH (OUTPATIENT)
Dept: CASE MANAGEMENT | Facility: OTHER | Age: 80
End: 2020-05-06

## 2020-05-07 ENCOUNTER — EPISODE CHANGES (OUTPATIENT)
Dept: CASE MANAGEMENT | Facility: HOSPITAL | Age: 80
End: 2020-05-07

## 2021-10-21 NOTE — ANESTHESIA POSTPROCEDURE EVALUATION
Post-Op Assessment Note      CV Status:  Stable    Mental Status:  Awake and somnolent    Hydration Status:  Euvolemic    PONV Controlled:  Controlled    Airway Patency:  Patent    Post Op Vitals Reviewed: Yes          Staff: Anesthesiologist           /74 (11/04/18 1005)    Temp (!) (P) 97 °F (36 1 °C) (11/04/18 1000)    Pulse (!) 117 (11/04/18 1005)   Resp (!) 25 (11/04/18 1005)    SpO2 100 % (11/04/18 1005) Detail Level: Zone Plan: Refills given at time of visit Continue Regimen: Hydroxyzine 25mg qhs, Clobetasol 0.05% cream bid x2 weeks on then x2 weeks off Initiate Treatment: Tacrolimus 0.1% ointment bid

## 2024-09-18 NOTE — PROGRESS NOTES
Daily Progress Note - Critical Care/ Stepdown   Ez Lund [de-identified] y o  male MRN: [id number]  Unit/Bed#: OR POOL Encounter: [id number]    ______________________________________________________________________  Assessment:   Principal Problem:    Septic shock ([address] )  Active Problems:    Necrotizing cellulitis of chest wall    Urinary tract infection    Respiratory infection    DM2 (diabetes mellitus, type 2) ([facility])    CAD (coronary artery disease)    CKD (chronic kidney disease) stage 3, GFR 30-59 ml/min ([facility])    Cardiomyopathy, ischemic    Atrial fibrillation ([facility])    Shingles    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[facility])    Iron deficiency anemia due to chronic blood loss  Resolved Problems:    Acute respiratory failure secondary to septic shock    Hyponatremia    CHRIS (acute kidney injury) ([facility])    Lactic acid acidosis    DKA (diabetic ketoacidoses) ([facility])    Lethargy    Sepsis ([facility])    Cellulitis of left axilla    Acute cystitis with hematuria      * Septic shock ([facility])   Assessment & Plan    · Improving  Vasopressor d/zaida  · Source likely presumed cellulitis/absess of chest wall with pneumonia and UTI  · Fluid resuscitation with 30ml/kg and started on vasopressors  · Continues on Levophed to mics  · Poor EF on echo  Cardiac index and output on Flow track remains acceptable  · [place] protocol - Stress Dose Steroids, Vit C, thiamine  · Lactic acid normalized  · ABX: cefepime, clindamycin and valacyclovir  Will continue  · ID consult  · Performed aggressive debridement in OR 10/26 of chest wall cellulitis, wound vac in place   Scant serosanguineous output  · Return to OR likely Monday to address posterior aspect of cellulitis  · Blood culture staph aureus x 1  · Wound culture: 3+ staph aureus   · Urine cultures > 100,000 mixed containments        Acute respiratory failure secondary to septic shockresolved as of 10/28/2018   Assessment & Plan    · Resolved    ·  Patient extubated on 10/26  · Continues on PT updated on plan of care. Verbalized understanding on plan.  Updated on any issues.  All needs met at this time.  Encouraged to notify nurse of needs.      nasal cannula  Wean as tolerated  Necrotizing cellulitis of chest wall   Assessment & Plan    · Aggressive  debridement in OR on 10/26 by Dr Paul Carroll, wound VAC in place  · Continue with antibiotics:  cefepime, clindamycin, and vacyclovir for HSV infection  · Plan to return to OR today for further debridement  · Wound VAC in place with scant serosanguineous output  · Area does not appear cellulitic in surrounding tissue       Urinary tract infection   Assessment & Plan    · Urinalysis concerning for possible urinary tract infection  Urine culture with > 100,000 CFU of mixed contaminants  · Continue cefepime     Respiratory infection   Assessment & Plan    · Present on admission as evidence by CT scan suggestive of infectious process  · No sputum culture to confirm pneumonia  · Respiratory status improved  Extubated on the 26th  Now 2 L nasal cannula  · Continue to treat with empiric antibiotics     Iron deficiency anemia due to chronic blood loss   Assessment & Plan    · Secondary to sepsis and acuity of condition  · Hemoglobin this morning 7 1  Transfuse 1 unit packed red blood cells in light of patient's pressor needs and cardiac dysfunction  · Repeat H&H 8 5     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HCC)   Assessment & Plan    · Known EF of 20%  · Judicious fluid resuscitation  · Consider diuresis for evidence of respiratory distress     Shingles   Assessment & Plan    · Patient tolerating p o  Intake  Transition to valacyclovir     Atrial fibrillation (HCC)   Assessment & Plan    · History of AFib in the setting of electrolyte abnormalities in the past   Monitor on telemetry  · Current Sinus Tachy  · Holding BB shock state and need for beta agonist therapy  · No indication for anticoagulation at this time     Cardiomyopathy, ischemic   Assessment & Plan    · EF 35 to 40%  Recent stress test in May of 2018 showed reversible ischemic changes    Cardiology following as an outpatient with medical management  · ECHO repeat with EF 20%  · Cardiac output and indexes while patient was intubated were acceptable  · Vasopressor d/zaida     CKD (chronic kidney disease) stage 3, GFR 30-59 ml/min ([facility])   Assessment & Plan    Baseline 0 9-1 3  Current creatinine 1 08  Adequate urine output at this time     CAD (coronary artery disease)   Assessment & Plan    Troponin negative  Will obtain ECHO today  EF 20%     DM2 (diabetes mellitus, type 2) [facility])   Assessment & Plan    Lab Results   Component Value Date    HGBA1C 12 0 (H) 10/26/2018       Recent Labs      10/28/18   0806  10/28/18   1010  10/28/18   1209  10/28/18   1632   POCGLU  188*  197*  211*  222*     · DKA resolved  · Blood sugar remains mildly elevated  Continue b i d  Lantus  Increase sliding scale algorithm  · Goal blood sugar under 180           Acute cystitis with hematuriaresolved as of 10/29/2018   Assessment & Plan    · Continue antibiotic regimen with cefepime, clindamycin, urine culture with greater than 100,000 colony-forming units of mixed roberto     Lethargyresolved as of 10/27/2018   Assessment & Plan    - improving  Patient awake and alert  Continue environmental controls        DKA (diabetic ketoacidoses) ([facility])resolved as of 10/26/2018   Assessment & Plan    Lab Results   Component Value Date    HGBA1C 12 0 (H) 10/26/2018       Recent Labs      10/28/18   0000  10/28/18   0806  10/28/18   1010  10/28/18   1209   POCGLU  206*  188*  197*  211*       Blood Sugar Average: Last 72 hrs:  (P) 281 [id number]     · DKA resolved  · Blood sugar remains mildly elevated  Continue b i d  Lantus  Increase sliding scale algorithm    · Goal blood sugar under 180     Lactic acid acidosisresolved as of 10/26/2018   Assessment & Plan    · Resolved  · Secondary to septic shock and DKA, trend  · Cleared  · CT CAP showing:  Left pectoral cellulitis with gas-forming pockets and right lower lobe pneumonia  · Continue to trend  · Dose with Thiamine  · Surgery consulted, went to OR for I%D  · Continue with vancomycin, clindamycin, cefepime, acyclovir     CHRIS (acute kidney injury) (HCC)resolved as of 10/27/2018   Assessment & Plan    · Resolved  · Likely Pre renal   Likely secondary to septic shock and DKA  · Improving with fluids  · FENa consistent with Pre-renal cause  · Continue fluid resuscitation and monitor urine output  · Gordon catheter in place     Hyponatremiaresolved as of 10/27/2018   Assessment & Plan    · Resolved  · Likely hypovolemic hyponatremia in the setting of dehydration, sepsis, and DKA  · Possibly a component of pseudo hyponatremia         Plan:    Neuro:   · Delirium: CAM ICU positive yes   · If yes, intervention: Strict environmental controls, frequent reorientation, melatonin for sleep tonight  · Pain controlled with: scheduled tylenol and prn Oxycodone  · Pain score: none  · Regulate sleep/wake cycle    CV:   · Severe sepsis, septic shock  Continue on 2 of levo  Vasopressin weaned off, c/w norepi, Likely from necrotizing skin infection, pneumonia, and UTI  Day #4 of antibiotics - Cefepime and Cllindamycin, Procalcitonin pending  · Combined Systolic and diastolic HF, no in acute exacerbation  · Received 1 unit PRBCs yesterday, consider diuresis if necessary     Pulm:   · Respiratory Insufficiency, continue with nasal canula as needed  · Extubated on 26th  · CT scan on showed PNA, c/w antibiotics    GI:   · Nutrition/diet plan: Mechanical Soft with thin liquid diet  · Stress ulcer prophylaxis: No prophylaxis needed  · Bowel regimen: [place]  · Last BM: PTA    FEN:   · No IV fluids  · Fluid/Diuretic plan: No intervention  · Goal 24 hour fluid balance: net even  · Electrolytes repleted: yes  · Goal: K >4 0, Mag >2 0, and Phos >3 0    :   · Indwelling Gordon present: yes   · Urinary catheter still needed for Strict I and O in a critically ill patient  ID:   · Severe Sepsis with septic shock secondary to left axilla necrotizing cellulitis  Scheduled for OR today for further debridement  Shock resolving, Levophed wean as tolerated  · Abx ordered: Cefepime and Clindamycin, valacyclovir   · Day # 4 of 7 day course  · Trend temps and WBC count    Heme:   · Anemia: likely secondary to severe sepsis, transfused 1 units PRBCs yesterday, hgb stable at 8 5  · Trend hgb and plts    Endo:   · Type 2DM, poorly controlled, c/w lantus bid with sliding scale     Msk/Skin:  · Mobility goal: out of bed with assisstance  · PT consult: yes  · OT consult: yes  · Frequent turning and off-loading    Family:  · Family updated within 24 hours: yes   · Family meeting planned today: no     Lines:  · Central venous access: Triple right femoral   · Keep central line today for meds requiring central line  · Peripheral IV  · Wound vac in place    VTE Prophylaxis:  · Pharmacologic Prophylaxis: Heparin  · Mechanical Prophylaxis: sequential compression device    Disposition: Continue ICU care    Code Status: Level 2 - DNAR: but accepts endotracheal intubation    Counseling / Coordination of Care  Total Critical Care time spent 25 minutes excluding procedures, teaching and family updates  ______________________________________________________________________    HPI/24hr events: Extubated, vasopressin discontinued over weekend  Continuing to wean levo  Transfused 1Unit PRBCs yesterday  No acute events overnight  Plan for OR debridement today      ______________________________________________________________________    Physical Exam:   Physical Exam   Constitutional: He is oriented to person, place, and time  He appears well-developed and well-nourished  No distress  HENT:   Head: Normocephalic and atraumatic  Neck: No JVD present  Cardiovascular: Normal rate, regular rhythm, normal heart sounds and intact distal pulses  Exam reveals no gallop and no friction rub  No murmur heard    Irregularly irregular rhythm    Pulmonary/Chest: Effort normal and breath sounds normal  No respiratory distress  He has no wheezes  He has no rales  On nasal canula O2, Diminished breath sounds, no wheezes or rhonci   Abdominal: Soft  Bowel sounds are normal  He exhibits no distension  There is no tenderness  There is no guarding  Musculoskeletal: He exhibits no edema  Neurological: He is alert and oriented to person, place, and time  Skin: Skin is warm and dry  Capillary refill takes less than 2 seconds  He is not diaphoretic  Wound vac in place on left axilla and anterior chest wall, skin excoriations on posterior scapula, clean and dry       ______________________________________________________________________  Vitals:    10/29/18 0700 10/29/18 0800 10/29/18 0848 10/29/18 0900   BP: 111/68 102/68  97/68   BP Location: Left arm      Pulse: 94 94  98   Resp: (!) 24 20  20   Temp: (!) 97 1 °F (36 2 °C)      TempSrc: Temporal      SpO2: 99% 100% 100% 99%   Weight:       Height:         Arterial Line BP: 143/132  Arterial Line MAP (mmHg): 140 mmHg     Temperature:   Temp (24hrs), Av 8 °F (36 6 °C), Min:97 1 °F (36 2 °C), Max:98 8 °F (37 1 °C)    Current Temperature: (!) 97 1 °F (36 2 °C)    Weights:   IBW: 69 55 kg    Body mass index is 22 36 kg/m²  Weight (last 2 days)     Date/Time   Weight    10/28/18 0500  67 7 (149 25)    10/27/18 0556  78 2 (172 4)              Hemodynamic Monitoring:  Frequent vital signs       Non-Invasive/Invasive Ventilation Settings:  Respiratory    Lab Data (Last 4 hours)    None         O2/Vent Data (Last 4 hours)    None              No results found for: PHART, FME4RXF, PO2ART, XYD5RET, A9VOLGLY, BEART, SOURCE  SpO2: SpO2: 99 %    Intake and Outputs:  I/O       10/27 0701 - 10/28 0700 10/28 0701 - 10/29 0700 10/29 0701 - 10/30 07    P  O   540     I V  (mL/kg) 1109 (16 4) 632 (9 3)     Blood  350     IV Piggyback 450 250     Feedings 40      Total Intake(mL/kg) 1599 (23 6) 1772 (26 2)     Urine (mL/kg/hr) 1295 (0 8) 1070 (0 7) 60 (1 5)    Drains 50 50 Total Output 1345 1120 60    Net +254 +652 -60                   Nutrition:        Diet Orders            Start     Ordered    10/29/18 0001  Diet NPO  Diet effective midnight     Question:  Diet Type  Answer:  NPO    10/28/18 1351    10/25/18 1947  Room Service  Once     Question:  Type of Service  Answer:  Room Service - Appropriate with Assistance    10/25/18 1946            Labs:     Results from last 7 days  Lab Units 10/29/18  0548 10/28/18  0456 10/27/18  0428 10/26/18  1003   WBC Thousand/uL 14 58* 16 88* 17 35* 17 71*   HEMOGLOBIN g/dL 8 5* 7 1* 8 0* 9 3*   HEMATOCRIT % 26 0* 21 1* 23 4* 25 8*   PLATELETS Thousands/uL 229 205 256 294   NEUTROS PCT %  --   --   --  89*   MONOS PCT %  --   --   --  4   MONO PCT MAN % 2* 3* 5  --        Results from last 7 days  Lab Units 10/29/18  0548 10/28/18  0456 10/27/18  0428  10/26/18  1004 10/26/18  0817 10/26/18  0732   SODIUM mmol/L 139 139 135*  < > 134*  --   --    POTASSIUM mmol/L 3 4* 3 7 4 3  < > 3 4*  --   --    CHLORIDE mmol/L 105 107 103  < > 101  --   --    CO2 mmol/L 25 24 25  < > 22  --   --    BUN mg/dL 40* 37* 42*  < > 66*  --   --    CREATININE mg/dL 1 32* 1 08 1 11  < > 1 49*  --   --    CALCIUM mg/dL 7 7* 7 8* 7 7*  < > 8 2*  --   --    ALK PHOS U/L 71  --  65  --  67  --   --    ALT U/L 20  --  12  --  12  --   --    AST U/L 22  --  17  --  14  --   --    GLUCOSE, ISTAT mg/dl  --   --   --   --   --  115 120   < > = values in this interval not displayed      Results from last 7 days  Lab Units 10/29/18  0548 10/28/18  0456 10/27/18  0428   MAGNESIUM mg/dL 2 2 2 2 2 1     Lab Results   Component Value Date    PHOS 2 6 10/28/2018    PHOS 2 8 10/27/2018    PHOS 1 9 (L) 10/26/2018        Results from last 7 days  Lab Units 10/29/18  0548 10/26/18  0526   INR  1 04 1 09   PTT seconds  --  27       0  Lab Value Date/Time   TROPONINI <0 02 10/25/2018 1534   TROPONINI <0 02 05/09/2018 1927       Results from last 7 days  Lab Units 10/26/18  1706 10/26/18  1004 10/26/18  0528   LACTIC ACID mmol/L 2 0 1 8 3 3*     ABG:  Lab Results   Component Value Date    PHART 7 461 (H) 10/27/2018    OEF9NPW 34 9 (L) 10/27/2018    PO2ART 134 2 (H) 10/27/2018    OBV9OPR 24 3 10/27/2018    BEART 0 6 10/27/2018    SOURCE Line, Arterial 10/27/2018       Imaging: CXR: pending        Micro:  Lab Results   Component Value Date    BLOODCX Staphylococcus aureus (A) 10/25/2018    BLOODCX No Growth at 72 hrs   10/25/2018    URINECX >100,000 cfu/ml  10/25/2018    URINECX >100,000 cfu/ml Klebsiella oxytoca (A) 05/09/2018    WOUNDCULT 4+ Growth of Staphylococcus aureus (A) 10/26/2018    WOUNDCULT 4+ Growth of Staphylococcus aureus (A) 10/25/2018       Allergies: No Known Allergies  Medications:   Scheduled Meds:    Current Facility-Administered Medications:  [MAR Hold] acetaminophen 650 mg Oral Q6H [facility] Belen Merida PA-C    [MAR Hold] IV infusion builder  Intravenous Q6H Rhea John PA-C Last Rate: 100 mL/hr at 10/29/18 0920   [MAR Hold] aspirin 81 mg Oral Daily Rhea John PA-C    [MAR Hold] atorvastatin 10 mg Oral Daily Brian Zabala PA-C    [MAR Hold] cefepime 1,000 mg Intravenous Q24H Rhea John PA-C Last Rate: Stopped (10/28/18 1720)   [MAR Hold] clindamycin 600 mg Intravenous Q8H Candis Killian PA-C Last Rate: 600 mg (10/29/18 0545)   [MAR Hold] collagenase 1 application Topical Daily Jihan Van MD    [facility] Hold] heparin (porcine) 5,000 Units Subcutaneous Q8H [facility] Rhea John PA-C    [facility] Hold] hydrocortisone sodium succinate 50 mg Intravenous Q6H [facility] Candis Killian PA-C    [facility] Hold] influenza vaccine 0 5 mL Intramuscular Prior to discharge Mercy Sin MD    [facility] Hold] insulin glargine 20 Units Subcutaneous HS Rhea John PA-C    [facility] Hold] insulin lispro 2-12 Units Subcutaneous TID AC Belen Tee PA-C    [MAR Hold] insulin lispro 2-12 Units Subcutaneous HS Belen Tee PA-C    [MAR Hold] melatonin 6 mg Oral HS Sonia Alexander PA-C norepinephrine 1-30 mcg/min Intravenous Titrated Ishmael Mcknight PA-C Last Rate: 3 mcg/min (10/29/18 1240)   [MAR Hold] oxyCODONE 5 mg Oral Q4H PRN MIGUEL Willis    [MAR Hold] senna 1 tablet Oral HS Marylu Tabor PA-C    [MAR Hold] thiamine 200 mg Intravenous Q12H Rhea John PA-C Last Rate: Stopped (10/29/18 0910)   [MAR Hold] valACYclovir 1,000 mg Oral Q8H Belen Tee PA-C      Continuous Infusions:    norepinephrine 1-30 mcg/min Last Rate: 3 mcg/min (10/29/18 1240)     PRN Meds:    [MAR Hold] influenza vaccine 0 5 mL Prior to discharge   [MAR Hold] oxyCODONE 5 mg Q4H PRN       Invasive lines and devices: Invasive Devices     Central Venous Catheter Line            CVC Central Lines 10/25/18 Triple Right Femoral 3 days          Peripheral Intravenous Line            Peripheral IV 10/25/18 Left Forearm 3 days          Drain            Negative Pressure Wound Therapy (V A C ) Chest Anterior; Left 3 days    Urethral Catheter Latex 16 Fr  3 days                   SIGNATURE: Crow Catherine MD  DATE: October 29, 2018

## (undated) DEVICE — GLOVE SRG BIOGEL 6.5

## (undated) DEVICE — NEEDLE 25G X 1 1/2

## (undated) DEVICE — PACK GENERAL LF

## (undated) DEVICE — 3M™ IOBAN™ 2 ANTIMICROBIAL INCISE DRAPE 6648EZ: Brand: IOBAN™ 2

## (undated) DEVICE — BETHLEHEM UNIVERSAL MINOR GEN: Brand: CARDINAL HEALTH

## (undated) DEVICE — ASTOUND STANDARD SURGICAL GOWN, XL: Brand: CONVERTORS

## (undated) DEVICE — SUT ETHILON 2-0 FS 18 IN 664H

## (undated) DEVICE — SYRINGE 10ML LL CONTROL TOP

## (undated) DEVICE — GLOVE SRG BIOGEL 7.5

## (undated) DEVICE — GLOVE SRG BIOGEL 7

## (undated) DEVICE — RESOLUTION CLIP 2.8MM X 235CM STR LF DISP

## (undated) DEVICE — TELFA NON-ADHERENT ABSORBENT DRESSING: Brand: TELFA

## (undated) DEVICE — INTENDED FOR TISSUE SEPARATION, AND OTHER PROCEDURES THAT REQUIRE A SHARP SURGICAL BLADE TO PUNCTURE OR CUT.: Brand: BARD-PARKER SAFETY BLADES SIZE 15, STERILE

## (undated) DEVICE — 3M™ IOBAN™ 2 ANTIMICROBIAL INCISE DRAPE 6650EZ: Brand: IOBAN™ 2

## (undated) DEVICE — GAUZE SPONGES,16 PLY: Brand: CURITY

## (undated) DEVICE — 60 ML SYRINGE,REGULAR TIP: Brand: MONOJECT

## (undated) DEVICE — MEDI-VAC YANKAUER SUCTION HANDLE: Brand: CARDINAL HEALTH

## (undated) DEVICE — AIRLIFE™  ADULT CUSHION NASAL CANNULA WITH 7 FOOT (2.1 M) CRUSH-RESISTANT OXYGEN TUBING, AND U/CONNECT-IT ADAPTER: Brand: AIRLIFE™

## (undated) DEVICE — TUBING BUBBLE CLEAR 5MM X 100 FT NS

## (undated) DEVICE — CURITY IDOFORM PACKING STRIP: Brand: CURITY

## (undated) DEVICE — "MH-438 A/W VLVE F/140 EVIS-140": Brand: AIR/WATER VALVE

## (undated) DEVICE — LUBRICANT SURGILUBE TUBE 4 OZ  FLIP TOP

## (undated) DEVICE — BIPOLAR ELECTROHEMOSTASIS CATHETER: Brand: GOLD PROBE 350CM

## (undated) DEVICE — TIBURON SPLIT SHEET: Brand: CONVERTORS

## (undated) DEVICE — DISPOSABLE BIOPSY VALVE MAJ-1555: Brand: SINGLE USE BIOPSY VALVE (STERILE)

## (undated) DEVICE — SUT ETHILON 3-0 FSLX 30 IN 1673H

## (undated) DEVICE — TIBURON LAPAROTOMY DRAPE: Brand: CONVERTORS

## (undated) DEVICE — PROXIMATE PLUS MD MULTI-DIRECTIONAL RELEASE SKIN STAPLERS CONTAINS 35 STAINLESS STEEL STAPLES APPROXIMATE CLOSED DIMENSIONS: 6.9MM X 3.9MM WIDE: Brand: PROXIMATE

## (undated) DEVICE — VIOLET BRAIDED (POLYGLACTIN 910), SYNTHETIC ABSORBABLE SUTURE: Brand: COATED VICRYL

## (undated) DEVICE — BRUSH ENDO CLEANING DBL-HEADER

## (undated) DEVICE — FABRIC REINFORCED, SURGICAL GOWN, XL: Brand: CONVERTORS

## (undated) DEVICE — VAC CANISTER 500ML

## (undated) DEVICE — 3M™ V.A.C.® GRANUFOAM™ DRESSING KIT, M8275052, MEDIUM: Brand: 3M™ V.A.C.® GRANUFOAM™

## (undated) DEVICE — "MAJ-901 WATER CONTAINER SET CV-160/140": Brand: WATER CONTAINER

## (undated) DEVICE — Device

## (undated) DEVICE — LABEL STERILE (RSC) (2-SENSOR CAINE 2-LIDOCAINE 2-HEPARIN)

## (undated) DEVICE — "MH-443 SUCTION VALVE F/EVIS140 EVIS160": Brand: SUCTION VALVE

## (undated) DEVICE — GLOVE EXAM NON-STRL NTRL PLUS LRG PF

## (undated) DEVICE — 1200CC GUARDIAN II: Brand: GUARDIAN

## (undated) DEVICE — WORKING LENGTH 235CM, WORKING CHANNEL 2.8MM: Brand: RESOLUTION 360 CLIP

## (undated) DEVICE — TRAVELKIT CONTAINS FIRST STEP KIT (200ML EP-4 KIT) AND SOILED SCOPE BAG - 1 KIT: Brand: TRAVELKIT CONTAINS FIRST STEP KIT AND SOILED SCOPE BAG

## (undated) DEVICE — BASIC DOUBLE BASIN 2-LF: Brand: MEDLINE INDUSTRIES, INC.

## (undated) DEVICE — INTENDED FOR TISSUE SEPARATION, AND OTHER PROCEDURES THAT REQUIRE A SHARP SURGICAL BLADE TO PUNCTURE OR CUT.: Brand: BARD-PARKER SAFETY BLADES SIZE 10, STERILE

## (undated) DEVICE — INTENDED FOR TISSUE SEPARATION, AND OTHER PROCEDURES THAT REQUIRE A SHARP SURGICAL BLADE TO PUNCTURE OR CUT.: Brand: BARD-PARKER ® CARBON RIB-BACK BLADES

## (undated) DEVICE — ABDOMINAL PAD: Brand: DERMACEA

## (undated) DEVICE — "MB-142 MOUTHPIECE": Brand: MOUTHPIECE

## (undated) DEVICE — ASTOUND IMPERVIOUS SURGICAL GOWN: Brand: CONVERTORS

## (undated) DEVICE — NEEDLE SCLERO INTERJECT 25G 240MM

## (undated) DEVICE — GLOVE INDICATOR PI UNDERGLOVE SZ 7.5 BLUE

## (undated) DEVICE — BITE BLOCK MAXI 60FR LF STRAP

## (undated) DEVICE — DRAPE SHEET X-LG

## (undated) DEVICE — IODOFORM PACKING STRIP: Brand: CURITY

## (undated) DEVICE — SOLIDIFIER FLUID WASTE CONTROL 1500ML

## (undated) DEVICE — POV-IOD SOLUTION 4OZ BT